# Patient Record
Sex: MALE | Race: WHITE | NOT HISPANIC OR LATINO | URBAN - METROPOLITAN AREA
[De-identification: names, ages, dates, MRNs, and addresses within clinical notes are randomized per-mention and may not be internally consistent; named-entity substitution may affect disease eponyms.]

---

## 2022-03-07 ENCOUNTER — INPATIENT (INPATIENT)
Facility: HOSPITAL | Age: 77
LOS: 8 days | Discharge: NURSING, REHAB OR LONG-TERM CARE | DRG: 870 | End: 2022-03-16
Attending: INTERNAL MEDICINE | Admitting: INTERNAL MEDICINE
Payer: MEDICARE

## 2022-03-07 VITALS
SYSTOLIC BLOOD PRESSURE: 153 MMHG | DIASTOLIC BLOOD PRESSURE: 84 MMHG | TEMPERATURE: 101 F | RESPIRATION RATE: 20 BRPM | OXYGEN SATURATION: 96 % | HEART RATE: 94 BPM | HEIGHT: 68 IN

## 2022-03-07 DIAGNOSIS — Z96.642 PRESENCE OF LEFT ARTIFICIAL HIP JOINT: Chronic | ICD-10-CM

## 2022-03-07 DIAGNOSIS — Z29.9 ENCOUNTER FOR PROPHYLACTIC MEASURES, UNSPECIFIED: ICD-10-CM

## 2022-03-07 DIAGNOSIS — Z98.89 OTHER SPECIFIED POSTPROCEDURAL STATES: Chronic | ICD-10-CM

## 2022-03-07 DIAGNOSIS — Z98.84 BARIATRIC SURGERY STATUS: Chronic | ICD-10-CM

## 2022-03-07 DIAGNOSIS — I10 ESSENTIAL (PRIMARY) HYPERTENSION: ICD-10-CM

## 2022-03-07 DIAGNOSIS — J18.9 PNEUMONIA, UNSPECIFIED ORGANISM: ICD-10-CM

## 2022-03-07 DIAGNOSIS — J96.01 ACUTE RESPIRATORY FAILURE WITH HYPOXIA: ICD-10-CM

## 2022-03-07 DIAGNOSIS — I48.91 UNSPECIFIED ATRIAL FIBRILLATION: ICD-10-CM

## 2022-03-07 DIAGNOSIS — G40.909 EPILEPSY, UNSPECIFIED, NOT INTRACTABLE, WITHOUT STATUS EPILEPTICUS: ICD-10-CM

## 2022-03-07 LAB
ALBUMIN SERPL ELPH-MCNC: 2.1 G/DL — LOW (ref 3.5–5)
ALBUMIN SERPL ELPH-MCNC: 2.2 G/DL — LOW (ref 3.5–5)
ALP SERPL-CCNC: 115 U/L — SIGNIFICANT CHANGE UP (ref 40–120)
ALP SERPL-CCNC: 117 U/L — SIGNIFICANT CHANGE UP (ref 40–120)
ALT FLD-CCNC: 29 U/L DA — SIGNIFICANT CHANGE UP (ref 10–60)
ALT FLD-CCNC: 33 U/L DA — SIGNIFICANT CHANGE UP (ref 10–60)
ANION GAP SERPL CALC-SCNC: 2 MMOL/L — LOW (ref 5–17)
ANION GAP SERPL CALC-SCNC: 5 MMOL/L — SIGNIFICANT CHANGE UP (ref 5–17)
APPEARANCE UR: CLEAR — SIGNIFICANT CHANGE UP
APTT BLD: 26.7 SEC — LOW (ref 27.5–35.5)
AST SERPL-CCNC: 25 U/L — SIGNIFICANT CHANGE UP (ref 10–40)
AST SERPL-CCNC: 37 U/L — SIGNIFICANT CHANGE UP (ref 10–40)
BASE EXCESS BLDA CALC-SCNC: 14.2 MMOL/L — HIGH (ref -2–3)
BASE EXCESS BLDV CALC-SCNC: 13.8 MMOL/L — SIGNIFICANT CHANGE UP
BASOPHILS # BLD AUTO: 0.1 K/UL — SIGNIFICANT CHANGE UP (ref 0–0.2)
BASOPHILS NFR BLD AUTO: 0.6 % — SIGNIFICANT CHANGE UP (ref 0–2)
BILIRUB SERPL-MCNC: 0.2 MG/DL — SIGNIFICANT CHANGE UP (ref 0.2–1.2)
BILIRUB SERPL-MCNC: 0.2 MG/DL — SIGNIFICANT CHANGE UP (ref 0.2–1.2)
BILIRUB UR-MCNC: NEGATIVE — SIGNIFICANT CHANGE UP
BLOOD GAS COMMENTS ARTERIAL: SIGNIFICANT CHANGE UP
BUN SERPL-MCNC: 32 MG/DL — HIGH (ref 7–18)
BUN SERPL-MCNC: 32 MG/DL — HIGH (ref 7–18)
CALCIUM SERPL-MCNC: 8.6 MG/DL — SIGNIFICANT CHANGE UP (ref 8.4–10.5)
CALCIUM SERPL-MCNC: 9 MG/DL — SIGNIFICANT CHANGE UP (ref 8.4–10.5)
CHLORIDE SERPL-SCNC: 107 MMOL/L — SIGNIFICANT CHANGE UP (ref 96–108)
CHLORIDE SERPL-SCNC: 107 MMOL/L — SIGNIFICANT CHANGE UP (ref 96–108)
CO2 SERPL-SCNC: 32 MMOL/L — HIGH (ref 22–31)
CO2 SERPL-SCNC: 35 MMOL/L — HIGH (ref 22–31)
COLOR SPEC: YELLOW — SIGNIFICANT CHANGE UP
CREAT SERPL-MCNC: 0.98 MG/DL — SIGNIFICANT CHANGE UP (ref 0.5–1.3)
CREAT SERPL-MCNC: 1 MG/DL — SIGNIFICANT CHANGE UP (ref 0.5–1.3)
CRP SERPL-MCNC: 87 MG/L — HIGH
DIFF PNL FLD: NEGATIVE — SIGNIFICANT CHANGE UP
EGFR: 78 ML/MIN/1.73M2 — SIGNIFICANT CHANGE UP
EGFR: 80 ML/MIN/1.73M2 — SIGNIFICANT CHANGE UP
EOSINOPHIL # BLD AUTO: 0.03 K/UL — SIGNIFICANT CHANGE UP (ref 0–0.5)
EOSINOPHIL NFR BLD AUTO: 0.2 % — SIGNIFICANT CHANGE UP (ref 0–6)
ERYTHROCYTE [SEDIMENTATION RATE] IN BLOOD: 109 MM/HR — HIGH (ref 0–20)
GLUCOSE SERPL-MCNC: 147 MG/DL — HIGH (ref 70–99)
GLUCOSE SERPL-MCNC: 156 MG/DL — HIGH (ref 70–99)
GLUCOSE UR QL: NEGATIVE — SIGNIFICANT CHANGE UP
HCO3 BLDA-SCNC: 40 MMOL/L — HIGH (ref 21–28)
HCO3 BLDV-SCNC: 40 MMOL/L — HIGH (ref 22–29)
HCT VFR BLD CALC: 28.1 % — LOW (ref 39–50)
HCT VFR BLD CALC: 30.4 % — LOW (ref 39–50)
HGB BLD-MCNC: 8.5 G/DL — LOW (ref 13–17)
HGB BLD-MCNC: 9 G/DL — LOW (ref 13–17)
HOROWITZ INDEX BLDA+IHG-RTO: 70 — SIGNIFICANT CHANGE UP
HOROWITZ INDEX BLDV+IHG-RTO: 21 — SIGNIFICANT CHANGE UP
IMM GRANULOCYTES NFR BLD AUTO: 0.6 % — SIGNIFICANT CHANGE UP (ref 0–1.5)
INR BLD: 1.13 RATIO — SIGNIFICANT CHANGE UP (ref 0.88–1.16)
KETONES UR-MCNC: NEGATIVE — SIGNIFICANT CHANGE UP
LACTATE SERPL-SCNC: 1.5 MMOL/L — SIGNIFICANT CHANGE UP (ref 0.7–2)
LACTATE SERPL-SCNC: 2.1 MMOL/L — HIGH (ref 0.7–2)
LEUKOCYTE ESTERASE UR-ACNC: NEGATIVE — SIGNIFICANT CHANGE UP
LYMPHOCYTES # BLD AUTO: 1.05 K/UL — SIGNIFICANT CHANGE UP (ref 1–3.3)
LYMPHOCYTES # BLD AUTO: 6.3 % — LOW (ref 13–44)
MAGNESIUM SERPL-MCNC: 2.8 MG/DL — HIGH (ref 1.6–2.6)
MCHC RBC-ENTMCNC: 29.6 GM/DL — LOW (ref 32–36)
MCHC RBC-ENTMCNC: 30.2 GM/DL — LOW (ref 32–36)
MCHC RBC-ENTMCNC: 31 PG — SIGNIFICANT CHANGE UP (ref 27–34)
MCHC RBC-ENTMCNC: 31 PG — SIGNIFICANT CHANGE UP (ref 27–34)
MCV RBC AUTO: 102.6 FL — HIGH (ref 80–100)
MCV RBC AUTO: 104.8 FL — HIGH (ref 80–100)
MONOCYTES # BLD AUTO: 0.48 K/UL — SIGNIFICANT CHANGE UP (ref 0–0.9)
MONOCYTES NFR BLD AUTO: 2.9 % — SIGNIFICANT CHANGE UP (ref 2–14)
NEUTROPHILS # BLD AUTO: 14.82 K/UL — HIGH (ref 1.8–7.4)
NEUTROPHILS NFR BLD AUTO: 89.4 % — HIGH (ref 43–77)
NITRITE UR-MCNC: NEGATIVE — SIGNIFICANT CHANGE UP
NRBC # BLD: 0 /100 WBCS — SIGNIFICANT CHANGE UP (ref 0–0)
NRBC # BLD: 0 /100 WBCS — SIGNIFICANT CHANGE UP (ref 0–0)
NT-PROBNP SERPL-SCNC: 8249 PG/ML — HIGH (ref 0–450)
PCO2 BLDA: 54 MMHG — HIGH (ref 35–48)
PCO2 BLDV: 55 MMHG — SIGNIFICANT CHANGE UP (ref 42–55)
PH BLDA: 7.48 — HIGH (ref 7.35–7.45)
PH BLDV: 7.47 — HIGH (ref 7.32–7.43)
PH UR: 6 — SIGNIFICANT CHANGE UP (ref 5–8)
PHOSPHATE SERPL-MCNC: 4.1 MG/DL — SIGNIFICANT CHANGE UP (ref 2.5–4.5)
PLATELET # BLD AUTO: 303 K/UL — SIGNIFICANT CHANGE UP (ref 150–400)
PLATELET # BLD AUTO: 306 K/UL — SIGNIFICANT CHANGE UP (ref 150–400)
PO2 BLDA: 85 MMHG — SIGNIFICANT CHANGE UP (ref 83–108)
PO2 BLDV: 56 MMHG — SIGNIFICANT CHANGE UP
POTASSIUM SERPL-MCNC: 4 MMOL/L — SIGNIFICANT CHANGE UP (ref 3.5–5.3)
POTASSIUM SERPL-MCNC: 4.4 MMOL/L — SIGNIFICANT CHANGE UP (ref 3.5–5.3)
POTASSIUM SERPL-SCNC: 4 MMOL/L — SIGNIFICANT CHANGE UP (ref 3.5–5.3)
POTASSIUM SERPL-SCNC: 4.4 MMOL/L — SIGNIFICANT CHANGE UP (ref 3.5–5.3)
PROCALCITONIN SERPL-MCNC: 0.11 NG/ML — HIGH (ref 0.02–0.1)
PROT SERPL-MCNC: 7.5 G/DL — SIGNIFICANT CHANGE UP (ref 6–8.3)
PROT SERPL-MCNC: 7.9 G/DL — SIGNIFICANT CHANGE UP (ref 6–8.3)
PROT UR-MCNC: 15
PROTHROM AB SERPL-ACNC: 13.5 SEC — HIGH (ref 10.5–13.4)
RBC # BLD: 2.74 M/UL — LOW (ref 4.2–5.8)
RBC # BLD: 2.9 M/UL — LOW (ref 4.2–5.8)
RBC # FLD: 17.7 % — HIGH (ref 10.3–14.5)
RBC # FLD: 18 % — HIGH (ref 10.3–14.5)
SAO2 % BLDA: 98 % — SIGNIFICANT CHANGE UP
SAO2 % BLDV: 87.3 % — SIGNIFICANT CHANGE UP
SARS-COV-2 RNA SPEC QL NAA+PROBE: SIGNIFICANT CHANGE UP
SODIUM SERPL-SCNC: 144 MMOL/L — SIGNIFICANT CHANGE UP (ref 135–145)
SODIUM SERPL-SCNC: 144 MMOL/L — SIGNIFICANT CHANGE UP (ref 135–145)
SP GR SPEC: 1.01 — SIGNIFICANT CHANGE UP (ref 1.01–1.02)
TROPONIN I, HIGH SENSITIVITY RESULT: 29.8 NG/L — SIGNIFICANT CHANGE UP
UROBILINOGEN FLD QL: NEGATIVE — SIGNIFICANT CHANGE UP
WBC # BLD: 14.44 K/UL — HIGH (ref 3.8–10.5)
WBC # BLD: 16.58 K/UL — HIGH (ref 3.8–10.5)
WBC # FLD AUTO: 14.44 K/UL — HIGH (ref 3.8–10.5)
WBC # FLD AUTO: 16.58 K/UL — HIGH (ref 3.8–10.5)

## 2022-03-07 PROCEDURE — 99285 EMERGENCY DEPT VISIT HI MDM: CPT

## 2022-03-07 PROCEDURE — 71045 X-RAY EXAM CHEST 1 VIEW: CPT | Mod: 26

## 2022-03-07 PROCEDURE — 93306 TTE W/DOPPLER COMPLETE: CPT | Mod: 26

## 2022-03-07 PROCEDURE — 99223 1ST HOSP IP/OBS HIGH 75: CPT

## 2022-03-07 PROCEDURE — 93010 ELECTROCARDIOGRAM REPORT: CPT

## 2022-03-07 RX ORDER — AMANTADINE HCL 100 MG
100 CAPSULE ORAL
Refills: 0 | Status: DISCONTINUED | OUTPATIENT
Start: 2022-03-07 | End: 2022-03-16

## 2022-03-07 RX ORDER — TIOTROPIUM BROMIDE 18 UG/1
1 CAPSULE ORAL; RESPIRATORY (INHALATION) DAILY
Refills: 0 | Status: DISCONTINUED | OUTPATIENT
Start: 2022-03-07 | End: 2022-03-09

## 2022-03-07 RX ORDER — AMIODARONE HYDROCHLORIDE 400 MG/1
200 TABLET ORAL DAILY
Refills: 0 | Status: DISCONTINUED | OUTPATIENT
Start: 2022-03-07 | End: 2022-03-16

## 2022-03-07 RX ORDER — VANCOMYCIN HCL 1 G
1000 VIAL (EA) INTRAVENOUS ONCE
Refills: 0 | Status: COMPLETED | OUTPATIENT
Start: 2022-03-07 | End: 2022-03-07

## 2022-03-07 RX ORDER — VANCOMYCIN HCL 1 G
1000 VIAL (EA) INTRAVENOUS EVERY 24 HOURS
Refills: 0 | Status: DISCONTINUED | OUTPATIENT
Start: 2022-03-07 | End: 2022-03-07

## 2022-03-07 RX ORDER — CHLORHEXIDINE GLUCONATE 213 G/1000ML
15 SOLUTION TOPICAL EVERY 12 HOURS
Refills: 0 | Status: DISCONTINUED | OUTPATIENT
Start: 2022-03-07 | End: 2022-03-13

## 2022-03-07 RX ORDER — ENOXAPARIN SODIUM 100 MG/ML
40 INJECTION SUBCUTANEOUS EVERY 24 HOURS
Refills: 0 | Status: DISCONTINUED | OUTPATIENT
Start: 2022-03-07 | End: 2022-03-16

## 2022-03-07 RX ORDER — IPRATROPIUM/ALBUTEROL SULFATE 18-103MCG
3 AEROSOL WITH ADAPTER (GRAM) INHALATION ONCE
Refills: 0 | Status: COMPLETED | OUTPATIENT
Start: 2022-03-07 | End: 2022-03-07

## 2022-03-07 RX ORDER — ACETAMINOPHEN 500 MG
650 TABLET ORAL ONCE
Refills: 0 | Status: COMPLETED | OUTPATIENT
Start: 2022-03-07 | End: 2022-03-07

## 2022-03-07 RX ORDER — PIPERACILLIN AND TAZOBACTAM 4; .5 G/20ML; G/20ML
3.38 INJECTION, POWDER, LYOPHILIZED, FOR SOLUTION INTRAVENOUS EVERY 8 HOURS
Refills: 0 | Status: COMPLETED | OUTPATIENT
Start: 2022-03-07 | End: 2022-03-13

## 2022-03-07 RX ORDER — SENNA PLUS 8.6 MG/1
10 TABLET ORAL AT BEDTIME
Refills: 0 | Status: DISCONTINUED | OUTPATIENT
Start: 2022-03-07 | End: 2022-03-16

## 2022-03-07 RX ORDER — SODIUM CHLORIDE 9 MG/ML
1000 INJECTION INTRAMUSCULAR; INTRAVENOUS; SUBCUTANEOUS
Refills: 0 | Status: DISCONTINUED | OUTPATIENT
Start: 2022-03-07 | End: 2022-03-07

## 2022-03-07 RX ORDER — SODIUM CHLORIDE 9 MG/ML
1000 INJECTION INTRAMUSCULAR; INTRAVENOUS; SUBCUTANEOUS ONCE
Refills: 0 | Status: COMPLETED | OUTPATIENT
Start: 2022-03-07 | End: 2022-03-07

## 2022-03-07 RX ORDER — LACTULOSE 10 G/15ML
6.67 SOLUTION ORAL
Refills: 0 | Status: DISCONTINUED | OUTPATIENT
Start: 2022-03-07 | End: 2022-03-16

## 2022-03-07 RX ORDER — ACETAMINOPHEN 500 MG
1000 TABLET ORAL ONCE
Refills: 0 | Status: COMPLETED | OUTPATIENT
Start: 2022-03-07 | End: 2022-03-08

## 2022-03-07 RX ORDER — ALBUTEROL 90 UG/1
2 AEROSOL, METERED ORAL EVERY 6 HOURS
Refills: 0 | Status: DISCONTINUED | OUTPATIENT
Start: 2022-03-07 | End: 2022-03-16

## 2022-03-07 RX ORDER — TAMSULOSIN HYDROCHLORIDE 0.4 MG/1
0.4 CAPSULE ORAL AT BEDTIME
Refills: 0 | Status: DISCONTINUED | OUTPATIENT
Start: 2022-03-07 | End: 2022-03-16

## 2022-03-07 RX ORDER — FOLIC ACID 0.8 MG
1 TABLET ORAL DAILY
Refills: 0 | Status: DISCONTINUED | OUTPATIENT
Start: 2022-03-07 | End: 2022-03-16

## 2022-03-07 RX ORDER — CARVEDILOL PHOSPHATE 80 MG/1
6.25 CAPSULE, EXTENDED RELEASE ORAL EVERY 12 HOURS
Refills: 0 | Status: DISCONTINUED | OUTPATIENT
Start: 2022-03-07 | End: 2022-03-08

## 2022-03-07 RX ORDER — LEVETIRACETAM 250 MG/1
250 TABLET, FILM COATED ORAL
Refills: 0 | Status: DISCONTINUED | OUTPATIENT
Start: 2022-03-07 | End: 2022-03-08

## 2022-03-07 RX ORDER — MORPHINE SULFATE 50 MG/1
1 CAPSULE, EXTENDED RELEASE ORAL EVERY 4 HOURS
Refills: 0 | Status: DISCONTINUED | OUTPATIENT
Start: 2022-03-07 | End: 2022-03-08

## 2022-03-07 RX ORDER — ASCORBIC ACID 60 MG
500 TABLET,CHEWABLE ORAL DAILY
Refills: 0 | Status: DISCONTINUED | OUTPATIENT
Start: 2022-03-07 | End: 2022-03-16

## 2022-03-07 RX ORDER — MORPHINE SULFATE 50 MG/1
2 CAPSULE, EXTENDED RELEASE ORAL ONCE
Refills: 0 | Status: DISCONTINUED | OUTPATIENT
Start: 2022-03-07 | End: 2022-03-07

## 2022-03-07 RX ORDER — PIPERACILLIN AND TAZOBACTAM 4; .5 G/20ML; G/20ML
3.38 INJECTION, POWDER, LYOPHILIZED, FOR SOLUTION INTRAVENOUS ONCE
Refills: 0 | Status: COMPLETED | OUTPATIENT
Start: 2022-03-07 | End: 2022-03-07

## 2022-03-07 RX ORDER — COLLAGENASE CLOSTRIDIUM HIST. 250 UNIT/G
1 OINTMENT (GRAM) TOPICAL DAILY
Refills: 0 | Status: DISCONTINUED | OUTPATIENT
Start: 2022-03-07 | End: 2022-03-13

## 2022-03-07 RX ADMIN — Medication 250 MILLIGRAM(S): at 22:35

## 2022-03-07 RX ADMIN — MORPHINE SULFATE 2 MILLIGRAM(S): 50 CAPSULE, EXTENDED RELEASE ORAL at 16:35

## 2022-03-07 RX ADMIN — PIPERACILLIN AND TAZOBACTAM 25 GRAM(S): 4; .5 INJECTION, POWDER, LYOPHILIZED, FOR SOLUTION INTRAVENOUS at 14:50

## 2022-03-07 RX ADMIN — ALBUTEROL 2 PUFF(S): 90 AEROSOL, METERED ORAL at 10:21

## 2022-03-07 RX ADMIN — LEVETIRACETAM 250 MILLIGRAM(S): 250 TABLET, FILM COATED ORAL at 22:09

## 2022-03-07 RX ADMIN — Medication 1 TABLET(S): at 13:28

## 2022-03-07 RX ADMIN — AMIODARONE HYDROCHLORIDE 200 MILLIGRAM(S): 400 TABLET ORAL at 05:59

## 2022-03-07 RX ADMIN — Medication 1 APPLICATION(S): at 13:39

## 2022-03-07 RX ADMIN — SODIUM CHLORIDE 12 MILLILITER(S): 9 INJECTION INTRAMUSCULAR; INTRAVENOUS; SUBCUTANEOUS at 13:30

## 2022-03-07 RX ADMIN — CARVEDILOL PHOSPHATE 6.25 MILLIGRAM(S): 80 CAPSULE, EXTENDED RELEASE ORAL at 05:59

## 2022-03-07 RX ADMIN — ENOXAPARIN SODIUM 40 MILLIGRAM(S): 100 INJECTION SUBCUTANEOUS at 13:29

## 2022-03-07 RX ADMIN — SODIUM CHLORIDE 500 MILLILITER(S): 9 INJECTION INTRAMUSCULAR; INTRAVENOUS; SUBCUTANEOUS at 03:23

## 2022-03-07 RX ADMIN — TAMSULOSIN HYDROCHLORIDE 0.4 MILLIGRAM(S): 0.4 CAPSULE ORAL at 23:26

## 2022-03-07 RX ADMIN — Medication 250 MILLIGRAM(S): at 02:35

## 2022-03-07 RX ADMIN — Medication 500 MILLIGRAM(S): at 13:29

## 2022-03-07 RX ADMIN — TIOTROPIUM BROMIDE 1 CAPSULE(S): 18 CAPSULE ORAL; RESPIRATORY (INHALATION) at 13:28

## 2022-03-07 RX ADMIN — MORPHINE SULFATE 2 MILLIGRAM(S): 50 CAPSULE, EXTENDED RELEASE ORAL at 16:09

## 2022-03-07 RX ADMIN — PIPERACILLIN AND TAZOBACTAM 3.38 GRAM(S): 4; .5 INJECTION, POWDER, LYOPHILIZED, FOR SOLUTION INTRAVENOUS at 03:35

## 2022-03-07 RX ADMIN — SENNA PLUS 10 MILLILITER(S): 8.6 TABLET ORAL at 22:52

## 2022-03-07 RX ADMIN — PIPERACILLIN AND TAZOBACTAM 200 GRAM(S): 4; .5 INJECTION, POWDER, LYOPHILIZED, FOR SOLUTION INTRAVENOUS at 02:35

## 2022-03-07 RX ADMIN — LEVETIRACETAM 250 MILLIGRAM(S): 250 TABLET, FILM COATED ORAL at 05:59

## 2022-03-07 RX ADMIN — Medication 1 MILLIGRAM(S): at 13:29

## 2022-03-07 RX ADMIN — Medication 650 MILLIGRAM(S): at 04:14

## 2022-03-07 RX ADMIN — Medication 3 MILLILITER(S): at 02:38

## 2022-03-07 RX ADMIN — PIPERACILLIN AND TAZOBACTAM 25 GRAM(S): 4; .5 INJECTION, POWDER, LYOPHILIZED, FOR SOLUTION INTRAVENOUS at 23:26

## 2022-03-07 RX ADMIN — ALBUTEROL 2 PUFF(S): 90 AEROSOL, METERED ORAL at 16:30

## 2022-03-07 RX ADMIN — Medication 100 MILLIGRAM(S): at 06:00

## 2022-03-07 RX ADMIN — Medication 100 MILLIGRAM(S): at 23:22

## 2022-03-07 RX ADMIN — Medication 1000 MILLIGRAM(S): at 03:35

## 2022-03-07 RX ADMIN — LACTULOSE 6.67 GRAM(S): 10 SOLUTION ORAL at 05:59

## 2022-03-07 RX ADMIN — LACTULOSE 6.67 GRAM(S): 10 SOLUTION ORAL at 22:11

## 2022-03-07 RX ADMIN — ALBUTEROL 2 PUFF(S): 90 AEROSOL, METERED ORAL at 22:50

## 2022-03-07 RX ADMIN — CARVEDILOL PHOSPHATE 6.25 MILLIGRAM(S): 80 CAPSULE, EXTENDED RELEASE ORAL at 21:49

## 2022-03-07 RX ADMIN — Medication 650 MILLIGRAM(S): at 03:44

## 2022-03-07 RX ADMIN — CHLORHEXIDINE GLUCONATE 15 MILLILITER(S): 213 SOLUTION TOPICAL at 23:19

## 2022-03-07 NOTE — H&P ADULT - NSHPPHYSICALEXAM_GEN_ALL_CORE
ICU Vital Signs Last 24 Hrs  T(C): 37.2 (07 Mar 2022 04:17), Max: 38.1 (07 Mar 2022 01:15)  T(F): 99 (07 Mar 2022 04:17), Max: 100.6 (07 Mar 2022 01:15)  HR: 92 (07 Mar 2022 04:17) (92 - 98)  BP: 167/88 (07 Mar 2022 04:17) (153/84 - 167/88)  RR: 25 (07 Mar 2022 04:17) (20 - 25)  SpO2: 96% (07 Mar 2022 04:17) (96% - 96%)    GENERAL: NAD, lying in bed, Trach/PEG,  in respiratory distress on 15L T collar saturatin 95%  HEAD:  Atraumatic, Normocephalic  EYES: EOMI, PERRLA, conjunctiva and sclera clear  ENT: Moist mucous membranes  NECK: Supple, No JVD  CHEST/LUNG: Diminished breath sound bilaterally   HEART: Regular rate and rhythm; No murmurs, rubs, or gallops  ABDOMEN: Bowel sounds present; Soft, Nontende.  EXTREMITIES:  2+ Peripheral Pulses, brisk capillary refill. No clubbing, cyanosis, or edema  NERVOUS SYSTEM:  Alert & Oriented X0-1. non verbal   MSK: Unable to assess   SKIN: Sacral ulcer as per chart ( unable to prone the patient due to desat and his weight )

## 2022-03-07 NOTE — CHART NOTE - NSCHARTNOTEFT_GEN_A_CORE
OBJECTIVE:  Vital Signs Last 24 Hrs  T(C): 36.7 (07 Mar 2022 07:34), Max: 38.1 (07 Mar 2022 01:15)  T(F): 98.1 (07 Mar 2022 07:34), Max: 100.6 (07 Mar 2022 01:15)  HR: 100 (07 Mar 2022 07:34) (92 - 107)  BP: 140/83 (07 Mar 2022 07:34) (140/83 - 197/83)  BP(mean): --  RR: 29 (07 Mar 2022 07:34) (20 - 29)  SpO2: 95% (07 Mar 2022 07:34) (93% - 97%)    FOCUSED PHYSICAL EXAM: lethargic, trach to 15L c-collar venti mask, requires frequent suctioning, approtiote candidate for AI, ICU consluted    LABS:                        8.5    14.44 )-----------( 303      ( 07 Mar 2022 05:34 )             28.1     03-07    144  |  107  |  32<H>  ----------------------------<  147<H>  4.0   |  35<H>  |  0.98    Ca    8.6      07 Mar 2022 05:34  Phos  4.1     03-07  Mg     2.8     03-07    TPro  7.5  /  Alb  2.2<L>  /  TBili  0.2  /  DBili  x   /  AST  25  /  ALT  29  /  AlkPhos  115  03-07      IMGAGING:    < from: Xray Chest 1 View-PORTABLE IMMEDIATE (03.07.22 @ 01:51) >    ACC: [id number] EXAM:  XR CHEST PORTABLE IMMED 1V                          PROCEDURE DATE:  03/07/2022          INTERPRETATION:  INDICATION: Sepsis    PRIORS: None    VIEWS: Portable AP radiography of the chest performed. Evaluation limited   secondary to portable technique and shallow inspiration.    FINDINGS: Heart size and mediastinal contours cannot be assessed on this   evaluation. Midline tracheostomy. Interstitial prominence may reflect   shallow inspiration. Increased markings within the lung bases suggests   atelectatic change. Small pleural effusions cannot be excluded. There is   no evidence for pneumothorax. No mediastinal shift.    IMPRESSION: As above.    --- End of Report ---            BECK SHEPHERD MD; Attending Radiologist  This document has been electronically signed. Mar  7 2022  9:00AM    < end of copied text >    HPI:  · Assessment    Patient is 76 years old male (with Trach/Peg) from NH with past medical history Respiratory failure, pressure ulcer of sacral region, intracranial injury, anemia, UTI, was brought to ED due to respiratory distress and hypoxia from nursing home. After 12am, it was noted that his O2 was 82%, he had labored breathing and was less responsive. At the nursing home, trach was suctioned and he was placed on oxygen and with oxygen, saturation went to 92% and he was sent to ED by EMS. Patient is currently on Zosyn antibiotic therapy.    In ED, Patient was placed Trach collar 15L saturating 95%. CXR showed lung infiltrations. Patient got Vancomycin IV and frequent suction.    Patient will be admitted for AHRF secondary to pneumonia     Problem/Plan - 1:  ·  Problem: Acute respiratory failure with hypoxia.   ·  Plan: - Patient with hx of respiratory failure on Trach/Peg was brought ED due to hypoxia to 82%   - Patient was placed on trach collar 15L saturating 95%  - CXR showed bilateral lung infiltrates   - Cont van and Zosyn  - vanco trough march 9th 0200  - Cont frequent Trach suctions   - Will send Inflammatory markers including Procal   - BNP >8k;   - F/u  Echo.  - ICU conslute for AI    Problem/Plan - 2:  ·  Problem: Pneumonia.   ·  Plan: - Plan as above   -Monitor vitals.    Problem/Plan - 3:  ·  Problem: Afib.   ·  Plan: - EKG showed Afib with RVR; Now HR 90s  - Patient is on Coreg and Amiodarone   - Will continue on same meds now with holding parameters.    Problem/Plan - 4:  ·  Problem: Epileptic seizures.   ·  Plan: - Continue on home meds.    Problem/Plan - 5:  ·  Problem: Hypertension.   ·  Plan: - Continue on home meds with holding parameters.    Problem/Plan - 6:  ·  Problem: Prophylactic measure.   ·  Plan: - Lovenox 40mg daily subcutaneous for DVT pxp.

## 2022-03-07 NOTE — ED PROVIDER NOTE - OBJECTIVE STATEMENT
75 y/o male with a history of respiratory failure with a trach, anemia, subdural hemorrhage, epilepsy, gastrostomy, hypertension, BPH, Afib and currently on Lovenox presents with respiratory distress and hypoxia from nursing home. After 12am, it was noted that his O2 was satting 82%, he had labored breathing and was less responsive. At the nursing home, trach was suctioned and he was placed on oxygen and with oxygen, saturation went to 92% and he was sent to ED by EMS. Patient is currently on Zosyn antibiotic therapy. NKDA.

## 2022-03-07 NOTE — H&P ADULT - NSICDXPASTSURGICALHX_GEN_ALL_CORE_FT
PAST SURGICAL HISTORY:  History of abdominoplasty and subsequent cosmetic surgery for removal of excess skin from face    S/P bunionectomy bilateral    S/P colonoscopy approx 2012    S/P gastric bypass 2008    Status post total replacement of left hip 2006

## 2022-03-07 NOTE — CONSULT NOTE ADULT - SUBJECTIVE AND OBJECTIVE BOX
HPI:  Patient is 76 years old male (with Trach/Peg) from NH with past medical history Respiratory failure, pressure ulcer of sacral region, intracranial injury, anemia, UTI, was brought to ED due to respiratory distress and hypoxia from nursing home. After 12am, it was noted that his O2 was satting 82%, he had labored breathing and was less responsive. At the nursing home, trach was suctioned and he was placed on oxygen and with oxygen, saturation went to 92% and he was sent to ED by EMS. Patient is currently on Zosyn antibiotic therapy.    In ED, Patient was placed Trach collar 15L saturating 95%. CXR showed lung infiltrations. Patient got Vancomycin IV and frequent suction. (07 Mar 2022 05:22)      PAST MEDICAL & SURGICAL HISTORY:  Essential hypertension    Primary osteoarthritis, unspecified site    Closed fracture of left radius, initial encounter    Morbid obesity, unspecified obesity type  h/o. - s/p gastric bypass- lost 113 lbs    KAREN (obstructive sleep apnea)  h/o - was on CPAP until gastric bypass surgery    Respiratory failure    Anemia    Subdural hemorrhage    Epilepsy    H/O gastrostomy    History of BPH    Afib    S/P gastric bypass  2008    Status post total replacement of left hip  2006    S/P bunionectomy  bilateral    S/P colonoscopy  approx 2012    History of abdominoplasty  and subsequent cosmetic surgery for removal of excess skin from face        No Known Allergies      Meds:  ALBUTerol    90 MICROgram(s) HFA Inhaler 2 Puff(s) Inhalation every 6 hours  amantadine Syrup 100 milliGRAM(s) Oral two times a day  aMIOdarone    Tablet 200 milliGRAM(s) Oral daily  ascorbic acid 500 milliGRAM(s) Oral daily  carvedilol 6.25 milliGRAM(s) Oral every 12 hours  chlorhexidine 0.12% Liquid 15 milliLiter(s) Oral Mucosa every 12 hours  collagenase Ointment 1 Application(s) Topical daily  enoxaparin Injectable 40 milliGRAM(s) SubCutaneous every 24 hours  folic acid 1 milliGRAM(s) Oral daily  lactulose Syrup 6.6667 Gram(s) Oral two times a day  levETIRAcetam  Solution 250 milliGRAM(s) Oral two times a day  LORazepam   Injectable 1 milliGRAM(s) IV Push once  morphine  - Injectable 1 milliGRAM(s) IV Push every 4 hours  multivitamin 1 Tablet(s) Oral daily  piperacillin/tazobactam IVPB.. 3.375 Gram(s) IV Intermittent every 8 hours  senna Syrup 10 milliLiter(s) Oral at bedtime  tamsulosin 0.4 milliGRAM(s) Oral at bedtime  tiotropium 18 MICROgram(s) Capsule 1 Capsule(s) Inhalation daily  vancomycin  IVPB 1000 milliGRAM(s) IV Intermittent every 24 hours      SOCIAL HISTORY:  Smoker:  YES / NO        PACK YEARS:                         WHEN QUIT?  ETOH use:  YES / NO               FREQUENCY / QUANTITY:  Ilicit Drug use:  YES / NO  Occupation:  Assisted device use (Cane / Walker):  Live with:    FAMILY HISTORY:  Family history of renal cell carcinoma (Mother)    Family history of malignant melanoma (Sibling)        VITALS:  Vital Signs Last 24 Hrs  T(C): 36.8 (07 Mar 2022 16:00), Max: 38.1 (07 Mar 2022 01:15)  T(F): 98.3 (07 Mar 2022 16:00), Max: 100.6 (07 Mar 2022 01:15)  HR: 106 (07 Mar 2022 16:00) (92 - 107)  BP: 185/104 (07 Mar 2022 16:00) (140/83 - 197/83)  BP(mean): 131 (07 Mar 2022 16:00) (131 - 131)  RR: 28 (07 Mar 2022 16:00) (20 - 29)  SpO2: 97% (07 Mar 2022 17:06) (89% - 97%)    LABS/DIAGNOSTIC TESTS:                          8.5    14.44 )-----------( 303      ( 07 Mar 2022 05:34 )             28.1     WBC Count: 14.44 K/uL ( @ 05:34)  WBC Count: 16.58 K/uL ( @ 02:23)          144  |  107  |  32<H>  ----------------------------<  147<H>  4.0   |  35<H>  |  0.98    Ca    8.6      07 Mar 2022 05:34  Phos  4.1       Mg     2.8         TPro  7.5  /  Alb  2.2<L>  /  TBili  0.2  /  DBili  x   /  AST  25  /  ALT  29  /  AlkPhos  115        Urinalysis Basic - ( 07 Mar 2022 02:30 )    Color: Yellow / Appearance: Clear / S.015 / pH: x  Gluc: x / Ketone: Negative  / Bili: Negative / Urobili: Negative   Blood: x / Protein: 15 / Nitrite: Negative   Leuk Esterase: Negative / RBC: 0-2 /HPF / WBC 0-2 /HPF   Sq Epi: x / Non Sq Epi: Few /HPF / Bacteria: Few /HPF        LIVER FUNCTIONS - ( 07 Mar 2022 05:34 )  Alb: 2.2 g/dL / Pro: 7.5 g/dL / ALK PHOS: 115 U/L / ALT: 29 U/L DA / AST: 25 U/L / GGT: x             PT/INR - ( 07 Mar 2022 02:23 )   PT: 13.5 sec;   INR: 1.13 ratio         PTT - ( 07 Mar 2022 02:23 )  PTT:26.7 sec    LACTATE:Lactate, Blood: 1.5 mmol/L ( @ 04:41)  Lactate, Blood: 2.1 mmol/L ( @ 02:23)      ABG - ABG - ( 07 Mar 2022 16:56 )  pH, Arterial: 7.48  pH, Blood: x     /  pCO2: 54    /  pO2: 85    / HCO3: 40    / Base Excess: 14.2  /  SaO2: 98                  CULTURES:       RADIOLOGY:< from: Xray Chest 1 View-PORTABLE IMMEDIATE (22 @ 01:51) >  ACC: [id number] EXAM:  XR CHEST PORTABLE IMMED 1V                          PROCEDURE DATE:  2022          INTERPRETATION:  INDICATION: Sepsis    PRIORS: None    VIEWS: Portable AP radiography of the chest performed. Evaluation limited   secondary to portable technique and shallow inspiration.    FINDINGS: Heart size and mediastinal contours cannot be assessed on this   evaluation. Midline tracheostomy. Interstitial prominence may reflect   shallow inspiration. Increased markings within the lung bases suggests   atelectatic change. Small pleural effusions cannot be excluded. There is   no evidence for pneumothorax. No mediastinal shift.    IMPRESSION: As above.    --- End of Report ---            BECK SHEPHERD MD; Attending Radiologist  This document has been electronically signed. Mar  7 2022  9:00AM    < end of copied text >        ROS  [  ] UNABLE TO ELICIT               HPI:  Patient is 76 years old male (with Trach/Peg) from NH with past medical history Respiratory failure, pressure ulcer of sacral region, intracranial injury, anemia, UTI, was brought to ED due to respiratory distress and hypoxia from nursing home. After 12am, it was noted that his O2 was satting 82%, he had labored breathing and was less responsive. At the nursing home, trach was suctioned and he was placed on oxygen and with oxygen, saturation went to 92% and he was sent to ED by EMS. Patient is currently on Zosyn antibiotic therapy.    In ED, Patient was placed Trach collar 15L saturating 95%. CXR showed lung infiltrations. Patient got Vancomycin IV and frequent suction. (07 Mar 2022 05:22)      History as above, pt is from a NH and has a trach and peg secondary to a prior intracranial hemorrhage , he was sent to the hospital for respiratory distress and hypoxia. He is currently vent dependent on an FIO2 of 80% and PEEP of 5. Asked to see patient as he has what appears to be Pneumonia along with fevers and leukocytosis. He opens his eyes partially to tactile stimuli.       PAST MEDICAL & SURGICAL HISTORY:  Essential hypertension    Primary osteoarthritis, unspecified site    Closed fracture of left radius, initial encounter    Morbid obesity, unspecified obesity type  h/o. - s/p gastric bypass- lost 113 lbs    KAREN (obstructive sleep apnea)  h/o - was on CPAP until gastric bypass surgery    Respiratory failure    Anemia    Subdural hemorrhage    Epilepsy    H/O gastrostomy    History of BPH    Afib    S/P gastric bypass      Status post total replacement of left hip  2006    S/P bunionectomy  bilateral    S/P colonoscopy  approx 2012    History of abdominoplasty  and subsequent cosmetic surgery for removal of excess skin from face        No Known Allergies      Meds:  ALBUTerol    90 MICROgram(s) HFA Inhaler 2 Puff(s) Inhalation every 6 hours  amantadine Syrup 100 milliGRAM(s) Oral two times a day  aMIOdarone    Tablet 200 milliGRAM(s) Oral daily  ascorbic acid 500 milliGRAM(s) Oral daily  carvedilol 6.25 milliGRAM(s) Oral every 12 hours  chlorhexidine 0.12% Liquid 15 milliLiter(s) Oral Mucosa every 12 hours  collagenase Ointment 1 Application(s) Topical daily  enoxaparin Injectable 40 milliGRAM(s) SubCutaneous every 24 hours  folic acid 1 milliGRAM(s) Oral daily  lactulose Syrup 6.6667 Gram(s) Oral two times a day  levETIRAcetam  Solution 250 milliGRAM(s) Oral two times a day  LORazepam   Injectable 1 milliGRAM(s) IV Push once  morphine  - Injectable 1 milliGRAM(s) IV Push every 4 hours  multivitamin 1 Tablet(s) Oral daily  piperacillin/tazobactam IVPB.. 3.375 Gram(s) IV Intermittent every 8 hours  senna Syrup 10 milliLiter(s) Oral at bedtime  tamsulosin 0.4 milliGRAM(s) Oral at bedtime  tiotropium 18 MICROgram(s) Capsule 1 Capsule(s) Inhalation daily  vancomycin  IVPB 1000 milliGRAM(s) IV Intermittent every 24 hours      SOCIAL HISTORY: unknown      FAMILY HISTORY:  Family history of renal cell carcinoma (Mother)    Family history of malignant melanoma (Sibling)        VITALS:  Vital Signs Last 24 Hrs  T(C): 36.8 (07 Mar 2022 16:00), Max: 38.1 (07 Mar 2022 01:15)  T(F): 98.3 (07 Mar 2022 16:00), Max: 100.6 (07 Mar 2022 01:15)  HR: 106 (07 Mar 2022 16:00) (92 - 107)  BP: 185/104 (07 Mar 2022 16:00) (140/83 - 197/83)  BP(mean): 131 (07 Mar 2022 16:00) (131 - 131)  RR: 28 (07 Mar 2022 16:00) (20 - 29)  SpO2: 97% (07 Mar 2022 17:06) (89% - 97%)    LABS/DIAGNOSTIC TESTS:                          8.5    14.44 )-----------( 303      ( 07 Mar 2022 05:34 )             28.1     WBC Count: 14.44 K/uL ( @ 05:34)  WBC Count: 16.58 K/uL ( @ 02:23)          144  |  107  |  32<H>  ----------------------------<  147<H>  4.0   |  35<H>  |  0.98    Ca    8.6      07 Mar 2022 05:34  Phos  4.1     -  Mg     2.8         TPro  7.5  /  Alb  2.2<L>  /  TBili  0.2  /  DBili  x   /  AST  25  /  ALT  29  /  AlkPhos  115  -      Urinalysis Basic - ( 07 Mar 2022 02:30 )    Color: Yellow / Appearance: Clear / S.015 / pH: x  Gluc: x / Ketone: Negative  / Bili: Negative / Urobili: Negative   Blood: x / Protein: 15 / Nitrite: Negative   Leuk Esterase: Negative / RBC: 0-2 /HPF / WBC 0-2 /HPF   Sq Epi: x / Non Sq Epi: Few /HPF / Bacteria: Few /HPF        LIVER FUNCTIONS - ( 07 Mar 2022 05:34 )  Alb: 2.2 g/dL / Pro: 7.5 g/dL / ALK PHOS: 115 U/L / ALT: 29 U/L DA / AST: 25 U/L / GGT: x             PT/INR - ( 07 Mar 2022 02:23 )   PT: 13.5 sec;   INR: 1.13 ratio         PTT - ( 07 Mar 2022 02:23 )  PTT:26.7 sec    LACTATE:Lactate, Blood: 1.5 mmol/L ( @ 04:41)  Lactate, Blood: 2.1 mmol/L ( @ 02:23)      ABG - ABG - ( 07 Mar 2022 16:56 )  pH, Arterial: 7.48  pH, Blood: x     /  pCO2: 54    /  pO2: 85    / HCO3: 40    / Base Excess: 14.2  /  SaO2: 98                  CULTURES:       RADIOLOGY:< from: Xray Chest 1 View-PORTABLE IMMEDIATE (22 @ 01:51) >  ACC: [id number] EXAM:  XR CHEST PORTABLE IMMED 1V                          PROCEDURE DATE:  2022          INTERPRETATION:  INDICATION: Sepsis    PRIORS: None    VIEWS: Portable AP radiography of the chest performed. Evaluation limited   secondary to portable technique and shallow inspiration.    FINDINGS: Heart size and mediastinal contours cannot be assessed on this   evaluation. Midline tracheostomy. Interstitial prominence may reflect   shallow inspiration. Increased markings within the lung bases suggests   atelectatic change. Small pleural effusions cannot be excluded. There is   no evidence for pneumothorax. No mediastinal shift.    IMPRESSION: As above.    --- End of Report ---            BECK SHEPHERD MD; Attending Radiologist  This document has been electronically signed. Mar  7 2022  9:00AM    < end of copied text >        ROS  [ x ] UNABLE TO ELICIT

## 2022-03-07 NOTE — ED ADULT NURSE REASSESSMENT NOTE - NS ED NURSE REASSESS COMMENT FT1
1630: Pt received from RN Shraddha in holding area. Pt hypoxic, tracheostomy connected to vent. Pt waiting to be transferred to ICU. Pt on cardaic monitor.

## 2022-03-07 NOTE — ED PROVIDER NOTE - NSICDXFAMILYHX_GEN_ALL_CORE_FT
FAMILY HISTORY:  Mother  Still living? No  Family history of renal cell carcinoma, Age at diagnosis: Age Unknown    Sibling  Still living? No  Family history of malignant melanoma, Age at diagnosis: Age Unknown

## 2022-03-07 NOTE — ED ADULT TRIAGE NOTE - CHIEF COMPLAINT QUOTE
Pt BIBA from [facility] for short of breathe and low oxygen saturation received with Venti-mask attached to trach collar.

## 2022-03-07 NOTE — CONSULT NOTE ADULT - ASSESSMENT
76 years old male (with Trach/Peg) from NH with PMH of Respiratory failure, pressure ulcer of sacral region, intracranial injury, anemia, UTI, was brought to ED due to respiratory distress and hypoxia from nursing home. In ED, Patient was placed Trach collar 15L saturating 93%. CXR showed lung infiltrations. Patient requires frequent suctioning. Pt started on Vancomycin and Zosyn.  Patient will be admitted for AHRF secondary to pneumonia     Neuro  #Appears lethargic, AAOX1     #Epilepsy  -c/w home medications      Cardiovascular  #Afib   - EKG showed Afib with RVR; Now HR 90s  - Patient is on Coreg and Amiodarone  - c/w meds     #HTN  -c/w coreg     Pulmonary  #Acute Hypoxic Respiratory Failure   -Patient with hx of respiratory failure on Trach/Peg   - Pt was brought ED due to hypoxia to 82%   - Patient was placed on trach collar 15L saturating 93-95%  -WBC 16K> 14 K  - CXR showed bilateral lung infiltrates   - c/w Vanc and Zosyn  - Cont frequent Trach suctions   - BNP >8k  - F/u  Echo  -f/u sputum Cx and Blood Cx       Infections  #Acute Hypoxic Respiratory Failure likely 2/2 Pneuminia  -Plan as above     Nephro  -No active issues     Gastrointestinal  -No active issues       Endocrine  -No active issues       Prophylaxis   -On Lovenox for DVT ppx    Dispo  Pt will be transferred to       76 years old male (with Trach/Peg) from NH with PMH of Respiratory failure, pressure ulcer of sacral region, intracranial injury, anemia, UTI, was brought to ED due to respiratory distress and hypoxia from nursing home. In ED, Patient was placed Trach collar 15L saturating 93%. CXR showed lung infiltrations. Patient requires frequent suctioning. Pt started on Vancomycin and Zosyn.  Patient will be admitted for AHRF secondary to pneumonia     #Acute Hypoxic Respiratory Failure likely 2/2 Pneumonia   #Epilepsy  #Afib  #HTN         Neuro  #Appears lethargic, AAOX1     #Epilepsy  -c/w home medications through PEG tube       Cardiovascular  #Afib   - EKG showed Afib with RVR; Now HR 100s  - Patient is on Coreg and Amiodarone  - c/w meds     #HTN  -c/w coreg     Pulmonary  #Acute Hypoxic Respiratory Failure   -Patient with hx of respiratory failure on Trach/Peg   - Pt was brought ED due to hypoxia to 82%   - Patient was placed on trach collar 15L saturating 93-95%. Pt will be put on mechanical ventilator due to increased work of breathing   -WBC 16K> 14 K  - CXR showed bilateral lung infiltrates   - c/w Vanc and Zosyn  - Cont frequent Trach suctions   - BNP >8k  - F/u  Echo  -f/u sputum Cx and Blood Cx       Infections  #Acute Hypoxic Respiratory Failure likely 2/2 Pneuminia  -Plan as above     Nephro  -No active issues     Gastrointestinal  -No active issues       Endocrine  -No active issues       Prophylaxis   -On Lovenox for DVT ppx    Dispo  Pt will be transferred to ICU      76 years old male (with Trach/Peg) from NH with PMH of Respiratory failure, pressure ulcer of sacral region, intracranial injury, anemia, UTI, was brought to ED due to respiratory distress and hypoxia from nursing home. In ED, Patient was placed Trach collar 15L saturating 93%. CXR showed lung infiltrations. Patient requires frequent suctioning. Pt started on Vancomycin and Zosyn.  Patient will be admitted for AHRF secondary to pneumonia     #Acute Hypoxic Respiratory Failure likely 2/2 Pneumonia   #Epilepsy  #Afib  #HTN         Neuro  #Appears lethargic, AAOX1     #Epilepsy  -c/w home medications through PEG tube       Cardiovascular  #Afib   - EKG showed Afib with RVR; Now HR 100s  - Patient is on Coreg and Amiodarone  - c/w meds     #HTN  -c/w coreg     Pulmonary  #Acute Hypoxic Respiratory Failure   -Patient with hx of respiratory failure on Trach/Peg   - Pt was brought ED due to hypoxia to 82%   - Patient was placed on trach collar 15L saturating 93-95%. Pt will be put on mechanical ventilator due to increased work of breathing   -WBC 16K> 14 K  - CXR showed bilateral lung infiltrates   - c/w Vanc and Zosyn  - Cont frequent Trach suctions   - BNP >8k  - F/u  Echo  -f/u sputum Cx and Blood Cx       Infections  #Acute Hypoxic Respiratory Failure likely 2/2 Pneuminia  -Plan as above     Nephro  -No active issues     Gastrointestinal  -No active issues       Endocrine  -No active issues       Prophylaxis   -On Lovenox for DVT ppx    Dispo  Pt will be transferred to

## 2022-03-07 NOTE — H&P ADULT - HISTORY OF PRESENT ILLNESS
Patient is 76 years old male from NH with past medical history pressure ulcer of sacral region, intracranial injury, anemia, UTI,  Patient is 76 years old male (with Trach/Peg) from NH with past medical history Respiratory failure, pressure ulcer of sacral region, intracranial injury, anemia, UTI, was brought to ED due to respiratory distress and hypoxia from nursing home. After 12am, it was noted that his O2 was satting 82%, he had labored breathing and was less responsive. At the nursing home, trach was suctioned and he was placed on oxygen and with oxygen, saturation went to 92% and he was sent to ED by EMS. Patient is currently on Zosyn antibiotic therapy.    In ED, Patient was placed Trach collar 15L saturating 95%. CXR showed lung infiltrations. Patient got Vancomycin IV and frequent suction.

## 2022-03-07 NOTE — ED ADULT NURSE REASSESSMENT NOTE - NS ED NURSE REASSESS COMMENT FT1
Pt remains stable, no s/s of any distress noted. IV line in place, IV fluids infusing as per orders, no signs of infiltration noted. Trach connected to vent. O2 saturation improving. VS WNL. Call bell in reach, bed in lowest position. Safety maintained, hourly rounding performed. Pt waiting to be transferred to AI floor. Endorsed to nurse Anne.

## 2022-03-07 NOTE — ED ADULT NURSE NOTE - OBJECTIVE STATEMENT
BIBA from nursing home for SOB and desaturation. Patient is on mask to trach collar 10 L, saturating 93%. PEG tube present.

## 2022-03-07 NOTE — ED PROVIDER NOTE - PHYSICAL EXAMINATION
Mild distress and heart has regular rate and rhythm. Lungs were decreased bilaterally, patient has a trach in place and has a feeding tube in left abdomen. 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 On neuro exam, opens eyes to verbal stimuli and follows simple commands but currently nonverbal.

## 2022-03-07 NOTE — H&P ADULT - ASSESSMENT
Patient is 76 years old male (with Trach/Peg) from NH with past medical history Respiratory failure, pressure ulcer of sacral region, intracranial injury, anemia, UTI, was brought to ED due to respiratory distress and hypoxia from nursing home. After 12am, it was noted that his O2 was 82%, he had labored breathing and was less responsive. At the nursing home, trach was suctioned and he was placed on oxygen and with oxygen, saturation went to 92% and he was sent to ED by EMS. Patient is currently on Zosyn antibiotic therapy.    In ED, Patient was placed Trach collar 15L saturating 95%. CXR showed lung infiltrations. Patient got Vancomycin IV and frequent suction.    Patient will be admitted for AHRF secondary to pneumonia

## 2022-03-07 NOTE — ED PROVIDER NOTE - CLINICAL SUMMARY MEDICAL DECISION MAKING FREE TEXT BOX
75 y/o male with a history of respiratory failure with a trach, anemia, subdural hemorrhage, epilepsy, gastrostomy, hypertension, BPH, Afib and currently on Lovenox presents with respiratory distress and hypoxia from nursing home. Will obtain EKG, labs and chest x-ray. In ED, trach was suctioned with thick, yellowish sputum. Patient was placed on aerosol mask at 15L satting 94%. Will continue to monitor. 77 y/o male with a history of respiratory failure with a trach, anemia, subdural hemorrhage, epilepsy, gastrostomy, hypertension, BPH, Afib and currently on Lovenox presents with respiratory distress and hypoxia from nursing home. Will obtain EKG, labs and chest x-ray. In ED, trach was suctioned with thick, yellowish sputum. Patient was placed on aerosol mask at 15L with O2sat 94%. Given empiric antibiotic. Will continue to monitor.    ekg shows afib with , trop wnl, wbc 16K, lactate 2.1, probnp 8249, covid neg, CXR shows possible infiltrate in RLL.  Will admit for further management.

## 2022-03-07 NOTE — CONSULT NOTE ADULT - SUBJECTIVE AND OBJECTIVE BOX
Patient is a 76y old  Male who presents with a chief complaint of Acute hypoxia respiratory failure (07 Mar 2022 05:22)      Initial HPI on admission:  HPI:  Patient is 76 years old male (with Trach/Peg) from NH with past medical history Respiratory failure, pressure ulcer of sacral region, intracranial injury, anemia, UTI, was brought to ED due to respiratory distress and hypoxia from nursing home. After 12am, it was noted that his O2 was satting 82%, he had labored breathing and was less responsive. At the nursing home, trach was suctioned and he was placed on oxygen and with oxygen, saturation went to 92% and he was sent to ED by EMS. Patient is currently on Zosyn antibiotic therapy.    In ED, Patient was placed Trach collar 15L saturating 95%. CXR showed lung infiltrations. Patient got Vancomycin IV and frequent suction. (07 Mar 2022 05:22)      BRIEF HOSPITAL COURSE: Pt is on 15 liters trach collar saturating 93%, requiring frequent suctioning. Pt appears lethargic. Pt is on Vancomycin and Zosyn for Pneumonia.     PAST MEDICAL & SURGICAL HISTORY:  Essential hypertension    Primary osteoarthritis, unspecified site    Closed fracture of left radius, initial encounter    Morbid obesity, unspecified obesity type  h/o. - s/p gastric bypass- lost 113 lbs    KAREN (obstructive sleep apnea)  h/o - was on CPAP until gastric bypass surgery    Respiratory failure    Anemia    Subdural hemorrhage    Epilepsy    H/O gastrostomy    History of BPH    Afib    S/P gastric bypass  2008    Status post total replacement of left hip  2006    S/P bunionectomy  bilateral    S/P colonoscopy  approx 2012    History of abdominoplasty  and subsequent cosmetic surgery for removal of excess skin from face      Allergies    No Known Allergies    Intolerances      FAMILY HISTORY:  Family history of renal cell carcinoma (Mother)    Family history of malignant melanoma (Sibling)            Medications:  ALBUTerol    90 MICROgram(s) HFA Inhaler 2 Puff(s) Inhalation every 6 hours  amantadine Syrup 100 milliGRAM(s) Oral two times a day  aMIOdarone    Tablet 200 milliGRAM(s) Oral daily  ascorbic acid 500 milliGRAM(s) Oral daily  carvedilol 6.25 milliGRAM(s) Oral every 12 hours  collagenase Ointment 1 Application(s) Topical daily  enoxaparin Injectable 40 milliGRAM(s) SubCutaneous every 24 hours  folic acid 1 milliGRAM(s) Oral daily  lactulose Syrup 6.6667 Gram(s) Oral two times a day  levETIRAcetam  Solution 250 milliGRAM(s) Oral two times a day  multivitamin 1 Tablet(s) Oral daily  piperacillin/tazobactam IVPB.. 3.375 Gram(s) IV Intermittent every 8 hours  senna Syrup 10 milliLiter(s) Oral at bedtime  sodium chloride 0.9%. 1000 milliLiter(s) IV Continuous <Continuous>  tamsulosin 0.4 milliGRAM(s) Oral at bedtime  tiotropium 18 MICROgram(s) Capsule 1 Capsule(s) Inhalation daily  vancomycin  IVPB 1000 milliGRAM(s) IV Intermittent every 24 hours      vent settings      Vital Signs Last 24 Hrs  T(C): 36.7 (07 Mar 2022 07:34), Max: 38.1 (07 Mar 2022 01:15)  T(F): 98.1 (07 Mar 2022 07:34), Max: 100.6 (07 Mar 2022 01:15)  HR: 100 (07 Mar 2022 07:34) (92 - 107)  BP: 140/83 (07 Mar 2022 07:34) (140/83 - 197/83)  BP(mean): --  RR: 29 (07 Mar 2022 07:34) (20 - 29)  SpO2: 95% (07 Mar 2022 07:34) (93% - 97%)              LABS:                        8.5    14.44 )-----------( 303      ( 07 Mar 2022 05:34 )             28.1     03-07    144  |  107  |  32<H>  ----------------------------<  147<H>  4.0   |  35<H>  |  0.98    Ca    8.6      07 Mar 2022 05:34  Phos  4.1     03-07  Mg     2.8     03-07    TPro  7.5  /  Alb  2.2<L>  /  TBili  0.2  /  DBili  x   /  AST  25  /  ALT  29  /  AlkPhos  115  03-07          CAPILLARY BLOOD GLUCOSE      POCT Blood Glucose.: 169 mg/dL (07 Mar 2022 01:33)    PT/INR - ( 07 Mar 2022 02:23 )   PT: 13.5 sec;   INR: 1.13 ratio         PTT - ( 07 Mar 2022 02:23 )  PTT:26.7 sec  Urinalysis Basic - ( 07 Mar 2022 02:30 )    Color: Yellow / Appearance: Clear / S.015 / pH: x  Gluc: x / Ketone: Negative  / Bili: Negative / Urobili: Negative   Blood: x / Protein: 15 / Nitrite: Negative   Leuk Esterase: Negative / RBC: 0-2 /HPF / WBC 0-2 /HPF   Sq Epi: x / Non Sq Epi: Few /HPF / Bacteria: Few /HPF      CULTURES:        Physical Examination:  GENERAL: NAD, lying in bed, Trach/PEG,  in respiratory distress   HEAD:  Atraumatic, Normocephalic  EYES: EOMI, PERRLA, conjunctiva and sclera clear  ENT: Moist mucous membranes  NECK: Supple, No JVD  CHEST/LUNG: Diminished breath sound bilaterally   HEART: Regular rate and rhythm; No murmurs, rubs, or gallops  ABDOMEN: Bowel sounds present; Soft, Nontender  EXTREMITIES:  2+ Peripheral Pulses, brisk capillary refill. No clubbing, cyanosis, or edema  NERVOUS SYSTEM:  Alert & Oriented X0-1. non verbal      RADIOLOGY REVIEWED ***   Patient is a 76y old  Male who presents with a chief complaint of Acute hypoxia respiratory failure (07 Mar 2022 05:22)      Initial HPI on admission:  HPI:  Patient is 76 years old male (with Trach/Peg) from NH with past medical history Respiratory failure, pressure ulcer of sacral region, intracranial injury, anemia, UTI, was brought to ED due to respiratory distress and hypoxia from nursing home. After 12am, it was noted that his O2 was satting 82%, he had labored breathing and was less responsive. At the nursing home, trach was suctioned and he was placed on oxygen and with oxygen, saturation went to 92% and he was sent to ED by EMS. Patient is currently on Zosyn antibiotic therapy.    In ED, Patient was placed Trach collar 15L saturating 95%. CXR showed lung infiltrations. Patient got Vancomycin IV and frequent suction. (07 Mar 2022 05:22)      BRIEF HOSPITAL COURSE: Pt is on 15 liters trach collar saturating 93%, requiring frequent suctioning. Pt appears lethargic and has labored breathing. Pt is on Vancomycin and Zosyn for Pneumonia.     PAST MEDICAL & SURGICAL HISTORY:  Essential hypertension    Primary osteoarthritis, unspecified site    Closed fracture of left radius, initial encounter    Morbid obesity, unspecified obesity type  h/o. - s/p gastric bypass- lost 113 lbs    KAREN (obstructive sleep apnea)  h/o - was on CPAP until gastric bypass surgery    Respiratory failure    Anemia    Subdural hemorrhage    Epilepsy    H/O gastrostomy    History of BPH    Afib    S/P gastric bypass  2008    Status post total replacement of left hip  2006    S/P bunionectomy  bilateral    S/P colonoscopy  approx 2012    History of abdominoplasty  and subsequent cosmetic surgery for removal of excess skin from face      Allergies    No Known Allergies    Intolerances      FAMILY HISTORY:  Family history of renal cell carcinoma (Mother)    Family history of malignant melanoma (Sibling)            Medications:  ALBUTerol    90 MICROgram(s) HFA Inhaler 2 Puff(s) Inhalation every 6 hours  amantadine Syrup 100 milliGRAM(s) Oral two times a day  aMIOdarone    Tablet 200 milliGRAM(s) Oral daily  ascorbic acid 500 milliGRAM(s) Oral daily  carvedilol 6.25 milliGRAM(s) Oral every 12 hours  collagenase Ointment 1 Application(s) Topical daily  enoxaparin Injectable 40 milliGRAM(s) SubCutaneous every 24 hours  folic acid 1 milliGRAM(s) Oral daily  lactulose Syrup 6.6667 Gram(s) Oral two times a day  levETIRAcetam  Solution 250 milliGRAM(s) Oral two times a day  multivitamin 1 Tablet(s) Oral daily  piperacillin/tazobactam IVPB.. 3.375 Gram(s) IV Intermittent every 8 hours  senna Syrup 10 milliLiter(s) Oral at bedtime  sodium chloride 0.9%. 1000 milliLiter(s) IV Continuous <Continuous>  tamsulosin 0.4 milliGRAM(s) Oral at bedtime  tiotropium 18 MICROgram(s) Capsule 1 Capsule(s) Inhalation daily  vancomycin  IVPB 1000 milliGRAM(s) IV Intermittent every 24 hours      vent settings      Vital Signs Last 24 Hrs  T(C): 36.7 (07 Mar 2022 07:34), Max: 38.1 (07 Mar 2022 01:15)  T(F): 98.1 (07 Mar 2022 07:34), Max: 100.6 (07 Mar 2022 01:15)  HR: 100 (07 Mar 2022 07:34) (92 - 107)  BP: 140/83 (07 Mar 2022 07:34) (140/83 - 197/83)  BP(mean): --  RR: 29 (07 Mar 2022 07:34) (20 - 29)  SpO2: 95% (07 Mar 2022 07:34) (93% - 97%)              LABS:                        8.5    14.44 )-----------( 303      ( 07 Mar 2022 05:34 )             28.1     03-07    144  |  107  |  32<H>  ----------------------------<  147<H>  4.0   |  35<H>  |  0.98    Ca    8.6      07 Mar 2022 05:34  Phos  4.1     03-07  Mg     2.8     03-07    TPro  7.5  /  Alb  2.2<L>  /  TBili  0.2  /  DBili  x   /  AST  25  /  ALT  29  /  AlkPhos  115  03-07          CAPILLARY BLOOD GLUCOSE      POCT Blood Glucose.: 169 mg/dL (07 Mar 2022 01:33)    PT/INR - ( 07 Mar 2022 02:23 )   PT: 13.5 sec;   INR: 1.13 ratio         PTT - ( 07 Mar 2022 02:23 )  PTT:26.7 sec  Urinalysis Basic - ( 07 Mar 2022 02:30 )    Color: Yellow / Appearance: Clear / S.015 / pH: x  Gluc: x / Ketone: Negative  / Bili: Negative / Urobili: Negative   Blood: x / Protein: 15 / Nitrite: Negative   Leuk Esterase: Negative / RBC: 0-2 /HPF / WBC 0-2 /HPF   Sq Epi: x / Non Sq Epi: Few /HPF / Bacteria: Few /HPF      CULTURES:        Physical Examination:  GENERAL: NAD, lying in bed, Trach/PEG,  in respiratory distress   HEAD:  Atraumatic, Normocephalic  EYES: EOMI, PERRLA, conjunctiva and sclera clear  ENT: Moist mucous membranes  NECK: Supple, No JVD  CHEST/LUNG: Diminished breath sound bilaterally   HEART: Regular rate and rhythm; No murmurs, rubs, or gallops  ABDOMEN: Bowel sounds present; Soft, Nontender  EXTREMITIES:  2+ Peripheral Pulses, brisk capillary refill. No clubbing, cyanosis, or edema  NERVOUS SYSTEM:  Alert & Oriented X0-1. non verbal      RADIOLOGY REVIEWED ***

## 2022-03-07 NOTE — ED ADULT NURSE NOTE - CHIEF COMPLAINT QUOTE
Pt BIBA from [facility] for short of breathe and low oxygen saturation received with Venti-mask attached to trach collar.

## 2022-03-07 NOTE — CONSULT NOTE ADULT - ASSESSMENT
Pneumonia  Fevers  Leukocytosis  Sepsis    Plan - Cont Zosyn 3.375 gms iv q8hrs  Can DC Vancomycin  await culture results.

## 2022-03-07 NOTE — ED PROVIDER NOTE - NSICDXPASTMEDICALHX_GEN_ALL_CORE_FT
PAST MEDICAL HISTORY:  Closed fracture of left radius, initial encounter     Essential hypertension     Morbid obesity, unspecified obesity type h/o. - s/p gastric bypass- lost 113 lbs    KAREN (obstructive sleep apnea) h/o - was on CPAP until gastric bypass surgery    Primary osteoarthritis, unspecified site       Afib     Anemia     Epilepsy     H/O gastrostomy     History of BPH     Respiratory failure     Subdural hemorrhage

## 2022-03-07 NOTE — H&P ADULT - NSICDXPASTMEDICALHX_GEN_ALL_CORE_FT
PAST MEDICAL HISTORY:  Afib     Anemia     Closed fracture of left radius, initial encounter     Epilepsy     Essential hypertension     H/O gastrostomy     History of BPH     Morbid obesity, unspecified obesity type h/o. - s/p gastric bypass- lost 113 lbs    KAREN (obstructive sleep apnea) h/o - was on CPAP until gastric bypass surgery    Primary osteoarthritis, unspecified site     Respiratory failure     Subdural hemorrhage

## 2022-03-07 NOTE — H&P ADULT - ATTENDING COMMENTS
Pt seen at bedside; examined  Case discussed with MAR. Pt seen at bedside; examined  Case discussed with MAR.  76 year old man Nursing Home resident - bedbound with medical history including SDH now tracheostomy dependent brought in on account of new respiratory distress with increased yellow sputum production and low SaO2.     Vital Signs Last 24 Hrs  T(C): 37.5 (07 Mar 2022 05:26), Max: 38.1 (07 Mar 2022 01:15)  T(F): 99.5 (07 Mar 2022 05:26), Max: 100.6 (07 Mar 2022 01:15)  HR: 92 (07 Mar 2022 06:36) (92 - 98)  BP: 157/77 (07 Mar 2022 06:36) (142/85 - 167/88)  RR: 25 (07 Mar 2022 06:36) (20 - 25)  SpO2: 93% (07 Mar 2022 06:36) (93% - 97%)    Elderly man, trached and nonverbal but makes eye contact, Not agitated  Loud transmitted upper airway rhonchi resolves with suctioning of upper airway.                          9.0    16.58 )-----------( 306      ( 07 Mar 2022 02:23 )             30.4     03-07    144  |  107  |  32<H>  ----------------------------<  147<H>  4.0   |  35<H>  |  0.98    Ca    8.6      07 Mar 2022 05:34  Phos  4.1     03-07  Mg     2.8     03-07    TPro  7.5  /  Alb  2.2<L>  /  TBili  0.2  /  DBili  x   /  AST  25  /  ALT  29  /  AlkPhos  115  03-07    - Lactate 2.1 -> 1.5  - Pro BNP - 8249    CXR   - RLL infiltrates    Metabolic alkalosis    Impression   - Acute respiratory failure with hypoxia   - RLL pneumonia - severe acute bacterial pneumonia   - Mild lactic acidosis  - Suspected LV dysfunction     Plan   - Admit to Medicine  - Blood and sputum culture  - Supplemental O2 via trach   - Intermittent suctioning   - Empiric broad spectrum antibiotics with zosyn and short course of vancomycin. If blood culture negative by day 3, discontinue vancomycin   - ECHO   - Gentle hydration   - Monitor closely

## 2022-03-07 NOTE — ED PROVIDER NOTE - RESPIRATORY, MLM
. Lungs were decreased bilaterally, patient has a trach in place and has a feeding tube in left abdomen.

## 2022-03-07 NOTE — CHART NOTE - NSCHARTNOTEFT_GEN_A_CORE
Spoke to Luisa, healthcare proxy and neurologist doctor. She told me that the patient BNP couple weeks ago was around 3500 and his WBCs was 12K. Patient had UTI before grew Pseudomonas for which he took Abx.    She requested to call her on [phone]) for any questions or concerns.

## 2022-03-07 NOTE — H&P ADULT - PROBLEM SELECTOR PLAN 1
- Patient with hx of respiratory failure on Trach/Peg was brought ED due to hypoxia to 82%   - Patient was placed on trach collar 15L saturating 95%  - CXR showed bilateral lung infiltrates   - On Zosyn in NH   - Will start on Vanco and Zosyn IV   - Patient needs frequent Trach suctions   - Will send Inflammatory markers including Procal   - BNP >8k; Will get Echo

## 2022-03-07 NOTE — H&P ADULT - PROBLEM SELECTOR PLAN 3
- EKG showed Afib   - Unknown whether it's old ?; But Patient is on Coreg and Amiodarone   - Will continue on same meds now with holding parameters - EKG showed Afib with RVR; Now HR 90s  - Patient is on Coreg and Amiodarone   - Will continue on same meds now with holding parameters

## 2022-03-07 NOTE — ED ADULT NURSE NOTE - NS ED NURSE LEVEL OF CONSCIOUSNESS MENTAL STATUS
Recommendations:      Medications: Continue Concerta 36 mg daily. Discussed resuming Concerta 54 mg for test days once school resumes in the fall. Dosing, administration, and side effects reviewed.        I reviewed protocol for this medication, and sent refill request to the prescribing provider.      Awake/Drowsy

## 2022-03-08 DIAGNOSIS — Z71.89 OTHER SPECIFIED COUNSELING: ICD-10-CM

## 2022-03-08 DIAGNOSIS — I62.00 NONTRAUMATIC SUBDURAL HEMORRHAGE, UNSPECIFIED: ICD-10-CM

## 2022-03-08 DIAGNOSIS — Z51.5 ENCOUNTER FOR PALLIATIVE CARE: ICD-10-CM

## 2022-03-08 DIAGNOSIS — Z87.438 PERSONAL HISTORY OF OTHER DISEASES OF MALE GENITAL ORGANS: ICD-10-CM

## 2022-03-08 DIAGNOSIS — E43 UNSPECIFIED SEVERE PROTEIN-CALORIE MALNUTRITION: ICD-10-CM

## 2022-03-08 LAB
ALBUMIN SERPL ELPH-MCNC: 1.9 G/DL — LOW (ref 3.5–5)
ALP SERPL-CCNC: 107 U/L — SIGNIFICANT CHANGE UP (ref 40–120)
ALT FLD-CCNC: 31 U/L DA — SIGNIFICANT CHANGE UP (ref 10–60)
ANION GAP SERPL CALC-SCNC: 4 MMOL/L — LOW (ref 5–17)
AST SERPL-CCNC: 32 U/L — SIGNIFICANT CHANGE UP (ref 10–40)
BILIRUB SERPL-MCNC: 0.2 MG/DL — SIGNIFICANT CHANGE UP (ref 0.2–1.2)
BUN SERPL-MCNC: 33 MG/DL — HIGH (ref 7–18)
CALCIUM SERPL-MCNC: 8.5 MG/DL — SIGNIFICANT CHANGE UP (ref 8.4–10.5)
CHLORIDE SERPL-SCNC: 108 MMOL/L — SIGNIFICANT CHANGE UP (ref 96–108)
CO2 SERPL-SCNC: 33 MMOL/L — HIGH (ref 22–31)
CREAT SERPL-MCNC: 0.93 MG/DL — SIGNIFICANT CHANGE UP (ref 0.5–1.3)
CULTURE RESULTS: NO GROWTH — SIGNIFICANT CHANGE UP
EGFR: 86 ML/MIN/1.73M2 — SIGNIFICANT CHANGE UP
GLUCOSE BLDC GLUCOMTR-MCNC: 110 MG/DL — HIGH (ref 70–99)
GLUCOSE SERPL-MCNC: 108 MG/DL — HIGH (ref 70–99)
GRAM STN FLD: SIGNIFICANT CHANGE UP
HCT VFR BLD CALC: 25.3 % — LOW (ref 39–50)
HCV AB S/CO SERPL IA: 1.43 S/CO — HIGH (ref 0–0.99)
HCV AB SERPL-IMP: ABNORMAL
HGB BLD-MCNC: 7.8 G/DL — LOW (ref 13–17)
MCHC RBC-ENTMCNC: 30.8 GM/DL — LOW (ref 32–36)
MCHC RBC-ENTMCNC: 32 PG — SIGNIFICANT CHANGE UP (ref 27–34)
MCV RBC AUTO: 103.7 FL — HIGH (ref 80–100)
NRBC # BLD: 0 /100 WBCS — SIGNIFICANT CHANGE UP (ref 0–0)
PLATELET # BLD AUTO: 282 K/UL — SIGNIFICANT CHANGE UP (ref 150–400)
POTASSIUM SERPL-MCNC: 3.6 MMOL/L — SIGNIFICANT CHANGE UP (ref 3.5–5.3)
POTASSIUM SERPL-SCNC: 3.6 MMOL/L — SIGNIFICANT CHANGE UP (ref 3.5–5.3)
PROT SERPL-MCNC: 6.8 G/DL — SIGNIFICANT CHANGE UP (ref 6–8.3)
RBC # BLD: 2.44 M/UL — LOW (ref 4.2–5.8)
RBC # FLD: 18.1 % — HIGH (ref 10.3–14.5)
SODIUM SERPL-SCNC: 145 MMOL/L — SIGNIFICANT CHANGE UP (ref 135–145)
SPECIMEN SOURCE: SIGNIFICANT CHANGE UP
SPECIMEN SOURCE: SIGNIFICANT CHANGE UP
WBC # BLD: 11.91 K/UL — HIGH (ref 3.8–10.5)
WBC # FLD AUTO: 11.91 K/UL — HIGH (ref 3.8–10.5)

## 2022-03-08 PROCEDURE — 99223 1ST HOSP IP/OBS HIGH 75: CPT

## 2022-03-08 PROCEDURE — 99497 ADVNCD CARE PLAN 30 MIN: CPT | Mod: 25

## 2022-03-08 RX ORDER — MORPHINE SULFATE 50 MG/1
1 CAPSULE, EXTENDED RELEASE ORAL EVERY 4 HOURS
Refills: 0 | Status: DISCONTINUED | OUTPATIENT
Start: 2022-03-08 | End: 2022-03-08

## 2022-03-08 RX ORDER — CARVEDILOL PHOSPHATE 80 MG/1
6.25 CAPSULE, EXTENDED RELEASE ORAL EVERY 12 HOURS
Refills: 0 | Status: DISCONTINUED | OUTPATIENT
Start: 2022-03-08 | End: 2022-03-16

## 2022-03-08 RX ORDER — LEVETIRACETAM 250 MG/1
1000 TABLET, FILM COATED ORAL
Refills: 0 | Status: DISCONTINUED | OUTPATIENT
Start: 2022-03-08 | End: 2022-03-16

## 2022-03-08 RX ORDER — ACETAMINOPHEN 500 MG
650 TABLET ORAL EVERY 6 HOURS
Refills: 0 | Status: DISCONTINUED | OUTPATIENT
Start: 2022-03-08 | End: 2022-03-16

## 2022-03-08 RX ORDER — IPRATROPIUM/ALBUTEROL SULFATE 18-103MCG
3 AEROSOL WITH ADAPTER (GRAM) INHALATION EVERY 6 HOURS
Refills: 0 | Status: DISCONTINUED | OUTPATIENT
Start: 2022-03-08 | End: 2022-03-09

## 2022-03-08 RX ADMIN — SENNA PLUS 10 MILLILITER(S): 8.6 TABLET ORAL at 21:46

## 2022-03-08 RX ADMIN — Medication 500 MILLIGRAM(S): at 12:10

## 2022-03-08 RX ADMIN — PIPERACILLIN AND TAZOBACTAM 25 GRAM(S): 4; .5 INJECTION, POWDER, LYOPHILIZED, FOR SOLUTION INTRAVENOUS at 05:54

## 2022-03-08 RX ADMIN — Medication 400 MILLIGRAM(S): at 00:02

## 2022-03-08 RX ADMIN — PIPERACILLIN AND TAZOBACTAM 25 GRAM(S): 4; .5 INJECTION, POWDER, LYOPHILIZED, FOR SOLUTION INTRAVENOUS at 21:44

## 2022-03-08 RX ADMIN — ALBUTEROL 2 PUFF(S): 90 AEROSOL, METERED ORAL at 14:12

## 2022-03-08 RX ADMIN — Medication 1 MILLIGRAM(S): at 12:13

## 2022-03-08 RX ADMIN — PIPERACILLIN AND TAZOBACTAM 25 GRAM(S): 4; .5 INJECTION, POWDER, LYOPHILIZED, FOR SOLUTION INTRAVENOUS at 13:20

## 2022-03-08 RX ADMIN — ALBUTEROL 2 PUFF(S): 90 AEROSOL, METERED ORAL at 20:16

## 2022-03-08 RX ADMIN — LEVETIRACETAM 1000 MILLIGRAM(S): 250 TABLET, FILM COATED ORAL at 17:41

## 2022-03-08 RX ADMIN — CARVEDILOL PHOSPHATE 6.25 MILLIGRAM(S): 80 CAPSULE, EXTENDED RELEASE ORAL at 17:38

## 2022-03-08 RX ADMIN — ALBUTEROL 2 PUFF(S): 90 AEROSOL, METERED ORAL at 08:28

## 2022-03-08 RX ADMIN — CHLORHEXIDINE GLUCONATE 15 MILLILITER(S): 213 SOLUTION TOPICAL at 05:55

## 2022-03-08 RX ADMIN — Medication 1 APPLICATION(S): at 12:10

## 2022-03-08 RX ADMIN — CHLORHEXIDINE GLUCONATE 15 MILLILITER(S): 213 SOLUTION TOPICAL at 17:48

## 2022-03-08 RX ADMIN — Medication 100 MILLIGRAM(S): at 17:38

## 2022-03-08 RX ADMIN — ENOXAPARIN SODIUM 40 MILLIGRAM(S): 100 INJECTION SUBCUTANEOUS at 13:20

## 2022-03-08 RX ADMIN — LACTULOSE 6.67 GRAM(S): 10 SOLUTION ORAL at 17:38

## 2022-03-08 RX ADMIN — TAMSULOSIN HYDROCHLORIDE 0.4 MILLIGRAM(S): 0.4 CAPSULE ORAL at 21:44

## 2022-03-08 NOTE — PROGRESS NOTE ADULT - PROBLEM SELECTOR PLAN 3
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  CT head 2/15/22 as above

## 2022-03-08 NOTE — CONSULT NOTE ADULT - TREATMENT GUIDELINE COMMENT
CPR, no limitations to medical interventions, long term intubation and mechanical ventilation (pt s/p trach), long term feeding tube (pt s/p PEG), use antibiotics

## 2022-03-08 NOTE — PROGRESS NOTE ADULT - PROBLEM SELECTOR PLAN 1
secondary to aspiration pneumonia, trach dependent at nursing home (15L trach collar)  presented to ED 3/7 from nursing home for respiratory distress  Continue ventilator support; wean as able  Continue zosyn, vancomycin  continue spiriva, albuterol   follow up culture data secondary to aspiration pneumonia, trach dependent at nursing home (15L trach collar)  presented to ED 3/7 from nursing home for respiratory distress  Continue ventilator support; wean as able  Continue zosyn, vancomycin  continue spiriva, albuterol   Prednisone 20mg for 5 days initiated today   follow up culture data

## 2022-03-08 NOTE — ADVANCED PRACTICE NURSE CONSULT - ASSESSMENT
This is a 76yr old male patient admitted for Pneumonia, presenting with the following:  -There is a Stage 1 Pressure Injury to the Bilateral Heels, as evident by non-blanchable erythema  -There is a Stage 3 Pressure Injury to the Sacrum with slough (15%), pink tissue, and red tissue

## 2022-03-08 NOTE — DIETITIAN INITIAL EVALUATION ADULT. - OTHER INFO
Pt visited, Pt is On vent via Trach. Pt is from NH Diet - Jevity 1.2 1500 ml /day. Pt w H/P Gastric Bypss Sx in 2008. Pt with SDH, . Pt admitted  with Acute Respiratory failure , Pneumonia. Wound Consult Noted.  Pt with Pressure ulcer stage 3 @ Sacrum, Stage 1 @ B/L Heel. Pt is NPO at Present. Labs noted. Bed  scale 177 Lb. Ht 68 inches. Meds Noted On MVim   , Vit c , Folic Acid.  D/ W RN Pt w H/O Jejunostomy tube.

## 2022-03-08 NOTE — DIETITIAN INITIAL EVALUATION ADULT. - PROBLEM SELECTOR PLAN 3
- EKG showed Afib with RVR; Now HR 90s  - Patient is on Coreg and Amiodarone   - Will continue on same meds now with holding parameters

## 2022-03-08 NOTE — ADVANCED PRACTICE NURSE CONSULT - RECOMMEDATIONS
-Clean all wounds with normal saline and apply skin prep to the surrounding skin  -Apply Calcium Alginate (Aquacel) to the Sacral wound and cover with a Foam dressing Q 72hrs PRN  -Elevate/float the patiens heels using heel protectors and reposition Q 2hrs using wedges or pillows

## 2022-03-08 NOTE — PROGRESS NOTE ADULT - ASSESSMENT
Pneumonia  Fevers  Leukocytosis - improving  Sepsis    Plan - Cont Zosyn 3.375 gms iv q8hrs  Awaiting blood culture results   Pneumonia  Fevers  Leukocytosis - improving  Sepsis    Plan - Cont Zosyn 3.375 gms iv q8hrs  Awaiting blood culture results    I agree with above

## 2022-03-08 NOTE — PROGRESS NOTE ADULT - PROBLEM SELECTOR PLAN 5
currently controlled  continue coreg with hold parameters currently controlled  continue coreg with hold parameters  per OSH records was previously on losartan and amlodpine as well; resume if needed/able

## 2022-03-08 NOTE — DIETITIAN INITIAL EVALUATION ADULT. - ENTERAL
When TF Started Suggest  Jevity 1.5 initial Goal  rate @ 50 ml /hr x 24 hr as tolerated to Provide 1200 ml, 1800 kcal, protein 76 gms, free water 912 ml/day

## 2022-03-08 NOTE — CONSULT NOTE ADULT - REASON FOR ADMISSION
Acute hypoxia respiratory failure

## 2022-03-08 NOTE — CONSULT NOTE ADULT - PROBLEM SELECTOR RECOMMENDATION 4
Clinical evidence indicates that the patient has Severe protein calorie malnutrition/ 3rd degree    In context of      Chronic Illness (>1 month)    Energy/Food intake - NPO s/p PEG on tube feed  Weight loss: Moderate   Body Fat loss: moderate muscle atrophy)  Muscle mass loss: moderate  (pressure ulcers)  Fluid Accumulation:  moderate ( edema, pleural effusions)   Strength: weakened severe (bedbound)    Recommend:   Continue with long-term feeding tube, dietary consult, supplement PRN. Clinical evidence indicates that the patient has Severe protein calorie malnutrition/ 3rd degree    In context of      Chronic Illness (>1 month)    Energy/Food intake - NPO s/p PEG on tube feed  Weight loss: Moderate   Body Fat loss: moderate muscle atrophy)  Muscle mass loss: moderate  (pressure ulcers st 1-bl heels, st 3- sacrum)  Fluid Accumulation:  moderate ( edema, pleural effusions)   Strength: weakened severe (bedbound)    Recommend:   Continue with long-term feeding tube, dietary consult, supplement PRN.

## 2022-03-08 NOTE — PROGRESS NOTE ADULT - PROBLEM SELECTOR PLAN 2
continue empiric antibiotics  trend WBC, fever  procalcitonin 0.11  follow up culture data  ID Dr Quach following, appreciate recommendation continue empiric antibiotics (vancomycin discontinued)  Continue zosyn   trend WBC, fever  procalcitonin 0.11  follow up culture data  ID Dr Quach following, appreciate recommendation continue empiric antibiotics (vancomycin discontinued)  Continue zosyn   trend WBC, fever  procalcitonin 0.11  follow up culture data  ID Dr Quach following, appreciate recommendation  Zosyn initiated prior to [facility] hospitalization for parotitis vs PNA; CT neck 3/2 as above.

## 2022-03-08 NOTE — CONSULT NOTE ADULT - PROBLEM SELECTOR RECOMMENDATION 9
-stabilized on vent   -being treated for pna/sepsis  -ID following -stabilized on vent   -being treated for pna/sepsis  -goal is for him to return to [facility] with trach/vent capabilities  -ID following

## 2022-03-08 NOTE — DIETITIAN INITIAL EVALUATION ADULT. - PERTINENT MEDS FT
MEDICATIONS  (STANDING):  ALBUTerol    90 MICROgram(s) HFA Inhaler 2 Puff(s) Inhalation every 6 hours  amantadine Syrup 100 milliGRAM(s) Oral two times a day  aMIOdarone    Tablet 200 milliGRAM(s) Oral daily  ascorbic acid 500 milliGRAM(s) Oral daily  carvedilol 6.25 milliGRAM(s) Oral every 12 hours  chlorhexidine 0.12% Liquid 15 milliLiter(s) Oral Mucosa every 12 hours  collagenase Ointment 1 Application(s) Topical daily  enoxaparin Injectable 40 milliGRAM(s) SubCutaneous every 24 hours  folic acid 1 milliGRAM(s) Oral daily  lactulose Syrup 6.6667 Gram(s) Oral two times a day  levETIRAcetam  Solution 250 milliGRAM(s) Oral two times a day  multivitamin 1 Tablet(s) Oral daily  piperacillin/tazobactam IVPB.. 3.375 Gram(s) IV Intermittent every 8 hours  senna Syrup 10 milliLiter(s) Oral at bedtime  tamsulosin 0.4 milliGRAM(s) Oral at bedtime  tiotropium 18 MICROgram(s) Capsule 1 Capsule(s) Inhalation daily    MEDICATIONS  (PRN):  morphine  - Injectable 1 milliGRAM(s) IV Push every 4 hours PRN Severe Pain (7 - 10)

## 2022-03-08 NOTE — PROGRESS NOTE ADULT - PROBLEM SELECTOR PLAN 9
continue DVT ppx, bowel regimen per nursing home  Nutrition to see for recommendation  PT eval pending  ID following appreciate recommendation for PNA continue DVT ppx, bowel regimen per nursing home  Nutrition to see for recommendation  PT eval pending  ID following appreciate recommendation for PNA  Palliative care following

## 2022-03-08 NOTE — CONSULT NOTE ADULT - CONVERSATION DETAILS
Due to the patient's health status and restrictions on visitation during the Public Health Emergency, the Advance Care Planning service was performed via telephone with patient's cousin and HCP Dr. Cora Moran (neurologist). We reviewed the goals of care and code status at length. Per Cora, pt is full code and wishes to continue with no limitations to medical interventions, the GOC are restorative-focused. Discussed high risk for readmission, wishes for him to be sent to hospital if necessary, would want ICU care if needed. Educated on [employer] and completed over the phone. She is the HCP and has a copy and it is on file at [facility]. All questions/concerns addressed.

## 2022-03-08 NOTE — PATIENT PROFILE ADULT - FALL HARM RISK - HARM RISK INTERVENTIONS

## 2022-03-08 NOTE — PROGRESS NOTE ADULT - SUBJECTIVE AND OBJECTIVE BOX
NAYANA CLAROS    SCU progress note    INTERVAL HPI/OVERNIGHT EVENTS: ***    DNR [ ]   DNI  [  ]    Covid - 19 PCR:     The 4Ms    What Matters Most: see GOC  Age appropriate Medications/Screen for High Risk Medication: Yes  Mentation: see CAM below  Mobility: defer to physical exam    The Confusion Assessment Method (CAM) Diagnostic Algorithm     1: Acute Onset or Fluctuating Course  - Is there evidence of an acute change in mental status from the patient’s baseline? Did the (abnormal) behavior  fluctuate during the day, that is, tend to come and go, or increase and decrease in severity?  [ ] YES [ ] NO     2: Inattention  - Did the patient have difficulty focusing attention, being easily distractible, or having difficulty keeping track of what was being said?  [ ] YES [ ] NO     3: Disorganized thinking  -Was the patient’s thinking disorganized or incoherent, such as rambling or irrelevant conversation, unclear or illogical flow of ideas, or unpredictable switching from subject to subject?  [ ] YES [ ] NO    4: Altered Level of consciousness?  [ ] YES [ ] NO    The diagnosis of delirium by CAM requires the presence of features 1 and 2 and either 3 or 4.    PRESSORS: [ ] YES [ ] NO  piperacillin/tazobactam IVPB.. 3.375 Gram(s) IV Intermittent every 8 hours    Cardiovascular:  Heart Failure  Acute   Acute on Chronic  Chronic       aMIOdarone    Tablet 200 milliGRAM(s) Oral daily  carvedilol 6.25 milliGRAM(s) Oral every 12 hours  tamsulosin 0.4 milliGRAM(s) Oral at bedtime    Pulmonary:  ALBUTerol    90 MICROgram(s) HFA Inhaler 2 Puff(s) Inhalation every 6 hours  tiotropium 18 MICROgram(s) Capsule 1 Capsule(s) Inhalation daily    Hematalogic:  enoxaparin Injectable 40 milliGRAM(s) SubCutaneous every 24 hours    Other:  amantadine Syrup 100 milliGRAM(s) Oral two times a day  ascorbic acid 500 milliGRAM(s) Oral daily  chlorhexidine 0.12% Liquid 15 milliLiter(s) Oral Mucosa every 12 hours  collagenase Ointment 1 Application(s) Topical daily  folic acid 1 milliGRAM(s) Oral daily  lactulose Syrup 6.6667 Gram(s) Oral two times a day  levETIRAcetam  Solution 250 milliGRAM(s) Oral two times a day  morphine  - Injectable 1 milliGRAM(s) IV Push every 4 hours PRN  multivitamin 1 Tablet(s) Oral daily  senna Syrup 10 milliLiter(s) Oral at bedtime    ALBUTerol    90 MICROgram(s) HFA Inhaler 2 Puff(s) Inhalation every 6 hours  amantadine Syrup 100 milliGRAM(s) Oral two times a day  aMIOdarone    Tablet 200 milliGRAM(s) Oral daily  ascorbic acid 500 milliGRAM(s) Oral daily  carvedilol 6.25 milliGRAM(s) Oral every 12 hours  chlorhexidine 0.12% Liquid 15 milliLiter(s) Oral Mucosa every 12 hours  collagenase Ointment 1 Application(s) Topical daily  enoxaparin Injectable 40 milliGRAM(s) SubCutaneous every 24 hours  folic acid 1 milliGRAM(s) Oral daily  lactulose Syrup 6.6667 Gram(s) Oral two times a day  levETIRAcetam  Solution 250 milliGRAM(s) Oral two times a day  morphine  - Injectable 1 milliGRAM(s) IV Push every 4 hours PRN  multivitamin 1 Tablet(s) Oral daily  piperacillin/tazobactam IVPB.. 3.375 Gram(s) IV Intermittent every 8 hours  senna Syrup 10 milliLiter(s) Oral at bedtime  tamsulosin 0.4 milliGRAM(s) Oral at bedtime  tiotropium 18 MICROgram(s) Capsule 1 Capsule(s) Inhalation daily    Drug Dosing Weight  Height (cm): 172.7 (07 Mar 2022 01:15)  Weight (kg): 82.2 (08 Mar 2022 05:03)  BMI (kg/m2): 27.6 (08 Mar 2022 05:03)  BSA (m2): 1.96 (08 Mar 2022 05:03)    CENTRAL LINE: [ ] YES [ ] NO  LOCATION:   DATE INSERTED:  REMOVE: [ ] YES [ ] NO  EXPLAIN:    ESPITIA: [ ] YES [ ] NO    DATE INSERTED:  REMOVE:  [ ] YES [ ] NO  EXPLAIN:    PAST MEDICAL & SURGICAL HISTORY:  Essential hypertension    Primary osteoarthritis, unspecified site    Closed fracture of left radius, initial encounter    Morbid obesity, unspecified obesity type  h/o. - s/p gastric bypass- lost 113 lbs    KAREN (obstructive sleep apnea)  h/o - was on CPAP until gastric bypass surgery    Respiratory failure    Anemia    Subdural hemorrhage    Epilepsy    H/O gastrostomy    History of BPH    Afib    S/P gastric bypass  2008    Status post total replacement of left hip  2006    S/P bunionectomy  bilateral    S/P colonoscopy  approx 2012    History of abdominoplasty  and subsequent cosmetic surgery for removal of excess skin from face          ABG - ( 07 Mar 2022 16:56 )  pH, Arterial: 7.48  pH, Blood: x     /  pCO2: 54    /  pO2: 85    / HCO3: 40    / Base Excess: 14.2  /  SaO2: 98                      Mode: AC/ CMV (Assist Control/ Continuous Mandatory Ventilation)  RR (machine): 14  TV (machine): 420  FiO2: 80  PEEP: 5  ITime: 1  MAP: 8  PIP: 15      PHYSICAL EXAM:    GENERAL: NAD, well-groomed, well-developed  HEAD:  Atraumatic, Normocephalic  EYES: EOMI, PERRLA, conjunctiva and sclera clear  ENMT: No tonsillar erythema, exudates, or enlargement; Moist mucous membranes, Good dentition, No lesions  NECK: Supple, No JVD, Normal thyroid  NERVOUS SYSTEM:  Alert & Oriented X3, Good concentration; Motor Strength 5/5 B/L upper and lower extremities; DTRs 2+ intact and symmetric  CHEST/LUNG: Clear to percussion bilaterally; No rales, rhonchi, wheezing, or rubs  HEART: Regular rate and rhythm; No murmurs, rubs, or gallops  ABDOMEN: Soft, Nontender, Nondistended; Bowel sounds present  EXTREMITIES:  2+ Peripheral Pulses, No clubbing, cyanosis, or edema  LYMPH: No lymphadenopathy noted  SKIN: No rashes or lesions      LABS:  CBC Full  -  ( 08 Mar 2022 06:42 )  WBC Count : 11.91 K/uL  RBC Count : 2.44 M/uL  Hemoglobin : 7.8 g/dL  Hematocrit : 25.3 %  Platelet Count - Automated : 282 K/uL  Mean Cell Volume : 103.7 fl  Mean Cell Hemoglobin : 32.0 pg  Mean Cell Hemoglobin Concentration : 30.8 gm/dL  Auto Neutrophil # : x  Auto Lymphocyte # : x  Auto Monocyte # : x  Auto Eosinophil # : x  Auto Basophil # : x  Auto Neutrophil % : x  Auto Lymphocyte % : x  Auto Monocyte % : x  Auto Eosinophil % : x  Auto Basophil % : x    -    145  |  108  |  33<H>  ----------------------------<  108<H>  3.6   |  33<H>  |  0.93    Ca    8.5      08 Mar 2022 06:42  Phos  4.1     03-07  Mg     2.8     03-07    TPro  6.8  /  Alb  1.9<L>  /  TBili  0.2  /  DBili  x   /  AST  32  /  ALT  31  /  AlkPhos  107  03-08    PT/INR - ( 07 Mar 2022 02:23 )   PT: 13.5 sec;   INR: 1.13 ratio         PTT - ( 07 Mar 2022 02:23 )  PTT:26.7 sec  Urinalysis Basic - ( 07 Mar 2022 02:30 )    Color: Yellow / Appearance: Clear / S.015 / pH: x  Gluc: x / Ketone: Negative  / Bili: Negative / Urobili: Negative   Blood: x / Protein: 15 / Nitrite: Negative   Leuk Esterase: Negative / RBC: 0-2 /HPF / WBC 0-2 /HPF   Sq Epi: x / Non Sq Epi: Few /HPF / Bacteria: Few /HPF            [  ]  DVT Prophylaxis  [  ]  Nutrition, Brand, Rate         Goal Rate        Abnormal Nutritional Status -  Malnutrition   Cachexia      Morbid Obesity BMI >/=40    RADIOLOGY & ADDITIONAL STUDIES:  ***    Goals of Care Discussion with Family/Proxy/Other   - see note from/family meeting set up for...     NAYANA CLAROS    SCU progress note    INTERVAL HPI/OVERNIGHT EVENTS: no events overnight, examined at bedside with even and unlabored respirations on ventilator     DNR [ ]   DNI  [  ]    Covid - 19 PCR: COVID negative 3/7/22    The 4Ms    What Matters Most: see GO  Age appropriate Medications/Screen for High Risk Medication: Yes  Mentation: see CAM below  Mobility: defer to physical exam    The Confusion Assessment Method (CAM) Diagnostic Algorithm     1: Acute Onset or Fluctuating Course  - Is there evidence of an acute change in mental status from the patient’s baseline? Did the (abnormal) behavior  fluctuate during the day, that is, tend to come and go, or increase and decrease in severity?  [ ] YES [x ] NO     2: Inattention  - Did the patient have difficulty focusing attention, being easily distractible, or having difficulty keeping track of what was being said?  [ ] YES [ x] NO     3: Disorganized thinking  -Was the patient’s thinking disorganized or incoherent, such as rambling or irrelevant conversation, unclear or illogical flow of ideas, or unpredictable switching from subject to subject?  [ ] YES [x ] NO    4: Altered Level of consciousness?  [x ] YES [ ] NO    The diagnosis of delirium by CAM requires the presence of features 1 and 2 and either 3 or 4.    PRESSORS: [ ] YES [x ] NO  piperacillin/tazobactam IVPB.. 3.375 Gram(s) IV Intermittent every 8 hours    Cardiovascular:    aMIOdarone    Tablet 200 milliGRAM(s) Oral daily  carvedilol 6.25 milliGRAM(s) Oral every 12 hours  tamsulosin 0.4 milliGRAM(s) Oral at bedtime    Pulmonary:  ALBUTerol    90 MICROgram(s) HFA Inhaler 2 Puff(s) Inhalation every 6 hours  tiotropium 18 MICROgram(s) Capsule 1 Capsule(s) Inhalation daily    Hematalogic:  enoxaparin Injectable 40 milliGRAM(s) SubCutaneous every 24 hours    Other:  amantadine Syrup 100 milliGRAM(s) Oral two times a day  ascorbic acid 500 milliGRAM(s) Oral daily  chlorhexidine 0.12% Liquid 15 milliLiter(s) Oral Mucosa every 12 hours  collagenase Ointment 1 Application(s) Topical daily  folic acid 1 milliGRAM(s) Oral daily  lactulose Syrup 6.6667 Gram(s) Oral two times a day  levETIRAcetam  Solution 250 milliGRAM(s) Oral two times a day  morphine  - Injectable 1 milliGRAM(s) IV Push every 4 hours PRN  multivitamin 1 Tablet(s) Oral daily  senna Syrup 10 milliLiter(s) Oral at bedtime    ALBUTerol    90 MICROgram(s) HFA Inhaler 2 Puff(s) Inhalation every 6 hours  amantadine Syrup 100 milliGRAM(s) Oral two times a day  aMIOdarone    Tablet 200 milliGRAM(s) Oral daily  ascorbic acid 500 milliGRAM(s) Oral daily  carvedilol 6.25 milliGRAM(s) Oral every 12 hours  chlorhexidine 0.12% Liquid 15 milliLiter(s) Oral Mucosa every 12 hours  collagenase Ointment 1 Application(s) Topical daily  enoxaparin Injectable 40 milliGRAM(s) SubCutaneous every 24 hours  folic acid 1 milliGRAM(s) Oral daily  lactulose Syrup 6.6667 Gram(s) Oral two times a day  levETIRAcetam  Solution 250 milliGRAM(s) Oral two times a day  morphine  - Injectable 1 milliGRAM(s) IV Push every 4 hours PRN  multivitamin 1 Tablet(s) Oral daily  piperacillin/tazobactam IVPB.. 3.375 Gram(s) IV Intermittent every 8 hours  senna Syrup 10 milliLiter(s) Oral at bedtime  tamsulosin 0.4 milliGRAM(s) Oral at bedtime  tiotropium 18 MICROgram(s) Capsule 1 Capsule(s) Inhalation daily    Drug Dosing Weight  Height (cm): 172.7 (07 Mar 2022 01:15)  Weight (kg): 82.2 (08 Mar 2022 05:03)  BMI (kg/m2): 27.6 (08 Mar 2022 05:03)  BSA (m2): 1.96 (08 Mar 2022 05:03)    CENTRAL LINE: [ ] YES [x ] NO  LOCATION:   DATE INSERTED:  REMOVE: [ ] YES [ ] NO  EXPLAIN:    ESPITIA: [ ] YES [ x] NO    DATE INSERTED:  REMOVE:  [ ] YES [ ] NO  EXPLAIN:    PAST MEDICAL & SURGICAL HISTORY:  Essential hypertension    Primary osteoarthritis, unspecified site    Closed fracture of left radius, initial encounter    Morbid obesity, unspecified obesity type  h/o. - s/p gastric bypass- lost 113 lbs    KAREN (obstructive sleep apnea)  h/o - was on CPAP until gastric bypass surgery    Respiratory failure    Anemia    Subdural hemorrhage    Epilepsy    H/O gastrostomy    History of BPH    Afib    S/P gastric bypass  2008    Status post total replacement of left hip      S/P bunionectomy  bilateral    S/P colonoscopy  approx 2012    History of abdominoplasty  and subsequent cosmetic surgery for removal of excess skin from face          ABG - ( 07 Mar 2022 16:56 )  pH, Arterial: 7.48  pH, Blood: x     /  pCO2: 54    /  pO2: 85    / HCO3: 40    / Base Excess: 14.2  /  SaO2: 98          Mode: AC/ CMV (Assist Control/ Continuous Mandatory Ventilation)  RR (machine): 14  TV (machine): 420  FiO2: 80  PEEP: 5  ITime: 1  MAP: 8  PIP: 15      PHYSICAL EXAM:    GENERAL: NAD, chronically ill appearing male   HEAD:  Atraumatic, well healed scar across skull CDI without erythema   EYES: conjunctiva and sclera clear  ENMT:  Moist mucous membranes,   NECK: Supple, trach midline with trach collar  NERVOUS SYSTEM: awake, opens eyes to verbal stimuli, intermittently following commands  CHEST/LUNG: coarse rhonchi throughout, no wheeze, respirations even and unlabored on ventilator support  HEART: irregular rate, trace edema x4 extremities   ABDOMEN: Soft, Nontender, Nondistended; Bowel sounds present, PEG in situ  EXTREMITIES:  2+ Peripheral Pulses, No clubbing, cyanosis   SKIN: see wound note       LABS:  CBC Full  -  ( 08 Mar 2022 06:42 )  WBC Count : 11.91 K/uL  RBC Count : 2.44 M/uL  Hemoglobin : 7.8 g/dL  Hematocrit : 25.3 %  Platelet Count - Automated : 282 K/uL  Mean Cell Volume : 103.7 fl  Mean Cell Hemoglobin : 32.0 pg  Mean Cell Hemoglobin Concentration : 30.8 gm/dL  Auto Neutrophil # : x  Auto Lymphocyte # : x  Auto Monocyte # : x  Auto Eosinophil # : x  Auto Basophil # : x  Auto Neutrophil % : x  Auto Lymphocyte % : x  Auto Monocyte % : x  Auto Eosinophil % : x  Auto Basophil % : x    03-08    145  |  108  |  33<H>  ----------------------------<  108<H>  3.6   |  33<H>  |  0.93    Ca    8.5      08 Mar 2022 06:42  Phos  4.1     03-07  Mg     2.8     03-07    TPro  6.8  /  Alb  1.9<L>  /  TBili  0.2  /  DBili  x   /  AST  32  /  ALT  31  /  AlkPhos  107  03-08    PT/INR - ( 07 Mar 2022 02:23 )   PT: 13.5 sec;   INR: 1.13 ratio         PTT - ( 07 Mar 2022 02:23 )  PTT:26.7 sec  Urinalysis Basic - ( 07 Mar 2022 02:30 )    Color: Yellow / Appearance: Clear / S.015 / pH: x  Gluc: x / Ketone: Negative  / Bili: Negative / Urobili: Negative   Blood: x / Protein: 15 / Nitrite: Negative   Leuk Esterase: Negative / RBC: 0-2 /HPF / WBC 0-2 /HPF   Sq Epi: x / Non Sq Epi: Few /HPF / Bacteria: Few /HPF    [x  ]  DVT Prophylaxis lovenox ppx  [x  ]  Nutrition, Brand, Rate NPO, nutrition consult pending; was tolerating Jevity 1.2 100ml/hr    RADIOLOGY & ADDITIONAL STUDIES:       ACC: [id number] EXAM:  XR CHEST PORTABLE IMMED 1V                          PROCEDURE DATE:  2022          INTERPRETATION:  INDICATION: Sepsis    PRIORS: None    VIEWS: Portable AP radiography of the chest performed. Evaluation limited   secondary to portable technique and shallow inspiration.    FINDINGS: Heart size and mediastinal contours cannot be assessed on this   evaluation. Midline tracheostomy. Interstitial prominence may reflect   shallow inspiration. Increased markings within the lung bases suggests   atelectatic change. Small pleural effusions cannot be excluded. There is   no evidence for pneumothorax. No mediastinal shift.    IMPRESSION: As above.    --- End of Report ---            BECK SHEPHERD MD; Attending Radiologist  This document has been electronically signed. Mar  7 2022  9:00AM      Goals of Care Discussion with Family/Proxy/Other   - see note from/family meeting set up for...     NAYANA CLAROS    SCU progress note    INTERVAL HPI/OVERNIGHT EVENTS: no events overnight, examined at bedside with even and unlabored respirations on ventilator     DNR [ ]   DNI  [  ]    Covid - 19 PCR: COVID negative 3/7/22    The 4Ms    What Matters Most: see GO  Age appropriate Medications/Screen for High Risk Medication: Yes  Mentation: see CAM below  Mobility: defer to physical exam    The Confusion Assessment Method (CAM) Diagnostic Algorithm     1: Acute Onset or Fluctuating Course  - Is there evidence of an acute change in mental status from the patient’s baseline? Did the (abnormal) behavior  fluctuate during the day, that is, tend to come and go, or increase and decrease in severity?  [ ] YES [x ] NO     2: Inattention  - Did the patient have difficulty focusing attention, being easily distractible, or having difficulty keeping track of what was being said?  [ ] YES [ x] NO     3: Disorganized thinking  -Was the patient’s thinking disorganized or incoherent, such as rambling or irrelevant conversation, unclear or illogical flow of ideas, or unpredictable switching from subject to subject?  [ ] YES [x ] NO    4: Altered Level of consciousness?  [x ] YES [ ] NO    The diagnosis of delirium by CAM requires the presence of features 1 and 2 and either 3 or 4.    PRESSORS: [ ] YES [x ] NO  piperacillin/tazobactam IVPB.. 3.375 Gram(s) IV Intermittent every 8 hours    Cardiovascular:    aMIOdarone    Tablet 200 milliGRAM(s) Oral daily  carvedilol 6.25 milliGRAM(s) Oral every 12 hours  tamsulosin 0.4 milliGRAM(s) Oral at bedtime    Pulmonary:  ALBUTerol    90 MICROgram(s) HFA Inhaler 2 Puff(s) Inhalation every 6 hours  tiotropium 18 MICROgram(s) Capsule 1 Capsule(s) Inhalation daily    Hematalogic:  enoxaparin Injectable 40 milliGRAM(s) SubCutaneous every 24 hours    Other:  amantadine Syrup 100 milliGRAM(s) Oral two times a day  ascorbic acid 500 milliGRAM(s) Oral daily  chlorhexidine 0.12% Liquid 15 milliLiter(s) Oral Mucosa every 12 hours  collagenase Ointment 1 Application(s) Topical daily  folic acid 1 milliGRAM(s) Oral daily  lactulose Syrup 6.6667 Gram(s) Oral two times a day  levETIRAcetam  Solution 250 milliGRAM(s) Oral two times a day  morphine  - Injectable 1 milliGRAM(s) IV Push every 4 hours PRN  multivitamin 1 Tablet(s) Oral daily  senna Syrup 10 milliLiter(s) Oral at bedtime    ALBUTerol    90 MICROgram(s) HFA Inhaler 2 Puff(s) Inhalation every 6 hours  amantadine Syrup 100 milliGRAM(s) Oral two times a day  aMIOdarone    Tablet 200 milliGRAM(s) Oral daily  ascorbic acid 500 milliGRAM(s) Oral daily  carvedilol 6.25 milliGRAM(s) Oral every 12 hours  chlorhexidine 0.12% Liquid 15 milliLiter(s) Oral Mucosa every 12 hours  collagenase Ointment 1 Application(s) Topical daily  enoxaparin Injectable 40 milliGRAM(s) SubCutaneous every 24 hours  folic acid 1 milliGRAM(s) Oral daily  lactulose Syrup 6.6667 Gram(s) Oral two times a day  levETIRAcetam  Solution 250 milliGRAM(s) Oral two times a day  morphine  - Injectable 1 milliGRAM(s) IV Push every 4 hours PRN  multivitamin 1 Tablet(s) Oral daily  piperacillin/tazobactam IVPB.. 3.375 Gram(s) IV Intermittent every 8 hours  senna Syrup 10 milliLiter(s) Oral at bedtime  tamsulosin 0.4 milliGRAM(s) Oral at bedtime  tiotropium 18 MICROgram(s) Capsule 1 Capsule(s) Inhalation daily    Drug Dosing Weight  Height (cm): 172.7 (07 Mar 2022 01:15)  Weight (kg): 82.2 (08 Mar 2022 05:03)  BMI (kg/m2): 27.6 (08 Mar 2022 05:03)  BSA (m2): 1.96 (08 Mar 2022 05:03)    CENTRAL LINE: [ ] YES [x ] NO  LOCATION:   DATE INSERTED:  REMOVE: [ ] YES [ ] NO  EXPLAIN:    ESPITIA: [ ] YES [ x] NO    DATE INSERTED:  REMOVE:  [ ] YES [ ] NO  EXPLAIN:    PAST MEDICAL & SURGICAL HISTORY:  Essential hypertension    Primary osteoarthritis, unspecified site    Closed fracture of left radius, initial encounter    Morbid obesity, unspecified obesity type  h/o. - s/p gastric bypass- lost 113 lbs    KAREN (obstructive sleep apnea)  h/o - was on CPAP until gastric bypass surgery    Respiratory failure    Anemia    Subdural hemorrhage    Epilepsy    H/O gastrostomy    History of BPH    Afib    S/P gastric bypass  2008    Status post total replacement of left hip      S/P bunionectomy  bilateral    S/P colonoscopy  approx 2012    History of abdominoplasty  and subsequent cosmetic surgery for removal of excess skin from face          ABG - ( 07 Mar 2022 16:56 )  pH, Arterial: 7.48  pH, Blood: x     /  pCO2: 54    /  pO2: 85    / HCO3: 40    / Base Excess: 14.2  /  SaO2: 98          Mode: AC/ CMV (Assist Control/ Continuous Mandatory Ventilation)  RR (machine): 14  TV (machine): 420  FiO2: 80  PEEP: 5  ITime: 1  MAP: 8  PIP: 15      PHYSICAL EXAM:    GENERAL: NAD, chronically ill appearing male   HEAD:  Atraumatic, well healed scar across skull CDI without erythema   EYES: conjunctiva and sclera clear  ENMT:  Moist mucous membranes,   NECK: Supple, trach midline with trach collar  NERVOUS SYSTEM: awake, opens eyes to verbal stimuli, intermittently following commands  CHEST/LUNG: coarse rhonchi throughout, no wheeze, respirations even and unlabored on ventilator support  HEART: irregular rate, trace edema x4 extremities   ABDOMEN: Soft, Nontender, Nondistended; Bowel sounds present, PEG in situ  EXTREMITIES:  2+ Peripheral Pulses, No clubbing, cyanosis   SKIN: see wound note       LABS:  CBC Full  -  ( 08 Mar 2022 06:42 )  WBC Count : 11.91 K/uL  RBC Count : 2.44 M/uL  Hemoglobin : 7.8 g/dL  Hematocrit : 25.3 %  Platelet Count - Automated : 282 K/uL  Mean Cell Volume : 103.7 fl  Mean Cell Hemoglobin : 32.0 pg  Mean Cell Hemoglobin Concentration : 30.8 gm/dL  Auto Neutrophil # : x  Auto Lymphocyte # : x  Auto Monocyte # : x  Auto Eosinophil # : x  Auto Basophil # : x  Auto Neutrophil % : x  Auto Lymphocyte % : x  Auto Monocyte % : x  Auto Eosinophil % : x  Auto Basophil % : x    03-08    145  |  108  |  33<H>  ----------------------------<  108<H>  3.6   |  33<H>  |  0.93    Ca    8.5      08 Mar 2022 06:42  Phos  4.1     03-07  Mg     2.8     03-07    TPro  6.8  /  Alb  1.9<L>  /  TBili  0.2  /  DBili  x   /  AST  32  /  ALT  31  /  AlkPhos  107  03-08    PT/INR - ( 07 Mar 2022 02:23 )   PT: 13.5 sec;   INR: 1.13 ratio         PTT - ( 07 Mar 2022 02:23 )  PTT:26.7 sec  Urinalysis Basic - ( 07 Mar 2022 02:30 )    Color: Yellow / Appearance: Clear / S.015 / pH: x  Gluc: x / Ketone: Negative  / Bili: Negative / Urobili: Negative   Blood: x / Protein: 15 / Nitrite: Negative   Leuk Esterase: Negative / RBC: 0-2 /HPF / WBC 0-2 /HPF   Sq Epi: x / Non Sq Epi: Few /HPF / Bacteria: Few /HPF    [x  ]  DVT Prophylaxis lovenox ppx  [x  ]  Nutrition, Brand, Rate NPO, nutrition consult pending; was tolerating Jevity 1.2 100ml/hr    RADIOLOGY & ADDITIONAL STUDIES:       ACC: [id number] EXAM:  XR CHEST PORTABLE IMMED 1V                          PROCEDURE DATE:  2022          INTERPRETATION:  INDICATION: Sepsis    PRIORS: None    VIEWS: Portable AP radiography of the chest performed. Evaluation limited   secondary to portable technique and shallow inspiration.    FINDINGS: Heart size and mediastinal contours cannot be assessed on this   evaluation. Midline tracheostomy. Interstitial prominence may reflect   shallow inspiration. Increased markings within the lung bases suggests   atelectatic change. Small pleural effusions cannot be excluded. There is   no evidence for pneumothorax. No mediastinal shift.    IMPRESSION: As above.    --- End of Report ---            BECK SHEPHERD MD; Attending Radiologist  This document has been electronically signed. Mar  7 2022  9:00AM      Goals of Care Discussion with Family/Proxy/Other   - see note from palliative care 3/8     NAYANA CLAROS    SCU progress note    INTERVAL HPI/OVERNIGHT EVENTS: no events overnight, examined at bedside with even and unlabored respirations on ventilator     DNR [ ]   DNI  [  ]    Covid - 19 PCR: COVID negative 3/7/22    The 4Ms    What Matters Most: see GO  Age appropriate Medications/Screen for High Risk Medication: Yes  Mentation: see CAM below  Mobility: defer to physical exam    The Confusion Assessment Method (CAM) Diagnostic Algorithm     1: Acute Onset or Fluctuating Course  - Is there evidence of an acute change in mental status from the patient’s baseline? Did the (abnormal) behavior  fluctuate during the day, that is, tend to come and go, or increase and decrease in severity?  [ ] YES [x ] NO     2: Inattention  - Did the patient have difficulty focusing attention, being easily distractible, or having difficulty keeping track of what was being said?  [ ] YES [ x] NO     3: Disorganized thinking  -Was the patient’s thinking disorganized or incoherent, such as rambling or irrelevant conversation, unclear or illogical flow of ideas, or unpredictable switching from subject to subject?  [ ] YES [x ] NO    4: Altered Level of consciousness?  [x ] YES [ ] NO    The diagnosis of delirium by CAM requires the presence of features 1 and 2 and either 3 or 4.    PRESSORS: [ ] YES [x ] NO  piperacillin/tazobactam IVPB.. 3.375 Gram(s) IV Intermittent every 8 hours    Cardiovascular:    aMIOdarone    Tablet 200 milliGRAM(s) Oral daily  carvedilol 6.25 milliGRAM(s) Oral every 12 hours  tamsulosin 0.4 milliGRAM(s) Oral at bedtime    Pulmonary:  ALBUTerol    90 MICROgram(s) HFA Inhaler 2 Puff(s) Inhalation every 6 hours  tiotropium 18 MICROgram(s) Capsule 1 Capsule(s) Inhalation daily    Hematalogic:  enoxaparin Injectable 40 milliGRAM(s) SubCutaneous every 24 hours    Other:  amantadine Syrup 100 milliGRAM(s) Oral two times a day  ascorbic acid 500 milliGRAM(s) Oral daily  chlorhexidine 0.12% Liquid 15 milliLiter(s) Oral Mucosa every 12 hours  collagenase Ointment 1 Application(s) Topical daily  folic acid 1 milliGRAM(s) Oral daily  lactulose Syrup 6.6667 Gram(s) Oral two times a day  levETIRAcetam  Solution 250 milliGRAM(s) Oral two times a day  morphine  - Injectable 1 milliGRAM(s) IV Push every 4 hours PRN  multivitamin 1 Tablet(s) Oral daily  senna Syrup 10 milliLiter(s) Oral at bedtime    ALBUTerol    90 MICROgram(s) HFA Inhaler 2 Puff(s) Inhalation every 6 hours  amantadine Syrup 100 milliGRAM(s) Oral two times a day  aMIOdarone    Tablet 200 milliGRAM(s) Oral daily  ascorbic acid 500 milliGRAM(s) Oral daily  carvedilol 6.25 milliGRAM(s) Oral every 12 hours  chlorhexidine 0.12% Liquid 15 milliLiter(s) Oral Mucosa every 12 hours  collagenase Ointment 1 Application(s) Topical daily  enoxaparin Injectable 40 milliGRAM(s) SubCutaneous every 24 hours  folic acid 1 milliGRAM(s) Oral daily  lactulose Syrup 6.6667 Gram(s) Oral two times a day  levETIRAcetam  Solution 250 milliGRAM(s) Oral two times a day  morphine  - Injectable 1 milliGRAM(s) IV Push every 4 hours PRN  multivitamin 1 Tablet(s) Oral daily  piperacillin/tazobactam IVPB.. 3.375 Gram(s) IV Intermittent every 8 hours  senna Syrup 10 milliLiter(s) Oral at bedtime  tamsulosin 0.4 milliGRAM(s) Oral at bedtime  tiotropium 18 MICROgram(s) Capsule 1 Capsule(s) Inhalation daily    Drug Dosing Weight  Height (cm): 172.7 (07 Mar 2022 01:15)  Weight (kg): 82.2 (08 Mar 2022 05:03)  BMI (kg/m2): 27.6 (08 Mar 2022 05:03)  BSA (m2): 1.96 (08 Mar 2022 05:03)    CENTRAL LINE: [ ] YES [x ] NO  LOCATION:   DATE INSERTED:  REMOVE: [ ] YES [ ] NO  EXPLAIN:    ESPITIA: [ ] YES [ x] NO    DATE INSERTED:  REMOVE:  [ ] YES [ ] NO  EXPLAIN:    PAST MEDICAL & SURGICAL HISTORY:  Essential hypertension    Primary osteoarthritis, unspecified site    Closed fracture of left radius, initial encounter    Morbid obesity, unspecified obesity type  h/o. - s/p gastric bypass- lost 113 lbs    KAREN (obstructive sleep apnea)  h/o - was on CPAP until gastric bypass surgery    Respiratory failure    Anemia    Subdural hemorrhage    Epilepsy    H/O gastrostomy    History of BPH    Afib    S/P gastric bypass  2008    Status post total replacement of left hip      S/P bunionectomy  bilateral    S/P colonoscopy  approx 2012    History of abdominoplasty  and subsequent cosmetic surgery for removal of excess skin from face          ABG - ( 07 Mar 2022 16:56 )  pH, Arterial: 7.48  pH, Blood: x     /  pCO2: 54    /  pO2: 85    / HCO3: 40    / Base Excess: 14.2  /  SaO2: 98          Mode: AC/ CMV (Assist Control/ Continuous Mandatory Ventilation)  RR (machine): 14  TV (machine): 420  FiO2: 80  PEEP: 5  ITime: 1  MAP: 8  PIP: 15      PHYSICAL EXAM:    GENERAL: NAD, chronically ill appearing male   HEAD:  Atraumatic, well healed scar across skull CDI without erythema   EYES: conjunctiva and sclera clear  ENMT:  Moist mucous membranes,   NECK: Supple, trach midline with trach collar  NERVOUS SYSTEM: awake, opens eyes to verbal stimuli, intermittently following commands  CHEST/LUNG: coarse rhonchi throughout, no wheeze, respirations even and unlabored on ventilator support  HEART: irregular rate, trace edema x4 extremities   ABDOMEN: Soft, Nontender, Nondistended; Bowel sounds present, PEG in situ  EXTREMITIES:  2+ Peripheral Pulses, No clubbing, cyanosis   SKIN: see wound note       LABS:  CBC Full  -  ( 08 Mar 2022 06:42 )  WBC Count : 11.91 K/uL  RBC Count : 2.44 M/uL  Hemoglobin : 7.8 g/dL  Hematocrit : 25.3 %  Platelet Count - Automated : 282 K/uL  Mean Cell Volume : 103.7 fl  Mean Cell Hemoglobin : 32.0 pg  Mean Cell Hemoglobin Concentration : 30.8 gm/dL  Auto Neutrophil # : x  Auto Lymphocyte # : x  Auto Monocyte # : x  Auto Eosinophil # : x  Auto Basophil # : x  Auto Neutrophil % : x  Auto Lymphocyte % : x  Auto Monocyte % : x  Auto Eosinophil % : x  Auto Basophil % : x    03-08    145  |  108  |  33<H>  ----------------------------<  108<H>  3.6   |  33<H>  |  0.93    Ca    8.5      08 Mar 2022 06:42  Phos  4.1     03-07  Mg     2.8     -07    TPro  6.8  /  Alb  1.9<L>  /  TBili  0.2  /  DBili  x   /  AST  32  /  ALT  31  /  AlkPhos  107  -08    PT/INR - ( 07 Mar 2022 02:23 )   PT: 13.5 sec;   INR: 1.13 ratio         PTT - ( 07 Mar 2022 02:23 )  PTT:26.7 sec  Urinalysis Basic - ( 07 Mar 2022 02:30 )    Color: Yellow / Appearance: Clear / S.015 / pH: x  Gluc: x / Ketone: Negative  / Bili: Negative / Urobili: Negative   Blood: x / Protein: 15 / Nitrite: Negative   Leuk Esterase: Negative / RBC: 0-2 /HPF / WBC 0-2 /HPF   Sq Epi: x / Non Sq Epi: Few /HPF / Bacteria: Few /HPF    [x  ]  DVT Prophylaxis lovenox ppx  [x  ]  Nutrition, Brand, Rate NPO, nutrition consult pending; was tolerating Jevity 1.2 100ml/hr    RADIOLOGY & ADDITIONAL STUDIES:       ACC: [id number] EXAM:  XR CHEST PORTABLE IMMED 1V                          PROCEDURE DATE:  2022          INTERPRETATION:  INDICATION: Sepsis    PRIORS: None    VIEWS: Portable AP radiography of the chest performed. Evaluation limited   secondary to portable technique and shallow inspiration.    FINDINGS: Heart size and mediastinal contours cannot be assessed on this   evaluation. Midline tracheostomy. Interstitial prominence may reflect   shallow inspiration. Increased markings within the lung bases suggests   atelectatic change. Small pleural effusions cannot be excluded. There is   no evidence for pneumothorax. No mediastinal shift.    IMPRESSION: As above.    --- End of Report ---            BECK SHEPHERD MD; Attending Radiologist  This document has been electronically signed. Mar  7 2022  9:00AM    [facility] DISCHARGE SUMMARY RECORDS    CT HEAD WITHOUT CONTRAST     Result Date: 2/15/2022  Head CT scan History: Trauma Priors: Outside MRI 2022 Non-contrast study acquired. Dose reduction techniques were utilized. Findings: There was motion artifact. Patient was obliqued at the time of imaging. Right-sided craniotomy and cranioplasty have been performed. Right frontal extra-axial collection overall size is decreased from maximum 2 to 1.7 cm. Hyperdense components are decreased. Air is decreased. Right posterior parafalcine extra-axial fluid collection is decreased. Intraventricular hemorrhage is decreased and no longer seen. No new hemorrhages are seen. There is brain parenchymal atrophy. There is hypodensity in the white matter. Previous noted left frontal and right parietal-occipital hypodense areas are unchanged. Ventricles are not significantly changed. There is partial left mastoid air cell opacification similar to prior study.      Postop changes. Decreased right frontal extra-axial collection. No new hemorrhages.    CT SOFT TISSUE NECK WITH CONTRAST     Addendum Date: 3/2/2022   There is relative enlargement of the right parotid gland with mild increased parenchymal enhancement compared to the contralateral side which can be seen with parotitis. There is no discrete drainable fluid/abscess collection. The results of this imaging exam were called to , and read back by Dr. Norris on 2022 at 9:45 AM.     Result Date: 3/2/2022  CT SOFT TISSUE NECK WITH CONTRAST TECHNIQUE: Axial images obtained from the skull base to the lung apices with intravenous administration of Omnipaque-350. Exam was acquired using one or more dose reduction techniques. COMPARISON: None COMMENTS: Nasopharynx: There is no suspicious abnormality. Suprahyoid neck: oropharynx and oral cavity appear within normal limits. The parapharyngeal region appears within normal limits. The retropharyngeal region appears within normal limits. Infrahyoid neck: hypopharynx, larynx, and trachea appear within normal limits. A tracheostomy tube is noted in place. Salivary glands: within normal limits. Thyroid gland: Appears within normal limits. There are small bilateral jugular chain nodes which are below size criteria for pathologic adenopathy. Vascular structures: within normal limits. Multilevel spondylotic changes are noted. Diffuse disc osteophyte complexes are noted at the C5-6 and C6-7 levels. Grade 1 anterolisthesis is noted at C7-T1 level. Visualized lung apices demonstrate a moderate left pleural effusion with adjacent compressive atelectasis. INDICATION:Lymphadenopathy, neck      1. Small bilateral jugular chain nodes are noted which are below size criteria for pathologic adenopathy. 2. A tracheostomy tube is noted in place. 3. Moderate left pleural effusion is noted.     VENOUS DOPPLER BILAT LE     Result Date: 2022   Name: NAYANA CLAROS Study Date: 2022 02:35 PM MRN: [id number] : 1945 Age: 76 yrs Patient Loc: [id number] [id number] Gender: Male Performed By: Margie Lynn T Ordering MD: LINDA KLEIN  Right Leg There is satisfactory visualization of the deep venous segments from the common femoral vein proceeding distally to the tibial veins. Color flow demonstrates patency of the venous segments with normal quality venous flow.     No evidence for deep or superficial thrombophlebitis. The great saphenous vein is also clearly visualized from the saphenofemoral junction distally to the ankle. There is no apparent intraluminal thrombus. The small saphenous vein was clearly visualized. There is no apparent intraluminal thrombus in the small saphenous vein.     Left Leg There is satisfactory visualization of the deep venous segments from the common femoral vein proceeding distally to the tibial veins. Color flow demonstrates patency of the venous segments with normal quality venous flow.     No evidence for deep or superficial thrombophlebitis. The great saphenous vein is also clearly visualized from the saphenofemoral junction distally to the ankle. There is no apparent intraluminal thrombus. The small saphenous vein was clearly visualized. There is no apparent intraluminal thrombus in the small saphenous vein. Impression: Negative venous duplex examination of both lower extremities. No evidence of superficial phlebitis. Electronically Signed by:Ahmet Johnson MD 2022 04:21 PM               Goals of Care Discussion with Family/Proxy/Other   - see note from palliative care 3/8

## 2022-03-08 NOTE — PROGRESS NOTE ADULT - PROBLEM SELECTOR PLAN 8
TF dependent via PEG   Nutrition consult pending  continue vitamin supplements given at nursing home

## 2022-03-08 NOTE — PROGRESS NOTE ADULT - SUBJECTIVE AND OBJECTIVE BOX
76y Male is under our care for     REVIEW OF SYSTEMS:  [  ] Not able to elicit  General:	  Chest:	  GI:	  :  Skin:	  Musculoskeletal:	  Neuro:	    MEDS:  piperacillin/tazobactam IVPB.. 3.375 Gram(s) IV Intermittent every 8 hours    ALLERGIES: Allergies    No Known Allergies    Intolerances        VITALS:  Vital Signs Last 24 Hrs  T(C): 37.3 (08 Mar 2022 05:03), Max: 38.3 (08 Mar 2022 02:23)  T(F): 99.2 (08 Mar 2022 05:03), Max: 100.9 (08 Mar 2022 02:23)  HR: 107 (08 Mar 2022 08:50) (65 - 107)  BP: 118/84 (08 Mar 2022 05:03) (118/84 - 185/104)  BP(mean): 131 (07 Mar 2022 16:00) (131 - 131)  RR: 18 (08 Mar 2022 05:03) (18 - 28)  SpO2: 97% (08 Mar 2022 08:50) (89% - 100%)      PHYSICAL EXAM:  HEENT:  Neck:  Respiratory:  Cardiovascular:  Gastrointestinal:  Extremities:  Skin:  Ortho:  Neuro:    LABS/DIAGNOSTIC TESTS:                        7.8    11.91 )-----------( 282      ( 08 Mar 2022 06:42 )             25.3     WBC Count: 11.91 K/uL (03-08 @ 06:42)  WBC Count: 14.44 K/uL (03-07 @ 05:34)  WBC Count: 16.58 K/uL (03-07 @ 02:23)    03-08    145  |  108  |  33<H>  ----------------------------<  108<H>  3.6   |  33<H>  |  0.93    Ca    8.5      08 Mar 2022 06:42  Phos  4.1     03-07  Mg     2.8     03-07    TPro  6.8  /  Alb  1.9<L>  /  TBili  0.2  /  DBili  x   /  AST  32  /  ALT  31  /  AlkPhos  107  03-08      CULTURES:   Clean Catch Clean Catch (Midstream)  03-07 @ 08:41   No growth  --  --        RADIOLOGY:  no new studies 76y Male is under our care for pneumonia and fevers.  Patient was seen laying comfortably in bed with no acute distress.  Patient had a fever of 100.9 this morning and WBC count is downtrending.  Urine culture is negative, awaiting results of blood culture.    REVIEW OF SYSTEMS:  [ x ] Not able to elicit    MEDS:  piperacillin/tazobactam IVPB.. 3.375 Gram(s) IV Intermittent every 8 hours    ALLERGIES: Allergies    No Known Allergies    Intolerances        VITALS:  Vital Signs Last 24 Hrs  T(C): 37.3 (08 Mar 2022 05:03), Max: 38.3 (08 Mar 2022 02:23)  T(F): 99.2 (08 Mar 2022 05:03), Max: 100.9 (08 Mar 2022 02:23)  HR: 107 (08 Mar 2022 08:50) (65 - 107)  BP: 118/84 (08 Mar 2022 05:03) (118/84 - 185/104)  BP(mean): 131 (07 Mar 2022 16:00) (131 - 131)  RR: 18 (08 Mar 2022 05:03) (18 - 28)  SpO2: 97% (08 Mar 2022 08:50) (89% - 100%)      PHYSICAL EXAM:  HEENT: trach +  Neck: supple no LN's   Respiratory: Bilateral rales  Cardiovascular: S1 S2 reg no murmurs  Gastrointestinal: +BS with soft, nondistended abdomen; nontender, peg tube in place  Extremities: no edema  Skin: no rashes  Ortho: n/a  Neuro: Awake and alert    LABS/DIAGNOSTIC TESTS:                        7.8    11.91 )-----------( 282      ( 08 Mar 2022 06:42 )             25.3     WBC Count: 11.91 K/uL (03-08 @ 06:42)  WBC Count: 14.44 K/uL (03-07 @ 05:34)  WBC Count: 16.58 K/uL (03-07 @ 02:23)    03-08    145  |  108  |  33<H>  ----------------------------<  108<H>  3.6   |  33<H>  |  0.93    Ca    8.5      08 Mar 2022 06:42  Phos  4.1     03-07  Mg     2.8     03-07    TPro  6.8  /  Alb  1.9<L>  /  TBili  0.2  /  DBili  x   /  AST  32  /  ALT  31  /  AlkPhos  107  03-08      CULTURES:   Clean Catch Clean Catch (Midstream)  03-07 @ 08:41   No growth  --  --        RADIOLOGY:  no new studies

## 2022-03-08 NOTE — CHART NOTE - NSCHARTNOTEFT_GEN_A_CORE
Spoke with patient HCP Cora Moran patients cousin (secondary HCP -Cora's  Dev [phone]). Pt HCP provided some detail in regards to medical history to myself and palliative care NP (see [facility] note), MOLST form completed by palliative care. Patient HCP to send discharge documents from rehab and hospitalization at [facility] to [facility]. Allowed for and answered all questions to the best of my ability.

## 2022-03-08 NOTE — CONSULT NOTE ADULT - ATTENDING COMMENTS
IMP: This is a 76 years old white man  (with Trach/Peg) from NH with  Respiratory failure s/p trach , pressure ulcer of sacral region, intracranial injury,( SDH) , dysphagia with PEG  anemia, UTI, was brought to ED due to respiratory distress and hypoxia from nursing home. In ED, Patient was placed Trach collar 15L saturating 93%. CXR showed lung infiltrations. Patient requires frequent suctioning. Pt started on Vancomycin and Zosyn.  Patient will be admitted for AHRF secondary to pneumonia   At time of examination , pat was in resp distress and placed on vent support     - Acute Hypoxic Respiratory Failure likely 2/2 Pneumonia Aspiration   - Epilepsy  - Afib  - HTN  - Dysphagia with PEG   - Sacral pressure wound   - Intracranial injury ( SDH)  - Seizure       Plan   - Admit to AI unit   - Place on vent support   - Continue antibx   - F/u cultures   - Bronchodilators   - Morphine for comfort   - Wound care   - Continue meds     Spoke with Dr Elham Moran ( Neuro ) [phone]
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 Currently lethargic, on vent support. Sister is physician, HCP. As patient was making strides at rehab prior to this admission, family wishes to continue all aggressive medical measures at this time. Remains FULL CODE. Reconsult Palliative team as needed. To return to vent facility once medically stable.

## 2022-03-08 NOTE — CONSULT NOTE ADULT - PROBLEM SELECTOR RECOMMENDATION 3
-traumatic SDH s/p fall in 11/2021  -per family respiratory, mental, functional status improving with acute rehabilitation and recently was transferred to [facility]  -Goal continue with [facility] at [facility]  -Discussed high risk for re-admission, would want him sent to hospital if necessary

## 2022-03-08 NOTE — CONSULT NOTE ADULT - SUBJECTIVE AND OBJECTIVE BOX
Consult to: Discuss complex medical decision making related to goals of care    [facility] Geriatric and Palliative Consult Service:  Dr. Elinor Ballesteros: cell ([phone])  Dr. Ofelia Jones: cell ([phone])   Shauna Gonzalez NP: cell ([phone])  Trice Slater NP: cell ([phone])   Patrick Su LSW: cell ([phone])     HPI:  Patient is 76 years old male (with Trach/Peg) from NH with past medical history Respiratory failure, pressure ulcer of sacral region, intracranial injury, anemia, UTI, was brought to ED due to respiratory distress and hypoxia from nursing home. After 12am, it was noted that his O2 was satting 82%, he had labored breathing and was less responsive. At the nursing home, trach was suctioned and he was placed on oxygen and with oxygen, saturation went to 92% and he was sent to ED by EMS. Patient is currently on Zosyn antibiotic therapy. In ED, Patient was placed Trach collar 15L saturating 95%. CXR showed lung infiltrations. Patient got Vancomycin IV and frequent suction. (07 Mar 2022 05:22)      Interval History: Seen at the bedside, NARD, alert on vent but minimally communicative. Additional history obtained over the phone with his sister Dr. Cora Moran. Sustained fall in [facility] in 2021, later found unconscious by hotel staff and had prolonged hospitalization there with SDH s/p cranitomy. Was stabilized and on 22 air lifted to [facility] where Cora is a neurolgoist. Was weaned from vent in [place] and went to LTAC at [facility] and then recently transferred to [facility] for continued ELIZABETH/LTC on 3/3/22. She notes that he progressed well with vent weaning, and had been off of the vent for over a month, with supplemental O2 and using speaking valve and able to communicate and make his needs known.     PAST MEDICAL & SURGICAL HISTORY:  Essential hypertension    Primary osteoarthritis, unspecified site    Closed fracture of left radius, initial encounter    Morbid obesity, unspecified obesity type  h/o. - s/p gastric bypass- lost 113 lbs    KAREN (obstructive sleep apnea)  h/o - was on CPAP until gastric bypass surgery    Respiratory failure    Anemia    Subdural hemorrhage    Epilepsy    H/O gastrostomy    History of BPH    Afib    S/P gastric bypass  2008    Status post total replacement of left hip  2006    S/P bunionectomy  bilateral    S/P colonoscopy  approx 2012    History of abdominoplasty  and subsequent cosmetic surgery for removal of excess skin from face        SOCIAL HISTORY:    Admitted from:    [facility]      [ none ] Substance abuse, [ none ] Tobacco hx, [ none ] Alcohol hx, [ none ] Home Opioid Hx    FAMILY HISTORY:  Family history of renal cell carcinoma (Mother)    Family history of malignant melanoma (Sibling)     see H&P for history  Baseline ADLs (prior to admission): bedbound, s/p trach and PEG, dependent in ADLs    Allergies    No Known Allergies    Intolerances      Present Symptoms:   Pain:  Fatigue:  Nausea:  Lack of Appetite:   SOB:  Depression:  Anxiety:  Review of Systems:  Unable to obtain due to poor mentation    MEDICATIONS  (STANDING):  ALBUTerol    90 MICROgram(s) HFA Inhaler 2 Puff(s) Inhalation every 6 hours  amantadine Syrup 100 milliGRAM(s) Oral two times a day  aMIOdarone    Tablet 200 milliGRAM(s) Oral daily  ascorbic acid 500 milliGRAM(s) Oral daily  carvedilol 6.25 milliGRAM(s) Oral every 12 hours  chlorhexidine 0.12% Liquid 15 milliLiter(s) Oral Mucosa every 12 hours  collagenase Ointment 1 Application(s) Topical daily  enoxaparin Injectable 40 milliGRAM(s) SubCutaneous every 24 hours  folic acid 1 milliGRAM(s) Oral daily  lactulose Syrup 6.6667 Gram(s) Oral two times a day  levETIRAcetam  Solution 1000 milliGRAM(s) Oral two times a day  multivitamin 1 Tablet(s) Oral daily  piperacillin/tazobactam IVPB.. 3.375 Gram(s) IV Intermittent every 8 hours  predniSONE   Tablet 20 milliGRAM(s) Oral daily  senna Syrup 10 milliLiter(s) Oral at bedtime  tamsulosin 0.4 milliGRAM(s) Oral at bedtime  tiotropium 18 MICROgram(s) Capsule 1 Capsule(s) Inhalation daily    MEDICATIONS  (PRN):  morphine  - Injectable 1 milliGRAM(s) IV Push every 4 hours PRN Severe Pain (7 - 10)      PHYSICAL EXAM:  Vital Signs Last 24 Hrs  T(C): 36.4 (08 Mar 2022 13:53), Max: 38.3 (08 Mar 2022 02:23)  T(F): 97.5 (08 Mar 2022 13:53), Max: 100.9 (08 Mar 2022 02:23)  HR: 90 (08 Mar 2022 13:53) (65 - 107)  BP: 124/80 (08 Mar 2022 13:53) (118/84 - 185/104)  BP(mean): 131 (07 Mar 2022 16:00) (131 - 131)  RR: 14 (08 Mar 2022 13:53) (14 - 28)  SpO2: 100% (08 Mar 2022 13:53) (89% - 100%)    General: alert  oriented x 1    lethargic nonverbal      Palliative Performance Scale/Karnofsky Score: 30%    HEENT: no abnormal lesion, dry mouth    Lungs: trach to vent  CV: RRR, S1S2, tachycardia  GI: soft non distended non tender  incontinent               PEG/NG/OG tube  constipation  last BM:   : incontinent  oliguria/anuria  benson  Musculoskeletal: weakness x4 edema x4    ambulatory with assistance   mostly/fully bedbound/wheelchair bound  Skin: no abnormal skin lesions, poor skin turgor, pressure ulcer stage:   Neuro: no deficits, cognitive impairment dsyphagia/dysarthria paresis  Oral intake ability: unable/only mouth care, minimal moderate full capability    LABS:                        7.8    11.91 )-----------( 282      ( 08 Mar 2022 06:42 )             25.3     03-08    145  |  108  |  33<H>  ----------------------------<  108<H>  3.6   |  33<H>  |  0.93    Ca    8.5      08 Mar 2022 06:42  Phos  4.1     03-07  Mg     2.8     03-07    TPro  6.8  /  Alb  1.9<L>  /  TBili  0.2  /  DBili  x   /  AST  32  /  ALT  31  /  AlkPhos  107  03-08    Urinalysis Basic - ( 07 Mar 2022 02:30 )    Color: Yellow / Appearance: Clear / S.015 / pH: x  Gluc: x / Ketone: Negative  / Bili: Negative / Urobili: Negative   Blood: x / Protein: 15 / Nitrite: Negative   Leuk Esterase: Negative / RBC: 0-2 /HPF / WBC 0-2 /HPF   Sq Epi: x / Non Sq Epi: Few /HPF / Bacteria: Few /HPF        RADIOLOGY & ADDITIONAL STUDIES:     Consult to: Discuss complex medical decision making related to goals of care    [facility] Geriatric and Palliative Consult Service:  Dr. Elinor Ballesteros: cell ([phone])  Dr. Ofelia Jones: cell ([phone])   Shauna Gonzalez NP: cell ([phone])  Trice Slater NP: cell ([phone])   Patrick Su LSW: cell ([phone])     HPI:  Patient is 76 years old male (with Trach/Peg) from NH with past medical history Respiratory failure, pressure ulcer of sacral region, intracranial injury, anemia, UTI, was brought to ED due to respiratory distress and hypoxia from nursing home. After 12am, it was noted that his O2 was satting 82%, he had labored breathing and was less responsive. At the nursing home, trach was suctioned and he was placed on oxygen and with oxygen, saturation went to 92% and he was sent to ED by EMS. Patient is currently on Zosyn antibiotic therapy. In ED, Patient was placed Trach collar 15L saturating 95%. CXR showed lung infiltrations. Patient got Vancomycin IV and frequent suction. (07 Mar 2022 05:22)      Interval History: Seen at the bedside, NARD, alert on vent but minimally communicative. Additional history obtained over the phone with his sister Dr. Cora Moran. Sustained fall in [facility] in 2021, later found unconscious by hotel staff and had prolonged hospitalization there with SDH s/p cranitomy. Was stabilized and on 22 air lifted to [facility] where Cora is a neurolgoist. Was weaned from vent in [place] and went to LTAC at [facility] and then recently transferred to [facility] for continued ELIZABETH/LTC on 3/3/22. She notes that he progressed well with vent weaning, and had been off of the vent for over a month, with supplemental O2 and using speaking valve and able to communicate and make his needs known.     PAST MEDICAL & SURGICAL HISTORY:  Essential hypertension    Primary osteoarthritis, unspecified site    Closed fracture of left radius, initial encounter    Morbid obesity, unspecified obesity type  h/o. - s/p gastric bypass- lost 113 lbs    KAREN (obstructive sleep apnea)  h/o - was on CPAP until gastric bypass surgery    Respiratory failure    Anemia    Subdural hemorrhage    Epilepsy    H/O gastrostomy    History of BPH    Afib    S/P gastric bypass  2008    Status post total replacement of left hip  2006    S/P bunionectomy  bilateral    S/P colonoscopy  approx 2012    History of abdominoplasty  and subsequent cosmetic surgery for removal of excess skin from face        SOCIAL HISTORY:    Admitted from:    [facility]      [ none ] Substance abuse, [ none ] Tobacco hx, [ none ] Alcohol hx, [ none ] Home Opioid Hx    FAMILY HISTORY:  Family history of renal cell carcinoma (Mother)    Family history of malignant melanoma (Sibling)     see H&P for history  Baseline ADLs (prior to admission): bedbound, s/p trach and PEG, dependent in ADLs, using speaking valve    Allergies    No Known Allergies    Intolerances      Present Symptoms:   Pain:  Fatigue:  Nausea:  Lack of Appetite:   SOB:  Depression:  Anxiety:  Review of Systems:  Unable to obtain due to poor mentation    MEDICATIONS  (STANDING):  ALBUTerol    90 MICROgram(s) HFA Inhaler 2 Puff(s) Inhalation every 6 hours  amantadine Syrup 100 milliGRAM(s) Oral two times a day  aMIOdarone    Tablet 200 milliGRAM(s) Oral daily  ascorbic acid 500 milliGRAM(s) Oral daily  carvedilol 6.25 milliGRAM(s) Oral every 12 hours  chlorhexidine 0.12% Liquid 15 milliLiter(s) Oral Mucosa every 12 hours  collagenase Ointment 1 Application(s) Topical daily  enoxaparin Injectable 40 milliGRAM(s) SubCutaneous every 24 hours  folic acid 1 milliGRAM(s) Oral daily  lactulose Syrup 6.6667 Gram(s) Oral two times a day  levETIRAcetam  Solution 1000 milliGRAM(s) Oral two times a day  multivitamin 1 Tablet(s) Oral daily  piperacillin/tazobactam IVPB.. 3.375 Gram(s) IV Intermittent every 8 hours  predniSONE   Tablet 20 milliGRAM(s) Oral daily  senna Syrup 10 milliLiter(s) Oral at bedtime  tamsulosin 0.4 milliGRAM(s) Oral at bedtime  tiotropium 18 MICROgram(s) Capsule 1 Capsule(s) Inhalation daily    MEDICATIONS  (PRN):  morphine  - Injectable 1 milliGRAM(s) IV Push every 4 hours PRN Severe Pain (7 - 10)      PHYSICAL EXAM:  Vital Signs Last 24 Hrs  T(C): 36.4 (08 Mar 2022 13:53), Max: 38.3 (08 Mar 2022 02:23)  T(F): 97.5 (08 Mar 2022 13:53), Max: 100.9 (08 Mar 2022 02:23)  HR: 90 (08 Mar 2022 13:53) (65 - 107)  BP: 124/80 (08 Mar 2022 13:53) (118/84 - 185/104)  BP(mean): 131 (07 Mar 2022 16:00) (131 - 131)  RR: 14 (08 Mar 2022 13:53) (14 - 28)  SpO2: 100% (08 Mar 2022 13:53) (89% - 100%)    General: alert  oriented x 1    lethargic nonverbal      Palliative Performance Scale/Karnofsky Score: 30%    HEENT: no abnormal lesion, dry mouth, healed neurosurg scar across scalp  Lungs: trach to vent  CV: RRR, S1S2, tachycardia  GI: soft non distended non tender  incontinent  PEG  : incontinent  oliguria/anuria  benson  Musculoskeletal: weakness x4 edema x4   fully bedbound  Skin: no abnormal skin lesions, poor skin turgor, pressure ulcer stage:   Neuro: eyes open to verbal stimuli, intermittently follows commands, minimally communicative on vent  Oral intake ability: unable/only mouth care, minimal moderate full capability    LABS:                        7.8    11.91 )-----------( 282      ( 08 Mar 2022 06:42 )             25.3     03-08    145  |  108  |  33<H>  ----------------------------<  108<H>  3.6   |  33<H>  |  0.93    Ca    8.5      08 Mar 2022 06:42  Phos  4.1     03-07  Mg     2.8     03-07    TPro  6.8  /  Alb  1.9<L>  /  TBili  0.2  /  DBili  x   /  AST  32  /  ALT  31  /  AlkPhos  107  03-08    Urinalysis Basic - ( 07 Mar 2022 02:30 )    Color: Yellow / Appearance: Clear / S.015 / pH: x  Gluc: x / Ketone: Negative  / Bili: Negative / Urobili: Negative   Blood: x / Protein: 15 / Nitrite: Negative   Leuk Esterase: Negative / RBC: 0-2 /HPF / WBC 0-2 /HPF   Sq Epi: x / Non Sq Epi: Few /HPF / Bacteria: Few /HPF        RADIOLOGY & ADDITIONAL STUDIES:     Consult to: Discuss complex medical decision making related to goals of care    [facility] Geriatric and Palliative Consult Service:  Dr. Elinor Ballesteros: cell ([phone])  Dr. Ofelia Jones: cell ([phone])   Shauna Gonzalez NP: cell ([phone])  Trice Slater NP: cell ([phone])   Patrick Su LSW: cell ([phone])     HPI:  Patient is 76 years old male (with Trach/Peg) from NH with past medical history Respiratory failure, pressure ulcer of sacral region, intracranial injury, anemia, UTI, was brought to ED due to respiratory distress and hypoxia from nursing home. After 12am, it was noted that his O2 was satting 82%, he had labored breathing and was less responsive. At the nursing home, trach was suctioned and he was placed on oxygen and with oxygen, saturation went to 92% and he was sent to ED by EMS. Patient is currently on Zosyn antibiotic therapy. In ED, Patient was placed Trach collar 15L saturating 95%. CXR showed lung infiltrations. Patient got Vancomycin IV and frequent suction. (07 Mar 2022 05:22)      Interval History: Seen at the bedside, NARD, alert on vent but minimally communicative. Additional history obtained over the phone with his sister Dr. Cora Moran. Sustained fall in [facility] in 2021, later found unconscious by hotel staff and had prolonged hospitalization there with SDH s/p cranitomy. Was stabilized and on 22 air lifted to [facility] where Cora is a neurolgoist. Was weaned from vent in [place] and went to LTAC at [facility] and then recently transferred to [facility] for continued ELIZABETH/LTC on 3/3/22. She notes that he progressed well with vent weaning, and had been off of the vent for over a month, with supplemental O2 and using speaking valve and able to communicate and make his needs known.     PAST MEDICAL & SURGICAL HISTORY:  Essential hypertension    Primary osteoarthritis, unspecified site    Closed fracture of left radius, initial encounter    Morbid obesity, unspecified obesity type  h/o. - s/p gastric bypass- lost 113 lbs    KAREN (obstructive sleep apnea)  h/o - was on CPAP until gastric bypass surgery    Respiratory failure    Anemia    Subdural hemorrhage    Epilepsy    H/O gastrostomy    History of BPH    Afib    S/P gastric bypass  2008    Status post total replacement of left hip  2006    S/P bunionectomy  bilateral    S/P colonoscopy  approx 2012    History of abdominoplasty  and subsequent cosmetic surgery for removal of excess skin from face        SOCIAL HISTORY:    Admitted from:    [facility]      [ none ] Substance abuse, [ none ] Tobacco hx, [ none ] Alcohol hx, [ none ] Home Opioid Hx    FAMILY HISTORY:  Family history of renal cell carcinoma (Mother)    Family history of malignant melanoma (Sibling)     see H&P for history  Baseline ADLs (prior to admission): bedbound, s/p trach and PEG, dependent in ADLs, using speaking valve    Allergies    No Known Allergies    Intolerances      Present Symptoms:   Pain:  Fatigue:  Nausea:  Lack of Appetite:   SOB:  Depression:  Anxiety:  Review of Systems:  Unable to obtain due to poor mentation    MEDICATIONS  (STANDING):  ALBUTerol    90 MICROgram(s) HFA Inhaler 2 Puff(s) Inhalation every 6 hours  amantadine Syrup 100 milliGRAM(s) Oral two times a day  aMIOdarone    Tablet 200 milliGRAM(s) Oral daily  ascorbic acid 500 milliGRAM(s) Oral daily  carvedilol 6.25 milliGRAM(s) Oral every 12 hours  chlorhexidine 0.12% Liquid 15 milliLiter(s) Oral Mucosa every 12 hours  collagenase Ointment 1 Application(s) Topical daily  enoxaparin Injectable 40 milliGRAM(s) SubCutaneous every 24 hours  folic acid 1 milliGRAM(s) Oral daily  lactulose Syrup 6.6667 Gram(s) Oral two times a day  levETIRAcetam  Solution 1000 milliGRAM(s) Oral two times a day  multivitamin 1 Tablet(s) Oral daily  piperacillin/tazobactam IVPB.. 3.375 Gram(s) IV Intermittent every 8 hours  predniSONE   Tablet 20 milliGRAM(s) Oral daily  senna Syrup 10 milliLiter(s) Oral at bedtime  tamsulosin 0.4 milliGRAM(s) Oral at bedtime  tiotropium 18 MICROgram(s) Capsule 1 Capsule(s) Inhalation daily    MEDICATIONS  (PRN):  morphine  - Injectable 1 milliGRAM(s) IV Push every 4 hours PRN Severe Pain (7 - 10)      PHYSICAL EXAM:  Vital Signs Last 24 Hrs  T(C): 36.4 (08 Mar 2022 13:53), Max: 38.3 (08 Mar 2022 02:23)  T(F): 97.5 (08 Mar 2022 13:53), Max: 100.9 (08 Mar 2022 02:23)  HR: 90 (08 Mar 2022 13:53) (65 - 107)  BP: 124/80 (08 Mar 2022 13:53) (118/84 - 185/104)  BP(mean): 131 (07 Mar 2022 16:00) (131 - 131)  RR: 14 (08 Mar 2022 13:53) (14 - 28)  SpO2: 100% (08 Mar 2022 13:53) (89% - 100%)    General: alert  oriented x 1    lethargic nonverbal      Palliative Performance Scale/Karnofsky Score: 30%    HEENT: no abnormal lesion, dry mouth, healed neurosurg scar across scalp  Lungs: trach to vent  CV: RRR, S1S2, tachycardia  GI: soft non distended non tender  incontinent  PEG  : incontinent  oliguria/anuria  benson  Musculoskeletal: weakness x4 edema x4   fully bedbound  Skin: no abnormal skin lesions, poor skin turgor, pressure ulcer stage: -Stage 1 to bilateral heels, Stage 3 Pressure Injury to the Sacrum   Neuro: eyes open to verbal stimuli, intermittently follows commands, minimally communicative on vent  Oral intake ability: unable/only mouth care, minimal moderate full capability    LABS:                        7.8    11.91 )-----------( 282      ( 08 Mar 2022 06:42 )             25.3     03-08    145  |  108  |  33<H>  ----------------------------<  108<H>  3.6   |  33<H>  |  0.93    Ca    8.5      08 Mar 2022 06:42  Phos  4.1     03-07  Mg     2.8     03-07    TPro  6.8  /  Alb  1.9<L>  /  TBili  0.2  /  DBili  x   /  AST  32  /  ALT  31  /  AlkPhos  107  03-08    Urinalysis Basic - ( 07 Mar 2022 02:30 )    Color: Yellow / Appearance: Clear / S.015 / pH: x  Gluc: x / Ketone: Negative  / Bili: Negative / Urobili: Negative   Blood: x / Protein: 15 / Nitrite: Negative   Leuk Esterase: Negative / RBC: 0-2 /HPF / WBC 0-2 /HPF   Sq Epi: x / Non Sq Epi: Few /HPF / Bacteria: Few /HPF        RADIOLOGY & ADDITIONAL STUDIES:

## 2022-03-08 NOTE — PROGRESS NOTE ADULT - ASSESSMENT
75 y/o male bedbound Nursing Home resident (Lesli Hook) with PMH SDH now s/p trach (6.0 shiley) and PEG, pressure ulcer, anemia, Afib and epilepsy. Per nursing home records pt was started on zosyn  (3/4) with course notable for increased yellow sputum production, respiratory distress for which patient was brought to ER (3/7). ER course notable for continuation of zosyn, initiating vancomycin with cultures pending and ID following and respiratory distress managed with ventilator support. Transferred to  from ED for further management of acute on chronic hypoxic respiratory failure.  75 y/o male bedbound Nursing Home resident (Lesli Hook) with medical history provided by cousin (HCP Cora Moran). Pt with PMH SDH (11/25) complicated by ventilator dependent respiratory failure s/p trach (6.0 shiley) and PEG was at [facility] brain trauma rehab (2/1-3/3) with course notable for wean from ventilator to 9L O2, pressure ulcer, anemia, Afib was on ASA prior to SDH and epilepsy i/s/o SDH on keppra. Per nursing home records pt on zosyn, per HCP this was started at [facility] for R parotitis. Nursing home course notable for increased yellow sputum production, respiratory distress for which patient was brought to ER (3/7). ER course notable for continuation of zosyn, initiating vancomycin with cultures pending and ID following and respiratory distress managed with ventilator support. Transferred to  from ED for further management of acute on chronic hypoxic respiratory failure. Per HCP baseline mental status is awake and alert, voluntary movement of RUE, minimal movement RLE, no movement LUE/LLE.  76 y.o. male with PMH Afib s/p Watchman device on ASA, prior gastric sleeve and history of R occipital hemorrhage (dx 2020) stable on imaging (11/19/2021). Patient was traveling in [facility] on 11/26/21 when he fell from a standing height and struck his head on the floor then presented to [facility] hospital in [facility] with a GCS of 3 found to have R subdural hematoma w/ R to L subflacine hernation s/p emergent R hemicraniectomy. Course notable for ventilator dependent respiratory failure s/p tracheostomy (12/9/21), CDfiff colitis (completed PO vanco), non convulsive seizures controlled with Keppra. Following 6 week ICU course in [facility] was repatriated on 1/8/22 and admitted to [facility] ([facility]). [facility] course notable for initiation of amantadine, successful wean from ventilator to trach collar, vEEG (1/8-1/9) notable for mild background slowing and right hemispheric high amplitude slowing and breach artifact; there were no seizures captured, s/p PEG (1/13). 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 Patient was stabilized and discharged to [facility] on 2/1/2022 for comprehensive inpatient rehabilitation for ADL and gait dysfunction 2/2 TBI then transferred to [facility] on 3/3/22 for skilled nursing. Recent course notable for new fever due to R parotitis vs PNA for which he was started on zosyn. Nursing home course notable for increased yellow sputum production, respiratory distress for which patient was brought to ER (3/7). ER course notable for continuation of zosyn, initiating vancomycin with cultures pending, ID following and respiratory distress managed with ventilator support. Transferred to  from ED for further management of acute on chronic hypoxic respiratory failure.

## 2022-03-08 NOTE — DIETITIAN INITIAL EVALUATION ADULT. - PERTINENT LABORATORY DATA
03-08 Na145 mmol/L Glu 108 mg/dL<H> K+ 3.6 mmol/L Cr  0.93 mg/dL BUN 33 mg/dL<H>   03-07 Phos 4.1 mg/dL   03-08 Alb 1.9 g/dL<L>

## 2022-03-09 DIAGNOSIS — E87.6 HYPOKALEMIA: ICD-10-CM

## 2022-03-09 DIAGNOSIS — D64.9 ANEMIA, UNSPECIFIED: ICD-10-CM

## 2022-03-09 DIAGNOSIS — L89.90 PRESSURE ULCER OF UNSPECIFIED SITE, UNSPECIFIED STAGE: ICD-10-CM

## 2022-03-09 LAB
ALBUMIN SERPL ELPH-MCNC: 1.8 G/DL — LOW (ref 3.5–5)
ALP SERPL-CCNC: 103 U/L — SIGNIFICANT CHANGE UP (ref 40–120)
ALT FLD-CCNC: 26 U/L DA — SIGNIFICANT CHANGE UP (ref 10–60)
ANION GAP SERPL CALC-SCNC: 6 MMOL/L — SIGNIFICANT CHANGE UP (ref 5–17)
AST SERPL-CCNC: 25 U/L — SIGNIFICANT CHANGE UP (ref 10–40)
BILIRUB SERPL-MCNC: 0.2 MG/DL — SIGNIFICANT CHANGE UP (ref 0.2–1.2)
BUN SERPL-MCNC: 34 MG/DL — HIGH (ref 7–18)
CALCIUM SERPL-MCNC: 8.4 MG/DL — SIGNIFICANT CHANGE UP (ref 8.4–10.5)
CHLORIDE SERPL-SCNC: 109 MMOL/L — HIGH (ref 96–108)
CO2 SERPL-SCNC: 31 MMOL/L — SIGNIFICANT CHANGE UP (ref 22–31)
CREAT SERPL-MCNC: 1.04 MG/DL — SIGNIFICANT CHANGE UP (ref 0.5–1.3)
EGFR: 75 ML/MIN/1.73M2 — SIGNIFICANT CHANGE UP
GLUCOSE BLDC GLUCOMTR-MCNC: 120 MG/DL — HIGH (ref 70–99)
GLUCOSE BLDC GLUCOMTR-MCNC: 141 MG/DL — HIGH (ref 70–99)
GLUCOSE BLDC GLUCOMTR-MCNC: 148 MG/DL — HIGH (ref 70–99)
GLUCOSE BLDC GLUCOMTR-MCNC: 155 MG/DL — HIGH (ref 70–99)
GLUCOSE BLDC GLUCOMTR-MCNC: 164 MG/DL — HIGH (ref 70–99)
GLUCOSE SERPL-MCNC: 137 MG/DL — HIGH (ref 70–99)
HCT VFR BLD CALC: 24.7 % — LOW (ref 39–50)
HGB BLD-MCNC: 7.8 G/DL — LOW (ref 13–17)
MAGNESIUM SERPL-MCNC: 2.6 MG/DL — SIGNIFICANT CHANGE UP (ref 1.6–2.6)
MCHC RBC-ENTMCNC: 31.6 GM/DL — LOW (ref 32–36)
MCHC RBC-ENTMCNC: 32.5 PG — SIGNIFICANT CHANGE UP (ref 27–34)
MCV RBC AUTO: 102.9 FL — HIGH (ref 80–100)
MRSA PCR RESULT.: SIGNIFICANT CHANGE UP
NRBC # BLD: 0 /100 WBCS — SIGNIFICANT CHANGE UP (ref 0–0)
OB PNL STL: NEGATIVE — SIGNIFICANT CHANGE UP
PHOSPHATE SERPL-MCNC: 3.1 MG/DL — SIGNIFICANT CHANGE UP (ref 2.5–4.5)
PLATELET # BLD AUTO: 293 K/UL — SIGNIFICANT CHANGE UP (ref 150–400)
POTASSIUM SERPL-MCNC: 3.2 MMOL/L — LOW (ref 3.5–5.3)
POTASSIUM SERPL-SCNC: 3.2 MMOL/L — LOW (ref 3.5–5.3)
PROT SERPL-MCNC: 6.8 G/DL — SIGNIFICANT CHANGE UP (ref 6–8.3)
RBC # BLD: 2.4 M/UL — LOW (ref 4.2–5.8)
RBC # FLD: 18.6 % — HIGH (ref 10.3–14.5)
S AUREUS DNA NOSE QL NAA+PROBE: SIGNIFICANT CHANGE UP
SODIUM SERPL-SCNC: 146 MMOL/L — HIGH (ref 135–145)
WBC # BLD: 10.83 K/UL — HIGH (ref 3.8–10.5)
WBC # FLD AUTO: 10.83 K/UL — HIGH (ref 3.8–10.5)

## 2022-03-09 RX ORDER — POTASSIUM CHLORIDE 20 MEQ
40 PACKET (EA) ORAL EVERY 4 HOURS
Refills: 0 | Status: COMPLETED | OUTPATIENT
Start: 2022-03-09 | End: 2022-03-09

## 2022-03-09 RX ADMIN — LEVETIRACETAM 1000 MILLIGRAM(S): 250 TABLET, FILM COATED ORAL at 05:35

## 2022-03-09 RX ADMIN — LACTULOSE 6.67 GRAM(S): 10 SOLUTION ORAL at 17:27

## 2022-03-09 RX ADMIN — PIPERACILLIN AND TAZOBACTAM 25 GRAM(S): 4; .5 INJECTION, POWDER, LYOPHILIZED, FOR SOLUTION INTRAVENOUS at 14:23

## 2022-03-09 RX ADMIN — Medication 40 MILLIEQUIVALENT(S): at 14:23

## 2022-03-09 RX ADMIN — TAMSULOSIN HYDROCHLORIDE 0.4 MILLIGRAM(S): 0.4 CAPSULE ORAL at 21:21

## 2022-03-09 RX ADMIN — SENNA PLUS 10 MILLILITER(S): 8.6 TABLET ORAL at 21:22

## 2022-03-09 RX ADMIN — Medication 1 MILLIGRAM(S): at 11:22

## 2022-03-09 RX ADMIN — PIPERACILLIN AND TAZOBACTAM 25 GRAM(S): 4; .5 INJECTION, POWDER, LYOPHILIZED, FOR SOLUTION INTRAVENOUS at 05:37

## 2022-03-09 RX ADMIN — PIPERACILLIN AND TAZOBACTAM 25 GRAM(S): 4; .5 INJECTION, POWDER, LYOPHILIZED, FOR SOLUTION INTRAVENOUS at 21:21

## 2022-03-09 RX ADMIN — Medication 500 MILLIGRAM(S): at 11:38

## 2022-03-09 RX ADMIN — ALBUTEROL 2 PUFF(S): 90 AEROSOL, METERED ORAL at 23:38

## 2022-03-09 RX ADMIN — CARVEDILOL PHOSPHATE 6.25 MILLIGRAM(S): 80 CAPSULE, EXTENDED RELEASE ORAL at 05:33

## 2022-03-09 RX ADMIN — Medication 100 MILLIGRAM(S): at 05:32

## 2022-03-09 RX ADMIN — ALBUTEROL 2 PUFF(S): 90 AEROSOL, METERED ORAL at 15:29

## 2022-03-09 RX ADMIN — Medication 100 MILLIGRAM(S): at 17:28

## 2022-03-09 RX ADMIN — LEVETIRACETAM 1000 MILLIGRAM(S): 250 TABLET, FILM COATED ORAL at 17:26

## 2022-03-09 RX ADMIN — LACTULOSE 6.67 GRAM(S): 10 SOLUTION ORAL at 05:34

## 2022-03-09 RX ADMIN — ALBUTEROL 2 PUFF(S): 90 AEROSOL, METERED ORAL at 08:50

## 2022-03-09 RX ADMIN — CHLORHEXIDINE GLUCONATE 15 MILLILITER(S): 213 SOLUTION TOPICAL at 17:27

## 2022-03-09 RX ADMIN — Medication 40 MILLIEQUIVALENT(S): at 10:06

## 2022-03-09 RX ADMIN — CARVEDILOL PHOSPHATE 6.25 MILLIGRAM(S): 80 CAPSULE, EXTENDED RELEASE ORAL at 17:29

## 2022-03-09 RX ADMIN — AMIODARONE HYDROCHLORIDE 200 MILLIGRAM(S): 400 TABLET ORAL at 05:33

## 2022-03-09 RX ADMIN — Medication 20 MILLIGRAM(S): at 05:33

## 2022-03-09 RX ADMIN — Medication 1 APPLICATION(S): at 11:36

## 2022-03-09 RX ADMIN — ENOXAPARIN SODIUM 40 MILLIGRAM(S): 100 INJECTION SUBCUTANEOUS at 14:23

## 2022-03-09 RX ADMIN — Medication 1 TABLET(S): at 11:21

## 2022-03-09 RX ADMIN — CHLORHEXIDINE GLUCONATE 15 MILLILITER(S): 213 SOLUTION TOPICAL at 05:36

## 2022-03-09 NOTE — PROGRESS NOTE ADULT - PROBLEM SELECTOR PLAN 8
TF dependent via PEG   Nutrition consult pending  continue vitamin supplements given at nursing home Wound care specialist consulted, c/w local wound tx as rec.   -There is a Stage 1 Pressure Injury to the Bilateral Heels, as evident by non-blanchable erythema  -There is a Stage 3 Pressure Injury to the Sacrum with slough (15%), pink tissue, and red tissue  Recommendation:  -Clean all wounds with normal saline and apply skin prep to the surrounding skin  -Apply Calcium Alginate (Aquacel) to the Sacral wound and cover with a Foam dressing Q 72hrs PRN  -Elevate/float the patiens heels using heel protectors and reposition Q 2hrs using wedges or pillows

## 2022-03-09 NOTE — PROGRESS NOTE ADULT - ASSESSMENT
76 y.o. male with PMH Afib s/p Watchman device on ASA, prior gastric sleeve and history of R occipital hemorrhage (dx 2020) stable on imaging (11/19/2021). Patient was traveling in [facility] on 11/26/21 when he fell from a standing height and struck his head on the floor then presented to [facility] hospital in [facility] with a GCS of 3 found to have R subdural hematoma w/ R to L subflacine hernation s/p emergent R hemicraniectomy. Course notable for ventilator dependent respiratory failure s/p tracheostomy (12/9/21), CDfiff colitis (completed PO vanco), non convulsive seizures controlled with Keppra. Following 6 week ICU course in [facility] was repatriated on 1/8/22 and admitted to [facility] ([facility] course notable for initiation of amantadine, successful wean from ventilator to trach collar, vEEG (1/8-1/9) notable for mild background slowing and right hemispheric high amplitude slowing and breach artifact; there were no seizures captured, s/p PEG (1/13). 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 Patient was stabilized and discharged to [facility] on 2/1/2022 for comprehensive inpatient rehabilitation for ADL and gait dysfunction 2/2 TBI then transferred to [facility] on 3/3/22 for skilled nursing. Recent course notable for new fever due to R parotitis vs PNA for which he was started on zosyn. Nursing home course notable for increased yellow sputum production, respiratory distress for which patient was brought to ER (3/7). ER course notable for continuation of zosyn, initiating vancomycin with cultures pending, ID following and respiratory distress managed with ventilator support.   Patient is admitted to   for further management of acute on chronic hypoxic respiratory failure and sepsis.  started on Zosyn and ID on board.    76 y.o. male with PMH Afib s/p Watchman device on ASA, prior gastric sleeve and history of R occipital hemorrhage (dx 2020) stable on imaging (11/19/2021). Patient was traveling in [facility] on 11/26/21 when he fell from a standing height and struck his head on the floor then presented to [facility] hospital in [facility] with a GCS of 3 found to have R subdural hematoma w/ R to L subflacine hernation s/p emergent R hemicraniectomy. Course notable for ventilator dependent respiratory failure s/p tracheostomy (12/9/21), CDfiff colitis (completed PO vanco), non convulsive seizures controlled with Keppra. Following 6 week ICU course in [facility] was repatriated on 1/8/22 and admitted to [facility] ([facility] course notable for initiation of amantadine, successful wean from ventilator to trach collar, vEEG (1/8-1/9) notable for mild background slowing and right hemispheric high amplitude slowing and breach artifact; there were no seizures captured, s/p PEG (1/13). 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 Patient was stabilized and discharged to [facility] on 2/1/2022 for comprehensive inpatient rehabilitation for ADL and gait dysfunction 2/2 TBI then transferred to [facility] on 3/3/22 for skilled nursing. Recent course notable for new fever due to R parotitis vs PNA for which he was started on zosyn. Nursing home course notable for increased yellow sputum production, respiratory distress for which patient was brought to ER (3/7). ER course notable for continuation of zosyn, initiating vancomycin with cultures pending, ID following and respiratory distress managed with ventilator support.   Patient is admitted to   for further management of acute on chronic hypoxic respiratory failure and sepsis.  started on Zosyn and ID on board.  Will tray breathing trial via Trach collar today, SLP consulted for speech eval.     76 y.o. male with PMH Afib s/p Watchman device on ASA, prior gastric sleeve and history of R occipital hemorrhage (dx 2020) stable on imaging (11/19/2021). Patient was traveling in [facility] on 11/26/21 when he fell from a standing height and struck his head on the floor then presented to [facility] hospital in [facility] with a GCS of 3 found to have R subdural hematoma w/ R to L subflacine hernation s/p emergent R hemicraniectomy. Course notable for ventilator dependent respiratory failure s/p tracheostomy (12/9/21), CDfiff colitis (completed PO vanco), non convulsive seizures controlled with Keppra. Following 6 week ICU course in [facility] was repatriated on 1/8/22 and admitted to [facility] ([facility]). [facility] course notable for initiation of amantadine, successful wean from ventilator to trach collar, vEEG (1/8-1/9) notable for mild background slowing and right hemispheric high amplitude slowing and breach artifact; there were no seizures captured, s/p PEG (1/13). 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 Patient was stabilized and discharged to [facility] on 2/1/2022 for comprehensive inpatient rehabilitation for ADL and gait dysfunction 2/2 TBI then transferred to [facility] on 3/3/22 for skilled nursing. Recent course notable for new fever due to R parotitis vs PNA for which he was started on zosyn. Nursing home course notable for increased yellow sputum production, respiratory distress for which patient was brought to ER (3/7). ER course notable for continuation of zosyn, initiating vancomycin with cultures pending, ID following and respiratory distress managed with ventilator support.   Patient is admitted to   for further management of acute on chronic hypoxic respiratory failure and sepsis.  started on Zosyn and ID on board.  Will attempt SBT today and then f/u SLP for speech eval when tolerating.

## 2022-03-09 NOTE — CHART NOTE - NSCHARTNOTEFT_GEN_A_CORE
Pt not tolerating on SBT today as tachypneic, hypoxic, placed back on Vent-A/C mode. will continue to monitor.

## 2022-03-09 NOTE — PROGRESS NOTE ADULT - PROBLEM SELECTOR PLAN 11
continue DVT ppx, bowel regimen per nursing home  Nutrition to see for recommendation  PT eval pending  Palliative care following- Full code  May need vent facility. continue DVT ppx, bowel regimen per nursing home  Nutrition to see for recommendation  PT and SLP eval pending  Palliative care following- Full code  May need vent facility. K 3.2  replaced  monitor BMP

## 2022-03-09 NOTE — PROGRESS NOTE ADULT - PROBLEM SELECTOR PLAN 3
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  CT head 2/15/22 as above

## 2022-03-09 NOTE — PROGRESS NOTE ADULT - PROBLEM SELECTOR PLAN 9
likely chronic condition, no report bleeding  Hgb 9.0 on admission to 7.8  unknown baseline  f/u FOBT, iron panel.   monitor CBC TF dependent via PEG   Nutrition consult pending  continue vitamin supplements given at nursing home

## 2022-03-09 NOTE — PROGRESS NOTE ADULT - SUBJECTIVE AND OBJECTIVE BOX
76y Male is under our care for     REVIEW OF SYSTEMS:  [  ] Not able to elicit  General:	  Chest:	  GI:	  :  Skin:	  Musculoskeletal:	  Neuro:	    MEDS:  piperacillin/tazobactam IVPB.. 3.375 Gram(s) IV Intermittent every 8 hours    ALLERGIES: Allergies    No Known Allergies    Intolerances        VITALS:  Vital Signs Last 24 Hrs  T(C): 37.6 (09 Mar 2022 05:25), Max: 37.6 (09 Mar 2022 05:25)  T(F): 99.6 (09 Mar 2022 05:25), Max: 99.6 (09 Mar 2022 05:25)  HR: 75 (09 Mar 2022 05:25) (68 - 90)  BP: 130/90 (09 Mar 2022 05:25) (115/73 - 130/90)  BP(mean): --  RR: 18 (08 Mar 2022 20:31) (14 - 18)  SpO2: 95% (09 Mar 2022 08:36) (95% - 100%)      PHYSICAL EXAM:  HEENT:  Neck:  Respiratory:  Cardiovascular:  Gastrointestinal:  Extremities:  Skin:  Ortho:  Neuro:    LABS/DIAGNOSTIC TESTS:                        7.8    10.83 )-----------( 293      ( 09 Mar 2022 07:15 )             24.7     WBC Count: 10.83 K/uL (03-09 @ 07:15)  WBC Count: 11.91 K/uL (03-08 @ 06:42)  WBC Count: 14.44 K/uL (03-07 @ 05:34)  WBC Count: 16.58 K/uL (03-07 @ 02:23)    03-09    146<H>  |  109<H>  |  34<H>  ----------------------------<  137<H>  3.2<L>   |  31  |  1.04    Ca    8.4      09 Mar 2022 07:15  Phos  3.1     03-09  Mg     2.6     03-09    TPro  6.8  /  Alb  1.8<L>  /  TBili  0.2  /  DBili  x   /  AST  25  /  ALT  26  /  AlkPhos  103  03-09      CULTURES:   .Sputum Sputum  03-08 @ 18:33 --  --    Few polymorphonuclear leukocytes per low power field  No Squamous epithelial cells per low power field  Moderate Gram Negative Rods per oil power field      .Blood Blood-Peripheral  03-07 @ 10:18   No growth to date.  --  --      Clean Catch Clean Catch (Midstream)  03-07 @ 08:41   No growth  --  --        RADIOLOGY:  no new studies 76y Male is under our care for pneumonia.  Patient was seen laying comfortably in bed with no acute distress.  Patient remains afebrile and WBC count has normalized.    REVIEW OF SYSTEMS:  [ x ] Not able to elicit      MEDS:  piperacillin/tazobactam IVPB.. 3.375 Gram(s) IV Intermittent every 8 hours    ALLERGIES: Allergies    No Known Allergies    Intolerances        VITALS:  Vital Signs Last 24 Hrs  T(C): 37.6 (09 Mar 2022 05:25), Max: 37.6 (09 Mar 2022 05:25)  T(F): 99.6 (09 Mar 2022 05:25), Max: 99.6 (09 Mar 2022 05:25)  HR: 75 (09 Mar 2022 05:25) (68 - 90)  BP: 130/90 (09 Mar 2022 05:25) (115/73 - 130/90)  BP(mean): --  RR: 18 (08 Mar 2022 20:31) (14 - 18)  SpO2: 95% (09 Mar 2022 08:36) (95% - 100%)      PHYSICAL EXAM:  HEENT: trach +  Neck: supple no LN's   Respiratory: Bilateral rales  Cardiovascular: S1 S2 reg no murmurs  Gastrointestinal: +BS with soft, nondistended abdomen; nontender, peg tube in place  Extremities: no edema  Skin: no rashes  Ortho: n/a  Neuro: Awake and alert    LABS/DIAGNOSTIC TESTS:                        7.8    10.83 )-----------( 293      ( 09 Mar 2022 07:15 )             24.7     WBC Count: 10.83 K/uL (03-09 @ 07:15)  WBC Count: 11.91 K/uL (03-08 @ 06:42)  WBC Count: 14.44 K/uL (03-07 @ 05:34)  WBC Count: 16.58 K/uL (03-07 @ 02:23)    03-09    146<H>  |  109<H>  |  34<H>  ----------------------------<  137<H>  3.2<L>   |  31  |  1.04    Ca    8.4      09 Mar 2022 07:15  Phos  3.1     03-09  Mg     2.6     03-09    TPro  6.8  /  Alb  1.8<L>  /  TBili  0.2  /  DBili  x   /  AST  25  /  ALT  26  /  AlkPhos  103  03-09      CULTURES:   .Sputum Sputum  03-08 @ 18:33 --  --    Few polymorphonuclear leukocytes per low power field  No Squamous epithelial cells per low power field  Moderate Gram Negative Rods per oil power field      .Blood Blood-Peripheral  03-07 @ 10:18   No growth to date.  --  --      Clean Catch Clean Catch (Midstream)  03-07 @ 08:41   No growth  --  --        RADIOLOGY:  no new studies

## 2022-03-09 NOTE — PROGRESS NOTE ADULT - PROBLEM SELECTOR PLAN 5
currently controlled  continue coreg with hold parameters  per OSH records was previously on losartan and amlodpine as well; resume if needed/able

## 2022-03-09 NOTE — PROGRESS NOTE ADULT - PROBLEM SELECTOR PLAN 10
K 3.2  replaced  monitor BMP likely chronic condition, no report bleeding  Hgb 9.0 on admission to 7.8  unknown baseline  f/u FOBT, iron panel.   monitor CBC

## 2022-03-09 NOTE — PROGRESS NOTE ADULT - PROBLEM SELECTOR PLAN 12
continue DVT ppx, bowel regimen per nursing home  Nutrition to see for recommendation  PT and SLP eval pending  Palliative care following- Full code  May need vent facility.

## 2022-03-09 NOTE — PROGRESS NOTE ADULT - PROBLEM SELECTOR PLAN 1
secondary to aspiration pneumonia, trach dependent at nursing home (15L trach collar)  presented to ED 3/7 from nursing home for respiratory distress  Continue ventilator support; wean as able  Continue zosyn, vancomycin  continue spiriva, albuterol   Prednisone 20mg for 5 days    Culture result negative from 3/7  f/u MRSA pcr secondary to aspiration pneumonia, trach dependent at nursing home (15L trach collar)  presented to ED 3/7 from nursing home for respiratory distress  Continue ventilator support; wean as able-daily breathing trial via trach collar  Continue zosyn, vancomycin  continue spiriva, albuterol   Prednisone 20mg for 5 days    Culture result negative from 3/7  f/u MRSA pcr secondary to aspiration pneumonia, trach dependent at nursing home (15L trach collar)  presented to ED 3/7 from nursing home for respiratory distress  Continue ventilator support; wean as able-daily SBT during the day and place vent at night  will follow up SLP for swallowing eval if tolerating SBT.   Continue zosyn, vancomycin  continue spiriva, albuterol   Prednisone 20mg for 5 days    Culture result negative from 3/7  f/u MRSA pcr

## 2022-03-09 NOTE — PROGRESS NOTE ADULT - SUBJECTIVE AND OBJECTIVE BOX
NAYANA CLAROS    SCU progress note    INTERVAL HPI/OVERNIGHT EVENTS: no significant changes over night. Tmax 99.6F today. non verbal    Code status : Full code    Covid - 19 PCR: negative on 3/7    The 4Ms    What Matters Most: see [facility]  Age appropriate Medications/Screen for High Risk Medication: Yes  Mentation: see CAM below  Mobility: defer to physical exam    The Confusion Assessment Method (CAM) Diagnostic Algorithm     1: Acute Onset or Fluctuating Course  - Is there evidence of an acute change in mental status from the patient’s baseline? Did the (abnormal) behavior  fluctuate during the day, that is, tend to come and go, or increase and decrease in severity?  [x ] YES [ ] NO     2: Inattention  - Did the patient have difficulty focusing attention, being easily distractible, or having difficulty keeping track of what was being said?  [ x] YES [ ] NO     3: Disorganized thinking  -Was the patient’s thinking disorganized or incoherent, such as rambling or irrelevant conversation, unclear or illogical flow of ideas, or unpredictable switching from subject to subject?  [ ] YES [ ] NO (X) NA    4: Altered Level of consciousness?  [ x] YES [ ] NO    The diagnosis of delirium by CAM requires the presence of features 1 and 2 and either 3 or 4.    PRESSORS: [ ] YES [ x] NO  piperacillin/tazobactam IVPB.. 3.375 Gram(s) IV Intermittent every 8 hours    Cardiovascular:  Heart Failure  Acute   Acute on Chronic  Chronic       aMIOdarone    Tablet 200 milliGRAM(s) Oral daily  carvedilol 6.25 milliGRAM(s) Oral every 12 hours  tamsulosin 0.4 milliGRAM(s) Oral at bedtime    Pulmonary:  ALBUTerol    90 MICROgram(s) HFA Inhaler 2 Puff(s) Inhalation every 6 hours  tiotropium 18 MICROgram(s) Capsule 1 Capsule(s) Inhalation daily    Hematalogic:  enoxaparin Injectable 40 milliGRAM(s) SubCutaneous every 24 hours    Other:  acetaminophen    Suspension .. 650 milliGRAM(s) Enteral Tube every 6 hours PRN  amantadine Syrup 100 milliGRAM(s) Oral two times a day  ascorbic acid 500 milliGRAM(s) Oral daily  chlorhexidine 0.12% Liquid 15 milliLiter(s) Oral Mucosa every 12 hours  collagenase Ointment 1 Application(s) Topical daily  folic acid 1 milliGRAM(s) Oral daily  lactulose Syrup 6.6667 Gram(s) Oral two times a day  levETIRAcetam  Solution 1000 milliGRAM(s) Oral two times a day  morphine  - Injectable 1 milliGRAM(s) IV Push every 4 hours PRN  multivitamin 1 Tablet(s) Oral daily  potassium chloride   Powder 40 milliEquivalent(s) Enteral Tube every 4 hours  predniSONE   Tablet 20 milliGRAM(s) Oral daily  senna Syrup 10 milliLiter(s) Oral at bedtime    acetaminophen    Suspension .. 650 milliGRAM(s) Enteral Tube every 6 hours PRN  ALBUTerol    90 MICROgram(s) HFA Inhaler 2 Puff(s) Inhalation every 6 hours  amantadine Syrup 100 milliGRAM(s) Oral two times a day  aMIOdarone    Tablet 200 milliGRAM(s) Oral daily  ascorbic acid 500 milliGRAM(s) Oral daily  carvedilol 6.25 milliGRAM(s) Oral every 12 hours  chlorhexidine 0.12% Liquid 15 milliLiter(s) Oral Mucosa every 12 hours  collagenase Ointment 1 Application(s) Topical daily  enoxaparin Injectable 40 milliGRAM(s) SubCutaneous every 24 hours  folic acid 1 milliGRAM(s) Oral daily  lactulose Syrup 6.6667 Gram(s) Oral two times a day  levETIRAcetam  Solution 1000 milliGRAM(s) Oral two times a day  morphine  - Injectable 1 milliGRAM(s) IV Push every 4 hours PRN  multivitamin 1 Tablet(s) Oral daily  piperacillin/tazobactam IVPB.. 3.375 Gram(s) IV Intermittent every 8 hours  potassium chloride   Powder 40 milliEquivalent(s) Enteral Tube every 4 hours  predniSONE   Tablet 20 milliGRAM(s) Oral daily  senna Syrup 10 milliLiter(s) Oral at bedtime  tamsulosin 0.4 milliGRAM(s) Oral at bedtime  tiotropium 18 MICROgram(s) Capsule 1 Capsule(s) Inhalation daily    Drug Dosing Weight  Height (cm): 172.7 (07 Mar 2022 01:15)  Weight (kg): 82.2 (08 Mar 2022 05:03)  BMI (kg/m2): 27.6 (08 Mar 2022 05:03)  BSA (m2): 1.96 (08 Mar 2022 05:03)    CENTRAL LINE: [ ] YES [x ] NO  LOCATION:   DATE INSERTED:  REMOVE: [ ] YES [ ] NO  EXPLAIN:    ESPITIA: [ ] YES [ x] NO    DATE INSERTED:  REMOVE:  [ ] YES [ ] NO  EXPLAIN:    PAST MEDICAL & SURGICAL HISTORY:  Essential hypertension    Primary osteoarthritis, unspecified site    Closed fracture of left radius, initial encounter    Morbid obesity, unspecified obesity type  h/o. - s/p gastric bypass- lost 113 lbs    KAREN (obstructive sleep apnea)  h/o - was on CPAP until gastric bypass surgery    Respiratory failure    Anemia    Subdural hemorrhage    Epilepsy    H/O gastrostomy    History of BPH    Afib    S/P gastric bypass  2008    Status post total replacement of left hip  2006    S/P bunionectomy  bilateral    S/P colonoscopy  approx 2012    History of abdominoplasty  and subsequent cosmetic surgery for removal of excess skin from face    ABG - ( 07 Mar 2022 16:56 )  pH, Arterial: 7.48  pH, Blood: x     /  pCO2: 54    /  pO2: 85    / HCO3: 40    / Base Excess: 14.2  /  SaO2: 98        Mode: AC/ CMV (Assist Control/ Continuous Mandatory Ventilation)  RR (machine): 14  TV (machine): 420  FiO2: 40  PEEP: 5  ITime: 1  MAP: 9  PIP: 19      PHYSICAL EXAM:  GENERAL: NAD, chronically ill appearing  non verbal  HEAD:  Atraumatic, well healed scar across skull   EYES: conjunctiva and sclera clear  ENMT:  Moist mucous membranes,   NECK: Supple, trach midline with trach collar  NERVOUS SYSTEM: awake, opens eyes to verbal stimuli, intermittently following commands  CHEST/LUNG: fair air entry, no wheeze or rhonchi, equal lung expansion and unlabored on ventilator support  HEART: irregular rate, no murmur  ABDOMEN: Soft, Nontender, Nondistended; Bowel sounds present, PEG in place  EXTREMITIES:  2+ Peripheral Pulses, No clubbing, cyanosis + trace edema all extremities   SKIN: see wound note-no visible  rash or erythema    LABS:  CBC Full  -  ( 09 Mar 2022 07:15 )  WBC Count : 10.83 K/uL  RBC Count : 2.40 M/uL  Hemoglobin : 7.8 g/dL  Hematocrit : 24.7 %  Platelet Count - Automated : 293 K/uL  Mean Cell Volume : 102.9 fl  Mean Cell Hemoglobin : 32.5 pg  Mean Cell Hemoglobin Concentration : 31.6 gm/dL    03-09    146<H>  |  109<H>  |  34<H>  ----------------------------<  137<H>  3.2<L>   |  31  |  1.04    Ca    8.4      09 Mar 2022 07:15  Phos  3.1     03-09  Mg     2.6     03-09    TPro  6.8  /  Alb  1.8<L>  /  TBili  0.2  /  DBili  x   /  AST  25  /  ALT  26  /  AlkPhos  103  03-09    [ x ]  DVT Prophylaxis  [ x ]  Nutrition, tube feeding, see nutrition note        Abnormal Nutritional Status -  Malnutrition   Cachexia      Morbid Obesity BMI >/=40    RADIOLOGY & ADDITIONAL STUDIES:   < from: Xray Chest 1 View-PORTABLE IMMEDIATE (03.07.22 @ 01:51) >    ACC: [id number] EXAM:  XR CHEST PORTABLE IMMED 1V                          PROCEDURE DATE:  03/07/2022      INTERPRETATION:  INDICATION: Sepsis    PRIORS: None    VIEWS: Portable AP radiography of the chest performed. Evaluation limited   secondary to portable technique and shallow inspiration.    FINDINGS: Heart size and mediastinal contours cannot be assessed on this   evaluation. Midline tracheostomy. Interstitial prominence may reflect   shallow inspiration. Increased markings within the lung bases suggests   atelectatic change. Small pleural effusions cannot be excluded. There is   no evidence for pneumothorax. No mediastinal shift.    IMPRESSION: As above.    --- End of Report ---    BECK SHEPHERD MD; Attending Radiologist  This document has been electronically signed. Mar  7 2022  9:00AM    < end of copied text >      Goals of Care Discussion with Family/Proxy/Other   - see note for family discussion     NAYANA CLAROS    SCU progress note    INTERVAL HPI/OVERNIGHT EVENTS: no significant changes over night. Tmax 99.6F today. non verbal    Code status : Full code    Covid - 19 PCR: negative on 3/7    The 4Ms    What Matters Most: see [facility]  Age appropriate Medications/Screen for High Risk Medication: Yes  Mentation: see CAM below  Mobility: defer to physical exam    The Confusion Assessment Method (CAM) Diagnostic Algorithm     1: Acute Onset or Fluctuating Course  - Is there evidence of an acute change in mental status from the patient’s baseline? Did the (abnormal) behavior  fluctuate during the day, that is, tend to come and go, or increase and decrease in severity?  [x ] YES [ ] NO     2: Inattention  - Did the patient have difficulty focusing attention, being easily distractible, or having difficulty keeping track of what was being said?  [ x] YES [ ] NO     3: Disorganized thinking  -Was the patient’s thinking disorganized or incoherent, such as rambling or irrelevant conversation, unclear or illogical flow of ideas, or unpredictable switching from subject to subject?  [ ] YES [ ] NO (X) NA    4: Altered Level of consciousness?  [ x] YES [ ] NO    The diagnosis of delirium by CAM requires the presence of features 1 and 2 and either 3 or 4.    PRESSORS: [ ] YES [ x] NO  piperacillin/tazobactam IVPB.. 3.375 Gram(s) IV Intermittent every 8 hours    Cardiovascular:  Heart Failure  Acute   Acute on Chronic  Chronic       aMIOdarone    Tablet 200 milliGRAM(s) Oral daily  carvedilol 6.25 milliGRAM(s) Oral every 12 hours  tamsulosin 0.4 milliGRAM(s) Oral at bedtime    Pulmonary:  ALBUTerol    90 MICROgram(s) HFA Inhaler 2 Puff(s) Inhalation every 6 hours  tiotropium 18 MICROgram(s) Capsule 1 Capsule(s) Inhalation daily    Hematalogic:  enoxaparin Injectable 40 milliGRAM(s) SubCutaneous every 24 hours    Other:  acetaminophen    Suspension .. 650 milliGRAM(s) Enteral Tube every 6 hours PRN  amantadine Syrup 100 milliGRAM(s) Oral two times a day  ascorbic acid 500 milliGRAM(s) Oral daily  chlorhexidine 0.12% Liquid 15 milliLiter(s) Oral Mucosa every 12 hours  collagenase Ointment 1 Application(s) Topical daily  folic acid 1 milliGRAM(s) Oral daily  lactulose Syrup 6.6667 Gram(s) Oral two times a day  levETIRAcetam  Solution 1000 milliGRAM(s) Oral two times a day  morphine  - Injectable 1 milliGRAM(s) IV Push every 4 hours PRN  multivitamin 1 Tablet(s) Oral daily  potassium chloride   Powder 40 milliEquivalent(s) Enteral Tube every 4 hours  predniSONE   Tablet 20 milliGRAM(s) Oral daily  senna Syrup 10 milliLiter(s) Oral at bedtime    acetaminophen    Suspension .. 650 milliGRAM(s) Enteral Tube every 6 hours PRN  ALBUTerol    90 MICROgram(s) HFA Inhaler 2 Puff(s) Inhalation every 6 hours  amantadine Syrup 100 milliGRAM(s) Oral two times a day  aMIOdarone    Tablet 200 milliGRAM(s) Oral daily  ascorbic acid 500 milliGRAM(s) Oral daily  carvedilol 6.25 milliGRAM(s) Oral every 12 hours  chlorhexidine 0.12% Liquid 15 milliLiter(s) Oral Mucosa every 12 hours  collagenase Ointment 1 Application(s) Topical daily  enoxaparin Injectable 40 milliGRAM(s) SubCutaneous every 24 hours  folic acid 1 milliGRAM(s) Oral daily  lactulose Syrup 6.6667 Gram(s) Oral two times a day  levETIRAcetam  Solution 1000 milliGRAM(s) Oral two times a day  morphine  - Injectable 1 milliGRAM(s) IV Push every 4 hours PRN  multivitamin 1 Tablet(s) Oral daily  piperacillin/tazobactam IVPB.. 3.375 Gram(s) IV Intermittent every 8 hours  potassium chloride   Powder 40 milliEquivalent(s) Enteral Tube every 4 hours  predniSONE   Tablet 20 milliGRAM(s) Oral daily  senna Syrup 10 milliLiter(s) Oral at bedtime  tamsulosin 0.4 milliGRAM(s) Oral at bedtime  tiotropium 18 MICROgram(s) Capsule 1 Capsule(s) Inhalation daily    Drug Dosing Weight  Height (cm): 172.7 (07 Mar 2022 01:15)  Weight (kg): 82.2 (08 Mar 2022 05:03)  BMI (kg/m2): 27.6 (08 Mar 2022 05:03)  BSA (m2): 1.96 (08 Mar 2022 05:03)    CENTRAL LINE: [ ] YES [x ] NO  LOCATION:   DATE INSERTED:  REMOVE: [ ] YES [ ] NO  EXPLAIN:    ESPITIA: [ ] YES [ x] NO    DATE INSERTED:  REMOVE:  [ ] YES [ ] NO  EXPLAIN:    PAST MEDICAL & SURGICAL HISTORY:  Essential hypertension    Primary osteoarthritis, unspecified site    Closed fracture of left radius, initial encounter    Morbid obesity, unspecified obesity type  h/o. - s/p gastric bypass- lost 113 lbs    KAREN (obstructive sleep apnea)  h/o - was on CPAP until gastric bypass surgery    Respiratory failure    Anemia    Subdural hemorrhage    Epilepsy    H/O gastrostomy    History of BPH    Afib    S/P gastric bypass  2008    Status post total replacement of left hip  2006    S/P bunionectomy  bilateral    S/P colonoscopy  approx 2012    History of abdominoplasty  and subsequent cosmetic surgery for removal of excess skin from face    ABG - ( 07 Mar 2022 16:56 )  pH, Arterial: 7.48  pH, Blood: x     /  pCO2: 54    /  pO2: 85    / HCO3: 40    / Base Excess: 14.2  /  SaO2: 98        Mode: AC/ CMV (Assist Control/ Continuous Mandatory Ventilation)  RR (machine): 14  TV (machine): 420  FiO2: 40  PEEP: 5  ITime: 1  MAP: 9  PIP: 19      PHYSICAL EXAM:  GENERAL: NAD, chronically ill appearing  non verbal  HEAD:  Atraumatic, well healed scar across skull   EYES: conjunctiva and sclera clear  ENMT:  Moist mucous membranes,   NECK: Supple, trach midline with trach collar  NERVOUS SYSTEM: awake, opens eyes to verbal stimuli, intermittently following commands  CHEST/LUNG: fair air entry, no wheeze or rhonchi, equal lung expansion and unlabored on ventilator support  HEART: irregular rate, no murmur  ABDOMEN: Soft, Nontender, Nondistended; Bowel sounds present, PEG in place  EXTREMITIES:  2+ Peripheral Pulses, No clubbing, cyanosis + trace edema all extremities   SKIN: see wound note, dressing intact lower back--no other visible  rash or erythema    LABS:  CBC Full  -  ( 09 Mar 2022 07:15 )  WBC Count : 10.83 K/uL  RBC Count : 2.40 M/uL  Hemoglobin : 7.8 g/dL  Hematocrit : 24.7 %  Platelet Count - Automated : 293 K/uL  Mean Cell Volume : 102.9 fl  Mean Cell Hemoglobin : 32.5 pg  Mean Cell Hemoglobin Concentration : 31.6 gm/dL    03-09    146<H>  |  109<H>  |  34<H>  ----------------------------<  137<H>  3.2<L>   |  31  |  1.04    Ca    8.4      09 Mar 2022 07:15  Phos  3.1     03-09  Mg     2.6     03-09    TPro  6.8  /  Alb  1.8<L>  /  TBili  0.2  /  DBili  x   /  AST  25  /  ALT  26  /  AlkPhos  103  03-09    [ x ]  DVT Prophylaxis  [ x ]  Nutrition, tube feeding, see nutrition note        Abnormal Nutritional Status -  Malnutrition   Cachexia      Morbid Obesity BMI >/=40    RADIOLOGY & ADDITIONAL STUDIES:   < from: Xray Chest 1 View-PORTABLE IMMEDIATE (03.07.22 @ 01:51) >    ACC: [id number] EXAM:  XR CHEST PORTABLE IMMED 1V                          PROCEDURE DATE:  03/07/2022      INTERPRETATION:  INDICATION: Sepsis    PRIORS: None    VIEWS: Portable AP radiography of the chest performed. Evaluation limited   secondary to portable technique and shallow inspiration.    FINDINGS: Heart size and mediastinal contours cannot be assessed on this   evaluation. Midline tracheostomy. Interstitial prominence may reflect   shallow inspiration. Increased markings within the lung bases suggests   atelectatic change. Small pleural effusions cannot be excluded. There is   no evidence for pneumothorax. No mediastinal shift.    IMPRESSION: As above.    --- End of Report ---    BECK SHEPHERD MD; Attending Radiologist  This document has been electronically signed. Mar  7 2022  9:00AM    < end of copied text >      Goals of Care Discussion with Family/Proxy/Other   - see note for family discussion

## 2022-03-09 NOTE — CHART NOTE - NSCHARTNOTEFT_GEN_A_CORE
Discussed and updated pt condition to family brother Jermaine Olivier at [phone], including current respiratory condition which is improving with oxygen at home dose. answered all questions. no further questions are voiced this time. Discussed and updated pt condition to family cousin HCP dr. Cora Moran [phone], including current respiratory condition on vent, will attempt to wean for SBT today, then will assess for speech if pt is tolerating SBT. Agreeable plan of care, answered all qeustions, no further questions voiced this time.

## 2022-03-09 NOTE — PROGRESS NOTE ADULT - PROBLEM SELECTOR PLAN 2
continue empiric antibiotics (vancomycin discontinued)  Continue zosyn   trend WBC, fever  procalcitonin 0.11  Bcx, Ucx NGTD 3/7  ID Dr Quahc following, appreciate recommendation  Zosyn initiated prior to [facility] hospitalization for parotitis vs PNA; CT neck 3/2 as above.  c/w Zosyn for now  f/u MRSA pcr

## 2022-03-10 LAB
-  AMIKACIN: SIGNIFICANT CHANGE UP
-  AMIKACIN: SIGNIFICANT CHANGE UP
-  AMPICILLIN/SULBACTAM: SIGNIFICANT CHANGE UP
-  AZTREONAM: SIGNIFICANT CHANGE UP
-  CEFEPIME: SIGNIFICANT CHANGE UP
-  CEFEPIME: SIGNIFICANT CHANGE UP
-  CEFTAZIDIME: SIGNIFICANT CHANGE UP
-  CEFTAZIDIME: SIGNIFICANT CHANGE UP
-  CIPROFLOXACIN: SIGNIFICANT CHANGE UP
-  CIPROFLOXACIN: SIGNIFICANT CHANGE UP
-  GENTAMICIN: SIGNIFICANT CHANGE UP
-  GENTAMICIN: SIGNIFICANT CHANGE UP
-  IMIPENEM: SIGNIFICANT CHANGE UP
-  IMIPENEM: SIGNIFICANT CHANGE UP
-  LEVOFLOXACIN: SIGNIFICANT CHANGE UP
-  LEVOFLOXACIN: SIGNIFICANT CHANGE UP
-  MEROPENEM: SIGNIFICANT CHANGE UP
-  MEROPENEM: SIGNIFICANT CHANGE UP
-  PIPERACILLIN/TAZOBACTAM: SIGNIFICANT CHANGE UP
-  PIPERACILLIN/TAZOBACTAM: SIGNIFICANT CHANGE UP
-  TOBRAMYCIN: SIGNIFICANT CHANGE UP
-  TOBRAMYCIN: SIGNIFICANT CHANGE UP
-  TRIMETHOPRIM/SULFAMETHOXAZOLE: SIGNIFICANT CHANGE UP
ALBUMIN SERPL ELPH-MCNC: 1.8 G/DL — LOW (ref 3.5–5)
ALP SERPL-CCNC: 102 U/L — SIGNIFICANT CHANGE UP (ref 40–120)
ALT FLD-CCNC: 29 U/L DA — SIGNIFICANT CHANGE UP (ref 10–60)
ANION GAP SERPL CALC-SCNC: 4 MMOL/L — LOW (ref 5–17)
AST SERPL-CCNC: 25 U/L — SIGNIFICANT CHANGE UP (ref 10–40)
BILIRUB SERPL-MCNC: 0.2 MG/DL — SIGNIFICANT CHANGE UP (ref 0.2–1.2)
BUN SERPL-MCNC: 35 MG/DL — HIGH (ref 7–18)
CALCIUM SERPL-MCNC: 8.2 MG/DL — LOW (ref 8.4–10.5)
CHLORIDE SERPL-SCNC: 111 MMOL/L — HIGH (ref 96–108)
CO2 SERPL-SCNC: 32 MMOL/L — HIGH (ref 22–31)
CREAT SERPL-MCNC: 0.94 MG/DL — SIGNIFICANT CHANGE UP (ref 0.5–1.3)
CULTURE RESULTS: SIGNIFICANT CHANGE UP
EGFR: 85 ML/MIN/1.73M2 — SIGNIFICANT CHANGE UP
FERRITIN SERPL-MCNC: 267 NG/ML — SIGNIFICANT CHANGE UP (ref 30–400)
FOLATE SERPL-MCNC: >20 NG/ML — SIGNIFICANT CHANGE UP
GLUCOSE BLDC GLUCOMTR-MCNC: 144 MG/DL — HIGH (ref 70–99)
GLUCOSE BLDC GLUCOMTR-MCNC: 179 MG/DL — HIGH (ref 70–99)
GLUCOSE BLDC GLUCOMTR-MCNC: 180 MG/DL — HIGH (ref 70–99)
GLUCOSE SERPL-MCNC: 137 MG/DL — HIGH (ref 70–99)
HCT VFR BLD CALC: 25.4 % — LOW (ref 39–50)
HGB BLD-MCNC: 7.7 G/DL — LOW (ref 13–17)
IRON SATN MFR SERPL: 25 % — SIGNIFICANT CHANGE UP (ref 20–55)
IRON SATN MFR SERPL: 41 UG/DL — LOW (ref 65–170)
MAGNESIUM SERPL-MCNC: 2.7 MG/DL — HIGH (ref 1.6–2.6)
MCHC RBC-ENTMCNC: 30.3 GM/DL — LOW (ref 32–36)
MCHC RBC-ENTMCNC: 32 PG — SIGNIFICANT CHANGE UP (ref 27–34)
MCV RBC AUTO: 105.4 FL — HIGH (ref 80–100)
METHOD TYPE: SIGNIFICANT CHANGE UP
NRBC # BLD: 0 /100 WBCS — SIGNIFICANT CHANGE UP (ref 0–0)
ORGANISM # SPEC MICROSCOPIC CNT: SIGNIFICANT CHANGE UP
PHOSPHATE SERPL-MCNC: 2.9 MG/DL — SIGNIFICANT CHANGE UP (ref 2.5–4.5)
PLATELET # BLD AUTO: 292 K/UL — SIGNIFICANT CHANGE UP (ref 150–400)
POTASSIUM SERPL-MCNC: 3.7 MMOL/L — SIGNIFICANT CHANGE UP (ref 3.5–5.3)
POTASSIUM SERPL-SCNC: 3.7 MMOL/L — SIGNIFICANT CHANGE UP (ref 3.5–5.3)
PROT SERPL-MCNC: 6.8 G/DL — SIGNIFICANT CHANGE UP (ref 6–8.3)
RBC # BLD: 2.41 M/UL — LOW (ref 4.2–5.8)
RBC # FLD: 18.7 % — HIGH (ref 10.3–14.5)
SODIUM SERPL-SCNC: 147 MMOL/L — HIGH (ref 135–145)
SPECIMEN SOURCE: SIGNIFICANT CHANGE UP
TIBC SERPL-MCNC: 162 UG/DL — LOW (ref 250–450)
TRANSFERRIN SERPL-MCNC: 145 MG/DL — LOW (ref 200–360)
UIBC SERPL-MCNC: 121 UG/DL — SIGNIFICANT CHANGE UP (ref 110–370)
VIT B12 SERPL-MCNC: 1455 PG/ML — HIGH (ref 232–1245)
WBC # BLD: 8.51 K/UL — SIGNIFICANT CHANGE UP (ref 3.8–10.5)
WBC # FLD AUTO: 8.51 K/UL — SIGNIFICANT CHANGE UP (ref 3.8–10.5)

## 2022-03-10 RX ORDER — SCOPALAMINE 1 MG/3D
1 PATCH, EXTENDED RELEASE TRANSDERMAL ONCE
Refills: 0 | Status: COMPLETED | OUTPATIENT
Start: 2022-03-10 | End: 2022-03-10

## 2022-03-10 RX ORDER — FUROSEMIDE 40 MG
40 TABLET ORAL DAILY
Refills: 0 | Status: DISCONTINUED | OUTPATIENT
Start: 2022-03-10 | End: 2022-03-10

## 2022-03-10 RX ORDER — FUROSEMIDE 40 MG
40 TABLET ORAL DAILY
Refills: 0 | Status: DISCONTINUED | OUTPATIENT
Start: 2022-03-10 | End: 2022-03-16

## 2022-03-10 RX ADMIN — PIPERACILLIN AND TAZOBACTAM 25 GRAM(S): 4; .5 INJECTION, POWDER, LYOPHILIZED, FOR SOLUTION INTRAVENOUS at 21:30

## 2022-03-10 RX ADMIN — SENNA PLUS 10 MILLILITER(S): 8.6 TABLET ORAL at 23:54

## 2022-03-10 RX ADMIN — Medication 100 MILLIGRAM(S): at 05:16

## 2022-03-10 RX ADMIN — LEVETIRACETAM 1000 MILLIGRAM(S): 250 TABLET, FILM COATED ORAL at 18:33

## 2022-03-10 RX ADMIN — CARVEDILOL PHOSPHATE 6.25 MILLIGRAM(S): 80 CAPSULE, EXTENDED RELEASE ORAL at 05:17

## 2022-03-10 RX ADMIN — CHLORHEXIDINE GLUCONATE 15 MILLILITER(S): 213 SOLUTION TOPICAL at 18:31

## 2022-03-10 RX ADMIN — Medication 20 MILLIGRAM(S): at 05:17

## 2022-03-10 RX ADMIN — CHLORHEXIDINE GLUCONATE 15 MILLILITER(S): 213 SOLUTION TOPICAL at 05:19

## 2022-03-10 RX ADMIN — TAMSULOSIN HYDROCHLORIDE 0.4 MILLIGRAM(S): 0.4 CAPSULE ORAL at 21:31

## 2022-03-10 RX ADMIN — ALBUTEROL 2 PUFF(S): 90 AEROSOL, METERED ORAL at 04:27

## 2022-03-10 RX ADMIN — SCOPALAMINE 1 PATCH: 1 PATCH, EXTENDED RELEASE TRANSDERMAL at 18:32

## 2022-03-10 RX ADMIN — Medication 650 MILLIGRAM(S): at 21:29

## 2022-03-10 RX ADMIN — AMIODARONE HYDROCHLORIDE 200 MILLIGRAM(S): 400 TABLET ORAL at 05:16

## 2022-03-10 RX ADMIN — LEVETIRACETAM 1000 MILLIGRAM(S): 250 TABLET, FILM COATED ORAL at 05:16

## 2022-03-10 RX ADMIN — ALBUTEROL 2 PUFF(S): 90 AEROSOL, METERED ORAL at 20:02

## 2022-03-10 RX ADMIN — ALBUTEROL 2 PUFF(S): 90 AEROSOL, METERED ORAL at 08:16

## 2022-03-10 RX ADMIN — Medication 1 TABLET(S): at 10:55

## 2022-03-10 RX ADMIN — ENOXAPARIN SODIUM 40 MILLIGRAM(S): 100 INJECTION SUBCUTANEOUS at 10:54

## 2022-03-10 RX ADMIN — PIPERACILLIN AND TAZOBACTAM 25 GRAM(S): 4; .5 INJECTION, POWDER, LYOPHILIZED, FOR SOLUTION INTRAVENOUS at 14:19

## 2022-03-10 RX ADMIN — CARVEDILOL PHOSPHATE 6.25 MILLIGRAM(S): 80 CAPSULE, EXTENDED RELEASE ORAL at 18:33

## 2022-03-10 RX ADMIN — LACTULOSE 6.67 GRAM(S): 10 SOLUTION ORAL at 18:33

## 2022-03-10 RX ADMIN — Medication 500 MILLIGRAM(S): at 10:46

## 2022-03-10 RX ADMIN — Medication 40 MILLIGRAM(S): at 15:06

## 2022-03-10 RX ADMIN — Medication 1 MILLIGRAM(S): at 10:54

## 2022-03-10 RX ADMIN — Medication 1 APPLICATION(S): at 10:47

## 2022-03-10 RX ADMIN — Medication 100 MILLIGRAM(S): at 18:32

## 2022-03-10 RX ADMIN — PIPERACILLIN AND TAZOBACTAM 25 GRAM(S): 4; .5 INJECTION, POWDER, LYOPHILIZED, FOR SOLUTION INTRAVENOUS at 05:17

## 2022-03-10 RX ADMIN — Medication 1000 MILLIGRAM(S): at 08:11

## 2022-03-10 RX ADMIN — SCOPALAMINE 1 PATCH: 1 PATCH, EXTENDED RELEASE TRANSDERMAL at 19:30

## 2022-03-10 RX ADMIN — Medication 650 MILLIGRAM(S): at 18:33

## 2022-03-10 RX ADMIN — LACTULOSE 6.67 GRAM(S): 10 SOLUTION ORAL at 05:18

## 2022-03-10 NOTE — PROGRESS NOTE ADULT - ASSESSMENT
Pneumonia  Fevers - improving  Leukocytosis - normalized  Sepsis    Plan - Cont Zosyn 3.375 gms iv q8hrs D#4 for a total of 7-8 days             Pneumonia  Fevers - improving  Leukocytosis - normalized  Sepsis    Plan - Cont Zosyn 3.375 gms iv q8hrs D#4 for a total of 7-8 days    I agree with above

## 2022-03-10 NOTE — PROGRESS NOTE ADULT - PROBLEM SELECTOR PLAN 1
secondary to aspiration pneumonia, trach dependent at nursing home (15L trach collar)  presented to ED 3/7 from nursing home for respiratory distress  Continue ventilator support; wean as able-daily SBT during the day and place vent at night  will follow up SLP for swallowing eval if tolerating SBT.   Continue zosyn, vancomycin  continue spiriva, albuterol   Prednisone 20mg for 5 days    Culture result negative from 3/7  MRSA PCR negative.

## 2022-03-10 NOTE — PROGRESS NOTE ADULT - PROBLEM SELECTOR PLAN 10
likely chronic condition, no report bleeding  Hgb 9.0 on admission to 7.8  unknown baseline  f/u FOBT, iron panel.   monitor CBC

## 2022-03-10 NOTE — PROGRESS NOTE ADULT - PROBLEM SELECTOR PLAN 2
continue empiric antibiotics (vancomycin discontinued)  Continue zosyn   trend WBC, fever  procalcitonin 0.11  MRSA PCR negative.  Bcx, Ucx NGTD 3/7  ID Dr Quach following, appreciate recommendation  Zosyn initiated prior to [facility] hospitalization for parotitis vs PNA; CT neck 3/2 as above.  c/w Zosyn for now

## 2022-03-10 NOTE — PROGRESS NOTE ADULT - ASSESSMENT
76 y.o. male with PMH Afib s/p Watchman device on ASA, prior gastric sleeve and history of R occipital hemorrhage (dx 2020) stable on imaging (11/19/2021). Patient was traveling in [facility] on 11/26/21 when he fell from a standing height and struck his head on the floor then presented to [facility] hospital in [facility] with a GCS of 3 found to have R subdural hematoma w/ R to L subflacine hernation s/p emergent R hemicraniectomy. Course notable for ventilator dependent respiratory failure s/p tracheostomy (12/9/21), CDfiff colitis (completed PO vanco), non convulsive seizures controlled with Keppra. Following 6 week ICU course in [facility] was repatriated on 1/8/22 and admitted to [facility] ([facility]). [facility] course notable for initiation of amantadine, successful wean from ventilator to trach collar, vEEG (1/8-1/9) notable for mild background slowing and right hemispheric high amplitude slowing and breach artifact; there were no seizures captured, s/p PEG (1/13). 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 Patient was stabilized and discharged to [facility] on 2/1/2022 for comprehensive inpatient rehabilitation for ADL and gait dysfunction 2/2 TBI then transferred to [facility] on 3/3/22 for skilled nursing. Recent course notable for new fever due to R parotitis vs PNA for which he was started on zosyn. Nursing home course notable for increased yellow sputum production, respiratory distress for which patient was brought to ER (3/7). ER course notable for continuation of zosyn, initiating vancomycin with cultures pending, ID following and respiratory distress managed with ventilator support.   Patient is admitted to   for further management of acute on chronic hypoxic respiratory failure and sepsis.  started on Zosyn and ID on board.  Will attempt SBT today and then f/u SLP for speech eval when tolerating.     76 y.o. male with PMH Afib s/p Watchman device on ASA, prior gastric sleeve and history of R occipital hemorrhage (dx 2020) stable on imaging (11/19/2021). Patient was traveling in [facility] on 11/26/21 when he fell from a standing height and struck his head on the floor then presented to [facility] hospital in [facility] with a GCS of 3 found to have R subdural hematoma w/ R to L subflacine hernation s/p emergent R hemicraniectomy. Course notable for ventilator dependent respiratory failure s/p tracheostomy (12/9/21), CDfiff colitis (completed PO vanco), non convulsive seizures controlled with Keppra. Following 6 week ICU course in [facility] was repatriated on 1/8/22 and admitted to [facility] ([facility]). [facility] course notable for initiation of amantadine, successful wean from ventilator to trach collar, vEEG (1/8-1/9) notable for mild background slowing and right hemispheric high amplitude slowing and breach artifact; there were no seizures captured, s/p PEG (1/13). 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 Patient was stabilized and discharged to [facility] on 2/1/2022 for comprehensive inpatient rehabilitation for ADL and gait dysfunction 2/2 TBI then transferred to [facility] on 3/3/22 for skilled nursing. Recent course notable for new fever due to R parotitis vs PNA for which he was started on zosyn. Nursing home course notable for increased yellow sputum production, respiratory distress for which patient was brought to ER (3/7). ER course notable for continuation of zosyn, initiating vancomycin with cultures pending, ID following and respiratory distress managed with ventilator support.   Patient is admitted to   for further management of acute on chronic hypoxic respiratory failure and sepsis.  started on Zosyn and ID on board.  3/9 failed SBT with tachyneic, hypoxic, placed back on A/C mode.  3/10 attempt SBT today

## 2022-03-10 NOTE — PROGRESS NOTE ADULT - PROBLEM SELECTOR PLAN 8
Wound care specialist consulted, c/w local wound tx as rec.   -There is a Stage 1 Pressure Injury to the Bilateral Heels, as evident by non-blanchable erythema  -There is a Stage 3 Pressure Injury to the Sacrum with slough (15%), pink tissue, and red tissue  Recommendation:  -Clean all wounds with normal saline and apply skin prep to the surrounding skin  -Apply Calcium Alginate (Aquacel) to the Sacral wound and cover with a Foam dressing Q 72hrs PRN  -Elevate/float the patiens heels using heel protectors and reposition Q 2hrs using wedges or pillows

## 2022-03-10 NOTE — CHART NOTE - NSCHARTNOTEFT_GEN_A_CORE
updated pt condition to family cousin HCP dr. Cora Moran [phone], including current respiratory condition on vent, not tolerating SBT may require vent facility on discharge. will continue daily SBT trials. antibiotic. Agreeable plan of care, answered all questions, no further questions voiced this time.

## 2022-03-10 NOTE — PROGRESS NOTE ADULT - SUBJECTIVE AND OBJECTIVE BOX
NAYANA CLAROS    SCU progress note    INTERVAL HPI/OVERNIGHT EVENTS: no overnight event, on vent, pt able to nod to Y/N questions. denies pain    Full code    Covid - 19 PCR: neg 3/7    The 4Ms    What Matters Most: see GOC  Age appropriate Medications/Screen for High Risk Medication: Yes  Mentation: see CAM below  Mobility: defer to physical exam    The Confusion Assessment Method (CAM) Diagnostic Algorithm     1: Acute Onset or Fluctuating Course  - Is there evidence of an acute change in mental status from the patient’s baseline? Did the (abnormal) behavior  fluctuate during the day, that is, tend to come and go, or increase and decrease in severity?  [x ] YES [ ] NO     2: Inattention  - Did the patient have difficulty focusing attention, being easily distractible, or having difficulty keeping track of what was being said?  [ ] YES [x ] NO     3: Disorganized thinking  -Was the patient’s thinking disorganized or incoherent, such as rambling or irrelevant conversation, unclear or illogical flow of ideas, or unpredictable switching from subject to subject?  [ ] YES [x ] NO    4: Altered Level of consciousness?  [ ] YES [x ] NO    The diagnosis of delirium by CAM requires the presence of features 1 and 2 and either 3 or 4.    PRESSORS: [ ] YES [ x] NO  piperacillin/tazobactam IVPB.. 3.375 Gram(s) IV Intermittent every 8 hours    Cardiovascular:  Heart Failure  Acute   Acute on Chronic  Chronic       aMIOdarone    Tablet 200 milliGRAM(s) Oral daily  carvedilol 6.25 milliGRAM(s) Oral every 12 hours  tamsulosin 0.4 milliGRAM(s) Oral at bedtime    Pulmonary:  ALBUTerol    90 MICROgram(s) HFA Inhaler 2 Puff(s) Inhalation every 6 hours    Hematalogic:  enoxaparin Injectable 40 milliGRAM(s) SubCutaneous every 24 hours    Other:  acetaminophen    Suspension .. 650 milliGRAM(s) Enteral Tube every 6 hours PRN  amantadine Syrup 100 milliGRAM(s) Oral two times a day  ascorbic acid 500 milliGRAM(s) Oral daily  chlorhexidine 0.12% Liquid 15 milliLiter(s) Oral Mucosa every 12 hours  collagenase Ointment 1 Application(s) Topical daily  folic acid 1 milliGRAM(s) Oral daily  lactulose Syrup 6.6667 Gram(s) Oral two times a day  levETIRAcetam  Solution 1000 milliGRAM(s) Oral two times a day  multivitamin 1 Tablet(s) Oral daily  predniSONE   Tablet 20 milliGRAM(s) Oral daily  senna Syrup 10 milliLiter(s) Oral at bedtime    acetaminophen    Suspension .. 650 milliGRAM(s) Enteral Tube every 6 hours PRN  ALBUTerol    90 MICROgram(s) HFA Inhaler 2 Puff(s) Inhalation every 6 hours  amantadine Syrup 100 milliGRAM(s) Oral two times a day  aMIOdarone    Tablet 200 milliGRAM(s) Oral daily  ascorbic acid 500 milliGRAM(s) Oral daily  carvedilol 6.25 milliGRAM(s) Oral every 12 hours  chlorhexidine 0.12% Liquid 15 milliLiter(s) Oral Mucosa every 12 hours  collagenase Ointment 1 Application(s) Topical daily  enoxaparin Injectable 40 milliGRAM(s) SubCutaneous every 24 hours  folic acid 1 milliGRAM(s) Oral daily  lactulose Syrup 6.6667 Gram(s) Oral two times a day  levETIRAcetam  Solution 1000 milliGRAM(s) Oral two times a day  multivitamin 1 Tablet(s) Oral daily  piperacillin/tazobactam IVPB.. 3.375 Gram(s) IV Intermittent every 8 hours  predniSONE   Tablet 20 milliGRAM(s) Oral daily  senna Syrup 10 milliLiter(s) Oral at bedtime  tamsulosin 0.4 milliGRAM(s) Oral at bedtime    Drug Dosing Weight  Height (cm): 172.7 (07 Mar 2022 01:15)  Weight (kg): 82.2 (08 Mar 2022 05:03)  BMI (kg/m2): 27.6 (08 Mar 2022 05:03)  BSA (m2): 1.96 (08 Mar 2022 05:03)    CENTRAL LINE: [ ] YES [x ] NO  LOCATION:   DATE INSERTED:  REMOVE: [ ] YES [ ] NO  EXPLAIN:    ESPITIA: [ ] YES [x ] NO    DATE INSERTED:  REMOVE:  [ ] YES [ ] NO  EXPLAIN:    PAST MEDICAL & SURGICAL HISTORY:  Essential hypertension    Primary osteoarthritis, unspecified site    Closed fracture of left radius, initial encounter    Morbid obesity, unspecified obesity type  h/o. - s/p gastric bypass- lost 113 lbs    KAREN (obstructive sleep apnea)  h/o - was on CPAP until gastric bypass surgery    Respiratory failure    Anemia    Subdural hemorrhage    Epilepsy    H/O gastrostomy    History of BPH    Afib    S/P gastric bypass  2008    Status post total replacement of left hip  2006    S/P bunionectomy  bilateral    S/P colonoscopy  approx 2012    History of abdominoplasty  and subsequent cosmetic surgery for removal of excess skin from face  Mode: PS (Pressure Support)/ Spontaneous  FiO2: 40  PEEP: 5  ITime: 1  MAP: 7  PIP: 17    PHYSICAL EXAM:  GENERAL: NAD, chronically ill appearing  non verbally responds  HEAD:  Atraumatic, well healed scar across skull   EYES: conjunctiva and sclera clear  ENMT:  Moist mucous membranes,   NECK: Supple, trach midline with trach collar  NERVOUS SYSTEM: awake, opens eyes to verbal stimuli,  following simple commands  CHEST/LUNG: fair air entry, no wheeze or rhonchi, equal lung expansion and unlabored on ventilator support  HEART: irregular rate, no murmur  ABDOMEN: Soft, Nontender, Nondistended; Bowel sounds present, PEG in place  EXTREMITIES:  2+ Peripheral Pulses, No clubbing, cyanosis + trace edema all extremities   SKIN: see wound note, pressure injury site no sx of infection to lower back, no other visible  rash or erythema    LABS:  CBC Full  -  ( 10 Mar 2022 06:44 )  WBC Count : 8.51 K/uL  RBC Count : 2.41 M/uL  Hemoglobin : 7.7 g/dL  Hematocrit : 25.4 %  Platelet Count - Automated : 292 K/uL  Mean Cell Volume : 105.4 fl  Mean Cell Hemoglobin : 32.0 pg  Mean Cell Hemoglobin Concentration : 30.3 gm/dL    03-10    147<H>  |  111<H>  |  35<H>  ----------------------------<  137<H>  3.7   |  32<H>  |  0.94    Ca    8.2<L>      10 Mar 2022 06:44  Phos  2.9     03-10  Mg     2.7     03-10    TPro  6.8  /  Alb  1.8<L>  /  TBili  0.2  /  DBili  x   /  AST  25  /  ALT  29  /  AlkPhos  102  03-10      [x  ]  DVT Prophylaxis  [x  ]  Nutrition, tube feeding, see nutrition note        Abnormal Nutritional Status -  Malnutrition   Cachexia      Morbid Obesity BMI >/=40    RADIOLOGY & ADDITIONAL STUDIES:     < from: Xray Chest 1 View-PORTABLE IMMEDIATE (03.07.22 @ 01:51) >    ACC: [id number] EXAM:  XR CHEST PORTABLE IMMED 1V                          PROCEDURE DATE:  03/07/2022          INTERPRETATION:  INDICATION: Sepsis    PRIORS: None    VIEWS: Portable AP radiography of the chest performed. Evaluation limited   secondary to portable technique and shallow inspiration.    FINDINGS: Heart size and mediastinal contours cannot be assessed on this   evaluation. Midline tracheostomy. Interstitial prominence may reflect   shallow inspiration. Increased markings within the lung bases suggests   atelectatic change. Small pleural effusions cannot be excluded. There is   no evidence for pneumothorax. No mediastinal shift.    IMPRESSION: As above.    --- End of Report ---    BECK SHEPHERD MD; Attending Radiologist  This document has been electronically signed. Mar  7 2022  9:00AM  < end of copied text >      Goals of Care Discussion with Family/Proxy/Other   - see note family update.

## 2022-03-10 NOTE — PROGRESS NOTE ADULT - PROBLEM SELECTOR PLAN 12
From [facility]  continue DVT ppx, bowel regimen per nursing home  Nutrition to see for recommendation  will need PT and SLP eval, daily SBT    Palliative care following- Full code  May need [facility] facility.

## 2022-03-10 NOTE — PROGRESS NOTE ADULT - SUBJECTIVE AND OBJECTIVE BOX
76y Male is under our care for     REVIEW OF SYSTEMS:  [  ] Not able to elicit  General:	  Chest:	  GI:	  :  Skin:	  Musculoskeletal:	  Neuro:	    MEDS:  piperacillin/tazobactam IVPB.. 3.375 Gram(s) IV Intermittent every 8 hours    ALLERGIES: Allergies    No Known Allergies    Intolerances        VITALS:  Vital Signs Last 24 Hrs  T(C): 37.2 (10 Mar 2022 05:05), Max: 37.4 (09 Mar 2022 21:59)  T(F): 99 (10 Mar 2022 05:05), Max: 99.4 (09 Mar 2022 21:59)  HR: 95 (10 Mar 2022 05:05) (77 - 95)  BP: 140/88 (10 Mar 2022 05:05) (123/75 - 141/87)  BP(mean): --  RR: 19 (10 Mar 2022 05:05) (14 - 21)  SpO2: 99% (10 Mar 2022 05:05) (94% - 99%)      PHYSICAL EXAM:  HEENT:  Neck:  Respiratory:  Cardiovascular:  Gastrointestinal:  Extremities:  Skin:  Ortho:  Neuro:    LABS/DIAGNOSTIC TESTS:                        7.7    8.51  )-----------( 292      ( 10 Mar 2022 06:44 )             25.4     WBC Count: 8.51 K/uL (03-10 @ 06:44)  WBC Count: 10.83 K/uL (03-09 @ 07:15)  WBC Count: 11.91 K/uL (03-08 @ 06:42)  WBC Count: 14.44 K/uL (03-07 @ 05:34)  WBC Count: 16.58 K/uL (03-07 @ 02:23)    03-10    147<H>  |  111<H>  |  35<H>  ----------------------------<  137<H>  3.7   |  32<H>  |  0.94    Ca    8.2<L>      10 Mar 2022 06:44  Phos  2.9     03-10  Mg     2.7     03-10    TPro  6.8  /  Alb  1.8<L>  /  TBili  0.2  /  DBili  x   /  AST  25  /  ALT  29  /  AlkPhos  102  03-10      CULTURES:   .Sputum Sputum  03-08 @ 18:33   Numerous Acinetobacter baumannii/nosocomialis group  Numerous Pseudomonas aeruginosa  --    Few polymorphonuclear leukocytes per low power field  No Squamous epithelial cells per low power field  Moderate Gram Negative Rods per oil power field      .Blood Blood-Peripheral  03-07 @ 10:18   No growth to date.  --  --      Clean Catch Clean Catch (Midstream)  03-07 @ 08:41   No growth  --  --        RADIOLOGY:  no new studies 76y Male is under our care for pneumonia.  Patient was seen laying comfortably in bed with no acute distress on trach collar on 40%.  He remains afebrile and WBC count is normalized.    REVIEW OF SYSTEMS:  [  ] Not able to elicit  General: no fevers no malaise  Chest: no cough no sob  GI: no nvd  : no urinary sxs   Skin: no rashes  Musculoskeletal: no trauma no LBP  Neuro: no ha's no dizziness     MEDS:  piperacillin/tazobactam IVPB.. 3.375 Gram(s) IV Intermittent every 8 hours    ALLERGIES: Allergies    No Known Allergies    Intolerances        VITALS:  Vital Signs Last 24 Hrs  T(C): 37.2 (10 Mar 2022 05:05), Max: 37.4 (09 Mar 2022 21:59)  T(F): 99 (10 Mar 2022 05:05), Max: 99.4 (09 Mar 2022 21:59)  HR: 95 (10 Mar 2022 05:05) (77 - 95)  BP: 140/88 (10 Mar 2022 05:05) (123/75 - 141/87)  BP(mean): --  RR: 19 (10 Mar 2022 05:05) (14 - 21)  SpO2: 99% (10 Mar 2022 05:05) (94% - 99%)      PHYSICAL EXAM:  HEENT: trach +  Neck: supple no LN's   Respiratory: Bilateral rales  Cardiovascular: S1 S2 reg no murmurs  Gastrointestinal: +BS with soft, nondistended abdomen; nontender, peg tube in place  Extremities: no edema  Skin: no rashes  Ortho: n/a  Neuro: Awake and alert      LABS/DIAGNOSTIC TESTS:                        7.7    8.51  )-----------( 292      ( 10 Mar 2022 06:44 )             25.4     WBC Count: 8.51 K/uL (03-10 @ 06:44)  WBC Count: 10.83 K/uL (03-09 @ 07:15)  WBC Count: 11.91 K/uL (03-08 @ 06:42)  WBC Count: 14.44 K/uL (03-07 @ 05:34)  WBC Count: 16.58 K/uL (03-07 @ 02:23)    03-10    147<H>  |  111<H>  |  35<H>  ----------------------------<  137<H>  3.7   |  32<H>  |  0.94    Ca    8.2<L>      10 Mar 2022 06:44  Phos  2.9     03-10  Mg     2.7     03-10    TPro  6.8  /  Alb  1.8<L>  /  TBili  0.2  /  DBili  x   /  AST  25  /  ALT  29  /  AlkPhos  102  03-10      CULTURES:   .Sputum Sputum  03-08 @ 18:33   Numerous Acinetobacter baumannii/nosocomialis group  Numerous Pseudomonas aeruginosa  --    Few polymorphonuclear leukocytes per low power field  No Squamous epithelial cells per low power field  Moderate Gram Negative Rods per oil power field      .Blood Blood-Peripheral  03-07 @ 10:18   No growth to date.  --  --      Clean Catch Clean Catch (Midstream)  03-07 @ 08:41   No growth  --  --        RADIOLOGY:  no new studies

## 2022-03-11 DIAGNOSIS — J90 PLEURAL EFFUSION, NOT ELSEWHERE CLASSIFIED: ICD-10-CM

## 2022-03-11 DIAGNOSIS — E87.8 OTHER DISORDERS OF ELECTROLYTE AND FLUID BALANCE, NOT ELSEWHERE CLASSIFIED: ICD-10-CM

## 2022-03-11 LAB
ANION GAP SERPL CALC-SCNC: 5 MMOL/L — SIGNIFICANT CHANGE UP (ref 5–17)
BUN SERPL-MCNC: 28 MG/DL — HIGH (ref 7–18)
CALCIUM SERPL-MCNC: 8.8 MG/DL — SIGNIFICANT CHANGE UP (ref 8.4–10.5)
CHLORIDE SERPL-SCNC: 110 MMOL/L — HIGH (ref 96–108)
CO2 SERPL-SCNC: 32 MMOL/L — HIGH (ref 22–31)
CREAT SERPL-MCNC: 0.96 MG/DL — SIGNIFICANT CHANGE UP (ref 0.5–1.3)
EGFR: 82 ML/MIN/1.73M2 — SIGNIFICANT CHANGE UP
GLUCOSE BLDC GLUCOMTR-MCNC: 146 MG/DL — HIGH (ref 70–99)
GLUCOSE BLDC GLUCOMTR-MCNC: 148 MG/DL — HIGH (ref 70–99)
GLUCOSE SERPL-MCNC: 142 MG/DL — HIGH (ref 70–99)
HCT VFR BLD CALC: 26.6 % — LOW (ref 39–50)
HGB BLD-MCNC: 8 G/DL — LOW (ref 13–17)
MCHC RBC-ENTMCNC: 30.1 GM/DL — LOW (ref 32–36)
MCHC RBC-ENTMCNC: 32 PG — SIGNIFICANT CHANGE UP (ref 27–34)
MCV RBC AUTO: 106.4 FL — HIGH (ref 80–100)
NRBC # BLD: 0 /100 WBCS — SIGNIFICANT CHANGE UP (ref 0–0)
NT-PROBNP SERPL-SCNC: 5893 PG/ML — HIGH (ref 0–450)
PLATELET # BLD AUTO: 297 K/UL — SIGNIFICANT CHANGE UP (ref 150–400)
POTASSIUM SERPL-MCNC: 3.5 MMOL/L — SIGNIFICANT CHANGE UP (ref 3.5–5.3)
POTASSIUM SERPL-SCNC: 3.5 MMOL/L — SIGNIFICANT CHANGE UP (ref 3.5–5.3)
RBC # BLD: 2.5 M/UL — LOW (ref 4.2–5.8)
RBC # FLD: 18.9 % — HIGH (ref 10.3–14.5)
SODIUM SERPL-SCNC: 147 MMOL/L — HIGH (ref 135–145)
WBC # BLD: 6.68 K/UL — SIGNIFICANT CHANGE UP (ref 3.8–10.5)
WBC # FLD AUTO: 6.68 K/UL — SIGNIFICANT CHANGE UP (ref 3.8–10.5)

## 2022-03-11 PROCEDURE — 71045 X-RAY EXAM CHEST 1 VIEW: CPT | Mod: 26

## 2022-03-11 RX ORDER — CHLORHEXIDINE GLUCONATE 213 G/1000ML
1 SOLUTION TOPICAL
Refills: 0 | Status: DISCONTINUED | OUTPATIENT
Start: 2022-03-11 | End: 2022-03-16

## 2022-03-11 RX ADMIN — Medication 500 MILLIGRAM(S): at 12:21

## 2022-03-11 RX ADMIN — Medication 40 MILLIGRAM(S): at 05:19

## 2022-03-11 RX ADMIN — ALBUTEROL 2 PUFF(S): 90 AEROSOL, METERED ORAL at 03:52

## 2022-03-11 RX ADMIN — LACTULOSE 6.67 GRAM(S): 10 SOLUTION ORAL at 05:17

## 2022-03-11 RX ADMIN — ALBUTEROL 2 PUFF(S): 90 AEROSOL, METERED ORAL at 14:33

## 2022-03-11 RX ADMIN — LEVETIRACETAM 1000 MILLIGRAM(S): 250 TABLET, FILM COATED ORAL at 18:21

## 2022-03-11 RX ADMIN — TAMSULOSIN HYDROCHLORIDE 0.4 MILLIGRAM(S): 0.4 CAPSULE ORAL at 21:54

## 2022-03-11 RX ADMIN — ALBUTEROL 2 PUFF(S): 90 AEROSOL, METERED ORAL at 21:50

## 2022-03-11 RX ADMIN — SENNA PLUS 10 MILLILITER(S): 8.6 TABLET ORAL at 21:58

## 2022-03-11 RX ADMIN — CHLORHEXIDINE GLUCONATE 15 MILLILITER(S): 213 SOLUTION TOPICAL at 18:24

## 2022-03-11 RX ADMIN — SCOPALAMINE 1 PATCH: 1 PATCH, EXTENDED RELEASE TRANSDERMAL at 07:16

## 2022-03-11 RX ADMIN — CARVEDILOL PHOSPHATE 6.25 MILLIGRAM(S): 80 CAPSULE, EXTENDED RELEASE ORAL at 18:23

## 2022-03-11 RX ADMIN — LACTULOSE 6.67 GRAM(S): 10 SOLUTION ORAL at 18:22

## 2022-03-11 RX ADMIN — PIPERACILLIN AND TAZOBACTAM 25 GRAM(S): 4; .5 INJECTION, POWDER, LYOPHILIZED, FOR SOLUTION INTRAVENOUS at 18:19

## 2022-03-11 RX ADMIN — Medication 1 APPLICATION(S): at 12:18

## 2022-03-11 RX ADMIN — CARVEDILOL PHOSPHATE 6.25 MILLIGRAM(S): 80 CAPSULE, EXTENDED RELEASE ORAL at 05:16

## 2022-03-11 RX ADMIN — AMIODARONE HYDROCHLORIDE 200 MILLIGRAM(S): 400 TABLET ORAL at 05:17

## 2022-03-11 RX ADMIN — PIPERACILLIN AND TAZOBACTAM 25 GRAM(S): 4; .5 INJECTION, POWDER, LYOPHILIZED, FOR SOLUTION INTRAVENOUS at 05:18

## 2022-03-11 RX ADMIN — Medication 100 MILLIGRAM(S): at 18:22

## 2022-03-11 RX ADMIN — SCOPALAMINE 1 PATCH: 1 PATCH, EXTENDED RELEASE TRANSDERMAL at 19:52

## 2022-03-11 RX ADMIN — LEVETIRACETAM 1000 MILLIGRAM(S): 250 TABLET, FILM COATED ORAL at 05:19

## 2022-03-11 RX ADMIN — Medication 100 MILLIGRAM(S): at 05:17

## 2022-03-11 RX ADMIN — Medication 1 MILLIGRAM(S): at 12:20

## 2022-03-11 RX ADMIN — Medication 20 MILLIGRAM(S): at 05:16

## 2022-03-11 RX ADMIN — Medication 1 TABLET(S): at 18:54

## 2022-03-11 RX ADMIN — ENOXAPARIN SODIUM 40 MILLIGRAM(S): 100 INJECTION SUBCUTANEOUS at 18:19

## 2022-03-11 RX ADMIN — CHLORHEXIDINE GLUCONATE 15 MILLILITER(S): 213 SOLUTION TOPICAL at 05:34

## 2022-03-11 RX ADMIN — CHLORHEXIDINE GLUCONATE 1 APPLICATION(S): 213 SOLUTION TOPICAL at 12:20

## 2022-03-11 RX ADMIN — ALBUTEROL 2 PUFF(S): 90 AEROSOL, METERED ORAL at 08:22

## 2022-03-11 NOTE — PROGRESS NOTE ADULT - PROBLEM SELECTOR PLAN 12
-From [facility]  -continue DVT ppx, bowel regimen per nursing home  -Nutrition to see for recommendation  -will need PT and SLP eval after tolerating SBT    -Palliative care following- Full code  -Will need vent facility. -From [facility]  -continue DVT ppx, bowel regimen per nursing home  -Nutrition to see for recommendation  -will need PT and SLP eval after tolerating SBT    -Palliative care following- Full code  -on isolation, CRE sputum  -Will need vent facility. -likely chronic condition, no report bleeding  -Hgb 8.0  -unknown baseline  -FOBT negative. low iron, TIBC, normal ferritin  -c/w Multivitamin.   -monitor CBC

## 2022-03-11 NOTE — PROGRESS NOTE ADULT - PROBLEM SELECTOR PLAN 10
-hypokalemia resolved after supplement  -Na 147, start free water 250ml Q6h via peg  -nutritionist following  -monitor electrolytes -TF dependent via PEG   -Nutrition consult pending  -continue vitamin supplements given at nursing home

## 2022-03-11 NOTE — PROGRESS NOTE ADULT - SUBJECTIVE AND OBJECTIVE BOX
76y Male    Meds:  piperacillin/tazobactam IVPB.. 3.375 Gram(s) IV Intermittent every 8 hours    Allergies    No Known Allergies    Intolerances        VITALS:  Vital Signs Last 24 Hrs  T(C): 37.1 (11 Mar 2022 12:47), Max: 37.3 (10 Mar 2022 21:33)  T(F): 98.7 (11 Mar 2022 12:47), Max: 99.1 (10 Mar 2022 21:33)  HR: 95 (11 Mar 2022 17:35) (78 - 95)  BP: 151/97 (11 Mar 2022 12:47) (150/90 - 155/96)  BP(mean): --  RR: 18 (11 Mar 2022 12:47) (18 - 19)  SpO2: 100% (11 Mar 2022 17:35) (98% - 100%)    LABS/DIAGNOSTIC TESTS:                          8.0    6.68  )-----------( 297      ( 11 Mar 2022 07:12 )             26.6         03-11    147<H>  |  110<H>  |  28<H>  ----------------------------<  142<H>  3.5   |  32<H>  |  0.96    Ca    8.8      11 Mar 2022 07:12  Phos  2.9     03-10  Mg     2.7     03-10    TPro  6.8  /  Alb  1.8<L>  /  TBili  0.2  /  DBili  x   /  AST  25  /  ALT  29  /  AlkPhos  102  03-10      LIVER FUNCTIONS - ( 10 Mar 2022 06:44 )  Alb: 1.8 g/dL / Pro: 6.8 g/dL / ALK PHOS: 102 U/L / ALT: 29 U/L DA / AST: 25 U/L / GGT: x             CULTURES: .Sputum Sputum  03-08 @ 18:33   Numerous Acinetobacter baumannii/nosocom group (Carbapenem Resistant)  Numerous Pseudomonas aeruginosa  Normal Respiratory Cheryl absent  --  Acinetobacter baumannii/nosocom group (Carbapenem Resistant)  Pseudomonas aeruginosa      .Blood Blood-Peripheral  03-07 @ 10:18   No growth to date.  --  --      Clean Catch Clean Catch (Midstream)  03-07 @ 08:41   No growth  --  --            RADIOLOGY:      ROS:  [  ] UNABLE TO ELICIT 76y Male who is in the AI unit , he remains vent dependent on an FIO2 of 40% and PEEP of 5,  He looks good and is alert and awake but does not appear to understand what I am saying to him. He has no fevers and his WBC count is WNL.    Meds:  piperacillin/tazobactam IVPB.. 3.375 Gram(s) IV Intermittent every 8 hours    Allergies    No Known Allergies    Intolerances        VITALS:  Vital Signs Last 24 Hrs  T(C): 37.1 (11 Mar 2022 12:47), Max: 37.3 (10 Mar 2022 21:33)  T(F): 98.7 (11 Mar 2022 12:47), Max: 99.1 (10 Mar 2022 21:33)  HR: 95 (11 Mar 2022 17:35) (78 - 95)  BP: 151/97 (11 Mar 2022 12:47) (150/90 - 155/96)  BP(mean): --  RR: 18 (11 Mar 2022 12:47) (18 - 19)  SpO2: 100% (11 Mar 2022 17:35) (98% - 100%)    LABS/DIAGNOSTIC TESTS:                          8.0    6.68  )-----------( 297      ( 11 Mar 2022 07:12 )             26.6         03-11    147<H>  |  110<H>  |  28<H>  ----------------------------<  142<H>  3.5   |  32<H>  |  0.96    Ca    8.8      11 Mar 2022 07:12  Phos  2.9     03-10  Mg     2.7     03-10    TPro  6.8  /  Alb  1.8<L>  /  TBili  0.2  /  DBili  x   /  AST  25  /  ALT  29  /  AlkPhos  102  03-10      LIVER FUNCTIONS - ( 10 Mar 2022 06:44 )  Alb: 1.8 g/dL / Pro: 6.8 g/dL / ALK PHOS: 102 U/L / ALT: 29 U/L DA / AST: 25 U/L / GGT: x             CULTURES: .Sputum Sputum  03-08 @ 18:33   Numerous Acinetobacter baumannii/nosocom group (Carbapenem Resistant)  Numerous Pseudomonas aeruginosa  Normal Respiratory Cheryl absent  --  Acinetobacter baumannii/nosocom group (Carbapenem Resistant)  Pseudomonas aeruginosa      .Blood Blood-Peripheral  03-07 @ 10:18   No growth to date.  --  --      Clean Catch Clean Catch (Midstream)  03-07 @ 08:41   No growth  --  --            RADIOLOGY:      ROS:  [ x ] UNABLE TO ELICIT

## 2022-03-11 NOTE — PROGRESS NOTE ADULT - ASSESSMENT
76 y.o. male with PMH Afib s/p Watchman device on ASA, prior gastric sleeve and history of R occipital hemorrhage (dx 2020) stable on imaging (11/19/2021). Patient was traveling in [facility] on 11/26/21 when he fell from a standing height and struck his head on the floor then presented to [facility] hospital in [facility] with a GCS of 3 found to have R subdural hematoma w/ R to L subflacine hernation s/p emergent R hemicraniectomy. Course notable for ventilator dependent respiratory failure s/p tracheostomy (12/9/21), CDfiff colitis (completed PO vanco), non convulsive seizures controlled with Keppra. Following 6 week ICU course in [facility] was repatriated on 1/8/22 and admitted to [facility] ([facility]). [facility] course notable for initiation of amantadine, successful wean from ventilator to trach collar, vEEG (1/8-1/9) notable for mild background slowing and right hemispheric high amplitude slowing and breach artifact; there were no seizures captured, s/p PEG (1/13). 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 Patient was stabilized and discharged to [facility] on 2/1/2022 for comprehensive inpatient rehabilitation for ADL and gait dysfunction 2/2 TBI then transferred to [facility] on 3/3/22 for skilled nursing. Recent course notable for new fever due to R parotitis vs PNA for which he was started on zosyn. Nursing home course notable for increased yellow sputum production, respiratory distress for which patient was brought to ER (3/7). ER course notable for continuation of zosyn, initiating vancomycin with cultures pending, ID following and respiratory distress managed with ventilator support.     Patient is admitted to   for further management of acute on chronic hypoxic respiratory failure and sepsis.  on vent support. FIO2 40%.   started on Zosyn and ID on board.  3/9 failed SBT with tachyneic, hypoxic, placed back on A/C mode.  3/10 failed SBT, on A/C mode.   3/11 attempt SBT   76 y.o. male with PMH Afib s/p Watchman device on ASA, prior gastric sleeve and history of R occipital hemorrhage (dx 2020) stable on imaging (11/19/2021). Patient was traveling in [facility] on 11/26/21 when he fell from a standing height and struck his head on the floor then presented to [facility] hospital in [facility] with a GCS of 3 found to have R subdural hematoma w/ R to L subflacine hernation s/p emergent R hemicraniectomy. Course notable for ventilator dependent respiratory failure s/p tracheostomy (12/9/21), CDfiff colitis (completed PO vanco), non convulsive seizures controlled with Keppra. Following 6 week ICU course in [facility] was repatriated on 1/8/22 and admitted to [facility] ([facility]). [facility] course notable for initiation of amantadine, successful wean from ventilator to trach collar, vEEG (1/8-1/9) notable for mild background slowing and right hemispheric high amplitude slowing and breach artifact; there were no seizures captured, s/p PEG (1/13). 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 Patient was stabilized and discharged to [facility] on 2/1/2022 for comprehensive inpatient rehabilitation for ADL and gait dysfunction 2/2 TBI then transferred to [facility] on 3/3/22 for skilled nursing. Recent course notable for new fever due to R parotitis vs PNA for which he was started on zosyn. Nursing home course notable for increased yellow sputum production, respiratory distress for which patient was brought to ER (3/7). ER course notable for continuation of zosyn, initiating vancomycin with cultures pending, ID following and respiratory distress managed with ventilator support.     Patient is admitted to   for further management of acute on chronic hypoxic respiratory failure and sepsis.  on vent support. FIO2 40%.   started on Zosyn and ID on board.  3/9 failed SBT with tachyneic, hypoxic, placed back on A/C mode.  3/10 failed SBT, on A/C mode.   3/11 attempt SBT, repeat CXR   76 y.o. male with PMH Afib s/p Watchman device on ASA, prior gastric sleeve and history of R occipital hemorrhage (dx 2020) stable on imaging (11/19/2021). Patient was traveling in [facility] on 11/26/21 when he fell from a standing height and struck his head on the floor then presented to [facility] hospital in [facility] with a GCS of 3 found to have R subdural hematoma w/ R to L subflacine hernation s/p emergent R hemicraniectomy. Course notable for ventilator dependent respiratory failure s/p tracheostomy (12/9/21), CDfiff colitis (completed PO vanco), non convulsive seizures controlled with Keppra. Following 6 week ICU course in [facility] was repatriated on 1/8/22 and admitted to [facility] ([facility]). [facility] course notable for initiation of amantadine, successful wean from ventilator to trach collar, vEEG (1/8-1/9) notable for mild background slowing and right hemispheric high amplitude slowing and breach artifact; there were no seizures captured, s/p PEG (1/13). 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 Patient was stabilized and discharged to [facility] on 2/1/2022 for comprehensive inpatient rehabilitation for ADL and gait dysfunction 2/2 TBI then transferred to [facility] on 3/3/22 for skilled nursing. Recent course notable for new fever due to R parotitis vs PNA for which he was started on zosyn. Nursing home course notable for increased yellow sputum production, respiratory distress for which patient was brought to ER (3/7). ER course notable for continuation of zosyn, initiating vancomycin with cultures pending, ID following and respiratory distress managed with ventilator support.     Patient is admitted to   for further management of acute on chronic hypoxic respiratory failure and sepsis.  on vent support. FIO2 40%.   started on Zosyn and ID on board.  3/9 failed SBT with tachyneic, hypoxic, placed back on A/C mode.  3/10 failed SBT, on A/C mode.   3/11 attempt SBT, PS 10 today, repeat CXR

## 2022-03-11 NOTE — PROGRESS NOTE ADULT - PROBLEM SELECTOR PLAN 1
-secondary to aspiration pneumonia, trach dependent at nursing home (15L trach collar)  -presented to ED 3/7 from nursing home for respiratory distress  -Blood Culture result negative from 3/7  -Sputum cx grew acinobacter, pseudomonas  -MRSA PCR negative.  -Continue ventilator support; wean as able-daily SBT during the day and place vent at night  will follow up SLP for swallowing eval if tolerating SBT.   -Continue zosyn  -continue spiriva, albuterol   -Prednisone 20mg for 5 days -secondary to aspiration pneumonia, trach dependent at nursing home (15L trach collar)  -presented to ED 3/7 from nursing home for respiratory distress  -Blood Culture result negative from 3/7  -MRSA PCR negative.  -Continue ventilator support; wean as able-daily SBT during the day and place vent at night  will follow up SLP for swallowing eval if tolerating SBT.   -Continue zosyn  -continue spiriva, albuterol   -Prednisone 20mg for 5 days  --Sputum cx grew acinobacter, pseudomonas (CRE), on contact/droplet Isolation -secondary to aspiration pneumonia, trach dependent at nursing home (15L trach collar)  -presented to ED 3/7 from nursing home for respiratory distress  -Blood Culture result negative from 3/7  -MRSA PCR negative.  -Continue ventilator support; wean as able-daily SBT during the day and place vent at night  will follow up SLP for swallowing eval if tolerating SBT.   -Continue zosyn  -continue spiriva, albuterol   -Prednisone 20mg for 5 days  --Sputum cx grew acinobacter, pseudomonas (Carbapenem resistant), on contact/droplet Isolation

## 2022-03-11 NOTE — CHART NOTE - NSCHARTNOTEFT_GEN_A_CORE
updated pt condition to HCP cousin HCP dr. Cora Moran [phone], including test result, current respiratory status on vent, pt has been tolerating SBT since this morning until now, will continue vent at night. Pt will require vent facility on discharge if pt not tolerating SBT.  Agreeable plan of care, answered all questions, no further questions voiced this time.

## 2022-03-11 NOTE — PROGRESS NOTE ADULT - PROBLEM SELECTOR PLAN 4
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  -CT head 2/15/22 as above

## 2022-03-11 NOTE — PROGRESS NOTE ADULT - PROBLEM SELECTOR PLAN 5
-currently controlled  -continue coreg with hold parameters  -per OSH records was previously on losartan and amlodpine as well; resume if needed/able -continue keppra  -maintain seizure precautions

## 2022-03-11 NOTE — PROGRESS NOTE ADULT - PROBLEM SELECTOR PLAN 9
-TF dependent via PEG   -Nutrition consult pending  -continue vitamin supplements given at nursing home Wound care specialist consulted, c/w local wound tx as rec.   -There is a Stage 1 Pressure Injury to the Bilateral Heels, as evident by non-blanchable erythema  -There is a Stage 3 Pressure Injury to the Sacrum with slough (15%), pink tissue, and red tissue  Recommendation:  -Clean all wounds with normal saline and apply skin prep to the surrounding skin  -Apply Calcium Alginate (Aquacel) to the Sacral wound and cover with a Foam dressing Q 72hrs PRN  -Elevate/float the patiens heels using heel protectors and reposition Q 2hrs using wedges or pillows

## 2022-03-11 NOTE — PROGRESS NOTE ADULT - PROBLEM SELECTOR PLAN 2
-trend WBC, fever  -procalcitonin 0.11  -MRSA PCR negative.  -Bcx, Ucx NGTD 3/7  -sputum Cx-Acinobacter, Pseudomonas  -Pulm dr. Bishop  -ID Dr Quach following, appreciate recommendation  -Zosyn initiated prior to [facility] hospitalization for parotitis vs PNA (CT  3/2 as above.)  -c/w Zosyn for now -trend WBC, fever  -procalcitonin 0.11  -MRSA PCR negative.  -Bcx, Ucx NGTD 3/7  -sputum Cx-Acinobacter, Pseudomonas, CRE, continue contact/droplet isolation  -Pulm dr. Bishop  -ID Dr Quach following, appreciate recommendation  -[facility]osyn [facility] (CT  3/2 as above.)  -c/w Zosyn for now -trend WBC, fever  -procalcitonin 0.11  -MRSA PCR negative.  -Bcx, Ucx NGTD 3/7  -sputum Cx-Acinobacter, Pseudomonas, (MDRO), continue contact/droplet isolation  -Pulm dr. Bishop  -ID Dr Quach following, appreciate recommendation  -Zosyn initiated prior to [facility] hospitalization for parotitis vs PNA (CT  3/2 as above.)  -c/w Zosyn for now

## 2022-03-11 NOTE — PROGRESS NOTE ADULT - PROBLEM SELECTOR PLAN 11
-likely chronic condition, no report bleeding  -Hgb 8.0  -unknown baseline  -FOBT negative. low iron, TIBC, normal ferritin  -c/w Multivitamin.   -monitor CBC -hypokalemia resolved after supplement  -Na 147, start free water 250ml Q6h via peg  -nutritionist following  -monitor electrolytes

## 2022-03-11 NOTE — PROGRESS NOTE ADULT - PROBLEM SELECTOR PLAN 6
-continue amiodarone, coreg   -EKG PRN  -monitor VS, rate control -currently controlled  -continue coreg with hold parameters  -per OSH records was previously on losartan and amlodpine as well; resume if needed/able

## 2022-03-11 NOTE — PROGRESS NOTE ADULT - PROBLEM SELECTOR PLAN 8
Wound care specialist consulted, c/w local wound tx as rec.   -There is a Stage 1 Pressure Injury to the Bilateral Heels, as evident by non-blanchable erythema  -There is a Stage 3 Pressure Injury to the Sacrum with slough (15%), pink tissue, and red tissue  Recommendation:  -Clean all wounds with normal saline and apply skin prep to the surrounding skin  -Apply Calcium Alginate (Aquacel) to the Sacral wound and cover with a Foam dressing Q 72hrs PRN  -Elevate/float the patiens heels using heel protectors and reposition Q 2hrs using wedges or pillows -continue flomax  -monitor urine output

## 2022-03-11 NOTE — PROGRESS NOTE ADULT - SUBJECTIVE AND OBJECTIVE BOX
NAYANA CLAROS    SCU progress note    INTERVAL HPI/OVERNIGHT EVENTS: Seen at bedside, able to nod on Y/N questions. denies pain.     Ful code     Covid - 19 PCR: neg 3/7    The 4Ms    What Matters Most: see GO  Age appropriate Medications/Screen for High Risk Medication: Yes  Mentation: see CAM below  Mobility: defer to physical exam    The Confusion Assessment Method (CAM) Diagnostic Algorithm     1: Acute Onset or Fluctuating Course  - Is there evidence of an acute change in mental status from the patient’s baseline? Did the (abnormal) behavior  fluctuate during the day, that is, tend to come and go, or increase and decrease in severity?  [ x] YES [ ] NO     2: Inattention  - Did the patient have difficulty focusing attention, being easily distractible, or having difficulty keeping track of what was being said?  [ ] YES [x ] NO     3: Disorganized thinking  -Was the patient’s thinking disorganized or incoherent, such as rambling or irrelevant conversation, unclear or illogical flow of ideas, or unpredictable switching from subject to subject?  [ ] YES [x ] NO    4: Altered Level of consciousness?  [ ] YES [x ] NO    The diagnosis of delirium by CAM requires the presence of features 1 and 2 and either 3 or 4.    PRESSORS: [ ] YES [ ] NO  piperacillin/tazobactam IVPB.. 3.375 Gram(s) IV Intermittent every 8 hours    Cardiovascular:  Heart Failure  Acute   Acute on Chronic  Chronic       aMIOdarone    Tablet 200 milliGRAM(s) Oral daily  carvedilol 6.25 milliGRAM(s) Oral every 12 hours  furosemide Solution 40 milliGRAM(s) Oral daily  tamsulosin 0.4 milliGRAM(s) Oral at bedtime    Pulmonary:  ALBUTerol    90 MICROgram(s) HFA Inhaler 2 Puff(s) Inhalation every 6 hours    Hematalogic:  enoxaparin Injectable 40 milliGRAM(s) SubCutaneous every 24 hours    Other:  acetaminophen    Suspension .. 650 milliGRAM(s) Enteral Tube every 6 hours PRN  amantadine Syrup 100 milliGRAM(s) Oral two times a day  ascorbic acid 500 milliGRAM(s) Oral daily  chlorhexidine 0.12% Liquid 15 milliLiter(s) Oral Mucosa every 12 hours  collagenase Ointment 1 Application(s) Topical daily  folic acid 1 milliGRAM(s) Oral daily  lactulose Syrup 6.6667 Gram(s) Oral two times a day  levETIRAcetam  Solution 1000 milliGRAM(s) Oral two times a day  multivitamin 1 Tablet(s) Oral daily  predniSONE   Tablet 20 milliGRAM(s) Oral daily  senna Syrup 10 milliLiter(s) Oral at bedtime    acetaminophen    Suspension .. 650 milliGRAM(s) Enteral Tube every 6 hours PRN  ALBUTerol    90 MICROgram(s) HFA Inhaler 2 Puff(s) Inhalation every 6 hours  amantadine Syrup 100 milliGRAM(s) Oral two times a day  aMIOdarone    Tablet 200 milliGRAM(s) Oral daily  ascorbic acid 500 milliGRAM(s) Oral daily  carvedilol 6.25 milliGRAM(s) Oral every 12 hours  chlorhexidine 0.12% Liquid 15 milliLiter(s) Oral Mucosa every 12 hours  collagenase Ointment 1 Application(s) Topical daily  enoxaparin Injectable 40 milliGRAM(s) SubCutaneous every 24 hours  folic acid 1 milliGRAM(s) Oral daily  furosemide Solution 40 milliGRAM(s) Oral daily  lactulose Syrup 6.6667 Gram(s) Oral two times a day  levETIRAcetam  Solution 1000 milliGRAM(s) Oral two times a day  multivitamin 1 Tablet(s) Oral daily  piperacillin/tazobactam IVPB.. 3.375 Gram(s) IV Intermittent every 8 hours  predniSONE   Tablet 20 milliGRAM(s) Oral daily  senna Syrup 10 milliLiter(s) Oral at bedtime  tamsulosin 0.4 milliGRAM(s) Oral at bedtime    Drug Dosing Weight  Height (cm): 172.7 (07 Mar 2022 01:15)  Weight (kg): 82.2 (08 Mar 2022 05:03)  BMI (kg/m2): 27.6 (08 Mar 2022 05:03)  BSA (m2): 1.96 (08 Mar 2022 05:03)    CENTRAL LINE: [ ] YES [ ] NO  LOCATION:   DATE INSERTED:  REMOVE: [ ] YES [ ] NO  EXPLAIN:    ESPITIA: [ ] YES [ ] NO    DATE INSERTED:  REMOVE:  [ ] YES [ ] NO  EXPLAIN:    PAST MEDICAL & SURGICAL HISTORY:  Essential hypertension    Primary osteoarthritis, unspecified site    Closed fracture of left radius, initial encounter    Morbid obesity, unspecified obesity type  h/o. - s/p gastric bypass- lost 113 lbs    KAREN (obstructive sleep apnea)  h/o - was on CPAP until gastric bypass surgery    Respiratory failure    Anemia    Subdural hemorrhage    Epilepsy    H/O gastrostomy    History of BPH    Afib    S/P gastric bypass  2008    Status post total replacement of left hip  2006    S/P bunionectomy  bilateral    S/P colonoscopy  approx 2012    History of abdominoplasty  and subsequent cosmetic surgery for removal of excess skin from face    03-10 @ 07:01  -  03-11 @ 07:00  --------------------------------------------------------  IN: 0 mL / OUT: 600 mL / NET: -600 mL  Mode: CPAP with PS  FiO2: 40  PEEP: 5  PS: 12  ITime: 1  MAP: 8  PIP: 18    PHYSICAL EXAM:  GENERAL: NAD, well-groomed, well-developed  HEAD:  Atraumatic, Normocephalic  EYES: EOMI, PERRLA, conjunctiva and sclera clear  ENMT: No tonsillar erythema, exudates, or enlargement; Moist mucous membranes, Good dentition, No lesions  NECK: Supple, No JVD, Normal thyroid  NERVOUS SYSTEM:  Alert & Oriented X3, Good concentration; Motor Strength 5/5 B/L upper and lower extremities; DTRs 2+ intact and symmetric  CHEST/LUNG: Clear to percussion bilaterally; No rales, rhonchi, wheezing, or rubs  HEART: Regular rate and rhythm; No murmurs, rubs, or gallops  ABDOMEN: Soft, Nontender, Nondistended; Bowel sounds present  EXTREMITIES:  2+ Peripheral Pulses, No clubbing, cyanosis, or edema  LYMPH: No lymphadenopathy noted  SKIN: No rashes or lesions    LABS:  CBC Full  -  ( 11 Mar 2022 07:12 )  WBC Count : 6.68 K/uL  RBC Count : 2.50 M/uL  Hemoglobin : 8.0 g/dL  Hematocrit : 26.6 %  Platelet Count - Automated : 297 K/uL  Mean Cell Volume : 106.4 fl  Mean Cell Hemoglobin : 32.0 pg  Mean Cell Hemoglobin Concentration : 30.1 gm/dL    03-11    147<H>  |  110<H>  |  28<H>  ----------------------------<  142<H>  3.5   |  32<H>  |  0.96    Ca    8.8      11 Mar 2022 07:12  Phos  2.9     03-10  Mg     2.7     03-10    TPro  6.8  /  Alb  1.8<L>  /  TBili  0.2  /  DBili  x   /  AST  25  /  ALT  29  /  AlkPhos  102  03-10  [  ]  DVT Prophylaxis  [  ]  Nutrition, Brand, Rate         Goal Rate        Abnormal Nutritional Status -  Malnutrition   Cachexia      Morbid Obesity BMI >/=40    RADIOLOGY & ADDITIONAL STUDIES:  ***    Goals of Care Discussion with Family/Proxy/Other   - see note from/family meeting set up for...     NAYANA CLAROS    SCU progress note    INTERVAL HPI/OVERNIGHT EVENTS: Seen at bedside, able to nod on Y/N questions. denies pain.     Ful code     Covid - 19 PCR: neg 3/7    The 4Ms    What Matters Most: see GO  Age appropriate Medications/Screen for High Risk Medication: Yes  Mentation: see CAM below  Mobility: defer to physical exam    The Confusion Assessment Method (CAM) Diagnostic Algorithm     1: Acute Onset or Fluctuating Course  - Is there evidence of an acute change in mental status from the patient’s baseline? Did the (abnormal) behavior  fluctuate during the day, that is, tend to come and go, or increase and decrease in severity?  [ ] YES [x ] NO     2: Inattention  - Did the patient have difficulty focusing attention, being easily distractible, or having difficulty keeping track of what was being said?  [ ] YES [x ] NO     3: Disorganized thinking  -Was the patient’s thinking disorganized or incoherent, such as rambling or irrelevant conversation, unclear or illogical flow of ideas, or unpredictable switching from subject to subject?  [ ] YES [x ] NO    4: Altered Level of consciousness?  [ ] YES [x ] NO    The diagnosis of delirium by CAM requires the presence of features 1 and 2 and either 3 or 4.    PRESSORS: [ ] YES [ ] NO  piperacillin/tazobactam IVPB.. 3.375 Gram(s) IV Intermittent every 8 hours    Cardiovascular:  Heart Failure  Acute   Acute on Chronic  Chronic       aMIOdarone    Tablet 200 milliGRAM(s) Oral daily  carvedilol 6.25 milliGRAM(s) Oral every 12 hours  furosemide Solution 40 milliGRAM(s) Oral daily  tamsulosin 0.4 milliGRAM(s) Oral at bedtime    Pulmonary:  ALBUTerol    90 MICROgram(s) HFA Inhaler 2 Puff(s) Inhalation every 6 hours    Hematalogic:  enoxaparin Injectable 40 milliGRAM(s) SubCutaneous every 24 hours    Other:  acetaminophen    Suspension .. 650 milliGRAM(s) Enteral Tube every 6 hours PRN  amantadine Syrup 100 milliGRAM(s) Oral two times a day  ascorbic acid 500 milliGRAM(s) Oral daily  chlorhexidine 0.12% Liquid 15 milliLiter(s) Oral Mucosa every 12 hours  collagenase Ointment 1 Application(s) Topical daily  folic acid 1 milliGRAM(s) Oral daily  lactulose Syrup 6.6667 Gram(s) Oral two times a day  levETIRAcetam  Solution 1000 milliGRAM(s) Oral two times a day  multivitamin 1 Tablet(s) Oral daily  predniSONE   Tablet 20 milliGRAM(s) Oral daily  senna Syrup 10 milliLiter(s) Oral at bedtime    acetaminophen    Suspension .. 650 milliGRAM(s) Enteral Tube every 6 hours PRN  ALBUTerol    90 MICROgram(s) HFA Inhaler 2 Puff(s) Inhalation every 6 hours  amantadine Syrup 100 milliGRAM(s) Oral two times a day  aMIOdarone    Tablet 200 milliGRAM(s) Oral daily  ascorbic acid 500 milliGRAM(s) Oral daily  carvedilol 6.25 milliGRAM(s) Oral every 12 hours  chlorhexidine 0.12% Liquid 15 milliLiter(s) Oral Mucosa every 12 hours  collagenase Ointment 1 Application(s) Topical daily  enoxaparin Injectable 40 milliGRAM(s) SubCutaneous every 24 hours  folic acid 1 milliGRAM(s) Oral daily  furosemide Solution 40 milliGRAM(s) Oral daily  lactulose Syrup 6.6667 Gram(s) Oral two times a day  levETIRAcetam  Solution 1000 milliGRAM(s) Oral two times a day  multivitamin 1 Tablet(s) Oral daily  piperacillin/tazobactam IVPB.. 3.375 Gram(s) IV Intermittent every 8 hours  predniSONE   Tablet 20 milliGRAM(s) Oral daily  senna Syrup 10 milliLiter(s) Oral at bedtime  tamsulosin 0.4 milliGRAM(s) Oral at bedtime    Drug Dosing Weight  Height (cm): 172.7 (07 Mar 2022 01:15)  Weight (kg): 82.2 (08 Mar 2022 05:03)  BMI (kg/m2): 27.6 (08 Mar 2022 05:03)  BSA (m2): 1.96 (08 Mar 2022 05:03)    CENTRAL LINE: [ ] YES [ ] NO  LOCATION:   DATE INSERTED:  REMOVE: [ ] YES [ ] NO  EXPLAIN:    ESPITIA: [ ] YES [ ] NO    DATE INSERTED:  REMOVE:  [ ] YES [ ] NO  EXPLAIN:    PAST MEDICAL & SURGICAL HISTORY:  Essential hypertension    Primary osteoarthritis, unspecified site    Closed fracture of left radius, initial encounter    Morbid obesity, unspecified obesity type  h/o. - s/p gastric bypass- lost 113 lbs    KAREN (obstructive sleep apnea)  h/o - was on CPAP until gastric bypass surgery    Respiratory failure    Anemia    Subdural hemorrhage    Epilepsy    H/O gastrostomy    History of BPH    Afib    S/P gastric bypass  2008    Status post total replacement of left hip  2006    S/P bunionectomy  bilateral    S/P colonoscopy  approx 2012    History of abdominoplasty  and subsequent cosmetic surgery for removal of excess skin from face    03-10 @ 07:01  -  03-11 @ 07:00  --------------------------------------------------------  IN: 0 mL / OUT: 600 mL / NET: -600 mL  Mode: CPAP with PS  FiO2: 40  PEEP: 5  PS: 12  ITime: 1  MAP: 8  PIP: 18    PHYSICAL EXAM:  GENERAL: NAD, chronically ill appearing  non verbally responds  HEAD:  Atraumatic, well healed scar across skull   EYES: conjunctiva and sclera clear  ENMT:  Moist mucous membranes,   NECK: Supple, trach midline with trach collar  NERVOUS SYSTEM: awake, opens eyes to verbal stimuli,  following simple commands  CHEST/LUNG: fair air entry, Rales b/l, equal lung expansion and unlabored on ventilator support  HEART: irregular rate, no murmur  ABDOMEN: Soft, Nontender, Nondistended; Bowel sounds present, PEG in place  EXTREMITIES:  2+ Peripheral Pulses, No clubbing, cyanosis + trace edema all extremities   SKIN: see wound note, pressure injury site no sx of infection to lower back, no other visible  rash or erythema    LABS:  CBC Full  -  ( 11 Mar 2022 07:12 )  WBC Count : 6.68 K/uL  RBC Count : 2.50 M/uL  Hemoglobin : 8.0 g/dL  Hematocrit : 26.6 %  Platelet Count - Automated : 297 K/uL  Mean Cell Volume : 106.4 fl  Mean Cell Hemoglobin : 32.0 pg  Mean Cell Hemoglobin Concentration : 30.1 gm/dL    03-11    147<H>  |  110<H>  |  28<H>  ----------------------------<  142<H>  3.5   |  32<H>  |  0.96    Ca    8.8      11 Mar 2022 07:12  Phos  2.9     03-10  Mg     2.7     03-10    TPro  6.8  /  Alb  1.8<L>  /  TBili  0.2  /  DBili  x   /  AST  25  /  ALT  29  /  AlkPhos  102  03-10  [x  ]  DVT Prophylaxis  [ x ]  Nutrition, Tube feeding, see full nutrition note    RADIOLOGY & ADDITIONAL STUDIES:    < from: Xray Chest 1 View-PORTABLE IMMEDIATE (03.07.22 @ 01:51) >    ACC: [id number] EXAM:  XR CHEST PORTABLE IMMED 1V                          PROCEDURE DATE:  03/07/2022          INTERPRETATION:  INDICATION: Sepsis    PRIORS: None    VIEWS: Portable AP radiography of the chest performed. Evaluation limited   secondary to portable technique and shallow inspiration.    FINDINGS: Heart size and mediastinal contours cannot be assessed on this   evaluation. Midline tracheostomy. Interstitial prominence may reflect   shallow inspiration. Increased markings within the lung bases suggests   atelectatic change. Small pleural effusions cannot be excluded. There is   no evidence for pneumothorax. No mediastinal shift.    IMPRESSION: As above.    --- End of Report ---    BECK SHEPHERD MD; Attending Radiologist  This document has been electronically signed. Mar  7 2022  9:00AM    < end of copied text >    < from: Transthoracic Echocardiogram (03.07.22 @ 07:52) >  OBSERVATIONS:  Mitral Valve: Normal mitral valve. Mild mitral  regurgitation.  Aortic Root: Aortic Root: 3.7 cm. Ascending Aorta: 3.8 cm.  Aortic Valve: Trileaflet aortic valve. No aortic stenosis.  Mild aortic regurgitation.  Left Atrium: Severely dilated left atrium.  LA volume index  = 53 cc/m2.  Left Ventricle: Endocardium not well visualized; grossly  normal left ventricular systolic function; in the setting  of atrial fibrillation, ejection fraction can vary with the  R-R interval.  Segmental wall motion could not be assessed.  Upper limit normal left ventricular wall thickness.  Unable  to accurately assess diastolic function due to presence of  arrythmia.  Right Heart: Normal right atrium. Normal right ventricular  size and function. Tricuspid valve not well visualized,  probably normal. Trace tricuspid regurgitation. Normal  pulmonic valve.  Pericardium/PleuraNormal pericardium with no pericardial  effusion. Left pleural effusion.  Hemodynamic: Unable to accurately assess right atrial  pressure due to technical aspects of the study.  Incomplete  tricuspid regurgitation jet precludes accurate assessment  of pulmonary artery systolic pressure.  ------------------------------------------------------------------------  CONCLUSIONS:  1. Normal mitral valve. Mild mitral regurgitation.  2. Trileaflet aortic valve. No aortic stenosis. Mild aortic  regurgitation.  3. Severely dilated left atrium.  LA volume index = 53  cc/m2.  4. Upper limit normal left ventricular wall thickness.  5. Endocardium not well visualized; grossly normal left  ventricular systolic function; in the setting of atrial  fibrillation, ejection fraction can vary with the R-R  interval.  Segmental wall motion could not be assessed.  6. Unable to accurately assess diastolic function due to  presence of arrythmia.  7. Normal right ventricular size and function.  8. Unable to accurately assess right atrial pressure due to  technical aspects of the study.  9. Incomplete tricuspid regurgitation jet precludes  accurate assessment of pulmonary artery systolic pressure.  10. Left pleural effusion.    *** No previous Echo exam.  ------------------------------------------------------------------------  Confirmed on  3/8/2022 - 13:04:57 by iLsa Vidal MD  ------------------------------------------------------------------------    < end of copied text >    Goals of Care Discussion with Family/Proxy/Other   - see note from/family meeting set up for...     NAYANA CLAROS    SCU progress note    INTERVAL HPI/OVERNIGHT EVENTS: Seen at bedside, able to nod on Y/N questions. denies pain.     Ful code     Covid - 19 PCR: neg 3/7    The 4Ms    What Matters Most: see GO  Age appropriate Medications/Screen for High Risk Medication: Yes  Mentation: see CAM below  Mobility: defer to physical exam    The Confusion Assessment Method (CAM) Diagnostic Algorithm     1: Acute Onset or Fluctuating Course  - Is there evidence of an acute change in mental status from the patient’s baseline? Did the (abnormal) behavior  fluctuate during the day, that is, tend to come and go, or increase and decrease in severity?  [ ] YES [x ] NO     2: Inattention  - Did the patient have difficulty focusing attention, being easily distractible, or having difficulty keeping track of what was being said?  [ ] YES [x ] NO     3: Disorganized thinking  -Was the patient’s thinking disorganized or incoherent, such as rambling or irrelevant conversation, unclear or illogical flow of ideas, or unpredictable switching from subject to subject?  [ ] YES [x ] NO    4: Altered Level of consciousness?  [ ] YES [x ] NO    The diagnosis of delirium by CAM requires the presence of features 1 and 2 and either 3 or 4.    PRESSORS: [ ] YES [ x] NO  piperacillin/tazobactam IVPB.. 3.375 Gram(s) IV Intermittent every 8 hours    Cardiovascular:  Heart Failure  Acute   Acute on Chronic  Chronic       aMIOdarone    Tablet 200 milliGRAM(s) Oral daily  carvedilol 6.25 milliGRAM(s) Oral every 12 hours  furosemide Solution 40 milliGRAM(s) Oral daily  tamsulosin 0.4 milliGRAM(s) Oral at bedtime    Pulmonary:  ALBUTerol    90 MICROgram(s) HFA Inhaler 2 Puff(s) Inhalation every 6 hours    Hematalogic:  enoxaparin Injectable 40 milliGRAM(s) SubCutaneous every 24 hours    Other:  acetaminophen    Suspension .. 650 milliGRAM(s) Enteral Tube every 6 hours PRN  amantadine Syrup 100 milliGRAM(s) Oral two times a day  ascorbic acid 500 milliGRAM(s) Oral daily  chlorhexidine 0.12% Liquid 15 milliLiter(s) Oral Mucosa every 12 hours  collagenase Ointment 1 Application(s) Topical daily  folic acid 1 milliGRAM(s) Oral daily  lactulose Syrup 6.6667 Gram(s) Oral two times a day  levETIRAcetam  Solution 1000 milliGRAM(s) Oral two times a day  multivitamin 1 Tablet(s) Oral daily  predniSONE   Tablet 20 milliGRAM(s) Oral daily  senna Syrup 10 milliLiter(s) Oral at bedtime    acetaminophen    Suspension .. 650 milliGRAM(s) Enteral Tube every 6 hours PRN  ALBUTerol    90 MICROgram(s) HFA Inhaler 2 Puff(s) Inhalation every 6 hours  amantadine Syrup 100 milliGRAM(s) Oral two times a day  aMIOdarone    Tablet 200 milliGRAM(s) Oral daily  ascorbic acid 500 milliGRAM(s) Oral daily  carvedilol 6.25 milliGRAM(s) Oral every 12 hours  chlorhexidine 0.12% Liquid 15 milliLiter(s) Oral Mucosa every 12 hours  collagenase Ointment 1 Application(s) Topical daily  enoxaparin Injectable 40 milliGRAM(s) SubCutaneous every 24 hours  folic acid 1 milliGRAM(s) Oral daily  furosemide Solution 40 milliGRAM(s) Oral daily  lactulose Syrup 6.6667 Gram(s) Oral two times a day  levETIRAcetam  Solution 1000 milliGRAM(s) Oral two times a day  multivitamin 1 Tablet(s) Oral daily  piperacillin/tazobactam IVPB.. 3.375 Gram(s) IV Intermittent every 8 hours  predniSONE   Tablet 20 milliGRAM(s) Oral daily  senna Syrup 10 milliLiter(s) Oral at bedtime  tamsulosin 0.4 milliGRAM(s) Oral at bedtime    Drug Dosing Weight  Height (cm): 172.7 (07 Mar 2022 01:15)  Weight (kg): 82.2 (08 Mar 2022 05:03)  BMI (kg/m2): 27.6 (08 Mar 2022 05:03)  BSA (m2): 1.96 (08 Mar 2022 05:03)    CENTRAL LINE: [ ] YES [ ] NO  LOCATION:   DATE INSERTED:  REMOVE: [ ] YES [ ] NO  EXPLAIN:    ESPITIA: [ ] YES [ ] NO    DATE INSERTED:  REMOVE:  [ ] YES [ ] NO  EXPLAIN:    PAST MEDICAL & SURGICAL HISTORY:  Essential hypertension    Primary osteoarthritis, unspecified site    Closed fracture of left radius, initial encounter    Morbid obesity, unspecified obesity type  h/o. - s/p gastric bypass- lost 113 lbs    KAREN (obstructive sleep apnea)  h/o - was on CPAP until gastric bypass surgery    Respiratory failure    Anemia    Subdural hemorrhage    Epilepsy    H/O gastrostomy    History of BPH    Afib    S/P gastric bypass  2008    Status post total replacement of left hip  2006    S/P bunionectomy  bilateral    S/P colonoscopy  approx 2012    History of abdominoplasty  and subsequent cosmetic surgery for removal of excess skin from face    03-10 @ 07:01  -  03-11 @ 07:00  --------------------------------------------------------  IN: 0 mL / OUT: 600 mL / NET: -600 mL  Mode: CPAP with PS  FiO2: 40  PEEP: 5  PS: 12  ITime: 1  MAP: 8  PIP: 18    PHYSICAL EXAM:  GENERAL: NAD, chronically ill appearing  non verbally responds  HEAD:  Atraumatic, well healed scar across skull   EYES: conjunctiva and sclera clear  ENMT:  Moist mucous membranes,   NECK: Supple, trach midline with trach collar  NERVOUS SYSTEM: awake, opens eyes to verbal stimuli,  following simple commands  CHEST/LUNG: fair air entry, Rales b/l, equal lung expansion and unlabored on ventilator support  HEART: irregular rate, no murmur  ABDOMEN: Soft, Nontender, Nondistended; Bowel sounds present, PEG in place  EXTREMITIES:  2+ Peripheral Pulses, No clubbing, cyanosis + trace edema all extremities   SKIN: see wound note, pressure injury site no sx of infection to lower back, no other visible  rash or erythema    LABS:  CBC Full  -  ( 11 Mar 2022 07:12 )  WBC Count : 6.68 K/uL  RBC Count : 2.50 M/uL  Hemoglobin : 8.0 g/dL  Hematocrit : 26.6 %  Platelet Count - Automated : 297 K/uL  Mean Cell Volume : 106.4 fl  Mean Cell Hemoglobin : 32.0 pg  Mean Cell Hemoglobin Concentration : 30.1 gm/dL    03-11    147<H>  |  110<H>  |  28<H>  ----------------------------<  142<H>  3.5   |  32<H>  |  0.96    Ca    8.8      11 Mar 2022 07:12  Phos  2.9     03-10  Mg     2.7     03-10    TPro  6.8  /  Alb  1.8<L>  /  TBili  0.2  /  DBili  x   /  AST  25  /  ALT  29  /  AlkPhos  102  03-10  [x  ]  DVT Prophylaxis  [ x ]  Nutrition, Tube feeding, see full nutrition note    RADIOLOGY & ADDITIONAL STUDIES:    < from: Xray Chest 1 View-PORTABLE IMMEDIATE (03.07.22 @ 01:51) >    ACC: [id number] EXAM:  XR CHEST PORTABLE IMMED 1V                          PROCEDURE DATE:  03/07/2022          INTERPRETATION:  INDICATION: Sepsis    PRIORS: None    VIEWS: Portable AP radiography of the chest performed. Evaluation limited   secondary to portable technique and shallow inspiration.    FINDINGS: Heart size and mediastinal contours cannot be assessed on this   evaluation. Midline tracheostomy. Interstitial prominence may reflect   shallow inspiration. Increased markings within the lung bases suggests   atelectatic change. Small pleural effusions cannot be excluded. There is   no evidence for pneumothorax. No mediastinal shift.    IMPRESSION: As above.    --- End of Report ---    BECK SHEPHERD MD; Attending Radiologist  This document has been electronically signed. Mar  7 2022  9:00AM    < end of copied text >    < from: Transthoracic Echocardiogram (03.07.22 @ 07:52) >  OBSERVATIONS:  Mitral Valve: Normal mitral valve. Mild mitral  regurgitation.  Aortic Root: Aortic Root: 3.7 cm. Ascending Aorta: 3.8 cm.  Aortic Valve: Trileaflet aortic valve. No aortic stenosis.  Mild aortic regurgitation.  Left Atrium: Severely dilated left atrium.  LA volume index  = 53 cc/m2.  Left Ventricle: Endocardium not well visualized; grossly  normal left ventricular systolic function; in the setting  of atrial fibrillation, ejection fraction can vary with the  R-R interval.  Segmental wall motion could not be assessed.  Upper limit normal left ventricular wall thickness.  Unable  to accurately assess diastolic function due to presence of  arrythmia.  Right Heart: Normal right atrium. Normal right ventricular  size and function. Tricuspid valve not well visualized,  probably normal. Trace tricuspid regurgitation. Normal  pulmonic valve.  Pericardium/PleuraNormal pericardium with no pericardial  effusion. Left pleural effusion.  Hemodynamic: Unable to accurately assess right atrial  pressure due to technical aspects of the study.  Incomplete  tricuspid regurgitation jet precludes accurate assessment  of pulmonary artery systolic pressure.  ------------------------------------------------------------------------  CONCLUSIONS:  1. Normal mitral valve. Mild mitral regurgitation.  2. Trileaflet aortic valve. No aortic stenosis. Mild aortic  regurgitation.  3. Severely dilated left atrium.  LA volume index = 53  cc/m2.  4. Upper limit normal left ventricular wall thickness.  5. Endocardium not well visualized; grossly normal left  ventricular systolic function; in the setting of atrial  fibrillation, ejection fraction can vary with the R-R  interval.  Segmental wall motion could not be assessed.  6. Unable to accurately assess diastolic function due to  presence of arrythmia.  7. Normal right ventricular size and function.  8. Unable to accurately assess right atrial pressure due to  technical aspects of the study.  9. Incomplete tricuspid regurgitation jet precludes  accurate assessment of pulmonary artery systolic pressure.  10. Left pleural effusion.    *** No previous Echo exam.  ------------------------------------------------------------------------  Confirmed on  3/8/2022 - 13:04:57 by Lisa Vidal MD  ------------------------------------------------------------------------    < end of copied text >    Goals of Care Discussion with Family/Proxy/Other   - see note from/family meeting set up for...

## 2022-03-11 NOTE — PROGRESS NOTE ADULT - PROBLEM SELECTOR PLAN 3
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  -CT head 2/15/22 as above -Echo result : limited, mild MR/AR, Left pleural effusion. EF 55%  -initial CXR shows atelectatic change. Small pleural effusions   -BNP 8000s on admission----> 5800s today  -trace edema, no respiratory distress on vent support  -on lasix 40mg daily  -repeat CXR

## 2022-03-11 NOTE — PROGRESS NOTE ADULT - ASSESSMENT
Pneumonia  Fevers - resolved  Leukocytosis - normalized      Plan - Cont Zosyn 3.375 gms iv q8hrs  for a total of 7-8 days

## 2022-03-11 NOTE — PROGRESS NOTE ADULT - PROBLEM SELECTOR PLAN 7
-continue flomax  -monitor urine output -continue amiodarone, coreg   -EKG PRN  -monitor VS, rate control

## 2022-03-12 ENCOUNTER — TRANSCRIPTION ENCOUNTER (OUTPATIENT)
Age: 77
End: 2022-03-12

## 2022-03-12 LAB
ANION GAP SERPL CALC-SCNC: 5 MMOL/L — SIGNIFICANT CHANGE UP (ref 5–17)
BUN SERPL-MCNC: 27 MG/DL — HIGH (ref 7–18)
CALCIUM SERPL-MCNC: 8.4 MG/DL — SIGNIFICANT CHANGE UP (ref 8.4–10.5)
CHLORIDE SERPL-SCNC: 110 MMOL/L — HIGH (ref 96–108)
CO2 SERPL-SCNC: 30 MMOL/L — SIGNIFICANT CHANGE UP (ref 22–31)
CREAT SERPL-MCNC: 0.94 MG/DL — SIGNIFICANT CHANGE UP (ref 0.5–1.3)
CULTURE RESULTS: SIGNIFICANT CHANGE UP
CULTURE RESULTS: SIGNIFICANT CHANGE UP
EGFR: 85 ML/MIN/1.73M2 — SIGNIFICANT CHANGE UP
GLUCOSE BLDC GLUCOMTR-MCNC: 134 MG/DL — HIGH (ref 70–99)
GLUCOSE BLDC GLUCOMTR-MCNC: 153 MG/DL — HIGH (ref 70–99)
GLUCOSE BLDC GLUCOMTR-MCNC: 181 MG/DL — HIGH (ref 70–99)
GLUCOSE SERPL-MCNC: 177 MG/DL — HIGH (ref 70–99)
HCT VFR BLD CALC: 28.1 % — LOW (ref 39–50)
HGB BLD-MCNC: 8.4 G/DL — LOW (ref 13–17)
MAGNESIUM SERPL-MCNC: 2.4 MG/DL — SIGNIFICANT CHANGE UP (ref 1.6–2.6)
MCHC RBC-ENTMCNC: 29.9 GM/DL — LOW (ref 32–36)
MCHC RBC-ENTMCNC: 32.1 PG — SIGNIFICANT CHANGE UP (ref 27–34)
MCV RBC AUTO: 107.3 FL — HIGH (ref 80–100)
NRBC # BLD: 0 /100 WBCS — SIGNIFICANT CHANGE UP (ref 0–0)
PHOSPHATE SERPL-MCNC: 2.9 MG/DL — SIGNIFICANT CHANGE UP (ref 2.5–4.5)
PLATELET # BLD AUTO: 273 K/UL — SIGNIFICANT CHANGE UP (ref 150–400)
POTASSIUM SERPL-MCNC: 3.7 MMOL/L — SIGNIFICANT CHANGE UP (ref 3.5–5.3)
POTASSIUM SERPL-SCNC: 3.7 MMOL/L — SIGNIFICANT CHANGE UP (ref 3.5–5.3)
RBC # BLD: 2.62 M/UL — LOW (ref 4.2–5.8)
RBC # FLD: 18.8 % — HIGH (ref 10.3–14.5)
SODIUM SERPL-SCNC: 145 MMOL/L — SIGNIFICANT CHANGE UP (ref 135–145)
SPECIMEN SOURCE: SIGNIFICANT CHANGE UP
SPECIMEN SOURCE: SIGNIFICANT CHANGE UP
WBC # BLD: 7.7 K/UL — SIGNIFICANT CHANGE UP (ref 3.8–10.5)
WBC # FLD AUTO: 7.7 K/UL — SIGNIFICANT CHANGE UP (ref 3.8–10.5)

## 2022-03-12 RX ORDER — SCOPALAMINE 1 MG/3D
1 PATCH, EXTENDED RELEASE TRANSDERMAL ONCE
Refills: 0 | Status: COMPLETED | OUTPATIENT
Start: 2022-03-12 | End: 2022-03-12

## 2022-03-12 RX ADMIN — LEVETIRACETAM 1000 MILLIGRAM(S): 250 TABLET, FILM COATED ORAL at 16:59

## 2022-03-12 RX ADMIN — AMIODARONE HYDROCHLORIDE 200 MILLIGRAM(S): 400 TABLET ORAL at 05:05

## 2022-03-12 RX ADMIN — CARVEDILOL PHOSPHATE 6.25 MILLIGRAM(S): 80 CAPSULE, EXTENDED RELEASE ORAL at 17:00

## 2022-03-12 RX ADMIN — ALBUTEROL 2 PUFF(S): 90 AEROSOL, METERED ORAL at 22:36

## 2022-03-12 RX ADMIN — PIPERACILLIN AND TAZOBACTAM 25 GRAM(S): 4; .5 INJECTION, POWDER, LYOPHILIZED, FOR SOLUTION INTRAVENOUS at 01:42

## 2022-03-12 RX ADMIN — CHLORHEXIDINE GLUCONATE 15 MILLILITER(S): 213 SOLUTION TOPICAL at 05:18

## 2022-03-12 RX ADMIN — Medication 650 MILLIGRAM(S): at 04:30

## 2022-03-12 RX ADMIN — Medication 1 MILLIGRAM(S): at 11:33

## 2022-03-12 RX ADMIN — ALBUTEROL 2 PUFF(S): 90 AEROSOL, METERED ORAL at 03:35

## 2022-03-12 RX ADMIN — Medication 500 MILLIGRAM(S): at 11:34

## 2022-03-12 RX ADMIN — ALBUTEROL 2 PUFF(S): 90 AEROSOL, METERED ORAL at 14:58

## 2022-03-12 RX ADMIN — SENNA PLUS 10 MILLILITER(S): 8.6 TABLET ORAL at 22:27

## 2022-03-12 RX ADMIN — ENOXAPARIN SODIUM 40 MILLIGRAM(S): 100 INJECTION SUBCUTANEOUS at 14:57

## 2022-03-12 RX ADMIN — CARVEDILOL PHOSPHATE 6.25 MILLIGRAM(S): 80 CAPSULE, EXTENDED RELEASE ORAL at 05:05

## 2022-03-12 RX ADMIN — PIPERACILLIN AND TAZOBACTAM 25 GRAM(S): 4; .5 INJECTION, POWDER, LYOPHILIZED, FOR SOLUTION INTRAVENOUS at 14:58

## 2022-03-12 RX ADMIN — SCOPALAMINE 1 PATCH: 1 PATCH, EXTENDED RELEASE TRANSDERMAL at 07:11

## 2022-03-12 RX ADMIN — LACTULOSE 6.67 GRAM(S): 10 SOLUTION ORAL at 05:06

## 2022-03-12 RX ADMIN — Medication 20 MILLIGRAM(S): at 05:06

## 2022-03-12 RX ADMIN — TAMSULOSIN HYDROCHLORIDE 0.4 MILLIGRAM(S): 0.4 CAPSULE ORAL at 22:17

## 2022-03-12 RX ADMIN — SCOPALAMINE 1 PATCH: 1 PATCH, EXTENDED RELEASE TRANSDERMAL at 16:57

## 2022-03-12 RX ADMIN — Medication 100 MILLIGRAM(S): at 16:58

## 2022-03-12 RX ADMIN — Medication 1 TABLET(S): at 11:33

## 2022-03-12 RX ADMIN — ALBUTEROL 2 PUFF(S): 90 AEROSOL, METERED ORAL at 08:44

## 2022-03-12 RX ADMIN — SCOPALAMINE 1 PATCH: 1 PATCH, EXTENDED RELEASE TRANSDERMAL at 23:09

## 2022-03-12 RX ADMIN — CHLORHEXIDINE GLUCONATE 1 APPLICATION(S): 213 SOLUTION TOPICAL at 05:08

## 2022-03-12 RX ADMIN — Medication 650 MILLIGRAM(S): at 06:41

## 2022-03-12 RX ADMIN — Medication 40 MILLIGRAM(S): at 05:07

## 2022-03-12 RX ADMIN — PIPERACILLIN AND TAZOBACTAM 25 GRAM(S): 4; .5 INJECTION, POWDER, LYOPHILIZED, FOR SOLUTION INTRAVENOUS at 22:17

## 2022-03-12 RX ADMIN — CHLORHEXIDINE GLUCONATE 15 MILLILITER(S): 213 SOLUTION TOPICAL at 16:59

## 2022-03-12 RX ADMIN — LEVETIRACETAM 1000 MILLIGRAM(S): 250 TABLET, FILM COATED ORAL at 05:07

## 2022-03-12 RX ADMIN — LACTULOSE 6.67 GRAM(S): 10 SOLUTION ORAL at 16:58

## 2022-03-12 RX ADMIN — Medication 1 APPLICATION(S): at 11:32

## 2022-03-12 RX ADMIN — Medication 100 MILLIGRAM(S): at 05:06

## 2022-03-12 NOTE — PROGRESS NOTE ADULT - PROBLEM SELECTOR PLAN 1
-secondary to aspiration pneumonia, trach dependent at nursing home (15L trach collar)  -presented to ED 3/7 from nursing home for respiratory distress  -Blood Culture result negative from 3/7  -MRSA PCR negative.  -Continue ventilator support; wean as able-daily SBT during the day and place vent at night  will follow up SLP for swallowing eval if tolerating SBT.   -Continue zosyn  -continue spiriva, albuterol   -Prednisone 20mg for 5 days  --Sputum cx grew Acinobacter, pseudomonas (Carbapenem resistant), on contact/droplet Isolation

## 2022-03-12 NOTE — PROGRESS NOTE ADULT - PROBLEM SELECTOR PLAN 6
-currently controlled  -continue coreg with hold parameters  -per OSH records was previously on losartan and Amlodipine as well; resume if needed/able

## 2022-03-12 NOTE — PROGRESS NOTE ADULT - PROBLEM SELECTOR PLAN 12
-likely chronic condition, no report bleeding  -Hgb stable   -unknown baseline  -FOBT negative. low iron, TIBC, normal ferritin  -c/w Multivitamin.   -monitor CBC

## 2022-03-12 NOTE — DISCHARGE NOTE PROVIDER - NSDCMRMEDTOKEN_GEN_ALL_CORE_FT
amantadine 50 mg/5 mL oral syrup: 10 milliliter(s) orally 2 times a day  amiodarone 100 mg oral tablet: 2 tab(s) orally once a day  carvedilol 6.25 mg oral tablet: 1 tab(s) orally 2 times a day  folic acid 1 mg oral tablet: 1 tab(s) orally once a day  ipratropium-albuterol 0.5 mg-2.5 mg/3 mL inhalation solution: 3 milliliter(s) inhaled 4 times a day  lactulose 10 g/15 mL oral syrup: 10 milliliter(s) orally 2 times a day  levETIRAcetam 100 mg/mL oral solution: 2.5 milliliter(s) orally 2 times a day  Santyl 250 units/g topical ointment: Apply topically to affected area once a day  Senna 8.8 mg/5 mL oral syrup: 10 milliliter(s) orally once a day (at bedtime)  tamsulosin 0.4 mg oral capsule: 1 cap(s) orally once a day  Vitamin B-100 oral tablet: 1 tab(s) orally once a day  Vitamin C 500 mg/5 mL oral liquid: 5 milliliter(s) orally once a day  Zosyn 4 g-0.5 g/100 mL intravenous solution: 4.5 gram(s) intravenous every 8 hours   acetaminophen 160 mg/5 mL oral suspension: 20.31 milliliter(s) orally every 6 hours, As needed, Temp greater or equal to 38C (100.4F), Moderate Pain (4 - 6)  albuterol 90 mcg/inh inhalation aerosol: 2 puff(s) inhaled every 6 hours  amantadine 50 mg/5 mL oral syrup: 10 milliliter(s) orally 2 times a day  amiodarone 200 mg oral tablet: 1 tab(s) orally once a day  ascorbic acid 500 mg oral tablet: 1 tab(s) orally once a day  carvedilol 6.25 mg oral tablet: 1 tab(s) orally every 12 hours  folic acid 1 mg oral tablet: 1 tab(s) orally once a day  furosemide 40 mg/5 mL oral solution: 5 milliliter(s) orally once a day  lactulose 10 g/15 mL oral syrup: 10 milliliter(s) orally 2 times a day  levETIRAcetam 100 mg/mL oral solution: 10 milliliter(s) orally 2 times a day  Multiple Vitamins oral tablet: 1 tab(s) orally once a day  senna 8.8 mg/5 mL oral syrup: 10 milliliter(s) orally once a day (at bedtime)  tamsulosin 0.4 mg oral capsule: 1 cap(s) orally once a day (at bedtime)   acetaminophen 160 mg/5 mL oral suspension: 20.31 milliliter(s) orally every 6 hours, As needed, Temp greater or equal to 38C (100.4F), Moderate Pain (4 - 6)  albuterol 90 mcg/inh inhalation aerosol: 2 puff(s) inhaled every 6 hours  amantadine 50 mg/5 mL oral syrup: 10 milliliter(s) orally 2 times a day  amiodarone 200 mg oral tablet: 1 tab(s) orally once a day  ascorbic acid 500 mg oral tablet: 1 tab(s) orally once a day  carvedilol 6.25 mg oral tablet: 1 tab(s) orally every 12 hours  folic acid 1 mg oral tablet: 1 tab(s) orally once a day  furosemide 40 mg/5 mL oral solution: 5 milliliter(s) orally once a day  lactulose 10 g/15 mL oral syrup: 10 milliliter(s) orally 2 times a day  levETIRAcetam 100 mg/mL oral solution: 10 milliliter(s) orally 2 times a day  Multiple Vitamins oral tablet: 1 tab(s) orally once a day  scopolamine: 1 milligram(s) transdermal every 3 days, excessive secretion  senna 8.8 mg/5 mL oral syrup: 10 milliliter(s) orally once a day (at bedtime)  tamsulosin 0.4 mg oral capsule: 1 cap(s) orally once a day (at bedtime)

## 2022-03-12 NOTE — DISCHARGE NOTE PROVIDER - HOSPITAL COURSE
Mr. Conrad is a 76 years old male (with Trach/Peg) from NH with past medical history Respiratory failure, pressure ulcer of sacral region, intracranial injury, anemia, UTI, was brought to ED due to respiratory distress and hypoxia from nursing home. After 12am, it was noted that his O2 was sating 82%, he had labored breathing and was less responsive. At the nursing home, trach was suctioned and he was placed on oxygen and with oxygen, saturation went to 92% and he was sent to ED by EMS.    In ED, he was placed on Trach collar 15L saturating 95% and his CXR showed lung infiltrates.  He was started on Vancomycin IV in addition to Zosyn and requiring frequent suction. He was admitted to SCU (Special Care Unit) for AHRF secondary to pneumonia and he was followed by ID.      Vancomycin was d/c and continue on Zosyn.  His sputum culture grew Numerous Acinetobacter baumannii/nosocom group (Carbapenem Resistant)  Numerous Pseudomonas aeruginosa, Normal Respiratory Cheryl absent.  Respiratory status continues to improve and was able to transition pt to trach collar on 3/12.    INCOMPLETE:::::::::::::3/12/22     Patient is a 76 year old male with PMH Afib s/p Watchman device on ASA, prior gastric sleeve and history of R occipital hemorrhage (dx 2020) stable on imaging (11/19/2021). Patient was traveling in [facility] on 11/26/21 when he fell from a standing height and struck his head on the floor then presented to [facility] hospital in [facility] with a GCS of 3 found to have Right subdural hematoma w/ R to L subflacine hernation s/p emergent R hemicraniectomy. Course notable for ventilator dependent respiratory failure s/p tracheostomy (12/9/21), CDfiff colitis (completed PO vanco), non convulsive seizures controlled with Keppra. Following 6 week ICU course in [facility] was repatriated on 1/8/22 and admitted to [facility] ([facility] course notable for initiation of amantadine, successful wean from ventilator to trach collar, vEEG (1/8-1/9) notable for mild background slowing and right hemispheric high amplitude slowing and breach artifact; there were no seizures captured, s/p PEG (1/13). 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 Patient was stabilized and discharged to [facility] on 2/1/2022 for comprehensive inpatient rehabilitation for ADL and gait dysfunction 2/2 TBI then transferred to [facility] on 3/3/22 for skilled nursing. Recent course notable for new fever due to R parotitis vs PNA for which he was started on zosyn. Nursing home course notable for increased yellow sputum production, respiratory distress for which patient was brought to ER (3/7). Patient admitted tp SCU for Aspiration Pneumonia, completed course of zosyn, cultures resulting CRE in sputum, ID following and respiratory distress managed with ventilator support. Patient able to tolerate Trach collar during the day, speaking valve for 10minutes to increase 10 minutes every day or as tolerable, however requiring ventilator support at night. Patient to be discharged to ventilator facility.    Incomplete 3/15 Patient is a 76 year old male with PMH Afib s/p Watchman device on ASA, prior gastric sleeve and history of R occipital hemorrhage (dx 2020) stable on imaging (11/19/2021). Patient was traveling in [facility] on 11/26/21 when he fell from a standing height and struck his head on the floor then presented to [facility] hospital in [facility] with a GCS of 3 found to have Right subdural hematoma w/ R to L subflacine hernation s/p emergent R hemicraniectomy. Course notable for ventilator dependent respiratory failure s/p tracheostomy (12/9/21), CDfiff colitis (completed PO vanco), non convulsive seizures controlled with Keppra. Following 6 week ICU course in [facility] was repatriated on 1/8/22 and admitted to [facility] ([facility] course notable for initiation of amantadine, successful wean from ventilator to trach collar, vEEG (1/8-1/9) notable for mild background slowing and right hemispheric high amplitude slowing and breach artifact; there were no seizures captured, s/p PEG (1/13). 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 Patient was stabilized and discharged to [facility] on 2/1/2022 for comprehensive inpatient rehabilitation for ADL and gait dysfunction 2/2 TBI then transferred to [facility] on 3/3/22 for skilled nursing. Recent course notable for new fever due to R parotitis vs PNA for which he was started on zosyn. Nursing home course notable for increased yellow sputum production, respiratory distress for which patient was brought to ER (3/7). Patient admitted tp SCU for Aspiration Pneumonia, completed course of zosyn, cultures resulting CRE in sputum, ID following and respiratory distress managed with ventilator support. Patient able to tolerate Trach collar during the day, speaking valve for 10minutes to increase 10 minutes every day or as tolerable, however requiring ventilator support at night. Patient to be discharged to ventilator facility.    Please note that this a brief summary of hospital course please refer to daily progress notes and consult notes for full course and events. Patient seen and examined at bedside, discussed with medical attending. Patient medically cleared for discharge to ventilator facility without patient follow up with Pulmonologist.

## 2022-03-12 NOTE — CHART NOTE - NSCHARTNOTEFT_GEN_A_CORE
Case discussed with pt's HCP Dr. Cora Moran at  and provided her update regarding pt's current medical condition including current oxygen demand.  Plan of care discussed and all questions answered.

## 2022-03-12 NOTE — PROGRESS NOTE ADULT - PROBLEM SELECTOR PLAN 2
-trend WBC, fever  -procalcitonin 0.11  -MRSA PCR negative.  -Bcx, Ucx NGTD 3/7  -sputum Cx- Acinobacter, Pseudomonas, (MDRO), continue contact/droplet isolation  -Pulm dr. Bishop  -ID Dr Quach following, appreciate recommendation  -Zosyn initiated prior to [facility] hospitalization for parotitis vs PNA (CT  3/2 as above.)  -c/w Zosyn for now

## 2022-03-12 NOTE — PROGRESS NOTE ADULT - ASSESSMENT
76 y.o. male with PMH Afib s/p Watchman device on ASA, prior gastric sleeve and history of R occipital hemorrhage (dx 2020) stable on imaging (11/19/2021). Patient was traveling in [facility] on 11/26/21 when he fell from a standing height and struck his head on the floor then presented to [facility] hospital in [facility] with a GCS of 3 found to have R subdural hematoma w/ R to L subflacine hernation s/p emergent R hemicraniectomy. Course notable for ventilator dependent respiratory failure s/p tracheostomy (12/9/21), CDfiff colitis (completed PO vanco), non convulsive seizures controlled with Keppra. Following 6 week ICU course in [facility] was repatriated on 1/8/22 and admitted to [facility] course notable for initiation of amantadine, successful wean from ventilator to trach collar, vEEG (1/8-1/9) notable for mild background slowing and right hemispheric high amplitude slowing and breach artifact; there were no seizures captured, s/p PEG (1/13). 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 Patient was stabilized and discharged to [facility] on 2/1/2022 for comprehensive inpatient rehabilitation for ADL and gait dysfunction 2/2 TBI then transferred to [facility] on 3/3/22 for skilled nursing. Recent course notable for new fever due to R parotitis vs PNA for which he was started on zosyn. Nursing home course notable for increased yellow sputum production, respiratory distress for which patient was brought to ER (3/7). ER course notable for continuation of zosyn, initiating vancomycin with cultures pending, ID following and respiratory distress managed with ventilator support.     Patient is admitted to   for further management of acute on chronic hypoxic respiratory failure and sepsis.  on vent support. FIO2 40%.   started on Zosyn and ID on board.  3/9 failed SBT with tachyneic, hypoxic, placed back on A/C mode.  3/10 failed SBT, on A/C mode.   3/11 attempt SBT, PS 10 today, repeat CXR   76 y.o. male with PMH Afib s/p Watchman device on ASA, prior gastric sleeve and history of R occipital hemorrhage (dx 2020) stable on imaging (11/19/2021). Patient was traveling in [facility] on 11/26/21 when he fell from a standing height and struck his head on the floor then presented to [facility] hospital in [facility] with a GCS of 3 found to have R subdural hematoma w/ R to L subflacine hernation s/p emergent R hemicraniectomy. Course notable for ventilator dependent respiratory failure s/p tracheostomy (12/9/21), CDfiff colitis (completed PO vanco), non convulsive seizures controlled with Keppra. Following 6 week ICU course in [facility] was repatriated on 1/8/22 and admitted to [facility] ([facility] course notable for initiation of amantadine, successful wean from ventilator to trach collar, vEEG (1/8-1/9) notable for mild background slowing and right hemispheric high amplitude slowing and breach artifact; there were no seizures captured, s/p PEG (1/13). 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 Patient was stabilized and discharged to [facility] on 2/1/2022 for comprehensive inpatient rehabilitation for ADL and gait dysfunction 2/2 TBI then transferred to [facility] on 3/3/22 for skilled nursing. Recent course notable for new fever due to R parotitis vs PNA for which he was started on zosyn. Nursing home course notable for increased yellow sputum production, respiratory distress for which patient was brought to ER (3/7). ER course notable for continuation of zosyn, initiating vancomycin with cultures pending, ID following and respiratory distress managed with ventilator support.     Patient is admitted to   for further management of acute on chronic hypoxic respiratory failure and sepsis.  on vent support. FIO2 40%.   started on Zosyn and ID on board.  3/9 failed SBT with tachyneic, hypoxic, placed back on A/C mode.  3/10 failed SBT, on A/C mode.   3/11 attempt SBT, PS 10 today, repeat CXR  3/12- Pt tolerated SBT 10/5 w/ 40% and is now tolerating trach collar.

## 2022-03-12 NOTE — PROGRESS NOTE ADULT - PROBLEM SELECTOR PLAN 9
Wound care specialist consulted, c/w local wound tx as rec.   -There is a Stage 1 Pressure Injury to the Bilateral Heels, as evident by non-blanchable erythema  -There is a Stage 3 Pressure Injury to the Sacrum with slough (15%), pink tissue, and red tissue  Recommendation:  -Clean all wounds with normal saline and apply skin prep to the surrounding skin  -Apply Calcium Alginate (Aquacel) to the Sacral wound and cover with a Foam dressing Q 72hrs PRN  -Elevate/float the patient's heels using heel protectors and reposition Q 2hrs using wedges or pillows

## 2022-03-12 NOTE — PROGRESS NOTE ADULT - PROBLEM SELECTOR PLAN 4
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  -CT head 2/15/22 as above

## 2022-03-12 NOTE — PROGRESS NOTE ADULT - PROBLEM SELECTOR PLAN 3
-Echo result : limited, mild MR/AR, Left pleural effusion. EF 55%  -initial CXR shows atelectatic change. Small pleural effusions   -BNP 8000s on admission----> 5800s  on 3/11  -trace edema, no respiratory distress on vent support  -on lasix 40mg daily  -repeat CXR- showed effusion, Lt>rt- -Echo result : limited, mild MR/AR, Left pleural effusion. EF 55%  -initial CXR shows atelectatic change. Small pleural effusions   -BNP 8000s on admission----> 5800s  on 3/11  -trace edema, no respiratory distress on vent support  -on lasix 40mg daily  -repeat CXR- showed effusion, Lt>rt, currently on lasix

## 2022-03-12 NOTE — PROGRESS NOTE ADULT - PROBLEM SELECTOR PLAN 10
-TF dependent via PEG   -Nutrition consult pending  -continue vitamin supplements given at nursing home

## 2022-03-12 NOTE — DISCHARGE NOTE PROVIDER - NSDCCPCAREPLAN_GEN_ALL_CORE_FT
Samra Aburto  YOB: 1965  Date of diagnosis:   Exported from  [facility]  Drive: Mar 3, 2022    Insulin Settings  Max Basal Rate              1 U/hr  Maximum Bolus               30 U  Duration of Insulin Action  240 min    Start time  Value  12:00 am    0.850  9:00 am     0.800  4:00 pm     0.900  10:00 pm    0.800  Total       20.450    Correction factor mg/dL/U  Start time  Value  12:00 am    55    Target BG mg/dL  Start time  Target  Correct Above  12:00 am    120     150    IC ratio g/U  Start time  Value  12:00 am    16  3:00 pm     12          Hypoglycemia:  Low blood sugar  Â   Definition:  Any blood sugar below 70 mg/dL. Â   Causes:             Skipped or delayed meals             Not enough food             Too much medication (insulin or diabetes pills)             Increased activity or exercise             Hot weather             Hot bath  Â   Onset:  Sudden  Â   Warning signs:             Shaking             Sweating              Dizziness             Impaired vision             Weakness and fatigue                     Hunger              Fast heartbeat             Irritability  Â   What to do:             1. Check your blood sugar level. 2. Take one dose of quick-acting sugar (15 g of carbohydrate). Examples:                         8 ounces with low-fat or fat-free milk. 6 Lifesavers candies -  Chew. 3 pieces of hard candy - Chew. One half cup of juice or regular soda. 1 tube of glucose gel. 3-4 glucose tablets. 3. Rest for 15 minutes and repeat blood sugar. 4. Repeat steps 1,2 and 3 until blood sugar is greater than 80 and you're feeling better. 5. Make a note of this in your logbook.              6. If it will be longer than 1 hour before you eat, eat 15 g of complex carbohydrates (bread, crackers or a half a sandwich). Â   Call your provider if you have two or more low blood sugars in a day or more than 3 in one week. Â   Â   INSULIN PUMP EMERGENCY PLAN  Â   If your pump is to be discontinued for less than 24 hours:  Â   1. Check your glucose every 2 hours. Â   2. Take a correction bolusÂ of rapid acting insulin by syringe for high glucose as above 150 . Â To calculate this, use 1 of the following formulas:  1. 1 unit of insulin for every 55 mg/dL (sensitivity) over 150 mg/dL (target). Â   Â   3. Take a bolus of food a rapid acting insulin by syringe (carbohydrate ratio):  1. Give 1 unit of insulin for every 15 space grams of carbohydrates he will be eating. Â   If your pump is to be discontinued for more than 24 hours:  Â   1. Â Â Follow the above guidelines plus take 18 units of long-acting insulin daily . Â   2. Â Â Do not restart insulin pump until 24 hours has passed since taking your long-acting insulin.   Â   Â   Emergency items to carry at all times:  Â   Â· A vial of rapid acting insulin  Â· Syringes  Â· Insulin reservoir  Â· Infusion set and   Â· IV prep pad/antiseptic white Line glucose tablets  Â· Extra batteries for pump  Â· Blood glucose meters, strips and extra battery  Â· Ketone strips  Â· Card with pump settings and provider phone numberÂ Â Â  PRINCIPAL DISCHARGE DIAGNOSIS  Diagnosis: Acute respiratory failure with hypoxia  Assessment and Plan of Treatment: You were admitted for acute hypoxic respiratory failure due to pneumonia and required mechanical ventilation.  Report any changes in your condition such as increasing shortness of breath, increasing sputum or increasing oxygen demand.      SECONDARY DISCHARGE DIAGNOSES  Diagnosis: Sepsis  Assessment and Plan of Treatment: You were admitted for sepsis which is classified as sever systemic infection likely caused by your pneumonia.  Continue taking your medication as prescribed and report any changes in your condition such as fever or chills, increasing generalized weakness, feeling foggy or confusion .    Diagnosis: Epileptic seizures  Assessment and Plan of Treatment: Continue taking your seizure medication as prescribed.    Diagnosis: Subdural hemorrhage  Assessment and Plan of Treatment: Report any new symptoms of confusion, nausea or vomiting associated with visual changes or headaches.    Diagnosis: Afib  Assessment and Plan of Treatment: Atrial fibrillation is the most common heart rhythm abnormality and increases your risk of stroke and heart attack.    It helps if you control your blood pressure, do not drink more than 1-2 alcohol drinks per day, cut down on caffeine, getting treatment for over active thyroid gland and getting exercise.  Report any symptoms of increasing heart racing or beating unusually, chest tightness or pain, lightheaded or dizziness, faint, shortness of breath.   It is important to take your heart medication as prescribed      Diagnosis: Pneumonia  Assessment and Plan of Treatment: You were admitted for a lung infection and you were treated with Intravenous antibiotic while in the hospital.  Please continue taking all prescribed medication and report any changes in your condition such as increasing shortness of breath, changes in  the color of your sputum, greenish or yellowish sputum production, fever or chills, temperature of 101.0F or higher.    Diagnosis: Acute respiratory failure with hypoxia  Assessment and Plan of Treatment:      PRINCIPAL DISCHARGE DIAGNOSIS  Diagnosis: Acute respiratory failure with hypoxia  Assessment and Plan of Treatment: You were admitted for acute hypoxic respiratory failure due to pneumonia and required mechanical ventilation.  Report any changes in your condition such as increasing shortness of breath, increasing sputum or increasing oxygen demand.  Please continue ventilator at night if unable to tolerate Respiratory weaning at night.  Please continue taking your medication as prescribed. If you have any questions or concerns about your medication please direct them to your prescribing Healthcare Provider.        SECONDARY DISCHARGE DIAGNOSES  Diagnosis: Sepsis  Assessment and Plan of Treatment: You were admitted for sepsis which is classified as sever systemic infection likely caused by your aspiration pneumonia.  Continue taking your medication as prescribed and report any changes in your condition such as fever or chills, increasing generalized weakness, feeling foggy or confusion .  You were found to have CRE in sputum. You completed a course of Zosyn. Please seek immediate help if you exhibit from the problem site if:  -fever, chills, shivering  -rapid heart rate, difficulty breathing  -worsening redness, drainage, foul odor  Please follow up with your Pulmonologist and Primary Care Physician in 1 week for futher management.    Diagnosis: Pneumonia  Assessment and Plan of Treatment: You were admitted for Aspiration Pneumonia a lung infection and you were treated with Intravenous antibiotic while in the hospital.  Please continue taking all prescribed medication and report any changes in your condition such as increasing shortness of breath, changes in  the color of your sputum, greenish or yellowish sputum production, fever or chills, temperature of 101.0F or higher.    Diagnosis: Afib  Assessment and Plan of Treatment: Atrial fibrillation is the most common heart rhythm abnormality and increases your risk of stroke and heart attack.    It helps if you control your blood pressure, do not drink more than 1-2 alcohol drinks per day, cut down on caffeine, getting treatment for over active thyroid gland and getting exercise.  Report any symptoms of increasing heart racing or beating unusually, chest tightness or pain, lightheaded or dizziness, faint, shortness of breath.   It is important to take your heart medication as prescribed      Diagnosis: Epileptic seizures  Assessment and Plan of Treatment: Please continue taking your medication as prescribed. If you have any questions or concerns about your medication please direct them to your prescribing Healthcare Provider.      Diagnosis: Subdural hemorrhage  Assessment and Plan of Treatment: Report any new symptoms of confusion, nausea or vomiting associated with visual changes or headaches.     PRINCIPAL DISCHARGE DIAGNOSIS  Diagnosis: Acute respiratory failure with hypoxia  Assessment and Plan of Treatment: You were admitted for acute hypoxic respiratory failure due to pneumonia and required mechanical ventilation.  Report any changes in your condition such as increasing shortness of breath, increasing sputum or increasing oxygen demand.  Please continue ventilator at night, and follow th directions of the ventilator facility regarding weaning off mechanical ventilator.  Please continue taking your medication as prescribed. If you have any questions or concerns about your medication please direct them to your prescribing Healthcare Provider.        SECONDARY DISCHARGE DIAGNOSES  Diagnosis: Sepsis  Assessment and Plan of Treatment: You were admitted for sepsis which is classified as sever systemic infection likely caused by your aspiration pneumonia.  Continue taking your medication as prescribed and report any changes in your condition such as fever or chills, increasing generalized weakness, feeling foggy or confusion .  You were found to have CRE in sputum. You completed a course of Zosyn. Please seek immediate help if you exhibit from the problem site if:  -fever, chills, shivering  -rapid heart rate, difficulty breathing  -worsening redness, drainage, foul odor  Please follow up with your Pulmonologist and Primary Care Physician in 1 week for futher management.    Diagnosis: Pneumonia  Assessment and Plan of Treatment: You were admitted for Aspiration Pneumonia a lung infection and you were treated with Intravenous antibiotic while in the hospital.  Please continue taking all prescribed medication and report any changes in your condition such as increasing shortness of breath, changes in  the color of your sputum, greenish or yellowish sputum production, fever or chills, temperature of 101.0F or higher.    Diagnosis: Afib  Assessment and Plan of Treatment: You have atrial fibrillation, this condition may have no symptoms, but when symptoms do appear they include palpitations, shortness of breath, and fatigue.  Please seek medical attention when you feel:  -chest pain   -dizziness, fatigue, inability to exercise, weakness, confusion  -fast , irregular, heart rate or palpitations, fluttering or thumping in chest  -shortness of breath  Please conitnue to take you medications as prescribed. Please follow up with your primary care physician and cardiologist in a week.    Diagnosis: Epileptic seizures  Assessment and Plan of Treatment: Please continue taking your medication as prescribed. If you have any questions or concerns about your medication please direct them to your prescribing Healthcare Provider.      Diagnosis: Subdural hemorrhage  Assessment and Plan of Treatment: Report any new symptoms of confusion, nausea or vomiting associated with visual changes or headaches.  Please continue taking your medication as prescribed. If you have any questions or concerns about your medication please direct them to your prescribing Healthcare Provider.

## 2022-03-12 NOTE — PROGRESS NOTE ADULT - PROBLEM SELECTOR PLAN 11
-hypokalemia resolved after supplement  -Na improving, continue free water 250ml Q6h via peg  -nutritionist following  -monitor electrolytes

## 2022-03-12 NOTE — DISCHARGE NOTE PROVIDER - CARE PROVIDER_API CALL
Asia Aguirre)  Internal Medicine  [address] [place] [address]  Phone: [phone]  Fax: [phone]  Follow Up Time:

## 2022-03-12 NOTE — PROGRESS NOTE ADULT - SUBJECTIVE AND OBJECTIVE BOX
NAYANA CLAROS    SCU progress note    INTERVAL HPI/OVERNIGHT EVENTS: Febrile overnight, T-max 100.4F, currently on Zosyn for + sputum cx growing pseudomonas, acinetobacter baumannii/nosocomialis group.      DNR [ ]   DNI  [  ]- FULL CODE.    Covid - 19 PCR:  COVID-19 PCR: Jackelyn (07 Mar 2022 02:23)      The 4Ms    What Matters Most: see GOC  Age appropriate Medications/Screen for High Risk Medication: Yes  Mentation: see CAM below  Mobility: defer to physical exam    The Confusion Assessment Method (CAM) Diagnostic Algorithm     1: Acute Onset or Fluctuating Course  - Is there evidence of an acute change in mental status from the patient’s baseline? Did the (abnormal) behavior  fluctuate during the day, that is, tend to come and go, or increase and decrease in severity?  [ ] YES [x ] NO     2: Inattention  - Did the patient have difficulty focusing attention, being easily distractible, or having difficulty keeping track of what was being said?  [ ] YES [ ] NO- Unable to assess     3: Disorganized thinking  -Was the patient’s thinking disorganized or incoherent, such as rambling or irrelevant conversation, unclear or illogical flow of ideas, or unpredictable switching from subject to subject?  [ ] YES [ ] NO- Unable to assess    4: Altered Level of consciousness?  [ ] YES [ ] NO- Unable to assess    The diagnosis of delirium by CAM requires the presence of features 1 and 2 and either 3 or 4.    PRESSORS: [ ] YES [x ] NO  piperacillin/tazobactam IVPB.. 3.375 Gram(s) IV Intermittent every 8 hours    Cardiovascular:  Heart Failure  Acute   Acute on Chronic  Chronic       aMIOdarone    Tablet 200 milliGRAM(s) Oral daily  carvedilol 6.25 milliGRAM(s) Oral every 12 hours  furosemide Solution 40 milliGRAM(s) Oral daily  tamsulosin 0.4 milliGRAM(s) Oral at bedtime    Pulmonary:  ALBUTerol    90 MICROgram(s) HFA Inhaler 2 Puff(s) Inhalation every 6 hours    Hematalogic:  enoxaparin Injectable 40 milliGRAM(s) SubCutaneous every 24 hours    Other:  acetaminophen    Suspension .. 650 milliGRAM(s) Enteral Tube every 6 hours PRN  amantadine Syrup 100 milliGRAM(s) Oral two times a day  ascorbic acid 500 milliGRAM(s) Oral daily  chlorhexidine 0.12% Liquid 15 milliLiter(s) Oral Mucosa every 12 hours  chlorhexidine 2% Cloths 1 Application(s) Topical <User Schedule>  collagenase Ointment 1 Application(s) Topical daily  folic acid 1 milliGRAM(s) Oral daily  lactulose Syrup 6.6667 Gram(s) Oral two times a day  levETIRAcetam  Solution 1000 milliGRAM(s) Oral two times a day  multivitamin 1 Tablet(s) Oral daily  predniSONE   Tablet 20 milliGRAM(s) Oral daily  senna Syrup 10 milliLiter(s) Oral at bedtime    acetaminophen    Suspension .. 650 milliGRAM(s) Enteral Tube every 6 hours PRN  ALBUTerol    90 MICROgram(s) HFA Inhaler 2 Puff(s) Inhalation every 6 hours  amantadine Syrup 100 milliGRAM(s) Oral two times a day  aMIOdarone    Tablet 200 milliGRAM(s) Oral daily  ascorbic acid 500 milliGRAM(s) Oral daily  carvedilol 6.25 milliGRAM(s) Oral every 12 hours  chlorhexidine 0.12% Liquid 15 milliLiter(s) Oral Mucosa every 12 hours  chlorhexidine 2% Cloths 1 Application(s) Topical <User Schedule>  collagenase Ointment 1 Application(s) Topical daily  enoxaparin Injectable 40 milliGRAM(s) SubCutaneous every 24 hours  folic acid 1 milliGRAM(s) Oral daily  furosemide Solution 40 milliGRAM(s) Oral daily  lactulose Syrup 6.6667 Gram(s) Oral two times a day  levETIRAcetam  Solution 1000 milliGRAM(s) Oral two times a day  multivitamin 1 Tablet(s) Oral daily  piperacillin/tazobactam IVPB.. 3.375 Gram(s) IV Intermittent every 8 hours  predniSONE   Tablet 20 milliGRAM(s) Oral daily  senna Syrup 10 milliLiter(s) Oral at bedtime  tamsulosin 0.4 milliGRAM(s) Oral at bedtime    Drug Dosing Weight  Height (cm): 172.7 (07 Mar 2022 01:15)  Weight (kg): 82.2 (08 Mar 2022 05:03)  BMI (kg/m2): 27.6 (08 Mar 2022 05:03)  BSA (m2): 1.96 (08 Mar 2022 05:03)    CENTRAL LINE: [ ] YES [x ] NO  LOCATION:   DATE INSERTED:  REMOVE: [ ] YES [ ] NO  EXPLAIN:    ESPITIA: [ ] YES [x ] NO    DATE INSERTED:  REMOVE:  [ ] YES [ ] NO  EXPLAIN:    PAST MEDICAL & SURGICAL HISTORY:  Essential hypertension    Primary osteoarthritis, unspecified site    Closed fracture of left radius, initial encounter    Morbid obesity, unspecified obesity type  h/o. - s/p gastric bypass- lost 113 lbs    KAREN (obstructive sleep apnea)  h/o - was on CPAP until gastric bypass surgery    Respiratory failure    Anemia    Subdural hemorrhage    Epilepsy    H/O gastrostomy    History of BPH    Afib    S/P gastric bypass  2008    Status post total replacement of left hip  2006    S/P bunionectomy  bilateral    S/P colonoscopy  approx 2012    History of abdominoplasty  and subsequent cosmetic surgery for removal of excess skin from face                  Mode: PS (Pressure Support)/ Spontaneous  FiO2: 40  PEEP: 5  PS: 10  ITime: 1  MAP: 9  PIP: 15      PHYSICAL EXAM:    GENERAL: NAD, chronically ill but not toxic appearing,   HEAD:  old healed sx scar, sunken rt temporal area  EYES: EOMI, PERRLA, conjunctiva and sclera clear  ENMT: No tonsillar erythema, exudates, or enlargement; Moist mucous membranes, Good dentition, No lesions  NECK: trach and vented, Supple, No JVD, Normal thyroid  NERVOUS SYSTEM:  Alert & awake, responsive to verbal and tactile stimuli, follows commands, able to squeeze with both hands, no lower extremities movements  CHEST/LUNG: trach and vented, moving air b/l w/ minimal rales, tolerating pressure support   HEART: Regular rate and rhythm; No murmurs, rubs, or gallops  ABDOMEN: obese abd, sft, nt, nd, no rebound or guarding, + BS, peg intact  EXTREMITIES:  2+ Peripheral Pulses, No clubbing, cyanosis, or edema  LYMPH: No lymphadenopathy noted  SKIN: Stage 1 Pressure Injury to the Bilateral Heels, as evident by non-blanchable erythema  -There is a Stage 3 Pressure Injury to the Sacrum with slough (15%), pink tissue, and red tissue        LABS:  CBC Full  -  ( 12 Mar 2022 08:05 )  WBC Count : 7.70 K/uL  RBC Count : 2.62 M/uL  Hemoglobin : 8.4 g/dL  Hematocrit : 28.1 %  Platelet Count - Automated : 273 K/uL  Mean Cell Volume : 107.3 fl  Mean Cell Hemoglobin : 32.1 pg  Mean Cell Hemoglobin Concentration : 29.9 gm/dL  Auto Neutrophil # : x  Auto Lymphocyte # : x  Auto Monocyte # : x  Auto Eosinophil # : x  Auto Basophil # : x  Auto Neutrophil % : x  Auto Lymphocyte % : x  Auto Monocyte % : x  Auto Eosinophil % : x  Auto Basophil % : x    03-12    145  |  110<H>  |  27<H>  ----------------------------<  177<H>  3.7   |  30  |  0.94    Ca    8.4      12 Mar 2022 08:05  Phos  2.9     03-12  Mg     2.4     03-12                [  ]  DVT Prophylaxis  [  ]  Nutrition, Brand, Rate         Goal Rate        Abnormal Nutritional Status -  Malnutrition   Cachexia      Morbid Obesity BMI >/=40    RADIOLOGY & ADDITIONAL STUDIES:  ***    Goals of Care Discussion with Family/Proxy/Other   - see note from/family meeting set up for...

## 2022-03-13 LAB
ANION GAP SERPL CALC-SCNC: 6 MMOL/L — SIGNIFICANT CHANGE UP (ref 5–17)
BUN SERPL-MCNC: 27 MG/DL — HIGH (ref 7–18)
CALCIUM SERPL-MCNC: 8.5 MG/DL — SIGNIFICANT CHANGE UP (ref 8.4–10.5)
CHLORIDE SERPL-SCNC: 107 MMOL/L — SIGNIFICANT CHANGE UP (ref 96–108)
CO2 SERPL-SCNC: 32 MMOL/L — HIGH (ref 22–31)
CREAT SERPL-MCNC: 0.93 MG/DL — SIGNIFICANT CHANGE UP (ref 0.5–1.3)
EGFR: 86 ML/MIN/1.73M2 — SIGNIFICANT CHANGE UP
GLUCOSE BLDC GLUCOMTR-MCNC: 137 MG/DL — HIGH (ref 70–99)
GLUCOSE BLDC GLUCOMTR-MCNC: 146 MG/DL — HIGH (ref 70–99)
GLUCOSE BLDC GLUCOMTR-MCNC: 160 MG/DL — HIGH (ref 70–99)
GLUCOSE BLDC GLUCOMTR-MCNC: 169 MG/DL — HIGH (ref 70–99)
GLUCOSE BLDC GLUCOMTR-MCNC: 184 MG/DL — HIGH (ref 70–99)
GLUCOSE SERPL-MCNC: 201 MG/DL — HIGH (ref 70–99)
HCT VFR BLD CALC: 28.7 % — LOW (ref 39–50)
HCV RNA FLD QL NAA+PROBE: SIGNIFICANT CHANGE UP
HGB BLD-MCNC: 8.7 G/DL — LOW (ref 13–17)
MCHC RBC-ENTMCNC: 30.3 GM/DL — LOW (ref 32–36)
MCHC RBC-ENTMCNC: 32.1 PG — SIGNIFICANT CHANGE UP (ref 27–34)
MCV RBC AUTO: 105.9 FL — HIGH (ref 80–100)
NRBC # BLD: 0 /100 WBCS — SIGNIFICANT CHANGE UP (ref 0–0)
PLATELET # BLD AUTO: 282 K/UL — SIGNIFICANT CHANGE UP (ref 150–400)
POTASSIUM SERPL-MCNC: 3.3 MMOL/L — LOW (ref 3.5–5.3)
POTASSIUM SERPL-SCNC: 3.3 MMOL/L — LOW (ref 3.5–5.3)
RBC # BLD: 2.71 M/UL — LOW (ref 4.2–5.8)
RBC # FLD: 18.8 % — HIGH (ref 10.3–14.5)
SODIUM SERPL-SCNC: 145 MMOL/L — SIGNIFICANT CHANGE UP (ref 135–145)
WBC # BLD: 8.44 K/UL — SIGNIFICANT CHANGE UP (ref 3.8–10.5)
WBC # FLD AUTO: 8.44 K/UL — SIGNIFICANT CHANGE UP (ref 3.8–10.5)

## 2022-03-13 RX ORDER — CHLORHEXIDINE GLUCONATE 213 G/1000ML
15 SOLUTION TOPICAL EVERY 12 HOURS
Refills: 0 | Status: DISCONTINUED | OUTPATIENT
Start: 2022-03-13 | End: 2022-03-16

## 2022-03-13 RX ORDER — POTASSIUM CHLORIDE 20 MEQ
40 PACKET (EA) ORAL EVERY 4 HOURS
Refills: 0 | Status: COMPLETED | OUTPATIENT
Start: 2022-03-13 | End: 2022-03-13

## 2022-03-13 RX ADMIN — Medication 100 MILLIGRAM(S): at 05:38

## 2022-03-13 RX ADMIN — ALBUTEROL 2 PUFF(S): 90 AEROSOL, METERED ORAL at 15:47

## 2022-03-13 RX ADMIN — CHLORHEXIDINE GLUCONATE 15 MILLILITER(S): 213 SOLUTION TOPICAL at 18:11

## 2022-03-13 RX ADMIN — LEVETIRACETAM 1000 MILLIGRAM(S): 250 TABLET, FILM COATED ORAL at 05:41

## 2022-03-13 RX ADMIN — SENNA PLUS 10 MILLILITER(S): 8.6 TABLET ORAL at 22:02

## 2022-03-13 RX ADMIN — AMIODARONE HYDROCHLORIDE 200 MILLIGRAM(S): 400 TABLET ORAL at 05:40

## 2022-03-13 RX ADMIN — ALBUTEROL 2 PUFF(S): 90 AEROSOL, METERED ORAL at 05:50

## 2022-03-13 RX ADMIN — Medication 40 MILLIGRAM(S): at 05:41

## 2022-03-13 RX ADMIN — Medication 40 MILLIEQUIVALENT(S): at 10:56

## 2022-03-13 RX ADMIN — SCOPALAMINE 1 PATCH: 1 PATCH, EXTENDED RELEASE TRANSDERMAL at 08:06

## 2022-03-13 RX ADMIN — SCOPALAMINE 1 PATCH: 1 PATCH, EXTENDED RELEASE TRANSDERMAL at 23:00

## 2022-03-13 RX ADMIN — Medication 500 MILLIGRAM(S): at 11:49

## 2022-03-13 RX ADMIN — Medication 40 MILLIEQUIVALENT(S): at 14:30

## 2022-03-13 RX ADMIN — PIPERACILLIN AND TAZOBACTAM 25 GRAM(S): 4; .5 INJECTION, POWDER, LYOPHILIZED, FOR SOLUTION INTRAVENOUS at 22:03

## 2022-03-13 RX ADMIN — Medication 100 MILLIGRAM(S): at 18:10

## 2022-03-13 RX ADMIN — Medication 1 MILLIGRAM(S): at 11:49

## 2022-03-13 RX ADMIN — CARVEDILOL PHOSPHATE 6.25 MILLIGRAM(S): 80 CAPSULE, EXTENDED RELEASE ORAL at 18:10

## 2022-03-13 RX ADMIN — PIPERACILLIN AND TAZOBACTAM 25 GRAM(S): 4; .5 INJECTION, POWDER, LYOPHILIZED, FOR SOLUTION INTRAVENOUS at 05:38

## 2022-03-13 RX ADMIN — PIPERACILLIN AND TAZOBACTAM 25 GRAM(S): 4; .5 INJECTION, POWDER, LYOPHILIZED, FOR SOLUTION INTRAVENOUS at 14:30

## 2022-03-13 RX ADMIN — LEVETIRACETAM 1000 MILLIGRAM(S): 250 TABLET, FILM COATED ORAL at 18:10

## 2022-03-13 RX ADMIN — LACTULOSE 6.67 GRAM(S): 10 SOLUTION ORAL at 05:39

## 2022-03-13 RX ADMIN — LACTULOSE 6.67 GRAM(S): 10 SOLUTION ORAL at 18:10

## 2022-03-13 RX ADMIN — ALBUTEROL 2 PUFF(S): 90 AEROSOL, METERED ORAL at 20:27

## 2022-03-13 RX ADMIN — Medication 20 MILLIGRAM(S): at 05:40

## 2022-03-13 RX ADMIN — CHLORHEXIDINE GLUCONATE 1 APPLICATION(S): 213 SOLUTION TOPICAL at 05:42

## 2022-03-13 RX ADMIN — ENOXAPARIN SODIUM 40 MILLIGRAM(S): 100 INJECTION SUBCUTANEOUS at 14:30

## 2022-03-13 RX ADMIN — ALBUTEROL 2 PUFF(S): 90 AEROSOL, METERED ORAL at 09:35

## 2022-03-13 RX ADMIN — Medication 1 TABLET(S): at 11:49

## 2022-03-13 RX ADMIN — TAMSULOSIN HYDROCHLORIDE 0.4 MILLIGRAM(S): 0.4 CAPSULE ORAL at 22:03

## 2022-03-13 RX ADMIN — CARVEDILOL PHOSPHATE 6.25 MILLIGRAM(S): 80 CAPSULE, EXTENDED RELEASE ORAL at 05:40

## 2022-03-13 RX ADMIN — CHLORHEXIDINE GLUCONATE 15 MILLILITER(S): 213 SOLUTION TOPICAL at 06:01

## 2022-03-13 NOTE — PROGRESS NOTE ADULT - PROBLEM SELECTOR PLAN 9
Wound care specialist consulted, c/w local wound tx as rec.   -There is a Stage 1 Pressure Injury to the Bilateral Heels, as evident by non-blanchable erythema  -There is a Stage 3 Pressure Injury to the Sacrum with slough (15%), pink tissue, and red tissue  Recommendation:  -Clean all wounds with normal saline and apply skin prep to the surrounding skin  -Apply Calcium Alginate (Aquacel) to the Sacral wound and cover with a Foam dressing Q 72hrs PRN  -Elevate/float the patient's heels using heel protectors and reposition Q 2hrs using wedges or pillows Wound care specialist consulted, c/w local wound tx as rec.   -There is a Stage 1 Pressure Injury to the Bilateral Heels, as evident by non-blanchable erythema  -There is a Stage 3 Pressure Injury to the Sacrum with slough (15%), pink tissue, and red tissue  Recommendation:  -Clean all wounds with normal saline and apply skin prep to the surrounding skin  -Apply Calcium Alginate (Aquacel) to the Sacral wound and cover with a Foam dressing Q 72hrs PRN  -Elevate/float the patient's heels using heel protectors and reposition Q 2hrs using wedges or pillows  - d/c collagenase per wound care recs

## 2022-03-13 NOTE — PROGRESS NOTE ADULT - SUBJECTIVE AND OBJECTIVE BOX
NAYANA CLAROS    SCU progress note    INTERVAL HPI/OVERNIGHT EVENTS: No acute events    DNR [ ]   DNI  [  ] FULL CODE.    Covid - 19 PCR: COVID-19 PCR: Jackelyn (07 Mar 2022 02:23)    The 4Ms    What Matters Most: see GOC  Age appropriate Medications/Screen for High Risk Medication: Yes  Mentation: see CAM below  Mobility: defer to physical exam    The Confusion Assessment Method (CAM) Diagnostic Algorithm     1: Acute Onset or Fluctuating Course  - Is there evidence of an acute change in mental status from the patient’s baseline? Did the (abnormal) behavior  fluctuate during the day, that is, tend to come and go, or increase and decrease in severity?  [ ] YES [x ] NO     2: Inattention  - Did the patient have difficulty focusing attention, being easily distractible, or having difficulty keeping track of what was being said?  [X] YES [ ] NO     3: Disorganized thinking  -Was the patient’s thinking disorganized or incoherent, such as rambling or irrelevant conversation, unclear or illogical flow of ideas, or unpredictable switching from subject to subject?  [ ] YES [ ] NO - limited assessment in setting of trach    4: Altered Level of consciousness?  [ ] YES [X ] NO    The diagnosis of delirium by CAM requires the presence of features 1 and 2 and either 3 or 4.    PRESSORS: [ ] YES [x ] NO  piperacillin/tazobactam IVPB.. 3.375 Gram(s) IV Intermittent every 8 hours    Cardiovascular:  Heart Failure  Acute   Acute on Chronic  Chronic       aMIOdarone    Tablet 200 milliGRAM(s) Oral daily  carvedilol 6.25 milliGRAM(s) Oral every 12 hours  furosemide Solution 40 milliGRAM(s) Oral daily  tamsulosin 0.4 milliGRAM(s) Oral at bedtime    Pulmonary:  ALBUTerol    90 MICROgram(s) HFA Inhaler 2 Puff(s) Inhalation every 6 hours    Hematalogic:  enoxaparin Injectable 40 milliGRAM(s) SubCutaneous every 24 hours    Other:  acetaminophen    Suspension .. 650 milliGRAM(s) Enteral Tube every 6 hours PRN  amantadine Syrup 100 milliGRAM(s) Oral two times a day  ascorbic acid 500 milliGRAM(s) Oral daily  chlorhexidine 0.12% Liquid 15 milliLiter(s) Oral Mucosa every 12 hours  chlorhexidine 2% Cloths 1 Application(s) Topical <User Schedule>  collagenase Ointment 1 Application(s) Topical daily  folic acid 1 milliGRAM(s) Oral daily  lactulose Syrup 6.6667 Gram(s) Oral two times a day  levETIRAcetam  Solution 1000 milliGRAM(s) Oral two times a day  multivitamin 1 Tablet(s) Oral daily  potassium chloride   Powder 40 milliEquivalent(s) Enteral Tube every 4 hours  senna Syrup 10 milliLiter(s) Oral at bedtime    acetaminophen    Suspension .. 650 milliGRAM(s) Enteral Tube every 6 hours PRN  ALBUTerol    90 MICROgram(s) HFA Inhaler 2 Puff(s) Inhalation every 6 hours  amantadine Syrup 100 milliGRAM(s) Oral two times a day  aMIOdarone    Tablet 200 milliGRAM(s) Oral daily  ascorbic acid 500 milliGRAM(s) Oral daily  carvedilol 6.25 milliGRAM(s) Oral every 12 hours  chlorhexidine 0.12% Liquid 15 milliLiter(s) Oral Mucosa every 12 hours  chlorhexidine 2% Cloths 1 Application(s) Topical <User Schedule>  collagenase Ointment 1 Application(s) Topical daily  enoxaparin Injectable 40 milliGRAM(s) SubCutaneous every 24 hours  folic acid 1 milliGRAM(s) Oral daily  furosemide Solution 40 milliGRAM(s) Oral daily  lactulose Syrup 6.6667 Gram(s) Oral two times a day  levETIRAcetam  Solution 1000 milliGRAM(s) Oral two times a day  multivitamin 1 Tablet(s) Oral daily  piperacillin/tazobactam IVPB.. 3.375 Gram(s) IV Intermittent every 8 hours  potassium chloride   Powder 40 milliEquivalent(s) Enteral Tube every 4 hours  senna Syrup 10 milliLiter(s) Oral at bedtime  tamsulosin 0.4 milliGRAM(s) Oral at bedtime    Drug Dosing Weight  Height (cm): 172.7 (07 Mar 2022 01:15)  Weight (kg): 82.2 (08 Mar 2022 05:03)  BMI (kg/m2): 27.6 (08 Mar 2022 05:03)  BSA (m2): 1.96 (08 Mar 2022 05:03)    CENTRAL LINE: [ ] YES [x ] NO  LOCATION:   DATE INSERTED:  REMOVE: [ ] YES [ ] NO  EXPLAIN:    ESPITIA: [ ] YES [x ] NO    DATE INSERTED:  REMOVE:  [ ] YES [ ] NO  EXPLAIN:    PAST MEDICAL & SURGICAL HISTORY:  Essential hypertension    Primary osteoarthritis, unspecified site    Closed fracture of left radius, initial encounter    Morbid obesity, unspecified obesity type  h/o. - s/p gastric bypass- lost 113 lbs    KAREN (obstructive sleep apnea)  h/o - was on CPAP until gastric bypass surgery    Respiratory failure    Anemia    Subdural hemorrhage    Epilepsy    H/O gastrostomy    History of BPH    Afib    S/P gastric bypass  2008    Status post total replacement of left hip  2006    S/P bunionectomy  bilateral    S/P colonoscopy  approx 2012    History of abdominoplasty  and subsequent cosmetic surgery for removal of excess skin from face      Mode: AC/ CMV (Assist Control/ Continuous Mandatory Ventilation)  RR (machine): 14  TV (machine): 420  FiO2: 40  PEEP: 5  ITime: 1  MAP: 8  PIP: 18      PHYSICAL EXAM:    GENERAL: NAD, chronically ill but well groomed   HEAD:  old healed sx scar, sunken rt temporal area  EYES: EOMI, PERRL, conjunctiva and sclera clear  ENMT: Moist mucous membranes, Good dentition, No lesions  NECK: trach and vented, Supple, No JVD  NERVOUS SYSTEM:  Alert & awake, responsive to verbal and tactile stimuli, follows commands, able to squeeze with both hands, limited movement of left upper extremity, right upper extremity weak; no movement to bilateral lower extremities  CHEST/LUNG: trach and vented, equal expansion bilaterally, diminished breath sounds bilateral lower lobes.    HEART: Regular rate and rhythm; No murmurs, rubs, or gallops  ABDOMEN: obese abd, mildly distended, normoactive bowel sounds, non-tender to palpation, soft. peg intact, last bowel movement documented 3/8  EXTREMITIES: bilateral lower extremities cool to touch, 2+ DP pulses palpated bilaterally. No clubbing, cyanosis, or 1+ LLE edema  SKIN: Stage 1 Pressure Injury to the Bilateral Heels, as evident by non-blanchable erythema  -There is a Stage 3 Pressure Injury to the Sacrum with slough (15%), pink tissue, and red tissue    LABS:  CBC Full  -  ( 13 Mar 2022 07:48 )  WBC Count : 8.44 K/uL  RBC Count : 2.71 M/uL  Hemoglobin : 8.7 g/dL  Hematocrit : 28.7 %  Platelet Count - Automated : 282 K/uL  Mean Cell Volume : 105.9 fl  Mean Cell Hemoglobin : 32.1 pg  Mean Cell Hemoglobin Concentration : 30.3 gm/dL  Auto Neutrophil # : x  Auto Lymphocyte # : x  Auto Monocyte # : x  Auto Eosinophil # : x  Auto Basophil # : x  Auto Neutrophil % : x  Auto Lymphocyte % : x  Auto Monocyte % : x  Auto Eosinophil % : x  Auto Basophil % : x    03-13    145  |  107  |  27<H>  ----------------------------<  201<H>  3.3<L>   |  32<H>  |  0.93    Ca    8.5      13 Mar 2022 07:48  Phos  2.9     03-12  Mg     2.4     03-12        [  ]  DVT Prophylaxis  [  ]  Nutrition, Brand, Rate         Goal Rate        Abnormal Nutritional Status -  Malnutrition   Cachexia      Morbid Obesity BMI >/=40    RADIOLOGY & ADDITIONAL STUDIES:   < from: Xray Chest 1 View- PORTABLE-Routine (Xray Chest 1 View- PORTABLE-Routine .) (03.11.22 @ 12:07) >  FINDINGS:  CATHETERS AND TUBES: Tracheostomy tube in place.    PULMONARY: LEFT basilar airspace consolidation and/or effusion obscuring LEFT diaphragm contour. Small RIGHT basilar airspace disease.  Upper zones clear. No pneumothorax.    HEART/VASCULAR: The  heart is enlarged in transverse diameter.    BONES: Visualized osseous structures are intact.    IMPRESSION:   LEFT greater than RIGHT bibasilar airspace disease and/or LEFT effusion obscuring LEFT diaphragm contour..    < end of copied text >  < from: Transthoracic Echocardiogram (03.07.22 @ 07:52) >  CONCLUSIONS:  1. Normal mitral valve. Mild mitral regurgitation.  2. Trileaflet aortic valve. No aortic stenosis. Mild aortic  regurgitation.  3. Severely dilated left atrium.  LA volume index = 53  cc/m2.  4. Upper limit normal left ventricular wall thickness.  5. Endocardium not well visualized; grossly normal left  ventricular systolic function; in the setting of atrial  fibrillation, ejection fraction can vary with the R-R  interval.  Segmental wall motion could not be assessed.  6. Unable to accurately assess diastolic function due to  presence of arrythmia.  7. Normal right ventricular size and function.  8. Unable to accurately assess right atrial pressure due to  technical aspects of the study.  9. Incomplete tricuspid regurgitation jet precludes  accurate assessment of pulmonary artery systolic pressure.  10. Left pleural effusion.    < end of copied text >        Goals of Care Discussion with Family/Proxy/Other   - see note from

## 2022-03-13 NOTE — PROGRESS NOTE ADULT - PROBLEM SELECTOR PLAN 2
-trend WBC, fever  -procalcitonin 0.11  -MRSA PCR negative.  -Bcx, Ucx NGTD 3/7  -sputum Cx- Acinobacter, Pseudomonas, (MDRO), continue contact/droplet isolation  -Pulm dr. Bishop  -ID Dr Quach following, appreciate recommendation  -Zosyn initiated prior to [facility] hospitalization for parotitis vs PNA (CT  3/2 as above.)  -Continue zosyn for 7-8 days per ID (through 3/14 or 3/15)

## 2022-03-13 NOTE — CHART NOTE - NSCHARTNOTEFT_GEN_A_CORE
Patient's cousin/ HCP Dr. Cora Moran called at [phone]. Non-descript message left to return [facility] available for an update on the patient's current clinical condition and plan of care. Awaiting return of call.

## 2022-03-13 NOTE — PROGRESS NOTE ADULT - PROBLEM SELECTOR PLAN 3
-Echo result : limited, mild MR/AR, Left pleural effusion. EF 55%  -initial CXR shows atelectatic change. Small pleural effusions   -BNP 8000s on admission----> 5800s  on 3/11  -trace edema, no respiratory distress on vent support  -on lasix 40mg daily  -repeat CXR- showed effusion, Lt>rt, currently on lasix

## 2022-03-13 NOTE — PROGRESS NOTE ADULT - PROBLEM SELECTOR PLAN 1
-secondary to aspiration pneumonia, trach dependent at nursing home (15L trach collar)  -presented to ED 3/7 from nursing home for respiratory distress  -Blood Culture result negative from 3/7  -Sputum cx grew Acinobacter, pseudomonas (Carbapenem resistant), on contact/droplet Isolation  -MRSA PCR negative.  -Continue ventilator support; wean as able-daily SBT during the day and place vent at night  will follow up SLP for swallowing eval if tolerating SBT.   -Continue zosyn for 7-8 days per ID (through 3/14 or 3/15)  -continue spiriva, albuterol   -Prednisone 20mg for 5 days -secondary to aspiration pneumonia, trach dependent at nursing home (15L trach collar)  -presented to ED 3/7 from nursing home for respiratory distress  -Blood Culture result negative from 3/7  -Sputum cx grew Acinobacter, pseudomonas (Carbapenem resistant), on contact/droplet Isolation  -MRSA PCR negative.  -Continue ventilator weaning; will use Spont mode PS 10/5 at night; trach collar during the day  will follow up SLP for swallowing eval if tolerating trach collar trials during day   -Continue zosyn for 7-8 days per ID (through 3/14 or 3/15)  -continue spiriva, albuterol   -Prednisone 20mg for 5 days

## 2022-03-13 NOTE — PROGRESS NOTE ADULT - ASSESSMENT
76 y.o. male with PMH Afib s/p Watchman device on ASA, prior gastric sleeve and history of R occipital hemorrhage (dx 2020) stable on imaging (11/19/2021). Patient was traveling in [facility] on 11/26/21 when he fell from a standing height and struck his head on the floor then presented to [facility] hospital in [facility] with a GCS of 3 found to have R subdural hematoma w/ R to L subflacine hernation s/p emergent R hemicraniectomy. Course notable for ventilator dependent respiratory failure s/p tracheostomy (12/9/21), CDfiff colitis (completed PO vanco), non convulsive seizures controlled with Keppra. Following 6 week ICU course in [facility] was repatriated on 1/8/22 and admitted to [facility] ([facility] course notable for initiation of amantadine, successful wean from ventilator to trach collar, vEEG (1/8-1/9) notable for mild background slowing and right hemispheric high amplitude slowing and breach artifact; there were no seizures captured, s/p PEG (1/13). 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 Recent course notable for new fever due to R parotitis vs PNA for which he was started on zosyn. Nursing home course notable for increased yellow sputum production, respiratory distress for which patient was brought to ER (3/7). ER course notable for continuation of zosyn, initiating vancomycin with cultures pending, ID following and respiratory distress managed with ventilator support.     Patient is admitted to   for further management of acute on chronic hypoxic respiratory failure and sepsis.  on vent support. FIO2 40%.   started on Zosyn and ID on board.  3/9 failed SBT with tachyneic, hypoxic, placed back on A/C mode.  3/10 failed SBT, on A/C mode.   3/11 attempt SBT, PS 10 today, repeat CXR  3/12- Pt tolerated SBT 10/5 w/ 40% and is now tolerating trach collar.

## 2022-03-13 NOTE — PROGRESS NOTE ADULT - PROBLEM SELECTOR PLAN 11
-hypokalemia in setting of diuresis; replete PRN to maintain K >4  -Hypernatremia -> continue free water 250ml Q6h via peg -> Na improving/stable  -nutritionist following  -monitor electrolytes

## 2022-03-13 NOTE — CHART NOTE - NSCHARTNOTEFT_GEN_A_CORE
Patient's cousin/ HCP Dr. Cora Moran updated at the bedside regarding the patient's current clinical status and the plan of care. She had no further questions at the end of our discussion. She is looking to speak with case management re: requirements at rehab to see if the patient can be discharged directly back to rehab from the hospital.

## 2022-03-13 NOTE — PROGRESS NOTE ADULT - PROBLEM SELECTOR PLAN 10
-TF dependent via PEG   -Nutrition consult pending  -continue vitamin supplements given at nursing home  - continue bowel regimen w/ senna, lactulose

## 2022-03-14 LAB
ANION GAP SERPL CALC-SCNC: 3 MMOL/L — LOW (ref 5–17)
BUN SERPL-MCNC: 30 MG/DL — HIGH (ref 7–18)
CALCIUM SERPL-MCNC: 8.3 MG/DL — LOW (ref 8.4–10.5)
CHLORIDE SERPL-SCNC: 110 MMOL/L — HIGH (ref 96–108)
CO2 SERPL-SCNC: 33 MMOL/L — HIGH (ref 22–31)
CREAT SERPL-MCNC: 0.94 MG/DL — SIGNIFICANT CHANGE UP (ref 0.5–1.3)
EGFR: 85 ML/MIN/1.73M2 — SIGNIFICANT CHANGE UP
GLUCOSE BLDC GLUCOMTR-MCNC: 133 MG/DL — HIGH (ref 70–99)
GLUCOSE BLDC GLUCOMTR-MCNC: 138 MG/DL — HIGH (ref 70–99)
GLUCOSE BLDC GLUCOMTR-MCNC: 156 MG/DL — HIGH (ref 70–99)
GLUCOSE BLDC GLUCOMTR-MCNC: 168 MG/DL — HIGH (ref 70–99)
GLUCOSE BLDC GLUCOMTR-MCNC: 170 MG/DL — HIGH (ref 70–99)
GLUCOSE SERPL-MCNC: 164 MG/DL — HIGH (ref 70–99)
HCT VFR BLD CALC: 29.3 % — LOW (ref 39–50)
HGB BLD-MCNC: 8.9 G/DL — LOW (ref 13–17)
MAGNESIUM SERPL-MCNC: 2.5 MG/DL — SIGNIFICANT CHANGE UP (ref 1.6–2.6)
MCHC RBC-ENTMCNC: 30.4 GM/DL — LOW (ref 32–36)
MCHC RBC-ENTMCNC: 32.6 PG — SIGNIFICANT CHANGE UP (ref 27–34)
MCV RBC AUTO: 107.3 FL — HIGH (ref 80–100)
NRBC # BLD: 0 /100 WBCS — SIGNIFICANT CHANGE UP (ref 0–0)
NT-PROBNP SERPL-SCNC: 6716 PG/ML — HIGH (ref 0–450)
PHOSPHATE SERPL-MCNC: 3.3 MG/DL — SIGNIFICANT CHANGE UP (ref 2.5–4.5)
PLATELET # BLD AUTO: 281 K/UL — SIGNIFICANT CHANGE UP (ref 150–400)
POTASSIUM SERPL-MCNC: 3.9 MMOL/L — SIGNIFICANT CHANGE UP (ref 3.5–5.3)
POTASSIUM SERPL-SCNC: 3.9 MMOL/L — SIGNIFICANT CHANGE UP (ref 3.5–5.3)
RBC # BLD: 2.73 M/UL — LOW (ref 4.2–5.8)
RBC # FLD: 19.3 % — HIGH (ref 10.3–14.5)
SARS-COV-2 RNA SPEC QL NAA+PROBE: SIGNIFICANT CHANGE UP
SODIUM SERPL-SCNC: 146 MMOL/L — HIGH (ref 135–145)
WBC # BLD: 8.46 K/UL — SIGNIFICANT CHANGE UP (ref 3.8–10.5)
WBC # FLD AUTO: 8.46 K/UL — SIGNIFICANT CHANGE UP (ref 3.8–10.5)

## 2022-03-14 RX ORDER — METOPROLOL TARTRATE 50 MG
5 TABLET ORAL ONCE
Refills: 0 | Status: COMPLETED | OUTPATIENT
Start: 2022-03-14 | End: 2022-03-14

## 2022-03-14 RX ADMIN — LACTULOSE 6.67 GRAM(S): 10 SOLUTION ORAL at 17:40

## 2022-03-14 RX ADMIN — ALBUTEROL 2 PUFF(S): 90 AEROSOL, METERED ORAL at 19:21

## 2022-03-14 RX ADMIN — ALBUTEROL 2 PUFF(S): 90 AEROSOL, METERED ORAL at 17:40

## 2022-03-14 RX ADMIN — SENNA PLUS 10 MILLILITER(S): 8.6 TABLET ORAL at 22:15

## 2022-03-14 RX ADMIN — TAMSULOSIN HYDROCHLORIDE 0.4 MILLIGRAM(S): 0.4 CAPSULE ORAL at 22:14

## 2022-03-14 RX ADMIN — Medication 100 MILLIGRAM(S): at 05:02

## 2022-03-14 RX ADMIN — SCOPALAMINE 1 PATCH: 1 PATCH, EXTENDED RELEASE TRANSDERMAL at 07:49

## 2022-03-14 RX ADMIN — Medication 100 MILLIGRAM(S): at 17:40

## 2022-03-14 RX ADMIN — Medication 40 MILLIGRAM(S): at 05:02

## 2022-03-14 RX ADMIN — ENOXAPARIN SODIUM 40 MILLIGRAM(S): 100 INJECTION SUBCUTANEOUS at 14:46

## 2022-03-14 RX ADMIN — AMIODARONE HYDROCHLORIDE 200 MILLIGRAM(S): 400 TABLET ORAL at 05:02

## 2022-03-14 RX ADMIN — ALBUTEROL 2 PUFF(S): 90 AEROSOL, METERED ORAL at 03:11

## 2022-03-14 RX ADMIN — LEVETIRACETAM 1000 MILLIGRAM(S): 250 TABLET, FILM COATED ORAL at 05:02

## 2022-03-14 RX ADMIN — Medication 1 MILLIGRAM(S): at 12:09

## 2022-03-14 RX ADMIN — Medication 1 TABLET(S): at 12:09

## 2022-03-14 RX ADMIN — CARVEDILOL PHOSPHATE 6.25 MILLIGRAM(S): 80 CAPSULE, EXTENDED RELEASE ORAL at 17:41

## 2022-03-14 RX ADMIN — LEVETIRACETAM 1000 MILLIGRAM(S): 250 TABLET, FILM COATED ORAL at 17:41

## 2022-03-14 RX ADMIN — CHLORHEXIDINE GLUCONATE 15 MILLILITER(S): 213 SOLUTION TOPICAL at 17:42

## 2022-03-14 RX ADMIN — ALBUTEROL 2 PUFF(S): 90 AEROSOL, METERED ORAL at 09:23

## 2022-03-14 RX ADMIN — CHLORHEXIDINE GLUCONATE 1 APPLICATION(S): 213 SOLUTION TOPICAL at 05:03

## 2022-03-14 RX ADMIN — Medication 500 MILLIGRAM(S): at 12:52

## 2022-03-14 RX ADMIN — CARVEDILOL PHOSPHATE 6.25 MILLIGRAM(S): 80 CAPSULE, EXTENDED RELEASE ORAL at 05:03

## 2022-03-14 RX ADMIN — Medication 5 MILLIGRAM(S): at 02:58

## 2022-03-14 RX ADMIN — LACTULOSE 6.67 GRAM(S): 10 SOLUTION ORAL at 05:03

## 2022-03-14 RX ADMIN — CHLORHEXIDINE GLUCONATE 15 MILLILITER(S): 213 SOLUTION TOPICAL at 05:04

## 2022-03-14 NOTE — SPEAKING VALVE EVALUATION - REMOVE VALVE FOR
SpO2 < 92%/Increase RR (1.5 x baseline)/Increased HR (1.5 x baseline)/Increased work of breathing/Evidence of air trapping/Excessive coughing/Subjective complaints

## 2022-03-14 NOTE — SPEAKING VALVE EVALUATION - RECOMMENDATIONS
Pt is a candidate to wear PMV for 10 minutes throughout the day: may slowly increase durations of time (10, 15, 20 mins, etc) if pt without s/s of distress, in order to restore airway patency to oral and nasal cavities, and improve communication and voice production.

## 2022-03-14 NOTE — SPEAKING VALVE EVALUATION - RECOMMENDED SPEAKING VALVE GUIDELINES
Placement of speaking valve as tolerated/During waking hours only/Suction prior to placement/Cuff fully deflated

## 2022-03-14 NOTE — PROGRESS NOTE ADULT - ASSESSMENT
Pneumonia  Fevers - resolved  Leukocytosis - normalized      Plan - Completed 7 day course of Zosyn                 Pneumonia  Fevers - resolved  Leukocytosis - normalized      Plan - Completed 7 day course of Zosyn, no need for further antibiotics  Will monitor off antibiotics                 Pneumonia  Fevers - resolved  Leukocytosis - normalized      Plan - Completed 7 day course of Zosyn, no need for further antibiotics  Will monitor off antibiotics    I agree with above

## 2022-03-14 NOTE — PROGRESS NOTE ADULT - PROBLEM SELECTOR PLAN 9
Wound care specialist consulted, c/w local wound tx as rec.   -There is a Stage 1 Pressure Injury to the Bilateral Heels, as evident by non-blanchable erythema  -There is a Stage 3 Pressure Injury to the Sacrum with slough (15%), pink tissue, and red tissue  Recommendation:  -Clean all wounds with normal saline and apply skin prep to the surrounding skin  -Apply Calcium Alginate (Aquacel) to the Sacral wound and cover with a Foam dressing Q 72hrs PRN  -Elevate/float the patient's heels using heel protectors and reposition Q 2hrs using wedges or pillows  - d/c collagenase per wound care recs

## 2022-03-14 NOTE — SPEAKING VALVE EVALUATION - COMMENTS
*(continuation): [facility] course notable for initiation of amantadine, successful wean from ventilator to trach collar, vEEG (1/8-1/9) notable for mild background slowing and right hemispheric high amplitude slowing and breach artifact; there were no seizures captured, s/p PEG (1/13). 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 Recent course notable for new fever due to R parotitis vs PNA for which he was started on zosyn. Nursing home course notable for increased yellow sputum production, respiratory distress for which patient was brought to ER (3/7). ER course notable for continuation of zosyn, initiating vancomycin with cultures pending, ID following and respiratory distress managed with ventilator support.    Pt AA+Ox2, HOB elevated to 45°, partially responsive to queries, able to follow simple commands and repeat.

## 2022-03-14 NOTE — SPEAKING VALVE EVALUATION - SLP PERTINENT HISTORY OF CURRENT PROBLEM
76 y.o. male with PMH Afib s/p Watchman device on ASA, prior gastric sleeve and history of R occipital hemorrhage (dx 2020) stable on imaging (11/19/2021). Patient was traveling in [facility] on 11/26/21 when he fell from a standing height and struck his head on the floor then presented to private hospital in [facility] with a GCS of 3 found to have R subdural hematoma w/ R to L subflacine hernation s/p emergent R hemicraniectomy. Course notable for ventilator dependent respiratory failure s/p tracheostomy (12/9/21), CDfiff colitis (completed PO vanco), non convulsive seizures controlled with Keppra. Following 6 week ICU course in [facility] was repatriated on 1/8/22 and admitted to [facility] ([facility]). (continues below)*

## 2022-03-14 NOTE — CHART NOTE - NSCHARTNOTEFT_GEN_A_CORE
EVENT: Elevated BP, see VS flow sheet    BRIEF HPI: 76 y.o. male with PMH Afib s/p Watchman device on ASA, prior gastric sleeve and history of R occipital hemorrhage (dx 2020) stable on imaging (11/19/2021). Patient was traveling in [facility] on 11/26/21 when he fell from a standing height and struck his head on the floor then presented to [facility] hospital in [facility] with a GCS of 3 found to have R subdural hematoma w/ R to L subflacine hernation s/p emergent R hemicraniectomy  Patient is admitted to   for further management of acute on chronic hypoxic respiratory failure and sepsis.  on vent support. FIO2 40%.   started on Zosyn and ID on board.  3/9 failed SBT with tachyneic, hypoxic, placed back on A/C mode.  3/10 failed SBT, on A/C mode.   3/11 attempt SBT, PS 10 today, repeat CXR  3/12- Pt tolerated SBT 10/5 w/ 40% and is now tolerating trach collar.      OBJECTIVE:  Vital Signs Last 24 Hrs  T(C): 37.1 (13 Mar 2022 21:11), Max: 37.1 (13 Mar 2022 21:11)  T(F): 98.8 (13 Mar 2022 21:11), Max: 98.8 (13 Mar 2022 21:11)  HR: 74 (14 Mar 2022 02:40) (74 - 93)  BP: 148/103 (14 Mar 2022 02:40) (142/94 - 172/103)  BP(mean): --  RR: 16 (14 Mar 2022 02:40) (14 - 18)  SpO2: 100% (14 Mar 2022 02:40) (98% - 100%)    FOCUSED PHYSICAL EXAM:  RESP: Trach to vent  CV: S1 S2    LABS:                        8.7    8.44  )-----------( 282      ( 13 Mar 2022 07:48 )             28.7     03-13    145  |  107  |  27<H>  ----------------------------<  201<H>  3.3<L>   |  32<H>  |  0.93    Ca    8.5      13 Mar 2022 07:48  Phos  2.9     03-12  Mg     2.4     03-12    PROBLEM: Elevated BP probably due to inadequate med dosing  PLAN:   1. Metoprolol tartrate Injectable 5 matthew GRAM(s) IV Push once  2. Cont amiodarone  Tablet 200 matthew GRAM(s) Oral daily  3. Cont carvedilol 6.25 matthew GRAM(s) Oral every 12 hours  4. Cont furosemide Solution 40 matthew GRAM(s) Oral daily    FOLLOW UP / RESULT: effectiveness of med

## 2022-03-14 NOTE — PROGRESS NOTE ADULT - SUBJECTIVE AND OBJECTIVE BOX
Time of Visit:  Patient seen and examined.     MEDICATIONS  (STANDING):  ALBUTerol    90 MICROgram(s) HFA Inhaler 2 Puff(s) Inhalation every 6 hours  amantadine Syrup 100 milliGRAM(s) Oral two times a day  aMIOdarone    Tablet 200 milliGRAM(s) Oral daily  ascorbic acid 500 milliGRAM(s) Oral daily  carvedilol 6.25 milliGRAM(s) Oral every 12 hours  chlorhexidine 0.12% Liquid 15 milliLiter(s) Oral Mucosa every 12 hours  chlorhexidine 2% Cloths 1 Application(s) Topical <User Schedule>  enoxaparin Injectable 40 milliGRAM(s) SubCutaneous every 24 hours  folic acid 1 milliGRAM(s) Oral daily  furosemide Solution 40 milliGRAM(s) Oral daily  lactulose Syrup 6.6667 Gram(s) Oral two times a day  levETIRAcetam  Solution 1000 milliGRAM(s) Oral two times a day  multivitamin 1 Tablet(s) Oral daily  senna Syrup 10 milliLiter(s) Oral at bedtime  tamsulosin 0.4 milliGRAM(s) Oral at bedtime      MEDICATIONS  (PRN):  acetaminophen    Suspension .. 650 milliGRAM(s) Enteral Tube every 6 hours PRN Temp greater or equal to 38C (100.4F), Moderate Pain (4 - 6)       Medications up to date at time of exam.      PHYSICAL EXAMINATION:    Vital Signs Last 24 Hrs  T(C): 36.8 (14 Mar 2022 05:24), Max: 37.1 (13 Mar 2022 21:11)  T(F): 98.3 (14 Mar 2022 05:24), Max: 98.8 (13 Mar 2022 21:11)  HR: 89 (14 Mar 2022 05:24) (72 - 93)  BP: 141/98 (14 Mar 2022 05:24) (137/86 - 172/103)  BP(mean): --  RR: 17 (14 Mar 2022 05:24) (14 - 18)  SpO2: 98% (14 Mar 2022 05:24) (98% - 100%)  Mode: standby   (if applicable)    General: Non verbal. On O2 via trach collar. No acute distress.      HEENT: Hx of cranioplasty. Has old Sx scar. No nasal tenderness. Has eye tracking. Mucous membranes are moist.     NECK: Has tracheostomy #6.     LUNGS: Non labored. No wheezing. No use of accessory muscle.     HEART: S1 S2 Regular rate and no click/ rub.     ABDOMEN: Soft, nontender, and nondistended.  Has Peg. Active bowel sounds .    : No bladder distention and tenderness.     NEUROLOGIC: Non verbal.      SKIN: Warm and moist. Non diaphoretic.       LABS:                        8.9    8.46  )-----------( 281      ( 14 Mar 2022 07:59 )             29.3     03-14    146<H>  |  110<H>  |  30<H>  ----------------------------<  164<H>  3.9   |  33<H>  |  0.94    Ca    8.3<L>      14 Mar 2022 07:59  Phos  3.3     03-14  Mg     2.5     03-14                  Serum Pro-Brain Natriuretic Peptide: 6716 pg/mL (03-14-22 @ 07:59)          MICROBIOLOGY: (if applicable)    RADIOLOGY & ADDITIONAL STUDIES:  EKG:   CXR:  ECHO:    IMPRESSION: 76y Male PAST MEDICAL & SURGICAL HISTORY:  Essential hypertension    Primary osteoarthritis, unspecified site    Closed fracture of left radius, initial encounter    Morbid obesity, unspecified obesity type  h/o. - s/p gastric bypass- lost 113 lbs    KAREN (obstructive sleep apnea)  h/o - was on CPAP until gastric bypass surgery    Respiratory failure    Anemia    Subdural hemorrhage    Epilepsy    H/O gastrostomy    History of BPH    Afib    S/P gastric bypass  2008    Status post total replacement of left hip  2006    S/P bunionectomy  bilateral    S/P colonoscopy  approx 2012    History of abdominoplasty  and subsequent cosmetic surgery for removal of excess skin from face     Impression: 75 Y/O Male from [facility] presented to ED with respiratory distress and hypoxia with O2 saturation 82%  from nursing home. Patient with Hx of  traveling in [facility] on 11/26/21 when he fell from a standing height and struck his head on the floor then presented to private hospital in [facility] with a GCS of 3 found to have R subdural hematoma w/ R to L subflacine herniation s/p emergent R hemicraniectomy. Course notable for ventilator dependent respiratory failure s/p tracheostomy (12/9/21), C-Diff colitis (completed PO vanco), non convulsive seizures controlled with Keppra. Following 6 week ICU course in [facility] was repatriated on 1/8/22 and admitted to [facility] ([facility] course notable for initiation of amantadine, successful wean from ventilator to trach collar, vEEG (1/8-1/9) notable for mild background slowing and right hemispheric high amplitude slowing and breach artifact; there were no seizures captured, s/p PEG (1/13). 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 Patient was stabilized and discharged to [facility] on 2/1/2022 for comprehensive inpatient rehabilitation for ADL and gait dysfunction 2/2 TBI then transferred to [facility] on 3/3/22 for skilled nursing. Recent course notable for new fever due to R parotitis vs PNA for which he was started on zosyn and completed the course of Zosyn at present time  . Admitted to  for management of Acute on chronic hypoxic respiratory failure and Sepsis . 03-14-22,03-07-22 Negative PCR for Covid 19 .     Suggestion:  O2 saturation 96% . So far tolerating O2 supplementation FIO2 35% via Trach collar.  Oral care with chlorhexidine , trach care , suction .  Maintain NPO . Aspiration precautions with HOB elevation.  Continue Albuterol 90 mcg 2 Puffs Q 6 Hours.  No need to drain Small Left Pleural Effusion. On Lasix 40 mg via Peg Daily.    DVT/ GI prophylactic.  Contact precaution due to CRE Sputum .   Completed Zosyn IVPB total for 7 days for Pneumonia.

## 2022-03-14 NOTE — SPEAKING VALVE EVALUATION - ADDITIONAL COMMENTS
After 15 minutes, PMV removed and noted signs of air trapping. New trial of 10 minutes and no signs of air trapping, discomfort, of change in vitals noted.

## 2022-03-14 NOTE — CHART NOTE - NSCHARTNOTEFT_GEN_A_CORE
Case discussed with pt's HCP Dr. Cora Moran at  and provided update regarding pt's current medical condition.  Daily labs and pt's current oxygen requirement discussed and all questions answered.

## 2022-03-14 NOTE — PROGRESS NOTE ADULT - PROBLEM SELECTOR PLAN 2
-trend WBC, fever  -procalcitonin 0.11  -MRSA PCR negative.  -Bcx, Ucx NGTD 3/7  -sputum Cx- Acinobacter, Pseudomonas, (MDRO), continue contact/droplet isolation  -Pulm dr. Bishop  -ID Dr Quach following, appreciate recommendation  -Zosyn initiated prior to [facility] hospitalization for parotitis vs PNA (CT  3/2 as above.)  -Continue zosyn for 7-8 days per ID (through 3/14 or 3/15) -trend WBC, fever  -procalcitonin 0.11  -MRSA PCR negative.  -Bcx, Ucx NGTD 3/7  -sputum Cx- Acinobacter, Pseudomonas, (MDRO), continue contact/droplet isolation  -Pulm dr. Bishop  -ID Dr Quach following, appreciate recommendation  -Zosyn initiated prior to [facility] hospitalization for parotitis vs PNA (CT  3/2 as above.)  -Completed Zosyn 3/14

## 2022-03-14 NOTE — PROGRESS NOTE ADULT - PROBLEM SELECTOR PLAN 1
-secondary to aspiration pneumonia, trach dependent at nursing home (15L trach collar)  -presented to ED 3/7 from nursing home for respiratory distress  -Blood Culture result negative from 3/7  -Sputum cx grew Acinobacter, pseudomonas (Carbapenem resistant), on contact/droplet Isolation  -MRSA PCR negative.  -Continue ventilator weaning; will use Spont mode PS 10/5 at night; trach collar during the day  will follow up SLP for swallowing eval if tolerating trach collar trials during day   -Continue zosyn for 7-8 days per ID (through 3/14 or 3/15)  -continue spiriva, albuterol   -Prednisone 20mg for 5 days -secondary to aspiration pneumonia, trach dependent at nursing home (15L trach collar)  -presented to ED 3/7 from nursing home for respiratory distress  -Blood Culture result negative from 3/7  -Sputum cx grew Acinobacter, pseudomonas (Carbapenem resistant), on contact/droplet Isolation  -MRSA PCR negative.  -Continue ventilator weaning; will use Spont mode PS 10/5 at night; trach collar during the day  will follow up SLP for swallowing eval if tolerating trach collar trials during day   -Completed zosyn on 3/14-   -continue spiriva, albuterol   -Prednisone 20mg for 5 days

## 2022-03-14 NOTE — PROGRESS NOTE ADULT - SUBJECTIVE AND OBJECTIVE BOX
76y Male is under our care for     REVIEW OF SYSTEMS:  [  ] Not able to elicit  General:	  Chest:	  GI:	  :  Skin:	  Musculoskeletal:	  Neuro:	    MEDS:    ALLERGIES: Allergies    No Known Allergies    Intolerances        VITALS:  Vital Signs Last 24 Hrs  T(C): 36.8 (14 Mar 2022 05:24), Max: 37.1 (13 Mar 2022 21:11)  T(F): 98.3 (14 Mar 2022 05:24), Max: 98.8 (13 Mar 2022 21:11)  HR: 89 (14 Mar 2022 05:24) (72 - 93)  BP: 141/98 (14 Mar 2022 05:24) (137/86 - 172/103)  BP(mean): --  RR: 17 (14 Mar 2022 05:24) (14 - 18)  SpO2: 98% (14 Mar 2022 05:24) (98% - 100%)      PHYSICAL EXAM:  HEENT:  Neck:  Respiratory:  Cardiovascular:  Gastrointestinal:  Extremities:  Skin:  Ortho:  Neuro:    LABS/DIAGNOSTIC TESTS:                        8.9    8.46  )-----------( 281      ( 14 Mar 2022 07:59 )             29.3     WBC Count: 8.46 K/uL (03-14 @ 07:59)  WBC Count: 8.44 K/uL (03-13 @ 07:48)  WBC Count: 7.70 K/uL (03-12 @ 08:05)  WBC Count: 6.68 K/uL (03-11 @ 07:12)  WBC Count: 8.51 K/uL (03-10 @ 06:44)    03-14    146<H>  |  110<H>  |  30<H>  ----------------------------<  164<H>  3.9   |  33<H>  |  0.94    Ca    8.3<L>      14 Mar 2022 07:59  Phos  3.3     03-14  Mg     2.5     03-14        CULTURES:   .Sputum Sputum  03-08 @ 18:33   Numerous Acinetobacter baumannii/nosocom group (Carbapenem Resistant)  Numerous Pseudomonas aeruginosa  Normal Respiratory Cheryl absent  --  Acinetobacter baumannii/nosocom group (Carbapenem Resistant)  Pseudomonas aeruginosa      .Blood Blood-Peripheral  03-07 @ 10:18   No Growth Final  --  --      Clean Catch Clean Catch (Midstream)  03-07 @ 08:41   No growth  --  --        RADIOLOGY:  no new studies 76y Male is under our care for pneumonia.  Patient was seen in good spirits laying comfortably in bed with no acute distress.  He denies any pain or discomfort at this time, remains afebrile and completed course of Zosyn.    REVIEW OF SYSTEMS:  [  ] Not able to elicit      MEDS:    ALLERGIES: Allergies    No Known Allergies    Intolerances        VITALS:  Vital Signs Last 24 Hrs  T(C): 36.8 (14 Mar 2022 05:24), Max: 37.1 (13 Mar 2022 21:11)  T(F): 98.3 (14 Mar 2022 05:24), Max: 98.8 (13 Mar 2022 21:11)  HR: 89 (14 Mar 2022 05:24) (72 - 93)  BP: 141/98 (14 Mar 2022 05:24) (137/86 - 172/103)  BP(mean): --  RR: 17 (14 Mar 2022 05:24) (14 - 18)  SpO2: 98% (14 Mar 2022 05:24) (98% - 100%)      PHYSICAL EXAM:  HEENT:  Neck:  Respiratory:  Cardiovascular:  Gastrointestinal:  Extremities:  Skin:  Ortho:  Neuro:    LABS/DIAGNOSTIC TESTS:                        8.9    8.46  )-----------( 281      ( 14 Mar 2022 07:59 )             29.3     WBC Count: 8.46 K/uL (03-14 @ 07:59)  WBC Count: 8.44 K/uL (03-13 @ 07:48)  WBC Count: 7.70 K/uL (03-12 @ 08:05)  WBC Count: 6.68 K/uL (03-11 @ 07:12)  WBC Count: 8.51 K/uL (03-10 @ 06:44)    03-14    146<H>  |  110<H>  |  30<H>  ----------------------------<  164<H>  3.9   |  33<H>  |  0.94    Ca    8.3<L>      14 Mar 2022 07:59  Phos  3.3     03-14  Mg     2.5     03-14        CULTURES:   .Sputum Sputum  03-08 @ 18:33   Numerous Acinetobacter baumannii/nosocom group (Carbapenem Resistant)  Numerous Pseudomonas aeruginosa  Normal Respiratory Cheryl absent  --  Acinetobacter baumannii/nosocom group (Carbapenem Resistant)  Pseudomonas aeruginosa      .Blood Blood-Peripheral  03-07 @ 10:18   No Growth Final  --  --      Clean Catch Clean Catch (Midstream)  03-07 @ 08:41   No growth  --  --        RADIOLOGY:  no new studies 76y Male is under our care for pneumonia.  Patient was seen in good spirits laying comfortably in bed with no acute distress.  He denies any pain or discomfort at this time, remains afebrile and completed course of Zosyn.    REVIEW OF SYSTEMS:  [  ] Not able to elicit  General: no fevers no malaise  Chest: no cough no sob  GI: no nvd  : no urinary sxs   Skin: no rashes  Musculoskeletal: no trauma no LBP  Neuro: no ha's no dizziness     MEDS:    ALLERGIES: Allergies    No Known Allergies    Intolerances        VITALS:  Vital Signs Last 24 Hrs  T(C): 36.8 (14 Mar 2022 05:24), Max: 37.1 (13 Mar 2022 21:11)  T(F): 98.3 (14 Mar 2022 05:24), Max: 98.8 (13 Mar 2022 21:11)  HR: 89 (14 Mar 2022 05:24) (72 - 93)  BP: 141/98 (14 Mar 2022 05:24) (137/86 - 172/103)  BP(mean): --  RR: 17 (14 Mar 2022 05:24) (14 - 18)  SpO2: 98% (14 Mar 2022 05:24) (98% - 100%)      PHYSICAL EXAM:  HEENT: trach +  Neck: supple no LN's   Respiratory: lung sounds clear no rales  Cardiovascular: S1 S2 reg no murmurs  Gastrointestinal: +BS with soft, nondistended abdomen; nontender, peg tube in place  Extremities: no edema  Skin: no rashes  Ortho: n/a  Neuro: Awake and alert    LABS/DIAGNOSTIC TESTS:                        8.9    8.46  )-----------( 281      ( 14 Mar 2022 07:59 )             29.3     WBC Count: 8.46 K/uL (03-14 @ 07:59)  WBC Count: 8.44 K/uL (03-13 @ 07:48)  WBC Count: 7.70 K/uL (03-12 @ 08:05)  WBC Count: 6.68 K/uL (03-11 @ 07:12)  WBC Count: 8.51 K/uL (03-10 @ 06:44)    03-14    146<H>  |  110<H>  |  30<H>  ----------------------------<  164<H>  3.9   |  33<H>  |  0.94    Ca    8.3<L>      14 Mar 2022 07:59  Phos  3.3     03-14  Mg     2.5     03-14        CULTURES:   .Sputum Sputum  03-08 @ 18:33   Numerous Acinetobacter baumannii/nosocom group (Carbapenem Resistant)  Numerous Pseudomonas aeruginosa  Normal Respiratory Cheryl absent  --  Acinetobacter baumannii/nosocom group (Carbapenem Resistant)  Pseudomonas aeruginosa      .Blood Blood-Peripheral  03-07 @ 10:18   No Growth Final  --  --      Clean Catch Clean Catch (Midstream)  03-07 @ 08:41   No growth  --  --        RADIOLOGY:  no new studies

## 2022-03-14 NOTE — PROGRESS NOTE ADULT - ASSESSMENT
76 y.o. male with PMH Afib s/p Watchman device on ASA, prior gastric sleeve and history of R occipital hemorrhage (dx 2020) stable on imaging (11/19/2021). Patient was traveling in [facility] on 11/26/21 when he fell from a standing height and struck his head on the floor then presented to [facility] hospital in [facility] with a GCS of 3 found to have R subdural hematoma w/ R to L subflacine hernation s/p emergent R hemicraniectomy. Course notable for ventilator dependent respiratory failure s/p tracheostomy (12/9/21), CDfiff colitis (completed PO vanco), non convulsive seizures controlled with Keppra. Following 6 week ICU course in [facility] was repatriated on 1/8/22 and admitted to [facility] ([facility] course notable for initiation of amantadine, successful wean from ventilator to trach collar, vEEG (1/8-1/9) notable for mild background slowing and right hemispheric high amplitude slowing and breach artifact; there were no seizures captured, s/p PEG (1/13). 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 Patient was stabilized and discharged to [facility] on 2/1/2022 for comprehensive inpatient rehabilitation for ADL and gait dysfunction 2/2 TBI then transferred to [facility] on 3/3/22 for skilled nursing. Recent course notable for new fever due to R parotitis vs PNA for which he was started on zosyn. Nursing home course notable for increased yellow sputum production, respiratory distress for which patient was brought to ER (3/7). ER course notable for continuation of zosyn, initiating vancomycin with cultures pending, ID following and respiratory distress managed with ventilator support.     Patient is admitted to   for further management of acute on chronic hypoxic respiratory failure and sepsis.  on vent support. FIO2 40%.   started on Zosyn and ID on board.  3/9 failed SBT with tachyneic, hypoxic, placed back on A/C mode.  3/10 failed SBT, on A/C mode.   3/11 attempt SBT, PS 10 today, repeat CXR  3/12- Pt tolerated SBT 10/5 w/ 40% and is now tolerating trach collar.     76 y.o. male with PMH Afib s/p Watchman device on ASA, prior gastric sleeve and history of R occipital hemorrhage (dx 2020) stable on imaging (11/19/2021). Patient was traveling in [facility] on 11/26/21 when he fell from a standing height and struck his head on the floor then presented to [facility] hospital in [facility] with a GCS of 3 found to have R subdural hematoma w/ R to L subflacine hernation s/p emergent R hemicraniectomy. Course notable for ventilator dependent respiratory failure s/p tracheostomy (12/9/21), CDfiff colitis (completed PO vanco), non convulsive seizures controlled with Keppra. Following 6 week ICU course in [facility] was repatriated on 1/8/22 and admitted to [facility] ([facility] course notable for initiation of amantadine, successful wean from ventilator to trach collar, vEEG (1/8-1/9) notable for mild background slowing and right hemispheric high amplitude slowing and breach artifact; there were no seizures captured, s/p PEG (1/13). 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 Patient was stabilized and discharged to [facility] on 2/1/2022 for comprehensive inpatient rehabilitation for ADL and gait dysfunction 2/2 TBI then transferred to [facility] on 3/3/22 for skilled nursing. Recent course notable for new fever due to R parotitis vs PNA for which he was started on zosyn. Nursing home course notable for increased yellow sputum production, respiratory distress for which patient was brought to ER (3/7). ER course notable for continuation of zosyn, initiating vancomycin with cultures pending, ID following and respiratory distress managed with ventilator support.     Patient is admitted to   for further management of acute on chronic hypoxic respiratory failure and sepsis.  on vent support. FIO2 40%.   started on Zosyn and ID on board.  3/9 failed SBT with tachyneic, hypoxic, placed back on A/C mode.  3/10 failed SBT, on A/C mode.   3/11 attempt SBT, PS 10 today, repeat CXR  3/12- Pt tolerated SBT 10/5 w/ 40% and is now tolerating trach collar.    3/14- Medically stable for D/C, awaiting bed.  COVID PCR 3/14 neg.

## 2022-03-14 NOTE — SPEAKING VALVE EVALUATION - DIAGNOSTIC IMPRESSIONS
Patient seen for passy-adele speaking valve evaluation. Patient on trach collar. Low pressure valve placed on the hub of the trach.  Patient was dysphonic. Vocal quality hoarse/breathy/hyphonic. Patient tolerated valve for 10 minutes. VSS stable throughout assessment. No signs of respiratoy distress. No increased work of breathing. No subjective complaints. Mild signs of air trapping. Valve removed after 10 minutes. Manual left at bedside.

## 2022-03-14 NOTE — PROGRESS NOTE ADULT - SUBJECTIVE AND OBJECTIVE BOX
NAYANA CLAROS    SCU progress note    INTERVAL HPI/OVERNIGHT EVENTS: ***    DNR [ ]   DNI  [  ]    Covid - 19 PCR:     [facility] 4Ms    What Matters Most: see GOC  Age appropriate Medications/Screen for High Risk Medication: Yes  Mentation: see CAM below  Mobility: defer to physical exam    The Confusion Assessment Method (CAM) Diagnostic Algorithm     1: Acute Onset or Fluctuating Course  - Is there evidence of an acute change in mental status from the patient’s baseline? Did the (abnormal) behavior  fluctuate during the day, that is, tend to come and go, or increase and decrease in severity?  [ ] YES [ ] NO     2: Inattention  - Did the patient have difficulty focusing attention, being easily distractible, or having difficulty keeping track of what was being said?  [ ] YES [ ] NO     3: Disorganized thinking  -Was the patient’s thinking disorganized or incoherent, such as rambling or irrelevant conversation, unclear or illogical flow of ideas, or unpredictable switching from subject to subject?  [ ] YES [ ] NO    4: Altered Level of consciousness?  [ ] YES [ ] NO    The diagnosis of delirium by CAM requires the presence of features 1 and 2 and either 3 or 4.    PRESSORS: [ ] YES [ ] NO    Cardiovascular:  Heart Failure  Acute   Acute on Chronic  Chronic       aMIOdarone    Tablet 200 milliGRAM(s) Oral daily  carvedilol 6.25 milliGRAM(s) Oral every 12 hours  furosemide Solution 40 milliGRAM(s) Oral daily  tamsulosin 0.4 milliGRAM(s) Oral at bedtime    Pulmonary:  ALBUTerol    90 MICROgram(s) HFA Inhaler 2 Puff(s) Inhalation every 6 hours    Hematalogic:  enoxaparin Injectable 40 milliGRAM(s) SubCutaneous every 24 hours    Other:  acetaminophen    Suspension .. 650 milliGRAM(s) Enteral Tube every 6 hours PRN  amantadine Syrup 100 milliGRAM(s) Oral two times a day  ascorbic acid 500 milliGRAM(s) Oral daily  chlorhexidine 0.12% Liquid 15 milliLiter(s) Oral Mucosa every 12 hours  chlorhexidine 2% Cloths 1 Application(s) Topical <User Schedule>  folic acid 1 milliGRAM(s) Oral daily  lactulose Syrup 6.6667 Gram(s) Oral two times a day  levETIRAcetam  Solution 1000 milliGRAM(s) Oral two times a day  multivitamin 1 Tablet(s) Oral daily  senna Syrup 10 milliLiter(s) Oral at bedtime    acetaminophen    Suspension .. 650 milliGRAM(s) Enteral Tube every 6 hours PRN  ALBUTerol    90 MICROgram(s) HFA Inhaler 2 Puff(s) Inhalation every 6 hours  amantadine Syrup 100 milliGRAM(s) Oral two times a day  aMIOdarone    Tablet 200 milliGRAM(s) Oral daily  ascorbic acid 500 milliGRAM(s) Oral daily  carvedilol 6.25 milliGRAM(s) Oral every 12 hours  chlorhexidine 0.12% Liquid 15 milliLiter(s) Oral Mucosa every 12 hours  chlorhexidine 2% Cloths 1 Application(s) Topical <User Schedule>  enoxaparin Injectable 40 milliGRAM(s) SubCutaneous every 24 hours  folic acid 1 milliGRAM(s) Oral daily  furosemide Solution 40 milliGRAM(s) Oral daily  lactulose Syrup 6.6667 Gram(s) Oral two times a day  levETIRAcetam  Solution 1000 milliGRAM(s) Oral two times a day  multivitamin 1 Tablet(s) Oral daily  senna Syrup 10 milliLiter(s) Oral at bedtime  tamsulosin 0.4 milliGRAM(s) Oral at bedtime    Drug Dosing Weight  Height (cm): 172.7 (07 Mar 2022 01:15)  Weight (kg): 82.2 (08 Mar 2022 05:03)  BMI (kg/m2): 27.6 (08 Mar 2022 05:03)  BSA (m2): 1.96 (08 Mar 2022 05:03)    CENTRAL LINE: [ ] YES [ ] NO  LOCATION:   DATE INSERTED:  REMOVE: [ ] YES [ ] NO  EXPLAIN:    OMKAR: [ ] YES [ ] NO    DATE INSERTED:  REMOVE:  [ ] YES [ ] NO  EXPLAIN:    PAST MEDICAL & SURGICAL HISTORY:  Essential hypertension    Primary osteoarthritis, unspecified site    Closed fracture of left radius, initial encounter    Morbid obesity, unspecified obesity type  h/o. - s/p gastric bypass- lost 113 lbs    KAREN (obstructive sleep apnea)  h/o - was on CPAP until gastric bypass surgery    Respiratory failure    Anemia    Subdural hemorrhage    Epilepsy    H/O gastrostomy    History of BPH    Afib    S/P gastric bypass  2008    Status post total replacement of left hip  2006    S/P bunionectomy  bilateral    S/P colonoscopy  approx 2012    History of abdominoplasty  and subsequent cosmetic surgery for removal of excess skin from face                03-13 @ 07:01  -  03-14 @ 07:00  --------------------------------------------------------  IN: 1100 mL / OUT: 0 mL / NET: 1100 mL        Mode: PS (Pressure Support)/ Spontaneous  FiO2: 40  PEEP: 5  ITime: 1  MAP: 9  PIP: 16      PHYSICAL EXAM:    GENERAL: NAD, well-groomed, well-developed  HEAD:  Atraumatic, Normocephalic  EYES: EOMI, PERRLA, conjunctiva and sclera clear  ENMT: No tonsillar erythema, exudates, or enlargement; Moist mucous membranes, Good dentition, No lesions  NECK: Supple, No JVD, Normal thyroid  NERVOUS SYSTEM:  Alert & Oriented X3, Good concentration; Motor Strength 5/5 B/L upper and lower extremities; DTRs 2+ intact and symmetric  CHEST/LUNG: Clear to percussion bilaterally; No rales, rhonchi, wheezing, or rubs  HEART: Regular rate and rhythm; No murmurs, rubs, or gallops  ABDOMEN: Soft, Nontender, Nondistended; Bowel sounds present  EXTREMITIES:  2+ Peripheral Pulses, No clubbing, cyanosis, or edema  LYMPH: No lymphadenopathy noted  SKIN: No rashes or lesions      LABS:  CBC Full  -  ( 14 Mar 2022 07:59 )  WBC Count : 8.46 K/uL  RBC Count : 2.73 M/uL  Hemoglobin : 8.9 g/dL  Hematocrit : 29.3 %  Platelet Count - Automated : 281 K/uL  Mean Cell Volume : 107.3 fl  Mean Cell Hemoglobin : 32.6 pg  Mean Cell Hemoglobin Concentration : 30.4 gm/dL  Auto Neutrophil # : x  Auto Lymphocyte # : x  Auto Monocyte # : x  Auto Eosinophil # : x  Auto Basophil # : x  Auto Neutrophil % : x  Auto Lymphocyte % : x  Auto Monocyte % : x  Auto Eosinophil % : x  Auto Basophil % : x    03-14    146<H>  |  110<H>  |  30<H>  ----------------------------<  164<H>  3.9   |  33<H>  |  0.94    Ca    8.3<L>      14 Mar 2022 07:59  Phos  3.3     03-14  Mg     2.5     03-14                [  ]  DVT Prophylaxis  [  ]  Nutrition, Brand, Rate         Goal Rate        Abnormal Nutritional Status -  Malnutrition   Cachexia      Morbid Obesity BMI >/=40    RADIOLOGY & ADDITIONAL STUDIES:  ***    Goals of Care Discussion with Family/Proxy/Other   - see note from/family meeting set up for...     NAYANA CLAROS    SCU progress note    INTERVAL HPI/OVERNIGHT EVENTS: No acute events overnight.    DNR [ ]   DNI  [  ]- FULL CODE    Covid - 19 PCR: COVID-19 PCR: Jackelyn (14 Mar 2022 07:15)  COVID-19 PCR: Stanleytejuliocesar (07 Mar 2022 02:23)      [facility] 4[facility]    What Matters Most: see GOC  Age appropriate Medications/Screen for High Risk Medication: Yes  Mentation: see CAM below  Mobility: defer to physical exam    The Confusion Assessment Method (CAM) Diagnostic Algorithm     1: Acute Onset or Fluctuating Course  - Is there evidence of an acute change in mental status from the patient’s baseline? Did the (abnormal) behavior  fluctuate during the day, that is, tend to come and go, or increase and decrease in severity?  [ ] YES [x ] NO     2: Inattention  - Did the patient have difficulty focusing attention, being easily distractible, or having difficulty keeping track of what was being said?  [ ] YES [x ] NO     3: Disorganized thinking  -Was the patient’s thinking disorganized or incoherent, such as rambling or irrelevant conversation, unclear or illogical flow of ideas, or unpredictable switching from subject to subject?  [ ] YES [ ] NO- Unable to assess    4: Altered Level of consciousness?  [ ] YES [ ] NO- Unable to assess    The diagnosis of delirium by CAM requires the presence of features 1 and 2 and either 3 or 4.    PRESSORS: [ ] YES [x ] NO     Cardiovascular:  Heart Failure  Acute   Acute on Chronic  Chronic       aMIOdarone    Tablet 200 milliGRAM(s) Oral daily  carvedilol 6.25 milliGRAM(s) Oral every 12 hours  furosemide Solution 40 milliGRAM(s) Oral daily  tamsulosin 0.4 milliGRAM(s) Oral at bedtime    Pulmonary:  ALBUTerol    90 MICROgram(s) HFA Inhaler 2 Puff(s) Inhalation every 6 hours    Hematalogic:  enoxaparin Injectable 40 milliGRAM(s) SubCutaneous every 24 hours    Other:  acetaminophen    Suspension .. 650 milliGRAM(s) Enteral Tube every 6 hours PRN  amantadine Syrup 100 milliGRAM(s) Oral two times a day  ascorbic acid 500 milliGRAM(s) Oral daily  chlorhexidine 0.12% Liquid 15 milliLiter(s) Oral Mucosa every 12 hours  chlorhexidine 2% Cloths 1 Application(s) Topical <User Schedule>  folic acid 1 milliGRAM(s) Oral daily  lactulose Syrup 6.6667 Gram(s) Oral two times a day  levETIRAcetam  Solution 1000 milliGRAM(s) Oral two times a day  multivitamin 1 Tablet(s) Oral daily  senna Syrup 10 milliLiter(s) Oral at bedtime    acetaminophen    Suspension .. 650 milliGRAM(s) Enteral Tube every 6 hours PRN  ALBUTerol    90 MICROgram(s) HFA Inhaler 2 Puff(s) Inhalation every 6 hours  amantadine Syrup 100 milliGRAM(s) Oral two times a day  aMIOdarone    Tablet 200 milliGRAM(s) Oral daily  ascorbic acid 500 milliGRAM(s) Oral daily  carvedilol 6.25 milliGRAM(s) Oral every 12 hours  chlorhexidine 0.12% Liquid 15 milliLiter(s) Oral Mucosa every 12 hours  chlorhexidine 2% Cloths 1 Application(s) Topical <User Schedule>  enoxaparin Injectable 40 milliGRAM(s) SubCutaneous every 24 hours  folic acid 1 milliGRAM(s) Oral daily  furosemide Solution 40 milliGRAM(s) Oral daily  lactulose Syrup 6.6667 Gram(s) Oral two times a day  levETIRAcetam  Solution 1000 milliGRAM(s) Oral two times a day  multivitamin 1 Tablet(s) Oral daily  senna Syrup 10 milliLiter(s) Oral at bedtime  tamsulosin 0.4 milliGRAM(s) Oral at bedtime    Drug Dosing Weight  Height (cm): 172.7 (07 Mar 2022 01:15)  Weight (kg): 82.2 (08 Mar 2022 05:03)  BMI (kg/m2): 27.6 (08 Mar 2022 05:03)  BSA (m2): 1.96 (08 Mar 2022 05:03)    CENTRAL LINE: [ ] YES [x ] NO  LOCATION:   DATE INSERTED:  REMOVE: [ ] YES [ ] NO  EXPLAIN:    ESPITIA: [ ] YES [x ] NO    DATE INSERTED:  REMOVE:  [ ] YES [ ] NO  EXPLAIN:    PAST MEDICAL & SURGICAL HISTORY:  Essential hypertension    Primary osteoarthritis, unspecified site    Closed fracture of left radius, initial encounter    Morbid obesity, unspecified obesity type  h/o. - s/p gastric bypass- lost 113 lbs    KAREN (obstructive sleep apnea)  h/o - was on CPAP until gastric bypass surgery    Respiratory failure    Anemia    Subdural hemorrhage    Epilepsy    H/O gastrostomy    History of BPH    Afib    S/P gastric bypass  2008    Status post total replacement of left hip  2006    S/P bunionectomy  bilateral    S/P colonoscopy  approx 2012    History of abdominoplasty  and subsequent cosmetic surgery for removal of excess skin from face                03-13 @ 07:01  -  03-14 @ 07:00  --------------------------------------------------------  IN: 1100 mL / OUT: 0 mL / NET: 1100 mL        Mode: PS (Pressure Support)/ Spontaneous  FiO2: 40  PEEP: 5  ITime: 1  MAP: 9  PIP: 16      PHYSICAL EXAM:    GENERAL: NAD, well-groomed, well-developed  HEAD:  Atraumatic, Normocephalic  EYES: EOMI, PERRLA, conjunctiva and sclera clear  ENMT: No tonsillar erythema, exudates, or enlargement; Moist mucous membranes, Good dentition, No lesions  NECK: Supple, No JVD, Normal thyroid  NERVOUS SYSTEM:  Alert & Oriented X3, Good concentration; Motor Strength 5/5 B/L upper and lower extremities; DTRs 2+ intact and symmetric  CHEST/LUNG: Clear to percussion bilaterally; No rales, rhonchi, wheezing, or rubs  HEART: Regular rate and rhythm; No murmurs, rubs, or gallops  ABDOMEN: Soft, Nontender, Nondistended; Bowel sounds present  EXTREMITIES:  2+ Peripheral Pulses, No clubbing, cyanosis, or edema  LYMPH: No lymphadenopathy noted  SKIN: No rashes or lesions      LABS:  CBC Full  -  ( 14 Mar 2022 07:59 )  WBC Count : 8.46 K/uL  RBC Count : 2.73 M/uL  Hemoglobin : 8.9 g/dL  Hematocrit : 29.3 %  Platelet Count - Automated : 281 K/uL  Mean Cell Volume : 107.3 fl  Mean Cell Hemoglobin : 32.6 pg  Mean Cell Hemoglobin Concentration : 30.4 gm/dL  Auto Neutrophil # : x  Auto Lymphocyte # : x  Auto Monocyte # : x  Auto Eosinophil # : x  Auto Basophil # : x  Auto Neutrophil % : x  Auto Lymphocyte % : x  Auto Monocyte % : x  Auto Eosinophil % : x  Auto Basophil % : x    03-14    146<H>  |  110<H>  |  30<H>  ----------------------------<  164<H>  3.9   |  33<H>  |  0.94    Ca    8.3<L>      14 Mar 2022 07:59  Phos  3.3     03-14  Mg     2.5     03-14                [  ]  DVT Prophylaxis  [  ]  Nutrition, Brand, Rate         Goal Rate        Abnormal Nutritional Status -  Malnutrition   Cachexia      Morbid Obesity BMI >/=40    RADIOLOGY & ADDITIONAL STUDIES:  ***    Goals of Care Discussion with Family/Proxy/Other   - see note from/family meeting set up for...     NAYANA CLAROS    SCU progress note    INTERVAL HPI/OVERNIGHT EVENTS: No acute events overnight.    DNR [ ]   DNI  [  ]- FULL CODE    Covid - 19 PCR: COVID-19 PCR: Jackelyn (14 Mar 2022 07:15)  COVID-19 PCR: Stanleytejuliocesar (07 Mar 2022 02:23)      [facility] 4[facility]    What Matters Most: see GOC  Age appropriate Medications/Screen for High Risk Medication: Yes  Mentation: see CAM below  Mobility: defer to physical exam    The Confusion Assessment Method (CAM) Diagnostic Algorithm     1: Acute Onset or Fluctuating Course  - Is there evidence of an acute change in mental status from the patient’s baseline? Did the (abnormal) behavior  fluctuate during the day, that is, tend to come and go, or increase and decrease in severity?  [ ] YES [x ] NO     2: Inattention  - Did the patient have difficulty focusing attention, being easily distractible, or having difficulty keeping track of what was being said?  [ ] YES [x ] NO     3: Disorganized thinking  -Was the patient’s thinking disorganized or incoherent, such as rambling or irrelevant conversation, unclear or illogical flow of ideas, or unpredictable switching from subject to subject?  [ ] YES [ ] NO- Unable to assess    4: Altered Level of consciousness?  [ ] YES [ ] NO- Unable to assess    The diagnosis of delirium by CAM requires the presence of features 1 and 2 and either 3 or 4.    PRESSORS: [ ] YES [x ] NO     Cardiovascular:  Heart Failure  Acute   Acute on Chronic  Chronic       aMIOdarone    Tablet 200 milliGRAM(s) Oral daily  carvedilol 6.25 milliGRAM(s) Oral every 12 hours  furosemide Solution 40 milliGRAM(s) Oral daily  tamsulosin 0.4 milliGRAM(s) Oral at bedtime    Pulmonary:  ALBUTerol    90 MICROgram(s) HFA Inhaler 2 Puff(s) Inhalation every 6 hours    Hematalogic:  enoxaparin Injectable 40 milliGRAM(s) SubCutaneous every 24 hours    Other:  acetaminophen    Suspension .. 650 milliGRAM(s) Enteral Tube every 6 hours PRN  amantadine Syrup 100 milliGRAM(s) Oral two times a day  ascorbic acid 500 milliGRAM(s) Oral daily  chlorhexidine 0.12% Liquid 15 milliLiter(s) Oral Mucosa every 12 hours  chlorhexidine 2% Cloths 1 Application(s) Topical <User Schedule>  folic acid 1 milliGRAM(s) Oral daily  lactulose Syrup 6.6667 Gram(s) Oral two times a day  levETIRAcetam  Solution 1000 milliGRAM(s) Oral two times a day  multivitamin 1 Tablet(s) Oral daily  senna Syrup 10 milliLiter(s) Oral at bedtime    acetaminophen    Suspension .. 650 milliGRAM(s) Enteral Tube every 6 hours PRN  ALBUTerol    90 MICROgram(s) HFA Inhaler 2 Puff(s) Inhalation every 6 hours  amantadine Syrup 100 milliGRAM(s) Oral two times a day  aMIOdarone    Tablet 200 milliGRAM(s) Oral daily  ascorbic acid 500 milliGRAM(s) Oral daily  carvedilol 6.25 milliGRAM(s) Oral every 12 hours  chlorhexidine 0.12% Liquid 15 milliLiter(s) Oral Mucosa every 12 hours  chlorhexidine 2% Cloths 1 Application(s) Topical <User Schedule>  enoxaparin Injectable 40 milliGRAM(s) SubCutaneous every 24 hours  folic acid 1 milliGRAM(s) Oral daily  furosemide Solution 40 milliGRAM(s) Oral daily  lactulose Syrup 6.6667 Gram(s) Oral two times a day  levETIRAcetam  Solution 1000 milliGRAM(s) Oral two times a day  multivitamin 1 Tablet(s) Oral daily  senna Syrup 10 milliLiter(s) Oral at bedtime  tamsulosin 0.4 milliGRAM(s) Oral at bedtime    Drug Dosing Weight  Height (cm): 172.7 (07 Mar 2022 01:15)  Weight (kg): 82.2 (08 Mar 2022 05:03)  BMI (kg/m2): 27.6 (08 Mar 2022 05:03)  BSA (m2): 1.96 (08 Mar 2022 05:03)    CENTRAL LINE: [ ] YES [x ] NO  LOCATION:   DATE INSERTED:  REMOVE: [ ] YES [ ] NO  EXPLAIN:    ESPITIA: [ ] YES [x ] NO    DATE INSERTED:  REMOVE:  [ ] YES [ ] NO  EXPLAIN:    PAST MEDICAL & SURGICAL HISTORY:  Essential hypertension    Primary osteoarthritis, unspecified site    Closed fracture of left radius, initial encounter    Morbid obesity, unspecified obesity type  h/o. - s/p gastric bypass- lost 113 lbs    KAREN (obstructive sleep apnea)  h/o - was on CPAP until gastric bypass surgery    Respiratory failure    Anemia    Subdural hemorrhage    Epilepsy    H/O gastrostomy    History of BPH    Afib    S/P gastric bypass  2008    Status post total replacement of left hip  2006    S/P bunionectomy  bilateral    S/P colonoscopy  approx 2012    History of abdominoplasty  and subsequent cosmetic surgery for removal of excess skin from face                03-13 @ 07:01  -  03-14 @ 07:00  --------------------------------------------------------  IN: 1100 mL / OUT: 0 mL / NET: 1100 mL        Mode: PS (Pressure Support)/ Spontaneous  FiO2: 40  PEEP: 5  ITime: 1  MAP: 9  PIP: 16      PHYSICAL EXAM:    GENERAL: resting comfortably, NAD  HEAD:  old healed sx scar, sunken rt temporal  EYES: EOMI, PERRLA, conjunctiva and sclera clear  ENMT: No tonsillar erythema, exudates, or enlargement; Moist mucous membranes, Good dentition, No lesions  NECK: + trach, supple, No JVD, Normal thyroid  NERVOUS SYSTEM:  Awake and alert, response to verbal and tactile stimuli, able to squeeze fingers w/ rt hand, no  concentration; Motor Strength 5/5 B/L upper and lower extremities; DTRs 2+ intact and symmetric  CHEST/LUNG: Clear to percussion bilaterally; No rales, rhonchi, wheezing, or rubs  HEART: Regular rate and rhythm; No murmurs, rubs, or gallops  ABDOMEN: Soft, Nontender, Nondistended; Bowel sounds present  EXTREMITIES:  2+ Peripheral Pulses, No clubbing, cyanosis, or edema  LYMPH: No lymphadenopathy noted  SKIN: No rashes or lesions      LABS:  CBC Full  -  ( 14 Mar 2022 07:59 )  WBC Count : 8.46 K/uL  RBC Count : 2.73 M/uL  Hemoglobin : 8.9 g/dL  Hematocrit : 29.3 %  Platelet Count - Automated : 281 K/uL  Mean Cell Volume : 107.3 fl  Mean Cell Hemoglobin : 32.6 pg  Mean Cell Hemoglobin Concentration : 30.4 gm/dL  Auto Neutrophil # : x  Auto Lymphocyte # : x  Auto Monocyte # : x  Auto Eosinophil # : x  Auto Basophil # : x  Auto Neutrophil % : x  Auto Lymphocyte % : x  Auto Monocyte % : x  Auto Eosinophil % : x  Auto Basophil % : x    03-14    146<H>  |  110<H>  |  30<H>  ----------------------------<  164<H>  3.9   |  33<H>  |  0.94    Ca    8.3<L>      14 Mar 2022 07:59  Phos  3.3     03-14  Mg     2.5     03-14                [  ]  DVT Prophylaxis  [  ]  Nutrition, Brand, Rate         Goal Rate        Abnormal Nutritional Status -  Malnutrition   Cachexia      Morbid Obesity BMI >/=40    RADIOLOGY & ADDITIONAL STUDIES:  ***    Goals of Care Discussion with Family/Proxy/Other   - see note from/family meeting set up for...     NAYANA CLAROS    SCU progress note    INTERVAL HPI/OVERNIGHT EVENTS: No acute events overnight, completed last dose of Zosyn on 3/13.  Afebrile thus far. D/c planning ongoing. COVID PCR neg 3/14.      DNR [ ]   DNI  [  ]- FULL CODE    Covid - 19 PCR: COVID-19 PCR: NotDetec (14 Mar 2022 07:15)  COVID-19 PCR: NotDetec (07 Mar 2022 02:23)      [facility] 4Ms    What Matters Most: see GOC  Age appropriate Medications/Screen for High Risk Medication: Yes  Mentation: see CAM below  Mobility: defer to physical exam    The Confusion Assessment Method (CAM) Diagnostic Algorithm     1: Acute Onset or Fluctuating Course  - Is there evidence of an acute change in mental status from the patient’s baseline? Did the (abnormal) behavior  fluctuate during the day, that is, tend to come and go, or increase and decrease in severity?  [ ] YES [x ] NO     2: Inattention  - Did the patient have difficulty focusing attention, being easily distractible, or having difficulty keeping track of what was being said?  [ ] YES [x ] NO     3: Disorganized thinking  -Was the patient’s thinking disorganized or incoherent, such as rambling or irrelevant conversation, unclear or illogical flow of ideas, or unpredictable switching from subject to subject?  [ ] YES [ ] NO- Unable to assess    4: Altered Level of consciousness?  [ ] YES [ ] NO- Unable to assess    The diagnosis of delirium by CAM requires the presence of features 1 and 2 and either 3 or 4.    PRESSORS: [ ] YES [x ] NO     Cardiovascular:  Heart Failure  Acute   Acute on Chronic  Chronic       aMIOdarone    Tablet 200 milliGRAM(s) Oral daily  carvedilol 6.25 milliGRAM(s) Oral every 12 hours  furosemide Solution 40 milliGRAM(s) Oral daily  tamsulosin 0.4 milliGRAM(s) Oral at bedtime    Pulmonary:  ALBUTerol    90 MICROgram(s) HFA Inhaler 2 Puff(s) Inhalation every 6 hours    Hematalogic:  enoxaparin Injectable 40 milliGRAM(s) SubCutaneous every 24 hours    Other:  acetaminophen    Suspension .. 650 milliGRAM(s) Enteral Tube every 6 hours PRN  amantadine Syrup 100 milliGRAM(s) Oral two times a day  ascorbic acid 500 milliGRAM(s) Oral daily  chlorhexidine 0.12% Liquid 15 milliLiter(s) Oral Mucosa every 12 hours  chlorhexidine 2% Cloths 1 Application(s) Topical <User Schedule>  folic acid 1 milliGRAM(s) Oral daily  lactulose Syrup 6.6667 Gram(s) Oral two times a day  levETIRAcetam  Solution 1000 milliGRAM(s) Oral two times a day  multivitamin 1 Tablet(s) Oral daily  senna Syrup 10 milliLiter(s) Oral at bedtime    acetaminophen    Suspension .. 650 milliGRAM(s) Enteral Tube every 6 hours PRN  ALBUTerol    90 MICROgram(s) HFA Inhaler 2 Puff(s) Inhalation every 6 hours  amantadine Syrup 100 milliGRAM(s) Oral two times a day  aMIOdarone    Tablet 200 milliGRAM(s) Oral daily  ascorbic acid 500 milliGRAM(s) Oral daily  carvedilol 6.25 milliGRAM(s) Oral every 12 hours  chlorhexidine 0.12% Liquid 15 milliLiter(s) Oral Mucosa every 12 hours  chlorhexidine 2% Cloths 1 Application(s) Topical <User Schedule>  enoxaparin Injectable 40 milliGRAM(s) SubCutaneous every 24 hours  folic acid 1 milliGRAM(s) Oral daily  furosemide Solution 40 milliGRAM(s) Oral daily  lactulose Syrup 6.6667 Gram(s) Oral two times a day  levETIRAcetam  Solution 1000 milliGRAM(s) Oral two times a day  multivitamin 1 Tablet(s) Oral daily  senna Syrup 10 milliLiter(s) Oral at bedtime  tamsulosin 0.4 milliGRAM(s) Oral at bedtime    Drug Dosing Weight  Height (cm): 172.7 (07 Mar 2022 01:15)  Weight (kg): 82.2 (08 Mar 2022 05:03)  BMI (kg/m2): 27.6 (08 Mar 2022 05:03)  BSA (m2): 1.96 (08 Mar 2022 05:03)    CENTRAL LINE: [ ] YES [x ] NO  LOCATION:   DATE INSERTED:  REMOVE: [ ] YES [ ] NO  EXPLAIN:    ESPITIA: [ ] YES [x ] NO    DATE INSERTED:  REMOVE:  [ ] YES [ ] NO  EXPLAIN:    PAST MEDICAL & SURGICAL HISTORY:  Essential hypertension    Primary osteoarthritis, unspecified site    Closed fracture of left radius, initial encounter    Morbid obesity, unspecified obesity type  h/o. - s/p gastric bypass- lost 113 lbs    KAREN (obstructive sleep apnea)  h/o - was on CPAP until gastric bypass surgery    Respiratory failure    Anemia    Subdural hemorrhage    Epilepsy    H/O gastrostomy    History of BPH    Afib    S/P gastric bypass  2008    Status post total replacement of left hip  2006    S/P bunionectomy  bilateral    S/P colonoscopy  approx 2012    History of abdominoplasty  and subsequent cosmetic surgery for removal of excess skin from face                03-13 @ 07:01  -  03-14 @ 07:00  --------------------------------------------------------  IN: 1100 mL / OUT: 0 mL / NET: 1100 mL        Mode: PS (Pressure Support)/ Spontaneous  FiO2: 40  PEEP: 5  ITime: 1  MAP: 9  PIP: 16      PHYSICAL EXAM:    GENERAL: resting comfortably, NAD  HEAD:  old healed sx scar, sunken rt temporal  EYES: EOMI, PERRLA, conjunctiva and sclera clear  ENMT: No tonsillar erythema, exudates, or enlargement; Moist mucous membranes, Good dentition, No lesions  NECK: + trach, supple, No JVD, Normal thyroid  NERVOUS SYSTEM: Awake and alert, response to verbal and tactile stimuli, able to squeeze fingers w/ rt hand, unable to range lower extremities  CHEST/LUNG: moving air b/l and tolerating trach collar  HEART: Regular rate and rhythm; No murmurs, rubs, or gallops  ABDOMEN: peg intact, sft, nd, nt, no rebound or guarding   EXTREMITIES:  2+ Peripheral Pulses, No clubbing, cyanosis, or edema  LYMPH: No lymphadenopathy noted  SKIN: No rashes or lesions      LABS:  CBC Full  -  ( 14 Mar 2022 07:59 )  WBC Count : 8.46 K/uL  RBC Count : 2.73 M/uL  Hemoglobin : 8.9 g/dL  Hematocrit : 29.3 %  Platelet Count - Automated : 281 K/uL  Mean Cell Volume : 107.3 fl  Mean Cell Hemoglobin : 32.6 pg  Mean Cell Hemoglobin Concentration : 30.4 gm/dL  Auto Neutrophil # : x  Auto Lymphocyte # : x  Auto Monocyte # : x  Auto Eosinophil # : x  Auto Basophil # : x  Auto Neutrophil % : x  Auto Lymphocyte % : x  Auto Monocyte % : x  Auto Eosinophil % : x  Auto Basophil % : x    03-14    146<H>  |  110<H>  |  30<H>  ----------------------------<  164<H>  3.9   |  33<H>  |  0.94    Ca    8.3<L>      14 Mar 2022 07:59  Phos  3.3     03-14  Mg     2.5     03-14                [  ]  DVT Prophylaxis  [  ]  Nutrition, Brand, Rate         Goal Rate        Abnormal Nutritional Status -  Malnutrition   Cachexia      Morbid Obesity BMI >/=40    RADIOLOGY & ADDITIONAL STUDIES:  ***    Goals of Care Discussion with Family/Proxy/Other   - see note from/family meeting set up for...

## 2022-03-15 LAB
ANION GAP SERPL CALC-SCNC: 5 MMOL/L — SIGNIFICANT CHANGE UP (ref 5–17)
BUN SERPL-MCNC: 30 MG/DL — HIGH (ref 7–18)
CALCIUM SERPL-MCNC: 8.6 MG/DL — SIGNIFICANT CHANGE UP (ref 8.4–10.5)
CHLORIDE SERPL-SCNC: 108 MMOL/L — SIGNIFICANT CHANGE UP (ref 96–108)
CO2 SERPL-SCNC: 32 MMOL/L — HIGH (ref 22–31)
CREAT SERPL-MCNC: 0.91 MG/DL — SIGNIFICANT CHANGE UP (ref 0.5–1.3)
EGFR: 88 ML/MIN/1.73M2 — SIGNIFICANT CHANGE UP
GLUCOSE BLDC GLUCOMTR-MCNC: 149 MG/DL — HIGH (ref 70–99)
GLUCOSE SERPL-MCNC: 130 MG/DL — HIGH (ref 70–99)
HCT VFR BLD CALC: 29.1 % — LOW (ref 39–50)
HGB BLD-MCNC: 9 G/DL — LOW (ref 13–17)
LEVETIRACETAM SERPL-MCNC: 32 UG/ML — SIGNIFICANT CHANGE UP (ref 10–40)
MAGNESIUM SERPL-MCNC: 2.7 MG/DL — HIGH (ref 1.6–2.6)
MCHC RBC-ENTMCNC: 30.9 GM/DL — LOW (ref 32–36)
MCHC RBC-ENTMCNC: 32.5 PG — SIGNIFICANT CHANGE UP (ref 27–34)
MCV RBC AUTO: 105.1 FL — HIGH (ref 80–100)
NRBC # BLD: 0 /100 WBCS — SIGNIFICANT CHANGE UP (ref 0–0)
PHOSPHATE SERPL-MCNC: 3.4 MG/DL — SIGNIFICANT CHANGE UP (ref 2.5–4.5)
PLATELET # BLD AUTO: 272 K/UL — SIGNIFICANT CHANGE UP (ref 150–400)
POTASSIUM SERPL-MCNC: 3.7 MMOL/L — SIGNIFICANT CHANGE UP (ref 3.5–5.3)
POTASSIUM SERPL-SCNC: 3.7 MMOL/L — SIGNIFICANT CHANGE UP (ref 3.5–5.3)
RBC # BLD: 2.77 M/UL — LOW (ref 4.2–5.8)
RBC # FLD: 19.1 % — HIGH (ref 10.3–14.5)
SODIUM SERPL-SCNC: 145 MMOL/L — SIGNIFICANT CHANGE UP (ref 135–145)
WBC # BLD: 8.7 K/UL — SIGNIFICANT CHANGE UP (ref 3.8–10.5)
WBC # FLD AUTO: 8.7 K/UL — SIGNIFICANT CHANGE UP (ref 3.8–10.5)

## 2022-03-15 RX ORDER — AMIODARONE HYDROCHLORIDE 400 MG/1
1 TABLET ORAL
Qty: 0 | Refills: 0 | DISCHARGE
Start: 2022-03-15

## 2022-03-15 RX ORDER — ACETAMINOPHEN 500 MG
20.31 TABLET ORAL
Qty: 0 | Refills: 0 | DISCHARGE
Start: 2022-03-15

## 2022-03-15 RX ORDER — ASCORBIC ACID 60 MG
5 TABLET,CHEWABLE ORAL
Qty: 0 | Refills: 0 | DISCHARGE
Start: 2022-03-15

## 2022-03-15 RX ORDER — SENNA PLUS 8.6 MG/1
10 TABLET ORAL
Qty: 0 | Refills: 0 | DISCHARGE
Start: 2022-03-15

## 2022-03-15 RX ORDER — TAMSULOSIN HYDROCHLORIDE 0.4 MG/1
1 CAPSULE ORAL
Qty: 0 | Refills: 0 | DISCHARGE

## 2022-03-15 RX ORDER — FOLIC ACID 0.8 MG
1 TABLET ORAL
Qty: 0 | Refills: 0 | DISCHARGE
Start: 2022-03-15

## 2022-03-15 RX ORDER — AMIODARONE HYDROCHLORIDE 400 MG/1
2 TABLET ORAL
Qty: 0 | Refills: 0 | DISCHARGE

## 2022-03-15 RX ORDER — ASCORBIC ACID 60 MG
5 TABLET,CHEWABLE ORAL
Qty: 0 | Refills: 0 | DISCHARGE

## 2022-03-15 RX ORDER — FUROSEMIDE 40 MG
5 TABLET ORAL
Qty: 0 | Refills: 0 | DISCHARGE
Start: 2022-03-15

## 2022-03-15 RX ORDER — SENNA PLUS 8.6 MG/1
10 TABLET ORAL
Qty: 0 | Refills: 0 | DISCHARGE

## 2022-03-15 RX ORDER — LEVETIRACETAM 250 MG/1
10 TABLET, FILM COATED ORAL
Qty: 0 | Refills: 0 | DISCHARGE
Start: 2022-03-15

## 2022-03-15 RX ORDER — AMANTADINE HCL 100 MG
10 CAPSULE ORAL
Qty: 0 | Refills: 0 | DISCHARGE

## 2022-03-15 RX ORDER — CARVEDILOL PHOSPHATE 80 MG/1
1 CAPSULE, EXTENDED RELEASE ORAL
Qty: 0 | Refills: 0 | DISCHARGE
Start: 2022-03-15

## 2022-03-15 RX ORDER — PIPERACILLIN AND TAZOBACTAM 4; .5 G/20ML; G/20ML
4.5 INJECTION, POWDER, LYOPHILIZED, FOR SOLUTION INTRAVENOUS
Qty: 0 | Refills: 0 | DISCHARGE

## 2022-03-15 RX ORDER — LACTULOSE 10 G/15ML
10 SOLUTION ORAL
Qty: 0 | Refills: 0 | DISCHARGE

## 2022-03-15 RX ORDER — LACTULOSE 10 G/15ML
10 SOLUTION ORAL
Qty: 0 | Refills: 0 | DISCHARGE
Start: 2022-03-15

## 2022-03-15 RX ORDER — IPRATROPIUM/ALBUTEROL SULFATE 18-103MCG
3 AEROSOL WITH ADAPTER (GRAM) INHALATION
Qty: 0 | Refills: 0 | DISCHARGE

## 2022-03-15 RX ORDER — ASCORBIC ACID 60 MG
1 TABLET,CHEWABLE ORAL
Qty: 0 | Refills: 0 | DISCHARGE
Start: 2022-03-15

## 2022-03-15 RX ORDER — SCOPALAMINE 1 MG/3D
1 PATCH, EXTENDED RELEASE TRANSDERMAL ONCE
Refills: 0 | Status: COMPLETED | OUTPATIENT
Start: 2022-03-15 | End: 2022-03-15

## 2022-03-15 RX ORDER — LEVETIRACETAM 250 MG/1
2.5 TABLET, FILM COATED ORAL
Qty: 0 | Refills: 0 | DISCHARGE

## 2022-03-15 RX ORDER — FUROSEMIDE 40 MG
1 TABLET ORAL
Qty: 0 | Refills: 0 | DISCHARGE
Start: 2022-03-15

## 2022-03-15 RX ORDER — CARVEDILOL PHOSPHATE 80 MG/1
1 CAPSULE, EXTENDED RELEASE ORAL
Qty: 0 | Refills: 0 | DISCHARGE

## 2022-03-15 RX ORDER — FOLIC ACID 0.8 MG
1 TABLET ORAL
Qty: 0 | Refills: 0 | DISCHARGE

## 2022-03-15 RX ORDER — AMANTADINE HCL 100 MG
10 CAPSULE ORAL
Qty: 0 | Refills: 0 | DISCHARGE
Start: 2022-03-15

## 2022-03-15 RX ORDER — ALBUTEROL 90 UG/1
2 AEROSOL, METERED ORAL
Qty: 0 | Refills: 0 | DISCHARGE
Start: 2022-03-15

## 2022-03-15 RX ORDER — TAMSULOSIN HYDROCHLORIDE 0.4 MG/1
1 CAPSULE ORAL
Qty: 0 | Refills: 0 | DISCHARGE
Start: 2022-03-15

## 2022-03-15 RX ORDER — COLLAGENASE CLOSTRIDIUM HIST. 250 UNIT/G
1 OINTMENT (GRAM) TOPICAL
Qty: 0 | Refills: 0 | DISCHARGE

## 2022-03-15 RX ADMIN — LACTULOSE 6.67 GRAM(S): 10 SOLUTION ORAL at 06:19

## 2022-03-15 RX ADMIN — SCOPALAMINE 1 PATCH: 1 PATCH, EXTENDED RELEASE TRANSDERMAL at 23:30

## 2022-03-15 RX ADMIN — ALBUTEROL 2 PUFF(S): 90 AEROSOL, METERED ORAL at 23:27

## 2022-03-15 RX ADMIN — LEVETIRACETAM 1000 MILLIGRAM(S): 250 TABLET, FILM COATED ORAL at 06:18

## 2022-03-15 RX ADMIN — ALBUTEROL 2 PUFF(S): 90 AEROSOL, METERED ORAL at 08:37

## 2022-03-15 RX ADMIN — LEVETIRACETAM 1000 MILLIGRAM(S): 250 TABLET, FILM COATED ORAL at 18:40

## 2022-03-15 RX ADMIN — Medication 1 MILLIGRAM(S): at 12:43

## 2022-03-15 RX ADMIN — Medication 1 TABLET(S): at 12:43

## 2022-03-15 RX ADMIN — SCOPALAMINE 1 PATCH: 1 PATCH, EXTENDED RELEASE TRANSDERMAL at 18:37

## 2022-03-15 RX ADMIN — ALBUTEROL 2 PUFF(S): 90 AEROSOL, METERED ORAL at 02:24

## 2022-03-15 RX ADMIN — Medication 100 MILLIGRAM(S): at 06:39

## 2022-03-15 RX ADMIN — CARVEDILOL PHOSPHATE 6.25 MILLIGRAM(S): 80 CAPSULE, EXTENDED RELEASE ORAL at 18:38

## 2022-03-15 RX ADMIN — AMIODARONE HYDROCHLORIDE 200 MILLIGRAM(S): 400 TABLET ORAL at 06:19

## 2022-03-15 RX ADMIN — CHLORHEXIDINE GLUCONATE 15 MILLILITER(S): 213 SOLUTION TOPICAL at 18:39

## 2022-03-15 RX ADMIN — SCOPALAMINE 1 PATCH: 1 PATCH, EXTENDED RELEASE TRANSDERMAL at 03:16

## 2022-03-15 RX ADMIN — CARVEDILOL PHOSPHATE 6.25 MILLIGRAM(S): 80 CAPSULE, EXTENDED RELEASE ORAL at 06:19

## 2022-03-15 RX ADMIN — SCOPALAMINE 1 PATCH: 1 PATCH, EXTENDED RELEASE TRANSDERMAL at 08:37

## 2022-03-15 RX ADMIN — SCOPALAMINE 1 PATCH: 1 PATCH, EXTENDED RELEASE TRANSDERMAL at 07:27

## 2022-03-15 RX ADMIN — Medication 40 MILLIGRAM(S): at 06:19

## 2022-03-15 RX ADMIN — Medication 100 MILLIGRAM(S): at 18:38

## 2022-03-15 RX ADMIN — SENNA PLUS 10 MILLILITER(S): 8.6 TABLET ORAL at 23:00

## 2022-03-15 RX ADMIN — CHLORHEXIDINE GLUCONATE 15 MILLILITER(S): 213 SOLUTION TOPICAL at 06:48

## 2022-03-15 RX ADMIN — Medication 500 MILLIGRAM(S): at 12:52

## 2022-03-15 RX ADMIN — TAMSULOSIN HYDROCHLORIDE 0.4 MILLIGRAM(S): 0.4 CAPSULE ORAL at 23:00

## 2022-03-15 RX ADMIN — CHLORHEXIDINE GLUCONATE 1 APPLICATION(S): 213 SOLUTION TOPICAL at 06:48

## 2022-03-15 RX ADMIN — ENOXAPARIN SODIUM 40 MILLIGRAM(S): 100 INJECTION SUBCUTANEOUS at 12:43

## 2022-03-15 RX ADMIN — ALBUTEROL 2 PUFF(S): 90 AEROSOL, METERED ORAL at 16:20

## 2022-03-15 RX ADMIN — LACTULOSE 6.67 GRAM(S): 10 SOLUTION ORAL at 18:39

## 2022-03-15 NOTE — CHART NOTE - NSCHARTNOTESELECT_GEN_ALL_CORE
Family Update/Event Note
family/Event Note
BP trending elevated, see VS flowsheet/Event Note
Event Note
Event Note
Family Update - attempt/Event Note
Family Update/Event Note
Nutrition Services
family update/Event Note
unable SBT/Event Note

## 2022-03-15 NOTE — PROGRESS NOTE ADULT - PROBLEM SELECTOR PLAN 1
secondary to aspiration pneumonia, trach dependent at nursing home (15L trach collar)  Blood Culture result negative from 3/7  Sputum cx grew Acinobacter, pseudomonas (Carbapenem resistant), on contact/droplet Isolation  MRSA PCR negative.  Continue ventilator weaning; will use Spont mode PS 10/5 at night; trach collar during the day  Completed zosyn on 3/14-   Continue Spiriva, albuterol   completed Prednisone 20mg for 5 days secondary to aspiration pneumonia, trach dependent at nursing home (15L trach collar)  Blood Culture result negative from 3/7  Sputum cx grew Acinobacter, pseudomonas (Carbapenem resistant), on contact/droplet Isolation  MRSA PCR negative.  Continue tracheostomy  ventilator night and trach collar during the day  Completed zosyn on 3/14  Continue Spiriva, albuterol   completed steroid therapy  continue speaking valve for 10minutes a day- gradual increase at 10minute increments daily or as tolerable

## 2022-03-15 NOTE — PROGRESS NOTE ADULT - PROBLEM SELECTOR PLAN 11
-hypokalemia in setting of diuresis; replete PRN to maintain K >4  -Hypernatremia -> continue free water 250ml Q6h via peg -> Na improving/stable  -nutritionist following  -monitor electrolytes likely chronic condition  no report bleeding  unknown baseline  FOBT negative. low iron, TIBC, normal ferritin  continue Multivitamin

## 2022-03-15 NOTE — CHART NOTE - NSCHARTNOTEFT_GEN_A_CORE
Assessment:   76yMalePatient is a 76y old  Male who presents with a chief complaint of Acute hypoxia respiratory failure (15 Mar 2022 11:08) Pt visited, Pt is On contact isolation. Pt is on vent via Trach, TF Jevity 1.5 infusing @ 50 ml /hr x 24  Providing 1200 ml, 1800 kcal,  Protein 77 gms, Free water 912 ml/day. +Prosource 1  pkt bid TO Provide additionally  120 Kcal, 30 gm's. TF tolerated.  . Pt is with Blue Boots, SCD device. Labs noted.  BUN 30, . Meds Noted On Vit C, Mvim, Folic Acid.       Factors impacting intake: [ ] none [ ] nausea  [ ] vomiting [ ] diarrhea [ ] constipation  [ ]chewing problems [ ] swallowing issues  [ ] other:     Diet Prescription: Diet, NPO with Tube Feed:   Tube Feeding Modality: Jejunostomy  Jevity 1.5 Daryn  Total Volume for 24 Hours (mL): 1200  Continuous  Starting Tube Feed Rate {mL per Hour}: 30  Increase Tube Feed Rate by (mL): 10     Every 6 hours  Until Goal Tube Feed Rate (mL per Hour): 50  Tube Feed Duration (in Hours): 24  Tube Feed Start Time: 16:00  No Carb Prosource TF     Qty per Day:  1 PKT BID (03-08-22 @ 16:01)    Intake:  NPO w TF    Current Weight:    Bed scale with out Blue Boots and SCD Device 173 LB   % Weight Change 173 lb with out  SCD Device and Blue Boots    Pertinent Medications: MEDICATIONS  (STANDING):  ALBUTerol    90 MICROgram(s) HFA Inhaler 2 Puff(s) Inhalation every 6 hours  amantadine Syrup 100 milliGRAM(s) Oral two times a day  aMIOdarone    Tablet 200 milliGRAM(s) Oral daily  ascorbic acid 500 milliGRAM(s) Oral daily  carvedilol 6.25 milliGRAM(s) Oral every 12 hours  chlorhexidine 0.12% Liquid 15 milliLiter(s) Oral Mucosa every 12 hours  chlorhexidine 2% Cloths 1 Application(s) Topical <User Schedule>  enoxaparin Injectable 40 milliGRAM(s) SubCutaneous every 24 hours  folic acid 1 milliGRAM(s) Oral daily  furosemide Solution 40 milliGRAM(s) Oral daily  lactulose Syrup 6.6667 Gram(s) Oral two times a day  levETIRAcetam  Solution 1000 milliGRAM(s) Oral two times a day  multivitamin 1 Tablet(s) Oral daily  senna Syrup 10 milliLiter(s) Oral at bedtime  tamsulosin 0.4 milliGRAM(s) Oral at bedtime    MEDICATIONS  (PRN):  acetaminophen    Suspension .. 650 milliGRAM(s) Enteral Tube every 6 hours PRN Temp greater or equal to 38C (100.4F), Moderate Pain (4 - 6)    Pertinent Labs: 03-15 Na145 mmol/L Glu 130 mg/dL<H> K+ 3.7 mmol/L Cr  0.91 mg/dL BUN 30 mg/dL<H> 03-15 Phos 3.4 mg/dL 03-10 Alb 1.8 g/dL<L>     CAPILLARY BLOOD GLUCOSE      POCT Blood Glucose.: 149 mg/dL (15 Mar 2022 05:57)  POCT Blood Glucose.: 133 mg/dL (14 Mar 2022 23:28)  POCT Blood Glucose.: 138 mg/dL (14 Mar 2022 17:14)    Skin: Stage 3 @ sacrum    Estimated Needs:   [ ] no change since previous assessment  [ ] recalculated:     Previous Nutrition Diagnosis:   [ ] Inadequate Energy Intake [ ]Inadequate Oral Intake [ ] Excessive Energy Intake   [ ] Underweight [ ] Increased Nutrient Needs [ ] Overweight/Obesity   [ ] Altered GI Function [ ] Unintended Weight Loss [ ] Food & Nutrition Related Knowledge Deficit [ ] Malnutrition   (x) Swallowing difficulty Continuos  .  Nutrition Diagnosis is [ ] ongoing  [ ] resolved [ ] not applicable     New Nutrition Diagnosis: [ ] not applicable       Interventions:   Recommend  [ ] Change Diet To:  [x ] Nutrition Supplement Continue Current TF Rx.  [ ] Nutrition Support  [ ] Other:     Monitoring and Evaluation:   [ ] PO intake [ x ] Tolerance to diet prescription [ x ] weights [ x ] labs[ x ] follow up per protocol  [ ] other:

## 2022-03-15 NOTE — PROGRESS NOTE ADULT - ASSESSMENT
Patient is a 76 year old male with PMH Afib s/p Watchman device on ASA, prior gastric sleeve and history of R occipital hemorrhage (dx 2020) stable on imaging (11/19/2021). Patient was traveling in [facility] on 11/26/21 when he fell from a standing height and struck his head on the floor then presented to [facility] hospital in [facility] with a GCS of 3 found to have R subdural hematoma w/ R to L subflacine hernation s/p emergent R hemicraniectomy. Course notable for ventilator dependent respiratory failure s/p tracheostomy (12/9/21), CDfiff colitis (completed PO vanco), non convulsive seizures controlled with Keppra. Following 6 week ICU course in [facility] was repatriated on 1/8/22 and admitted to [facility] ([facility] course notable for initiation of amantadine, successful wean from ventilator to trach collar, vEEG (1/8-1/9) notable for mild background slowing and right hemispheric high amplitude slowing and breach artifact; there were no seizures captured, s/p PEG (1/13). 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 Recent course notable for new fever due to R parotitis vs PNA for which he was started on zosyn. Nursing home course notable for increased yellow sputum production, respiratory distress for which patient was brought to ER (3/7). ER course notable for continuation of zosyn, initiating vancomycin with cultures pending, ID following and respiratory distress managed with ventilator support.     Patient is admitted to   for further management of acute on chronic hypoxic respiratory failure and sepsis.  on vent support. FIO2 40%.   started on Zosyn and ID on board.  3/9 failed SBT with tachyneic, hypoxic, placed back on A/C mode.  3/10 failed SBT, on A/C mode.   3/11 attempt SBT, PS 10 today, repeat CXR  3/12- Pt tolerated SBT 10/5 w/ 40% and is now tolerating trach collar.    3/14- Medically stable for D/C, awaiting bed.  COVID PCR 3/14 neg.   3/15 Tolerated ventilator, no overnight events, tolerating trach collar during day Patient is a 76 year old male with PMH Afib s/p Watchman device on ASA, prior gastric sleeve and history of R occipital hemorrhage (dx 2020) stable on imaging (11/19/2021). Patient was traveling in [facility] on 11/26/21 when he fell from a standing height and struck his head on the floor then presented to [facility] hospital in [facility] with a GCS of 3 found to have Right subdural hematoma w/ R to L subflacine hernation s/p emergent R hemicraniectomy. Course notable for ventilator dependent respiratory failure s/p tracheostomy (12/9/21), CDfiff colitis (completed PO vanco), non convulsive seizures controlled with Keppra. Following 6 week ICU course in [facility] was repatriated on 1/8/22 and admitted to [facility] ([facility]). [facility] course notable for initiation of amantadine, successful wean from ventilator to trach collar, vEEG (1/8-1/9) notable for mild background slowing and right hemispheric high amplitude slowing and breach artifact; there were no seizures captured, s/p PEG (1/13). 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 Recent course notable for new fever due to R parotitis vs PNA for which he was started on zosyn. Nursing home course notable for increased yellow sputum production, respiratory distress for which patient was brought to ER (3/7). Patient admitted tp SCU for Aspiration Pneumonia, completed course of zosyn, cultures resulting CRE in sputum, ID following and respiratory distress managed with ventilator support. Patient able to tolerate Trach collar during the day, however requiring ventilator support at night. Patient to be discharged to ventilator facility.

## 2022-03-15 NOTE — PROGRESS NOTE ADULT - SUBJECTIVE AND OBJECTIVE BOX
76y Male is under our care for     REVIEW OF SYSTEMS:  [  ] Not able to elicit  General:	  Chest:	  GI:	  :  Skin:	  Musculoskeletal:	  Neuro:	    MEDS:    ALLERGIES: Allergies    No Known Allergies    Intolerances        VITALS:  Vital Signs Last 24 Hrs  T(C): 35.9 (15 Mar 2022 05:28), Max: 37.1 (14 Mar 2022 13:39)  T(F): 96.7 (15 Mar 2022 05:28), Max: 98.8 (14 Mar 2022 13:39)  HR: 75 (15 Mar 2022 05:28) (75 - 99)  BP: 148/90 (15 Mar 2022 05:28) (139/84 - 154/94)  BP(mean): --  RR: 19 (15 Mar 2022 05:28) (18 - 25)  SpO2: 99% (15 Mar 2022 05:28) (97% - 99%)      PHYSICAL EXAM:  HEENT:  Neck:  Respiratory:  Cardiovascular:  Gastrointestinal:  Extremities:  Skin:  Ortho:  Neuro:    LABS/DIAGNOSTIC TESTS:                        9.0    8.70  )-----------( 272      ( 15 Mar 2022 06:38 )             29.1     WBC Count: 8.70 K/uL (03-15 @ 06:38)  WBC Count: 8.46 K/uL (03-14 @ 07:59)  WBC Count: 8.44 K/uL (03-13 @ 07:48)  WBC Count: 7.70 K/uL (03-12 @ 08:05)  WBC Count: 6.68 K/uL (03-11 @ 07:12)    03-15    145  |  108  |  30<H>  ----------------------------<  130<H>  3.7   |  32<H>  |  0.91    Ca    8.6      15 Mar 2022 06:38  Phos  3.4     03-15  Mg     2.7     03-15        CULTURES:   .Sputum Sputum  03-08 @ 18:33   Numerous Acinetobacter baumannii/nosocom group (Carbapenem Resistant)  Numerous Pseudomonas aeruginosa  Normal Respiratory Cheryl absent  --  Acinetobacter baumannii/nosocom group (Carbapenem Resistant)  Pseudomonas aeruginosa      .Blood Blood-Peripheral  03-07 @ 10:18   No Growth Final  --  --      Clean Catch Clean Catch (Midstream)  03-07 @ 08:41   No growth  --  --        RADIOLOGY:  no new studies 76y Male is under our care for pneumonia.  Patient was seen laying comfortably in bed with no acute distress.  Patient remains afebrile and is awaiting acceptance to vent facility.    REVIEW OF SYSTEMS:  [  ] Not able to elicit  General: no fevers no malaise  Chest: no cough no sob  GI: no nvd  : no urinary sxs   Skin: no rashes  Musculoskeletal: no trauma no LBP  Neuro: no ha's no dizziness     MEDS:    ALLERGIES: Allergies    No Known Allergies    Intolerances        VITALS:  Vital Signs Last 24 Hrs  T(C): 35.9 (15 Mar 2022 05:28), Max: 37.1 (14 Mar 2022 13:39)  T(F): 96.7 (15 Mar 2022 05:28), Max: 98.8 (14 Mar 2022 13:39)  HR: 75 (15 Mar 2022 05:28) (75 - 99)  BP: 148/90 (15 Mar 2022 05:28) (139/84 - 154/94)  BP(mean): --  RR: 19 (15 Mar 2022 05:28) (18 - 25)  SpO2: 99% (15 Mar 2022 05:28) (97% - 99%)      PHYSICAL EXAM:  HEENT: trach +  Neck: supple no LN's   Respiratory: lung sounds clear no rales  Cardiovascular: S1 S2 reg no murmurs  Gastrointestinal: +BS with soft, nondistended abdomen; nontender, peg tube in place  Extremities: no edema  Skin: no rashes  Ortho: n/a  Neuro: Awake and alert    LABS/DIAGNOSTIC TESTS:                        9.0    8.70  )-----------( 272      ( 15 Mar 2022 06:38 )             29.1     WBC Count: 8.70 K/uL (03-15 @ 06:38)  WBC Count: 8.46 K/uL (03-14 @ 07:59)  WBC Count: 8.44 K/uL (03-13 @ 07:48)  WBC Count: 7.70 K/uL (03-12 @ 08:05)  WBC Count: 6.68 K/uL (03-11 @ 07:12)    03-15    145  |  108  |  30<H>  ----------------------------<  130<H>  3.7   |  32<H>  |  0.91    Ca    8.6      15 Mar 2022 06:38  Phos  3.4     03-15  Mg     2.7     03-15        CULTURES:   .Sputum Sputum  03-08 @ 18:33   Numerous Acinetobacter baumannii/nosocom group (Carbapenem Resistant)  Numerous Pseudomonas aeruginosa  Normal Respiratory Cheryl absent  --  Acinetobacter baumannii/nosocom group (Carbapenem Resistant)  Pseudomonas aeruginosa      .Blood Blood-Peripheral  03-07 @ 10:18   No Growth Final  --  --      Clean Catch Clean Catch (Midstream)  03-07 @ 08:41   No growth  --  --        RADIOLOGY:  no new studies

## 2022-03-15 NOTE — PROGRESS NOTE ADULT - PROBLEM SELECTOR PLAN 3
-Echo result : limited, mild MR/AR, Left pleural effusion. EF 55%  -initial CXR shows atelectatic change. Small pleural effusions   -BNP 8000s on admission----> 5800s  on 3/11  -trace edema, no respiratory distress on vent support  -on lasix 40mg daily  -repeat CXR- showed effusion, Lt>rt, currently on lasix Echo result : limited, mild MR/AR, Left pleural effusion. EF 55%  initial CXR shows atelectatic change. Small pleural effusions   BNP 8000s on admission----> 5800s  on 3/11  trace edema, no respiratory distress on vent support  continue lasix 40mg daily  repeat CXR- showed effusion, Lt>rt, currently on lasix

## 2022-03-15 NOTE — PROGRESS NOTE ADULT - PROBLEM SELECTOR PLAN 10
-TF dependent via PEG   -Nutrition consult pending  -continue vitamin supplements given at nursing home  - continue bowel regimen w/ senna, lactulose TF dependent via PEG   continue vitamin supplements given at nursing home  continue bowel regimen w/ senna, lactulose  Nutrition consult

## 2022-03-15 NOTE — PROGRESS NOTE ADULT - PROBLEM SELECTOR PLAN 2
-trend WBC, fever  -procalcitonin 0.11  -MRSA PCR negative.  -Bcx, Ucx NGTD 3/7  -sputum Cx- Acinobacter, Pseudomonas, (MDRO), continue contact/droplet isolation  -Pulm dr. Bishop  -ID Dr Quach following, appreciate recommendation  -Zosyn initiated prior to [facility] hospitalization for parotitis vs PNA (CT  3/2 as above.)  -Completed Zosyn 3/14 likely aspiration Pneumonia  procalcitonin 0.11  MRSA PCR negative.  Blood and urine culture negative 3/7  sputum Cx- Acinobacter, Pseudomonas, (MDRO)  continue contact/droplet isolation  Completed [facility] 3/14  Pulmonology dr. Bishop  ID Dr Quach

## 2022-03-15 NOTE — PROGRESS NOTE ADULT - ASSESSMENT
Pneumonia  Fevers - resolved  Leukocytosis - normalized      Plan - Completed 7 day course of Zosyn, no need for further antibiotics  Will monitor off antibiotics  DC Planning                   Pneumonia  Fevers - resolved  Leukocytosis - normalized      Plan - Completed 7 day course of Zosyn, no need for further antibiotics  Will continue to monitor off antibiotics  DC Planning                   Pneumonia  Fevers - resolved  Leukocytosis - normalized      Plan - Completed 7 day course of Zosyn, no need for further antibiotics  Will continue to monitor off antibiotics  DC Planning    I agree with above

## 2022-03-15 NOTE — PROGRESS NOTE ADULT - SUBJECTIVE AND OBJECTIVE BOX
NAYANA CLAROS    SCU progress note    INTERVAL HPI/OVERNIGHT EVENTS: *** Patient seen and examined at bedside. Patient comfortable in bed. Trach to vent at night- trach collar at night.     DNR [ ]   DNI  [  ] Full code    Covid - 19 PCR: negative 3/14    The 4Ms    What Matters Most: see GOC  Age appropriate Medications/Screen for High Risk Medication: Yes  Mentation: see CAM below  Mobility: defer to physical exam    The Confusion Assessment Method (CAM) Diagnostic Algorithm     1: Acute Onset or Fluctuating Course  - Is there evidence of an acute change in mental status from the patient’s baseline? Did the (abnormal) behavior  fluctuate during the day, that is, tend to come and go, or increase and decrease in severity?  [ ] YES [ ] NO unable to assess     2: Inattention  - Did the patient have difficulty focusing attention, being easily distractible, or having difficulty keeping track of what was being said?  [ ] YES [ ] NO unable to assess     3: Disorganized thinking  -Was the patient’s thinking disorganized or incoherent, such as rambling or irrelevant conversation, unclear or illogical flow of ideas, or unpredictable switching from subject to subject?  [ ] YES [ ] NO unable to assess    4: Altered Level of consciousness?  [ ] YES [ ] NO unable to assess    The diagnosis of delirium by CAM requires the presence of features 1 and 2 and either 3 or 4.    PRESSORS: [ ] YES [ x] NO    Cardiovascular:  Heart Failure  Acute   Acute on Chronic  Chronic       aMIOdarone    Tablet 200 milliGRAM(s) Oral daily  carvedilol 6.25 milliGRAM(s) Oral every 12 hours  furosemide Solution 40 milliGRAM(s) Oral daily  tamsulosin 0.4 milliGRAM(s) Oral at bedtime    Pulmonary:  ALBUTerol    90 MICROgram(s) HFA Inhaler 2 Puff(s) Inhalation every 6 hours    Hematalogic:  enoxaparin Injectable 40 milliGRAM(s) SubCutaneous every 24 hours    Other:  acetaminophen    Suspension .. 650 milliGRAM(s) Enteral Tube every 6 hours PRN  amantadine Syrup 100 milliGRAM(s) Oral two times a day  ascorbic acid 500 milliGRAM(s) Oral daily  chlorhexidine 0.12% Liquid 15 milliLiter(s) Oral Mucosa every 12 hours  chlorhexidine 2% Cloths 1 Application(s) Topical <User Schedule>  folic acid 1 milliGRAM(s) Oral daily  lactulose Syrup 6.6667 Gram(s) Oral two times a day  levETIRAcetam  Solution 1000 milliGRAM(s) Oral two times a day  multivitamin 1 Tablet(s) Oral daily  senna Syrup 10 milliLiter(s) Oral at bedtime    acetaminophen    Suspension .. 650 milliGRAM(s) Enteral Tube every 6 hours PRN  ALBUTerol    90 MICROgram(s) HFA Inhaler 2 Puff(s) Inhalation every 6 hours  amantadine Syrup 100 milliGRAM(s) Oral two times a day  aMIOdarone    Tablet 200 milliGRAM(s) Oral daily  ascorbic acid 500 milliGRAM(s) Oral daily  carvedilol 6.25 milliGRAM(s) Oral every 12 hours  chlorhexidine 0.12% Liquid 15 milliLiter(s) Oral Mucosa every 12 hours  chlorhexidine 2% Cloths 1 Application(s) Topical <User Schedule>  enoxaparin Injectable 40 milliGRAM(s) SubCutaneous every 24 hours  folic acid 1 milliGRAM(s) Oral daily  furosemide Solution 40 milliGRAM(s) Oral daily  lactulose Syrup 6.6667 Gram(s) Oral two times a day  levETIRAcetam  Solution 1000 milliGRAM(s) Oral two times a day  multivitamin 1 Tablet(s) Oral daily  senna Syrup 10 milliLiter(s) Oral at bedtime  tamsulosin 0.4 milliGRAM(s) Oral at bedtime    Drug Dosing Weight  Height (cm): 172.7 (07 Mar 2022 01:15)  Weight (kg): 82.2 (08 Mar 2022 05:03)  BMI (kg/m2): 27.6 (08 Mar 2022 05:03)  BSA (m2): 1.96 (08 Mar 2022 05:03)    CENTRAL LINE: [ ] YES [x ] NO  LOCATION:   DATE INSERTED:  REMOVE: [ ] YES [ ] NO  EXPLAIN:    ESPITIA: [ ] YES [ x] NO    DATE INSERTED:  REMOVE:  [ ] YES [ ] NO  EXPLAIN:    PAST MEDICAL & SURGICAL HISTORY:  Essential hypertension    Primary osteoarthritis, unspecified site    Closed fracture of left radius, initial encounter    Morbid obesity, unspecified obesity type  h/o. - s/p gastric bypass- lost 113 lbs    KAREN (obstructive sleep apnea)  h/o - was on CPAP until gastric bypass surgery    Respiratory failure    Anemia    Subdural hemorrhage    Epilepsy    H/O gastrostomy    History of BPH    Afib    S/P gastric bypass  2008    Status post total replacement of left hip  2006    S/P bunionectomy  bilateral    S/P colonoscopy  approx 2012    History of abdominoplasty  and subsequent cosmetic surgery for removal of excess skin from face        Mode: standby      PHYSICAL EXAM:    GENERAL: nonverbal, flat affect  HEAD:  traumatic, right scalp indented  EYES: EOMI, PERRLA, conjunctiva and sclera clear  ENMT: No tonsillar erythema, exudates, or enlargement; Moist mucous membranes, Good dentition, No lesions  NECK: tracheostomy   NERVOUS SYSTEM:  Alert, awake, non-verbal, unable to follow commands  CHEST/LUNG: Clear to percussion bilaterally; No rales, rhonchi, wheezing, or rubs  HEART: Regular rate and rhythm; No murmurs, rubs, or gallops  ABDOMEN: PEG t ube, Soft, Nontender, Nondistended; Bowel sounds present  EXTREMITIES:  2+ Peripheral Pulses, No clubbing, cyanosis, or edema  LYMPH: No lymphadenopathy noted  SKIN: Pressure Injury Stage 1bilateral heel, Stage 3 sacrum      LABS:  CBC Full  -  ( 15 Mar 2022 06:38 )  WBC Count : 8.70 K/uL  RBC Count : 2.77 M/uL  Hemoglobin : 9.0 g/dL  Hematocrit : 29.1 %  Platelet Count - Automated : 272 K/uL  Mean Cell Volume : 105.1 fl  Mean Cell Hemoglobin : 32.5 pg  Mean Cell Hemoglobin Concentration : 30.9 gm/dL  Auto Neutrophil # : x  Auto Lymphocyte # : x  Auto Monocyte # : x  Auto Eosinophil # : x  Auto Basophil # : x  Auto Neutrophil % : x  Auto Lymphocyte % : x  Auto Monocyte % : x  Auto Eosinophil % : x  Auto Basophil % : x    03-15    145  |  108  |  30<H>  ----------------------------<  130<H>  3.7   |  32<H>  |  0.91    Ca    8.6      15 Mar 2022 06:38  Phos  3.4     03-15  Mg     2.7     03-15      [  x]  DVT Prophylaxis  [  ]  Nutrition, Brand, Rate         Goal Rate        Abnormal Nutritional Status -  Malnutrition   Cachexia          RADIOLOGY & ADDITIONAL STUDIES:  ***  < from: Xray Chest 1 View- PORTABLE-Routine (Xray Chest 1 View- PORTABLE-Routine .) (03.11.22 @ 12:07) >  FINDINGS:  CATHETERS AND TUBES: Tracheostomy tube in place.    PULMONARY: LEFT basilar airspace consolidation and/or effusion obscuring   LEFT diaphragm contour. Small RIGHT basilar airspace disease.  Upper zones clear.  No pneumothorax.    HEART/VASCULAR: The  heart is enlarged in transverse diameter.    BONES: Visualized osseous structures are intact.    IMPRESSION:   LEFT greater than RIGHT bibasilar airspace disease and/or   LEFT effusion obscuring LEFT diaphragm contour..    --- End of Report ---    < end of copied text >    Goals of Care Discussion with Family/Proxy/Other   - see note from/family meeting set up for...

## 2022-03-15 NOTE — PROGRESS NOTE ADULT - PROBLEM SELECTOR PLAN 12
-likely chronic condition, no report bleeding  -Hgb stable   -unknown baseline  -FOBT negative. low iron, TIBC, normal ferritin  -c/w Multivitamin.   -monitor CBC Tracheostomy and PEG- monitor for aspiratory precaution  Bedbound- complete bed care  Pressure Injury- turn and reposition  discharge to ventilator facility

## 2022-03-15 NOTE — PROGRESS NOTE ADULT - PROBLEM SELECTOR PLAN 4
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

## 2022-03-15 NOTE — PROGRESS NOTE ADULT - PROBLEM SELECTOR PLAN 7
-continue amiodarone, coreg   -EKG PRN  -monitor VS, rate control continue amiodarone and coreg   rate controlled

## 2022-03-15 NOTE — PROGRESS NOTE ADULT - PROBLEM SELECTOR PLAN 5
-continue keppra  -maintain seizure precautions continue keppra  maintain seizure precautions  no active seizures

## 2022-03-15 NOTE — PROGRESS NOTE ADULT - PROBLEM SELECTOR PLAN 6
-currently controlled  -continue coreg with hold parameters  -per OSH records was previously on losartan and Amlodipine as well; resume if needed/able controlled  continue carvedilol and Lasix

## 2022-03-15 NOTE — CHART NOTE - NSCHARTNOTEFT_GEN_A_CORE
Contacted and spoke with Cora Moran. Explained current condition. Encouraged family to address concerns and emotional support given, all concerns and questions addressed.

## 2022-03-16 ENCOUNTER — TRANSCRIPTION ENCOUNTER (OUTPATIENT)
Age: 77
End: 2022-03-16

## 2022-03-16 VITALS
SYSTOLIC BLOOD PRESSURE: 136 MMHG | HEART RATE: 83 BPM | RESPIRATION RATE: 14 BRPM | TEMPERATURE: 98 F | DIASTOLIC BLOOD PRESSURE: 99 MMHG | OXYGEN SATURATION: 99 %

## 2022-03-16 LAB
ANION GAP SERPL CALC-SCNC: 3 MMOL/L — LOW (ref 5–17)
BUN SERPL-MCNC: 30 MG/DL — HIGH (ref 7–18)
CALCIUM SERPL-MCNC: 8.7 MG/DL — SIGNIFICANT CHANGE UP (ref 8.4–10.5)
CHLORIDE SERPL-SCNC: 107 MMOL/L — SIGNIFICANT CHANGE UP (ref 96–108)
CO2 SERPL-SCNC: 33 MMOL/L — HIGH (ref 22–31)
CREAT SERPL-MCNC: 0.84 MG/DL — SIGNIFICANT CHANGE UP (ref 0.5–1.3)
EGFR: 91 ML/MIN/1.73M2 — SIGNIFICANT CHANGE UP
GLUCOSE BLDC GLUCOMTR-MCNC: 123 MG/DL — HIGH (ref 70–99)
GLUCOSE BLDC GLUCOMTR-MCNC: 144 MG/DL — HIGH (ref 70–99)
GLUCOSE SERPL-MCNC: 136 MG/DL — HIGH (ref 70–99)
HCT VFR BLD CALC: 29.7 % — LOW (ref 39–50)
HGB BLD-MCNC: 9.3 G/DL — LOW (ref 13–17)
MAGNESIUM SERPL-MCNC: 2.5 MG/DL — SIGNIFICANT CHANGE UP (ref 1.6–2.6)
MCHC RBC-ENTMCNC: 31.3 GM/DL — LOW (ref 32–36)
MCHC RBC-ENTMCNC: 32.9 PG — SIGNIFICANT CHANGE UP (ref 27–34)
MCV RBC AUTO: 104.9 FL — HIGH (ref 80–100)
NRBC # BLD: 0 /100 WBCS — SIGNIFICANT CHANGE UP (ref 0–0)
PHOSPHATE SERPL-MCNC: 3.2 MG/DL — SIGNIFICANT CHANGE UP (ref 2.5–4.5)
PLATELET # BLD AUTO: 262 K/UL — SIGNIFICANT CHANGE UP (ref 150–400)
POTASSIUM SERPL-MCNC: 3.6 MMOL/L — SIGNIFICANT CHANGE UP (ref 3.5–5.3)
POTASSIUM SERPL-SCNC: 3.6 MMOL/L — SIGNIFICANT CHANGE UP (ref 3.5–5.3)
RBC # BLD: 2.83 M/UL — LOW (ref 4.2–5.8)
RBC # FLD: 19 % — HIGH (ref 10.3–14.5)
SODIUM SERPL-SCNC: 143 MMOL/L — SIGNIFICANT CHANGE UP (ref 135–145)
WBC # BLD: 8.7 K/UL — SIGNIFICANT CHANGE UP (ref 3.8–10.5)
WBC # FLD AUTO: 8.7 K/UL — SIGNIFICANT CHANGE UP (ref 3.8–10.5)

## 2022-03-16 PROCEDURE — 99285 EMERGENCY DEPT VISIT HI MDM: CPT

## 2022-03-16 PROCEDURE — 83735 ASSAY OF MAGNESIUM: CPT

## 2022-03-16 PROCEDURE — 82728 ASSAY OF FERRITIN: CPT

## 2022-03-16 PROCEDURE — 87086 URINE CULTURE/COLONY COUNT: CPT

## 2022-03-16 PROCEDURE — 86803 HEPATITIS C AB TEST: CPT

## 2022-03-16 PROCEDURE — 83880 ASSAY OF NATRIURETIC PEPTIDE: CPT

## 2022-03-16 PROCEDURE — 85652 RBC SED RATE AUTOMATED: CPT

## 2022-03-16 PROCEDURE — 87077 CULTURE AEROBIC IDENTIFY: CPT

## 2022-03-16 PROCEDURE — 87640 STAPH A DNA AMP PROBE: CPT

## 2022-03-16 PROCEDURE — 80177 DRUG SCRN QUAN LEVETIRACETAM: CPT

## 2022-03-16 PROCEDURE — 85027 COMPLETE CBC AUTOMATED: CPT

## 2022-03-16 PROCEDURE — 83605 ASSAY OF LACTIC ACID: CPT

## 2022-03-16 PROCEDURE — 82746 ASSAY OF FOLIC ACID SERUM: CPT

## 2022-03-16 PROCEDURE — 87184 SC STD DISK METHOD PER PLATE: CPT

## 2022-03-16 PROCEDURE — 82962 GLUCOSE BLOOD TEST: CPT

## 2022-03-16 PROCEDURE — 84100 ASSAY OF PHOSPHORUS: CPT

## 2022-03-16 PROCEDURE — 87040 BLOOD CULTURE FOR BACTERIA: CPT

## 2022-03-16 PROCEDURE — 83550 IRON BINDING TEST: CPT

## 2022-03-16 PROCEDURE — 84466 ASSAY OF TRANSFERRIN: CPT

## 2022-03-16 PROCEDURE — 82803 BLOOD GASES ANY COMBINATION: CPT

## 2022-03-16 PROCEDURE — 92597 ORAL SPEECH DEVICE EVAL: CPT

## 2022-03-16 PROCEDURE — 93005 ELECTROCARDIOGRAM TRACING: CPT

## 2022-03-16 PROCEDURE — 83540 ASSAY OF IRON: CPT

## 2022-03-16 PROCEDURE — 87070 CULTURE OTHR SPECIMN AEROBIC: CPT

## 2022-03-16 PROCEDURE — 82607 VITAMIN B-12: CPT

## 2022-03-16 PROCEDURE — 36415 COLL VENOUS BLD VENIPUNCTURE: CPT

## 2022-03-16 PROCEDURE — 81001 URINALYSIS AUTO W/SCOPE: CPT

## 2022-03-16 PROCEDURE — 71045 X-RAY EXAM CHEST 1 VIEW: CPT

## 2022-03-16 PROCEDURE — 93306 TTE W/DOPPLER COMPLETE: CPT

## 2022-03-16 PROCEDURE — 84484 ASSAY OF TROPONIN QUANT: CPT

## 2022-03-16 PROCEDURE — 80053 COMPREHEN METABOLIC PANEL: CPT

## 2022-03-16 PROCEDURE — 82272 OCCULT BLD FECES 1-3 TESTS: CPT

## 2022-03-16 PROCEDURE — 87635 SARS-COV-2 COVID-19 AMP PRB: CPT

## 2022-03-16 PROCEDURE — 87521 HEPATITIS C PROBE&RVRS TRNSC: CPT

## 2022-03-16 PROCEDURE — 85610 PROTHROMBIN TIME: CPT

## 2022-03-16 PROCEDURE — 94003 VENT MGMT INPAT SUBQ DAY: CPT

## 2022-03-16 PROCEDURE — 94002 VENT MGMT INPAT INIT DAY: CPT

## 2022-03-16 PROCEDURE — 86140 C-REACTIVE PROTEIN: CPT

## 2022-03-16 PROCEDURE — 94760 N-INVAS EAR/PLS OXIMETRY 1: CPT

## 2022-03-16 PROCEDURE — 94640 AIRWAY INHALATION TREATMENT: CPT

## 2022-03-16 PROCEDURE — 85730 THROMBOPLASTIN TIME PARTIAL: CPT

## 2022-03-16 PROCEDURE — 85025 COMPLETE CBC W/AUTO DIFF WBC: CPT

## 2022-03-16 PROCEDURE — 87186 SC STD MICRODIL/AGAR DIL: CPT

## 2022-03-16 PROCEDURE — 87641 MR-STAPH DNA AMP PROBE: CPT

## 2022-03-16 PROCEDURE — 84145 PROCALCITONIN (PCT): CPT

## 2022-03-16 PROCEDURE — 80048 BASIC METABOLIC PNL TOTAL CA: CPT

## 2022-03-16 RX ORDER — SCOPALAMINE 1 MG/3D
1 PATCH, EXTENDED RELEASE TRANSDERMAL ONCE
Refills: 0 | Status: COMPLETED | OUTPATIENT
Start: 2022-03-16 | End: 2022-03-16

## 2022-03-16 RX ORDER — SCOPALAMINE 1 MG/3D
1 PATCH, EXTENDED RELEASE TRANSDERMAL
Qty: 0 | Refills: 0 | DISCHARGE
Start: 2022-03-16

## 2022-03-16 RX ADMIN — CHLORHEXIDINE GLUCONATE 1 APPLICATION(S): 213 SOLUTION TOPICAL at 05:57

## 2022-03-16 RX ADMIN — Medication 500 MILLIGRAM(S): at 11:40

## 2022-03-16 RX ADMIN — AMIODARONE HYDROCHLORIDE 200 MILLIGRAM(S): 400 TABLET ORAL at 05:56

## 2022-03-16 RX ADMIN — ALBUTEROL 2 PUFF(S): 90 AEROSOL, METERED ORAL at 11:28

## 2022-03-16 RX ADMIN — LEVETIRACETAM 1000 MILLIGRAM(S): 250 TABLET, FILM COATED ORAL at 05:55

## 2022-03-16 RX ADMIN — CHLORHEXIDINE GLUCONATE 15 MILLILITER(S): 213 SOLUTION TOPICAL at 05:57

## 2022-03-16 RX ADMIN — ALBUTEROL 2 PUFF(S): 90 AEROSOL, METERED ORAL at 03:55

## 2022-03-16 RX ADMIN — Medication 40 MILLIGRAM(S): at 05:55

## 2022-03-16 RX ADMIN — Medication 1 MILLIGRAM(S): at 11:40

## 2022-03-16 RX ADMIN — ENOXAPARIN SODIUM 40 MILLIGRAM(S): 100 INJECTION SUBCUTANEOUS at 13:34

## 2022-03-16 RX ADMIN — Medication 100 MILLIGRAM(S): at 05:56

## 2022-03-16 RX ADMIN — CARVEDILOL PHOSPHATE 6.25 MILLIGRAM(S): 80 CAPSULE, EXTENDED RELEASE ORAL at 05:56

## 2022-03-16 RX ADMIN — Medication 1 TABLET(S): at 11:40

## 2022-03-16 RX ADMIN — LACTULOSE 6.67 GRAM(S): 10 SOLUTION ORAL at 05:55

## 2022-03-16 RX ADMIN — SCOPALAMINE 1 PATCH: 1 PATCH, EXTENDED RELEASE TRANSDERMAL at 11:40

## 2022-03-16 NOTE — PROGRESS NOTE ADULT - PROBLEM SELECTOR PLAN 12
Tracheostomy and PEG- monitor for aspiratory precaution  Bedbound- complete bed care  Pressure Injury- turn and reposition  discharge to ventilator facility

## 2022-03-16 NOTE — PROGRESS NOTE ADULT - ASSESSMENT
Pneumonia  Fevers - resolved  Leukocytosis - normalized      Plan - Completed 7 day course of Zosyn, no need for further antibiotics  Awaiting discharge to [facility]                     Pneumonia  Fevers - resolved  Leukocytosis - normalized      Plan - Completed 7 day course of Zosyn, no need for further antibiotics  Awaiting discharge to [facility]    I agree with above

## 2022-03-16 NOTE — PROGRESS NOTE ADULT - PROBLEM SELECTOR PLAN 3
Echo result : limited, mild MR/AR, Left pleural effusion. EF 55%  initial CXR shows atelectatic change. Small pleural effusions   BNP 8000s on admission----> 5800s  on 3/11  trace edema, no respiratory distress on vent support  repeat CXR- showed effusion, Lt>rt  continue lasix 40mg daily

## 2022-03-16 NOTE — PROGRESS NOTE ADULT - ASSESSMENT
Patient is a 76 year old male with PMH Afib s/p Watchman device on ASA, prior gastric sleeve and history of R occipital hemorrhage (dx 2020) stable on imaging (11/19/2021). Patient was traveling in [facility] on 11/26/21 when he fell from a standing height and struck his head on the floor then presented to [facility] hospital in [facility] with a GCS of 3 found to have Right subdural hematoma w/ R to L subflacine hernation s/p emergent R hemicraniectomy. Course notable for ventilator dependent respiratory failure s/p tracheostomy (12/9/21), CDfiff colitis (completed PO vanco), non convulsive seizures controlled with Keppra. Following 6 week ICU course in [facility] was repatriated on 1/8/22 and admitted to [facility] ([facility] course notable for initiation of amantadine, successful wean from ventilator to trach collar, vEEG (1/8-1/9) notable for mild background slowing and right hemispheric high amplitude slowing and breach artifact; there were no seizures captured, s/p PEG (1/13). 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 Patient was stabilized and discharged to [facility] on 2/1/2022 for comprehensive inpatient rehabilitation for ADL and gait dysfunction 2/2 TBI then transferred to [facility] on 3/3/22 for skilled nursing. Recent course notable for new fever due to R parotitis vs PNA for which he was started on zosyn. Nursing home course notable for increased yellow sputum production, respiratory distress for which patient was brought to ER (3/7). Patient admitted tp SCU for Aspiration Pneumonia, completed course of zosyn, cultures resulting CRE in sputum, ID following and respiratory distress managed with ventilator support. Patient able to tolerate Trach collar during the day, however requiring ventilator support at night. Patient to be discharged to ventilator facility.

## 2022-03-16 NOTE — PROGRESS NOTE ADULT - PROBLEM SELECTOR PLAN 1
secondary to aspiration pneumonia  Blood Culture result negative from 3/7  Sputum cx grew Acinobacter, pseudomonas (Carbapenem resistant), on contact/droplet Isolation  MRSA PCR negative.  Continue tracheostomy  ventilator night and trach collar during the day  Completed zosyn on 3/14  Continue Spiriva, albuterol   completed steroid therapy  continue speaking valve for 10minutes a day- gradual increase at 10minute increments daily or as tolerable  continue ventilator support at night- trach collar 4L during day- patient requiring frequent pulmonary toileting

## 2022-03-16 NOTE — PROGRESS NOTE ADULT - PROBLEM SELECTOR PLAN 11
likely chronic condition  no report bleeding  unknown baseline  FOBT negative. low iron, TIBC, normal ferritin  continue Multivitamin

## 2022-03-16 NOTE — PROGRESS NOTE ADULT - ATTENDING COMMENTS
Discharge today
Problem/Plan - 1:  ·  Problem: Acute respiratory failure with hypoxia.   ·  Plan: -secondary to aspiration pneumonia, trach dependent at nursing home (15L trach collar)  -presented to ED 3/7 from nursing home for respiratory distress  -Blood Culture result negative from 3/7  -MRSA PCR negative.  -Continue ventilator support; wean as able-daily SBT during the day and place vent at night  will follow up SLP for swallowing eval if tolerating SBT.   -Continue zosyn  -continue spiriva, albuterol   -Prednisone 20mg for 5 days  --Sputum cx grew Acinobacter, pseudomonas (Carbapenem resistant), on contact/droplet Isolation.     Problem/Plan - 2:  ·  Problem: Pneumonia.   ·  Plan: -trend WBC, fever  -procalcitonin 0.11  -MRSA PCR negative.  -Bcx, Ucx NGTD 3/7  -sputum Cx- Acinobacter, Pseudomonas, (MDRO), continue contact/droplet isolation  -Pulm dr. Bishop  -ID Dr Quach following, appreciate recommendation  -Zosyn initiated prior to [facility] hospitalization for parotitis vs PNA (CT  3/2 as above.)  -c/w Zosyn for now.     Problem/Plan - 3:  ·  Problem: Pleural effusion, left.   ·  Plan: -Echo result : limited, mild MR/AR, Left pleural effusion. EF 55%  -initial CXR shows atelectatic change. Small pleural effusions   -BNP 8000s on admission----> 5800s  on 3/11  -trace edema, no respiratory distress on vent support  -on lasix 40mg daily  -repeat CXR- showed effusion, Lt>rt, currently on lasix.
Problem/Plan - 1:  ·  Problem: Acute respiratory failure with hypoxia.   ·  Plan: -secondary to aspiration pneumonia, trach dependent at nursing home (15L trach collar)  -presented to ED 3/7 from nursing home for respiratory distress  -Blood Culture result negative from 3/7  -Sputum cx grew Acinobacter, pseudomonas (Carbapenem resistant), on contact/droplet Isolation  -MRSA PCR negative.  -Continue ventilator weaning; will use Spont mode PS 10/5 at night; trach collar during the day  will follow up SLP for swallowing eval if tolerating trach collar trials during day   -Continue zosyn for 7-8 days per ID (through 3/14 or 3/15)  -continue spiriva, albuterol   -Prednisone 20mg for 5 days.     Problem/Plan - 2:  ·  Problem: Pneumonia.   ·  Plan: -trend WBC, fever  -procalcitonin 0.11  -MRSA PCR negative.  -Bcx, Ucx NGTD 3/7  -sputum Cx- Acinobacter, Pseudomonas, (MDRO), continue contact/droplet isolation  -Pulodilia Bishop  -ID Dr Quach following, appreciate recommendation  -Zosyn initiated prior to [facility] hospitalization for parotitis vs PNA (CT  3/2 as above.)  -Continue zosyn for 7-8 days per ID (through 3/14 or 3/15).
Problem/Plan - 1:  ·  Problem: Acute respiratory failure with hypoxia.   ·  Plan: secondary to aspiration pneumonia, trach dependent at nursing home (15L trach collar)  presented to ED 3/7 from nursing home for respiratory distress  Continue ventilator support; wean as able-daily SBT during the day and place vent at night  will follow up SLP for swallowing eval if tolerating SBT.   Continue zosyn, vancomycin  continue spiriva, albuterol   Prednisone 20mg for 5 days    Culture result negative from 3/7  MRSA PCR negative.     Problem/Plan - 2:  ·  Problem: Pneumonia.   ·  Plan: continue empiric antibiotics (vancomycin discontinued)  Continue zosyn   trend WBC, fever  procalcitonin 0.11  MRSA PCR negative.  Bcx, Ucx NGTD 3/7  ID Dr Quach following, appreciate recommendation  Zosyn initiated prior to [facility] hospitalization for parotitis vs PNA; CT neck 3/2 as above.  c/w Zosyn for now.     Problem/Plan - 3:  ·  Problem: Subdural hemorrhage.   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  CT head 2/15/22 as above.     Problem/Plan - 4:  ·  Problem: Epileptic seizures.   ·  Plan: continue keppra  maintain seizure precautions.
Problem/Plan - 1:  ·  Problem: Acute respiratory failure with hypoxia.   ·  Plan: secondary to aspiration pneumonia, trach dependent at nursing home (15L trach collar)  presented to ED 3/7 from nursing home for respiratory distress  Continue ventilator support; wean as able  Continue zosyn, vancomycin  continue spiriva, albuterol   Prednisone 20mg for 5 days initiated today   follow up culture data.     Problem/Plan - 2:  ·  Problem: Pneumonia.   ·  Plan: continue empiric antibiotics (vancomycin discontinued)  Continue zosyn   trend WBC, fever  procalcitonin 0.11  follow up culture data  ID Dr Quach following, appreciate recommendation  Zosyn initiated prior to [facility] hospitalization for parotitis vs PNA; CT neck 3/2 as above.     Problem/Plan - 3:  ·  Problem: Subdural hemorrhage.   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  CT head 2/15/22 as above.     Problem/Plan - 4:  ·  Problem: Epileptic seizures.   ·  Plan: continue keppra  maintain seizure precautions.
Tolerating trach collar during day time  Vent support overnight   Completed antibiotics  Tube feedings to be continued  Discharge planning to vent capable facility
Tolerating trach collar during day time  Vent support overnight   Completed antibiotics  Tube feedings to be continued  Discharge planning to vent capable facility
Problem/Plan - 1:  ·  Problem: Acute respiratory failure with hypoxia.   ·  Plan: secondary to aspiration pneumonia, trach dependent at nursing home (15L trach collar)  presented to ED 3/7 from nursing home for respiratory distress  Continue ventilator support; wean as able-daily SBT during the day and place vent at night  will follow up SLP for swallowing eval if tolerating SBT.   Continue zosyn, vancomycin  continue spiriva, albuterol   Prednisone 20mg for 5 days    Culture result negative from 3/7  f/u MRSA pcr.     Problem/Plan - 2:  ·  Problem: Pneumonia.   ·  Plan: continue empiric antibiotics (vancomycin discontinued)  Continue zosyn   trend WBC, fever  procalcitonin 0.11  Bcx, Ucx NGTD 3/7  ID Dr Quach following, appreciate recommendation  Zosyn initiated prior to [facility] hospitalization for parotitis vs PNA; CT neck 3/2 as above.  c/w Zosyn for now  f/u MRSA pcr.     Problem/Plan - 3:  ·  Problem: Subdural hemorrhage.   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  CT head 2/15/22 as above.     Problem/Plan - 4:  ·  Problem: Epileptic seizures.   ·  Plan: continue keppra  maintain seizure precautions.
Problem/Plan - 1:  ·  Problem: Acute respiratory failure with hypoxia.   ·  Plan: -secondary to aspiration pneumonia, trach dependent at nursing home (15L trach collar)  -presented to ED 3/7 from nursing home for respiratory distress  -Blood Culture result negative from 3/7  -MRSA PCR negative.  -Continue ventilator support; wean as able-daily SBT during the day and place vent at night  will follow up SLP for swallowing eval if tolerating SBT.   -Continue zosyn  -continue spiriva, albuterol   -Prednisone 20mg for 5 days  --Sputum cx grew acinobacter, pseudomonas (Carbapenem resistant), on contact/droplet Isolation.     Problem/Plan - 2:  ·  Problem: Pneumonia.   ·  Plan: -trend WBC, fever  -procalcitonin 0.11  -MRSA PCR negative.  -Bcx, Ucx NGTD 3/7  -sputum Cx-Acinobacter, Pseudomonas, (MDRO), continue contact/droplet isolation  -Pulm dr. Bishop  -ID Dr Quach following, appreciate recommendation  -Zosyn initiated prior to [facility] hospitalization for parotitis vs PNA (CT  3/2 as above.)  -c/w Zosyn for now.

## 2022-03-16 NOTE — DISCHARGE NOTE NURSING/CASE MANAGEMENT/SOCIAL WORK - NSDCPEFALRISK_GEN_ALL_CORE
For information on Fall & Injury Prevention, visit: [email or web address] OR  [email or web address] OR  https://www.cdc.gov/steadi/patient.html

## 2022-03-16 NOTE — PROGRESS NOTE ADULT - PROVIDER SPECIALTY LIST ADULT
Critical Care
Infectious Disease
Infectious Disease
Critical Care
Critical Care
Infectious Disease
Pulmonology
Infectious Disease
Infectious Disease
Critical Care

## 2022-03-16 NOTE — PROGRESS NOTE ADULT - PROBLEM SELECTOR PLAN 10
TF dependent via PEG   continue vitamin supplements given at nursing home  continue bowel regimen w/ senna, lactulose  Nutrition consult

## 2022-03-16 NOTE — PROGRESS NOTE ADULT - PROBLEM SELECTOR PLAN 4
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

## 2022-03-16 NOTE — DISCHARGE NOTE NURSING/CASE MANAGEMENT/SOCIAL WORK - PATIENT PORTAL LINK FT
You can access the FollowMyHealth Patient Portal offered by [facility] by registering at the following website: [email or web address]. By joining [employer]’s FollowMyHealth portal, you will also be able to view your health information using other applications (apps) compatible with our system.

## 2022-03-16 NOTE — PROGRESS NOTE ADULT - SUBJECTIVE AND OBJECTIVE BOX
NAYANA CLAROS    SCU progress note    INTERVAL HPI/OVERNIGHT EVENTS: *** Patient seen and examined at bedside. Patient off venti transitioned to Trach collar at 4LNC, tolerating well.    DNR [ ]   DNI  [  ] Full Code    Covid - 19 PCR: negative 3/14    The 4Ms    What Matters Most: see GOC  Age appropriate Medications/Screen for High Risk Medication: Yes  Mentation: see CAM below  Mobility: defer to physical exam    The Confusion Assessment Method (CAM) Diagnostic Algorithm     1: Acute Onset or Fluctuating Course  - Is there evidence of an acute change in mental status from the patient’s baseline? Did the (abnormal) behavior  fluctuate during the day, that is, tend to come and go, or increase and decrease in severity?  [ ] YES [ ] NO unable to assess     2: Inattention  - Did the patient have difficulty focusing attention, being easily distractible, or having difficulty keeping track of what was being said?  [ ] YES [ ] NO unable to assess     3: Disorganized thinking  -Was the patient’s thinking disorganized or incoherent, such as rambling or irrelevant conversation, unclear or illogical flow of ideas, or unpredictable switching from subject to subject?  [ ] YES [ ] NO unable to assess    4: Altered Level of consciousness?  [ ] YES [ ] NO unable to assess    The diagnosis of delirium by CAM requires the presence of features 1 and 2 and either 3 or 4.    PRESSORS: [ ] YES [x ] NO    Cardiovascular:  Heart Failure  Acute   Acute on Chronic  Chronic       aMIOdarone    Tablet 200 milliGRAM(s) Oral daily  carvedilol 6.25 milliGRAM(s) Oral every 12 hours  furosemide Solution 40 milliGRAM(s) Oral daily  tamsulosin 0.4 milliGRAM(s) Oral at bedtime    Pulmonary:  ALBUTerol    90 MICROgram(s) HFA Inhaler 2 Puff(s) Inhalation every 6 hours    Hematalogic:  enoxaparin Injectable 40 milliGRAM(s) SubCutaneous every 24 hours    Other:  acetaminophen    Suspension .. 650 milliGRAM(s) Enteral Tube every 6 hours PRN  amantadine Syrup 100 milliGRAM(s) Oral two times a day  ascorbic acid 500 milliGRAM(s) Oral daily  chlorhexidine 0.12% Liquid 15 milliLiter(s) Oral Mucosa every 12 hours  chlorhexidine 2% Cloths 1 Application(s) Topical <User Schedule>  folic acid 1 milliGRAM(s) Oral daily  lactulose Syrup 6.6667 Gram(s) Oral two times a day  levETIRAcetam  Solution 1000 milliGRAM(s) Oral two times a day  multivitamin 1 Tablet(s) Oral daily  scopolamine 1 mG/72 Hr(s) Patch 1 Patch Transdermal once  senna Syrup 10 milliLiter(s) Oral at bedtime    acetaminophen    Suspension .. 650 milliGRAM(s) Enteral Tube every 6 hours PRN  ALBUTerol    90 MICROgram(s) HFA Inhaler 2 Puff(s) Inhalation every 6 hours  amantadine Syrup 100 milliGRAM(s) Oral two times a day  aMIOdarone    Tablet 200 milliGRAM(s) Oral daily  ascorbic acid 500 milliGRAM(s) Oral daily  carvedilol 6.25 milliGRAM(s) Oral every 12 hours  chlorhexidine 0.12% Liquid 15 milliLiter(s) Oral Mucosa every 12 hours  chlorhexidine 2% Cloths 1 Application(s) Topical <User Schedule>  enoxaparin Injectable 40 milliGRAM(s) SubCutaneous every 24 hours  folic acid 1 milliGRAM(s) Oral daily  furosemide Solution 40 milliGRAM(s) Oral daily  lactulose Syrup 6.6667 Gram(s) Oral two times a day  levETIRAcetam  Solution 1000 milliGRAM(s) Oral two times a day  multivitamin 1 Tablet(s) Oral daily  scopolamine 1 mG/72 Hr(s) Patch 1 Patch Transdermal once  senna Syrup 10 milliLiter(s) Oral at bedtime  tamsulosin 0.4 milliGRAM(s) Oral at bedtime    Drug Dosing Weight  Height (cm): 172.7 (07 Mar 2022 01:15)  Weight (kg): 82.2 (08 Mar 2022 05:03)  BMI (kg/m2): 27.6 (08 Mar 2022 05:03)  BSA (m2): 1.96 (08 Mar 2022 05:03)    CENTRAL LINE: [ ] YES [x ] NO  LOCATION:   DATE INSERTED:  REMOVE: [ ] YES [ ] NO  EXPLAIN:    ESPITIA: [ ] YES [ x] NO    DATE INSERTED:  REMOVE:  [ ] YES [ ] NO  EXPLAIN:    PAST MEDICAL & SURGICAL HISTORY:  Essential hypertension    Primary osteoarthritis, unspecified site    Closed fracture of left radius, initial encounter    Morbid obesity, unspecified obesity type  h/o. - s/p gastric bypass- lost 113 lbs    KAREN (obstructive sleep apnea)  h/o - was on CPAP until gastric bypass surgery    Respiratory failure    Anemia    Subdural hemorrhage    Epilepsy    H/O gastrostomy    History of BPH    Afib    S/P gastric bypass  2008    Status post total replacement of left hip  2006    S/P bunionectomy  bilateral    S/P colonoscopy  approx 2012    History of abdominoplasty  and subsequent cosmetic surgery for removal of excess skin from face      03-15 @ 07:01  -  03-16 @ 07:00  --------------------------------------------------------  IN: 500 mL / OUT: 850 mL / NET: -350 mL      Mode: AC/ CMV (Assist Control/ Continuous Mandatory Ventilation)  RR (machine): 14  TV (machine): 420  FiO2: 40  PEEP: 5  ITime: 1  MAP: 12  PC:   PIP: 24    PHYSICAL EXAM:    GENERAL: nonverbal, flat affect  HEAD:  traumatic, right scalp indented  EYES: EOMI, PERRLA, conjunctiva and sclera clear  ENMT: No tonsillar erythema, exudates, or enlargement; Moist mucous membranes, Good dentition, No lesions  NECK: tracheostomy   NERVOUS SYSTEM:  Alert, awake, non-verbal, unable to follow commands  CHEST/LUNG: Clear to percussion bilaterally; No rales, rhonchi, wheezing, or rubs  HEART: Regular rate and rhythm; No murmurs, rubs, or gallops  ABDOMEN: PEG tube, Soft, Nontender, Nondistended; Bowel sounds present  EXTREMITIES:  2+ Peripheral Pulses, No clubbing, cyanosis, or edema  LYMPH: No lymphadenopathy noted  SKIN: Pressure Injury Stage 1bilateral heel, Stage 3 sacrum        LABS:  CBC Full  -  ( 15 Mar 2022 06:38 )  WBC Count : 8.70 K/uL  RBC Count : 2.77 M/uL  Hemoglobin : 9.0 g/dL  Hematocrit : 29.1 %  Platelet Count - Automated : 272 K/uL  Mean Cell Volume : 105.1 fl  Mean Cell Hemoglobin : 32.5 pg  Mean Cell Hemoglobin Concentration : 30.9 gm/dL  Auto Neutrophil # : x  Auto Lymphocyte # : x  Auto Monocyte # : x  Auto Eosinophil # : x  Auto Basophil # : x  Auto Neutrophil % : x  Auto Lymphocyte % : x  Auto Monocyte % : x  Auto Eosinophil % : x  Auto Basophil % : x    03-15    145  |  108  |  30<H>  ----------------------------<  130<H>  3.7   |  32<H>  |  0.91    Ca    8.6      15 Mar 2022 06:38  Phos  3.4     03-15  Mg     2.7     03-15    [ x ]  DVT Prophylaxis  [  ]  Nutrition, Brand, Rate         Goal Rate        Abnormal Nutritional Status -  Malnutrition   Cachexia          RADIOLOGY & ADDITIONAL STUDIES:  ***  < from: Xray Chest 1 View- PORTABLE-Routine (Xray Chest 1 View- PORTABLE-Routine .) (03.11.22 @ 12:07) >    FINDINGS:  CATHETERS AND TUBES: Tracheostomy tube in place.    PULMONARY: LEFT basilar airspace consolidation and/or effusion obscuring   LEFT diaphragm contour. Small RIGHT basilar airspace disease.  Upper zones clear.  No pneumothorax.    HEART/VASCULAR: The  heart is enlarged in transverse diameter.    BONES: Visualized osseous structures are intact.    IMPRESSION:   LEFT greater than RIGHT bibasilar airspace disease and/or   LEFT effusion obscuring LEFT diaphragm contour..    --- End of Report ---    < end of copied text >  < from: Transthoracic Echocardiogram (03.07.22 @ 07:52) >  Derived Variables:  LVMI: 77 g/m2  RWT: 0.53  Ejection Fraction Visual Estimate: 55 %    ------------------------------------------------------------------------  OBSERVATIONS:  Mitral Valve: Normal mitral valve. Mild mitral  regurgitation.  Aortic Root: Aortic Root: 3.7 cm. Ascending Aorta: 3.8 cm.  Aortic Valve: Trileaflet aortic valve. No aortic stenosis.  Mild aortic regurgitation.  Left Atrium: Severely dilated left atrium.  LA volume index  = 53 cc/m2.  Left Ventricle: Endocardium not well visualized; grossly  normal left ventricular systolic function; in the setting  of atrial fibrillation, ejection fraction can vary with the  R-R interval.  Segmental wall motion could not be assessed.  Upper limit normal left ventricular wall thickness.  Unable  to accurately assess diastolic function due to presence of  arrythmia.  Right Heart: Normal right atrium. Normal right ventricular  size and function. Tricuspid valve not well visualized,  probably normal. Trace tricuspid regurgitation. Normal  pulmonic valve.  Pericardium/PleuraNormal pericardium with no pericardial  effusion. Left pleural effusion.  Hemodynamic: Unable to accurately assess right atrial  pressure due to technical aspects of the study.  Incomplete  tricuspid regurgitation jet precludes accurate assessment  of pulmonary artery systolic pressure.  ------------------------------------------------------------------------  CONCLUSIONS:  1. Normal mitral valve. Mild mitral regurgitation.  2. Trileaflet aortic valve. No aortic stenosis. Mild aortic  regurgitation.  3. Severely dilated left atrium.  LA volume index = 53  cc/m2.  4. Upper limit normal left ventricular wall thickness.  5. Endocardium not well visualized; grossly normal left  ventricular systolic function; in the setting of atrial  fibrillation, ejection fraction can vary with the R-R  interval.  Segmental wall motion could not be assessed.  6. Unable to accurately assess diastolic function due to  presence of arrythmia.  7. Normal right ventricular size and function.  8. Unable to accurately assess right atrial pressure due to  technical aspects of the study.  9. Incomplete tricuspid regurgitation jet precludes  accurate assessment of pulmonary artery systolic pressure.  10. Left pleural effusion.    *** No previous Echo exam.  ------------------------------------------------------------------------  Confirmed on  3/8/2022 - 13:04:57 by Lisa Vidal MD  ------------------------------------------------------------------------    < end of copied text >    Goals of Care Discussion with Family/Proxy/Other

## 2022-03-16 NOTE — PROGRESS NOTE ADULT - REASON FOR ADMISSION
Acute hypoxia respiratory failure

## 2022-03-16 NOTE — PROGRESS NOTE ADULT - PROBLEM SELECTOR PLAN 2
likely aspiration Pneumonia  procalcitonin 0.11  MRSA PCR negative.  Blood and urine culture negative 3/7  sputum Cx- Acinobacter, Pseudomonas, (MDRO)  continue contact/droplet isolation  Completed [facility] 3/14  Pulmonology Dr. Bishop  ID Dr Quach

## 2022-03-30 NOTE — ED ADULT NURSE NOTE - CHPI ED NUR SYMPTOMS POS
I have faxed [facility] for records from   Dr. Scarlett Topete or Dr. Isabella Martin FEVER/SHORTNESS OF BREATH

## 2022-04-08 ENCOUNTER — INPATIENT (INPATIENT)
Facility: HOSPITAL | Age: 77
LOS: 38 days | Discharge: NURSING, REHAB OR LONG-TERM CARE | End: 2022-05-17
Attending: INTERNAL MEDICINE | Admitting: INTERNAL MEDICINE
Payer: COMMERCIAL

## 2022-04-08 VITALS
RESPIRATION RATE: 24 BRPM | SYSTOLIC BLOOD PRESSURE: 93 MMHG | HEIGHT: 68 IN | HEART RATE: 90 BPM | DIASTOLIC BLOOD PRESSURE: 64 MMHG

## 2022-04-08 DIAGNOSIS — Z98.84 BARIATRIC SURGERY STATUS: Chronic | ICD-10-CM

## 2022-04-08 DIAGNOSIS — Z98.89 OTHER SPECIFIED POSTPROCEDURAL STATES: Chronic | ICD-10-CM

## 2022-04-08 DIAGNOSIS — N17.9 ACUTE KIDNEY FAILURE, UNSPECIFIED: ICD-10-CM

## 2022-04-08 DIAGNOSIS — Z96.642 PRESENCE OF LEFT ARTIFICIAL HIP JOINT: Chronic | ICD-10-CM

## 2022-04-08 LAB
ALBUMIN SERPL ELPH-MCNC: 2.5 G/DL — LOW (ref 3.3–5)
ALP SERPL-CCNC: 99 U/L — SIGNIFICANT CHANGE UP (ref 40–120)
ALT FLD-CCNC: 25 U/L — SIGNIFICANT CHANGE UP (ref 4–41)
ANION GAP SERPL CALC-SCNC: 15 MMOL/L — HIGH (ref 7–14)
APPEARANCE UR: ABNORMAL
APTT BLD: 24.2 SEC — LOW (ref 27–36.3)
AST SERPL-CCNC: 38 U/L — SIGNIFICANT CHANGE UP (ref 4–40)
BACTERIA # UR AUTO: ABNORMAL
BASOPHILS # BLD AUTO: 0.02 K/UL — SIGNIFICANT CHANGE UP (ref 0–0.2)
BASOPHILS NFR BLD AUTO: 0.2 % — SIGNIFICANT CHANGE UP (ref 0–2)
BILIRUB SERPL-MCNC: <0.2 MG/DL — SIGNIFICANT CHANGE UP (ref 0.2–1.2)
BILIRUB UR-MCNC: NEGATIVE — SIGNIFICANT CHANGE UP
BLOOD GAS VENOUS COMPREHENSIVE RESULT: SIGNIFICANT CHANGE UP
BUN SERPL-MCNC: 67 MG/DL — HIGH (ref 7–23)
CALCIUM SERPL-MCNC: 8 MG/DL — LOW (ref 8.4–10.5)
CHLORIDE SERPL-SCNC: 100 MMOL/L — SIGNIFICANT CHANGE UP (ref 98–107)
CO2 SERPL-SCNC: 23 MMOL/L — SIGNIFICANT CHANGE UP (ref 22–31)
COLOR SPEC: YELLOW — SIGNIFICANT CHANGE UP
CREAT SERPL-MCNC: 2.39 MG/DL — HIGH (ref 0.5–1.3)
DIFF PNL FLD: ABNORMAL
EGFR: 28 ML/MIN/1.73M2 — LOW
EOSINOPHIL # BLD AUTO: 0.29 K/UL — SIGNIFICANT CHANGE UP (ref 0–0.5)
EOSINOPHIL NFR BLD AUTO: 3.3 % — SIGNIFICANT CHANGE UP (ref 0–6)
GLUCOSE SERPL-MCNC: 101 MG/DL — HIGH (ref 70–99)
GLUCOSE UR QL: NEGATIVE — SIGNIFICANT CHANGE UP
HCT VFR BLD CALC: 24 % — LOW (ref 39–50)
HGB BLD-MCNC: 7.4 G/DL — LOW (ref 13–17)
IANC: 7.56 K/UL — HIGH (ref 1.8–7.4)
IMM GRANULOCYTES NFR BLD AUTO: 1.1 % — SIGNIFICANT CHANGE UP (ref 0–1.5)
INR BLD: 1.23 RATIO — HIGH (ref 0.88–1.16)
KETONES UR-MCNC: ABNORMAL
LEUKOCYTE ESTERASE UR-ACNC: ABNORMAL
LYMPHOCYTES # BLD AUTO: 0.51 K/UL — LOW (ref 1–3.3)
LYMPHOCYTES # BLD AUTO: 5.8 % — LOW (ref 13–44)
MCHC RBC-ENTMCNC: 30.8 GM/DL — LOW (ref 32–36)
MCHC RBC-ENTMCNC: 31.8 PG — SIGNIFICANT CHANGE UP (ref 27–34)
MCV RBC AUTO: 103 FL — HIGH (ref 80–100)
MONOCYTES # BLD AUTO: 0.33 K/UL — SIGNIFICANT CHANGE UP (ref 0–0.9)
MONOCYTES NFR BLD AUTO: 3.7 % — SIGNIFICANT CHANGE UP (ref 2–14)
NEUTROPHILS # BLD AUTO: 7.56 K/UL — HIGH (ref 1.8–7.4)
NEUTROPHILS NFR BLD AUTO: 85.9 % — HIGH (ref 43–77)
NITRITE UR-MCNC: NEGATIVE — SIGNIFICANT CHANGE UP
NRBC # BLD: 0 /100 WBCS — SIGNIFICANT CHANGE UP
NRBC # FLD: 0 K/UL — SIGNIFICANT CHANGE UP
PH UR: 6 — SIGNIFICANT CHANGE UP (ref 5–8)
PLATELET # BLD AUTO: 159 K/UL — SIGNIFICANT CHANGE UP (ref 150–400)
POTASSIUM SERPL-MCNC: 4.8 MMOL/L — SIGNIFICANT CHANGE UP (ref 3.5–5.3)
POTASSIUM SERPL-SCNC: 4.8 MMOL/L — SIGNIFICANT CHANGE UP (ref 3.5–5.3)
PROT SERPL-MCNC: 7 G/DL — SIGNIFICANT CHANGE UP (ref 6–8.3)
PROT UR-MCNC: ABNORMAL
PROTHROM AB SERPL-ACNC: 14.3 SEC — HIGH (ref 10.5–13.4)
RBC # BLD: 2.33 M/UL — LOW (ref 4.2–5.8)
RBC # FLD: 18.5 % — HIGH (ref 10.3–14.5)
RBC CASTS # UR COMP ASSIST: 75 /HPF — HIGH (ref 0–4)
SARS-COV-2 RNA SPEC QL NAA+PROBE: SIGNIFICANT CHANGE UP
SODIUM SERPL-SCNC: 138 MMOL/L — SIGNIFICANT CHANGE UP (ref 135–145)
SP GR SPEC: 1.02 — SIGNIFICANT CHANGE UP (ref 1–1.05)
UROBILINOGEN FLD QL: SIGNIFICANT CHANGE UP
WBC # BLD: 8.81 K/UL — SIGNIFICANT CHANGE UP (ref 3.8–10.5)
WBC # FLD AUTO: 8.81 K/UL — SIGNIFICANT CHANGE UP (ref 3.8–10.5)
WBC UR QL: 15 /HPF — HIGH (ref 0–5)

## 2022-04-08 PROCEDURE — 99285 EMERGENCY DEPT VISIT HI MDM: CPT | Mod: GC

## 2022-04-08 PROCEDURE — 71045 X-RAY EXAM CHEST 1 VIEW: CPT | Mod: 26

## 2022-04-08 PROCEDURE — 99291 CRITICAL CARE FIRST HOUR: CPT

## 2022-04-08 PROCEDURE — 74176 CT ABD & PELVIS W/O CONTRAST: CPT | Mod: 26

## 2022-04-08 PROCEDURE — 70450 CT HEAD/BRAIN W/O DYE: CPT | Mod: 26

## 2022-04-08 RX ORDER — SODIUM CHLORIDE 9 MG/ML
1000 INJECTION INTRAMUSCULAR; INTRAVENOUS; SUBCUTANEOUS ONCE
Refills: 0 | Status: COMPLETED | OUTPATIENT
Start: 2022-04-08 | End: 2022-04-08

## 2022-04-08 RX ORDER — MEROPENEM 1 G/30ML
1000 INJECTION INTRAVENOUS ONCE
Refills: 0 | Status: COMPLETED | OUTPATIENT
Start: 2022-04-08 | End: 2022-04-08

## 2022-04-08 RX ORDER — CHLORHEXIDINE GLUCONATE 213 G/1000ML
15 SOLUTION TOPICAL EVERY 12 HOURS
Refills: 0 | Status: DISCONTINUED | OUTPATIENT
Start: 2022-04-08 | End: 2022-05-17

## 2022-04-08 RX ORDER — ACETAMINOPHEN 500 MG
1000 TABLET ORAL ONCE
Refills: 0 | Status: COMPLETED | OUTPATIENT
Start: 2022-04-08 | End: 2022-04-08

## 2022-04-08 RX ORDER — VANCOMYCIN HCL 1 G
1000 VIAL (EA) INTRAVENOUS ONCE
Refills: 0 | Status: COMPLETED | OUTPATIENT
Start: 2022-04-08 | End: 2022-04-08

## 2022-04-08 RX ADMIN — Medication 1000 MILLIGRAM(S): at 19:00

## 2022-04-08 RX ADMIN — MEROPENEM 100 MILLIGRAM(S): 1 INJECTION INTRAVENOUS at 21:29

## 2022-04-08 RX ADMIN — Medication 1000 MILLIGRAM(S): at 19:45

## 2022-04-08 RX ADMIN — SODIUM CHLORIDE 1000 MILLILITER(S): 9 INJECTION INTRAMUSCULAR; INTRAVENOUS; SUBCUTANEOUS at 19:46

## 2022-04-08 RX ADMIN — SODIUM CHLORIDE 1000 MILLILITER(S): 9 INJECTION INTRAMUSCULAR; INTRAVENOUS; SUBCUTANEOUS at 18:26

## 2022-04-08 RX ADMIN — Medication 400 MILLIGRAM(S): at 18:26

## 2022-04-08 RX ADMIN — Medication 250 MILLIGRAM(S): at 20:06

## 2022-04-08 NOTE — ED ADULT NURSE REASSESSMENT NOTE - NS ED NURSE REASSESS COMMENT FT1
Benson catheter changed.   14 fr benson catheter placed with minimal urine output noted after securement.

## 2022-04-08 NOTE — ED PROVIDER NOTE - CLINICAL SUMMARY MEDICAL DECISION MAKING FREE TEXT BOX
76 yom, Hx: Afib s/p Watchman, gastric sleeve, SDH, L subflacine hernation s/p emergent R hemicraniectomy, Trach Dependent, Seizure disorder (Keppra), complicated ICU course for Pseudomonal HCAP and MDR - now presenting from NH with increasing oxygen requirement and worsening mental status. Exam, presentation, and history concerning for occult infection - consider VAP vs. Pyelo (chronic benson) vs. AMS iso CVA/ICH. Plan: CBC, CMP, EKG, CXR, BCx, UA, UCx, Benson Exchange, Soft Fluids (2/2 to HF history), CT Head, CT Abd/Pelvis, Vanc/Ethan (MDR bacteremia in the past). VSS, guarded prognosis.

## 2022-04-08 NOTE — ED ADULT NURSE NOTE - NSIMPLEMENTINTERV_GEN_ALL_ED
Implemented All Fall with Harm Risk Interventions:  [place] to call system. Call bell, personal items and telephone within reach. Instruct patient to call for assistance. Room bathroom lighting operational. Non-slip footwear when patient is off stretcher. Physically safe environment: no spills, clutter or unnecessary equipment. Stretcher in lowest position, wheels locked, appropriate side rails in place. Provide visual cue, wrist band, yellow gown, etc. Monitor gait and stability. Monitor for mental status changes and reorient to person, place, and time. Review medications for side effects contributing to fall risk. Reinforce activity limits and safety measures with patient and family. Provide visual clues: red socks.

## 2022-04-08 NOTE — ED ADULT NURSE NOTE - OBJECTIVE STATEMENT
Pt received to trauma room C trached and vented from nursing home.    Pt vent dependent at baseline.   Requiring increased amount of O2 today 30% -40% FiO2.    Pt also noted to have altered mental status.    Pt not following commands or able to express needs on assessment in trauma room C.    Limited collateral from nursing home provided.    As per EMS, pt also had "abnormal blood work" on 4/4/22.   Pt placed on hospital ventilator.    Trach site unremarkable.   Inline suction attached.    Pt placed on continuous cardiac monitor with NSR noted.   PMH a-fib.    Pt hypotensive.    Rectal temp as charted with sepsis workup complete.    Fluids, tylenol and abx given.   Stage 2 sacral ulcer noted when pt changed, turned and positioned.   2 new IVs placed.   Garcia catheter in place with dark, cloudy urine noted.    Awaiting further plan of care.

## 2022-04-08 NOTE — ED ADULT NURSE REASSESSMENT NOTE - NS ED NURSE REASSESS COMMENT FT1
Received report from day shift RN. Pt in Tr C. Pt on vented trach, vitally stable with improved BP post medicating with antibiotics. Pt satting 100% with vented trach. Pt nonverbal currently, but has spontaneous eye response with verbal command. Pt suctioned to clear airway. Safety maintained and environment free of clutter.

## 2022-04-08 NOTE — ED PROVIDER NOTE - ATTENDING CONTRIBUTION TO CARE
Francisco Javier Davidson DO:  patient seen and evaluated with the resident.  I was present for key portions of the History & Physical, and I agree with the Impression & Plan. 75 yo m pmh Afib s/p Watchman device on ASA, prior gastric sleeve and history of R occipital hemorrhage (dx 2020) stable on imaging (11/19/2021, pw estuardo. collateral from ems bedside and EMR and nursing home records. reports on 4/4 pt had bloodwork which noted "abnormal electrolytes" and "worsening renal function". today results returned prompting EMS call. on scene ems noted pt to be satting 80% on trach 40 fio2, increased to 50%. upon arrival satting in 90s on 40 fio2. pt unable to provide hx. arrives in seemingly baseline mental status. pt has suffered many different infectious diseases in last two years, will cover w/ broad spectrum abx, obtain ct head, check lytes, plan for admission.

## 2022-04-08 NOTE — ED PROVIDER NOTE - PHYSICAL EXAMINATION
GEN - NAD; chronically ill appearing  HENT - No visible Ecchymosis, No Abrasions, No Lac/Tears, MMM, no discharge  EYES -  PERRL, no conjunctival pallor, no scleral icterus  NECK - Neck supple, No LAD, No Swelling  PULM - Rhonchi B/L,  symmetric breath sounds  CV -  S1 S2, 2+ Pulses B/L UE  GI - (+) abd distension;, no guarding, no rebound, no masses    MSK/EXT- no edema, no gross deformity, warm and well perfused, no calf tenderness/swelling/erythema   NEUROLOGIC - obtunded, vent dependent

## 2022-04-08 NOTE — ED PROVIDER NOTE - OBJECTIVE STATEMENT
76 yom, Hx: Afib s/p Watchman, gastric sleeve, SDH, L subflacine hernation s/p emergent R hemicraniectomy, Trach Dependent, Seizure disorder (Keppra), complicated ICU course for Pseudomonal HCAP and MDR - now presenting from NH with increasing oxygen requirement and worsening mental status. Pt is non-verbal here, vent dependent. Normally at 30% FiO2 now requiring 40%, with increased RR. POCT BG en route 130's per EMS; hypotensive on arrival s/p trace cystalloid en route as BP normalized.

## 2022-04-08 NOTE — ED ADULT NURSE NOTE - CHIEF COMPLAINT QUOTE
EMS states " Patient is coming from [facility] with recent labs show elevated WBC and renal failure and sent in as septic . patient is chroniv vent dependent and responsive to pain at baseline. patient went to trauma C as notification.

## 2022-04-08 NOTE — ED PROVIDER NOTE - COVID-19 ORDERING FACILITY
[FreeTextEntry1] : Covid PCR sent to lab.\par Will follow up by phone once results are available.\par Parent/pt aware of need to quarantine until test is resulted.\par Recommend supportive care including antipyretics, fluids, OTC cough/cold medications if age-appropriate, and nasal saline followed by nasal suction. \par History and physical consistent with pharyngitis.\par Rapid strep test negative in office, throat culture sent to lab.\par Will follow up by phone if throat culture results are positive.\par Advise supportive care. Tylenol or Motrin as needed for pain or fever. Increase fluids, rest.\par Follow up if symptoms persist or worsen.\par \par 
[facility]

## 2022-04-08 NOTE — ED ADULT TRIAGE NOTE - CHIEF COMPLAINT QUOTE
EMS states " Patient is coming from [facility] with recent labs show elevated WBC and renal failure and sent in as septic . patient is chroniv vent dependent and responsive to pain at baseline. patient went to trauma C as notification.

## 2022-04-09 PROBLEM — I48.91 UNSPECIFIED ATRIAL FIBRILLATION: Chronic | Status: ACTIVE | Noted: 2022-03-07

## 2022-04-09 PROBLEM — Z87.438 PERSONAL HISTORY OF OTHER DISEASES OF MALE GENITAL ORGANS: Chronic | Status: ACTIVE | Noted: 2022-03-07

## 2022-04-09 PROBLEM — I62.00 NONTRAUMATIC SUBDURAL HEMORRHAGE, UNSPECIFIED: Chronic | Status: ACTIVE | Noted: 2022-03-07

## 2022-04-09 PROBLEM — Z98.890 OTHER SPECIFIED POSTPROCEDURAL STATES: Chronic | Status: ACTIVE | Noted: 2022-03-07

## 2022-04-09 PROBLEM — J96.90 RESPIRATORY FAILURE, UNSPECIFIED, UNSPECIFIED WHETHER WITH HYPOXIA OR HYPERCAPNIA: Chronic | Status: ACTIVE | Noted: 2022-03-07

## 2022-04-09 PROBLEM — G40.909 EPILEPSY, UNSPECIFIED, NOT INTRACTABLE, WITHOUT STATUS EPILEPTICUS: Chronic | Status: ACTIVE | Noted: 2022-03-07

## 2022-04-09 PROBLEM — D64.9 ANEMIA, UNSPECIFIED: Chronic | Status: ACTIVE | Noted: 2022-03-07

## 2022-04-09 LAB
A1C WITH ESTIMATED AVERAGE GLUCOSE RESULT: 5.9 % — HIGH (ref 4–5.6)
ANION GAP SERPL CALC-SCNC: 14 MMOL/L — SIGNIFICANT CHANGE UP (ref 7–14)
ANION GAP SERPL CALC-SCNC: 15 MMOL/L — HIGH (ref 7–14)
BASE EXCESS BLDA CALC-SCNC: 2.4 MMOL/L — SIGNIFICANT CHANGE UP (ref -2–3)
BLD GP AB SCN SERPL QL: NEGATIVE — SIGNIFICANT CHANGE UP
BLOOD GAS ARTERIAL COMPREHENSIVE RESULT: SIGNIFICANT CHANGE UP
BUN SERPL-MCNC: 71 MG/DL — HIGH (ref 7–23)
BUN SERPL-MCNC: 74 MG/DL — HIGH (ref 7–23)
CALCIUM SERPL-MCNC: 7.7 MG/DL — LOW (ref 8.4–10.5)
CALCIUM SERPL-MCNC: 7.9 MG/DL — LOW (ref 8.4–10.5)
CHLORIDE SERPL-SCNC: 100 MMOL/L — SIGNIFICANT CHANGE UP (ref 98–107)
CHLORIDE SERPL-SCNC: 101 MMOL/L — SIGNIFICANT CHANGE UP (ref 98–107)
CO2 BLDA-SCNC: 28 MMOL/L — HIGH (ref 19–24)
CO2 SERPL-SCNC: 22 MMOL/L — SIGNIFICANT CHANGE UP (ref 22–31)
CO2 SERPL-SCNC: 23 MMOL/L — SIGNIFICANT CHANGE UP (ref 22–31)
CREAT ?TM UR-MCNC: 49 MG/DL — SIGNIFICANT CHANGE UP
CREAT SERPL-MCNC: 2.54 MG/DL — HIGH (ref 0.5–1.3)
CREAT SERPL-MCNC: 2.76 MG/DL — HIGH (ref 0.5–1.3)
EGFR: 23 ML/MIN/1.73M2 — LOW
EGFR: 26 ML/MIN/1.73M2 — LOW
ESTIMATED AVERAGE GLUCOSE: 123 — SIGNIFICANT CHANGE UP
GLUCOSE BLDC GLUCOMTR-MCNC: 111 MG/DL — HIGH (ref 70–99)
GLUCOSE BLDC GLUCOMTR-MCNC: 84 MG/DL — SIGNIFICANT CHANGE UP (ref 70–99)
GLUCOSE BLDC GLUCOMTR-MCNC: 95 MG/DL — SIGNIFICANT CHANGE UP (ref 70–99)
GLUCOSE BLDC GLUCOMTR-MCNC: 97 MG/DL — SIGNIFICANT CHANGE UP (ref 70–99)
GLUCOSE SERPL-MCNC: 86 MG/DL — SIGNIFICANT CHANGE UP (ref 70–99)
GLUCOSE SERPL-MCNC: 96 MG/DL — SIGNIFICANT CHANGE UP (ref 70–99)
HCO3 BLDA-SCNC: 26 MMOL/L — SIGNIFICANT CHANGE UP (ref 21–28)
HCT VFR BLD CALC: 21.1 % — LOW (ref 39–50)
HCT VFR BLD CALC: 22.2 % — LOW (ref 39–50)
HCT VFR BLD CALC: 25.1 % — LOW (ref 39–50)
HGB BLD-MCNC: 6.6 G/DL — CRITICAL LOW (ref 13–17)
HGB BLD-MCNC: 6.8 G/DL — CRITICAL LOW (ref 13–17)
HGB BLD-MCNC: 8.2 G/DL — LOW (ref 13–17)
MAGNESIUM SERPL-MCNC: 2.9 MG/DL — HIGH (ref 1.6–2.6)
MCHC RBC-ENTMCNC: 30.6 GM/DL — LOW (ref 32–36)
MCHC RBC-ENTMCNC: 31.3 GM/DL — LOW (ref 32–36)
MCHC RBC-ENTMCNC: 31.5 PG — SIGNIFICANT CHANGE UP (ref 27–34)
MCHC RBC-ENTMCNC: 31.7 PG — SIGNIFICANT CHANGE UP (ref 27–34)
MCHC RBC-ENTMCNC: 32.2 PG — SIGNIFICANT CHANGE UP (ref 27–34)
MCHC RBC-ENTMCNC: 32.7 GM/DL — SIGNIFICANT CHANGE UP (ref 32–36)
MCV RBC AUTO: 101.4 FL — HIGH (ref 80–100)
MCV RBC AUTO: 102.8 FL — HIGH (ref 80–100)
MCV RBC AUTO: 98.4 FL — SIGNIFICANT CHANGE UP (ref 80–100)
MRSA PCR RESULT.: SIGNIFICANT CHANGE UP
NRBC # BLD: 0 /100 WBCS — SIGNIFICANT CHANGE UP
NRBC # FLD: 0 K/UL — SIGNIFICANT CHANGE UP
NT-PROBNP SERPL-SCNC: HIGH PG/ML
PCO2 BLDA: 38 MMHG — SIGNIFICANT CHANGE UP (ref 35–48)
PH BLDA: 7.45 — SIGNIFICANT CHANGE UP (ref 7.35–7.45)
PHOSPHATE SERPL-MCNC: 3.9 MG/DL — SIGNIFICANT CHANGE UP (ref 2.5–4.5)
PLATELET # BLD AUTO: 132 K/UL — LOW (ref 150–400)
PLATELET # BLD AUTO: 138 K/UL — LOW (ref 150–400)
PLATELET # BLD AUTO: 145 K/UL — LOW (ref 150–400)
PO2 BLDA: 115 MMHG — HIGH (ref 83–108)
POTASSIUM SERPL-MCNC: 3.7 MMOL/L — SIGNIFICANT CHANGE UP (ref 3.5–5.3)
POTASSIUM SERPL-MCNC: 3.9 MMOL/L — SIGNIFICANT CHANGE UP (ref 3.5–5.3)
POTASSIUM SERPL-SCNC: 3.7 MMOL/L — SIGNIFICANT CHANGE UP (ref 3.5–5.3)
POTASSIUM SERPL-SCNC: 3.9 MMOL/L — SIGNIFICANT CHANGE UP (ref 3.5–5.3)
RBC # BLD: 2.08 M/UL — LOW (ref 4.2–5.8)
RBC # BLD: 2.16 M/UL — LOW (ref 4.2–5.8)
RBC # BLD: 2.55 M/UL — LOW (ref 4.2–5.8)
RBC # FLD: 18.1 % — HIGH (ref 10.3–14.5)
RBC # FLD: 18.1 % — HIGH (ref 10.3–14.5)
RBC # FLD: 18.3 % — HIGH (ref 10.3–14.5)
RH IG SCN BLD-IMP: POSITIVE — SIGNIFICANT CHANGE UP
RH IG SCN BLD-IMP: POSITIVE — SIGNIFICANT CHANGE UP
S AUREUS DNA NOSE QL NAA+PROBE: SIGNIFICANT CHANGE UP
SAO2 % BLDA: 99.7 % — HIGH (ref 94–98)
SODIUM SERPL-SCNC: 137 MMOL/L — SIGNIFICANT CHANGE UP (ref 135–145)
SODIUM SERPL-SCNC: 138 MMOL/L — SIGNIFICANT CHANGE UP (ref 135–145)
SODIUM UR-SCNC: <20 MMOL/L — SIGNIFICANT CHANGE UP
T4 FREE SERPL-MCNC: 0.5 NG/DL — LOW (ref 0.9–1.8)
TSH SERPL-MCNC: 47.92 UIU/ML — HIGH (ref 0.27–4.2)
UUN UR-MCNC: 484 MG/DL — SIGNIFICANT CHANGE UP
WBC # BLD: 7 K/UL — SIGNIFICANT CHANGE UP (ref 3.8–10.5)
WBC # BLD: 7.51 K/UL — SIGNIFICANT CHANGE UP (ref 3.8–10.5)
WBC # BLD: 8.07 K/UL — SIGNIFICANT CHANGE UP (ref 3.8–10.5)
WBC # FLD AUTO: 7 K/UL — SIGNIFICANT CHANGE UP (ref 3.8–10.5)
WBC # FLD AUTO: 7.51 K/UL — SIGNIFICANT CHANGE UP (ref 3.8–10.5)
WBC # FLD AUTO: 8.07 K/UL — SIGNIFICANT CHANGE UP (ref 3.8–10.5)

## 2022-04-09 PROCEDURE — 99233 SBSQ HOSP IP/OBS HIGH 50: CPT

## 2022-04-09 PROCEDURE — 99222 1ST HOSP IP/OBS MODERATE 55: CPT | Mod: GC

## 2022-04-09 RX ORDER — ACETAMINOPHEN 500 MG
650 TABLET ORAL EVERY 4 HOURS
Refills: 0 | Status: DISCONTINUED | OUTPATIENT
Start: 2022-04-09 | End: 2022-05-17

## 2022-04-09 RX ORDER — LACTULOSE 10 G/15ML
10 SOLUTION ORAL
Refills: 0 | Status: DISCONTINUED | OUTPATIENT
Start: 2022-04-09 | End: 2022-04-16

## 2022-04-09 RX ORDER — TAMSULOSIN HYDROCHLORIDE 0.4 MG/1
0.4 CAPSULE ORAL AT BEDTIME
Refills: 0 | Status: DISCONTINUED | OUTPATIENT
Start: 2022-04-09 | End: 2022-04-09

## 2022-04-09 RX ORDER — LEVETIRACETAM 250 MG/1
1000 TABLET, FILM COATED ORAL
Refills: 0 | Status: DISCONTINUED | OUTPATIENT
Start: 2022-04-09 | End: 2022-05-05

## 2022-04-09 RX ORDER — AMANTADINE HCL 100 MG
100 CAPSULE ORAL
Refills: 0 | Status: DISCONTINUED | OUTPATIENT
Start: 2022-04-09 | End: 2022-05-17

## 2022-04-09 RX ORDER — AMIODARONE HYDROCHLORIDE 400 MG/1
200 TABLET ORAL DAILY
Refills: 0 | Status: DISCONTINUED | OUTPATIENT
Start: 2022-04-09 | End: 2022-05-17

## 2022-04-09 RX ORDER — MEROPENEM 1 G/30ML
1000 INJECTION INTRAVENOUS EVERY 12 HOURS
Refills: 0 | Status: DISCONTINUED | OUTPATIENT
Start: 2022-04-09 | End: 2022-04-10

## 2022-04-09 RX ORDER — CHLORHEXIDINE GLUCONATE 213 G/1000ML
1 SOLUTION TOPICAL DAILY
Refills: 0 | Status: DISCONTINUED | OUTPATIENT
Start: 2022-04-09 | End: 2022-05-17

## 2022-04-09 RX ORDER — DOXAZOSIN MESYLATE 4 MG
4 TABLET ORAL AT BEDTIME
Refills: 0 | Status: DISCONTINUED | OUTPATIENT
Start: 2022-04-09 | End: 2022-04-10

## 2022-04-09 RX ORDER — CARVEDILOL PHOSPHATE 80 MG/1
6.25 CAPSULE, EXTENDED RELEASE ORAL EVERY 12 HOURS
Refills: 0 | Status: DISCONTINUED | OUTPATIENT
Start: 2022-04-09 | End: 2022-05-05

## 2022-04-09 RX ORDER — HEPARIN SODIUM 5000 [USP'U]/ML
5000 INJECTION INTRAVENOUS; SUBCUTANEOUS EVERY 8 HOURS
Refills: 0 | Status: DISCONTINUED | OUTPATIENT
Start: 2022-04-09 | End: 2022-04-19

## 2022-04-09 RX ORDER — SENNA PLUS 8.6 MG/1
5 TABLET ORAL AT BEDTIME
Refills: 0 | Status: DISCONTINUED | OUTPATIENT
Start: 2022-04-09 | End: 2022-04-19

## 2022-04-09 RX ADMIN — HEPARIN SODIUM 5000 UNIT(S): 5000 INJECTION INTRAVENOUS; SUBCUTANEOUS at 21:29

## 2022-04-09 RX ADMIN — Medication 100 MILLIGRAM(S): at 17:43

## 2022-04-09 RX ADMIN — HEPARIN SODIUM 5000 UNIT(S): 5000 INJECTION INTRAVENOUS; SUBCUTANEOUS at 06:20

## 2022-04-09 RX ADMIN — HEPARIN SODIUM 5000 UNIT(S): 5000 INJECTION INTRAVENOUS; SUBCUTANEOUS at 17:54

## 2022-04-09 RX ADMIN — LEVETIRACETAM 1000 MILLIGRAM(S): 250 TABLET, FILM COATED ORAL at 17:42

## 2022-04-09 RX ADMIN — CHLORHEXIDINE GLUCONATE 1 APPLICATION(S): 213 SOLUTION TOPICAL at 11:36

## 2022-04-09 RX ADMIN — CARVEDILOL PHOSPHATE 6.25 MILLIGRAM(S): 80 CAPSULE, EXTENDED RELEASE ORAL at 06:19

## 2022-04-09 RX ADMIN — Medication 4 MILLIGRAM(S): at 21:30

## 2022-04-09 RX ADMIN — Medication 100 MILLIGRAM(S): at 06:19

## 2022-04-09 RX ADMIN — AMIODARONE HYDROCHLORIDE 200 MILLIGRAM(S): 400 TABLET ORAL at 06:19

## 2022-04-09 RX ADMIN — MEROPENEM 100 MILLIGRAM(S): 1 INJECTION INTRAVENOUS at 17:55

## 2022-04-09 RX ADMIN — CARVEDILOL PHOSPHATE 6.25 MILLIGRAM(S): 80 CAPSULE, EXTENDED RELEASE ORAL at 17:54

## 2022-04-09 RX ADMIN — CHLORHEXIDINE GLUCONATE 15 MILLILITER(S): 213 SOLUTION TOPICAL at 11:36

## 2022-04-09 RX ADMIN — LEVETIRACETAM 1000 MILLIGRAM(S): 250 TABLET, FILM COATED ORAL at 06:20

## 2022-04-09 RX ADMIN — LACTULOSE 10 GRAM(S): 10 SOLUTION ORAL at 06:20

## 2022-04-09 RX ADMIN — CHLORHEXIDINE GLUCONATE 15 MILLILITER(S): 213 SOLUTION TOPICAL at 06:21

## 2022-04-09 RX ADMIN — CHLORHEXIDINE GLUCONATE 15 MILLILITER(S): 213 SOLUTION TOPICAL at 18:40

## 2022-04-09 RX ADMIN — SENNA PLUS 5 MILLILITER(S): 8.6 TABLET ORAL at 21:30

## 2022-04-09 RX ADMIN — LACTULOSE 10 GRAM(S): 10 SOLUTION ORAL at 17:42

## 2022-04-09 NOTE — CONSULT NOTE ADULT - ASSESSMENT
is a 76 year old gentleman with PMH of Afib s/p watchman, gastric sleeve, SDH with L subfalcine herniation after fall (11/2021) s/p emergent R hemicraniectomy, trach/vent/PEG dependant, seizure d/o, and recent hospitalisation in [place] for MDR pseudomonas and CRE acinitobacter baumanii s/p 7 days of zosyn who  was sent in from nursing home for increasing oxygen requirements and worsening mental status. Per report, patient on 30% FiO2 normally, however has required 40% at NH and noted to be tachypneic. Patient seen and examined at bedside. Currently on vent at 30% FiO2, appearing comfortable. Patient remains non-verbal however is awake and alert. In ED patient noted to be febrile to 101.9 however was able to be titrated back down to baseline of 30% FiO2. s/p vancomycin/meropenem in ED.      WORKUP  Sputum cx (3/8): Acinetobacter baumannii/nosocom group (Carbapenem Resistant) sensitive only to tobramycin and amikacin and  Pseudomonas aeruginosa (R to Zosyn and cefepime)  UA (3/8):  Nitrite: Negative, Leuk Esterase: Moderate WBC 15 /HPF Bacteria: Few  MRSA PCR Result.: NotDetec (03-08  CT Abdomen and Pelvis No Cont (04.08.22 @ 23:22): Dependent lung parenchymal opacities, left greater than right, may  represent atelectasis or infection in the appropriate clinical setting.    IMPRESSION  ·	Vent associated Pneumonia    RECOMMENDATIONS  Febrile to 101.9 with no leukocytosis  s/p dose of vanc  Currently on meropenem  IVPB 1000 every 12 hours  MRSA, Blood cx and Urine cx in lab.   Given fevers, Lower lobe opacities and increased O2 requriement on admission, -> concern for possible pneumonia with potential mucus plugging event  c/w airway clearance measures, Chest PT, and suctioning for secretions  Please send sputum cx    PT TO BE SEEN. PRELIM NOTE  PENDING RECS. PLEASE WAIT FOR FINAL RECS AFTER DISCUSSION WITH ATTENDINGLian Hartman MD, PGY4   ID fellow  [employer] Teams Preferred  After 5pm/weekends call [phone]    is a 76 year old gentleman with PMH of Afib s/p watchman, gastric sleeve, SDH with L subfalcine herniation after fall (11/2021) s/p emergent R hemicraniectomy, trach/vent/PEG dependant, seizure d/o, and recent hospitalisation in [place] for MDR pseudomonas and CRE acinitobacter baumanii s/p 7 days of zosyn who  was sent in from nursing home for increasing oxygen requirements and worsening mental status. Per report, patient on 30% FiO2 normally, however has required 40% at NH and noted to be tachypneic. Patient seen and examined at bedside. Currently on vent at 30% FiO2, appearing comfortable. Patient remains non-verbal however is awake and alert. In ED patient noted to be febrile to 101.9 however was able to be titrated back down to baseline of 30% FiO2. s/p vancomycin/meropenem in ED.      WORKUP  Sputum cx (3/8): Acinetobacter baumannii/nosocom group (Carbapenem Resistant) sensitive only to tobramycin and amikacin and  Pseudomonas aeruginosa (R to Zosyn and cefepime)  UA (3/8):  Nitrite: Negative, Leuk Esterase: Moderate WBC 15 /HPF Bacteria: Few  MRSA PCR Result.: NotDetec (03-08  CT Abdomen and Pelvis No Cont (04.08.22 @ 23:22): Dependent lung parenchymal opacities, left greater than right, may  represent atelectasis or infection in the appropriate clinical setting.    IMPRESSION  ·	Vent associated Pneumonia    RECOMMENDATIONS  Febrile to 101.9 with no leukocytosis  s/p dose of vanc  Given fevers, Lower lobe opacities and increased O2 requriement on admission, -> concern for possible pneumonia with potential mucus plugging event  Currently on meropenem  IVPB 1000 every 12 hours -> Continue with meropenem  MRSA, Blood cx and Urine cx in lab.   c/w airway clearance measures, Chest PT, and suctioning for secretions  Please send sputum cx    Pt seen and examined. Case d/w attending and primary team    Josr Hartman MD, PGY4   ID fellow  [employer] Teams Preferred  After 5pm/weekends call [phone]

## 2022-04-09 NOTE — H&P ADULT - ATTENDING COMMENTS
Admit to ICU/RCU with acute on chronic respiratory failure of vent dependent patient likely secondary to mucous plugging.  Continue AED  vent settings 450/7/30-40% titrate to sat >92%, airway clearance, abx for presumed vent related pna  w/u for FLORIAN

## 2022-04-09 NOTE — PATIENT PROFILE ADULT - FALL HARM RISK - HARM RISK INTERVENTIONS
Assistance with ambulation/Assistance OOB with selected safe patient handling equipment/Communicate Risk of Fall with Harm to all staff/Discuss with provider need for PT consult/Monitor gait and stability/Reinforce activity limits and safety measures with patient and family/Tailored Fall Risk Interventions/Visual Cue: Yellow wristband and red socks/Bed in lowest position, wheels locked, appropriate side rails in place/Call bell, personal items and telephone in reach/Instruct patient to call for assistance before getting out of bed or chair/Non-slip footwear when patient is out of bed/[place] to call system/Physically safe environment - no spills, clutter or unnecessary equipment/Purposeful Proactive Rounding/Room/bathroom lighting operational, light cord in reach

## 2022-04-09 NOTE — H&P ADULT - HISTORY OF PRESENT ILLNESS
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     Patient sent in from nursing home for increasing oxygen requirements and worsening mental status. Per report, patient on 30% FiO2 normally, however has required 40% at NH and noted to be tachypneic. Patient seen and examined at bedside. Currently on vent at 30% FiO2, appearing comfortable. Patient remains non-verbal however is awake and alert.     On arrival to ED, patient noted to be febrile to 101.9 however was able to be titrated back down to baseline of 30% FiO2. Given 1g tylenol, 1L NS, and vancomycin/meropenem in ED. Patient to be admitted to RCU for further management.

## 2022-04-09 NOTE — CONSULT NOTE ADULT - SUBJECTIVE AND OBJECTIVE BOX
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 Per report, patient on 30% FiO2 normally, however has required 40% at NH and noted to be tachypneic. Patient seen and examined at bedside. Currently on vent at 30% FiO2, appearing comfortable. Patient remains non-verbal however is awake and alert. In ED patient noted to be febrile to 101.9 however was able to be titrated back down to baseline of 30% FiO2. s/p vancomycin/meropenem in ED.      REVIEW OF SYSTEMS  [  ] ROS unobtainable because:    [ x ] All other systems negative except as noted below    Constitutional:  [ ] fever [ ] chills  [ ] weight loss  [ ]night sweat  [ ]poor appetite/PO intake [ ]fatigue   Skin:  [ ] rash [ ] phlebitis	  Eyes: [ ] icterus [ ] pain  [ ] discharge	  ENMT: [ ] sore throat  [ ] thrush [ ] ulcers [ ] exudates [ ]anosmia  Respiratory: [ ] dyspnea [ ] hemoptysis [ ] cough [ ] sputum	  Cardiovascular:  [ ] chest pain [ ] palpitations [ ] edema	  Gastrointestinal:  [ ] nausea [ ] vomiting [ ] diarrhea [ ] constipation [ ] pain	  Genitourinary:  [ ] dysuria [ ] frequency [ ] hematuria [ ] discharge [ ] flank pain  [ ] incontinence  Musculoskeletal:  [ ] myalgias [ ] arthralgias [ ] arthritis  [ ] back pain  Neurological:  [ ] headache [ ] weakness [ ] seizures  [ ] confusion/altered mental status    prior hospital charts reviewed [V]  primary team notes reviewed [V]  other consultant notes reviewed [V]    PAST MEDICAL & SURGICAL HISTORY:  Essential hypertension  Primary osteoarthritis, unspecified site  Closed fracture of left radius, initial encounter  Morbid obesity, unspecified obesity type h/o. - s/p gastric bypass- lost 113 lbs  KAREN (obstructive sleep apnea) h/o - was on CPAP until gastric bypass surgery  Respiratory failure   Anemia  Subdural hemorrhage  Epilepsy  H/O gastrostomy  History of BPH  Afib  S/P gastric bypass 2008  Status post total replacement of left hip   S/P bunionectomy bilateral  S/P colonoscopy approx 2012  History of abdominoplasty and subsequent cosmetic surgery for removal of excess skin from face    SOCIAL HISTORY:  - Denied smoking/vaping/alcohol/recreational drug use    FAMILY HISTORY:  Family history of renal cell carcinoma (Mother)    Family history of malignant melanoma (Sibling)        Allergies  No Known Allergies        ANTIMICROBIALS:  meropenem  IVPB 1000 every 12 hours      ANTIMICROBIALS (past 90 days):  MEDICATIONS  (STANDING):  meropenem  IVPB   100 mL/Hr IV Intermittent (22 @ 21:29)    vancomycin  IVPB.   250 mL/Hr IV Intermittent (22 @ 20:06)        OTHER MEDS:   MEDICATIONS  (STANDING):  acetaminophen    Suspension .. 650 every 4 hours PRN  amantadine Syrup 100 two times a day  aMIOdarone    Tablet 200 daily  carvedilol 6.25 every 12 hours  doxazosin 4 at bedtime  heparin   Injectable 5000 every 8 hours  lactulose Syrup 10 two times a day  levETIRAcetam  Solution 1000 two times a day  senna Syrup 5 at bedtime      VITALS:  Vital Signs Last 24 Hrs  T(F): 98.7 (22 @ 10:00), Max: 101.9 (22 @ 18:27)    Vital Signs Last 24 Hrs  HR: 80 (22 @ 11:32) (72 - 99)  BP: 103/73 (22 @ 10:00) (90/55 - 109/63)  RR: 13 (22 @ 10:00)  SpO2: 97% (22 @ 11:32) (96% - 100%)  Wt(kg): --    EXAM:    GA: NAD, AOx3  HEENT: oral cavity no lesion  CV: nl S1/S2, no RMG  Lungs: CTAB, No distress  Abd: BS+, soft, nontender, no rebounding pain  Ext: no edema  Neuro: No focal deficits  Skin: Intact  IV: no phlebitis    Labs:                        6.8    7.51  )-----------( 138      ( 2022 08:43 )             22.2         137  |  100  |  71<H>  ----------------------------<  86  3.7   |  22  |  2.54<H>    Ca    7.7<L>      2022 06:43  Phos  3.9       Mg     2.90         TPro  7.0  /  Alb  2.5<L>  /  TBili  <0.2  /  DBili  x   /  AST  38  /  ALT  25  /  AlkPhos  99        WBC Trend:  WBC Count: 7.51 (22 @ 08:43)  WBC Count: 8.07 (22 @ 06:43)  WBC Count: 8.81 (22 @ 18:26)      Auto Neutrophil #: 7.56 K/uL (22 @ 18:26)  Auto Neutrophil #: 14.82 K/uL (22 @ 02:23)      Creatine Trend:  Creatinine, Serum: 2.54 ()  Creatinine, Serum: 2.39 ()      Liver Biochemical Testing Trend:  Alanine Aminotransferase (ALT/SGPT): 25 ()  Alanine Aminotransferase (ALT/SGPT): 29 (03-10)  Alanine Aminotransferase (ALT/SGPT): 26 ()  Alanine Aminotransferase (ALT/SGPT): 31 ()  Alanine Aminotransferase (ALT/SGPT): 29 ()  Aspartate Aminotransferase (AST/SGOT): 38 (22 @ 18:26)  Aspartate Aminotransferase (AST/SGOT): 25 (03-10-22 @ 06:44)  Aspartate Aminotransferase (AST/SGOT): 25 (22 @ 07:15)  Aspartate Aminotransferase (AST/SGOT): 32 (22 @ 06:42)  Aspartate Aminotransferase (AST/SGOT): 25 (22 @ 05:34)  Bilirubin Total, Serum: <0.2 ()  Bilirubin Total, Serum: 0.2 (03-10)  Bilirubin Total, Serum: 0.2 ()  Bilirubin Total, Serum: 0.2 ()  Bilirubin Total, Serum: 0.2 ()      Trend LDH      Auto Eosinophil %: 3.3 % (22 @ 18:26)      Urinalysis Basic - ( 2022 23:15 )    Color: Yellow / Appearance: Slightly Turbid / S.023 / pH: x  Gluc: x / Ketone: Trace  / Bili: Negative / Urobili: <2 mg/dL   Blood: x / Protein: 300 mg/dL / Nitrite: Negative   Leuk Esterase: Moderate / RBC: 75 /HPF / WBC 15 /HPF   Sq Epi: x / Non Sq Epi: x / Bacteria: Few        MICROBIOLOGY:    MRSA PCR Result.: NotDetec (22 @ 18:29)      Culture - Sputum (collected 08 Mar 2022 18:33)  Source: .Sputum Sputum  Final Report:    Numerous Acinetobacter baumannii/nosocom group (Carbapenem Resistant)    Numerous Pseudomonas aeruginosa    Normal Respiratory Cheryl absent  Organism: Acinetobacter baumannii/nosocom group (Carbapenem Resistant)  Pseudomonas aeruginosa  Organism: Pseudomonas aeruginosa    Sensitivities:      -  Amikacin: S <=16      -  Aztreonam: R >16      -  Cefepime: I 16      -  Ceftazidime: R >16      -  Ciprofloxacin: S <=0.25      -  Gentamicin: S <=2      -  Imipenem: S <=1      -  Levofloxacin: S <=0.5      -  Meropenem: S <=1      -  Piperacillin/Tazobactam: R >64      -  Tobramycin: S <=2      Method Type: IZZY  Organism: Acinetobacter baumannii/nosocom group (Carbapenem Resistant)    Sensitivities:      -  Imipenem: R      -  Piperacillin/Tazobactam: R      Method Type: KB  Organism: Acinetobacter baumannii/nosocom group (Carbapenem Resistant)    Sensitivities:      -  Amikacin: S <=16      -  Ampicillin/Sulbactam: I 16/8      -  Cefepime: R >16      -  Ceftazidime: R >16      -  Ciprofloxacin: R >2      -  Gentamicin: R >8      -  Levofloxacin: R >4      -  Meropenem: R >8      -  Tobramycin: S <=2      -  Trimethoprim/Sulfamethoxazole: R >2/38      Method Type: IZZY    Culture - Blood (collected 07 Mar 2022 10:18)  Source: .Blood Blood-Peripheral  Final Report:    No Growth Final    Culture - Blood (collected 07 Mar 2022 10:18)  Source: .Blood Blood-Peripheral  Final Report:    No Growth Final    Culture - Urine (collected 07 Mar 2022 08:41)  Source: Clean Catch Clean Catch (Midstream)  Final Report:    No growth                          COVID-19 PCR: NotDetec (22 @ 18:44)  COVID-19 PCR: NotDetec (22 @ 07:15)  COVID-19 PCR: NotDetec (22 @ 02:23)                  Serum Pro-Brain Natriuretic Peptide: 16487 ()      Blood Gas Arterial, Lactate: 1.8 ( @ 04:20)  Blood Gas Venous - Lactate: 1.8 ( @ 18:26)    A1C with Estimated Average Glucose Result: 5.9 % (22 @ 06:43)    Sedimentation Rate, Erythrocyte: 109 mm/Hr (22 @ 05:34)          RADIOLOGY:  imaging below personally reviewed    < from: Xray Chest 1 View- PORTABLE-Urgent (22 @ 17:36) >  Unchanged left retrocardiac opacity. Right basilar atelectasis.  < end of copied text >    < from: CT Head No Cont (22 @ 23:22) >  Right frontoparietal temporal craniectomy with wire mesh cranioplasty.  Extensive right cerebral gliosis and areas of encephalomalacia with  volume loss either from prior infarct or trauma in the left frontal and  anterior right temporal lobes. Thin chronic subdural along the falx. Appearance of gyriform thickening in the right frontoparietal parenchyma  underneath the cranioplasty is indeterminate. Possibility of intracranial  infection is not excluded. Consider follow-up with contrast-enhanced   brain MRI for better evaluation.   < end of copied text >      < from: CT Abdomen and Pelvis No Cont (22 @ 23:22) >  Dependent lung parenchymal opacities, left greater than right, may  represent atelectasis or infection in the appropriate clinical setting.  Small left pleural effusion with adjacent left basilar compressive  atelectasis. Small pericardial effusion.  Post gastric bypass with gastrostomy tube localized to the excluded  stomach of pancreaticobiliary limb. Slight distention of the excluded  gastric fundus with layering dense material, likely from prior contrast administration. Correlate clinically. No bowel obstruction. Moderate stool of the colon. Correlate for constipation. Coronary artery calcification.   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 Per report, patient on 30% FiO2 normally, however has required 40% at NH and noted to be tachypneic. Patient seen and examined at bedside. Currently on vent at 30% FiO2, appearing comfortable. Patient remains non-verbal however is awake and alert. In ED patient noted to be febrile to 101.9 however was able to be titrated back down to baseline of 30% FiO2. s/p vancomycin/meropenem in ED.      REVIEW OF SYSTEMS  [  ] ROS unobtainable because:    [ x ] All other systems negative except as noted below    Constitutional:  [ ] fever [ ] chills  [ ] weight loss  [ ]night sweat  [ ]poor appetite/PO intake [ ]fatigue   Skin:  [ ] rash [ ] phlebitis	  Eyes: [ ] icterus [ ] pain  [ ] discharge	  ENMT: [ ] sore throat  [ ] thrush [ ] ulcers [ ] exudates [ ]anosmia  Respiratory: [ ] dyspnea [ ] hemoptysis [ ] cough [ ] sputum	  Cardiovascular:  [ ] chest pain [ ] palpitations [ ] edema	  Gastrointestinal:  [ ] nausea [ ] vomiting [ ] diarrhea [ ] constipation [ ] pain	  Genitourinary:  [ ] dysuria [ ] frequency [ ] hematuria [ ] discharge [ ] flank pain  [ ] incontinence  Musculoskeletal:  [ ] myalgias [ ] arthralgias [ ] arthritis  [ ] back pain  Neurological:  [ ] headache [ ] weakness [ ] seizures  [ ] confusion/altered mental status    prior hospital charts reviewed [V]  primary team notes reviewed [V]  other consultant notes reviewed [V]    PAST MEDICAL & SURGICAL HISTORY:  Essential hypertension  Primary osteoarthritis, unspecified site  Closed fracture of left radius, initial encounter  Morbid obesity, unspecified obesity type h/o. - s/p gastric bypass- lost 113 lbs  KAREN (obstructive sleep apnea) h/o - was on CPAP until gastric bypass surgery  Respiratory failure   Anemia  Subdural hemorrhage  Epilepsy  H/O gastrostomy  History of BPH  Afib  S/P gastric bypass 2008  Status post total replacement of left hip   S/P bunionectomy bilateral  S/P colonoscopy approx 2012  History of abdominoplasty and subsequent cosmetic surgery for removal of excess skin from face    SOCIAL HISTORY:  - Denied smoking/vaping/alcohol/recreational drug use    FAMILY HISTORY:  Family history of renal cell carcinoma (Mother)    Family history of malignant melanoma (Sibling)        Allergies  No Known Allergies        ANTIMICROBIALS:  meropenem  IVPB 1000 every 12 hours      ANTIMICROBIALS (past 90 days):  MEDICATIONS  (STANDING):  meropenem  IVPB   100 mL/Hr IV Intermittent (22 @ 21:29)    vancomycin  IVPB.   250 mL/Hr IV Intermittent (22 @ 20:06)        OTHER MEDS:   MEDICATIONS  (STANDING):  acetaminophen    Suspension .. 650 every 4 hours PRN  amantadine Syrup 100 two times a day  aMIOdarone    Tablet 200 daily  carvedilol 6.25 every 12 hours  doxazosin 4 at bedtime  heparin   Injectable 5000 every 8 hours  lactulose Syrup 10 two times a day  levETIRAcetam  Solution 1000 two times a day  senna Syrup 5 at bedtime      VITALS:  Vital Signs Last 24 Hrs  T(F): 98.7 (22 @ 10:00), Max: 101.9 (22 @ 18:27)    Vital Signs Last 24 Hrs  HR: 80 (22 @ 11:32) (72 - 99)  BP: 103/73 (22 @ 10:00) (90/55 - 109/63)  RR: 13 (22 @ 10:00)  SpO2: 97% (22 @ 11:32) (96% - 100%)  Wt(kg): --    EXAM:        Labs:                        6.8    7.51  )-----------( 138      ( 2022 08:43 )             22.2         137  |  100  |  71<H>  ----------------------------<  86  3.7   |  22  |  2.54<H>    Ca    7.7<L>      2022 06:43  Phos  3.9       Mg     2.90         TPro  7.0  /  Alb  2.5<L>  /  TBili  <0.2  /  DBili  x   /  AST  38  /  ALT  25  /  AlkPhos  99        WBC Trend:  WBC Count: 7.51 (22 @ 08:43)  WBC Count: 8.07 (22 @ 06:43)  WBC Count: 8.81 (22 @ 18:26)      Auto Neutrophil #: 7.56 K/uL (22 @ 18:26)  Auto Neutrophil #: 14.82 K/uL (22 @ 02:23)      Creatine Trend:  Creatinine, Serum: 2.54 ()  Creatinine, Serum: 2.39 ()      Liver Biochemical Testing Trend:  Alanine Aminotransferase (ALT/SGPT): 25 ()  Alanine Aminotransferase (ALT/SGPT): 29 (03-10)  Alanine Aminotransferase (ALT/SGPT): 26 ()  Alanine Aminotransferase (ALT/SGPT): 31 ()  Alanine Aminotransferase (ALT/SGPT): 29 ()  Aspartate Aminotransferase (AST/SGOT): 38 (22 @ 18:26)  Aspartate Aminotransferase (AST/SGOT): 25 (03-10-22 @ 06:44)  Aspartate Aminotransferase (AST/SGOT): 25 (22 @ 07:15)  Aspartate Aminotransferase (AST/SGOT): 32 (22 @ 06:42)  Aspartate Aminotransferase (AST/SGOT): 25 (22 @ 05:34)  Bilirubin Total, Serum: <0.2 ()  Bilirubin Total, Serum: 0.2 (03-10)  Bilirubin Total, Serum: 0.2 ()  Bilirubin Total, Serum: 0.2 ()  Bilirubin Total, Serum: 0.2 ()      Trend LDH      Auto Eosinophil %: 3.3 % (22 @ 18:26)      Urinalysis Basic - ( 2022 23:15 )    Color: Yellow / Appearance: Slightly Turbid / S.023 / pH: x  Gluc: x / Ketone: Trace  / Bili: Negative / Urobili: <2 mg/dL   Blood: x / Protein: 300 mg/dL / Nitrite: Negative   Leuk Esterase: Moderate / RBC: 75 /HPF / WBC 15 /HPF   Sq Epi: x / Non Sq Epi: x / Bacteria: Few        MICROBIOLOGY:    MRSA PCR Result.: NotDetec (22 @ 18:29)      Culture - Sputum (collected 08 Mar 2022 18:33)  Source: .Sputum Sputum  Final Report:    Numerous Acinetobacter baumannii/nosocom group (Carbapenem Resistant)    Numerous Pseudomonas aeruginosa    Normal Respiratory Cheryl absent  Organism: Acinetobacter baumannii/nosocom group (Carbapenem Resistant)  Pseudomonas aeruginosa  Organism: Pseudomonas aeruginosa    Sensitivities:      -  Amikacin: S <=16      -  Aztreonam: R >16      -  Cefepime: I 16      -  Ceftazidime: R >16      -  Ciprofloxacin: S <=0.25      -  Gentamicin: S <=2      -  Imipenem: S <=1      -  Levofloxacin: S <=0.5      -  Meropenem: S <=1      -  Piperacillin/Tazobactam: R >64      -  Tobramycin: S <=2      Method Type: IZZY  Organism: Acinetobacter baumannii/nosocom group (Carbapenem Resistant)    Sensitivities:      -  Imipenem: R      -  Piperacillin/Tazobactam: R      Method Type: KB  Organism: Acinetobacter baumannii/nosocom group (Carbapenem Resistant)    Sensitivities:      -  Amikacin: S <=16      -  Ampicillin/Sulbactam: I 16/8      -  Cefepime: R >16      -  Ceftazidime: R >16      -  Ciprofloxacin: R >2      -  Gentamicin: R >8      -  Levofloxacin: R >4      -  Meropenem: R >8      -  Tobramycin: S <=2      -  Trimethoprim/Sulfamethoxazole: R >2/38      Method Type: IZZY    Culture - Blood (collected 07 Mar 2022 10:18)  Source: .Blood Blood-Peripheral  Final Report:    No Growth Final    Culture - Blood (collected 07 Mar 2022 10:18)  Source: .Blood Blood-Peripheral  Final Report:    No Growth Final    Culture - Urine (collected 07 Mar 2022 08:41)  Source: Clean Catch Clean Catch (Midstream)  Final Report:    No growth                          COVID-19 PCR: NotDetec (22 @ 18:44)  COVID-19 PCR: NotDetec (22 @ 07:15)  COVID-19 PCR: NotDetec (22 @ 02:23)                  Serum Pro-Brain Natriuretic Peptide: 64825 ()      Blood Gas Arterial, Lactate: 1.8 ( @ 04:20)  Blood Gas Venous - Lactate: 1.8 ( @ 18:26)    A1C with Estimated Average Glucose Result: 5.9 % (22 @ 06:43)    Sedimentation Rate, Erythrocyte: 109 mm/Hr (22 @ 05:34)          RADIOLOGY:  imaging below personally reviewed    < from: Xray Chest 1 View- PORTABLE-Urgent (22 @ 17:36) >  Unchanged left retrocardiac opacity. Right basilar atelectasis.  < end of copied text >    < from: CT Head No Cont (22 @ 23:22) >  Right frontoparietal temporal craniectomy with wire mesh cranioplasty.  Extensive right cerebral gliosis and areas of encephalomalacia with  volume loss either from prior infarct or trauma in the left frontal and  anterior right temporal lobes. Thin chronic subdural along the falx. Appearance of gyriform thickening in the right frontoparietal parenchyma  underneath the cranioplasty is indeterminate. Possibility of intracranial  infection is not excluded. Consider follow-up with contrast-enhanced   brain MRI for better evaluation.   < end of copied text >      < from: CT Abdomen and Pelvis No Cont (22 @ 23:22) >  Dependent lung parenchymal opacities, left greater than right, may  represent atelectasis or infection in the appropriate clinical setting.  Small left pleural effusion with adjacent left basilar compressive  atelectasis. Small pericardial effusion.  Post gastric bypass with gastrostomy tube localized to the excluded  stomach of pancreaticobiliary limb. Slight distention of the excluded  gastric fundus with layering dense material, likely from prior contrast administration. Correlate clinically. No bowel obstruction. Moderate stool of the colon. Correlate for constipation. Coronary artery calcification.   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 Per report, patient on 30% FiO2 normally, however has required 40% at NH and noted to be tachypneic. Patient seen and examined at bedside. Currently on vent at 30% FiO2, appearing comfortable. Patient remains non-verbal however is awake and alert. In ED patient noted to be febrile to 101.9 however was able to be titrated back down to baseline of 30% FiO2. s/p vancomycin/meropenem in ED.      REVIEW OF SYSTEMS  [  ] ROS unobtainable because:    [ x ] All other systems negative except as noted below    Constitutional:  [ ] fever [ ] chills  [ ] weight loss  [ ]night sweat  [ ]poor appetite/PO intake [ ]fatigue   Skin:  [ ] rash [ ] phlebitis	  Eyes: [ ] icterus [ ] pain  [ ] discharge	  ENMT: [ ] sore throat  [ ] thrush [ ] ulcers [ ] exudates [ ]anosmia  Respiratory: [ ] dyspnea [ ] hemoptysis [ ] cough [ ] sputum	  Cardiovascular:  [ ] chest pain [ ] palpitations [ ] edema	  Gastrointestinal:  [ ] nausea [ ] vomiting [ ] diarrhea [ ] constipation [ ] pain	  Genitourinary:  [ ] dysuria [ ] frequency [ ] hematuria [ ] discharge [ ] flank pain  [ ] incontinence  Musculoskeletal:  [ ] myalgias [ ] arthralgias [ ] arthritis  [ ] back pain  Neurological:  [ ] headache [ ] weakness [ ] seizures  [ ] confusion/altered mental status    prior hospital charts reviewed [V]  primary team notes reviewed [V]  other consultant notes reviewed [V]    PAST MEDICAL & SURGICAL HISTORY:  Essential hypertension  Primary osteoarthritis, unspecified site  Closed fracture of left radius, initial encounter  Morbid obesity, unspecified obesity type h/o. - s/p gastric bypass- lost 113 lbs  KAREN (obstructive sleep apnea) h/o - was on CPAP until gastric bypass surgery  Respiratory failure   Anemia  Subdural hemorrhage  Epilepsy  H/O gastrostomy  History of BPH  Afib  S/P gastric bypass 2008  Status post total replacement of left hip   S/P bunionectomy bilateral  S/P colonoscopy approx 2012  History of abdominoplasty and subsequent cosmetic surgery for removal of excess skin from face    SOCIAL HISTORY:  - Denied smoking/vaping/alcohol/recreational drug use    FAMILY HISTORY:  Family history of renal cell carcinoma (Mother)  Family history of malignant melanoma (Sibling)    Allergies  No Known Allergies    ANTIMICROBIALS:  vancomycin  IVPB (4/8 x1)  meropenem  IVPB 1000 every 12 hours (-)    MEDICATIONS  (STANDING):  acetaminophen    Suspension .. 650 every 4 hours PRN  amantadine Syrup 100 two times a day  aMIOdarone    Tablet 200 daily  carvedilol 6.25 every 12 hours  doxazosin 4 at bedtime  heparin   Injectable 5000 every 8 hours  lactulose Syrup 10 two times a day  levETIRAcetam  Solution 1000 two times a day  senna Syrup 5 at bedtime    Vital Signs Last 24 Hrs  T(F): 98.7 (22 @ 10:00), Max: 101.9 (22 @ 18:27)    Vital Signs Last 24 Hrs  HR: 80 (22 @ 11:32) (72 - 99)  BP: 103/73 (22 @ 10:00) (90/55 - 109/63)  RR: 13 (22 @ 10:00)  SpO2: 97% (22 @ 11:32) (96% - 100%)  Wt(kg): --    EXAM:                              6.8    7.51  )-----------( 138      ( 2022 08:43 )             22.2     137  |  100  |  71<H>  ----------------------------<  86  3.7   |  22  |  2.54<H>    Ca    7.7<L>      2022 06:43  Phos  3.9     04-  Mg     2.90     04-    TPro  7.0  /  Alb  2.5<L>  /  TBili  <0.2  /  DBili  x   /  AST  38  /  ALT  25  /  AlkPhos  99  04-08    WBC Count: 7.51 (22 @ 08:43)  WBC Count: 8.07 (22 @ 06:43)  WBC Count: 8.81 (22 @ 18:26)    Blood Gas Arterial, Lactate: 1.8 ( @ 04:20)  Blood Gas Venous - Lactate: 1.8 ( @ 18:26)    Urinalysis Basic - ( 2022 23:15 )  Color: Yellow / Appearance: Slightly Turbid / S.023 / pH: x  Gluc: x / Ketone: Trace  / Bili: Negative / Urobili: <2 mg/dL   Blood: x / Protein: 300 mg/dL / Nitrite: Negative   Leuk Esterase: Moderate / RBC: 75 /HPF / WBC 15 /HPF   Sq Epi: x / Non Sq Epi: x / Bacteria: Few    MICROBIOLOGY:   BC,  pending    MRSA PCR Result.: NotDete (22 @ 18:29)    Culture - Sputum (collected 08 Mar 2022 18:33)  Source: .Sputum Sputum  Final Report:    Numerous Acinetobacter baumannii/nosocom group (Carbapenem Resistant)    Numerous Pseudomonas aeruginosa    Normal Respiratory Cheryl absent  Organism: Acinetobacter baumannii/nosocom group (Carbapenem Resistant)  Pseudomonas aeruginosa  Organism: Pseudomonas aeruginosa    Sensitivities:      -  Amikacin: S <=16      -  Aztreonam: R >16      -  Cefepime: I 16      -  Ceftazidime: R >16      -  Ciprofloxacin: S <=0.25      -  Gentamicin: S <=2      -  Imipenem: S <=1      -  Levofloxacin: S <=0.5      -  Meropenem: S <=1      -  Piperacillin/Tazobactam: R >64      -  Tobramycin: S <=2      Method Type: IZZY  Organism: Acinetobacter baumannii/nosocom group (Carbapenem Resistant)    Sensitivities:      -  Imipenem: R      -  Piperacillin/Tazobactam: R      Method Type: KB  Organism: Acinetobacter baumannii/nosocom group (Carbapenem Resistant)    Sensitivities:      -  Amikacin: S <=16      -  Ampicillin/Sulbactam: I 16/8      -  Cefepime: R >16      -  Ceftazidime: R >16      -  Ciprofloxacin: R >2      -  Gentamicin: R >8      -  Levofloxacin: R >4      -  Meropenem: R >8      -  Tobramycin: S <=2      -  Trimethoprim/Sulfamethoxazole: R >2/38      Method Type: IZZY    Culture - Blood (collected 07 Mar 2022 10:18)  Source: .Blood Blood-Peripheral  Final Report:    No Growth Final    Culture - Blood (collected 07 Mar 2022 10:18)  Source: .Blood Blood-Peripheral  Final Report:    No Growth Final    Culture - Urine (collected 07 Mar 2022 08:41)  Source: Clean Catch Clean Catch (Midstream)  Final Report:    No growth  COVID-19 PCR: NotDetec (22 @ 18:44)  COVID-19 PCR: NotDetec (22 @ 07:15)  COVID-19 PCR: NotDetec (22 @ 02:23)    RADIOLOGY:  imaging below personally reviewed    Xray Chest 1 View- PORTABLE-Urgent (22 @ 17:36) >  Unchanged left retrocardiac opacity. Right basilar atelectasis.    CT Head No Cont (22 @ 23:22) >  Right frontoparietal temporal craniectomy with wire mesh cranioplasty.  Extensive right cerebral gliosis and areas of encephalomalacia with  volume loss either from prior infarct or trauma in the left frontal and  anterior right temporal lobes. Thin chronic subdural along the falx. Appearance of gyriform thickening in the right frontoparietal parenchyma  underneath the cranioplasty is indeterminate. Possibility of intracranial  infection is not excluded. Consider follow-up with contrast-enhanced   brain MRI for better evaluation.     CT Abdomen and Pelvis No Cont (22 @ 23:22) >  Dependent lung parenchymal opacities, left greater than right, may represent atelectasis or infection in the appropriate clinical setting.  Small left pleural effusion with adjacent left basilar compressive  atelectasis. Small pericardial effusion.  Post gastric bypass with gastrostomy tube localized to the excluded  stomach of pancreaticobiliary limb. Slight distention of the excluded  gastric fundus with layering dense material, likely from prior contrast administration. Correlate clinically. No bowel obstruction. Moderate stool of the colon. Correlate for constipation. Coronary artery calcification.      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 Per report, patient on 30% FiO2 normally, however has required 40% at NH and noted to be tachypneic. Patient seen and examined at bedside. Currently on vent at 30% FiO2, appearing comfortable. Patient remains non-verbal however is awake and alert. In ED patient noted to be febrile to 101.9 however was able to be titrated back down to baseline of 30% FiO2. s/p vancomycin/meropenem in ED.      REVIEW OF SYSTEMS  [  ] ROS unobtainable because:    [ x ] All other systems negative except as noted below    Constitutional:  [ ] fever [ ] chills  [ ] weight loss  [ ]night sweat  [ ]poor appetite/PO intake [ ]fatigue   Skin:  [ ] rash [ ] phlebitis	  Eyes: [ ] icterus [ ] pain  [ ] discharge	  ENMT: [ ] sore throat  [ ] thrush [ ] ulcers [ ] exudates [ ]anosmia  Respiratory: [ ] dyspnea [ ] hemoptysis [ ] cough [ ] sputum	  Cardiovascular:  [ ] chest pain [ ] palpitations [ ] edema	  Gastrointestinal:  [ ] nausea [ ] vomiting [ ] diarrhea [ ] constipation [ ] pain	  Genitourinary:  [ ] dysuria [ ] frequency [ ] hematuria [ ] discharge [ ] flank pain  [ ] incontinence  Musculoskeletal:  [ ] myalgias [ ] arthralgias [ ] arthritis  [ ] back pain  Neurological:  [ ] headache [ ] weakness [ ] seizures  [ ] confusion/altered mental status    prior hospital charts reviewed [V]  primary team notes reviewed [V]  other consultant notes reviewed [V]    PAST MEDICAL & SURGICAL HISTORY:  Essential hypertension  Primary osteoarthritis, unspecified site  Closed fracture of left radius, initial encounter  Morbid obesity, unspecified obesity type h/o. - s/p gastric bypass- lost 113 lbs  KARNE (obstructive sleep apnea) h/o - was on CPAP until gastric bypass surgery  Respiratory failure   Anemia  Subdural hemorrhage  Epilepsy  H/O gastrostomy  History of BPH  Afib  S/P gastric bypass 2008  Status post total replacement of left hip   S/P bunionectomy bilateral  S/P colonoscopy approx 2012  History of abdominoplasty and subsequent cosmetic surgery for removal of excess skin from face    SOCIAL HISTORY:  - Denied smoking/vaping/alcohol/recreational drug use    FAMILY HISTORY:  Family history of renal cell carcinoma (Mother)  Family history of malignant melanoma (Sibling)    Allergies  No Known Allergies    ANTIMICROBIALS:  vancomycin  IVPB (4/8 x1)  meropenem  IVPB 1000 every 12 hours (-)    MEDICATIONS  (STANDING):  acetaminophen    Suspension .. 650 every 4 hours PRN  amantadine Syrup 100 two times a day  aMIOdarone    Tablet 200 daily  carvedilol 6.25 every 12 hours  doxazosin 4 at bedtime  heparin   Injectable 5000 every 8 hours  lactulose Syrup 10 two times a day  levETIRAcetam  Solution 1000 two times a day  senna Syrup 5 at bedtime    Vital Signs Last 24 Hrs  T(F): 98.7 (22 @ 10:00), Max: 101.9 (22 @ 18:27)    Vital Signs Last 24 Hrs  HR: 80 (22 @ 11:32) (72 - 99)  BP: 103/73 (22 @ 10:00) (90/55 - 109/63)  RR: 13 (22 @ 10:00)  SpO2: 97% (22 @ 11:32) (96% - 100%)  Wt(kg): --    PHYSICAL EXAM:  Constitutional: non-toxic  HEAD/EYES: anicteric  ENT:  trache site okay  Cardiovascular:   normal S1, S2  Respiratory:  coarse b/l breath sounds  GI:  soft, non-tender, normal bowel sounds  :  benson with cloudy urine and debris  Musculoskeletal:  no synovitis, normal ROM  Neurologic: awake  Skin:  no rash  Psychiatric:  awake, appropriate mood                       6.8    7.51  )-----------( 138      ( 2022 08:43 )             22.2     137  |  100  |  71<H>  ----------------------------<  86  3.7   |  22  |  2.54<H>    Ca    7.7<L>      2022 06:43  Phos  3.9       Mg     2.90         TPro  7.0  /  Alb  2.5<L>  /  TBili  <0.2  /  DBili  x   /  AST  38  /  ALT  25  /  AlkPhos  99      WBC Count: 7.51 (22 @ 08:43)  WBC Count: 8.07 (22 @ 06:43)  WBC Count: 8.81 (22 @ 18:26)    Blood Gas Arterial, Lactate: 1.8 ( @ 04:20)  Blood Gas Venous - Lactate: 1.8 ( @ 18:26)    Urinalysis Basic - ( 2022 23:15 )  Color: Yellow / Appearance: Slightly Turbid / S.023 / pH: x  Gluc: x / Ketone: Trace  / Bili: Negative / Urobili: <2 mg/dL   Blood: x / Protein: 300 mg/dL / Nitrite: Negative   Leuk Esterase: Moderate / RBC: 75 /HPF / WBC 15 /HPF   Sq Epi: x / Non Sq Epi: x / Bacteria: Few    MICROBIOLOGY:   BC,  pending    MRSA PCR Result.: Jackelyn (22 @ 18:29)    Culture - Sputum (collected 08 Mar 2022 18:33)  Source: .Sputum Sputum  Final Report:    Numerous Acinetobacter baumannii/nosocom group (Carbapenem Resistant)    Numerous Pseudomonas aeruginosa    Normal Respiratory Cheryl absent  Organism: Acinetobacter baumannii/nosocom group (Carbapenem Resistant)  Pseudomonas aeruginosa  Organism: Pseudomonas aeruginosa    Sensitivities:      -  Amikacin: S <=16      -  Aztreonam: R >16      -  Cefepime: I 16      -  Ceftazidime: R >16      -  Ciprofloxacin: S <=0.25      -  Gentamicin: S <=2      -  Imipenem: S <=1      -  Levofloxacin: S <=0.5      -  Meropenem: S <=1      -  Piperacillin/Tazobactam: R >64      -  Tobramycin: S <=2      Method Type: IZZY  Organism: Acinetobacter baumannii/nosocom group (Carbapenem Resistant)    Sensitivities:      -  Imipenem: R      -  Piperacillin/Tazobactam: R      Method Type: KB  Organism: Acinetobacter baumannii/nosocom group (Carbapenem Resistant)    Sensitivities:      -  Amikacin: S <=16      -  Ampicillin/Sulbactam: I 16/8      -  Cefepime: R >16      -  Ceftazidime: R >16      -  Ciprofloxacin: R >2      -  Gentamicin: R >8      -  Levofloxacin: R >4      -  Meropenem: R >8      -  Tobramycin: S <=2      -  Trimethoprim/Sulfamethoxazole: R >2/38      Method Type: IZZY    Culture - Blood (collected 07 Mar 2022 10:18)  Source: .Blood Blood-Peripheral  Final Report:    No Growth Final    Culture - Blood (collected 07 Mar 2022 10:18)  Source: .Blood Blood-Peripheral  Final Report:    No Growth Final    Culture - Urine (collected 07 Mar 2022 08:41)  Source: Clean Catch Clean Catch (Midstream)  Final Report:    No growth  COVID-19 PCR: NotDetec (22 @ 18:44)  COVID-19 PCR: NotDetec (22 @ 07:15)  COVID-19 PCR: NotDetec (22 @ 02:23)    RADIOLOGY:  imaging below personally reviewed    Xray Chest 1 View- PORTABLE-Urgent (22 @ 17:36) >  Unchanged left retrocardiac opacity. Right basilar atelectasis.    CT Head No Cont (22 @ 23:22) >  Right frontoparietal temporal craniectomy with wire mesh cranioplasty.  Extensive right cerebral gliosis and areas of encephalomalacia with  volume loss either from prior infarct or trauma in the left frontal and  anterior right temporal lobes. Thin chronic subdural along the falx. Appearance of gyriform thickening in the right frontoparietal parenchyma  underneath the cranioplasty is indeterminate. Possibility of intracranial  infection is not excluded. Consider follow-up with contrast-enhanced   brain MRI for better evaluation.     CT Abdomen and Pelvis No Cont (22 @ 23:22) >  Dependent lung parenchymal opacities, left greater than right, may represent atelectasis or infection in the appropriate clinical setting.  Small left pleural effusion with adjacent left basilar compressive  atelectasis. Small pericardial effusion.  Post gastric bypass with gastrostomy tube localized to the excluded  stomach of pancreaticobiliary limb. Slight distention of the excluded  gastric fundus with layering dense material, likely from prior contrast administration. Correlate clinically. No bowel obstruction. Moderate stool of the colon. Correlate for constipation. Coronary artery calcification.      Patient is a 76y old  Male who presents with a chief complaint of Hypoxia (2022 12:39)    HPI:  is a 76 year old gentleman with PMH of Afib s/p watchman, gastric sleeve, SDH with L subfalcine herniation after fall (2021) s/p emergent R hemicraniectomy, trach/vent/PEG dependant, seizure d/o, and recent hospitalisation in [place] for MDR pseudomonas and CRE acinitobacter baumanii s/p 7 days of zosyn who  was sent in from nursing home for increasing oxygen requirements and worsening mental status. Per report, patient on 30% FiO2 normally, however has required 40% at NH and noted to be tachypneic. Patient seen and examined at bedside. Currently on vent at 30% FiO2, appearing comfortable. Patient remains non-verbal however is awake and alert. In ED patient noted to be febrile to 101.9 however was able to be titrated back down to baseline of 30% FiO2. s/p vancomycin/meropenem in ED.      REVIEW OF SYSTEMS  [ x ] ROS unobtainable because:  Pt is non verbal  [ ] All other systems negative except as noted below    Constitutional:  [ ] fever [ ] chills  [ ] weight loss  [ ]night sweat  [ ]poor appetite/PO intake [ ]fatigue   Skin:  [ ] rash [ ] phlebitis	  Eyes: [ ] icterus [ ] pain  [ ] discharge	  ENMT: [ ] sore throat  [ ] thrush [ ] ulcers [ ] exudates [ ]anosmia  Respiratory: [ ] dyspnea [ ] hemoptysis [ ] cough [ ] sputum	  Cardiovascular:  [ ] chest pain [ ] palpitations [ ] edema	  Gastrointestinal:  [ ] nausea [ ] vomiting [ ] diarrhea [ ] constipation [ ] pain	  Genitourinary:  [ ] dysuria [ ] frequency [ ] hematuria [ ] discharge [ ] flank pain  [ ] incontinence  Musculoskeletal:  [ ] myalgias [ ] arthralgias [ ] arthritis  [ ] back pain  Neurological:  [ ] headache [ ] weakness [ ] seizures  [ ] confusion/altered mental status    prior hospital charts reviewed [V]  primary team notes reviewed [V]  other consultant notes reviewed [V]    PAST MEDICAL & SURGICAL HISTORY:  Essential hypertension  Primary osteoarthritis, unspecified site  Closed fracture of left radius, initial encounter  Morbid obesity, unspecified obesity type h/o. - s/p gastric bypass- lost 113 lbs  KAREN (obstructive sleep apnea) h/o - was on CPAP until gastric bypass surgery  Respiratory failure   Anemia  Subdural hemorrhage  Epilepsy  H/O gastrostomy  History of BPH  Afib  S/P gastric bypass   Status post total replacement of left hip   S/P bunionectomy bilateral  S/P colonoscopy approx 2012  History of abdominoplasty and subsequent cosmetic surgery for removal of excess skin from face    SOCIAL HISTORY:  - Denied smoking/vaping/alcohol/recreational drug use    FAMILY HISTORY:  Family history of renal cell carcinoma (Mother)  Family history of malignant melanoma (Sibling)    Allergies  No Known Allergies    ANTIMICROBIALS:  vancomycin  IVPB (4/8 x1)  meropenem  IVPB 1000 every 12 hours (-)    MEDICATIONS  (STANDING):  acetaminophen    Suspension .. 650 every 4 hours PRN  amantadine Syrup 100 two times a day  aMIOdarone    Tablet 200 daily  carvedilol 6.25 every 12 hours  doxazosin 4 at bedtime  heparin   Injectable 5000 every 8 hours  lactulose Syrup 10 two times a day  levETIRAcetam  Solution 1000 two times a day  senna Syrup 5 at bedtime    Vital Signs Last 24 Hrs  T(F): 98.7 (22 @ 10:00), Max: 101.9 (22 @ 18:27)    Vital Signs Last 24 Hrs  HR: 80 (22 @ 11:32) (72 - 99)  BP: 103/73 (22 @ 10:00) (90/55 - 109/63)  RR: 13 (22 @ 10:00)  SpO2: 97% (22 @ 11:32) (96% - 100%)  Wt(kg): --    PHYSICAL EXAM:  Constitutional: non-toxic  HEAD/EYES: anicteric  ENT:  trache site okay, some purulent sputum  Cardiovascular:   normal S1, S2  Respiratory:  coarse b/l breath sounds  GI:  soft, non-tender, normal bowel sounds  :  benson with cloudy urine and debris  Musculoskeletal:  no synovitis, normal ROM  Neurologic: awake  Skin:  no rash  Psychiatric:  awake, appropriate mood                       6.8    7.51  )-----------( 138      ( 2022 08:43 )             22.2     137  |  100  |  71<H>  ----------------------------<  86  3.7   |  22  |  2.54<H>    Ca    7.7<L>      2022 06:43  Phos  3.9       Mg     2.90         TPro  7.0  /  Alb  2.5<L>  /  TBili  <0.2  /  DBili  x   /  AST  38  /  ALT  25  /  AlkPhos  99      WBC Count: 7.51 (22 @ 08:43)  WBC Count: 8.07 (22 @ 06:43)  WBC Count: 8.81 (22 @ 18:26)    Blood Gas Arterial, Lactate: 1.8 ( @ 04:20)  Blood Gas Venous - Lactate: 1.8 ( @ 18:26)    Urinalysis Basic - ( 2022 23:15 )  Color: Yellow / Appearance: Slightly Turbid / S.023 / pH: x  Gluc: x / Ketone: Trace  / Bili: Negative / Urobili: <2 mg/dL   Blood: x / Protein: 300 mg/dL / Nitrite: Negative   Leuk Esterase: Moderate / RBC: 75 /HPF / WBC 15 /HPF   Sq Epi: x / Non Sq Epi: x / Bacteria: Few    MICROBIOLOGY:   BC,  pending    MRSA PCR Result.: Jackelyn (22 @ 18:29)    Culture - Sputum (collected 08 Mar 2022 18:33)  Source: .Sputum Sputum  Final Report:    Numerous Acinetobacter baumannii/nosocom group (Carbapenem Resistant)    Numerous Pseudomonas aeruginosa    Normal Respiratory Cheryl absent  Organism: Acinetobacter baumannii/nosocom group (Carbapenem Resistant)  Pseudomonas aeruginosa  Organism: Pseudomonas aeruginosa    Sensitivities:      -  Amikacin: S <=16      -  Aztreonam: R >16      -  Cefepime: I 16      -  Ceftazidime: R >16      -  Ciprofloxacin: S <=0.25      -  Gentamicin: S <=2      -  Imipenem: S <=1      -  Levofloxacin: S <=0.5      -  Meropenem: S <=1      -  Piperacillin/Tazobactam: R >64      -  Tobramycin: S <=2      Method Type: IZZY  Organism: Acinetobacter baumannii/nosocom group (Carbapenem Resistant)    Sensitivities:      -  Imipenem: R      -  Piperacillin/Tazobactam: R      Method Type: KB  Organism: Acinetobacter baumannii/nosocom group (Carbapenem Resistant)    Sensitivities:      -  Amikacin: S <=16      -  Ampicillin/Sulbactam: I 16/8      -  Cefepime: R >16      -  Ceftazidime: R >16      -  Ciprofloxacin: R >2      -  Gentamicin: R >8      -  Levofloxacin: R >4      -  Meropenem: R >8      -  Tobramycin: S <=2      -  Trimethoprim/Sulfamethoxazole: R >2/38      Method Type: IZZY    Culture - Blood (collected 07 Mar 2022 10:18)  Source: .Blood Blood-Peripheral  Final Report:    No Growth Final    Culture - Blood (collected 07 Mar 2022 10:18)  Source: .Blood Blood-Peripheral  Final Report:    No Growth Final    Culture - Urine (collected 07 Mar 2022 08:41)  Source: Clean Catch Clean Catch (Midstream)  Final Report:    No growth  COVID-19 PCR: NotDetec (22 @ 18:44)  COVID-19 PCR: NotDetec (22 @ 07:15)  COVID-19 PCR: NotDetec (22 @ 02:23)    RADIOLOGY:  imaging below personally reviewed    Xray Chest 1 View- PORTABLE-Urgent (22 @ 17:36) >  Unchanged left retrocardiac opacity. Right basilar atelectasis.    CT Head No Cont (22 @ 23:22) >  Right frontoparietal temporal craniectomy with wire mesh cranioplasty.  Extensive right cerebral gliosis and areas of encephalomalacia with  volume loss either from prior infarct or trauma in the left frontal and  anterior right temporal lobes. Thin chronic subdural along the falx. Appearance of gyriform thickening in the right frontoparietal parenchyma  underneath the cranioplasty is indeterminate. Possibility of intracranial  infection is not excluded. Consider follow-up with contrast-enhanced   brain MRI for better evaluation.     CT Abdomen and Pelvis No Cont (22 @ 23:22) >  Dependent lung parenchymal opacities, left greater than right, may represent atelectasis or infection in the appropriate clinical setting.  Small left pleural effusion with adjacent left basilar compressive  atelectasis. Small pericardial effusion.  Post gastric bypass with gastrostomy tube localized to the excluded  stomach of pancreaticobiliary limb. Slight distention of the excluded  gastric fundus with layering dense material, likely from prior contrast administration. Correlate clinically. No bowel obstruction. Moderate stool of the colon. Correlate for constipation. Coronary artery calcification.

## 2022-04-09 NOTE — H&P ADULT - NSHPLABSRESULTS_GEN_ALL_CORE
LABS:                         7.4    8.81  )-----------( 159      ( 2022 18:26 )             24.0     04    138  |  100  |  67<H>  ----------------------------<  101<H>  4.8   |  23  |  2.39<H>    Ca    8.0<L>      2022 18:26    TPro  7.0  /  Alb  2.5<L>  /  TBili  <0.2  /  DBili  x   /  AST  38  /  ALT  25  /  AlkPhos  99  04-08    PT/INR - ( 2022 18:26 )   PT: 14.3 sec;   INR: 1.23 ratio      PTT - ( 2022 18:26 )  PTT:24.2 sec  Urinalysis Basic - ( 2022 23:15 )    Color: Yellow / Appearance: Slightly Turbid / S.023 / pH: x  Gluc: x / Ketone: Trace  / Bili: Negative / Urobili: <2 mg/dL   Blood: x / Protein: 300 mg/dL / Nitrite: Negative   Leuk Esterase: Moderate / RBC: 75 /HPF / WBC 15 /HPF   Sq Epi: x / Non Sq Epi: x / Bacteria: Few    RADIOLOGY, EKG & ADDITIONAL TESTS: Reviewed.

## 2022-04-09 NOTE — CONSULT NOTE ADULT - ATTENDING COMMENTS
76M with Afib s/p watchman, gastric sleeve, SDH with L subfalcine herniation after fall (11/2021) s/p emergent R hemicraniectomy, trach/vent/PEG dependant, seizure d/o, and recent hospitalization at [facility] for pneumonia s/p zosyn.  Discharged to NH and admitted to [facility] with increasing oxygen requirements and worsening mental status.  Fever, no leukocytosis.  Cultures sent.  Back to baseline respiratory status. 76M with Afib s/p watchman, gastric sleeve, SDH with L subfalcine herniation after fall (11/2021) s/p emergent R hemicraniectomy, trach/vent/PEG dependant, seizure d/o, and recent hospitalization at [facility] for pneumonia s/p zosyn.  Discharged to NH and admitted to [facility] with increasing oxygen requirements and worsening mental status.  Fever, no leukocytosis.  Cultures sent.      Hypoxic Respiratory Failure  - agree with meropenem for now  - mucous plugging versus pneumonia  - if further decompensates, can add tobramycin but would hold for now israel with florian  - f/u all cultures  - please send sputum for culture    Fever  - continue meropenem  - f/u all cultures    FLORIAN  - renally dose meropenem    I have discussed plan of care as detailed above with RCU NP

## 2022-04-09 NOTE — H&P ADULT - NSHPPHYSICALEXAM_GEN_ALL_CORE
PHYSICAL EXAM:  GENERAL: NAD, lying in bed comfortably, alert  HEENT: NC/AT, EOMI, PERRL, tracheostomy present, thick whitish secretions noted in suction catheter  NECK: Supple, No JVD  CHEST/LUNG: CTAB, no increased WOB  HEART: RRR, no m/r/g  ABDOMEN: soft, NT, ND, BS+, PEG tube present, dressing c/d/i  : benson catheter noted with clear urine and scant amount of blood  EXTREMITIES:  2+ peripheral pulses, no clubbing, no edema  NERVOUS SYSTEM: A&Ox0, non-verbal  SKIN: No rashes or lesions

## 2022-04-09 NOTE — PROGRESS NOTE ADULT - NS ATTEND AMEND GEN_ALL_CORE FT
Attestation Statements:  I have personally seen and examined the patient.  I fully participated in the care of this patient.  I have made amendments to the documentation where necessary, and agree with the history, physical exam, and plan as documented by the Fellow.     76M with Afib s/p watchman, gastric sleeve, SDH with L subfalcine herniation after fall (11/2021) s/p emergent R hemicraniectomy, trach/vent/PEG dependant, seizure d/o, and recent hospitalization at [facility] for pneumonia s/p zosyn.  Discharged to NH and admitted to [facility] with increasing oxygen requirements and worsening mental status.  Fever, no leukocytosis.  Cultures sent.      Hypoxic Respiratory Failure  - meropenem AS PER ID   - mucous plugging versus pneumonia  - if further decompensates, can add tobramycin but would hold for now israel with florian  - f/u all cultures  - please send sputum for culture  \FLORIAN  - renally dose meropenem----NEED NEPHROLOGY OPINION     I have discussed plan of care as detailed above with RCU NP.

## 2022-04-09 NOTE — PROGRESS NOTE ADULT - SUBJECTIVE AND OBJECTIVE BOX
CHIEF COMPLAINT: Patient is a 76y old  Male who presents with a chief complaint of Hypoxia (2022 00:40)      Interval Events:    REVIEW OF SYSTEMS:  [ ] All other systems negative  [ ] Unable to assess ROS because ________    Mode: AC/ CMV (Assist Control/ Continuous Mandatory Ventilation), RR (machine): 12, TV (machine): 450, FiO2: 30, PEEP: 7, ITime: 0.8, MAP: 10, PIP: 20  Arterial Blood Gas:   @ 06:43  7.45/38/115/26/99.7/2.4  ABG lactate: --  Arterial Blood Gas:   @ 04:20  7.39/45/39/27/72.1/2.0  ABG lactate: --      OBJECTIVE:  ICU Vital Signs Last 24 Hrs  T(C): 37.1 (2022 10:00), Max: 38.8 (2022 18:27)  T(F): 98.7 (2022 10:00), Max: 101.9 (2022 18:27)  HR: 80 (2022 11:32) (72 - 99)  BP: 103/73 (2022 10:00) (90/55 - 109/63)  BP(mean): 85 (2022 00:13) (77 - 85)  ABP: --  ABP(mean): --  RR: 13 (2022 10:00) (12 - 24)  SpO2: 97% (2022 11:32) (96% - 100%)    Mode: AC/ CMV (Assist Control/ Continuous Mandatory Ventilation), RR (machine): 12, TV (machine): 450, FiO2: 30, PEEP: 7, ITime: 0.8, MAP: 10, PIP: 20    - @ 07:01  -   @ 07:00  --------------------------------------------------------  IN: 0 mL / OUT: 270 mL / NET: -270 mL      CAPILLARY BLOOD GLUCOSE      POCT Blood Glucose.: 95 mg/dL (2022 11:27)      HOSPITAL MEDICATIONS:  MEDICATIONS  (STANDING):  amantadine Syrup 100 milliGRAM(s) Oral two times a day  aMIOdarone    Tablet 200 milliGRAM(s) Oral daily  carvedilol 6.25 milliGRAM(s) Oral every 12 hours  chlorhexidine 0.12% Liquid 15 milliLiter(s) Oral Mucosa every 12 hours  chlorhexidine 2% Cloths 1 Application(s) Topical daily  doxazosin 4 milliGRAM(s) Oral at bedtime  heparin   Injectable 5000 Unit(s) SubCutaneous every 8 hours  lactulose Syrup 10 Gram(s) Oral two times a day  levETIRAcetam  Solution 1000 milliGRAM(s) Oral two times a day  meropenem  IVPB 1000 milliGRAM(s) IV Intermittent every 12 hours  senna Syrup 5 milliLiter(s) Oral at bedtime    MEDICATIONS  (PRN):  acetaminophen    Suspension .. 650 milliGRAM(s) Oral every 4 hours PRN Temp greater or equal to 38C (100.4F), Mild Pain (1 - 3)      LABS:                        6.8    7.51  )-----------( 138      ( 2022 08:43 )             22.2     04-09    137  |  100  |  71<H>  ----------------------------<  86  3.7   |  22  |  2.54<H>    Ca    7.7<L>      2022 06:43  Phos  3.9     04-  Mg     2.90     04-    TPro  7.0  /  Alb  2.5<L>  /  TBili  <0.2  /  DBili  x   /  AST  38  /  ALT  25  /  AlkPhos  99  04-08    PT/INR - ( 2022 18:26 )   PT: 14.3 sec;   INR: 1.23 ratio         PTT - ( 2022 18:26 )  PTT:24.2 sec  Urinalysis Basic - ( 2022 23:15 )    Color: Yellow / Appearance: Slightly Turbid / S.023 / pH: x  Gluc: x / Ketone: Trace  / Bili: Negative / Urobili: <2 mg/dL   Blood: x / Protein: 300 mg/dL / Nitrite: Negative   Leuk Esterase: Moderate / RBC: 75 /HPF / WBC 15 /HPF   Sq Epi: x / Non Sq Epi: x / Bacteria: Few      Arterial Blood Gas:   @ 06:43  7.45/38/115/26/99.7/2.4  ABG lactate: --  Arterial Blood Gas:   @ 04:20  7.39/45/39/27/72.1/2.0  ABG lactate: --    Venous Blood Gas:   @ 18:26  7.35/47/51/26/77.6  VBG Lactate: 1.8      PAST MEDICAL & SURGICAL HISTORY:  Essential hypertension    Primary osteoarthritis, unspecified site    Closed fracture of left radius, initial encounter    Morbid obesity, unspecified obesity type  h/o. - s/p gastric bypass- lost 113 lbs    KAREN (obstructive sleep apnea)  h/o - was on CPAP until gastric bypass surgery    Respiratory failure    Anemia    Subdural hemorrhage    Epilepsy    H/O gastrostomy    History of BPH    Afib    S/P gastric bypass  2008    Status post total replacement of left hip  2006    S/P bunionectomy  bilateral    S/P colonoscopy  approx 2012    History of abdominoplasty  and subsequent cosmetic surgery for removal of excess skin from face        FAMILY HISTORY:  Family history of renal cell carcinoma (Mother)    Family history of malignant melanoma (Sibling)        Social History:      RADIOLOGY:  [ ] Reviewed and interpreted by me    PULMONARY FUNCTION TESTS:    EKG: CHIEF COMPLAINT: Patient is a 76y old  Male who presents with a chief complaint of Hypoxia (2022 00:40)      Interval Events: Pt transferred to floor from ED during night     REVIEW OF SYSTEMS:  [x ] Unable to assess ROS because ________    Mode: AC/ CMV (Assist Control/ Continuous Mandatory Ventilation), RR (machine): 12, TV (machine): 450, FiO2: 30, PEEP: 7, ITime: 0.8, MAP: 10, PIP: 20  Arterial Blood Gas:   @ 06:43  7.45/38/115/26/99.7/2.4  ABG lactate: --  Arterial Blood Gas:   @ 04:20  7.39/45/39/27/72.1/2.0  ABG lactate: --      OBJECTIVE:  ICU Vital Signs Last 24 Hrs  T(C): 37.1 (2022 10:00), Max: 38.8 (2022 18:27)  T(F): 98.7 (2022 10:00), Max: 101.9 (2022 18:27)  HR: 80 (2022 11:32) (72 - 99)  BP: 103/73 (2022 10:00) (90/55 - 109/63)  BP(mean): 85 (2022 00:13) (77 - 85)  ABP: --  ABP(mean): --  RR: 13 (2022 10:00) (12 - 24)  SpO2: 97% (2022 11:32) (96% - 100%)    Mode: AC/ CMV (Assist Control/ Continuous Mandatory Ventilation), RR (machine): 12, TV (machine): 450, FiO2: 30, PEEP: 7, ITime: 0.8, MAP: 10, PIP: 20     @ 07:01  -   @ 07:00  --------------------------------------------------------  IN: 0 mL / OUT: 270 mL / NET: -270 mL      CAPILLARY BLOOD GLUCOSE      POCT Blood Glucose.: 95 mg/dL (2022 11:27)      HOSPITAL MEDICATIONS:  MEDICATIONS  (STANDING):  amantadine Syrup 100 milliGRAM(s) Oral two times a day  aMIOdarone    Tablet 200 milliGRAM(s) Oral daily  carvedilol 6.25 milliGRAM(s) Oral every 12 hours  chlorhexidine 0.12% Liquid 15 milliLiter(s) Oral Mucosa every 12 hours  chlorhexidine 2% Cloths 1 Application(s) Topical daily  doxazosin 4 milliGRAM(s) Oral at bedtime  heparin   Injectable 5000 Unit(s) SubCutaneous every 8 hours  lactulose Syrup 10 Gram(s) Oral two times a day  levETIRAcetam  Solution 1000 milliGRAM(s) Oral two times a day  meropenem  IVPB 1000 milliGRAM(s) IV Intermittent every 12 hours  senna Syrup 5 milliLiter(s) Oral at bedtime    MEDICATIONS  (PRN):  acetaminophen    Suspension .. 650 milliGRAM(s) Oral every 4 hours PRN Temp greater or equal to 38C (100.4F), Mild Pain (1 - 3)      LABS:                        6.8    7.51  )-----------( 138      ( 2022 08:43 )             22.2     04-09    137  |  100  |  71<H>  ----------------------------<  86  3.7   |  22  |  2.54<H>    Ca    7.7<L>      2022 06:43  Phos  3.9     04-09  Mg     2.90     04-    TPro  7.0  /  Alb  2.5<L>  /  TBili  <0.2  /  DBili  x   /  AST  38  /  ALT  25  /  AlkPhos  99  04-08    PT/INR - ( 2022 18:26 )   PT: 14.3 sec;   INR: 1.23 ratio         PTT - ( 2022 18:26 )  PTT:24.2 sec  Urinalysis Basic - ( 2022 23:15 )    Color: Yellow / Appearance: Slightly Turbid / S.023 / pH: x  Gluc: x / Ketone: Trace  / Bili: Negative / Urobili: <2 mg/dL   Blood: x / Protein: 300 mg/dL / Nitrite: Negative   Leuk Esterase: Moderate / RBC: 75 /HPF / WBC 15 /HPF   Sq Epi: x / Non Sq Epi: x / Bacteria: Few      Arterial Blood Gas:   @ 06:43  7.45/38/115/26/99.7/2.4  ABG lactate: --  Arterial Blood Gas:   @ 04:20  7.39/45/39/27/72.1/2.0  ABG lactate: --    Venous Blood Gas:   @ 18:26  7.35/47/51/26/77.6  VBG Lactate: 1.8      PAST MEDICAL & SURGICAL HISTORY:  Essential hypertension    Primary osteoarthritis, unspecified site    Closed fracture of left radius, initial encounter    Morbid obesity, unspecified obesity type  h/o. - s/p gastric bypass- lost 113 lbs    KAREN (obstructive sleep apnea)  h/o - was on CPAP until gastric bypass surgery    Respiratory failure    Anemia    Subdural hemorrhage    Epilepsy    H/O gastrostomy    History of BPH    Afib    S/P gastric bypass  2008    Status post total replacement of left hip  2006    S/P bunionectomy  bilateral    S/P colonoscopy  approx 2012    History of abdominoplasty  and subsequent cosmetic surgery for removal of excess skin from face        FAMILY HISTORY:  Family history of renal cell carcinoma (Mother)    Family history of malignant melanoma (Sibling)        Social History:      RADIOLOGY:  [ ] Reviewed and interpreted by me    PULMONARY FUNCTION TESTS:    EKG:

## 2022-04-09 NOTE — H&P ADULT - ASSESSMENT
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     Neuro  - Currently A&Ox0, awake and alert but non-verbal, not following commands  - based on prior documentation, appears to be at baseline mental status  - CT head obtained, pending read    CVS  #Atrial Fibrillation  - Currently rate controlled   - s/p watchman device placement  - c/w home amiodarone 200mg qd  - c/w home carvedilol 6.25mg BID  #HFpEF  - most recent echo on 3/2022 with EF 55% and grossly normal LV fxn with severely dilated LA  - will check pBNP  - Pt appearing euvolemic at this time  - will hold off on home lasix in setting of possible ongoing infection    RESP  #Acute hypoxic respiratory failure  - Patient with episode of desaturation and increased WOB prior to arrival   - At facility patient is on trach collar during the day and on vent at night with 30% fio2 requirement  - initially required 40% fiO2 in ED, now weaned back to 30% FiO2 and saturating at 100%  - concern for possible pneumonia with potential mucus plugging event  - c/w airway clearance measures, Chest PT, and suctioning for secretions  - s/p vanc/arnold in ED, will c/w arnold for now for possible pneumonia  - will obtain sputum culture  - check ABG to ensure adequate oxygenation on current vent settings    GI  - no active issues  - PEG tube present  - will plan to start trickle feeds  - pending CT A/P for possible abdominal pathology    Renal  #FLORIAN  - Noted with elevated serum Cr to 2.39, with previous baseline of 0.8-0.9  - possibly in setting of current infection  - s/p 1L NS in ED  - chronic benson exchanged in ED with drainage of clear urine  - monitor I&Os  - trend Cr    Endo  - no active issues  - no history of DM or thyroid disease  - Glucose WNL on BMPs  - can obtain A1C/TSH with AM labs    Heme  #Anemia  - noted with anemia to 7.4, decreased from 8-9 on previous admissions  - no evidence of overt bleeding at this time  - will continue to trend Hgb daily    ID  #c/f Pneumonia  - Patient with questionable retrocardiac opacity on CXR  - CT A/P showing small L pleural effusion  - UA pending, chronic benson catheter exchanged  - s/p vanc/arnold in ED  - will c/w meropenem in setting of recent MDR Pseudomonas infection (sensitive to arnold)  - check MRSA PCR  - check sputum/blood cultures    PPx  HSQ for DVT prophylaxis    Ethics  FULL CODE

## 2022-04-10 DIAGNOSIS — N17.9 ACUTE KIDNEY FAILURE, UNSPECIFIED: ICD-10-CM

## 2022-04-10 LAB
ANION GAP SERPL CALC-SCNC: 15 MMOL/L — HIGH (ref 7–14)
APPEARANCE UR: ABNORMAL
BILIRUB UR-MCNC: NEGATIVE — SIGNIFICANT CHANGE UP
BUN SERPL-MCNC: 76 MG/DL — HIGH (ref 7–23)
CALCIUM SERPL-MCNC: 7.9 MG/DL — LOW (ref 8.4–10.5)
CHLORIDE SERPL-SCNC: 101 MMOL/L — SIGNIFICANT CHANGE UP (ref 98–107)
CO2 SERPL-SCNC: 23 MMOL/L — SIGNIFICANT CHANGE UP (ref 22–31)
COLOR SPEC: YELLOW — SIGNIFICANT CHANGE UP
CREAT ?TM UR-MCNC: 49 MG/DL — SIGNIFICANT CHANGE UP
CREAT SERPL-MCNC: 2.93 MG/DL — HIGH (ref 0.5–1.3)
CULTURE RESULTS: SIGNIFICANT CHANGE UP
DIFF PNL FLD: ABNORMAL
EGFR: 22 ML/MIN/1.73M2 — LOW
GLUCOSE BLDC GLUCOMTR-MCNC: 106 MG/DL — HIGH (ref 70–99)
GLUCOSE BLDC GLUCOMTR-MCNC: 115 MG/DL — HIGH (ref 70–99)
GLUCOSE BLDC GLUCOMTR-MCNC: 115 MG/DL — HIGH (ref 70–99)
GLUCOSE BLDC GLUCOMTR-MCNC: 116 MG/DL — HIGH (ref 70–99)
GLUCOSE SERPL-MCNC: 106 MG/DL — HIGH (ref 70–99)
GLUCOSE UR QL: NEGATIVE — SIGNIFICANT CHANGE UP
GRAM STN FLD: SIGNIFICANT CHANGE UP
HCT VFR BLD CALC: 23.8 % — LOW (ref 39–50)
HGB BLD-MCNC: 7.6 G/DL — LOW (ref 13–17)
KETONES UR-MCNC: NEGATIVE — SIGNIFICANT CHANGE UP
LEUKOCYTE ESTERASE UR-ACNC: ABNORMAL
MAGNESIUM SERPL-MCNC: 3 MG/DL — HIGH (ref 1.6–2.6)
MCHC RBC-ENTMCNC: 31.3 PG — SIGNIFICANT CHANGE UP (ref 27–34)
MCHC RBC-ENTMCNC: 31.9 GM/DL — LOW (ref 32–36)
MCV RBC AUTO: 97.9 FL — SIGNIFICANT CHANGE UP (ref 80–100)
NITRITE UR-MCNC: NEGATIVE — SIGNIFICANT CHANGE UP
NRBC # BLD: 0 /100 WBCS — SIGNIFICANT CHANGE UP
NRBC # FLD: 0 K/UL — SIGNIFICANT CHANGE UP
OB PNL STL: NEGATIVE — SIGNIFICANT CHANGE UP
PH UR: 6 — SIGNIFICANT CHANGE UP (ref 5–8)
PHOSPHATE SERPL-MCNC: 4.4 MG/DL — SIGNIFICANT CHANGE UP (ref 2.5–4.5)
PLATELET # BLD AUTO: 133 K/UL — LOW (ref 150–400)
POTASSIUM SERPL-MCNC: 3.8 MMOL/L — SIGNIFICANT CHANGE UP (ref 3.5–5.3)
POTASSIUM SERPL-SCNC: 3.8 MMOL/L — SIGNIFICANT CHANGE UP (ref 3.5–5.3)
PROT ?TM UR-MCNC: 154 MG/DL — SIGNIFICANT CHANGE UP
PROT UR-MCNC: ABNORMAL
RBC # BLD: 2.43 M/UL — LOW (ref 4.2–5.8)
RBC # FLD: 19.2 % — HIGH (ref 10.3–14.5)
SODIUM SERPL-SCNC: 139 MMOL/L — SIGNIFICANT CHANGE UP (ref 135–145)
SP GR SPEC: 1.02 — SIGNIFICANT CHANGE UP (ref 1–1.05)
SPECIMEN SOURCE: SIGNIFICANT CHANGE UP
SPECIMEN SOURCE: SIGNIFICANT CHANGE UP
UROBILINOGEN FLD QL: SIGNIFICANT CHANGE UP
WBC # BLD: 6.16 K/UL — SIGNIFICANT CHANGE UP (ref 3.8–10.5)
WBC # FLD AUTO: 6.16 K/UL — SIGNIFICANT CHANGE UP (ref 3.8–10.5)

## 2022-04-10 PROCEDURE — 99233 SBSQ HOSP IP/OBS HIGH 50: CPT

## 2022-04-10 PROCEDURE — 99232 SBSQ HOSP IP/OBS MODERATE 35: CPT

## 2022-04-10 RX ORDER — DOXAZOSIN MESYLATE 4 MG
2 TABLET ORAL AT BEDTIME
Refills: 0 | Status: DISCONTINUED | OUTPATIENT
Start: 2022-04-10 | End: 2022-05-17

## 2022-04-10 RX ORDER — MEROPENEM 1 G/30ML
500 INJECTION INTRAVENOUS EVERY 12 HOURS
Refills: 0 | Status: DISCONTINUED | OUTPATIENT
Start: 2022-04-10 | End: 2022-04-14

## 2022-04-10 RX ADMIN — HEPARIN SODIUM 5000 UNIT(S): 5000 INJECTION INTRAVENOUS; SUBCUTANEOUS at 21:47

## 2022-04-10 RX ADMIN — HEPARIN SODIUM 5000 UNIT(S): 5000 INJECTION INTRAVENOUS; SUBCUTANEOUS at 13:11

## 2022-04-10 RX ADMIN — MEROPENEM 100 MILLIGRAM(S): 1 INJECTION INTRAVENOUS at 05:04

## 2022-04-10 RX ADMIN — AMIODARONE HYDROCHLORIDE 200 MILLIGRAM(S): 400 TABLET ORAL at 05:05

## 2022-04-10 RX ADMIN — LACTULOSE 10 GRAM(S): 10 SOLUTION ORAL at 05:05

## 2022-04-10 RX ADMIN — CHLORHEXIDINE GLUCONATE 15 MILLILITER(S): 213 SOLUTION TOPICAL at 18:03

## 2022-04-10 RX ADMIN — CHLORHEXIDINE GLUCONATE 1 APPLICATION(S): 213 SOLUTION TOPICAL at 11:36

## 2022-04-10 RX ADMIN — Medication 2 MILLIGRAM(S): at 21:47

## 2022-04-10 RX ADMIN — CHLORHEXIDINE GLUCONATE 15 MILLILITER(S): 213 SOLUTION TOPICAL at 21:47

## 2022-04-10 RX ADMIN — Medication 100 MILLIGRAM(S): at 18:03

## 2022-04-10 RX ADMIN — CARVEDILOL PHOSPHATE 6.25 MILLIGRAM(S): 80 CAPSULE, EXTENDED RELEASE ORAL at 18:02

## 2022-04-10 RX ADMIN — MEROPENEM 100 MILLIGRAM(S): 1 INJECTION INTRAVENOUS at 17:57

## 2022-04-10 RX ADMIN — CHLORHEXIDINE GLUCONATE 15 MILLILITER(S): 213 SOLUTION TOPICAL at 05:04

## 2022-04-10 RX ADMIN — LEVETIRACETAM 1000 MILLIGRAM(S): 250 TABLET, FILM COATED ORAL at 05:04

## 2022-04-10 RX ADMIN — LACTULOSE 10 GRAM(S): 10 SOLUTION ORAL at 18:03

## 2022-04-10 RX ADMIN — LEVETIRACETAM 1000 MILLIGRAM(S): 250 TABLET, FILM COATED ORAL at 18:02

## 2022-04-10 RX ADMIN — Medication 100 MILLIGRAM(S): at 05:09

## 2022-04-10 RX ADMIN — HEPARIN SODIUM 5000 UNIT(S): 5000 INJECTION INTRAVENOUS; SUBCUTANEOUS at 05:09

## 2022-04-10 RX ADMIN — CARVEDILOL PHOSPHATE 6.25 MILLIGRAM(S): 80 CAPSULE, EXTENDED RELEASE ORAL at 05:05

## 2022-04-10 NOTE — CONSULT NOTE ADULT - ATTENDING COMMENTS
He was admitted with acute medical illness.   Baseline from paper chart with NH notes - Hemiplegia/hemiparesis, minimal verbal output.    MS: alert, ??right gaze preference. Follows command - "show me two fingers" with right hand.   CN: EOMI; Left facial weakness.   Motor: moves right arm well.  Moves R>L arm. minimal movement in the legs.   Right arm - 4 -->4+ in the elbow movements.     A/P  Mr. Conrad is a 77 yo man with h/o head trauma, right hemicraniectomy who was admitted for acute medical illness.  The only documentation of previous function is from paper NH chart as above.   Please do MRI brain if he is not at his baseline function and we don't have any previous imaging available.  Findings on CT may be chronic.  Likely diagnosis is toxic metabolic septic encephalopathy.     Thank you    Please call us for any further questions He was admitted with acute medical illness.   Baseline from paper chart with NH notes - Hemiplegia/hemiparesis, minimal verbal output.    MS: alert, ??right gaze preference. Follows command - "show me two fingers" with right hand.   CN: EOMI; Left facial weakness.   Motor: moves right arm well.  Moves R>L arm. minimal movement in the legs.   Right arm - 4 -->4+ in the elbow movements.     A/P  Mr. Conrad is a 77 yo man with h/o head trauma, right hemicraniectomy who was admitted for acute medical illness.  The only documentation of previous function is from paper NH chart as above.   Please do MRI brain if he is not at his baseline function and we don't have any previous imaging available.  Findings on CT may be chronic.  Likely diagnosis is toxic metabolic septic encephalopathy.   Continue current management as prior to admission.    Thank you    Please call us for any further questions

## 2022-04-10 NOTE — CONSULT NOTE ADULT - SUBJECTIVE AND OBJECTIVE BOX
[facility] DIVISION OF KIDNEY DISEASES AND HYPERTENSION -- [phone]  -- INITIAL CONSULT NOTE  --------------------------------------------------------------------------------  HPI:  Pt. is a 76 y.o. M with PMHx of Afib (s/p watchman), Hx. 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 likely secondary to aspiration pneumonia. Nephrology consulted for FLORIAN. history provided by team and chart review as Pt. is non-verbal. Pt. admitted with SCr. of 2.3(4/8) which has trended to 2.9 today(4/10). Scr. was 0.8 on 3/16/22. Pt. with chronic Garcia that was replaced in ED on day of admission. Non- contrast CT abdomen and pelvis negative for hydronephrosis or renal structural abnormalities. Currently on vent at 30% FiO2, appearing comfortable. Pt. on Carvedilol and Doxazosin for hypertension maintained throughout hospital course with borderline hypotensive blood pressures. UA positive for blood, protein and leukocyte esterase. Pt. transfused on 4/9 for HgB of 6.6. No active bleeding observed.       PAST HISTORY  --------------------------------------------------------------------------------  PAST MEDICAL & SURGICAL HISTORY:  Essential hypertension    Primary osteoarthritis, unspecified site    Closed fracture of left radius, initial encounter    Morbid obesity, unspecified obesity type  h/o. - s/p gastric bypass- lost 113 lbs    KAREN (obstructive sleep apnea)  h/o - was on CPAP until gastric bypass surgery    Respiratory failure    Anemia    Subdural hemorrhage    Epilepsy    H/O gastrostomy    History of BPH    Afib    S/P gastric bypass  2008    Status post total replacement of left hip  2006    S/P bunionectomy  bilateral    S/P colonoscopy  approx 2012    History of abdominoplasty  and subsequent cosmetic surgery for removal of excess skin from face      FAMILY HISTORY:  Family history of renal cell carcinoma (Mother)    Family history of malignant melanoma (Sibling)      PAST SOCIAL HISTORY:    ALLERGIES & MEDICATIONS  --------------------------------------------------------------------------------  Allergies    No Known Allergies    Intolerances      Standing Inpatient Medications  amantadine Syrup 100 milliGRAM(s) Oral two times a day  aMIOdarone    Tablet 200 milliGRAM(s) Oral daily  carvedilol 6.25 milliGRAM(s) Oral every 12 hours  chlorhexidine 0.12% Liquid 15 milliLiter(s) Oral Mucosa every 12 hours  chlorhexidine 2% Cloths 1 Application(s) Topical daily  doxazosin 2 milliGRAM(s) Oral at bedtime  heparin   Injectable 5000 Unit(s) SubCutaneous every 8 hours  lactulose Syrup 10 Gram(s) Oral two times a day  levETIRAcetam  Solution 1000 milliGRAM(s) Oral two times a day  meropenem  IVPB 500 milliGRAM(s) IV Intermittent every 12 hours  senna Syrup 5 milliLiter(s) Oral at bedtime    PRN Inpatient Medications  acetaminophen    Suspension .. 650 milliGRAM(s) Oral every 4 hours PRN      REVIEW OF SYSTEMS  --------------------------------------------------------------------------------  Unable to obtain    VITALS/PHYSICAL EXAM  --------------------------------------------------------------------------------  T(C): 37 (04-10-22 @ 10:00), Max: 37 (04-10-22 @ 10:00)  HR: 70 (04-10-22 @ 10:34) (68 - 88)  BP: 111/76 (04-10-22 @ 10:00) (101/70 - 115/80)  RR: 20 (04-10-22 @ 10:00) (15 - 20)  SpO2: 99% (04-10-22 @ 10:34) (98% - 100%)  Wt(kg): --  Height (cm): 172.7 (04-08-22 @ 17:16)  Weight (kg): 81.4 (04-09-22 @ 01:32)  BMI (kg/m2): 27.3 (04-09-22 @ 01:32)  BSA (m2): 1.95 (04-09-22 @ 01:32)      04-09-22 @ 07:01  -  04-10-22 @ 07:00  --------------------------------------------------------  IN: 1140 mL / OUT: 550 mL / NET: 590 mL      Physical Exam:  	Gen: NAD  	HEENT: Trach+   	Pulm: CTA B/L anteriorly  	CV: S1S2  	Abd: Soft, PEG+, Garcia+  	Ext: No LE edema B/L  	Neuro: Awake  	Skin: Warm and dry  	Vascular access: Peripheral     LABS/STUDIES  --------------------------------------------------------------------------------              7.6    6.16  >-----------<  133      [04-10-22 @ 06:42]              23.8     139  |  101  |  76  ----------------------------<  106      [04-10-22 @ 06:42]  3.8   |  23  |  2.93        Ca     7.9     [04-10-22 @ 06:42]      Mg     3.00     [04-10-22 @ 06:42]      Phos  4.4     [04-10-22 @ 06:42]    TPro  7.0  /  Alb  2.5  /  TBili  <0.2  /  DBili  x   /  AST  38  /  ALT  25  /  AlkPhos  99  [04-08-22 @ 18:26]    PT/INR: PT 14.3 , INR 1.23       [04-08-22 @ 18:26]  PTT: 24.2       [04-08-22 @ 18:26]      Creatinine Trend:  SCr 2.93 [04-10 @ 06:42]  SCr 2.76 [04-09 @ 18:40]  SCr 2.54 [04-09 @ 06:43]  SCr 2.39 [04-08 @ 18:26]  SCr 0.84 [03-16 @ 08:44]    Urinalysis - [04-08-22 @ 23:15]      Color Yellow / Appearance Slightly Turbid / SG 1.023 / pH 6.0      Gluc Negative / Ketone Trace  / Bili Negative / Urobili <2 mg/dL       Blood Moderate / Protein 300 mg/dL / Leuk Est Moderate / Nitrite Negative      RBC 75 / WBC 15 / Hyaline  / Gran  / Sq Epi  / Non Sq Epi  / Bacteria Few    Urine Creatinine 49      [04-09-22 @ 06:31]  Urine Sodium <20      [04-09-22 @ 06:31]  Urine Urea Nitrogen 484.0      [04-09-22 @ 06:31]    Iron 41, TIBC 162, %sat 25      [03-10-22 @ 06:44]  Ferritin 267      [03-10-22 @ 10:16]  TSH 47.92      [04-09-22 @ 06:43]    HCV 1.43, Weakly Reactive Hepatitis C AB  S/CO Ratio                        Interpretation  < 1.00                                   Non-Reactive  1.00 - 4.99                         Weakly-Reactive  >= 5.00                                Reactive  Non-Reactive: A person with a non-reactive HCV antibody result is  considered uninfected.  No further action is needed unless recent  infection is suspected.  In these cases, consider repeat testing later to  detect seroconversion..  Weakly-Reactive: HCV antibody test is abnormal, HCV RNA Qualitative test  will follow.  Reactive: HCV antibody test is abnormal, HCV RNA Qualitative test will  follow.  Note: HCV antibody testing is performed on the Abbott  system.      [03-08-22 @ 10:30]     [facility] DIVISION OF KIDNEY DISEASES AND HYPERTENSION -- [phone]  -- INITIAL CONSULT NOTE  --------------------------------------------------------------------------------  HPI: Pt. is a 76 y.o. 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 likely secondary to aspiration pneumonia. Nephrology consulted for FLORIAN. History provided by team and chart review as Pt. is non-verbal. Pt. admitted with SCr. of 2.3(4/8) which has trended to 2.9 today(4/10). Scr. was 0.8 on 3/16/22. Pt. with chronic Garcia that was replaced in ED on day of admission. Non-contrast CT abdomen and pelvis negative for hydronephrosis or renal structural abnormalities. Currently on vent at 30% FiO2, appearing comfortable. Pt. on Carvedilol and Doxazosin for hypertension maintained throughout hospital course with borderline hypotensive blood pressures. UA positive for blood, protein and leukocyte esterase. Pt. transfused on 4/9 for HgB of 6.6. No active bleeding observed.       PAST HISTORY  --------------------------------------------------------------------------------  PAST MEDICAL & SURGICAL HISTORY:  Essential hypertension    Primary osteoarthritis, unspecified site    Closed fracture of left radius, initial encounter    Morbid obesity, unspecified obesity type  h/o. - s/p gastric bypass- lost 113 lbs    KAREN (obstructive sleep apnea)  h/o - was on CPAP until gastric bypass surgery    Respiratory failure    Anemia    Subdural hemorrhage    Epilepsy    H/O gastrostomy    History of BPH    Afib    S/P gastric bypass  2008    Status post total replacement of left hip  2006    S/P bunionectomy  bilateral    S/P colonoscopy  approx 2012    History of abdominoplasty  and subsequent cosmetic surgery for removal of excess skin from face      FAMILY HISTORY:  Family history of renal cell carcinoma (Mother)    Family history of malignant melanoma (Sibling)      PAST SOCIAL HISTORY:    ALLERGIES & MEDICATIONS  --------------------------------------------------------------------------------  Allergies    No Known Allergies    Intolerances    Standing Inpatient Medications  amantadine Syrup 100 milliGRAM(s) Oral two times a day  aMIOdarone    Tablet 200 milliGRAM(s) Oral daily  carvedilol 6.25 milliGRAM(s) Oral every 12 hours  chlorhexidine 0.12% Liquid 15 milliLiter(s) Oral Mucosa every 12 hours  chlorhexidine 2% Cloths 1 Application(s) Topical daily  doxazosin 2 milliGRAM(s) Oral at bedtime  heparin   Injectable 5000 Unit(s) SubCutaneous every 8 hours  lactulose Syrup 10 Gram(s) Oral two times a day  levETIRAcetam  Solution 1000 milliGRAM(s) Oral two times a day  meropenem  IVPB 500 milliGRAM(s) IV Intermittent every 12 hours  senna Syrup 5 milliLiter(s) Oral at bedtime    PRN Inpatient Medications  acetaminophen    Suspension .. 650 milliGRAM(s) Oral every 4 hours PRN    REVIEW OF SYSTEMS  --------------------------------------------------------------------------------  Unable to obtain ROS    VITALS/PHYSICAL EXAM  --------------------------------------------------------------------------------  T(C): 37 (04-10-22 @ 10:00), Max: 37 (04-10-22 @ 10:00)  HR: 70 (04-10-22 @ 10:34) (68 - 88)  BP: 111/76 (04-10-22 @ 10:00) (101/70 - 115/80)  RR: 20 (04-10-22 @ 10:00) (15 - 20)  SpO2: 99% (04-10-22 @ 10:34) (98% - 100%)  Wt(kg): --  Height (cm): 172.7 (04-08-22 @ 17:16)  Weight (kg): 81.4 (04-09-22 @ 01:32)  BMI (kg/m2): 27.3 (04-09-22 @ 01:32)  BSA (m2): 1.95 (04-09-22 @ 01:32)    04-09-22 @ 07:01  -  04-10-22 @ 07:00  --------------------------------------------------------  IN: 1140 mL / OUT: 550 mL / NET: 590 mL    Physical Exam:  	Gen: resting  	HEENT: Trach+   	Pulm: CTA B/L anteriorly  	CV: S1S2+  	Abd: Soft, PEG+, Garcia+  	Ext: No LE edema B/L  	Neuro: Awake  	Skin: Warm and dry  	Vascular access: Peripheral IV line    LABS/STUDIES  --------------------------------------------------------------------------------              7.6    6.16  >-----------<  133      [04-10-22 @ 06:42]              23.8     139  |  101  |  76  ----------------------------<  106      [04-10-22 @ 06:42]  3.8   |  23  |  2.93        Ca     7.9     [04-10-22 @ 06:42]      Mg     3.00     [04-10-22 @ 06:42]      Phos  4.4     [04-10-22 @ 06:42]    TPro  7.0  /  Alb  2.5  /  TBili  <0.2  /  DBili  x   /  AST  38  /  ALT  25  /  AlkPhos  99  [04-08-22 @ 18:26]    Creatinine Trend:  SCr 2.93 [04-10 @ 06:42]  SCr 2.76 [04-09 @ 18:40]  SCr 2.54 [04-09 @ 06:43]  SCr 2.39 [04-08 @ 18:26]  SCr 0.84 [03-16 @ 08:44]    Urinalysis - [04-08-22 @ 23:15]      Color Yellow / Appearance Slightly Turbid / SG 1.023 / pH 6.0      Gluc Negative / Ketone Trace  / Bili Negative / Urobili <2 mg/dL       Blood Moderate / Protein 300 mg/dL / Leuk Est Moderate / Nitrite Negative      RBC 75 / WBC 15 / Hyaline  / Gran  / Sq Epi  / Non Sq Epi  / Bacteria Few    Urine Creatinine 49      [04-09-22 @ 06:31]  Urine Sodium <20      [04-09-22 @ 06:31]  Urine Urea Nitrogen 484.0      [04-09-22 @ 06:31]    Iron 41, TIBC 162, %sat 25      [03-10-22 @ 06:44]  Ferritin 267      [03-10-22 @ 10:16]  TSH 47.92      [04-09-22 @ 06:43]    HCV 1.43, Weakly Reactive Hepatitis C AB  S/CO Ratio                        Interpretation  < 1.00                                   Non-Reactive  1.00 - 4.99                         Weakly-Reactive  >= 5.00                                Reactive  Non-Reactive: A person with a non-reactive HCV antibody result is  considered uninfected.  No further action is needed unless recent  infection is suspected.  In these cases, consider repeat testing later to  detect seroconversion..  Weakly-Reactive: HCV antibody test is abnormal, HCV RNA Qualitative test  will follow.  Reactive: HCV antibody test is abnormal, HCV RNA Qualitative test will  follow.  Note: HCV antibody testing is performed on the Abbott  system.      [03-08-22 @ 10:30]

## 2022-04-10 NOTE — PROGRESS NOTE ADULT - NS ATTEND AMEND GEN_ALL_CORE FT
I have personally seen and examined the patient.  I fully participated in the care of this patient.  I have made amendments to the documentation where necessary, and agree with the history, physical exam, and plan as documented by the  NP      76M with Afib s/p watchman, gastric sleeve, SDH with L subfalcine herniation after fall (11/2021) s/p emergent R hemicraniectomy, trach/vent/PEG dependant, seizure d/o, and recent hospitalization at [facility] for pneumonia s/p zosyn.  Discharged to NH and admitted to [facility] with increasing oxygen requirements and worsening mental status.  Fever, no leukocytosis.  Cultures sent.      Hypoxic Respiratory Failure  - meropenem AS PER ID   - mucous plugging versus pneumonia  - if further decompensates, can add tobramycin but would hold for now israel with florian  - f/u all cultures  - please send sputum for culture  \FLORIAN  - renally dose meropenem--   ELEVATED S CREATININE  --- --NEED NEPHROLOGY OPINION     ON

## 2022-04-10 NOTE — CONSULT NOTE ADULT - ATTENDING COMMENTS
Pt. with FLORIAN, ? ATN. Pt. seen and examined today (4/11/22). Pt. with tracheostomy, unable to provide ROS. Labs reviewed. Scr increased to 2.93 on 4/10/22. Scr elevated/stable at 2.87 today (4/11/22). Pt. non-oliguric. Monitor labs and urine output. Avoid any potential nephrotoxins. Dose medications as per eGFR.

## 2022-04-10 NOTE — CONSULT NOTE ADULT - ASSESSMENT
76 y.o. M with afib s/p watchman, gastric sleeve, chronic SDH and L falcine herniation s/p R hemicraniectomy due to fall, trach/vent dependent, seizure (keppra 1g q12), [facility] ICU course for HCAP, presented to the [facility] ED from nursing home facility on 4/8 due to hypoxia. Currently getting treatment for new PNA. Neurology consulted for abnormal CTH findings showing R frontoparietal temporal craniectomy with extensive cerebral gliosis and encephalomalacia. Chronic subdural along the falx and gyriform thickening in the R frontoparietal parenchyma underneath cranioplasty were seen as well.     Recommendations:  [] MRI brain w/o contrast (would ideally get w/wo contrast however given acute FLORIAN, may need to repeat with contrast later on if any new concerning symptoms arise)  [] STAT CTH for any changes in neuro examination or focal findings, especially for gaze preference that is unable to be overcome, anisocoria  [] Consider neurosx evaluation if imagings with MRI reveal any midline shift due to edema or signs of infection  [] Neurocheck and vital per unit protocol  [] C/w care per primary team     Case to be seen and discussed with general neurology attending in AM

## 2022-04-10 NOTE — DIETITIAN INITIAL EVALUATION ADULT - OTHER INFO
77 y/o male admitted with dx acute renal failure. Pt nonverbal with Trach to Vent at night. Receiving PEG feeding of Jevity 1.2 and tolerating with last BM 4/9. Recommend increasing TF to goal rate of 65 mL/hr x 24 hrs which will offer pt 1871 kcals, 87 gms protein, 1258 mL free H2O in 1560 mL total volume. Pt has higher protein need due to advanced stage pressure injury therefore suggest adding No Carb Prosource x 2/day (30 gms protein) to provide a daily protein amount of 117 gms along with TF. Enteral recommendation will give pt 23 kcals/kg and 1.4 gms protein/kg of dosing weight. Request weekly weights to assure proper TF rate. RDN services to remain available as needed.

## 2022-04-10 NOTE — PROGRESS NOTE ADULT - SUBJECTIVE AND OBJECTIVE BOX
Follow Up:  PNA    Interval History/ROS: Afebrile with no leukocytosis. Worsening creat      REVIEW OF SYSTEMS  [ x ] ROS unobtainable because:  Pt is non verbal  [  ] All other systems negative except as noted below    Constitutional:  [ ] fever [ ] chills  [ ] weight loss  [ ]night sweat  [ ]poor appetite/PO intake [ ]fatigue   Skin:  [ ] rash [ ] phlebitis	  Eyes: [ ] icterus [ ] pain  [ ] discharge	  ENMT: [ ] sore throat  [ ] thrush [ ] ulcers [ ] exudates [ ]anosmia  Respiratory: [ ] dyspnea [ ] hemoptysis [ ] cough [ ] sputum	  Cardiovascular:  [ ] chest pain [ ] palpitations [ ] edema	  Gastrointestinal:  [ ] nausea [ ] vomiting [ ] diarrhea [ ] constipation [ ] pain	  Genitourinary:  [ ] dysuria [ ] frequency [ ] hematuria [ ] discharge [ ] flank pain  [ ] incontinence  Musculoskeletal:  [ ] myalgias [ ] arthralgias [ ] arthritis  [ ] back pain  Neurological:  [ ] headache [ ] weakness [ ] seizures  [ ] confusion/altered mental status    Allergies  No Known Allergies        ANTIMICROBIALS:    meropenem  IVPB 1000 every 12 hours        OTHER MEDS: MEDICATIONS  (STANDING):  acetaminophen    Suspension .. 650 every 4 hours PRN  amantadine Syrup 100 two times a day  aMIOdarone    Tablet 200 daily  carvedilol 6.25 every 12 hours  doxazosin 4 at bedtime  heparin   Injectable 5000 every 8 hours  lactulose Syrup 10 two times a day  levETIRAcetam  Solution 1000 two times a day  senna Syrup 5 at bedtime      Vital Signs Last 24 Hrs  T(F): 97.8 (04-10-22 @ 04:57), Max: 101.9 (22 @ 18:27)    Vital Signs Last 24 Hrs  HR: 70 (04-10-22 @ 10:34) (68 - 88)  BP: 115/80 (04-10-22 @ 04:57) (101/70 - 115/80)  RR: 20 (04-10-22 @ 04:57)  SpO2: 99% (04-10-22 @ 10:34) (97% - 100%)  Wt(kg): --    EXAM:    Constitutional: non-toxic  HEAD/EYES: anicteric  ENT:  trache site okay, some purulent sputum  Cardiovascular:   normal S1, S2  Respiratory:  coarse b/l breath sounds  GI:  soft, non-tender, normal bowel sounds  :  benson with cloudy urine and debris  Musculoskeletal:  no synovitis, normal ROM  Neurologic: awake  Skin:  no rash  Psychiatric:  awake, appropriate mood      Labs:                        7.6    6.16  )-----------( 133      ( 10 Apr 2022 06:42 )             23.8     04-10    139  |  101  |  76<H>  ----------------------------<  106<H>  3.8   |  23  |  2.93<H>    Ca    7.9<L>      10 Apr 2022 06:42  Phos  4.4     04-10  Mg     3.00     04-10    TPro  7.0  /  Alb  2.5<L>  /  TBili  <0.2  /  DBili  x   /  AST  38  /  ALT  25  /  AlkPhos  99  -08      WBC Trend:  WBC Count: 6.16 (04-10-22 @ 06:42)  WBC Count: 7.00 (22 @ 18:40)  WBC Count: 7.51 (22 @ 08:43)  WBC Count: 8.07 (22 @ 06:43)      Creatine Trend:  Creatinine, Serum: 2.93 (04-10)  Creatinine, Serum: 2.76 ()  Creatinine, Serum: 2.54 ()  Creatinine, Serum: 2.39 ()      Liver Biochemical Testing Trend:  Alanine Aminotransferase (ALT/SGPT): 25 ()  Alanine Aminotransferase (ALT/SGPT): 29 (03-10)  Alanine Aminotransferase (ALT/SGPT): 26 ()  Alanine Aminotransferase (ALT/SGPT): 31 ()  Alanine Aminotransferase (ALT/SGPT): 29 ()  Aspartate Aminotransferase (AST/SGOT): 38 (22 @ 18:26)  Aspartate Aminotransferase (AST/SGOT): 25 (03-10-22 @ 06:44)  Aspartate Aminotransferase (AST/SGOT): 25 (22 @ 07:15)  Aspartate Aminotransferase (AST/SGOT): 32 (22 @ 06:42)  Aspartate Aminotransferase (AST/SGOT): 25 (22 @ 05:34)  Bilirubin Total, Serum: <0.2 (-)  Bilirubin Total, Serum: 0.2 (-10)  Bilirubin Total, Serum: 0.2 (-)  Bilirubin Total, Serum: 0.2 (-)  Bilirubin Total, Serum: 0.2 (-)      Trend LDH      Urinalysis Basic - ( 2022 23:15 )    Color: Yellow / Appearance: Slightly Turbid / S.023 / pH: x  Gluc: x / Ketone: Trace  / Bili: Negative / Urobili: <2 mg/dL   Blood: x / Protein: 300 mg/dL / Nitrite: Negative   Leuk Esterase: Moderate / RBC: 75 /HPF / WBC 15 /HPF   Sq Epi: x / Non Sq Epi: x / Bacteria: Few        MICROBIOLOGY:    MRSA PCR Result.: NotDetec (22 @ 09:10)  MRSA PCR Result.: NotDetec (22 @ 18:29)      Culture - Urine (collected 2022 23:03)  Source: Catheterized Catheterized  Final Report:    <10,000 CFU/mL Normal Urogenital Cheryl    Culture - Blood (collected 2022 21:21)  Source: .Blood Blood-Peripheral  Preliminary Report:    No growth to date.    Culture - Blood (collected 2022 21:19)  Source: .Blood Blood-Peripheral  Preliminary Report:    No growth to date.    Culture - Sputum (collected 08 Mar 2022 18:33)  Source: .Sputum Sputum  Final Report:    Numerous Acinetobacter baumannii/nosocom group (Carbapenem Resistant)    Numerous Pseudomonas aeruginosa    Normal Respiratory Cheryl absent  Organism: Acinetobacter baumannii/nosocom group (Carbapenem Resistant)  Pseudomonas aeruginosa  Organism: Pseudomonas aeruginosa    Sensitivities:      -  Amikacin: S <=16      -  Aztreonam: R >16      -  Cefepime: I 16      -  Ceftazidime: R >16      -  Ciprofloxacin: S <=0.25      -  Gentamicin: S <=2      -  Imipenem: S <=1      -  Levofloxacin: S <=0.5      -  Meropenem: S <=1      -  Piperacillin/Tazobactam: R >64      -  Tobramycin: S <=2      Method Type: IZZY  Organism: Acinetobacter baumannii/nosocom group (Carbapenem Resistant)    Sensitivities:      -  Imipenem: R      -  Piperacillin/Tazobactam: R      Method Type: KB  Organism: Acinetobacter baumannii/nosocom group (Carbapenem Resistant)    Sensitivities:      -  Amikacin: S <=16      -  Ampicillin/Sulbactam: I 16/8      -  Cefepime: R >16      -  Ceftazidime: R >16      -  Ciprofloxacin: R >2      -  Gentamicin: R >8      -  Levofloxacin: R >4      -  Meropenem: R >8      -  Tobramycin: S <=2      -  Trimethoprim/Sulfamethoxazole: R >2/38      Method Type: IZZY    Culture - Blood (collected 07 Mar 2022 10:18)  Source: .Blood Blood-Peripheral  Final Report:    No Growth Final    Culture - Blood (collected 07 Mar 2022 10:18)  Source: .Blood Blood-Peripheral  Final Report:    No Growth Final    Culture - Urine (collected 07 Mar 2022 08:41)  Source: Clean Catch Clean Catch (Midstream)  Final Report:    No growth                          COVID-19 PCR: NotDetec (22 @ 18:44)  COVID-19 PCR: NotDetec (22 @ 07:15)  COVID-19 PCR: NotDetec (22 @ 02:23)                Serum Pro-Brain Natriuretic Peptide: 34810 ()      Blood Gas Arterial, Lactate: 1.8 ( @ 04:20)  Blood Gas Venous - Lactate: 1.8 ( @ 18:26)    Sedimentation Rate, Erythrocyte: 109 mm/Hr (22 @ 05:34)      RADIOLOGY:  imaging below personally reviewed      Xray Chest 1 View- PORTABLE-Urgent (22 @ 17:36) >  Unchanged left retrocardiac opacity. Right basilar atelectasis.    CT Head No Cont (22 @ 23:22) >  Right frontoparietal temporal craniectomy with wire mesh cranioplasty.  Extensive right cerebral gliosis and areas of encephalomalacia with  volume loss either from prior infarct or trauma in the left frontal and  anterior right temporal lobes. Thin chronic subdural along the falx. Appearance of gyriform thickening in the right frontoparietal parenchyma  underneath the cranioplasty is indeterminate. Possibility of intracranial  infection is not excluded. Consider follow-up with contrast-enhanced   brain MRI for better evaluation.     CT Abdomen and Pelvis No Cont (22 @ 23:22) >  Dependent lung parenchymal opacities, left greater than right, may represent atelectasis or infection in the appropriate clinical setting.  Small left pleural effusion with adjacent left basilar compressive  atelectasis. Small pericardial effusion.  Post gastric bypass with gastrostomy tube localized to the excluded  stomach of pancreaticobiliary limb. Slight distention of the excluded  gastric fundus with layering dense material, likely from prior contrast administration. Correlate clinically. No bowel obstruction. Moderate stool of the colon. Correlate for constipation. Coronary artery calcification.            Follow Up:  PNA    Interval History/ROS: Afebrile with no leukocytosis. Worsening creat    REVIEW OF SYSTEMS  [ x ] ROS unobtainable because:  Pt is non verbal  [  ] All other systems negative except as noted below    Constitutional:  [ ] fever [ ] chills  [ ] weight loss  [ ]night sweat  [ ]poor appetite/PO intake [ ]fatigue   Skin:  [ ] rash [ ] phlebitis	  Eyes: [ ] icterus [ ] pain  [ ] discharge	  ENMT: [ ] sore throat  [ ] thrush [ ] ulcers [ ] exudates [ ]anosmia  Respiratory: [ ] dyspnea [ ] hemoptysis [ ] cough [ ] sputum	  Cardiovascular:  [ ] chest pain [ ] palpitations [ ] edema	  Gastrointestinal:  [ ] nausea [ ] vomiting [ ] diarrhea [ ] constipation [ ] pain	  Genitourinary:  [ ] dysuria [ ] frequency [ ] hematuria [ ] discharge [ ] flank pain  [ ] incontinence  Musculoskeletal:  [ ] myalgias [ ] arthralgias [ ] arthritis  [ ] back pain  Neurological:  [ ] headache [ ] weakness [ ] seizures  [ ] confusion/altered mental status    PAST MEDICAL & SURGICAL HISTORY:  Essential hypertension  Primary osteoarthritis, unspecified site  Closed fracture of left radius, initial encounter  Morbid obesity, unspecified obesity type h/o. - s/p gastric bypass- lost 113 lbs  KAREN (obstructive sleep apnea) h/o - was on CPAP until gastric bypass surgery  Respiratory failure   Anemia  Subdural hemorrhage  Epilepsy  H/O gastrostomy  History of BPH  Afib  S/P gastric bypass 2008  Status post total replacement of left hip 2006  S/P bunionectomy bilateral  S/P colonoscopy approx 2012  History of abdominoplasty and subsequent cosmetic surgery for removal of excess skin from face    Allergies  No Known Allergies    ANTIMICROBIALS:  vancomycin  IVPB (4/8 x1)  meropenem  IVPB 1000 every 12 hours (-)    MEDICATIONS  (STANDING):  amantadine Syrup 100 two times a day  aMIOdarone    Tablet 200 daily  carvedilol 6.25 every 12 hours  doxazosin 4 at bedtime  heparin   Injectable 5000 every 8 hours  lactulose Syrup 10 two times a day  levETIRAcetam  Solution 1000 two times a day  senna Syrup 5 at bedtime    Vital Signs Last 24 Hrs  T(F): 97.8 (04-10-22 @ 04:57), Max: 101.9 (22 @ 18:27)    Vital Signs Last 24 Hrs  HR: 70 (04-10-22 @ 10:34) (68 - 88)  BP: 115/80 (04-10-22 @ 04:57) (101/70 - 115/80)  RR: 20 (04-10-22 @ 04:57)  SpO2: 99% (04-10-22 @ 10:34) (97% - 100%)  Wt(kg): --    Constitutional: non-toxic  HEAD/EYES: anicteric  ENT:  trache site okay, some purulent sputum  Cardiovascular:   normal S1, S2  Respiratory:  coarse b/l breath sounds  GI:  soft, non-tender, normal bowel sounds  :  benson with cloudy urine and debris  Musculoskeletal:  no synovitis, normal ROM  Neurologic: awake  Skin:  no rash  Psychiatric:  awake, appropriate mood                        7.6    6.16  )-----------( 133      ( 10 Apr 2022 06:42 )             23.8 04-10    139  |  101  |  76  ----------------------------<  106  3.8   |  23  |  2.93  Ca    7.9      10 Apr 2022 06:42Phos  4.4     04-10Mg     3.00     04-10  TPro  7.0  /  Alb  2.5  /  TBili  <0.2  /  DBili  x   /  AST  38  /  ALT  25  /  AlkPhos  99  04-08    Urinalysis Basic - ( 2022 23:15 )  Color: Yellow / Appearance: Slightly Turbid / S.023 / pH: x  Gluc: x / Ketone: Trace  / Bili: Negative / Urobili: <2 mg/dL   Blood: x / Protein: 300 mg/dL / Nitrite: Negative   Leuk Esterase: Moderate / RBC: 75 /HPF / WBC 15 /HPF   Sq Epi: x / Non Sq Epi: x / Bacteria: Few    MICROBIOLOGY:  MRSA PCR Result.: NotDetec (22 @ 09:10)  MRSA PCR Result.: NotDetec (22 @ 18:29)    Culture - Urine (collected 2022 23:03)  Source: Catheterized Catheterized  Final Report:    <10,000 CFU/mL Normal Urogenital Cheryl    Culture - Blood (collected 2022 21:21)  Source: .Blood Blood-Peripheral  Preliminary Report:    No growth to date.    Culture - Blood (collected 2022 21:19)  Source: .Blood Blood-Peripheral  Preliminary Report:    No growth to date.    Culture - Sputum (collected 08 Mar 2022 18:33)  Source: .Sputum Sputum  Final Report:    Numerous Acinetobacter baumannii/nosocom group (Carbapenem Resistant)    Numerous Pseudomonas aeruginosa    Normal Respiratory Cheryl absent  Organism: Acinetobacter baumannii/nosocom group (Carbapenem Resistant)  Pseudomonas aeruginosa  Organism: Pseudomonas aeruginosa    Sensitivities:      -  Amikacin: S <=16      -  Aztreonam: R >16      -  Cefepime: I 16      -  Ceftazidime: R >16      -  Ciprofloxacin: S <=0.25      -  Gentamicin: S <=2      -  Imipenem: S <=1      -  Levofloxacin: S <=0.5      -  Meropenem: S <=1      -  Piperacillin/Tazobactam: R >64      -  Tobramycin: S <=2      Method Type: IZZY  Organism: Acinetobacter baumannii/nosocom group (Carbapenem Resistant)    Sensitivities:      -  Imipenem: R      -  Piperacillin/Tazobactam: R      Method Type: KB  Organism: Acinetobacter baumannii/nosocom group (Carbapenem Resistant)    Sensitivities:      -  Amikacin: S <=16      -  Ampicillin/Sulbactam: I 16/8      -  Cefepime: R >16      -  Ceftazidime: R >16      -  Ciprofloxacin: R >2      -  Gentamicin: R >8      -  Levofloxacin: R >4      -  Meropenem: R >8      -  Tobramycin: S <=2      -  Trimethoprim/Sulfamethoxazole: R >2/38      Method Type: IZZY    Culture - Blood (collected 07 Mar 2022 10:18)  Source: .Blood Blood-Peripheral  Final Report:    No Growth Final    Culture - Blood (collected 07 Mar 2022 10:18)  Source: .Blood Blood-Peripheral  Final Report:    No Growth Final    Culture - Urine (collected 07 Mar 2022 08:41)  Source: Clean Catch Clean Catch (Midstream)  Final Report:    No growth    COVID-19 PCR: NotDetec (22 @ 18:44)  COVID-19 PCR: NotDetec (22 @ 07:15)  COVID-19 PCR: NotDetec (22 @ 02:23)    RADIOLOGY:  imaging below personally reviewed    Xray Chest 1 View- PORTABLE-Urgent (04.08.22 @ 17:36) >  Unchanged left retrocardiac opacity. Right basilar atelectasis.    CT Head No Cont (.. @ 23:22) >  Right frontoparietal temporal craniectomy with wire mesh cranioplasty.  Extensive right cerebral gliosis and areas of encephalomalacia with  volume loss either from prior infarct or trauma in the left frontal and  anterior right temporal lobes. Thin chronic subdural along the falx. Appearance of gyriform thickening in the right frontoparietal parenchyma  underneath the cranioplasty is indeterminate. Possibility of intracranial  infection is not excluded. Consider follow-up with contrast-enhanced   brain MRI for better evaluation.     CT Abdomen and Pelvis No Cont (. @ 23:22) >  Dependent lung parenchymal opacities, left greater than right, may represent atelectasis or infection in the appropriate clinical setting.  Small left pleural effusion with adjacent left basilar compressive  atelectasis. Small pericardial effusion.  Post gastric bypass with gastrostomy tube localized to the excluded  stomach of pancreaticobiliary limb. Slight distention of the excluded  gastric fundus with layering dense material, likely from prior contrast administration. Correlate clinically. No bowel obstruction. Moderate stool of the colon. Correlate for constipation. Coronary artery calcification.

## 2022-04-10 NOTE — DIETITIAN INITIAL EVALUATION ADULT - PERTINENT MEDS FT
MEDICATIONS  (STANDING):  amantadine Syrup 100 milliGRAM(s) Oral two times a day  aMIOdarone    Tablet 200 milliGRAM(s) Oral daily  carvedilol 6.25 milliGRAM(s) Oral every 12 hours  chlorhexidine 0.12% Liquid 15 milliLiter(s) Oral Mucosa every 12 hours  chlorhexidine 2% Cloths 1 Application(s) Topical daily  doxazosin 4 milliGRAM(s) Oral at bedtime  heparin   Injectable 5000 Unit(s) SubCutaneous every 8 hours  lactulose Syrup 10 Gram(s) Oral two times a day  levETIRAcetam  Solution 1000 milliGRAM(s) Oral two times a day  meropenem  IVPB 1000 milliGRAM(s) IV Intermittent every 12 hours  senna Syrup 5 milliLiter(s) Oral at bedtime    MEDICATIONS  (PRN):  acetaminophen    Suspension .. 650 milliGRAM(s) Oral every 4 hours PRN Temp greater or equal to 38C (100.4F), Mild Pain (1 - 3)

## 2022-04-10 NOTE — DIETITIAN INITIAL EVALUATION ADULT - NSFNSGIIOFT_GEN_A_CORE
04-09-22 @ 07:01  -  04-10-22 @ 07:00  --------------------------------------------------------  OUT:  Total OUT: 0 mL    Total NET: 840 mL

## 2022-04-10 NOTE — CONSULT NOTE ADULT - PROBLEM SELECTOR RECOMMENDATION 9
Pt with FLORIAN in the setting of sepsis, deranged hemodynamics?(anemia and borderline hypotension) causing ATN? Unable to rule out glomerulonephritis. Microscopy performed by myself did not show any dysmorphic RBCs or RBC casts. Did observe many granular and some hyaline casts. Pt. admitted with SCr. of 2.3(4/8) which has trended to 2.9 today(4/10). Scr. was 0.8 on 3/16/22. Pt. with chronic Garcia that was replaced in ED on day of admission. Non- contrast CT abdomen and pelvis negative for hydronephrosis or renal structural abnormalities. Please repeat UA, and send spot urine TP/CR. Urine electrolytes suggesting of intrinsic renal disease per [facility] (Pt. on Lasix Po as outpatient) and pre-renal per [facility]. Low threshold to send GN workup (ISABELLE, P-ANCA, C-ANCA, Anti-GBM ab, HBsAg, Hep C antibody, HIV, Parvovirus, C3, C4, SPEP, serum immunofixation, free kappa lambda light chain, anti PLA2R) given recent history of infections. Will consider Renal biopsy if renal function does not improve. Will need to consider HD if renal failure continues to worsen. Monitor labs and urine output. Avoid NSAIDs, ACEI/ARBS, RCA and nephrotoxins. Dose medications as per eGFR. Please decrease Doxazosin to allow for increased blood pressure. Goal is to have BP consistently in 120s-130s/70s-80s.      If you have any questions, please feel free to contact me  Ahmet Elliott  Nephrology Fellow  [phone]; Prefer [employer] TEAMS  (After 5pm or on weekends please page the on-call fellow) Pt with FLORIAN in the setting of sepsis, deranged hemodynamics?(anemia and borderline hypotension) causing ATN? Unable to rule out glomerulonephritis. Microscopy performed by myself did not show any dysmorphic RBCs or RBC casts. Did observe many granular and some hyaline casts. Pt. admitted with SCr. of 2.3(4/8) which has trended to 2.9 today(4/10). Scr. was 0.8 on 3/16/22. Pt. with chronic Garcia that was replaced in ED on day of admission. Non- contrast CT abdomen and pelvis negative for hydronephrosis or renal structural abnormalities. Please repeat UA, and send spot urine TP/CR. Urine electrolytes suggesting of intrinsic renal disease per FeUrea (Pt. on Lasix Po as outpatient) and pre-renal per Fe[facility]. Blas <20 however Pt. appears euvolemic. Low threshold to send GN workup (ISABELLE, P-ANCA, C-ANCA, Anti-GBM ab, HBsAg, Hep C antibody, HIV, Parvovirus, C3, C4, SPEP, serum immunofixation, free kappa lambda light chain, anti PLA2R) given recent history of infections. Will consider Renal biopsy if renal function does not improve. Will need to consider HD if renal failure continues to worsen. Monitor labs and urine output. Avoid NSAIDs, ACEI/ARBS, RCA and nephrotoxins. Dose medications as per eGFR. Please decrease Doxazosin to allow for increased blood pressure. Goal is to have BP consistently in 120s-130s/70s-80s. Please send haptoglobin and repeat CBC to look for schistocytes.     If you have any questions, please feel free to contact me  Ahmet Elliott  Nephrology Fellow  [phone]; Prefer [employer] TEAMS  (After 5pm or on weekends please page the on-call fellow) Pt with FLORIAN in the setting of sepsis, deranged hemodynamics?(anemia and borderline hypotension) causing ATN? Unable to rule out glomerulonephritis. Microscopy performed by myself did not show any dysmorphic RBCs or RBC casts. Did observe many granular and some hyaline casts. Pt. admitted with SCr. of 2.3(4/8) which has trended to 2.9 today(4/10). Scr. was 0.8 on 3/16/22. Pt. with chronic Garcia that was replaced in ED on day of admission. Non- contrast CT abdomen and pelvis negative for hydronephrosis or renal structural abnormalities. Please repeat UA, and send spot urine TP/CR. Urine electrolytes suggesting of intrinsic renal disease per FeUrea (Pt. on Lasix Po as outpatient) and pre-renal per Fe[facility]. Blas <20 however Pt. appears euvolemic. Please send GN workup (ISABELLE, P-ANCA, C-ANCA, Anti-GBM ab, HBsAg, Hep C antibody, HIV, Parvovirus, C3, C4, SPEP, serum immunofixation, free kappa lambda light chain, anti PLA2R) given recent history of infections. Will consider Renal biopsy if renal function does not improve. Will need to consider HD if renal failure continues to worsen. Monitor labs and urine output. Avoid NSAIDs, ACEI/ARBS, RCA and nephrotoxins. Dose medications as per eGFR. Please decrease Doxazosin to allow for increased blood pressure. Goal is to have BP consistently in 120s-130s/70s-80s. Please send haptoglobin and repeat CBC to look for schistocytes.     If you have any questions, please feel free to contact me  Ahmet Elliott  Nephrology Fellow  [phone]; Prefer [employer] TEAMS  (After 5pm or on weekends please page the on-call fellow) Pt with FLORIAN in the setting of sepsis, anemia and borderline hypotension. Pt. with ? ATN Unable to rule out glomerulonephritis. Microscopy performed by myself did not show any dysmorphic RBCs or RBC casts. Did observe many granular and some hyaline casts. Pt. admitted with SCr. of 2.3(4/8) which has trended to 2.9 today(4/10). Scr. was 0.8 on 3/16/22. Pt. with chronic Garcia that was replaced in ED on day of admission. Non- contrast CT abdomen and pelvis negative for hydronephrosis or renal structural abnormalities. Please repeat UA, and send spot urine TP/CR. Urine electrolytes suggesting of intrinsic renal disease per FeUrea (Pt. on Lasix Po as outpatient) and pre-renal per [facility]. Blas <20 however pt. appears euvolemic. Please send GN workup (ISABELLE, P-ANCA, C-ANCA, Anti-GBM ab, HBsAg, Hep C antibody, HIV, Parvovirus, C3, C4, SPEP, serum immunofixation, free kappa lambda light chain, anti PLA2R) given recent history of infections. Will need to consider renal biopsy if renal function does not improve. Will need to consider HD if renal failure continues to worsen. Monitor labs and urine output. Avoid NSAIDs, ACEI/ARBS, RCA and nephrotoxins. Dose medications as per eGFR. Please decrease Doxazosin to allow for increased blood pressure. Goal is to have BP consistently in 120s-130s/70s-80s. Please send haptoglobin and repeat CBC to look for schistocytes.     If you have any questions, please feel free to contact me  Ahmet Elliott  Nephrology Fellow  [phone]; Prefer [employer] TEAMS  (After 5pm or on weekends please page the on-call fellow)

## 2022-04-10 NOTE — PHYSICAL THERAPY INITIAL EVALUATION ADULT - DIAGNOSIS, PT EVAL
Upon evaluation, pt presents with impairments in functional mobility, strength, balance, and endurance. Pt would benefit from skilled PT services in the acute care setting to address impairments to facilitate return to prior level of function. Pt admitted from LTAC; vent/trach dependent

## 2022-04-10 NOTE — DIETITIAN INITIAL EVALUATION ADULT - NS FNS DIET ORDER
Diet, NPO with Tube Feed:   Tube Feeding Modality: Gastrostomy  Jevity 1.2 Daryn (JEVITY1.2RTH)  Total Volume for 24 Hours (mL): 960  Continuous  Starting Tube Feed Rate {mL per Hour}: 10  Increase Tube Feed Rate by (mL): 10     Every 4 hours  Until Goal Tube Feed Rate (mL per Hour): 40  Tube Feed Duration (in Hours): 24  Tube Feed Start Time: 05:00  Free Water Flush (04-09-22 @ 04:24)

## 2022-04-10 NOTE — PROGRESS NOTE ADULT - ASSESSMENT
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 Per report, patient on 30% FiO2 normally, however has required 40% at NH and noted to be tachypneic. Patient seen and examined at bedside. Currently on vent at 30% FiO2, appearing comfortable. Patient remains non-verbal however is awake and alert. In ED patient noted to be febrile to 101.9 however was able to be titrated back down to baseline of 30% FiO2. s/p vancomycin/meropenem in ED.      WORKUP  Sputum cx (3/8): Acinetobacter baumannii/nosocom group (Carbapenem Resistant) sensitive only to tobramycin and amikacin and  Pseudomonas aeruginosa (R to Zosyn and cefepime)  UA (3/8):  Nitrite: Negative, Leuk Esterase: Moderate WBC 15 /HPF Bacteria: Few  MRSA PCR Result.: NotDetec (03-08) and 4/9  CT Abdomen and Pelvis No Cont (04.08.22 @ 23:22): Dependent lung parenchymal opacities, left greater than right, may  represent atelectasis or infection in the appropriate clinical setting.  Urine cx and blood cx (4/8): Negative    IMPRESSION  ·	Vent associated Pneumonia  ·	FLORIAN on CKD    RECOMMENDATIONS  Febrile to 101.9 on admission with no leukocytosis  s/p dose of vanc in ER  Given fevers, Lower lobe opacities and increased O2 requriement on admission, -> concern for possible pneumonia with potential mucus plugging event  Currently on meropenem  IVPB 1000 every 12 hours -> Continue with meropenem  Renally dose Meropenem to 500mg BID given worsening FLORIAN  Blood cx and Urine cx negative  c/w airway clearance measures, Chest PT, and suctioning for secretions  Please send sputum cx    Pt seen and examined. Case d/w attending and primary team    Josr Hartman MD, PGY4   ID fellow  [employer] Teams Preferred  After 5pm/weekends call [phone]

## 2022-04-10 NOTE — CONSULT NOTE ADULT - SUBJECTIVE AND OBJECTIVE BOX
HPI:  76 y.o. M with afib s/p watchman, gastric sleeve, chronic SDH and L falcine herniation s/p R hemicraniectomy due to fall, trach/vent dependent, seizure (keppra 1g q12), [facility] ICU course for HCAP, presented to the [facility] ED from nursing home facility on  due to hypoxia. Currently getting treatment for new PNA. Neurology consulted for abnormal CTH findings.       REVIEW OF SYSTEMS    A 10-system ROS was performed and is negative except for those items noted above and/or in the HPI.    PAST MEDICAL & SURGICAL HISTORY:  Essential hypertension    Primary osteoarthritis, unspecified site    Closed fracture of left radius, initial encounter    Morbid obesity, unspecified obesity type  h/o. - s/p gastric bypass- lost 113 lbs    KAREN (obstructive sleep apnea)  h/o - was on CPAP until gastric bypass surgery    Respiratory failure    Anemia    Subdural hemorrhage    Epilepsy    H/O gastrostomy    History of BPH    Afib    S/P gastric bypass      Status post total replacement of left hip  2006    S/P bunionectomy  bilateral    S/P colonoscopy  approx 2012    History of abdominoplasty  and subsequent cosmetic surgery for removal of excess skin from face      FAMILY HISTORY:  Family history of renal cell carcinoma (Mother)    Family history of malignant melanoma (Sibling)      SOCIAL HISTORY: as noted in HPI    MEDICATIONS (HOME):  Home Medications:  acetaminophen 160 mg/5 mL oral suspension: 20.31 milliliter(s) orally every 6 hours, As needed, Temp greater or equal to 38C (100.4F), Moderate Pain (4 - 6) (15 Mar 2022 16:09)  albuterol 90 mcg/inh inhalation aerosol: 2 puff(s) inhaled every 6 hours (15 Mar 2022 16:09)  amantadine 50 mg/5 mL oral syrup: 10 milliliter(s) orally 2 times a day (15 Mar 2022 16:09)  amiodarone 200 mg oral tablet: 1 tab(s) orally once a day (15 Mar 2022 16:09)  ascorbic acid 500 mg oral tablet: 1 tab(s) orally once a day (15 Mar 2022 16:09)  carvedilol 6.25 mg oral tablet: 1 tab(s) orally every 12 hours (15 Mar 2022 16:09)  folic acid 1 mg oral tablet: 1 tab(s) orally once a day (15 Mar 2022 16:09)  furosemide 40 mg/5 mL oral solution: 5 milliliter(s) orally once a day (15 Mar 2022 16:09)  lactulose 10 g/15 mL oral syrup: 10 milliliter(s) orally 2 times a day (15 Mar 2022 16:09)  levETIRAcetam 100 mg/mL oral solution: 10 milliliter(s) orally 2 times a day (15 Mar 2022 16:09)  Multiple Vitamins oral tablet: 1 tab(s) orally once a day (15 Mar 2022 16:09)  scopolamine: 1 milligram(s) transdermal every 3 days, excessive secretion (16 Mar 2022 10:51)  senna 8.8 mg/5 mL oral syrup: 10 milliliter(s) orally once a day (at bedtime) (15 Mar 2022 16:09)  tamsulosin 0.4 mg oral capsule: 1 cap(s) orally once a day (at bedtime) (15 Mar 2022 16:09)    MEDICATIONS  (STANDING):  amantadine Syrup 100 milliGRAM(s) Oral two times a day  aMIOdarone    Tablet 200 milliGRAM(s) Oral daily  carvedilol 6.25 milliGRAM(s) Oral every 12 hours  chlorhexidine 0.12% Liquid 15 milliLiter(s) Oral Mucosa every 12 hours  chlorhexidine 2% Cloths 1 Application(s) Topical daily  doxazosin 2 milliGRAM(s) Oral at bedtime  heparin   Injectable 5000 Unit(s) SubCutaneous every 8 hours  lactulose Syrup 10 Gram(s) Oral two times a day  levETIRAcetam  Solution 1000 milliGRAM(s) Oral two times a day  meropenem  IVPB 500 milliGRAM(s) IV Intermittent every 12 hours  senna Syrup 5 milliLiter(s) Oral at bedtime    MEDICATIONS  (PRN):  acetaminophen    Suspension .. 650 milliGRAM(s) Oral every 4 hours PRN Temp greater or equal to 38C (100.4F), Mild Pain (1 - 3)    ALLERGIES/INTOLERANCES:  Allergies  No Known Allergies    Intolerances    VITALS & EXAMINATION:  Vital Signs Last 24 Hrs  T(C): 37 (10 Apr 2022 10:00), Max: 37 (10 Apr 2022 10:00)  T(F): 98.6 (10 Apr 2022 10:00), Max: 98.6 (10 Apr 2022 10:00)  HR: 70 (10 Apr 2022 10:34) (68 - 88)  BP: 111/76 (10 Apr 2022 10:00) (101/70 - 115/80)  BP(mean): --  RR: 20 (10 Apr 2022 10:00) (15 - 20)  SpO2: 99% (10 Apr 2022 10:34) (98% - 100%)    General:  Constitutional: Obese Male, appears stated age, in no apparent distress including pain  Head: Normocephalic & atraumatic.  Neck: Trach  Abd: PEG for tube feed    Neurological (>12):  MS: Eyes open. Awake, alert, oriented to person, place, situation, time. Flat affect. Follows simple one step commands.    Language: Mute but able to whisper "yes" rarely and appropriately to simple question    CNs: PERRL on R>L (R = 3mm, L = 3mm). BTT on R. Has R gaze preference but able to cross midline and turn head intermittently to L. V1-3 intact to LT, well developed masseter muscles b/l. No facial asymmetry b/l, full eye closure strength b/l. Hearing grossly normal (rubbing fingers) b/l. Head turning & shoulder shrug intact b/l. Tongue midline, normal movements, no atrophy.    Motor: Normal muscle bulk & tone. No noticeable tremor or seizure. No pronator drift. Withdraws to noxious stimuli in LLE only. No response on RLE. RUE is able to be lifted up antigravity without drift, strength at least 3/5. There is more spontaneous movement of RUE than LUE. LUE has mild stimulus induced myoclonic jerks     Sensation: Does not grimace throughout to noxious stimuli    Reflexes:              Biceps(C5)       BR(C6)     Patellar(L4)    Achilles(S1)    Plantar Resp  R	2	          2	             Mute		    Mute		Unequivocal  L	2	          2	             Mute		    Mute		Unequivocal    Coordination: Was able to take R thumb only and perform FTN without dysmetria. Was not able to perform FTN on LUE or toe to hand on b/l LE    Gait: Deferred    LABORATORY:  CBC                       7.6    6.16  )-----------( 133      ( 10 Apr 2022 06:42 )             23.8     Chem 04-10    139  |  101  |  76<H>  ----------------------------<  106<H>  3.8   |  23  |  2.93<H>    Ca    7.9<L>      10 Apr 2022 06:42  Phos  4.4     04-10  Mg     3.00     04-10    TPro  7.0  /  Alb  2.5<L>  /  TBili  <0.2  /  DBili  x   /  AST  38  /  ALT  25  /  AlkPhos  99  04-08    LFTs LIVER FUNCTIONS - ( 2022 18:26 )  Alb: 2.5 g/dL / Pro: 7.0 g/dL / ALK PHOS: 99 U/L / ALT: 25 U/L / AST: 38 U/L / GGT: x           Coagulopathy PT/INR - ( 2022 18:26 )   PT: 14.3 sec;   INR: 1.23 ratio         PTT - ( 2022 18:26 )  PTT:24.2 sec  Lipid Panel   A1c   Cardiac enzymes     U/A Urinalysis Basic - ( 2022 23:15 )    Color: Yellow / Appearance: Slightly Turbid / S.023 / pH: x  Gluc: x / Ketone: Trace  / Bili: Negative / Urobili: <2 mg/dL   Blood: x / Protein: 300 mg/dL / Nitrite: Negative   Leuk Esterase: Moderate / RBC: 75 /HPF / WBC 15 /HPF   Sq Epi: x / Non Sq Epi: x / Bacteria: Few      CSF  Immunological  Other    STUDIES & IMAGING:  Studies (EKG, EEG, EMG, etc):     Radiology (XR, CT, MR, U/S, TTE/SRINIVAS):  < from: CT Head No Cont (22 @ 23:22) >  IMPRESSION:  Motion degraded exam.    Right frontoparietal temporal craniectomy with wire mesh cranioplasty.   Extensive right cerebral gliosis and areas of encephalomalacia with   volume loss either from prior infarct or trauma in the left frontal and   anterior right temporal lobes. Thin chronic subdural along the falx.  Appearance of gyriform thickening in the right frontoparietal parenchyma   underneath the cranioplasty is indeterminate. Possibility of intracranial   infection is not excluded. Consider follow-up with contrast-enhanced   brain MRI for better evaluation.  Ventriculomegaly superimposed on cerebral volume loss. No acute   intracranial hemorrhage, midline shift or acute extra-axial collection.    --- End of Report ---      < end of copied text >   HPI:  76 y.o. M with afib s/p watchman, gastric sleeve, chronic SDH and L falcine herniation s/p R hemicraniectomy due to fall, trach/vent dependent, seizure (keppra 1g q12), [facility] ICU course for HCAP, presented to the [facility] ED from nursing home facility on  due to hypoxia. Currently getting treatment for new PNA. Neurology consulted for abnormal CTH findings. Per [facility] chart review, pt sustained a fall in [facility] in 2021, later found unconscious by hotel staff and had prolonged hospitalization there with SDH s/p cranitomy. Was stabilized and on 22 air lifted to [facility]. Was weaned from vent in [place] and went to LTAC at [facility] and then recently transferred to [facility] for continued ELIZABETH/LTC on 3/3/22. Weaned off vent and was put on trache with supplement O2 and speaking valve. At the time of the exam, pt is not verbal except for rare occasion where he would whisper yes. Unable to obtain ROS due to not answer questions.       REVIEW OF SYSTEMS: unable to be obtained due to pt status    PAST MEDICAL & SURGICAL HISTORY:  Essential hypertension    Primary osteoarthritis, unspecified site    Closed fracture of left radius, initial encounter    Morbid obesity, unspecified obesity type  h/o. - s/p gastric bypass- lost 113 lbs    KAREN (obstructive sleep apnea)  h/o - was on CPAP until gastric bypass surgery    Respiratory failure    Anemia    Subdural hemorrhage    Epilepsy    H/O gastrostomy    History of BPH    Afib    S/P gastric bypass      Status post total replacement of left hip  2006    S/P bunionectomy  bilateral    S/P colonoscopy  approx     History of abdominoplasty  and subsequent cosmetic surgery for removal of excess skin from face      FAMILY HISTORY:  Family history of renal cell carcinoma (Mother)    Family history of malignant melanoma (Sibling)      SOCIAL HISTORY: as noted in HPI    MEDICATIONS (HOME):  Home Medications:  acetaminophen 160 mg/5 mL oral suspension: 20.31 milliliter(s) orally every 6 hours, As needed, Temp greater or equal to 38C (100.4F), Moderate Pain (4 - 6) (15 Mar 2022 16:09)  albuterol 90 mcg/inh inhalation aerosol: 2 puff(s) inhaled every 6 hours (15 Mar 2022 16:09)  amantadine 50 mg/5 mL oral syrup: 10 milliliter(s) orally 2 times a day (15 Mar 2022 16:09)  amiodarone 200 mg oral tablet: 1 tab(s) orally once a day (15 Mar 2022 16:09)  ascorbic acid 500 mg oral tablet: 1 tab(s) orally once a day (15 Mar 2022 16:09)  carvedilol 6.25 mg oral tablet: 1 tab(s) orally every 12 hours (15 Mar 2022 16:09)  folic acid 1 mg oral tablet: 1 tab(s) orally once a day (15 Mar 2022 16:09)  furosemide 40 mg/5 mL oral solution: 5 milliliter(s) orally once a day (15 Mar 2022 16:09)  lactulose 10 g/15 mL oral syrup: 10 milliliter(s) orally 2 times a day (15 Mar 2022 16:09)  levETIRAcetam 100 mg/mL oral solution: 10 milliliter(s) orally 2 times a day (15 Mar 2022 16:09)  Multiple Vitamins oral tablet: 1 tab(s) orally once a day (15 Mar 2022 16:09)  scopolamine: 1 milligram(s) transdermal every 3 days, excessive secretion (16 Mar 2022 10:51)  senna 8.8 mg/5 mL oral syrup: 10 milliliter(s) orally once a day (at bedtime) (15 Mar 2022 16:09)  tamsulosin 0.4 mg oral capsule: 1 cap(s) orally once a day (at bedtime) (15 Mar 2022 16:09)    MEDICATIONS  (STANDING):  amantadine Syrup 100 milliGRAM(s) Oral two times a day  aMIOdarone    Tablet 200 milliGRAM(s) Oral daily  carvedilol 6.25 milliGRAM(s) Oral every 12 hours  chlorhexidine 0.12% Liquid 15 milliLiter(s) Oral Mucosa every 12 hours  chlorhexidine 2% Cloths 1 Application(s) Topical daily  doxazosin 2 milliGRAM(s) Oral at bedtime  heparin   Injectable 5000 Unit(s) SubCutaneous every 8 hours  lactulose Syrup 10 Gram(s) Oral two times a day  levETIRAcetam  Solution 1000 milliGRAM(s) Oral two times a day  meropenem  IVPB 500 milliGRAM(s) IV Intermittent every 12 hours  senna Syrup 5 milliLiter(s) Oral at bedtime    MEDICATIONS  (PRN):  acetaminophen    Suspension .. 650 milliGRAM(s) Oral every 4 hours PRN Temp greater or equal to 38C (100.4F), Mild Pain (1 - 3)    ALLERGIES/INTOLERANCES:  Allergies  No Known Allergies    Intolerances    VITALS & EXAMINATION:  Vital Signs Last 24 Hrs  T(C): 37 (10 Apr 2022 10:00), Max: 37 (10 Apr 2022 10:00)  T(F): 98.6 (10 Apr 2022 10:00), Max: 98.6 (10 Apr 2022 10:00)  HR: 70 (10 Apr 2022 10:34) (68 - 88)  BP: 111/76 (10 Apr 2022 10:00) (101/70 - 115/80)  BP(mean): --  RR: 20 (10 Apr 2022 10:00) (15 - 20)  SpO2: 99% (10 Apr 2022 10:34) (98% - 100%)    General:  Constitutional: Obese Male, appears stated age, in no apparent distress including pain  Head: Normocephalic & atraumatic.  Neck: Trach  Abd: PEG for tube feed    Neurological (>12):  MS: Eyes open. Awake, alert, oriented to person, place, situation, time. Flat affect. Follows simple one step commands.    Language: Mute but able to whisper "yes" rarely and appropriately to simple question    CNs: PERRL on R>L (R = 3mm, L = 3mm). BTT on R. Has R gaze preference but able to cross midline and turn head intermittently to L. V1-3 intact to LT, well developed masseter muscles b/l. No facial asymmetry b/l, full eye closure strength b/l. Hearing grossly normal (rubbing fingers) b/l. Head turning & shoulder shrug intact b/l. Tongue midline, normal movements, no atrophy.    Motor: Normal muscle bulk & tone. No noticeable tremor or seizure. No pronator drift. Withdraws to noxious stimuli in LLE only. No response on RLE. RUE is able to be lifted up antigravity without drift, strength at least 3/5. There is more spontaneous movement of RUE than LUE. LUE has mild stimulus induced myoclonic jerks     Sensation: Does not grimace throughout to noxious stimuli    Reflexes:              Biceps(C5)       BR(C6)     Patellar(L4)    Achilles(S1)    Plantar Resp  R	2	          2	             Mute		    Mute		Unequivocal  L	2	          2	             Mute		    Mute		Unequivocal    Coordination: Was able to take R thumb only and perform FTN without dysmetria. Was not able to perform FTN on LUE or toe to hand on b/l LE    Gait: Deferred    LABORATORY:  CBC                       7.6    6.16  )-----------( 133      ( 10 Apr 2022 06:42 )             23.8     Chem 04-10    139  |  101  |  76<H>  ----------------------------<  106<H>  3.8   |  23  |  2.93<H>    Ca    7.9<L>      10 Apr 2022 06:42  Phos  4.4     04-10  Mg     3.00     04-10    TPro  7.0  /  Alb  2.5<L>  /  TBili  <0.2  /  DBili  x   /  AST  38  /  ALT  25  /  AlkPhos  99  04-08    LFTs LIVER FUNCTIONS - ( 2022 18:26 )  Alb: 2.5 g/dL / Pro: 7.0 g/dL / ALK PHOS: 99 U/L / ALT: 25 U/L / AST: 38 U/L / GGT: x           Coagulopathy PT/INR - ( 2022 18:26 )   PT: 14.3 sec;   INR: 1.23 ratio         PTT - ( 2022 18:26 )  PTT:24.2 sec  Lipid Panel   A1c   Cardiac enzymes     U/A Urinalysis Basic - ( 2022 23:15 )    Color: Yellow / Appearance: Slightly Turbid / S.023 / pH: x  Gluc: x / Ketone: Trace  / Bili: Negative / Urobili: <2 mg/dL   Blood: x / Protein: 300 mg/dL / Nitrite: Negative   Leuk Esterase: Moderate / RBC: 75 /HPF / WBC 15 /HPF   Sq Epi: x / Non Sq Epi: x / Bacteria: Few      CSF  Immunological  Other    STUDIES & IMAGING:  Studies (EKG, EEG, EMG, etc):     Radiology (XR, CT, MR, U/S, TTE/SRINIVAS):  < from: CT Head No Cont (22 @ 23:22) >  IMPRESSION:  Motion degraded exam.    Right frontoparietal temporal craniectomy with wire mesh cranioplasty.   Extensive right cerebral gliosis and areas of encephalomalacia with   volume loss either from prior infarct or trauma in the left frontal and   anterior right temporal lobes. Thin chronic subdural along the falx.  Appearance of gyriform thickening in the right frontoparietal parenchyma   underneath the cranioplasty is indeterminate. Possibility of intracranial   infection is not excluded. Consider follow-up with contrast-enhanced   brain MRI for better evaluation.  Ventriculomegaly superimposed on cerebral volume loss. No acute   intracranial hemorrhage, midline shift or acute extra-axial collection.    --- End of Report ---      < end of copied text >   HPI:  76 y.o. M with afib s/p watchman, gastric sleeve, chronic SDH and L falcine herniation s/p R hemicraniectomy due to fall, trach/vent dependent, seizure (keppra 1g q12), [facility] ICU course for HCAP, presented to the [facility] ED from nursing home facility on  due to hypoxia. Currently getting treatment for new PNA. Neurology consulted for abnormal CTH findings. Per [facility] chart review, pt sustained a fall in [facility] in 2021, later found unconscious by hotel staff and had prolonged hospitalization there with SDH s/p cranitomy. Was stabilized and on 22 air lifted to [facility]. Was weaned from vent in [place] and went to LTAC at [facility] and then recently transferred to [facility] for continued ELIZABETH/LTC on 3/3/22. Weaned off vent and was put on trache with supplement O2 and speaking valve. At the time of the exam, pt is not verbal except for rare occasion where he would whisper yes. Unable to obtain ROS due to not answer questions.       REVIEW OF SYSTEMS: unable to be obtained due to pt status    PAST MEDICAL & SURGICAL HISTORY:  Essential hypertension    Primary osteoarthritis, unspecified site    Closed fracture of left radius, initial encounter    Morbid obesity, unspecified obesity type  h/o. - s/p gastric bypass- lost 113 lbs    KAREN (obstructive sleep apnea)  h/o - was on CPAP until gastric bypass surgery    Respiratory failure    Anemia    Subdural hemorrhage    Epilepsy    H/O gastrostomy    History of BPH    Afib    S/P gastric bypass      Status post total replacement of left hip  2006    S/P bunionectomy  bilateral    S/P colonoscopy  approx     History of abdominoplasty  and subsequent cosmetic surgery for removal of excess skin from face      FAMILY HISTORY:  Family history of renal cell carcinoma (Mother)    Family history of malignant melanoma (Sibling)      SOCIAL HISTORY: as noted in HPI    MEDICATIONS (HOME):  Home Medications:  acetaminophen 160 mg/5 mL oral suspension: 20.31 milliliter(s) orally every 6 hours, As needed, Temp greater or equal to 38C (100.4F), Moderate Pain (4 - 6) (15 Mar 2022 16:09)  albuterol 90 mcg/inh inhalation aerosol: 2 puff(s) inhaled every 6 hours (15 Mar 2022 16:09)  amantadine 50 mg/5 mL oral syrup: 10 milliliter(s) orally 2 times a day (15 Mar 2022 16:09)  amiodarone 200 mg oral tablet: 1 tab(s) orally once a day (15 Mar 2022 16:09)  ascorbic acid 500 mg oral tablet: 1 tab(s) orally once a day (15 Mar 2022 16:09)  carvedilol 6.25 mg oral tablet: 1 tab(s) orally every 12 hours (15 Mar 2022 16:09)  folic acid 1 mg oral tablet: 1 tab(s) orally once a day (15 Mar 2022 16:09)  furosemide 40 mg/5 mL oral solution: 5 milliliter(s) orally once a day (15 Mar 2022 16:09)  lactulose 10 g/15 mL oral syrup: 10 milliliter(s) orally 2 times a day (15 Mar 2022 16:09)  levETIRAcetam 100 mg/mL oral solution: 10 milliliter(s) orally 2 times a day (15 Mar 2022 16:09)  Multiple Vitamins oral tablet: 1 tab(s) orally once a day (15 Mar 2022 16:09)  scopolamine: 1 milligram(s) transdermal every 3 days, excessive secretion (16 Mar 2022 10:51)  senna 8.8 mg/5 mL oral syrup: 10 milliliter(s) orally once a day (at bedtime) (15 Mar 2022 16:09)  tamsulosin 0.4 mg oral capsule: 1 cap(s) orally once a day (at bedtime) (15 Mar 2022 16:09)    MEDICATIONS  (STANDING):  amantadine Syrup 100 milliGRAM(s) Oral two times a day  aMIOdarone    Tablet 200 milliGRAM(s) Oral daily  carvedilol 6.25 milliGRAM(s) Oral every 12 hours  chlorhexidine 0.12% Liquid 15 milliLiter(s) Oral Mucosa every 12 hours  chlorhexidine 2% Cloths 1 Application(s) Topical daily  doxazosin 2 milliGRAM(s) Oral at bedtime  heparin   Injectable 5000 Unit(s) SubCutaneous every 8 hours  lactulose Syrup 10 Gram(s) Oral two times a day  levETIRAcetam  Solution 1000 milliGRAM(s) Oral two times a day  meropenem  IVPB 500 milliGRAM(s) IV Intermittent every 12 hours  senna Syrup 5 milliLiter(s) Oral at bedtime    MEDICATIONS  (PRN):  acetaminophen    Suspension .. 650 milliGRAM(s) Oral every 4 hours PRN Temp greater or equal to 38C (100.4F), Mild Pain (1 - 3)    ALLERGIES/INTOLERANCES:  Allergies  No Known Allergies    Intolerances    VITALS & EXAMINATION:  Vital Signs Last 24 Hrs  T(C): 37 (10 Apr 2022 10:00), Max: 37 (10 Apr 2022 10:00)  T(F): 98.6 (10 Apr 2022 10:00), Max: 98.6 (10 Apr 2022 10:00)  HR: 70 (10 Apr 2022 10:34) (68 - 88)  BP: 111/76 (10 Apr 2022 10:00) (101/70 - 115/80)  BP(mean): --  RR: 20 (10 Apr 2022 10:00) (15 - 20)  SpO2: 99% (10 Apr 2022 10:34) (98% - 100%)    General:  Constitutional: Obese Male, appears stated age, in no apparent distress including pain  Head: Normocephalic & atraumatic.  Neck: Trach  Abd: PEG for tube feed    Neurological (>12):  MS: Eyes open. Awake, alert. Flat affect. Follows simple one step commands.  followed command - "show me two fingers" with his right hand.     Language: Mute but able to whisper "yes" rarely and appropriately to simple question    CNs: PERRL on R>L (R = 3mm, L = 3mm). BTT on R. Has R gaze preference but able to cross midline and turn head intermittently to L. V1-3 intact to LT, well developed masseter muscles b/l. No facial asymmetry b/l, full eye closure strength b/l. Hearing grossly normal (rubbing fingers) b/l. Head turning & shoulder shrug intact b/l. Tongue midline, normal movements, no atrophy.    Motor: Normal muscle bulk & tone. No noticeable tremor or seizure. No pronator drift. Withdraws to noxious stimuli in LLE only. No response on RLE. RUE is able to be lifted up antigravity without drift, strength at least 3/5. There is more spontaneous movement of RUE than LUE. LUE has mild stimulus induced myoclonic jerks.    Sensation: Does not grimace throughout to noxious stimuli    Reflexes:              Biceps(C5)       BR(C6)     Patellar(L4)    Achilles(S1)    Plantar Resp  R	2	          2	             Mute		    Mute		Unequivocal  L	2	          2	             Mute		    Mute		Unequivocal    Coordination: Was able to take R thumb only and perform FTN without dysmetria. Was not able to perform FTN on LUE or toe to hand on b/l LE    Gait: Deferred    LABORATORY:  CBC                       7.6    6.16  )-----------( 133      ( 10 Apr 2022 06:42 )             23.8     Chem 04-10    139  |  101  |  76<H>  ----------------------------<  106<H>  3.8   |  23  |  2.93<H>    Ca    7.9<L>      10 Apr 2022 06:42  Phos  4.4     04-10  Mg     3.00     04-10    TPro  7.0  /  Alb  2.5<L>  /  TBili  <0.2  /  DBili  x   /  AST  38  /  ALT  25  /  AlkPhos  99  04-08    LFTs LIVER FUNCTIONS - ( 2022 18:26 )  Alb: 2.5 g/dL / Pro: 7.0 g/dL / ALK PHOS: 99 U/L / ALT: 25 U/L / AST: 38 U/L / GGT: x           Coagulopathy PT/INR - ( 2022 18:26 )   PT: 14.3 sec;   INR: 1.23 ratio         PTT - ( 2022 18:26 )  PTT:24.2 sec  Lipid Panel   A1c   Cardiac enzymes     U/A Urinalysis Basic - ( 2022 23:15 )    Color: Yellow / Appearance: Slightly Turbid / S.023 / pH: x  Gluc: x / Ketone: Trace  / Bili: Negative / Urobili: <2 mg/dL   Blood: x / Protein: 300 mg/dL / Nitrite: Negative   Leuk Esterase: Moderate / RBC: 75 /HPF / WBC 15 /HPF   Sq Epi: x / Non Sq Epi: x / Bacteria: Few      CSF  Immunological  Other    STUDIES & IMAGING:  Studies (EKG, EEG, EMG, etc):     Radiology (XR, CT, MR, U/S, TTE/SRINIVAS):  < from: CT Head No Cont (22 @ 23:22) >  IMPRESSION:  Motion degraded exam.    Right frontoparietal temporal craniectomy with wire mesh cranioplasty.   Extensive right cerebral gliosis and areas of encephalomalacia with   volume loss either from prior infarct or trauma in the left frontal and   anterior right temporal lobes. Thin chronic subdural along the falx.  Appearance of gyriform thickening in the right frontoparietal parenchyma   underneath the cranioplasty is indeterminate. Possibility of intracranial   infection is not excluded. Consider follow-up with contrast-enhanced   brain MRI for better evaluation.  Ventriculomegaly superimposed on cerebral volume loss. No acute   intracranial hemorrhage, midline shift or acute extra-axial collection.    --- End of Report ---      < end of copied text >

## 2022-04-10 NOTE — DIETITIAN INITIAL EVALUATION ADULT - ETIOLOGY
related to TF not running at goal rate O-Z Plasty Text: The defect edges were debeveled with a #15 scalpel blade.  Given the location of the defect, shape of the defect and the proximity to free margins an O-Z plasty (double transposition flap) was deemed most appropriate.  Using a sterile surgical marker, the appropriate transposition flaps were drawn incorporating the defect and placing the expected incisions within the relaxed skin tension lines where possible.    The area thus outlined was incised deep to adipose tissue with a #15 scalpel blade.  The skin margins were undermined to an appropriate distance in all directions utilizing iris scissors.  Hemostasis was achieved with electrocautery.  The flaps were then transposed into place, one clockwise and the other counterclockwise, and anchored with interrupted buried subcutaneous sutures.

## 2022-04-10 NOTE — DIETITIAN INITIAL EVALUATION ADULT - PERTINENT LABORATORY DATA
04-10    139  |  101  |  76<H>  ----------------------------<  106<H>  3.8   |  23  |  2.93<H>    Ca    7.9<L>      10 Apr 2022 06:42  Phos  4.4     04-10  Mg     3.00     04-10    TPro  7.0  /  Alb  2.5<L>  /  TBili  <0.2  /  DBili  x   /  AST  38  /  ALT  25  /  AlkPhos  99  04-08  POCT Blood Glucose.: 115 mg/dL (04-10-22 @ 05:57)  A1C with Estimated Average Glucose Result: 5.9 % (04-09-22 @ 06:43)

## 2022-04-10 NOTE — PROGRESS NOTE ADULT - SUBJECTIVE AND OBJECTIVE BOX
CHIEF COMPLAINT: Patient is a 76y old  Male who presents with a chief complaint of Hypoxia (2022 13:58)      Interval Events: Pt seen at bedside     REVIEW OF SYSTEMS:  [x ] Unable to assess ROS because  AMS    Mode: AC/ CMV (Assist Control/ Continuous Mandatory Ventilation), RR (machine): 12, TV (machine): 450, FiO2: 30, PEEP: 7, ITime: 0.8, MAP: 9, PIP: 18  Arterial Blood Gas:   @ 06:43  7.45/38/115/26/99.7/2.4  ABG lactate: --  Arterial Blood Gas:   @ 04:20  7.39/45/39/27/72.1/2.0  ABG lactate: --      OBJECTIVE:  ICU Vital Signs Last 24 Hrs  T(C): 36.6 (10 Apr 2022 04:57), Max: 37.1 (2022 10:00)  T(F): 97.8 (10 Apr 2022 04:57), Max: 98.7 (2022 10:00)  HR: 68 (10 Apr 2022 07:37) (68 - 88)  BP: 115/80 (10 Apr 2022 04:57) (101/70 - 115/80)  BP(mean): --  ABP: --  ABP(mean): --  RR: 20 (10 Apr 2022 04:57) (13 - 20)  SpO2: 100% (10 Apr 2022 07:37) (97% - 100%)    Mode: AC/ CMV (Assist Control/ Continuous Mandatory Ventilation), RR (machine): 12, TV (machine): 450, FiO2: 30, PEEP: 7, ITime: 0.8, MAP: 9, PIP: 18     @ 07:01  -  04-10 @ 07:00  --------------------------------------------------------  IN: 1140 mL / OUT: 550 mL / NET: 590 mL      CAPILLARY BLOOD GLUCOSE      POCT Blood Glucose.: 115 mg/dL (10 Apr 2022 05:57)      HOSPITAL MEDICATIONS:  MEDICATIONS  (STANDING):  amantadine Syrup 100 milliGRAM(s) Oral two times a day  aMIOdarone    Tablet 200 milliGRAM(s) Oral daily  carvedilol 6.25 milliGRAM(s) Oral every 12 hours  chlorhexidine 0.12% Liquid 15 milliLiter(s) Oral Mucosa every 12 hours  chlorhexidine 2% Cloths 1 Application(s) Topical daily  doxazosin 4 milliGRAM(s) Oral at bedtime  heparin   Injectable 5000 Unit(s) SubCutaneous every 8 hours  lactulose Syrup 10 Gram(s) Oral two times a day  levETIRAcetam  Solution 1000 milliGRAM(s) Oral two times a day  meropenem  IVPB 1000 milliGRAM(s) IV Intermittent every 12 hours  senna Syrup 5 milliLiter(s) Oral at bedtime    MEDICATIONS  (PRN):  acetaminophen    Suspension .. 650 milliGRAM(s) Oral every 4 hours PRN Temp greater or equal to 38C (100.4F), Mild Pain (1 - 3)      LABS:                        7.6    6.16  )-----------( 133      ( 10 Apr 2022 06:42 )             23.8     04-10    139  |  101  |  76<H>  ----------------------------<  106<H>  3.8   |  23  |  2.93<H>    Ca    7.9<L>      10 Apr 2022 06:42  Phos  4.4     04-10  Mg     3.00     04-10    TPro  7.0  /  Alb  2.5<L>  /  TBili  <0.2  /  DBili  x   /  AST  38  /  ALT  25  /  AlkPhos  99  04-08    PT/INR - ( 2022 18:26 )   PT: 14.3 sec;   INR: 1.23 ratio         PTT - ( 2022 18:26 )  PTT:24.2 sec  Urinalysis Basic - ( 2022 23:15 )    Color: Yellow / Appearance: Slightly Turbid / S.023 / pH: x  Gluc: x / Ketone: Trace  / Bili: Negative / Urobili: <2 mg/dL   Blood: x / Protein: 300 mg/dL / Nitrite: Negative   Leuk Esterase: Moderate / RBC: 75 /HPF / WBC 15 /HPF   Sq Epi: x / Non Sq Epi: x / Bacteria: Few      Arterial Blood Gas:   @ 06:43  7.45/38/115/26/99.7/2.4  ABG lactate: --  Arterial Blood Gas:   @ 04:20  7.39/45/39/27/72.1/2.0  ABG lactate: --    Venous Blood Gas:   @ 18:26  7.35/47/51/26/77.6  VBG Lactate: 1.8      PAST MEDICAL & SURGICAL HISTORY:  Essential hypertension    Primary osteoarthritis, unspecified site    Closed fracture of left radius, initial encounter    Morbid obesity, unspecified obesity type  h/o. - s/p gastric bypass- lost 113 lbs    KAREN (obstructive sleep apnea)  h/o - was on CPAP until gastric bypass surgery    Respiratory failure    Anemia    Subdural hemorrhage    Epilepsy    H/O gastrostomy    History of BPH    Afib    S/P gastric bypass  2008    Status post total replacement of left hip  2006    S/P bunionectomy  bilateral    S/P colonoscopy  approx 2012    History of abdominoplasty  and subsequent cosmetic surgery for removal of excess skin from face        FAMILY HISTORY:  Family history of renal cell carcinoma (Mother)    Family history of malignant melanoma (Sibling)        Social History:      RADIOLOGY:  [ ] Reviewed and interpreted by me    PULMONARY FUNCTION TESTS:    EKG:

## 2022-04-10 NOTE — PROGRESS NOTE ADULT - ASSESSMENT
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     Neuro  - Currently A&Ox0, awake and alert but non-verbal, not following commands  - based on prior documentation, appears to be at baseline mental status  - CT head obtained- No acute intracranial hemorrhage, midline shift or acute extra-axial collection    CVS  #Atrial Fibrillation  - Currently rate controlled   - s/p watchman device placement  - c/w home amiodarone 200mg qd  - c/w home carvedilol 6.25mg BID  #HFpEF  - most recent echo on 3/2022 with EF 55% and grossly normal LV fxn with severely dilated LA  - will check pBNP  - Pt appearing euvolemic at this time  - will hold off on home lasix in setting of possible ongoing infection  - Tolerating NH vent settings     RESP  #Acute hypoxic respiratory failure  - Patient with episode of desaturation and increased WOB prior to arrival   - At facility patient is on trach collar during the day and on vent at night with 30% fio2 requirement  - initially required 40% fiO2 in ED, now weaned back to 30% FiO2 and saturating at 100%  - concern for possible pneumonia with potential mucus plugging event  - c/w airway clearance measures, Chest PT, and suctioning for secretions  - s/p vanc/arnold in ED, will c/w arnold for now for possible pneumonia  - will obtain sputum culture  - check ABG to ensure adequate oxygenation on current vent settings    GI  - no active issues  - PEG tube present  - will plan to start trickle feeds  - pending CT A/P for possible abdominal pathology  - + BM with CT showing mod. stool burden now having stools     Renal  #FLORIAN  - Noted with elevated serum Cr to 2.39, with previous baseline of 0.8-0.9  - possibly in setting of current infection  - s/p 1L NS in ED  - chronic benson exchanged in ED with drainage of clear urine  - monitor I&Os  - trend Cr FU BMP s/p PRBC / fluids in ED     Endo  - no active issues  - no history of DM or thyroid disease  - Glucose WNL on BMPs  - can obtain A1C/TSH with AM labs  - A!C 5.9 accuchecks ordered /nutrition consult   - TSh elevated continue synthroid - pt with hypothyroidims 2/2 amiodorone use per family     Heme  #Anemia  - noted with anemia to 7.4, decreased from 8-9 on previous admissions  - no evidence of overt bleeding at this time  - will continue to trend Hgb daily  - Hgb 6.6 after fluids in ED- fu 6.8 when repeated S/P one unit PRBC FU post cbc   - Stool for OB     ID  #c/f Pneumonia  - Patient with questionable retrocardiac opacity on CXR  - CT A/P showing small L pleural effusion  - UA pending, chronic benson catheter exchanged  - s/p vanc/arnold in ED  - will c/w meropenem in setting of recent MDR Pseudomonas infection (sensitive to arnold)  - check MRSA PCR  - check sputum/blood cultures  - Seen by ID - continue present abx arnold /vanco x1 for now  Await cx     PPx  HSQ for DVT prophylaxis    Ethics  FULL CODE 75 y/o M with afib s/p watchman, gastric sleeve, SDH, 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     Neuro  - Currently A&Ox0, awake and alert but non-verbal, not following commands  - based on prior documentation, appears to be at baseline mental status  - CT head obtained- No acute intracranial hemorrhage, midline shift or acute extra-axial collection ? infection   - Neuro consult called - FU MRI brain ordered no contrast 2/2 FLORIAN    CVS  #Atrial Fibrillation  - Currently rate controlled   - s/p watchman device placement  - c/w home amiodarone 200mg qd  - c/w home carvedilol 6.25mg BID  #HFpEF  - most recent echo on 3/2022 with EF 55% and grossly normal LV fxn with severely dilated LA  - will check pBNP  - Pt appearing euvolemic at this time  - will hold off on home lasix in setting of possible ongoing infection/FLORIAN       RESP  #Acute hypoxic respiratory failure  - Patient with episode of desaturation and increased WOB prior to arrival   - At facility patient is on trach collar during the day and on vent at night with 30% fio2 requirement  - initially required 40% fiO2 in ED, now weaned back to 30% FiO2 and saturating at 100%  - concern for possible pneumonia with potential mucus plugging event  - c/w airway clearance measures, Chest PT, and suctioning for secretions  - s/p vanc/arnold in ED, will c/w arnold for now for possible pneumonia  - will obtain sputum culture  - Tolerating NH vent settings /PS trials     GI  - no active issues  - PEG tube present  - will plan to start trickle feeds  - pending CT A/P for possible abdominal pathology  - + BM with CT showing mod. stool burden now having stools     Renal  #FLORIAN  - Noted with elevated serum Cr to 2.39, with previous baseline of 0.8-0.9  - possibly in setting of current infection  - s/p 1L NS in ED  - chronic benson exchanged in ED with drainage of clear urine  - monitor I&Os  - trend Cr FU BMP s/p PRBC / fluids in ED   - Urine Na low - Nephrology called for consult ? glomerulonephritis Work up in progress     Endo  - no active issues  - no history of DM or thyroid disease  - Glucose WNL on BMPs  - can obtain A1C/TSH with AM labs  - A!C 5.9 accuchecks ordered /nutrition consult   - TSh elevated continue synthroid - pt with hypothyroidims 2/2 amiodorone use per family     Heme  #Anemia  - noted with anemia to 7.4, decreased from 8-9 on previous admissions  - no evidence of overt bleeding at this time  - will continue to trend Hgb daily  - Hgb 6.6 after fluids in ED- fu 6.8 when repeated S/P one unit PRBC FU post cbc   - Stool for OB     ID  #c/f Pneumonia  - Patient with questionable retrocardiac opacity on CXR  - CT A/P showing small L pleural effusion  - UA pending, chronic benson catheter exchanged  - s/p vanc/arnold in ED  - will c/w meropenem in setting of recent MDR Pseudomonas infection (sensitive to arnold)  - check MRSA PCR  - check sputum/blood cultures  - Seen by ID - continue present abx arnold /vanco x1 for now  Await cx   - Dose adjusted for FLORIAN     PPx  HSQ for DVT prophylaxis    Ethics- Per HCP pt is full code   FULL CODE

## 2022-04-11 LAB
ANION GAP SERPL CALC-SCNC: 15 MMOL/L — HIGH (ref 7–14)
BUN SERPL-MCNC: 77 MG/DL — HIGH (ref 7–23)
C3 SERPL-MCNC: 107 MG/DL — SIGNIFICANT CHANGE UP (ref 90–180)
C4 SERPL-MCNC: 47 MG/DL — HIGH (ref 10–40)
CALCIUM SERPL-MCNC: 7.7 MG/DL — LOW (ref 8.4–10.5)
CHLORIDE SERPL-SCNC: 101 MMOL/L — SIGNIFICANT CHANGE UP (ref 98–107)
CO2 SERPL-SCNC: 24 MMOL/L — SIGNIFICANT CHANGE UP (ref 22–31)
CREAT SERPL-MCNC: 2.87 MG/DL — HIGH (ref 0.5–1.3)
EGFR: 22 ML/MIN/1.73M2 — LOW
GLUCOSE BLDC GLUCOMTR-MCNC: 110 MG/DL — HIGH (ref 70–99)
GLUCOSE BLDC GLUCOMTR-MCNC: 121 MG/DL — HIGH (ref 70–99)
GLUCOSE BLDC GLUCOMTR-MCNC: 123 MG/DL — HIGH (ref 70–99)
GLUCOSE BLDC GLUCOMTR-MCNC: 134 MG/DL — HIGH (ref 70–99)
GLUCOSE SERPL-MCNC: 95 MG/DL — SIGNIFICANT CHANGE UP (ref 70–99)
HAPTOGLOB SERPL-MCNC: 323 MG/DL — HIGH (ref 34–200)
HCT VFR BLD CALC: 24.9 % — LOW (ref 39–50)
HGB BLD-MCNC: 7.8 G/DL — LOW (ref 13–17)
HIV 1+2 AB+HIV1 P24 AG SERPL QL IA: SIGNIFICANT CHANGE UP
MAGNESIUM SERPL-MCNC: 2.9 MG/DL — HIGH (ref 1.6–2.6)
MCHC RBC-ENTMCNC: 31.2 PG — SIGNIFICANT CHANGE UP (ref 27–34)
MCHC RBC-ENTMCNC: 31.3 GM/DL — LOW (ref 32–36)
MCV RBC AUTO: 99.6 FL — SIGNIFICANT CHANGE UP (ref 80–100)
NRBC # BLD: 0 /100 WBCS — SIGNIFICANT CHANGE UP
NRBC # FLD: 0 K/UL — SIGNIFICANT CHANGE UP
PHOSPHATE SERPL-MCNC: 4.4 MG/DL — SIGNIFICANT CHANGE UP (ref 2.5–4.5)
PLATELET # BLD AUTO: 145 K/UL — LOW (ref 150–400)
POTASSIUM SERPL-MCNC: 3.9 MMOL/L — SIGNIFICANT CHANGE UP (ref 3.5–5.3)
POTASSIUM SERPL-SCNC: 3.9 MMOL/L — SIGNIFICANT CHANGE UP (ref 3.5–5.3)
PROT SERPL-MCNC: 6.7 G/DL — SIGNIFICANT CHANGE UP (ref 6–8.3)
RBC # BLD: 2.5 M/UL — LOW (ref 4.2–5.8)
RBC # FLD: 18.6 % — HIGH (ref 10.3–14.5)
SODIUM SERPL-SCNC: 140 MMOL/L — SIGNIFICANT CHANGE UP (ref 135–145)
WBC # BLD: 6.01 K/UL — SIGNIFICANT CHANGE UP (ref 3.8–10.5)
WBC # FLD AUTO: 6.01 K/UL — SIGNIFICANT CHANGE UP (ref 3.8–10.5)

## 2022-04-11 PROCEDURE — 99223 1ST HOSP IP/OBS HIGH 75: CPT | Mod: GC

## 2022-04-11 PROCEDURE — 99233 SBSQ HOSP IP/OBS HIGH 50: CPT

## 2022-04-11 PROCEDURE — 84165 PROTEIN E-PHORESIS SERUM: CPT | Mod: 26

## 2022-04-11 PROCEDURE — 99232 SBSQ HOSP IP/OBS MODERATE 35: CPT

## 2022-04-11 PROCEDURE — 99222 1ST HOSP IP/OBS MODERATE 55: CPT

## 2022-04-11 PROCEDURE — 86334 IMMUNOFIX E-PHORESIS SERUM: CPT | Mod: 26

## 2022-04-11 RX ORDER — COLLAGENASE CLOSTRIDIUM HIST. 250 UNIT/G
1 OINTMENT (GRAM) TOPICAL DAILY
Refills: 0 | Status: DISCONTINUED | OUTPATIENT
Start: 2022-04-11 | End: 2022-05-17

## 2022-04-11 RX ADMIN — HEPARIN SODIUM 5000 UNIT(S): 5000 INJECTION INTRAVENOUS; SUBCUTANEOUS at 06:02

## 2022-04-11 RX ADMIN — Medication 100 MILLIGRAM(S): at 06:01

## 2022-04-11 RX ADMIN — SENNA PLUS 5 MILLILITER(S): 8.6 TABLET ORAL at 21:58

## 2022-04-11 RX ADMIN — LEVETIRACETAM 1000 MILLIGRAM(S): 250 TABLET, FILM COATED ORAL at 06:01

## 2022-04-11 RX ADMIN — LACTULOSE 10 GRAM(S): 10 SOLUTION ORAL at 06:01

## 2022-04-11 RX ADMIN — MEROPENEM 100 MILLIGRAM(S): 1 INJECTION INTRAVENOUS at 06:00

## 2022-04-11 RX ADMIN — Medication 2 MILLIGRAM(S): at 22:01

## 2022-04-11 RX ADMIN — Medication 100 MILLIGRAM(S): at 21:56

## 2022-04-11 RX ADMIN — LACTULOSE 10 GRAM(S): 10 SOLUTION ORAL at 18:52

## 2022-04-11 RX ADMIN — MEROPENEM 100 MILLIGRAM(S): 1 INJECTION INTRAVENOUS at 18:52

## 2022-04-11 RX ADMIN — HEPARIN SODIUM 5000 UNIT(S): 5000 INJECTION INTRAVENOUS; SUBCUTANEOUS at 21:58

## 2022-04-11 RX ADMIN — LEVETIRACETAM 1000 MILLIGRAM(S): 250 TABLET, FILM COATED ORAL at 18:52

## 2022-04-11 RX ADMIN — CARVEDILOL PHOSPHATE 6.25 MILLIGRAM(S): 80 CAPSULE, EXTENDED RELEASE ORAL at 18:52

## 2022-04-11 RX ADMIN — HEPARIN SODIUM 5000 UNIT(S): 5000 INJECTION INTRAVENOUS; SUBCUTANEOUS at 14:00

## 2022-04-11 RX ADMIN — Medication 1 APPLICATION(S): at 21:58

## 2022-04-11 RX ADMIN — CHLORHEXIDINE GLUCONATE 1 APPLICATION(S): 213 SOLUTION TOPICAL at 12:05

## 2022-04-11 RX ADMIN — CARVEDILOL PHOSPHATE 6.25 MILLIGRAM(S): 80 CAPSULE, EXTENDED RELEASE ORAL at 06:01

## 2022-04-11 RX ADMIN — AMIODARONE HYDROCHLORIDE 200 MILLIGRAM(S): 400 TABLET ORAL at 06:01

## 2022-04-11 RX ADMIN — CHLORHEXIDINE GLUCONATE 15 MILLILITER(S): 213 SOLUTION TOPICAL at 19:14

## 2022-04-11 NOTE — CONSULT NOTE ADULT - ASSESSMENT
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   Wound consulted requested to assist with management of sacral unstageable pressure injury      Sacrum unstageable pressure injury  -6.5cmx9.5cmx0.3cm  -moist firmly attached slough  -no fluctuance, no induration, no drainable collection.  -no s/s of acute skin infection/cellulitis  -no sharp debridement indicated; will enzymatically debride.  -CT abdomen/pelvis, findings without mention of osteomyelitis, defer remaining findings to primary team.  -Topical recommendations: cleanse with ns. collagenase rama thickness, silicone foam with border, change daily.  -offload pressure; low airloss support surface, t&p per protocol with use of fluidized positioning devices.  -perineal care per protocol, single breathable incontinence pad.  -Nutrition Consult for optimization as tolerated, enteral feeds via PEG, sacral unstageable pressure injury        consider MVI & Vit C to promote wound healing        encourage high quality protein when appropriate      Remainder of care per primary team  Will follow periodically throughout hospitalization. Please reconsult earlier if needed.    Upon discharge f/u as outpatient at [facility] [address] [place], [place] [phone]  Seen w/ Dr. Gordillo, findings and plan discussed in detail with primary team    Thank you for this consult  DEBBIE Bryant, CWEDISONN (pager #[phone])    If after 4PM or before 7:30AM on Mon-Friday or weekend/holiday please contact general surgery for urgent matters.   Team A- [id number]   Team B- [id number]  For non-urgent matters e-mail [email or web address]    We spent 70 minutes face to face with this patient of which more than 50% of the time was spent counseling & coordinating care of this pt

## 2022-04-11 NOTE — PROGRESS NOTE ADULT - SUBJECTIVE AND OBJECTIVE BOX
CHIEF COMPLAINT: Patient is a 76y old  Male who presents with a chief complaint of Hypoxia (10 Apr 2022 14:14)      Interval Events:    REVIEW OF SYSTEMS:  [ ] All other systems negative  [ ] Unable to assess ROS because ________    Mode: AC/ CMV (Assist Control/ Continuous Mandatory Ventilation), RR (machine): 12, TV (machine): 450, FiO2: 40, PEEP: 7, ITime: 0.8, MAP: 9, PIP: 20      OBJECTIVE:  ICU Vital Signs Last 24 Hrs  T(C): 36.2 (2022 05:45), Max: 36.8 (10 Apr 2022 18:42)  T(F): 97.1 (2022 05:45), Max: 98.2 (10 Apr 2022 18:42)  HR: 66 (2022 07:23) (66 - 77)  BP: 130/89 (2022 05:45) (123/88 - 130/89)  BP(mean): --  ABP: --  ABP(mean): --  RR: 13 (2022 05:45) (13 - 20)  SpO2: 100% (2022 07:23) (98% - 100%)    Mode: AC/ CMV (Assist Control/ Continuous Mandatory Ventilation), RR (machine): 12, TV (machine): 450, FiO2: 40, PEEP: 7, ITime: 0.8, MAP: 9, PIP: 20    04-10 @ 07:01  -  04-11 @ 07:00  --------------------------------------------------------  IN: 0 mL / OUT: 550 mL / NET: -550 mL      CAPILLARY BLOOD GLUCOSE      POCT Blood Glucose.: 110 mg/dL (2022 06:23)      HOSPITAL MEDICATIONS:  MEDICATIONS  (STANDING):  amantadine Syrup 100 milliGRAM(s) Oral two times a day  aMIOdarone    Tablet 200 milliGRAM(s) Oral daily  carvedilol 6.25 milliGRAM(s) Oral every 12 hours  chlorhexidine 0.12% Liquid 15 milliLiter(s) Oral Mucosa every 12 hours  chlorhexidine 2% Cloths 1 Application(s) Topical daily  doxazosin 2 milliGRAM(s) Oral at bedtime  heparin   Injectable 5000 Unit(s) SubCutaneous every 8 hours  lactulose Syrup 10 Gram(s) Oral two times a day  levETIRAcetam  Solution 1000 milliGRAM(s) Oral two times a day  meropenem  IVPB 500 milliGRAM(s) IV Intermittent every 12 hours  senna Syrup 5 milliLiter(s) Oral at bedtime    MEDICATIONS  (PRN):  acetaminophen    Suspension .. 650 milliGRAM(s) Oral every 4 hours PRN Temp greater or equal to 38C (100.4F), Mild Pain (1 - 3)      LABS:                        7.8    6.01  )-----------( 145      ( 2022 07:09 )             24.9     04-11    140  |  101  |  77<H>  ----------------------------<  95  3.9   |  24  |  2.87<H>    Ca    7.7<L>      2022 07:09  Phos  4.4     -  Mg     2.90     -    TPro  6.7  /  Alb  x   /  TBili  x   /  DBili  x   /  AST  x   /  ALT  x   /  AlkPhos  x         Urinalysis Basic - ( 10 Apr 2022 17:22 )    Color: Yellow / Appearance: Slightly Turbid / S.019 / pH: x  Gluc: x / Ketone: Negative  / Bili: Negative / Urobili: <2 mg/dL   Blood: x / Protein: 100 mg/dL / Nitrite: Negative   Leuk Esterase: Large / RBC: 86 /HPF / WBC 81 /HPF   Sq Epi: x / Non Sq Epi: 4 /HPF / Bacteria: Negative            PAST MEDICAL & SURGICAL HISTORY:  Essential hypertension    Primary osteoarthritis, unspecified site    Closed fracture of left radius, initial encounter    Morbid obesity, unspecified obesity type  h/o. - s/p gastric bypass- lost 113 lbs    KAREN (obstructive sleep apnea)  h/o - was on CPAP until gastric bypass surgery    Respiratory failure    Anemia    Subdural hemorrhage    Epilepsy    H/O gastrostomy    History of BPH    Afib    S/P gastric bypass      Status post total replacement of left hip      S/P bunionectomy  bilateral    S/P colonoscopy  approx 2012    History of abdominoplasty  and subsequent cosmetic surgery for removal of excess skin from face        FAMILY HISTORY:  Family history of renal cell carcinoma (Mother)    Family history of malignant melanoma (Sibling)        Social History:      RADIOLOGY:  [ ] Reviewed and interpreted by me    PULMONARY FUNCTION TESTS:    EKG: CHIEF COMPLAINT: Patient is a 76y old  Male who presents with a chief complaint of Hypoxia (10 Apr 2022 14:14)    Interval Events: None overnight. Unable to fully assess ROS 2/2 drowsiness. Did not tolerate CPAP today.     REVIEW OF SYSTEMS:  See above  [x] Unable to assess ROS because poor mental status/drowsiness     Mode: AC/ CMV (Assist Control/ Continuous Mandatory Ventilation), RR (machine): 12, TV (machine): 450, FiO2: 40, PEEP: 7, ITime: 0.8, MAP: 9, PIP: 20      OBJECTIVE:  ICU Vital Signs Last 24 Hrs  T(C): 36.2 (2022 05:45), Max: 36.8 (10 Apr 2022 18:42)  T(F): 97.1 (2022 05:45), Max: 98.2 (10 Apr 2022 18:42)  HR: 66 (2022 07:23) (66 - 77)  BP: 130/89 (2022 05:45) (123/88 - 130/89)  BP(mean): --  ABP: --  ABP(mean): --  RR: 13 (2022 05:45) (13 - 20)  SpO2: 100% (2022 07:23) (98% - 100%)    Mode: AC/ CMV (Assist Control/ Continuous Mandatory Ventilation), RR (machine): 12, TV (machine): 450, FiO2: 40, PEEP: 7, ITime: 0.8, MAP: 9, PIP: 20    04-10 @ 07:01  -  04-11 @ 07:00  --------------------------------------------------------  IN: 0 mL / OUT: 550 mL / NET: -550 mL      CAPILLARY BLOOD GLUCOSE      POCT Blood Glucose.: 110 mg/dL (2022 06:23)      HOSPITAL MEDICATIONS:  MEDICATIONS  (STANDING):  amantadine Syrup 100 milliGRAM(s) Oral two times a day  aMIOdarone    Tablet 200 milliGRAM(s) Oral daily  carvedilol 6.25 milliGRAM(s) Oral every 12 hours  chlorhexidine 0.12% Liquid 15 milliLiter(s) Oral Mucosa every 12 hours  chlorhexidine 2% Cloths 1 Application(s) Topical daily  doxazosin 2 milliGRAM(s) Oral at bedtime  heparin   Injectable 5000 Unit(s) SubCutaneous every 8 hours  lactulose Syrup 10 Gram(s) Oral two times a day  levETIRAcetam  Solution 1000 milliGRAM(s) Oral two times a day  meropenem  IVPB 500 milliGRAM(s) IV Intermittent every 12 hours  senna Syrup 5 milliLiter(s) Oral at bedtime    MEDICATIONS  (PRN):  acetaminophen    Suspension .. 650 milliGRAM(s) Oral every 4 hours PRN Temp greater or equal to 38C (100.4F), Mild Pain (1 - 3)    PHYSICAL EXAM  GENERAL: Laying in bed, NAD  HEENT: +Trach, area c/d/i  Card: +s1, s2  Pulm: + coarse breath sound b/l  GI: +PEG, area c/d/i, obese abd, soft, nt/nd, no peritoneal signs noted  Ext: no asymmetry, no swelling, no calf tenderness  Neuro: alert and awake, following simple commands such as squeezing hands     LABS:                        7.8    6.01  )-----------( 145      ( 2022 07:09 )             24.9     04-11    140  |  101  |  77<H>  ----------------------------<  95  3.9   |  24  |  2.87<H>    Ca    7.7<L>      2022 07:09  Phos  4.4       Mg     2.90         TPro  6.7  /  Alb  x   /  TBili  x   /  DBili  x   /  AST  x   /  ALT  x   /  AlkPhos  x   -      Urinalysis Basic - ( 10 Apr 2022 17:22 )    Color: Yellow / Appearance: Slightly Turbid / S.019 / pH: x  Gluc: x / Ketone: Negative  / Bili: Negative / Urobili: <2 mg/dL   Blood: x / Protein: 100 mg/dL / Nitrite: Negative   Leuk Esterase: Large / RBC: 86 /HPF / WBC 81 /HPF   Sq Epi: x / Non Sq Epi: 4 /HPF / Bacteria: Negative      PAST MEDICAL & SURGICAL HISTORY:  Essential hypertension    Primary osteoarthritis, unspecified site    Closed fracture of left radius, initial encounter    Morbid obesity, unspecified obesity type  h/o. - s/p gastric bypass- lost 113 lbs    KAREN (obstructive sleep apnea)  h/o - was on CPAP until gastric bypass surgery    Respiratory failure    Anemia    Subdural hemorrhage    Epilepsy    H/O gastrostomy    History of BPH    Afib    S/P gastric bypass  2008    Status post total replacement of left hip  2006    S/P bunionectomy  bilateral    S/P colonoscopy  approx 2012    History of abdominoplasty  and subsequent cosmetic surgery for removal of excess skin from face      FAMILY HISTORY:  Family history of renal cell carcinoma (Mother)    Family history of malignant melanoma (Sibling)    Social History:    RADIOLOGY:  [ ] Reviewed and interpreted by me    PULMONARY FUNCTION TESTS:    EKG:

## 2022-04-11 NOTE — PROGRESS NOTE ADULT - ASSESSMENT
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     Neuro  - Currently A&Ox0, awake and alert but non-verbal, not following commands  - based on prior documentation, appears to be at baseline mental status  - CT head obtained- No acute intracranial hemorrhage, midline shift or acute extra-axial collection ? infection   - Neuro consult called - FU MRI brain ordered no contrast 2/2 FLORIAN    CVS  #Atrial Fibrillation  - Currently rate controlled   - s/p watchman device placement  - c/w home amiodarone 200mg qd  - c/w home carvedilol 6.25mg BID  #HFpEF  - most recent echo on 3/2022 with EF 55% and grossly normal LV fxn with severely dilated LA  - will check pBNP  - Pt appearing euvolemic at this time  - will hold off on home lasix in setting of possible ongoing infection/FLORIAN       RESP  #Acute hypoxic respiratory failure  - Patient with episode of desaturation and increased WOB prior to arrival   - At facility patient is on trach collar during the day and on vent at night with 30% fio2 requirement  - initially required 40% fiO2 in ED, now weaned back to 30% FiO2 and saturating at 100%  - concern for possible pneumonia with potential mucus plugging event  - c/w airway clearance measures, Chest PT, and suctioning for secretions  - s/p vanc/arnold in ED, will c/w arnold for now for possible pneumonia  - will obtain sputum culture  - Tolerating NH vent settings /PS trials     GI  - no active issues  - PEG tube present  - will plan to start trickle feeds  - pending CT A/P for possible abdominal pathology  - + BM with CT showing mod. stool burden now having stools     Renal  #FLORIAN  - Noted with elevated serum Cr to 2.39, with previous baseline of 0.8-0.9  - possibly in setting of current infection  - s/p 1L NS in ED  - chronic benson exchanged in ED with drainage of clear urine  - monitor I&Os  - trend Cr FU BMP s/p PRBC / fluids in ED   - Urine Na low - Nephrology called for consult ? glomerulonephritis Work up in progress     Endo  - no active issues  - no history of DM or thyroid disease  - Glucose WNL on BMPs  - can obtain A1C/TSH with AM labs  - A!C 5.9 accuchecks ordered /nutrition consult   - TSh elevated continue synthroid - pt with hypothyroidims 2/2 amiodorone use per family     Heme  #Anemia  - noted with anemia to 7.4, decreased from 8-9 on previous admissions  - no evidence of overt bleeding at this time  - will continue to trend Hgb daily  - Hgb 6.6 after fluids in ED- fu 6.8 when repeated S/P one unit PRBC FU post cbc   - Stool for OB     ID  #c/f Pneumonia  - Patient with questionable retrocardiac opacity on CXR  - CT A/P showing small L pleural effusion  - UA pending, chronic benson catheter exchanged  - s/p vanc/arnold in ED  - will c/w meropenem in setting of recent MDR Pseudomonas infection (sensitive to arnold)  - check MRSA PCR  - check sputum/blood cultures  - Seen by ID - continue present abx arnold /vanco x1 for now  Await cx   - Dose adjusted for FLORIAN     PPx  HSQ for DVT prophylaxis    Ethics- Per HCP pt is full code   FULL CODE 75 y/o M with afib s/p watchman, gastric sleeve, SDH, 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     Neuro  - Currently A&Ox0, awake and alert but non-verbal  - based on prior documentation, appears to be at baseline mental status  - CT head obtained- No acute intracranial hemorrhage, midline shift or acute extra-axial collection ? infection   - Neuro consult called - MRI brain ordered no contrast 2/2 FLORIAN pending     CVS  #Atrial Fibrillation  - Currently rate controlled   - s/p watchman device placement  - c/w home amiodarone 200mg qd  - c/w home carvedilol 6.25mg BID  #HFpEF  - most recent echo on 3/2022 with EF 55% and grossly normal LV fxn with severely dilated LA  - will check pBNP  - Pt appearing euvolemic at this time  - will hold off on home Lasix in setting of possible ongoing infection/FLORIAN     RESP  #Acute hypoxic respiratory failure  - Patient with episode of desaturation and increased WOB prior to arrival   - At facility patient is on trach collar during the day and on vent at night with 30% fio2 requirement  - initially required 40% fiO2 in ED, now weaned back to 30% FiO2 and saturating at 100%  - concern for possible pneumonia with potential mucus plugging event  - c/w airway clearance measures, Chest PT, and suctioning for secretions  - s/p vanc/arnold in ED - c/w Arnold for now for possible pneumonia  - will obtain sputum culture  - Tolerating NH vent settings / did not tolerate PS trials today    GI  - No active issues  - PEG tube present, c/w TF at current rate   - CT A/P done for possible abdominal pathology - see full report   - + BM with CT showing mod. stool burden now having stools     Renal  #FLORIAN  - Noted with elevated serum Cr to 2.39, with previous baseline of 0.8-0.9  - possibly in setting of current infection  - chronic Benson - exchanged in ED with drainage of clear urine  - monitor I&Os  - Trend Cr FU BMP s/p PRBC / fluids in ED   - Urine Na low - Nephrology called for consult ? glomerulonephritis Work up in progress     Endo  - no active issues  - no history of DM or thyroid disease  - Glucose WNL on BMPs  - A1C 5.9 accuchecks ordered /nutrition consult   - TSH elevated continue synthroid - pt with hypothyroidims 2/2 amiodorone use per family     Heme  #Anemia  - noted with anemia to 7.4, decreased from 8-9 on previous admissions  - no evidence of overt bleeding at this time  - will continue to trend Hgb daily  - Hgb 6.6 after fluids in ED- fu 6.8 when repeated S/P one unit PRBC FU post cbc - now stable   - Stool for OB     ID  #c/f Pneumonia  - Patient with questionable retrocardiac opacity on CXR  - CT A/P showing small L pleural effusion  - UA pending, chronic benson catheter exchanged  - s/p vanc/arnold in ED  - will c/w meropenem in setting of recent MDR Pseudomonas infection (sensitive to arnold)  - check MRSA PCR  - check sputum/blood cultures  - Seen by ID - c/w Meropenem for now  - Dose adjusted for FLORIAN     PPx  HSQ for DVT prophylaxis    Ethics- Per HCP pt is full code   FULL CODE

## 2022-04-11 NOTE — CONSULT NOTE ADULT - SUBJECTIVE AND OBJECTIVE BOX
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     Patient sent in from nursing home for increasing oxygen requirements and worsening mental status. Per report, patient on 30% FiO2 normally, however has required 40% at NH and noted to be tachypneic. Patient seen and examined at bedside. Currently on vent at 30% FiO2, appearing comfortable. Patient remains non-verbal however is awake and alert.     On arrival to ED, patient noted to be febrile to 101.9 however was able to be titrated back down to baseline of 30% FiO2. Given 1g tylenol, 1L NS, and vancomycin/meropenem in ED. Patient to be admitted to RCU for further management. (2022 00:40)    Wound consult requested to assist w/ management of sacral unstageable pressure injury present on admission.      Current Diet: Diet, NPO with Tube Feed:   Tube Feeding Modality: Gastrostomy  Jevity 1.2 Daryn (JEVITY1.2RTH)  Total Volume for 24 Hours (mL): 1560  Continuous  Starting Tube Feed Rate mL per Hour: 10  Increase Tube Feed Rate by (mL): 10     Every 4 hours  Until Goal Tube Feed Rate (mL per Hour): 65  Tube Feed Duration (in Hours): 24  Tube Feed Start Time: 05:00  Free Water Flush  No Carb Prosource (1pkg = 15gms Protein)     Qty per Day:  2 (04-10-22 @ 11:04)      PAST MEDICAL & SURGICAL HISTORY:  Essential hypertension    Primary osteoarthritis, unspecified site    Closed fracture of left radius, initial encounter    Morbid obesity, unspecified obesity type  h/o. - s/p gastric bypass- lost 113 lbs    KAREN (obstructive sleep apnea)  h/o - was on CPAP until gastric bypass surgery    Respiratory failure    Anemia    Subdural hemorrhage    Epilepsy    H/O gastrostomy    History of BPH    Afib    S/P gastric bypass      Status post total replacement of left hip  2006    S/P bunionectomy  bilateral    S/P colonoscopy  approx 2012    History of abdominoplasty  and subsequent cosmetic surgery for removal of excess skin from face        REVIEW OF SYSTEMS: Pt unable to offer    MEDICATIONS  (STANDING):  amantadine Syrup 100 milliGRAM(s) Oral two times a day  aMIOdarone    Tablet 200 milliGRAM(s) Oral daily  carvedilol 6.25 milliGRAM(s) Oral every 12 hours  chlorhexidine 0.12% Liquid 15 milliLiter(s) Oral Mucosa every 12 hours  chlorhexidine 2% Cloths 1 Application(s) Topical daily  collagenase Ointment 1 Application(s) Topical daily  doxazosin 2 milliGRAM(s) Oral at bedtime  heparin   Injectable 5000 Unit(s) SubCutaneous every 8 hours  lactulose Syrup 10 Gram(s) Oral two times a day  levETIRAcetam  Solution 1000 milliGRAM(s) Oral two times a day  meropenem  IVPB 500 milliGRAM(s) IV Intermittent every 12 hours  senna Syrup 5 milliLiter(s) Oral at bedtime    MEDICATIONS  (PRN):  acetaminophen    Suspension .. 650 milliGRAM(s) Oral every 4 hours PRN Temp greater or equal to 38C (100.4F), Mild Pain (1 - 3)      Allergies    No Known Allergies    Intolerances        SOCIAL HISTORY: From NH. Non-smoker, no etoh.  Bedbound.    FAMILY HISTORY:  Family history of renal cell carcinoma (Mother)    Family history of malignant melanoma (Sibling)        PHYSICAL EXAM:  Vital Signs Last 24 Hrs  T(C): 36.7 (2022 12:00), Max: 36.8 (10 Apr 2022 18:42)  T(F): 98 (2022 12:00), Max: 98.2 (10 Apr 2022 18:42)  HR: 73 (2022 15:48) (66 - 77)  BP: 119/76 (2022 12:00) (119/76 - 130/89)  BP(mean): 86 (2022 12:00) (86 - 86)  RR: 14 (2022 12:00) (13 - 20)  SpO2: 100% (2022 15:48) (98% - 100%)  Wt: 81.4 kg ()  BMI: 27.3 kg/m2 (2022)      Constitutional: NAD, non verbal, a&o x 0, awake, eyes open spontaneously  Well groomed.  (+) low airloss support surface, (+) fluidized positioning devices, (+) complete cair boots  HEENT:  NC/AT, PERRL, EOMI, mucosa moist, trach in place peristomal skin intact  Cardiovascular: rate regular  Respiratory: ventilator via trach  Gastrointestinal: soft NT/ND, PEG peritubular skin intact, enteral feeds, incontinent stool  : indwelling benson catheter  Neurology:  weakened strength & sensation, able to squeeze with right hand subtly.  Musculoskeletal:  limited, functional quadriplegic b/l upper and lower extremities rigid in extension.  Vascular: BLE equally warm, no overt ischemia noted  Skin:  moist w/ good turgor  Sacrum- unstageable pressure injury, turned to left side. 6.5cmx9.5cmx0.3cm, 100% firmly attached, moist slough. no fluctuance, no induration, no drainable collection, no crepitus. scant serofibrinous drainage. Periwound skin no edema, no erythema, no increased warmth noted. No indication for sharp debridement  No odor, erythema, increased warmth, tenderness, induration, fluctuance    LABS/ CULTURES/ RADIOLOGY:                        7.8    6.01  )-----------( 145      ( 2022 07:09 )             24.9       140  |  101  |  77  ----------------------------<  95      [22 @ 07:09]  3.9   |  24  |  2.87        Ca     7.7     [22 @ 07:09]      Mg     2.90     [22 @ 07:09]      Phos  4.4     [22 @ 07:09]    TPro  6.7  /  Alb  x   /  TBili  x   /  DBili  x   /  AST  x   /  ALT  x   /  AlkPhos  x   [22 @ 07:09]      Urinalysis Basic - ( 10 Apr 2022 17:22 )    Color: Yellow / Appearance: Slightly Turbid / S.019 / pH: x  Gluc: x / Ketone: Negative  / Bili: Negative / Urobili: <2 mg/dL   Blood: x / Protein: 100 mg/dL / Nitrite: Negative   Leuk Esterase: Large / RBC: 86 /HPF / WBC 81 /HPF   Sq Epi: x / Non Sq Epi: 4 /HPF / Bacteria: Negative      Culture - Blood (collected 22 @ 21:21)  Source: .Blood Blood-Peripheral  Preliminary Report (22 @ 22:02):    No growth to date.    Culture - Blood (collected 22 @ 21:19)  Source: .Blood Blood-Peripheral  Preliminary Report (22 @ 22:02):    No growth to date.      < from: CT Abdomen and Pelvis No Cont (22 @ 23:22) >  ACC: [id number] EXAM:  CT ABDOMEN AND PELVIS                          PROCEDURE DATE:  2022          INTERPRETATION:  CLINICAL INFORMATION: Abdominal distention and fever    COMPARISON: None.    CONTRAST/COMPLICATIONS:  IV Contrast: None  Oral Contrast: None  Complications: None    PROCEDURE:  CT of the Abdomen and Pelvis was performed.  Sagittal and coronal reformats were performed.    FINDINGS:  LOWER CHEST: Partially imaged bilateral dependent consolidative opacities   may represent atelectasis or infection. Small left pleural effusion with   adjacent compressive atelectasis. Small pericardial effusion. Coronary   artery calcification versus stenting.    Solid organ evaluation limited due to noncontrast technique.    LIVER: Liver size measures 20.9 cm in craniocaudal dimension.  BILE DUCTS: No biliary distention  GALLBLADDER: Unremarkable CT appearance  SPLEEN: Normal size  PANCREAS: No main ductal dilatation  ADRENALS: Slightly nodular left adrenal gland.  KIDNEYS/URETERS: No hydronephrosis.    BLADDER: Benson catheter.  REPRODUCTIVE ORGANS: Prostate within normal limits.    BOWEL: Status post gastric bypass. JJ anastomosis of the left   hemiabdomen. Gastrostomy tube localized to excluded stomach along   pancreatic or biliarylimb. Trace amount of high density noted within the   partially distended excluded gastric fundus, could be from prior contrast   administration via PEG tube. No small bowel distention or evidence of   bowel obstruction. Appendix not visualized. No secondary sign of acute   appendicitis. Moderate stool burden of the colon limits evaluation of the   colonic mucosa. Colonic diverticulosis without diverticulitis.  PERITONEUM: Trace ascites.  VESSELS: No aneurysm of the abdominal aorta. Atheromatous change.  RETROPERITONEUM/LYMPH NODES: Small volume nodes.  ABDOMINAL WALL: Soft tissue edema. Small fat-containing inguinal hernias.  BONES: Left hip prosthesis. Degenerative changes and osseous   demineralization. Grade 1 anterolisthesis of L4 on L5.    IMPRESSION:    Dependent lung parenchymal opacities, left greater than right, may   represent atelectasis or infection in the appropriate clinical setting.   Small left pleural effusion with adjacent left basilar compressive   atelectasis. Small pericardial effusion.    Post gastric bypass with gastrostomy tube localized to the excluded   stomach of pancreaticobiliary limb. Slight distention of the excluded   gastric fundus with layering dense material, likely from prior contrast   administration. Correlate clinically. No bowel obstruction.    Moderate stool of the colon. Correlate for constipation.    Coronary artery calcification.    --- End of Report ---          AMARI HERNANDEZ MD; Resident Radiologist  This document has been electronically signed.  ZHANNA MORTENSEN M.D., ATTENDING RADIOLOGIST  This document has been electronically signed. 2022 10:28AM    < end of copied text >
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
## 2022-04-12 LAB
-  AMIKACIN: SIGNIFICANT CHANGE UP
-  AMPICILLIN/SULBACTAM: SIGNIFICANT CHANGE UP
-  CEFEPIME: SIGNIFICANT CHANGE UP
-  CEFTAZIDIME: SIGNIFICANT CHANGE UP
-  CIPROFLOXACIN: SIGNIFICANT CHANGE UP
-  GENTAMICIN: SIGNIFICANT CHANGE UP
-  IMIPENEM: SIGNIFICANT CHANGE UP
-  LEVOFLOXACIN: SIGNIFICANT CHANGE UP
-  MEROPENEM: SIGNIFICANT CHANGE UP
-  PIPERACILLIN/TAZOBACTAM: SIGNIFICANT CHANGE UP
-  TOBRAMYCIN: SIGNIFICANT CHANGE UP
-  TRIMETHOPRIM/SULFAMETHOXAZOLE: SIGNIFICANT CHANGE UP
ANA TITR SER: NEGATIVE — SIGNIFICANT CHANGE UP
GLUCOSE BLDC GLUCOMTR-MCNC: 105 MG/DL — HIGH (ref 70–99)
GLUCOSE BLDC GLUCOMTR-MCNC: 134 MG/DL — HIGH (ref 70–99)
GLUCOSE BLDC GLUCOMTR-MCNC: 142 MG/DL — HIGH (ref 70–99)
INTERPRETATION SERPL IFE-IMP: SIGNIFICANT CHANGE UP
METHOD TYPE: SIGNIFICANT CHANGE UP
METHOD TYPE: SIGNIFICANT CHANGE UP
MPO AB + PR3 PNL SER: SIGNIFICANT CHANGE UP

## 2022-04-12 PROCEDURE — 99232 SBSQ HOSP IP/OBS MODERATE 35: CPT

## 2022-04-12 PROCEDURE — 70551 MRI BRAIN STEM W/O DYE: CPT | Mod: 26

## 2022-04-12 PROCEDURE — 99232 SBSQ HOSP IP/OBS MODERATE 35: CPT | Mod: GC

## 2022-04-12 PROCEDURE — 99233 SBSQ HOSP IP/OBS HIGH 50: CPT

## 2022-04-12 RX ADMIN — LACTULOSE 10 GRAM(S): 10 SOLUTION ORAL at 18:19

## 2022-04-12 RX ADMIN — MEROPENEM 100 MILLIGRAM(S): 1 INJECTION INTRAVENOUS at 05:09

## 2022-04-12 RX ADMIN — MEROPENEM 100 MILLIGRAM(S): 1 INJECTION INTRAVENOUS at 18:18

## 2022-04-12 RX ADMIN — CHLORHEXIDINE GLUCONATE 15 MILLILITER(S): 213 SOLUTION TOPICAL at 18:19

## 2022-04-12 RX ADMIN — CARVEDILOL PHOSPHATE 6.25 MILLIGRAM(S): 80 CAPSULE, EXTENDED RELEASE ORAL at 05:06

## 2022-04-12 RX ADMIN — Medication 100 MILLIGRAM(S): at 05:05

## 2022-04-12 RX ADMIN — CHLORHEXIDINE GLUCONATE 15 MILLILITER(S): 213 SOLUTION TOPICAL at 05:08

## 2022-04-12 RX ADMIN — HEPARIN SODIUM 5000 UNIT(S): 5000 INJECTION INTRAVENOUS; SUBCUTANEOUS at 15:59

## 2022-04-12 RX ADMIN — LACTULOSE 10 GRAM(S): 10 SOLUTION ORAL at 05:06

## 2022-04-12 RX ADMIN — HEPARIN SODIUM 5000 UNIT(S): 5000 INJECTION INTRAVENOUS; SUBCUTANEOUS at 05:08

## 2022-04-12 RX ADMIN — LEVETIRACETAM 1000 MILLIGRAM(S): 250 TABLET, FILM COATED ORAL at 18:18

## 2022-04-12 RX ADMIN — Medication 2 MILLIGRAM(S): at 21:30

## 2022-04-12 RX ADMIN — SENNA PLUS 5 MILLILITER(S): 8.6 TABLET ORAL at 21:34

## 2022-04-12 RX ADMIN — Medication 100 MILLIGRAM(S): at 18:19

## 2022-04-12 RX ADMIN — Medication 1 APPLICATION(S): at 12:50

## 2022-04-12 RX ADMIN — HEPARIN SODIUM 5000 UNIT(S): 5000 INJECTION INTRAVENOUS; SUBCUTANEOUS at 21:34

## 2022-04-12 RX ADMIN — CHLORHEXIDINE GLUCONATE 1 APPLICATION(S): 213 SOLUTION TOPICAL at 12:51

## 2022-04-12 RX ADMIN — CARVEDILOL PHOSPHATE 6.25 MILLIGRAM(S): 80 CAPSULE, EXTENDED RELEASE ORAL at 18:19

## 2022-04-12 RX ADMIN — AMIODARONE HYDROCHLORIDE 200 MILLIGRAM(S): 400 TABLET ORAL at 05:06

## 2022-04-12 RX ADMIN — LEVETIRACETAM 1000 MILLIGRAM(S): 250 TABLET, FILM COATED ORAL at 05:05
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
## 2022-04-12 NOTE — PROGRESS NOTE ADULT - SUBJECTIVE AND OBJECTIVE BOX
CHIEF COMPLAINT: Patient is a 76y old  Male who presents with a chief complaint of Hypoxia (2022 16:56)    Interval Events:    REVIEW OF SYSTEMS:  [ ] All other systems negative  [ ] Unable to assess ROS because ________    Mode: AC/ CMV (Assist Control/ Continuous Mandatory Ventilation), RR (machine): 12, TV (machine): 450, FiO2: 40, PEEP: 7, MAP: 10, PIP: 17      OBJECTIVE:  ICU Vital Signs Last 24 Hrs  T(C): 36.6 (2022 22:00), Max: 36.7 (2022 12:00)  T(F): 97.8 (2022 22:00), Max: 98 (2022 12:00)  HR: 77 (2022 06:33) (67 - 88)  BP: 143/97 (2022 22:00) (119/76 - 143/97)  BP(mean): 86 (2022 12:00) (86 - 86)  ABP: --  ABP(mean): --  RR: 16 (2022 22:00) (14 - 16)  SpO2: 100% (2022 06:33) (98% - 100%)    Mode: AC/ CMV (Assist Control/ Continuous Mandatory Ventilation), RR (machine): 12, TV (machine): 450, FiO2: 40, PEEP: 7, MAP: 10, PIP: 17    04-11 @ 07:01  -  04-12 @ 07:00  --------------------------------------------------------  IN: 780 mL / OUT: 450 mL / NET: 330 mL      CAPILLARY BLOOD GLUCOSE      POCT Blood Glucose.: 142 mg/dL (2022 05:35)      HOSPITAL MEDICATIONS:  MEDICATIONS  (STANDING):  amantadine Syrup 100 milliGRAM(s) Oral two times a day  aMIOdarone    Tablet 200 milliGRAM(s) Oral daily  carvedilol 6.25 milliGRAM(s) Oral every 12 hours  chlorhexidine 0.12% Liquid 15 milliLiter(s) Oral Mucosa every 12 hours  chlorhexidine 2% Cloths 1 Application(s) Topical daily  collagenase Ointment 1 Application(s) Topical daily  doxazosin 2 milliGRAM(s) Oral at bedtime  heparin   Injectable 5000 Unit(s) SubCutaneous every 8 hours  lactulose Syrup 10 Gram(s) Oral two times a day  levETIRAcetam  Solution 1000 milliGRAM(s) Oral two times a day  meropenem  IVPB 500 milliGRAM(s) IV Intermittent every 12 hours  senna Syrup 5 milliLiter(s) Oral at bedtime    MEDICATIONS  (PRN):  acetaminophen    Suspension .. 650 milliGRAM(s) Oral every 4 hours PRN Temp greater or equal to 38C (100.4F), Mild Pain (1 - 3)      LABS:                        7.8    6.01  )-----------( 145      ( 2022 07:09 )             24.9     04-11    140  |  101  |  77<H>  ----------------------------<  95  3.9   |  24  |  2.87<H>    Ca    7.7<L>      2022 07:09  Phos  4.4     -  Mg     2.90     -    TPro  6.7  /  Alb  x   /  TBili  x   /  DBili  x   /  AST  x   /  ALT  x   /  AlkPhos  x       Urinalysis Basic - ( 10 Apr 2022 17:22 )    Color: Yellow / Appearance: Slightly Turbid / S.019 / pH: x  Gluc: x / Ketone: Negative  / Bili: Negative / Urobili: <2 mg/dL   Blood: x / Protein: 100 mg/dL / Nitrite: Negative   Leuk Esterase: Large / RBC: 86 /HPF / WBC 81 /HPF   Sq Epi: x / Non Sq Epi: 4 /HPF / Bacteria: Negative      PAST MEDICAL & SURGICAL HISTORY:  Essential hypertension    Primary osteoarthritis, unspecified site    Closed fracture of left radius, initial encounter    Morbid obesity, unspecified obesity type  h/o. - s/p gastric bypass- lost 113 lbs    KAREN (obstructive sleep apnea)  h/o - was on CPAP until gastric bypass surgery    Respiratory failure    Anemia    Subdural hemorrhage    Epilepsy    H/O gastrostomy    History of BPH    Afib    S/P gastric bypass      Status post total replacement of left hip      S/P bunionectomy  bilateral    S/P colonoscopy  approx 2012    History of abdominoplasty  and subsequent cosmetic surgery for removal of excess skin from face      FAMILY HISTORY:  Family history of renal cell carcinoma (Mother)    Family history of malignant melanoma (Sibling)      Social History:      RADIOLOGY:  [ ] Reviewed and interpreted by me    PULMONARY FUNCTION TESTS:    EKG: CHIEF COMPLAINT: Patient is a 76y old  Male who presents with a chief complaint of Hypoxia (2022 16:56)    Interval Events: None reported overnight. Clinically improving. Pt seen and examined at bedside during morning round. Unable to fully assess ROS.     REVIEW OF SYSTEMS:  See above  [x] Unable to assess ROS because     Mode: AC/ CMV (Assist Control/ Continuous Mandatory Ventilation), RR (machine): 12, TV (machine): 450, FiO2: 40, PEEP: 7, MAP: 10, PIP: 17      OBJECTIVE:  ICU Vital Signs Last 24 Hrs  T(C): 36.6 (2022 22:00), Max: 36.7 (2022 12:00)  T(F): 97.8 (2022 22:00), Max: 98 (2022 12:00)  HR: 77 (2022 06:33) (67 - 88)  BP: 143/97 (2022 22:00) (119/76 - 143/97)  BP(mean): 86 (2022 12:00) (86 - 86)  ABP: --  ABP(mean): --  RR: 16 (2022 22:00) (14 - 16)  SpO2: 100% (2022 06:33) (98% - 100%)    Mode: AC/ CMV (Assist Control/ Continuous Mandatory Ventilation), RR (machine): 12, TV (machine): 450, FiO2: 40, PEEP: 7, MAP: 10, PIP: 17    04-11 @ 07:01  -  04-12 @ 07:00  --------------------------------------------------------  IN: 780 mL / OUT: 450 mL / NET: 330 mL      CAPILLARY BLOOD GLUCOSE      POCT Blood Glucose.: 142 mg/dL (2022 05:35)      HOSPITAL MEDICATIONS:  MEDICATIONS  (STANDING):  amantadine Syrup 100 milliGRAM(s) Oral two times a day  aMIOdarone    Tablet 200 milliGRAM(s) Oral daily  carvedilol 6.25 milliGRAM(s) Oral every 12 hours  chlorhexidine 0.12% Liquid 15 milliLiter(s) Oral Mucosa every 12 hours  chlorhexidine 2% Cloths 1 Application(s) Topical daily  collagenase Ointment 1 Application(s) Topical daily  doxazosin 2 milliGRAM(s) Oral at bedtime  heparin   Injectable 5000 Unit(s) SubCutaneous every 8 hours  lactulose Syrup 10 Gram(s) Oral two times a day  levETIRAcetam  Solution 1000 milliGRAM(s) Oral two times a day  meropenem  IVPB 500 milliGRAM(s) IV Intermittent every 12 hours  senna Syrup 5 milliLiter(s) Oral at bedtime    MEDICATIONS  (PRN):  acetaminophen    Suspension .. 650 milliGRAM(s) Oral every 4 hours PRN Temp greater or equal to 38C (100.4F), Mild Pain (1 - 3)      LABS:                        7.8    6.01  )-----------( 145      ( 2022 07:09 )             24.9     04-11    140  |  101  |  77<H>  ----------------------------<  95  3.9   |  24  |  2.87<H>    Ca    7.7<L>      2022 07:09  Phos  4.4     04-  Mg     2.90     -    TPro  6.7  /  Alb  x   /  TBili  x   /  DBili  x   /  AST  x   /  ALT  x   /  AlkPhos  x   -    Urinalysis Basic - ( 10 Apr 2022 17:22 )    Color: Yellow / Appearance: Slightly Turbid / S.019 / pH: x  Gluc: x / Ketone: Negative  / Bili: Negative / Urobili: <2 mg/dL   Blood: x / Protein: 100 mg/dL / Nitrite: Negative   Leuk Esterase: Large / RBC: 86 /HPF / WBC 81 /HPF   Sq Epi: x / Non Sq Epi: 4 /HPF / Bacteria: Negative      PAST MEDICAL & SURGICAL HISTORY:  Essential hypertension    Primary osteoarthritis, unspecified site    Closed fracture of left radius, initial encounter    Morbid obesity, unspecified obesity type  h/o. - s/p gastric bypass- lost 113 lbs    KAREN (obstructive sleep apnea)  h/o - was on CPAP until gastric bypass surgery    Respiratory failure    Anemia    Subdural hemorrhage    Epilepsy    H/O gastrostomy    History of BPH    Afib    S/P gastric bypass  2008    Status post total replacement of left hip      S/P bunionectomy  bilateral    S/P colonoscopy  approx 2012    History of abdominoplasty  and subsequent cosmetic surgery for removal of excess skin from face      FAMILY HISTORY:  Family history of renal cell carcinoma (Mother)    Family history of malignant melanoma (Sibling)      Social History:      RADIOLOGY:  [ ] Reviewed and interpreted by me    PULMONARY FUNCTION TESTS:    EKG: CHIEF COMPLAINT: Patient is a 76y old  Male who presents with a chief complaint of Hypoxia (2022 16:56)    Interval Events: None reported overnight. Clinically improving. Pt seen and examined at bedside during morning round. Unable to fully assess ROS. However, following simple commands and nodding head yes/no to answer simple questions. Pt tolerated CPAP 5/5 FI02 40% well throughout the day. MRI Brain done today, official report pending.     REVIEW OF SYSTEMS:  See above  [x] Unable to assess ROS because     Mode: AC/ CMV (Assist Control/ Continuous Mandatory Ventilation), RR (machine): 12, TV (machine): 450, FiO2: 40, PEEP: 7, MAP: 10, PIP: 17    OBJECTIVE:  ICU Vital Signs Last 24 Hrs  T(C): 36.6 (2022 22:00), Max: 36.7 (2022 12:00)  T(F): 97.8 (2022 22:00), Max: 98 (2022 12:00)  HR: 77 (2022 06:33) (67 - 88)  BP: 143/97 (2022 22:00) (119/76 - 143/97)  BP(mean): 86 (2022 12:00) (86 - 86)  ABP: --  ABP(mean): --  RR: 16 (2022 22:00) (14 - 16)  SpO2: 100% (2022 06:33) (98% - 100%)    Mode: AC/ CMV (Assist Control/ Continuous Mandatory Ventilation), RR (machine): 12, TV (machine): 450, FiO2: 40, PEEP: 7, MAP: 10, PIP: 17    04-11 @ 07:01  -  -12 @ 07:00  --------------------------------------------------------  IN: 780 mL / OUT: 450 mL / NET: 330 mL      CAPILLARY BLOOD GLUCOSE      POCT Blood Glucose.: 142 mg/dL (2022 05:35)      HOSPITAL MEDICATIONS:  MEDICATIONS  (STANDING):  amantadine Syrup 100 milliGRAM(s) Oral two times a day  aMIOdarone    Tablet 200 milliGRAM(s) Oral daily  carvedilol 6.25 milliGRAM(s) Oral every 12 hours  chlorhexidine 0.12% Liquid 15 milliLiter(s) Oral Mucosa every 12 hours  chlorhexidine 2% Cloths 1 Application(s) Topical daily  collagenase Ointment 1 Application(s) Topical daily  doxazosin 2 milliGRAM(s) Oral at bedtime  heparin   Injectable 5000 Unit(s) SubCutaneous every 8 hours  lactulose Syrup 10 Gram(s) Oral two times a day  levETIRAcetam  Solution 1000 milliGRAM(s) Oral two times a day  meropenem  IVPB 500 milliGRAM(s) IV Intermittent every 12 hours  senna Syrup 5 milliLiter(s) Oral at bedtime    MEDICATIONS  (PRN):  acetaminophen    Suspension .. 650 milliGRAM(s) Oral every 4 hours PRN Temp greater or equal to 38C (100.4F), Mild Pain (1 - 3)    PHYSICAL EXAM  GENERAL: Laying in bed, NAD  HEENT: +Trach, area c/d/i  Card: +s1, s2  Pulm: + coarse breath sound b/l  GI: +PEG, area c/d/i, obese abd, soft, nt/nd, no peritoneal signs noted  Ext: no asymmetry, no swelling, no calf tenderness  Neuro: alert and awake, following simple commands such as squeezing hands     LABS:                        7.8    6.01  )-----------( 145      ( 2022 07:09 )             24.9     04-11    140  |  101  |  77<H>  ----------------------------<  95  3.9   |  24  |  2.87<H>    Ca    7.7<L>      2022 07:09  Phos  4.4     04-11  Mg     2.90     04-11    TPro  6.7  /  Alb  x   /  TBili  x   /  DBili  x   /  AST  x   /  ALT  x   /  AlkPhos  x   -11    Urinalysis Basic - ( 10 Apr 2022 17:22 )    Color: Yellow / Appearance: Slightly Turbid / S.019 / pH: x  Gluc: x / Ketone: Negative  / Bili: Negative / Urobili: <2 mg/dL   Blood: x / Protein: 100 mg/dL / Nitrite: Negative   Leuk Esterase: Large / RBC: 86 /HPF / WBC 81 /HPF   Sq Epi: x / Non Sq Epi: 4 /HPF / Bacteria: Negative      PAST MEDICAL & SURGICAL HISTORY:  Essential hypertension    Primary osteoarthritis, unspecified site    Closed fracture of left radius, initial encounter    Morbid obesity, unspecified obesity type  h/o. - s/p gastric bypass- lost 113 lbs    KAREN (obstructive sleep apnea)  h/o - was on CPAP until gastric bypass surgery    Respiratory failure    Anemia    Subdural hemorrhage    Epilepsy    H/O gastrostomy    History of BPH    Afib    S/P gastric bypass  2008    Status post total replacement of left hip  2006    S/P bunionectomy  bilateral    S/P colonoscopy  approx 2012    History of abdominoplasty  and subsequent cosmetic surgery for removal of excess skin from face      FAMILY HISTORY:  Family history of renal cell carcinoma (Mother)    Family history of malignant melanoma (Sibling)      Social History:      RADIOLOGY:  [ ] Reviewed and interpreted by me    PULMONARY FUNCTION TESTS:    EKG:

## 2022-04-12 NOTE — PROGRESS NOTE ADULT - ASSESSMENT
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  Discharged to NH and admitted to [facility] with increasing oxygen requirements and worsening mental status.  Fever, no leukocytosis.  Cultures sent.      Hypoxic Respiratory Failure  - today is D#5 IV meropenem  - clinically better despite resistant acinetobacter (tige thanh <2, S-amie)  - will have micro add/release further sensitivities  - mucous plugging versus pneumonia    Fever  - continue meropenem  - f/u all cultures    FLORIAN  - renally dose meropenem  - decrease to 500mg q12    I have discussed plan of care as detailed above with consulting team

## 2022-04-12 NOTE — PROGRESS NOTE ADULT - SUBJECTIVE AND OBJECTIVE BOX
[facility] DIVISION OF KIDNEY DISEASES AND HYPERTENSION -- FOLLOW UP NOTE  --------------------------------------------------------------------------------  Chief Complaint: FLORIAN    24 hour events/subjective: Pt. was seen and examined this morning. Pt. has tracheostomy and unable to verbally communicate. Unable to examine ROS.     PAST HISTORY  --------------------------------------------------------------------------------  No significant changes to PMH, PSH, FHx, SHx, unless otherwise noted    ALLERGIES & MEDICATIONS  --------------------------------------------------------------------------------  Allergies    No Known Allergies    Intolerances    Standing Inpatient Medications  amantadine Syrup 100 milliGRAM(s) Oral two times a day  aMIOdarone    Tablet 200 milliGRAM(s) Oral daily  carvedilol 6.25 milliGRAM(s) Oral every 12 hours  chlorhexidine 0.12% Liquid 15 milliLiter(s) Oral Mucosa every 12 hours  chlorhexidine 2% Cloths 1 Application(s) Topical daily  collagenase Ointment 1 Application(s) Topical daily  doxazosin 2 milliGRAM(s) Oral at bedtime  heparin   Injectable 5000 Unit(s) SubCutaneous every 8 hours  lactulose Syrup 10 Gram(s) Oral two times a day  levETIRAcetam  Solution 1000 milliGRAM(s) Oral two times a day  meropenem  IVPB 500 milliGRAM(s) IV Intermittent every 12 hours  senna Syrup 5 milliLiter(s) Oral at bedtime    PRN Inpatient Medications  acetaminophen    Suspension .. 650 milliGRAM(s) Oral every 4 hours PRN    REVIEW OF SYSTEMS  Unable to obtain.    VITALS/PHYSICAL EXAM  --------------------------------------------------------------------------------  T(C): 36.6 (04-11-22 @ 22:00), Max: 36.7 (04-11-22 @ 12:00)  HR: 77 (04-12-22 @ 06:33) (67 - 88)  BP: 143/97 (04-11-22 @ 22:00) (119/76 - 143/97)  RR: 16 (04-11-22 @ 22:00) (14 - 16)  SpO2: 100% (04-12-22 @ 06:33) (98% - 100%)  Wt(kg): --    04-11-22 @ 07:01  -  04-12-22 @ 07:00  --------------------------------------------------------  IN: 780 mL / OUT: 450 mL / NET: 330 mL    Physical Exam:  	Gen: resting  	HEENT: Trach+   	Pulm: CTA B/L anteriorly  	CV: S1S2+  	Abd: Soft, PEG+, Garcia+  	Ext: No LE edema B/L  	Neuro: Awake not alert   	Skin: Warm and dry    LABS/STUDIES  --------------------------------------------------------------------------------              7.8    6.01  >-----------<  145      [04-11-22 @ 07:09]              24.9     140  |  101  |  77  ----------------------------<  95      [04-11-22 @ 07:09]  3.9   |  24  |  2.87        Ca     7.7     [04-11-22 @ 07:09]      Mg     2.90     [04-11-22 @ 07:09]      Phos  4.4     [04-11-22 @ 07:09]    TPro  6.7  /  Alb  x   /  TBili  x   /  DBili  x   /  AST  x   /  ALT  x   /  AlkPhos  x   [04-11-22 @ 07:09]          Creatinine Trend:  SCr 2.87 [04-11 @ 07:09]  SCr 2.93 [04-10 @ 06:42]  SCr 2.76 [04-09 @ 18:40]  SCr 2.54 [04-09 @ 06:43]  SCr 2.39 [04-08 @ 18:26]    Urinalysis - [04-10-22 @ 17:22]      Color Yellow / Appearance Slightly Turbid / SG 1.019 / pH 6.0      Gluc Negative / Ketone Negative  / Bili Negative / Urobili <2 mg/dL       Blood Moderate / Protein 100 mg/dL / Leuk Est Large / Nitrite Negative      RBC 86 / WBC 81 / Hyaline 3 / Gran  / Sq Epi  / Non Sq Epi 4 / Bacteria Negative    Urine Creatinine 49      [04-10-22 @ 17:22]  Urine Protein 154      [04-10-22 @ 17:22]  Urine Sodium <20      [04-09-22 @ 06:31]  Urine Urea Nitrogen 484.0      [04-09-22 @ 06:31]    Iron 41, TIBC 162, %sat 25      [03-10-22 @ 06:44]  Ferritin 267      [03-10-22 @ 10:16]  TSH 47.92      [04-09-22 @ 06:43]    HIV Nonreact      [04-11-22 @ 07:09]    C3 Complement 107      [04-11-22 @ 07:09]  C4 Complement 47      [04-11-22 @ 07:09]   [facility] DIVISION OF KIDNEY DISEASES AND HYPERTENSION -- FOLLOW UP NOTE  --------------------------------------------------------------------------------  Chief Complaint: FLORIAN    24 hour events/subjective: Pt. was seen and examined this morning. Pt. has tracheostomy and unable to verbally communicate. Unable to examine ROS.     PAST HISTORY  --------------------------------------------------------------------------------  No significant changes to PMH, PSH, FHx, SHx, unless otherwise noted    ALLERGIES & MEDICATIONS  --------------------------------------------------------------------------------  Allergies    No Known Allergies    Intolerances    Standing Inpatient Medications  amantadine Syrup 100 milliGRAM(s) Oral two times a day  aMIOdarone    Tablet 200 milliGRAM(s) Oral daily  carvedilol 6.25 milliGRAM(s) Oral every 12 hours  chlorhexidine 0.12% Liquid 15 milliLiter(s) Oral Mucosa every 12 hours  chlorhexidine 2% Cloths 1 Application(s) Topical daily  collagenase Ointment 1 Application(s) Topical daily  doxazosin 2 milliGRAM(s) Oral at bedtime  heparin   Injectable 5000 Unit(s) SubCutaneous every 8 hours  lactulose Syrup 10 Gram(s) Oral two times a day  levETIRAcetam  Solution 1000 milliGRAM(s) Oral two times a day  meropenem  IVPB 500 milliGRAM(s) IV Intermittent every 12 hours  senna Syrup 5 milliLiter(s) Oral at bedtime    PRN Inpatient Medications  acetaminophen    Suspension .. 650 milliGRAM(s) Oral every 4 hours PRN    REVIEW OF SYSTEMS  Unable to obtain.    VITALS/PHYSICAL EXAM  --------------------------------------------------------------------------------  T(C): 36.6 (04-11-22 @ 22:00), Max: 36.7 (04-11-22 @ 12:00)  HR: 77 (04-12-22 @ 06:33) (67 - 88)  BP: 143/97 (04-11-22 @ 22:00) (119/76 - 143/97)  RR: 16 (04-11-22 @ 22:00) (14 - 16)  SpO2: 100% (04-12-22 @ 06:33) (98% - 100%)  Wt(kg): --    04-11-22 @ 07:01  -  04-12-22 @ 07:00  --------------------------------------------------------  IN: 780 mL / OUT: 450 mL / NET: 330 mL    Physical Exam:  	Gen: resting  	HEENT: Trach+   	Pulm: CTA B/L anteriorly  	CV: S1S2+  	Abd: Soft, PEG+, Garcia+  	Ext: No LE edema B/L  	Neuro: Awake not alert   	Skin: Warm and dry    LABS/STUDIES  --------------------------------------------------------------------------------              7.8    6.01  >-----------<  145      [04-11-22 @ 07:09]              24.9     140  |  101  |  77  ----------------------------<  95      [04-11-22 @ 07:09]  3.9   |  24  |  2.87        Ca     7.7     [04-11-22 @ 07:09]      Mg     2.90     [04-11-22 @ 07:09]      Phos  4.4     [04-11-22 @ 07:09]    TPro  6.7  /  Alb  x   /  TBili  x   /  DBili  x   /  AST  x   /  ALT  x   /  AlkPhos  x   [04-11-22 @ 07:09]    Creatinine Trend:  SCr 2.87 [04-11 @ 07:09]  SCr 2.93 [04-10 @ 06:42]  SCr 2.76 [04-09 @ 18:40]  SCr 2.54 [04-09 @ 06:43]  SCr 2.39 [04-08 @ 18:26]    Urinalysis - [04-10-22 @ 17:22]      Color Yellow / Appearance Slightly Turbid / SG 1.019 / pH 6.0      Gluc Negative / Ketone Negative  / Bili Negative / Urobili <2 mg/dL       Blood Moderate / Protein 100 mg/dL / Leuk Est Large / Nitrite Negative      RBC 86 / WBC 81 / Hyaline 3 / Gran  / Sq Epi  / Non Sq Epi 4 / Bacteria Negative    Urine Creatinine 49      [04-10-22 @ 17:22]  Urine Protein 154      [04-10-22 @ 17:22]  Urine Sodium <20      [04-09-22 @ 06:31]  Urine Urea Nitrogen 484.0      [04-09-22 @ 06:31]    HIV Nonreact      [04-11-22 @ 07:09]    C3 Complement 107      [04-11-22 @ 07:09]  C4 Complement 47      [04-11-22 @ 07:09]

## 2022-04-12 NOTE — PROGRESS NOTE ADULT - PROBLEM SELECTOR PLAN 1
Pt. with FLORIAN in the setting of sepsis, anemia and borderline hypotension. Pt. with likely ATN. Upon review of [facility] SCR 0.8 on 3/16/2022. Upon admission SCr of 2.3 and most recently SCr of 2.87 (4/11/22). Follow up AM labs. Pt. with chronic Benson that was replaced on day of admission. Non- contrast CT abdomen and pelvis negative for hydronephrosis or renal structural abnormalities. UA with proteinuria and hematuria (which can be in the setting of benson catheter exchange). Repeat UA and send spot urine TP/CR ratio. C3 and C4 not low, HIV negative.   Follow up serologies for work up of secondary causes for renal failure (ISABELLE, P-ANCA, C-ANCA, Anti-GBM ab, HBsAg, Hep C antibody, Parvovirus, SPEP, serum immunofixation, free kappa lambda light chain, anti PLA2R). Monitor labs and urine output. Avoid NSAIDs, ACEI/ARBS, RCA and nephrotoxins. Dose medications as per eGFR.     If any questions, please feel free to contact me     Ritchie Estes  Nephrology Fellow  [facility] Pager: [phone] Pt. with FLORIAN in the setting of sepsis, anemia and hypotension. Pt. with likely ATN. [facility] Scr was 0.8 on 3/16/2022. On admission, Scr was 2.3. Scr elevated at 2.87 on 4/11/22. Pt. with chronic Garcia catheter that was replaced on day of admission. Non-contrast CT abdomen and pelvis negative for hydronephrosis or renal structural abnormalities. UA showed proteinuria and hematuria. Repeat UA and send spot urine TP/CR ratio. C3 and C4 not low, HIV negative. Follow up serologies for work up of secondary causes for renal failure (ISABELLE, P-ANCA, C-ANCA, Anti-GBM ab, HBsAg, Hep C antibody, Parvovirus, SPEP, serum immunofixation, free kappa lambda light chain, anti-PLA2R). Monitor labs and urine output. Avoid NSAIDs, ACEI/ARBS, RCA and nephrotoxins. Dose medications as per eGFR.     If any questions, please feel free to contact me     Ritchie Estes  Nephrology Fellow  [facility] Pager: [phone]

## 2022-04-12 NOTE — PROGRESS NOTE ADULT - ASSESSMENT
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     Neuro  - Currently A&Ox0, awake and alert but non-verbal  - based on prior documentation, appears to be at baseline mental status  - CT head obtained- No acute intracranial hemorrhage, midline shift or acute extra-axial collection ? infection   - Neuro consult called - MRI brain ordered no contrast 2/2 FLORIAN pending     CVS  #Atrial Fibrillation  - Currently rate controlled   - s/p watchman device placement  - c/w home amiodarone 200mg qd  - c/w home carvedilol 6.25mg BID  #HFpEF  - most recent echo on 3/2022 with EF 55% and grossly normal LV fxn with severely dilated LA  - will check pBNP  - Pt appearing euvolemic at this time  - will hold off on home Lasix in setting of possible ongoing infection/FLORIAN     RESP  #Acute hypoxic respiratory failure  - Patient with episode of desaturation and increased WOB prior to arrival   - At facility patient is on trach collar during the day and on vent at night with 30% fio2 requirement  - initially required 40% fiO2 in ED, now weaned back to 30% FiO2 and saturating at 100%  - concern for possible pneumonia with potential mucus plugging event  - c/w airway clearance measures, Chest PT, and suctioning for secretions  - s/p vanc/arnold in ED - c/w Arnold for now for possible pneumonia  - will obtain sputum culture  - Tolerating NH vent settings / did not tolerate PS trials today    GI  - No active issues  - PEG tube present, c/w TF at current rate   - CT A/P done for possible abdominal pathology - see full report   - + BM with CT showing mod. stool burden now having stools     Renal  #FLORIAN  - Noted with elevated serum Cr to 2.39, with previous baseline of 0.8-0.9  - possibly in setting of current infection  - chronic Benson - exchanged in ED with drainage of clear urine  - monitor I&Os  - Trend Cr FU BMP s/p PRBC / fluids in ED   - Urine Na low - Nephrology called for consult ? glomerulonephritis Work up in progress     Endo  - no active issues  - no history of DM or thyroid disease  - Glucose WNL on BMPs  - A1C 5.9 accuchecks ordered /nutrition consult   - TSH elevated continue synthroid - pt with hypothyroidims 2/2 amiodorone use per family     Heme  #Anemia  - noted with anemia to 7.4, decreased from 8-9 on previous admissions  - no evidence of overt bleeding at this time  - will continue to trend Hgb daily  - Hgb 6.6 after fluids in ED- fu 6.8 when repeated S/P one unit PRBC FU post cbc - now stable   - Stool for OB     ID  #c/f Pneumonia  - Patient with questionable retrocardiac opacity on CXR  - CT A/P showing small L pleural effusion  - UA pending, chronic benson catheter exchanged  - s/p vanc/arnold in ED  - will c/w meropenem in setting of recent MDR Pseudomonas infection (sensitive to arnold)  - check MRSA PCR  - check sputum/blood cultures  - Seen by ID - c/w Meropenem for now  - Dose adjusted for FLORIAN     PPx  HSQ for DVT prophylaxis    Ethics- Per HCP pt is full code   FULL CODE 77 y/o M with afib s/p watchman, gastric sleeve, SDH, 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     Neuro  - Currently A&Ox0, awake and alert but non-verbal  - based on prior documentation, appears to be at baseline mental status  - CT head obtained- No acute intracranial hemorrhage, midline shift or acute extra-axial collection ? infection   - Neuro consult called - MRI brain ordered no contrast- done, official reading pending     CVS  #Atrial Fibrillation  - Currently rate controlled   - s/p watchman device placement  - c/w home amiodarone 200mg qd  - c/w home carvedilol 6.25mg BID  #HFpEF  - most recent echo on 3/2022 with EF 55% and grossly normal LV fxn with severely dilated LA  - will check pBNP  - Pt appearing euvolemic at this time  - will hold off on home Lasix in setting of possible ongoing infection/FLORIAN     RESP  #Acute hypoxic respiratory failure  - Patient with episode of desaturation and increased WOB prior to arrival   - At facility patient is on trach collar during the day and on vent at night with 30% fio2 requirement  - initially required 40% fiO2 in ED, now weaned back to 30% FiO2 and saturating at 100%  - concern for possible pneumonia with potential mucus plugging event  - c/w airway clearance measures, Chest PT, and suctioning for secretions  - s/p vanc/arnold in ED - c/w Arnold for now for possible pneumonia  - Sputum culture grew acinetobacter, R to Arnold, however clinically improving - ID following  - Tolerating CPAP 5/5 FIO2 40% throughout the day     GI  - No active issues  - PEG tube present, c/w TF at current rate   - CT A/P done for possible abdominal pathology - see full report   - + BM with CT showing mod. stool burden now having stools     Renal  #FLORIAN  - Noted with elevated serum Cr to 2.39, with previous baseline of 0.8-0.9  - possibly in setting of current infection  - chronic Benson - exchanged in ED with drainage of clear urine  - monitor I&Os  - Trend Cr FU BMP s/p PRBC / fluids in ED   - Urine Na low - Nephrology called for consult ? Glomerulonephritis. Work up in progress     Endo  - no active issues  - no history of DM or thyroid disease  - Glucose WNL on BMPs  - A1C 5.9 accuchecks ordered /nutrition consult   - TSH elevated continue synthroid - pt with hypothyroidism 2/2 amiodarone use per family     Heme  #Anemia  - Noted with anemia to 7.4, decreased from 8-9 on previous admissions  - No evidence of overt bleeding at this time  - h/h stable - Will continue to trend Hgb daily   - Stool for OB     ID  #c/f Pneumonia  - Patient with questionable retrocardiac opacity on CXR  - CT A/P showing small L pleural effusion  - UA pending, chronic benson catheter exchanged  - s/p vanc/arnold in ED  - will c/w meropenem in setting of recent MDR Pseudomonas infection (sensitive to arnold)  - check MRSA PCR  - check sputum/blood cultures  - Seen by ID - c/w Meropenem for now, despite recent Scx growing Acinetobacter R to Arnold   - Dose adjusted for FLORIAN     PPx  HSQ for DVT prophylaxis    Ethics- Per HCP pt is full code   FULL CODE

## 2022-04-13 LAB
% ALBUMIN: 35.8 % — SIGNIFICANT CHANGE UP
% ALPHA 1: 4.3 % — SIGNIFICANT CHANGE UP
% ALPHA 2: 16.7 % — SIGNIFICANT CHANGE UP
% BETA: 13.6 % — SIGNIFICANT CHANGE UP
% GAMMA: 29.7 % — SIGNIFICANT CHANGE UP
-  AMIKACIN: SIGNIFICANT CHANGE UP
-  AZTREONAM: SIGNIFICANT CHANGE UP
-  CEFEPIME: SIGNIFICANT CHANGE UP
-  CEFTAZIDIME: SIGNIFICANT CHANGE UP
-  CIPROFLOXACIN: SIGNIFICANT CHANGE UP
-  GENTAMICIN: SIGNIFICANT CHANGE UP
-  IMIPENEM: SIGNIFICANT CHANGE UP
-  LEVOFLOXACIN: SIGNIFICANT CHANGE UP
-  MEROPENEM: SIGNIFICANT CHANGE UP
-  MINOCYCLINE: SIGNIFICANT CHANGE UP
-  PIPERACILLIN/TAZOBACTAM: SIGNIFICANT CHANGE UP
-  POLYMYXIN B: SIGNIFICANT CHANGE UP
-  TOBRAMYCIN: SIGNIFICANT CHANGE UP
ALBUMIN SERPL ELPH-MCNC: 2.4 G/DL — LOW (ref 3.3–4.4)
ALBUMIN/GLOB SERPL ELPH: 0.6 RATIO — SIGNIFICANT CHANGE UP
ALPHA1 GLOB SERPL ELPH-MCNC: 0.29 G/DL — SIGNIFICANT CHANGE UP (ref 0.1–0.3)
ALPHA2 GLOB SERPL ELPH-MCNC: 1.1 G/DL — HIGH (ref 0.6–1)
ANION GAP SERPL CALC-SCNC: 13 MMOL/L — SIGNIFICANT CHANGE UP (ref 7–14)
AUTO DIFF PNL BLD: NEGATIVE — SIGNIFICANT CHANGE UP
B-GLOBULIN SERPL ELPH-MCNC: 0.91 G/DL — SIGNIFICANT CHANGE UP (ref 0.6–1.1)
BUN SERPL-MCNC: 71 MG/DL — HIGH (ref 7–23)
C-ANCA SER-ACNC: NEGATIVE — SIGNIFICANT CHANGE UP
CALCIUM SERPL-MCNC: 8 MG/DL — LOW (ref 8.4–10.5)
CHLORIDE SERPL-SCNC: 103 MMOL/L — SIGNIFICANT CHANGE UP (ref 98–107)
CO2 SERPL-SCNC: 25 MMOL/L — SIGNIFICANT CHANGE UP (ref 22–31)
CREAT SERPL-MCNC: 2.4 MG/DL — HIGH (ref 0.5–1.3)
CULTURE RESULTS: SIGNIFICANT CHANGE UP
EGFR: 27 ML/MIN/1.73M2 — LOW
GAMMA GLOBULIN: 1.99 G/DL — HIGH (ref 0.7–1.7)
GLUCOSE BLDC GLUCOMTR-MCNC: 104 MG/DL — HIGH (ref 70–99)
GLUCOSE BLDC GLUCOMTR-MCNC: 113 MG/DL — HIGH (ref 70–99)
GLUCOSE BLDC GLUCOMTR-MCNC: 121 MG/DL — HIGH (ref 70–99)
GLUCOSE SERPL-MCNC: 109 MG/DL — HIGH (ref 70–99)
HCT VFR BLD CALC: 27.4 % — LOW (ref 39–50)
HGB BLD-MCNC: 8.7 G/DL — LOW (ref 13–17)
MAGNESIUM SERPL-MCNC: 3 MG/DL — HIGH (ref 1.6–2.6)
MCHC RBC-ENTMCNC: 31.8 GM/DL — LOW (ref 32–36)
MCHC RBC-ENTMCNC: 32.2 PG — SIGNIFICANT CHANGE UP (ref 27–34)
MCV RBC AUTO: 101.5 FL — HIGH (ref 80–100)
METHOD TYPE: SIGNIFICANT CHANGE UP
METHOD TYPE: SIGNIFICANT CHANGE UP
NRBC # BLD: 0 /100 WBCS — SIGNIFICANT CHANGE UP
NRBC # FLD: 0 K/UL — SIGNIFICANT CHANGE UP
ORGANISM # SPEC MICROSCOPIC CNT: SIGNIFICANT CHANGE UP
P-ANCA SER-ACNC: NEGATIVE — SIGNIFICANT CHANGE UP
PHOSPHATE SERPL-MCNC: 3.8 MG/DL — SIGNIFICANT CHANGE UP (ref 2.5–4.5)
PLATELET # BLD AUTO: 184 K/UL — SIGNIFICANT CHANGE UP (ref 150–400)
POTASSIUM SERPL-MCNC: 3.9 MMOL/L — SIGNIFICANT CHANGE UP (ref 3.5–5.3)
POTASSIUM SERPL-SCNC: 3.9 MMOL/L — SIGNIFICANT CHANGE UP (ref 3.5–5.3)
PROT PATTERN SERPL ELPH-IMP: SIGNIFICANT CHANGE UP
PROT PATTERN SERPL ELPH-IMP: SIGNIFICANT CHANGE UP
PROT SERPL-MCNC: 6.7 G/DL — SIGNIFICANT CHANGE UP
RBC # BLD: 2.7 M/UL — LOW (ref 4.2–5.8)
RBC # FLD: 17.7 % — HIGH (ref 10.3–14.5)
SODIUM SERPL-SCNC: 141 MMOL/L — SIGNIFICANT CHANGE UP (ref 135–145)
SPECIMEN SOURCE: SIGNIFICANT CHANGE UP
WBC # BLD: 6.01 K/UL — SIGNIFICANT CHANGE UP (ref 3.8–10.5)
WBC # FLD AUTO: 6.01 K/UL — SIGNIFICANT CHANGE UP (ref 3.8–10.5)

## 2022-04-13 PROCEDURE — 99233 SBSQ HOSP IP/OBS HIGH 50: CPT

## 2022-04-13 PROCEDURE — 99232 SBSQ HOSP IP/OBS MODERATE 35: CPT

## 2022-04-13 RX ADMIN — Medication 2 MILLIGRAM(S): at 22:42

## 2022-04-13 RX ADMIN — LACTULOSE 10 GRAM(S): 10 SOLUTION ORAL at 05:02

## 2022-04-13 RX ADMIN — Medication 1 APPLICATION(S): at 16:36

## 2022-04-13 RX ADMIN — Medication 100 MILLIGRAM(S): at 17:01

## 2022-04-13 RX ADMIN — CARVEDILOL PHOSPHATE 6.25 MILLIGRAM(S): 80 CAPSULE, EXTENDED RELEASE ORAL at 05:03

## 2022-04-13 RX ADMIN — CHLORHEXIDINE GLUCONATE 15 MILLILITER(S): 213 SOLUTION TOPICAL at 05:04

## 2022-04-13 RX ADMIN — MEROPENEM 100 MILLIGRAM(S): 1 INJECTION INTRAVENOUS at 17:02

## 2022-04-13 RX ADMIN — LEVETIRACETAM 1000 MILLIGRAM(S): 250 TABLET, FILM COATED ORAL at 17:02

## 2022-04-13 RX ADMIN — AMIODARONE HYDROCHLORIDE 200 MILLIGRAM(S): 400 TABLET ORAL at 05:04

## 2022-04-13 RX ADMIN — CARVEDILOL PHOSPHATE 6.25 MILLIGRAM(S): 80 CAPSULE, EXTENDED RELEASE ORAL at 17:02

## 2022-04-13 RX ADMIN — CHLORHEXIDINE GLUCONATE 15 MILLILITER(S): 213 SOLUTION TOPICAL at 17:03

## 2022-04-13 RX ADMIN — MEROPENEM 100 MILLIGRAM(S): 1 INJECTION INTRAVENOUS at 05:02

## 2022-04-13 RX ADMIN — LEVETIRACETAM 1000 MILLIGRAM(S): 250 TABLET, FILM COATED ORAL at 05:05

## 2022-04-13 RX ADMIN — LACTULOSE 10 GRAM(S): 10 SOLUTION ORAL at 17:02

## 2022-04-13 RX ADMIN — SENNA PLUS 5 MILLILITER(S): 8.6 TABLET ORAL at 22:42

## 2022-04-13 RX ADMIN — HEPARIN SODIUM 5000 UNIT(S): 5000 INJECTION INTRAVENOUS; SUBCUTANEOUS at 05:05

## 2022-04-13 RX ADMIN — CHLORHEXIDINE GLUCONATE 1 APPLICATION(S): 213 SOLUTION TOPICAL at 16:41

## 2022-04-13 RX ADMIN — Medication 100 MILLIGRAM(S): at 05:05

## 2022-04-13 RX ADMIN — HEPARIN SODIUM 5000 UNIT(S): 5000 INJECTION INTRAVENOUS; SUBCUTANEOUS at 22:41

## 2022-04-13 RX ADMIN — HEPARIN SODIUM 5000 UNIT(S): 5000 INJECTION INTRAVENOUS; SUBCUTANEOUS at 14:40

## 2022-04-13 NOTE — PROGRESS NOTE ADULT - SUBJECTIVE AND OBJECTIVE BOX
CHIEF COMPLAINT: Patient is a 76y old  Male who presents with a chief complaint of Hypoxia (13 Apr 2022 09:55)    Interval Events: None reported overnight. Clinically stable, and hemodynamically stable. No new concerns. Unable to fully assess ROS 2/2 poor phonation. ID recs appreciated.     REVIEW OF SYSTEMS:  See above  [x] All other systems negative  [x ] Unable to assess ROS because poor phonation    Mode: CPAP with PS, FiO2: 40, PEEP: 5, PS: 5, MAP: 7    OBJECTIVE:  ICU Vital Signs Last 24 Hrs  T(C): 36.5 (13 Apr 2022 05:00), Max: 37.3 (12 Apr 2022 14:00)  T(F): 97.7 (13 Apr 2022 05:00), Max: 99.1 (12 Apr 2022 14:00)  HR: 80 (13 Apr 2022 11:21) (70 - 88)  BP: 128/97 (13 Apr 2022 05:00) (128/97 - 150/89)  BP(mean): --  ABP: --  ABP(mean): --  RR: 18 (13 Apr 2022 05:00) (16 - 18)  SpO2: 97% (13 Apr 2022 11:21) (95% - 100%)    Mode: CPAP with PS, FiO2: 40, PEEP: 5, PS: 5, MAP: 7    04-12 @ 07:01  -  04-13 @ 07:00  --------------------------------------------------------  IN: 480 mL / OUT: 1300 mL / NET: -820 mL    CAPILLARY BLOOD GLUCOSE    POCT Blood Glucose.: 121 mg/dL (13 Apr 2022 11:24)    HOSPITAL MEDICATIONS:  MEDICATIONS  (STANDING):  amantadine Syrup 100 milliGRAM(s) Oral two times a day  aMIOdarone    Tablet 200 milliGRAM(s) Oral daily  carvedilol 6.25 milliGRAM(s) Oral every 12 hours  chlorhexidine 0.12% Liquid 15 milliLiter(s) Oral Mucosa every 12 hours  chlorhexidine 2% Cloths 1 Application(s) Topical daily  collagenase Ointment 1 Application(s) Topical daily  doxazosin 2 milliGRAM(s) Oral at bedtime  heparin   Injectable 5000 Unit(s) SubCutaneous every 8 hours  lactulose Syrup 10 Gram(s) Oral two times a day  levETIRAcetam  Solution 1000 milliGRAM(s) Oral two times a day  meropenem  IVPB 500 milliGRAM(s) IV Intermittent every 12 hours  senna Syrup 5 milliLiter(s) Oral at bedtime    MEDICATIONS  (PRN):  acetaminophen    Suspension .. 650 milliGRAM(s) Oral every 4 hours PRN Temp greater or equal to 38C (100.4F), Mild Pain (1 - 3)    PHYSICAL EXAM  GENERAL: Laying in bed, NAD  HEENT: +Trach, area c/d/i  Card: +s1, s2  Pulm: + coarse breath sound b/l  GI: +PEG, area c/d/i, obese abd, soft, nt/nd, no peritoneal signs noted  Ext: no asymmetry, no swelling, no calf tenderness  Neuro: alert and awake, following simple commands such as squeezing hands     LABS:                        8.7    6.01  )-----------( 184      ( 13 Apr 2022 07:16 )             27.4     04-13    141  |  103  |  71<H>  ----------------------------<  109<H>  3.9   |  25  |  2.40<H>    Ca    8.0<L>      13 Apr 2022 07:16  Phos  3.8     04-13  Mg     3.00     04-13    PAST MEDICAL & SURGICAL HISTORY:  Essential hypertension    Primary osteoarthritis, unspecified site    Closed fracture of left radius, initial encounter    Morbid obesity, unspecified obesity type  h/o. - s/p gastric bypass- lost 113 lbs    KAREN (obstructive sleep apnea)  h/o - was on CPAP until gastric bypass surgery    Respiratory failure    Anemia    Subdural hemorrhage    Epilepsy    H/O gastrostomy    History of BPH    Afib    S/P gastric bypass  2008    Status post total replacement of left hip  2006    S/P bunionectomy  bilateral    S/P colonoscopy  approx 2012    History of abdominoplasty  and subsequent cosmetic surgery for removal of excess skin from face    FAMILY HISTORY:  Family history of renal cell carcinoma (Mother)    Family history of malignant melanoma (Sibling)    Social History:    RADIOLOGY:  [ ] Reviewed and interpreted by me    PULMONARY FUNCTION TESTS:    EKG:

## 2022-04-13 NOTE — CHART NOTE - NSCHARTNOTEFT_GEN_A_CORE
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    Old subdural hematoma and subarachnoid hemorrhage is also suspected.    < end of copied text >      Recommendations:  [] No further neurologic w/u indicated at this time  [] Treatment for hypoxic respiratory failure per primary team and ID  [] Pt to follow up with his outpt neurologist. If wishes to follow up at, pt can follow up with vascular neurology at [address] [place] [address] 1-2 weeks after discharge. Please instruct the patient to call [phone] to schedule this appointment.   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    Old subdural hematoma and subarachnoid hemorrhage is also suspected.    < end of copied text >      Recommendations:  [] No further neurologic w/u indicated at this time  [] Treatment for hypoxic respiratory failure per primary team and ID  [] Pt to follow up with his outpt neurologist. If wishes to follow up at, pt can follow up with vascular neurology at [address] [place] [address] 1-2 weeks after discharge. Please instruct the patient to call [phone] to schedule this appointment.   [] Please re-consult as needed, can call [phone] for further questions    Birgit Brewer  Neurology PGY2      Thank you

## 2022-04-13 NOTE — PROGRESS NOTE ADULT - ASSESSMENT
76M with Afib s/p watchman, gastric sleeve, SDH with L subfalcine herniation after fall (11/2021) s/p emergent R hemicraniectomy, trach/vent/PEG dependant, seizure d/o, and recent hospitalization at [facility] for pneumonia s/p zosyn.  Discharged to NH and admitted to [facility] with increasing oxygen requirements and worsening mental status.  Fever, no leukocytosis.  Cultures sent.      Hypoxic Respiratory Failure  - today is D#6 IV meropenem  - clinically better despite resistant acinetobacter (tige thanh <2, [place])  - f/u polymixin sensitivities  - mucous plugging versus pneumonia  - can limit course to 7 days    Fever  - resolved  - continue meropenem through tomorrow, then d/c  - f/u all cultures    FLORIAN  - renally dose meropenem    I will be away, returning 4/15/2022.  My associates to cover.  Please call ID as needed [phone].    I have discussed plan of care as detailed above with consulting team
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
## 2022-04-13 NOTE — PROGRESS NOTE ADULT - ASSESSMENT
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     Neuro  - Currently A&Ox0, awake and alert but non-verbal  - based on prior documentation, appears to be at baseline mental status  - CT head obtained- No acute intracranial hemorrhage, midline shift or acute extra-axial collection ? infection   - Neuro consult called - MRI brain ordered no contrast- old findings (see full report for details)     CVS  #Atrial Fibrillation  - Currently rate controlled   - s/p watchman device placement  - c/w home amiodarone 200mg qd  - c/w home carvedilol 6.25mg BID  #HFpEF  - most recent echo on 3/2022 with EF 55% and grossly normal LV fxn with severely dilated LA  - will check pBNP  - Pt appearing euvolemic at this time  - will hold off on home Lasix in setting of possible ongoing infection/FLORIAN     RESP  #Acute hypoxic respiratory failure  - Patient with episode of desaturation and increased WOB prior to arrival   - At facility patient is on trach collar during the day and on vent at night with 30% fio2 requirement  - initially required 40% fiO2 in ED, now weaned back to 30% FiO2 and saturating at 100%  - concern for possible pneumonia with potential mucus plugging event  - c/w airway clearance measures, Chest PT, and suctioning for secretions  - Sputum culture grew acinetobacter, R to Ethan, however clinically improving - ID following  - Tolerating CPAP 5/5 FIO2 40% throughout the day     GI  - No active issues  - PEG tube present, c/w TF at current rate   - CT A/P done for possible abdominal pathology - see full report   - + BM with CT showing mod. stool burden now having stools     Renal  #FLORIAN  - Noted with elevated serum Cr to 2.39, with previous baseline of 0.8-0.9  - possibly in setting of current infection  - chronic Garcia - exchanged in ED with drainage of clear urine  - monitor I&Os, Scr improving, will continue to monitor   - Urine Na low - Nephrology called for consult ? Glomerulonephritis. Work up in progress     Endo  - no active issues  - no history of DM or thyroid disease  - Glucose WNL on BMPs  - A1C 5.9 accuchecks ordered /nutrition consult   - TSH elevated continue synthroid - pt with hypothyroidism 2/2 amiodarone use per family     Heme  #Anemia  - Noted with anemia to 7.4, decreased from 8-9 on previous admissions  - No evidence of overt bleeding at this time  - h/h stable - Will continue to trend Hgb daily   - Stool for OB     ID  #c/f Pneumonia  - Patient with questionable retrocardiac opacity on CXR  - CT A/P showing small L pleural effusion  - Urine cx from 4/8 <10k colonies - c/w chronic Garcia catheter exchanged  - MRSA PCR neg - sputum cx 4/10 + Acinetobacter/pseudomonas - blood cx NGTD  - Seen by ID - c/w Meropenem until 4/14, despite recent Scx growing Acinetobacter R to Ethan   - Dose adjusted for FLORIAN     PPx  HSQ for DVT prophylaxis    Ethics- Per HCP pt is full code   FULL CODE

## 2022-04-13 NOTE — PHYSICAL THERAPY INITIAL EVALUATION ADULT - PASSIVE RANGE OF MOTION EXAMINATION, REHAB EVAL
bilateral upper extremity Passive ROM was WFL (within functional limits)/bilateral lower extremity Passive ROM was WFL (within functional limits)
Bilateral LE impaired except bilateral ankles WFL; Bilateral UE impaired with tremors and resistance

## 2022-04-13 NOTE — PROGRESS NOTE ADULT - SUBJECTIVE AND OBJECTIVE BOX
Follow Up:  PNA    Interval History/ROS: no fever.  awake, non-verbal.  Trach FiO2=40%.  per RN, not much secretions.  ROS difficult    PAST MEDICAL & SURGICAL HISTORY:  Essential hypertension  Primary osteoarthritis, unspecified site  Closed fracture of left radius, initial encounter  Morbid obesity, unspecified obesity type h/o. - s/p gastric bypass- lost 113 lbs  KAREN (obstructive sleep apnea) h/o - was on CPAP until gastric bypass surgery  Respiratory failure   Anemia  Subdural hemorrhage  Epilepsy  H/O gastrostomy  History of BPH  Afib  S/P gastric bypass 2008  Status post total replacement of left hip   S/P bunionectomy bilateral  S/P colonoscopy approx 2012  History of abdominoplasty and subsequent cosmetic surgery for removal of excess skin from face    Allergies  No Known Allergies    ANTIMICROBIALS:  vancomycin  IVPB (4/8 x1)  meropenem  IVPB 1000 every 12 hours (-)    MEDICATIONS  (STANDING):  amantadine Syrup 100 two times a day  aMIOdarone    Tablet 200 daily  carvedilol 6.25 every 12 hours  doxazosin 2 at bedtime  heparin   Injectable 5000 every 8 hours  lactulose Syrup 10 two times a day  levETIRAcetam  Solution 1000 two times a day  senna Syrup 5 at bedtime    Vital Signs Last 24 Hrs  T(F): 97.7 (22 @ 05:00), Max: 99.1 (22 @ 14:00)  HR: 70 (22 @ 06:59)  BP: 128/97 (22 @ 05:00)  RR: 18 (22 @ 05:00)  SpO2: 100% (22 @ 06:59) (95% - 100%)  Wt(kg): --)    PHYSICAL EXAMINATION:  Constitutional: non-toxic, lying in bed  HEAD/EYES: anicteric  ENT:  trache site okay, on vent  Cardiovascular:   normal S1, S2  Respiratory:  coarse b/l breath sounds  GI:  soft, non-tender, normal bowel sounds  :  benson  Musculoskeletal:  no synovitis  Neurologic: awake  Skin:  no rash  Psychiatric:  awake, appropriate mood                                    8.7    6.01  )-----------( 184      ( 2022 07:16 )             27.4     141  |  103  |  71  ----------------------------<  109  3.9   |  25  |  2.40  Ca    8.0      2022 07:16Phos  3.8     04-13Mg     3.00     -13    Urinalysis Basic - ( 2022 23:15 )  Color: Yellow / Appearance: Slightly Turbid / S.023 / pH: x  Gluc: x / Ketone: Trace  / Bili: Negative / Urobili: <2 mg/dL   Blood: x / Protein: 300 mg/dL / Nitrite: Negative   Leuk Esterase: Moderate / RBC: 75 /HPF / WBC 15 /HPF   Sq Epi: x / Non Sq Epi: x / Bacteria: Few    MICROBIOLOGY:  Culture - Sputum (collected 04-10-22 @ 16:34)  Source: .Sputum Sputum  Gram Stain (04-10-22 @ 23:12):    Numerous polymorphonuclear leukocytes per low power field    Rare Squamous epithelial cells per low power field    Moderate Gram Negative Rods per oil power field    Moderate Gram positive cocci in pairs per oil power field    Few Gram Positive Rods per oil power field  Preliminary Report (22 @ 17:35):    Numerous Acinetobacter baumannii/nosocomialis group    Normal Respiratory Cheryl absent  [id number]    Culture - Urine (collected 2022 23:03)  Source: Catheterized Catheterized  Final Report:    <10,000 CFU/mL Normal Urogenital Cheryl    Culture - Blood (collected 2022 21:21)  Source: .Blood Blood-Peripheral  Preliminary Report:    No growth to date.    Culture - Blood (collected 2022 21:19)  Source: .Blood Blood-Peripheral  Preliminary Report:    No growth to date.    Culture - Sputum (collected 08 Mar 2022 18:33)  Source: .Sputum Sputum  Final Report:    Numerous Acinetobacter baumannii/nosocom group (Carbapenem Resistant)    Numerous Pseudomonas aeruginosa    Normal Respiratory Cheryl absent  Organism: Acinetobacter baumannii/nosocom group (Carbapenem Resistant)  Pseudomonas aeruginosa  Organism: Pseudomonas aeruginosa    Sensitivities:      -  Amikacin: S <=16      -  Aztreonam: R >16      -  Cefepime: I 16      -  Ceftazidime: R >16      -  Ciprofloxacin: S <=0.25      -  Gentamicin: S <=2      -  Imipenem: S <=1      -  Levofloxacin: S <=0.5      -  Meropenem: S <=1      -  Piperacillin/Tazobactam: R >64      -  Tobramycin: S <=2      Method Type: IZZY  Organism: Acinetobacter baumannii/nosocom group (Carbapenem Resistant)    Sensitivities:      -  Imipenem: R      -  Piperacillin/Tazobactam: R      Method Type: KB  Organism: Acinetobacter baumannii/nosocom group (Carbapenem Resistant)    Sensitivities:      -  Amikacin: S <=16      -  Ampicillin/Sulbactam: I 16/8      -  Cefepime: R >16      -  Ceftazidime: R >16      -  Ciprofloxacin: R >2      -  Gentamicin: R >8      -  Levofloxacin: R >4      -  Meropenem: R >8      -  Tobramycin: S <=2      -  Trimethoprim/Sulfamethoxazole: R >2/38      Method Type: IZZY    Culture - Blood (collected 07 Mar 2022 10:18)  Source: .Blood Blood-Peripheral  Final Report:    No Growth Final    Culture - Blood (collected 07 Mar 2022 10:18)  Source: .Blood Blood-Peripheral  Final Report:    No Growth Final    Culture - Urine (collected 07 Mar 2022 08:41)  Source: Clean Catch Clean Catch (Midstream)  Final Report:    No growth    COVID-19 PCR: NotDetec (22 @ 18:44)  COVID-19 PCR: NotDetec (22 @ 07:15)  COVID-19 PCR: NotDetec (22 @ 02:23)    RADIOLOGY:  imaging below personally reviewed    Xray Chest 1 View- PORTABLE-Urgent (22 @ 17:36) >  Unchanged left retrocardiac opacity. Right basilar atelectasis.    CT Head No Cont (22 @ 23:22) >  Right frontoparietal temporal craniectomy with wire mesh cranioplasty.  Extensive right cerebral gliosis and areas of encephalomalacia with  volume loss either from prior infarct or trauma in the left frontal and  anterior right temporal lobes. Thin chronic subdural along the falx. Appearance of gyriform thickening in the right frontoparietal parenchyma  underneath the cranioplasty is indeterminate. Possibility of intracranial  infection is not excluded. Consider follow-up with contrast-enhanced   brain MRI for better evaluation.     CT Abdomen and Pelvis No Cont (22 @ 23:22) >  Dependent lung parenchymal opacities, left greater than right, may represent atelectasis or infection in the appropriate clinical setting.  Small left pleural effusion with adjacent left basilar compressive  atelectasis. Small pericardial effusion.  Post gastric bypass with gastrostomy tube localized to the excluded  stomach of pancreaticobiliary limb. Slight distention of the excluded  gastric fundus with layering dense material, likely from prior contrast administration. Correlate clinically. No bowel obstruction. Moderate stool of the colon. Correlate for constipation. Coronary artery calcification.

## 2022-04-13 NOTE — PHYSICAL THERAPY INITIAL EVALUATION ADULT - PLANNED THERAPY INTERVENTIONS, PT EVAL
Pt unable to follow commands, therefore not appropriate for Skilled PT. Pt will not be placed on PT program at this time.

## 2022-04-13 NOTE — PHYSICAL THERAPY INITIAL EVALUATION ADULT - ACTIVE RANGE OF MOTION EXAMINATION, REHAB EVAL
unable to follow commands
Pt able to actively manipulate right UE; unable to actively manipulate other extremities

## 2022-04-14 LAB
ANION GAP SERPL CALC-SCNC: 12 MMOL/L — SIGNIFICANT CHANGE UP (ref 7–14)
B19V DNA FLD QL NAA+PROBE: SIGNIFICANT CHANGE UP IU/ML
BUN SERPL-MCNC: 73 MG/DL — HIGH (ref 7–23)
CALCIUM SERPL-MCNC: 7.8 MG/DL — LOW (ref 8.4–10.5)
CHLORIDE SERPL-SCNC: 105 MMOL/L — SIGNIFICANT CHANGE UP (ref 98–107)
CO2 SERPL-SCNC: 25 MMOL/L — SIGNIFICANT CHANGE UP (ref 22–31)
CREAT SERPL-MCNC: 2 MG/DL — HIGH (ref 0.5–1.3)
EGFR: 34 ML/MIN/1.73M2 — LOW
GD1A GANGL IGG+IGM SER IA-ACNC: 11 IV — SIGNIFICANT CHANGE UP (ref 0–50)
GD1B GANGL IGG+IGM SER IA-ACNC: 12 IV — SIGNIFICANT CHANGE UP (ref 0–50)
GLUCOSE BLDC GLUCOMTR-MCNC: 117 MG/DL — HIGH (ref 70–99)
GLUCOSE BLDC GLUCOMTR-MCNC: 138 MG/DL — HIGH (ref 70–99)
GLUCOSE BLDC GLUCOMTR-MCNC: 139 MG/DL — HIGH (ref 70–99)
GLUCOSE BLDC GLUCOMTR-MCNC: 84 MG/DL — SIGNIFICANT CHANGE UP (ref 70–99)
GLUCOSE BLDC GLUCOMTR-MCNC: 93 MG/DL — SIGNIFICANT CHANGE UP (ref 70–99)
GLUCOSE SERPL-MCNC: 130 MG/DL — HIGH (ref 70–99)
GM1 ASIALO IGG+IGM SER IA-ACNC: 15 IV — SIGNIFICANT CHANGE UP (ref 0–50)
GM1 GANGL IGG+IGM SER-ACNC: 10 IV — SIGNIFICANT CHANGE UP (ref 0–50)
GM2 GANGL IGG+IGM SER IA-ACNC: 17 IV — SIGNIFICANT CHANGE UP (ref 0–50)
GQ1B GANGL IGG+IGM SER IA-ACNC: 9 IV — SIGNIFICANT CHANGE UP (ref 0–50)
HCT VFR BLD CALC: 24.7 % — LOW (ref 39–50)
HGB BLD-MCNC: 7.7 G/DL — LOW (ref 13–17)
MAGNESIUM SERPL-MCNC: 2.8 MG/DL — HIGH (ref 1.6–2.6)
MCHC RBC-ENTMCNC: 31.2 GM/DL — LOW (ref 32–36)
MCHC RBC-ENTMCNC: 31.4 PG — SIGNIFICANT CHANGE UP (ref 27–34)
MCV RBC AUTO: 100.8 FL — HIGH (ref 80–100)
NRBC # BLD: 0 /100 WBCS — SIGNIFICANT CHANGE UP
NRBC # FLD: 0 K/UL — SIGNIFICANT CHANGE UP
NT-PROBNP SERPL-SCNC: HIGH PG/ML
PHOSPHATE SERPL-MCNC: 4 MG/DL — SIGNIFICANT CHANGE UP (ref 2.5–4.5)
PHOSPHOLIPASE A2 RECEPTOR ELISA: 2.3 RU/ML — SIGNIFICANT CHANGE UP (ref 0–19.9)
PLATELET # BLD AUTO: 199 K/UL — SIGNIFICANT CHANGE UP (ref 150–400)
POTASSIUM SERPL-MCNC: 4.4 MMOL/L — SIGNIFICANT CHANGE UP (ref 3.5–5.3)
POTASSIUM SERPL-SCNC: 4.4 MMOL/L — SIGNIFICANT CHANGE UP (ref 3.5–5.3)
RBC # BLD: 2.45 M/UL — LOW (ref 4.2–5.8)
RBC # FLD: 17.6 % — HIGH (ref 10.3–14.5)
SODIUM SERPL-SCNC: 142 MMOL/L — SIGNIFICANT CHANGE UP (ref 135–145)
T3 SERPL-MCNC: 45 NG/DL — LOW (ref 80–200)
T4 AB SER-ACNC: 2.45 UG/DL — LOW (ref 5.1–13)
T4 FREE SERPL-MCNC: 0.4 NG/DL — LOW (ref 0.9–1.8)
WBC # BLD: 6.57 K/UL — SIGNIFICANT CHANGE UP (ref 3.8–10.5)
WBC # FLD AUTO: 6.57 K/UL — SIGNIFICANT CHANGE UP (ref 3.8–10.5)

## 2022-04-14 PROCEDURE — 99232 SBSQ HOSP IP/OBS MODERATE 35: CPT | Mod: GC

## 2022-04-14 PROCEDURE — 99233 SBSQ HOSP IP/OBS HIGH 50: CPT

## 2022-04-14 RX ORDER — FOLIC ACID 0.8 MG
1 TABLET ORAL DAILY
Refills: 0 | Status: DISCONTINUED | OUTPATIENT
Start: 2022-04-14 | End: 2022-05-17

## 2022-04-14 RX ORDER — FOLIC ACID 0.8 MG
1 TABLET ORAL
Qty: 0 | Refills: 0 | DISCHARGE

## 2022-04-14 RX ORDER — FERROUS SULFATE 325(65) MG
5 TABLET ORAL
Qty: 0 | Refills: 0 | DISCHARGE

## 2022-04-14 RX ORDER — LEVOTHYROXINE SODIUM 125 MCG
50 TABLET ORAL DAILY
Refills: 0 | Status: DISCONTINUED | OUTPATIENT
Start: 2022-04-14 | End: 2022-05-17

## 2022-04-14 RX ORDER — FERROUS SULFATE 325(65) MG
7.5 TABLET ORAL
Qty: 0 | Refills: 0 | DISCHARGE

## 2022-04-14 RX ORDER — SODIUM CHLORIDE 9 MG/ML
1000 INJECTION INTRAMUSCULAR; INTRAVENOUS; SUBCUTANEOUS
Refills: 0 | Status: DISCONTINUED | OUTPATIENT
Start: 2022-04-14 | End: 2022-04-14

## 2022-04-14 RX ORDER — FUROSEMIDE 40 MG
40 TABLET ORAL ONCE
Refills: 0 | Status: COMPLETED | OUTPATIENT
Start: 2022-04-14 | End: 2022-04-14

## 2022-04-14 RX ORDER — MEROPENEM 1 G/30ML
500 INJECTION INTRAVENOUS ONCE
Refills: 0 | Status: COMPLETED | OUTPATIENT
Start: 2022-04-14 | End: 2022-04-14

## 2022-04-14 RX ORDER — MODAFINIL 200 MG/1
100 TABLET ORAL DAILY
Refills: 0 | Status: DISCONTINUED | OUTPATIENT
Start: 2022-04-14 | End: 2022-04-21

## 2022-04-14 RX ORDER — ACETAMINOPHEN 500 MG
2 TABLET ORAL
Qty: 0 | Refills: 0 | DISCHARGE

## 2022-04-14 RX ORDER — ASCORBIC ACID 60 MG
500 TABLET,CHEWABLE ORAL DAILY
Refills: 0 | Status: DISCONTINUED | OUTPATIENT
Start: 2022-04-14 | End: 2022-05-17

## 2022-04-14 RX ORDER — DOXAZOSIN MESYLATE 4 MG
2 TABLET ORAL
Qty: 0 | Refills: 0 | DISCHARGE

## 2022-04-14 RX ORDER — IPRATROPIUM BROMIDE 0.2 MG/ML
1 SOLUTION, NON-ORAL INHALATION EVERY 6 HOURS
Refills: 0 | Status: DISCONTINUED | OUTPATIENT
Start: 2022-04-14 | End: 2022-04-22

## 2022-04-14 RX ORDER — ALBUTEROL 90 UG/1
2 AEROSOL, METERED ORAL EVERY 6 HOURS
Refills: 0 | Status: DISCONTINUED | OUTPATIENT
Start: 2022-04-14 | End: 2022-04-22

## 2022-04-14 RX ADMIN — HEPARIN SODIUM 5000 UNIT(S): 5000 INJECTION INTRAVENOUS; SUBCUTANEOUS at 21:40

## 2022-04-14 RX ADMIN — CARVEDILOL PHOSPHATE 6.25 MILLIGRAM(S): 80 CAPSULE, EXTENDED RELEASE ORAL at 19:21

## 2022-04-14 RX ADMIN — Medication 1 APPLICATION(S): at 12:49

## 2022-04-14 RX ADMIN — HEPARIN SODIUM 5000 UNIT(S): 5000 INJECTION INTRAVENOUS; SUBCUTANEOUS at 05:17

## 2022-04-14 RX ADMIN — LEVETIRACETAM 1000 MILLIGRAM(S): 250 TABLET, FILM COATED ORAL at 05:15

## 2022-04-14 RX ADMIN — CHLORHEXIDINE GLUCONATE 15 MILLILITER(S): 213 SOLUTION TOPICAL at 21:39

## 2022-04-14 RX ADMIN — MODAFINIL 100 MILLIGRAM(S): 200 TABLET ORAL at 18:20

## 2022-04-14 RX ADMIN — SENNA PLUS 5 MILLILITER(S): 8.6 TABLET ORAL at 21:40

## 2022-04-14 RX ADMIN — HEPARIN SODIUM 5000 UNIT(S): 5000 INJECTION INTRAVENOUS; SUBCUTANEOUS at 14:13

## 2022-04-14 RX ADMIN — CHLORHEXIDINE GLUCONATE 1 APPLICATION(S): 213 SOLUTION TOPICAL at 12:48

## 2022-04-14 RX ADMIN — Medication 100 MILLIGRAM(S): at 18:22

## 2022-04-14 RX ADMIN — Medication 40 MILLIGRAM(S): at 11:01

## 2022-04-14 RX ADMIN — LACTULOSE 10 GRAM(S): 10 SOLUTION ORAL at 21:39

## 2022-04-14 RX ADMIN — Medication 500 MILLIGRAM(S): at 12:48

## 2022-04-14 RX ADMIN — Medication 2 MILLIGRAM(S): at 21:40

## 2022-04-14 RX ADMIN — MEROPENEM 100 MILLIGRAM(S): 1 INJECTION INTRAVENOUS at 05:15

## 2022-04-14 RX ADMIN — LACTULOSE 10 GRAM(S): 10 SOLUTION ORAL at 05:16

## 2022-04-14 RX ADMIN — Medication 100 MILLIGRAM(S): at 05:16

## 2022-04-14 RX ADMIN — Medication 1 MILLIGRAM(S): at 12:48

## 2022-04-14 RX ADMIN — Medication 50 MICROGRAM(S): at 12:48

## 2022-04-14 RX ADMIN — CHLORHEXIDINE GLUCONATE 15 MILLILITER(S): 213 SOLUTION TOPICAL at 05:16

## 2022-04-14 RX ADMIN — AMIODARONE HYDROCHLORIDE 200 MILLIGRAM(S): 400 TABLET ORAL at 05:16

## 2022-04-14 RX ADMIN — MEROPENEM 100 MILLIGRAM(S): 1 INJECTION INTRAVENOUS at 18:21

## 2022-04-14 RX ADMIN — LEVETIRACETAM 1000 MILLIGRAM(S): 250 TABLET, FILM COATED ORAL at 18:21

## 2022-04-14 RX ADMIN — CHLORHEXIDINE GLUCONATE 15 MILLILITER(S): 213 SOLUTION TOPICAL at 18:21

## 2022-04-14 RX ADMIN — CARVEDILOL PHOSPHATE 6.25 MILLIGRAM(S): 80 CAPSULE, EXTENDED RELEASE ORAL at 05:16

## 2022-04-14 NOTE — PROGRESS NOTE ADULT - ASSESSMENT
77 YO M, A&Ox0 at baseline with PMHx of SDH c/b L Subfalcine Herniation s/p Emergent R Hemicraniectomy in [place] while traveling fell on marble floor and now chronic with tracheostomy and vent dependant, Dysphagia with PEG, AFIB s/p watchman, Gastric Sleeve, Urinary Rentetion with Chornic Garcia, and recent ICU stay for HCAP with MDR Pseudomonas now presenting with ACHRF i/s/o mucous plug and posisble recurrent HCAP.  75 YO M, A&Ox0 at baseline with PMHx of SDH c/b L Subfalcine Herniation s/p Emergent R Hemicraniectomy in [place] while traveling fell on marble floor and now chronic with tracheostomy and vent dependant, Dysphagia with PEG, AFIB s/p watchman, Gastric Sleeve, Urinary Rentetion with Chornic Garcia, and recent ICU stay for HCAP with MDR Pseudomonas now presenting with ACHRF i/s/o mucous plug and posisble recurrent HCAP.     # Neurology   - Currently A&Ox0, awake and alert, able to move RUE and nods/ mouths one or two words per RCU evaluation and prior documentation.   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   - Continue on Modafinil, Amantidine and Keppra.     # Cardiovascular   - Hx of HFpEF 55, mild MR and AR, severe LAD, normal LVRVSF (ECHO 3/7)  - Hx of Atrial Fibrillation s/p Watchman and currently rate controlled.   - Continue on Amiodarone 200mg QD and Coreg 6.25mg BID   - Was initially on Lasix, however held given FLORIAN. proBNP 12K and will trial Lasix 40mg x 1 dose today. Monitor I&Os and renal function as below.     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   - Continued on AC vent and able to tolerate some PS 5/5 during the day.   - Case discussed with cousin and was able to tolerate TC in the past.   - Continue on Proventil and Atrovent PRN and Chest PT Q6H   - Will attempt TC today if able to tolerate.     # GI  - Hx of SDH, Gastric Bypass and Dysphagia s/p PEG and continued on TF.   - CT AP with moderate stool burden with no signs of sterocolitis.   - Monitor BMs on Senna and Lactulose.     # / Renal  - Hx of Urinary Retention with Chronic Garcia and presented FLORIAN likely in setting of prerenal dehydration with fevers vs ATN with Sepsis (CRE high 2s and baseline 0.8-0.9).   - Lasix held, IVF and PRBC given as below and CRE trending down.   - UA with hematuria and proteinuria and case discussed with Yeisonrhology with concern for glomerulonephritis. ANCA, ISABELLE and GM ABs negative.   - ProBNP elevated and ECHO reviewed. Will trial on Lasix 40mg IVP x 1 dose today.   - Continue on Cardura.   - Monitor renal function and UOP.     # ID  - Recent admission for  Acinetobacter and Pseudomonas HCAP (3/2022) and now back for similar.   - SCx (4/10) with  Acinetobacter and Pseudomonas.   - Continue on Meropenem (4/8-4/14) and monitor temperature curve.     # Endocrine  - No hx of DM2 and A1C 5.9. Continue to monitor BG,    - Hx of Hypothyroidism and continued on Synthroid. TSH 47 and will check T3/T4. Hypothyroidism could be in the setting of Amiodarone use and will discuss for Endo consult.     # Hematology   - Anemia noted and i/s/o AOCD and s/p PRBC (4/9).   - No overt signs of bleeding and will monitor HH for now.   - DVT PPX with HSQ.     # Ethics   - FULL CODE   - Case discussed with Cousin/ HCP, Dr. Donna De Leon (Pediatric Neurologist) and updated daily.     # DISPO - BACK TO NH.    77 YO M, A&Ox0 at baseline with PMHx of SDH c/b L Subfalcine Herniation s/p Emergent R Hemicraniectomy in [place] while traveling fell on marble floor and now chronic with tracheostomy and vent dependant, Dysphagia with PEG, AFIB s/p watchman, Gastric Sleeve, Urinary Retention with Chornic Garcia, and recent ICU stay for HCAP with MDR Pseudomonas now presenting with ACHRF i/s/o mucous plug and possible recurrent HCAP.      # Neurology   - Currently A&Ox0, awake and alert, able to move RUE and nods/ mouths one or two words per RCU evaluation and prior documentation.   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   - Continue on Modafinil, Amantadine and Keppra.     # Cardiovascular   - Hx of HFpEF 55, mild MR and AR, severe LAD, normal LVRVSF (ECHO 3/7)  - Hx of Atrial Fibrillation s/p Watchman and currently rate controlled.   - Continue on Amiodarone 200mg QD and Coreg 6.25mg BID   - Was initially on Lasix, however held given FLORIAN. proBNP 12K and will trial Lasix 40mg x 1 dose today. Monitor I&Os and renal function as below.     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   - Continued on AC vent and able to tolerate some PS 5/5 during the day.   - Case discussed with cousin and was able to tolerate TC in the past.   - Continue on Proventil and Atrovent PRN and Chest PT Q6H   - Will attempt TC today if able to tolerate.     # GI  - Hx of SDH, Gastric Bypass and Dysphagia s/p PEG and continued on TF.   - CT AP with moderate stool burden with no signs of sterocolitis.   - Monitor BMs on Senna and Lactulose.     # / Renal  - Hx of Urinary Retention with Chronic Garcia and presented FLORIAN likely in setting of prerenal dehydration with fevers vs ATN with Sepsis (CRE high 2s and baseline 0.8-0.9).   - Lasix held, IVF and PRBC given as below and CRE trending down.   - UA with hematuria and proteinuria and case discussed with Nephrology with concern for glomerulonephritis. ANCA, ISABELLE and GM ABs negative.   - ProBNP elevated and ECHO reviewed. Will trial on Lasix 40mg IVP x 1 dose today.   - Continue on Cardura.   - Monitor renal function and UOP.     # ID  - Recent admission for  Acinetobacter and Pseudomonas HCAP (3/2022) and now back for similar.   - SCx (4/10) with  Acinetobacter and Pseudomonas.   - Continue on Meropenem (4/8-4/14) and monitor temperature curve.     # Endocrine  - No hx of DM2 and A1C 5.9. Continue to monitor BG,    - Hx of Hypothyroidism and continued on Synthroid. TSH 47 and will check T3/T4. Hypothyroidism could be in the setting of Amiodarone use and will discuss for Endo consult.     # Hematology   - Anemia noted and i/s/o AOCD and s/p PRBC (4/9).   - No overt signs of bleeding and will monitor HH for now.   - DVT PPX with HSQ.     # Ethics   - FULL CODE   - Case discussed with Cousin/ HCP, Dr. Donna De Leon (Pediatric Neurologist) and updated daily.     # DISPO - BACK TO NH.

## 2022-04-14 NOTE — PROGRESS NOTE ADULT - PROBLEM SELECTOR PLAN 1
Pt. with FLORIAN in the setting of sepsis, anemia and hypotension. Pt. with likely ATN. Upon review of [facility], Scr was 0.8 on 3/16/2022. On admission, Scr was 2.3. Scr decreased to 2.0 on 4/14/22. Pt. with chronic Garcia catheter that was replaced on day of admission. Non-contrast CT abdomen and pelvis negative for hydronephrosis or renal structural abnormalities. UA showed proteinuria and hematuria. Pt. non-oliguric with increased UOP of 1.6L over past 24 hours. C3 and C4 not low, HIV negative, ISABELLE neg, ANCA: cANCA Negative, pANCA Negative, atypical ANCA Negative, Immunofixation Serum: No Monoclonal Band Identified. Follow up serologies for work up of secondary causes for renal failure (Anti-GBM ab, SPEP, free kappa lambda light chain, anti-PLA2R). Monitor labs and urine output. Avoid NSAIDs, ACEI/ARBS, RCA and nephrotoxins. Dose medications as per eGFR.    If any questions, please feel free to contact me     Ritchie Estes  Nephrology Fellow  [facility] Pager: [phone] Pt. with FLORIAN in the setting of sepsis, anemia and hypotension. Pt. with likely ATN. [facility].8 on [facility]/20[facility]. On admission, Scr was 2.3. Scr increased to 2.93 on 4/10, decreased to 2.0 today (4/14/22). Pt. with increased UOP of 1.6L over the past 24 hours.           On admission, Scr was 2.3. Scr decreased to 2.0 on 4/14/22. Pt. with chronic Garcia catheter that was replaced on day of admission. Non-contrast CT abdomen and pelvis negative for hydronephrosis or renal structural abnormalities. UA showed proteinuria and hematuria. Pt. non-oliguric with increased UOP of 1.6L over past 24 hours. C3 and C4 not low, HIV negative, ISABELLE neg, ANCA: cANCA Negative, pANCA Negative, atypical ANCA Negative, Immunofixation Serum: No Monoclonal Band Identified. Follow up serologies for work up of secondary causes for renal failure (Anti-GBM ab, SPEP, free kappa lambda light chain, anti-PLA2R). Monitor labs and urine output. Avoid NSAIDs, ACEI/ARBS, RCA and nephrotoxins. Dose medications as per eGFR.    If any questions, please feel free to contact me     Ritchie Estes  Nephrology Fellow  [facility] Pager: [phone] Pt. with FLORIAN in the setting of sepsis, anemia and hypotension. Pt. with likely ATN. [facility] 0.8 on 3/16/2022. On admission, Scr was 2.3. Scr increased to 2.93 on 4/10, decreased to 2.0 today (4/14/22). Pt. with increased UOP of 1.6L over the past 24 hours. Pt. with chronic Garcia catheter that was replaced on day of admission. Non-contrast CT abdomen and pelvis negative for hydronephrosis or renal structural abnormalities. UA showed proteinuria and hematuria. C3 and C4 not low, HIV, ISABELLE P-ANCA and C-ANCA were all negative and no monoclonal band identified seen on serum ISIDRA. Monitor labs and urine output. Avoid NSAIDs, ACEI/ARBS, RCA and nephrotoxins. Dose medications as per eGFR.    If any questions, please feel free to contact me     Ritchie Estes  Nephrology Fellow  [facility] Pager: [phone]

## 2022-04-14 NOTE — PROGRESS NOTE ADULT - SUBJECTIVE AND OBJECTIVE BOX
CHIEF COMPLAINT: Patient is a 76y old  Male who presents with a chief complaint of Hypoxia (13 Apr 2022 13:02)      INTERVAL EVENTS:    REVIEW OF SYSTEMS: Seen by bedside during AM rounds and     Mode: AC/ CMV (Assist Control/ Continuous Mandatory Ventilation), RR (machine): 12, TV (machine): 450, FiO2: 40, PEEP: 7, MAP: 9, PIP: 14      OBJECTIVE:  ICU Vital Signs Last 24 Hrs  T(C): 36.8 (14 Apr 2022 05:13), Max: 36.8 (13 Apr 2022 14:45)  T(F): 98.2 (14 Apr 2022 05:13), Max: 98.3 (13 Apr 2022 14:45)  HR: 80 (14 Apr 2022 06:48) (75 - 95)  BP: 122/75 (14 Apr 2022 05:13) (122/75 - 149/87)  BP(mean): --  ABP: --  ABP(mean): --  RR: 12 (14 Apr 2022 05:13) (12 - 19)  SpO2: 100% (14 Apr 2022 06:48) (97% - 100%)    Mode: AC/ CMV (Assist Control/ Continuous Mandatory Ventilation), RR (machine): 12, TV (machine): 450, FiO2: 40, PEEP: 7, MAP: 9, PIP: 14    04-13 @ 07:01  -  04-14 @ 07:00  --------------------------------------------------------  IN: 845 mL / OUT: 1600 mL / NET: -755 mL      CAPILLARY BLOOD GLUCOSE      POCT Blood Glucose.: 139 mg/dL (14 Apr 2022 05:26)      HOSPITAL MEDICATIONS:  MEDICATIONS  (STANDING):  amantadine Syrup 100 milliGRAM(s) Oral two times a day  aMIOdarone    Tablet 200 milliGRAM(s) Oral daily  carvedilol 6.25 milliGRAM(s) Oral every 12 hours  chlorhexidine 0.12% Liquid 15 milliLiter(s) Oral Mucosa every 12 hours  chlorhexidine 2% Cloths 1 Application(s) Topical daily  collagenase Ointment 1 Application(s) Topical daily  doxazosin 2 milliGRAM(s) Oral at bedtime  heparin   Injectable 5000 Unit(s) SubCutaneous every 8 hours  lactulose Syrup 10 Gram(s) Oral two times a day  levETIRAcetam  Solution 1000 milliGRAM(s) Oral two times a day  meropenem  IVPB 500 milliGRAM(s) IV Intermittent every 12 hours  senna Syrup 5 milliLiter(s) Oral at bedtime    MEDICATIONS  (PRN):  acetaminophen    Suspension .. 650 milliGRAM(s) Oral every 4 hours PRN Temp greater or equal to 38C (100.4F), Mild Pain (1 - 3)      PHYSICAL EXAMINATION    LABS:                        7.7    6.57  )-----------( 199      ( 14 Apr 2022 05:34 )             24.7     04-14    142  |  105  |  73<H>  ----------------------------<  130<H>  4.4   |  25  |  2.00<H>    Ca    7.8<L>      14 Apr 2022 05:34  Phos  4.0     04-14  Mg     2.80     04-14                PAST MEDICAL & SURGICAL HISTORY:  Essential hypertension    Primary osteoarthritis, unspecified site    Closed fracture of left radius, initial encounter    Morbid obesity, unspecified obesity type  h/o. - s/p gastric bypass- lost 113 lbs    KAREN (obstructive sleep apnea)  h/o - was on CPAP until gastric bypass surgery    Respiratory failure    Anemia    Subdural hemorrhage    Epilepsy    H/O gastrostomy    History of BPH    Afib    S/P gastric bypass  2008    Status post total replacement of left hip  2006    S/P bunionectomy  bilateral    S/P colonoscopy  approx 2012    History of abdominoplasty  and subsequent cosmetic surgery for removal of excess skin from face        FAMILY HISTORY:  Family history of renal cell carcinoma (Mother)    Family history of malignant melanoma (Sibling)        Social History:      RADIOLOGY:  [ ] Reviewed and interpreted by me    PULMONARY FUNCTION TESTS:    EKG: CHIEF COMPLAINT: Patient is a 76y old  Male who presents with a chief complaint of Hypoxia (13 Apr 2022 13:02)    INTERVAL EVENTS:  - No interval events overight.     REVIEW OF SYSTEMS: Seen by bedside during AM rounds and unable to assess completes ROS. Nods no to pain.     Mode: AC/ CMV (Assist Control/ Continuous Mandatory Ventilation), RR (machine): 12, TV (machine): 450, FiO2: 40, PEEP: 7, MAP: 9, PIP: 14    OBJECTIVE:  ICU Vital Signs Last 24 Hrs  T(C): 36.8 (14 Apr 2022 05:13), Max: 36.8 (13 Apr 2022 14:45)  T(F): 98.2 (14 Apr 2022 05:13), Max: 98.3 (13 Apr 2022 14:45)  HR: 80 (14 Apr 2022 06:48) (75 - 95)  BP: 122/75 (14 Apr 2022 05:13) (122/75 - 149/87)  BP(mean): --  ABP: --  ABP(mean): --  RR: 12 (14 Apr 2022 05:13) (12 - 19)  SpO2: 100% (14 Apr 2022 06:48) (97% - 100%)    Mode: AC/ CMV (Assist Control/ Continuous Mandatory Ventilation), RR (machine): 12, TV (machine): 450, FiO2: 40, PEEP: 7, MAP: 9, PIP: 14    04-13 @ 07:01  -  04-14 @ 07:00  --------------------------------------------------------  IN: 845 mL / OUT: 1600 mL / NET: -755 mL    CAPILLARY BLOOD GLUCOSE  POCT Blood Glucose.: 139 mg/dL (14 Apr 2022 05:26)    HOSPITAL MEDICATIONS:  MEDICATIONS  (STANDING):  amantadine Syrup 100 milliGRAM(s) Oral two times a day  aMIOdarone    Tablet 200 milliGRAM(s) Oral daily  carvedilol 6.25 milliGRAM(s) Oral every 12 hours  chlorhexidine 0.12% Liquid 15 milliLiter(s) Oral Mucosa every 12 hours  chlorhexidine 2% Cloths 1 Application(s) Topical daily  collagenase Ointment 1 Application(s) Topical daily  doxazosin 2 milliGRAM(s) Oral at bedtime  heparin   Injectable 5000 Unit(s) SubCutaneous every 8 hours  lactulose Syrup 10 Gram(s) Oral two times a day  levETIRAcetam  Solution 1000 milliGRAM(s) Oral two times a day  meropenem  IVPB 500 milliGRAM(s) IV Intermittent every 12 hours  senna Syrup 5 milliLiter(s) Oral at bedtime    MEDICATIONS  (PRN):  acetaminophen    Suspension .. 650 milliGRAM(s) Oral every 4 hours PRN Temp greater or equal to 38C (100.4F), Mild Pain (1 - 3)    PHYSICAL EXAMINATION  General: NAD   Neck: Trach (+)   Cards: S1/S2, no murmurs   Pulm: Course breath sounds bilaterally. No wheezes.   Abdomen: Soft, NTND. BS (+) PEG (+)   Extremities: No pedal edema. KIMBERLEY of RUE ONLY.  Neurology: Awake and alert and able to nod/ mouth one word answer and KIMBERLEY of RUE ONLY. No focal neurological deficits     LABS:                        7.7    6.57  )-----------( 199      ( 14 Apr 2022 05:34 )             24.7     04-14    142  |  105  |  73<H>  ----------------------------<  130<H>  4.4   |  25  |  2.00<H>    Ca    7.8<L>      14 Apr 2022 05:34  Phos  4.0     04-14  Mg     2.80     04-14    PAST MEDICAL & SURGICAL HISTORY:  Essential hypertension    Primary osteoarthritis, unspecified site    Closed fracture of left radius, initial encounter    Morbid obesity, unspecified obesity type  h/o. - s/p gastric bypass- lost 113 lbs    KAREN (obstructive sleep apnea)  h/o - was on CPAP until gastric bypass surgery    Respiratory failure    Anemia    Subdural hemorrhage    Epilepsy    H/O gastrostomy    History of BPH    Afib    S/P gastric bypass  2008    Status post total replacement of left hip  2006    S/P bunionectomy  bilateral    S/P colonoscopy  approx 2012    History of abdominoplasty  and subsequent cosmetic surgery for removal of excess skin from face    FAMILY HISTORY:  Family history of renal cell carcinoma (Mother)    Family history of malignant melanoma (Sibling)    SOCIAL HISTORY:    RADIOLOGY:  [ ] Reviewed and interpreted by me    PULMONARY FUNCTION TESTS:    EKG:

## 2022-04-14 NOTE — PROGRESS NOTE ADULT - SUBJECTIVE AND OBJECTIVE BOX
[facility] DIVISION OF KIDNEY DISEASES AND HYPERTENSION -- FOLLOW UP NOTE  --------------------------------------------------------------------------------  Chief Complaint: FLORIAN    HPI: Pt. with FLORIAN in the setting of sepsis, anemia and hypotension. Pt. with likely ATN. [facility]r was 0.8 on 3/16/2022. On admission, Scr was 2.3. Scr decreased at 2.0 on 4/14/22. Pt. with increased UOP of 1.6L over the past 24 hours.     Pt. was seen and examined this morning. Pt. has tracheostomy and unable to verbally communicate. Unable to examine ROS.     PAST HISTORY  --------------------------------------------------------------------------------  No significant changes to PMH, PSH, FHx, SHx, unless otherwise noted    ALLERGIES & MEDICATIONS  --------------------------------------------------------------------------------  Allergies    No Known Allergies    Intolerances    Standing Inpatient Medications  amantadine Syrup 100 milliGRAM(s) Oral two times a day  aMIOdarone    Tablet 200 milliGRAM(s) Oral daily  ascorbic acid 500 milliGRAM(s) Oral daily  carvedilol 6.25 milliGRAM(s) Oral every 12 hours  chlorhexidine 0.12% Liquid 15 milliLiter(s) Oral Mucosa every 12 hours  chlorhexidine 2% Cloths 1 Application(s) Topical daily  collagenase Ointment 1 Application(s) Topical daily  doxazosin 2 milliGRAM(s) Oral at bedtime  folic acid 1 milliGRAM(s) Oral daily  furosemide   Injectable 40 milliGRAM(s) IV Push once  heparin   Injectable 5000 Unit(s) SubCutaneous every 8 hours  lactulose Syrup 10 Gram(s) Oral two times a day  levETIRAcetam  Solution 1000 milliGRAM(s) Oral two times a day  levothyroxine 50 MICROGram(s) Oral daily  meropenem  IVPB 500 milliGRAM(s) IV Intermittent every 12 hours  modafinil 100 milliGRAM(s) Oral daily  senna Syrup 5 milliLiter(s) Oral at bedtime    PRN Inpatient Medications  acetaminophen    Suspension .. 650 milliGRAM(s) Oral every 4 hours PRN    REVIEW OF SYSTEMS  Unable to obtain.    VITALS/PHYSICAL EXAM  --------------------------------------------------------------------------------  T(C): 36.8 (04-14-22 @ 05:13), Max: 36.8 (04-13-22 @ 14:45)  HR: 80 (04-14-22 @ 06:48) (75 - 95)  BP: 122/75 (04-14-22 @ 05:13) (122/75 - 149/87)  RR: 12 (04-14-22 @ 05:13) (12 - 19)  SpO2: 100% (04-14-22 @ 06:48) (97% - 100%)  Wt(kg): --    04-13-22 @ 07:01  -  04-14-22 @ 07:00  --------------------------------------------------------  IN: 845 mL / OUT: 1600 mL / NET: -755 mL    04-14-22 @ 07:01  -  04-14-22 @ 10:41  --------------------------------------------------------  IN: 195 mL / OUT: 0 mL / NET: 195 mL    Physical Exam:  	Gen: resting, chronically ill appearing  	HEENT: Trach+   	Pulm: CTA B/L anteriorly  	CV: S1S2+  	Abd: Soft, PEG+, Garcia+  	Ext: No LE edema B/L  	Neuro: not awake or alert   	Skin: Warm and dry    LABS/STUDIES  --------------------------------------------------------------------------------              7.7    6.57  >-----------<  199      [04-14-22 @ 05:34]              24.7     142  |  105  |  73  ----------------------------<  130      [04-14-22 @ 05:34]  4.4   |  25  |  2.00        Ca     7.8     [04-14-22 @ 05:34]      Mg     2.80     [04-14-22 @ 05:34]      Phos  4.0     [04-14-22 @ 05:34]    Creatinine Trend:  SCr 2.00 [04-14 @ 05:34]  SCr 2.40 [04-13 @ 07:16]  SCr 2.87 [04-11 @ 07:09]  SCr 2.93 [04-10 @ 06:42]  SCr 2.76 [04-09 @ 18:40]    Urinalysis - [04-10-22 @ 17:22]      Color Yellow / Appearance Slightly Turbid / SG 1.019 / pH 6.0      Gluc Negative / Ketone Negative  / Bili Negative / Urobili <2 mg/dL       Blood Moderate / Protein 100 mg/dL / Leuk Est Large / Nitrite Negative      RBC 86 / WBC 81 / Hyaline 3 / Gran  / Sq Epi  / Non Sq Epi 4 / Bacteria Negative    Urine Creatinine 49      [04-10-22 @ 17:22]  Urine Protein 154      [04-10-22 @ 17:22]  Urine Sodium <20      [04-09-22 @ 06:31]  Urine Urea Nitrogen 484.0      [04-09-22 @ 06:31]    Iron 41, TIBC 162, %sat 25      [03-10-22 @ 06:44]  Ferritin 267      [03-10-22 @ 10:16]  TSH 47.92      [04-09-22 @ 06:43]    HIV Nonreact      [04-11-22 @ 07:09]    ISABELLE: titer Negative, pattern --      [04-11-22 @ 09:18]  C3 Complement 107      [04-11-22 @ 07:09]  C4 Complement 47      [04-11-22 @ 07:09]  ANCA: cANCA Negative, pANCA Negative, atypical ANCA Negative      [04-11-22 @ 09:18]  Immunofixation Serum:   No Monoclonal Band Identified. SARAH Marks MD  Reference Range: None Detected      [04-11-22 @ 07:09]  SPEP Interpretation: reaction. SARAH Marks MD      [04-11-22 @ 07:09] [facility] DIVISION OF KIDNEY DISEASES AND HYPERTENSION -- FOLLOW UP NOTE  --------------------------------------------------------------------------------  Chief Complaint: FLORIAN    HPI: Pt. with FLORIAN in the setting of sepsis, anemia and hypotension. Pt. with likely ATN. [facility] was 0.8 on [facility]/16/2022. On admission, Scr was 2.3. Scr increased to 2.93 on 4/10, decreased to 2.0 today (4/14/22). Pt. with increased UOP of 1.6L over the past 24 hours.     Pt. was seen and examined this morning. Pt. with tracheostomy, unable to provide ROS.     PAST HISTORY  --------------------------------------------------------------------------------  No significant changes to PMH, PSH, FHx, SHx, unless otherwise noted    ALLERGIES & MEDICATIONS  --------------------------------------------------------------------------------  Allergies    No Known Allergies    Intolerances    Standing Inpatient Medications  amantadine Syrup 100 milliGRAM(s) Oral two times a day  aMIOdarone    Tablet 200 milliGRAM(s) Oral daily  ascorbic acid 500 milliGRAM(s) Oral daily  carvedilol 6.25 milliGRAM(s) Oral every 12 hours  chlorhexidine 0.12% Liquid 15 milliLiter(s) Oral Mucosa every 12 hours  chlorhexidine 2% Cloths 1 Application(s) Topical daily  collagenase Ointment 1 Application(s) Topical daily  doxazosin 2 milliGRAM(s) Oral at bedtime  folic acid 1 milliGRAM(s) Oral daily  furosemide   Injectable 40 milliGRAM(s) IV Push once  heparin   Injectable 5000 Unit(s) SubCutaneous every 8 hours  lactulose Syrup 10 Gram(s) Oral two times a day  levETIRAcetam  Solution 1000 milliGRAM(s) Oral two times a day  levothyroxine 50 MICROGram(s) Oral daily  meropenem  IVPB 500 milliGRAM(s) IV Intermittent every 12 hours  modafinil 100 milliGRAM(s) Oral daily  senna Syrup 5 milliLiter(s) Oral at bedtime    PRN Inpatient Medications  acetaminophen    Suspension .. 650 milliGRAM(s) Oral every 4 hours PRN    REVIEW OF SYSTEMS  Unable to obtain ROS.    VITALS/PHYSICAL EXAM  --------------------------------------------------------------------------------  T(C): 36.8 (04-14-22 @ 05:13), Max: 36.8 (04-13-22 @ 14:45)  HR: 80 (04-14-22 @ 06:48) (75 - 95)  BP: 122/75 (04-14-22 @ 05:13) (122/75 - 149/87)  RR: 12 (04-14-22 @ 05:13) (12 - 19)  SpO2: 100% (04-14-22 @ 06:48) (97% - 100%)  Wt(kg): --    04-13-22 @ 07:01  -  04-14-22 @ 07:00  --------------------------------------------------------  IN: 845 mL / OUT: 1600 mL / NET: -755 mL    04-14-22 @ 07:01  -  04-14-22 @ 10:41  --------------------------------------------------------  IN: 195 mL / OUT: 0 mL / NET: 195 mL    Physical Exam:  	Gen: resting, chronically ill appearing  	HEENT: Trach+   	Pulm: CTA B/L anteriorly  	CV: S1S2+  	Abd: Soft, PEG+, Garcia+  	Ext: No LE edema B/L  	Neuro: not awake or alert   	Skin: Warm and dry    LABS/STUDIES  --------------------------------------------------------------------------------              7.7    6.57  >-----------<  199      [04-14-22 @ 05:34]              24.7     142  |  105  |  73  ----------------------------<  130      [04-14-22 @ 05:34]  4.4   |  25  |  2.00        Ca     7.8     [04-14-22 @ 05:34]      Mg     2.80     [04-14-22 @ 05:34]      Phos  4.0     [04-14-22 @ 05:34]    Creatinine Trend:  SCr 2.00 [04-14 @ 05:34]  SCr 2.40 [04-13 @ 07:16]  SCr 2.87 [04-11 @ 07:09]  SCr 2.93 [04-10 @ 06:42]  SCr 2.76 [04-09 @ 18:40]    Urinalysis - [04-10-22 @ 17:22]      Color Yellow / Appearance Slightly Turbid / SG 1.019 / pH 6.0      Gluc Negative / Ketone Negative  / Bili Negative / Urobili <2 mg/dL       Blood Moderate / Protein 100 mg/dL / Leuk Est Large / Nitrite Negative      RBC 86 / WBC 81 / Hyaline 3 / Gran  / Sq Epi  / Non Sq Epi 4 / Bacteria Negative    Urine Creatinine 49      [04-10-22 @ 17:22]  Urine Protein 154      [04-10-22 @ 17:22]  Urine Sodium <20      [04-09-22 @ 06:31]  Urine Urea Nitrogen 484.0      [04-09-22 @ 06:31]    Iron 41, TIBC 162, %sat 25      [03-10-22 @ 06:44]  Ferritin 267      [03-10-22 @ 10:16]  TSH 47.92      [04-09-22 @ 06:43]    HIV Nonreact      [04-11-22 @ 07:09]    ISABELLE: titer Negative, pattern --      [04-11-22 @ 09:18]  C3 Complement 107      [04-11-22 @ 07:09]  C4 Complement 47      [04-11-22 @ 07:09]  ANCA: cANCA Negative, pANCA Negative, atypical ANCA Negative      [04-11-22 @ 09:18]  Immunofixation Serum:   No Monoclonal Band Identified. SARAH Marks MD  Reference Range: None Detected      [04-11-22 @ 07:09]  SPEP Interpretation: reaction. SARAH Marks MD      [04-11-22 @ 07:09]
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
## 2022-04-15 DIAGNOSIS — E87.0 HYPEROSMOLALITY AND HYPERNATREMIA: ICD-10-CM

## 2022-04-15 LAB
ANION GAP SERPL CALC-SCNC: 13 MMOL/L — SIGNIFICANT CHANGE UP (ref 7–14)
BLD GP AB SCN SERPL QL: NEGATIVE — SIGNIFICANT CHANGE UP
BUN SERPL-MCNC: 77 MG/DL — HIGH (ref 7–23)
CALCIUM SERPL-MCNC: 8 MG/DL — LOW (ref 8.4–10.5)
CHLORIDE SERPL-SCNC: 108 MMOL/L — HIGH (ref 98–107)
CO2 SERPL-SCNC: 25 MMOL/L — SIGNIFICANT CHANGE UP (ref 22–31)
CREAT SERPL-MCNC: 1.85 MG/DL — HIGH (ref 0.5–1.3)
EGFR: 38 ML/MIN/1.73M2 — LOW
GLUCOSE BLDC GLUCOMTR-MCNC: 133 MG/DL — HIGH (ref 70–99)
GLUCOSE SERPL-MCNC: 136 MG/DL — HIGH (ref 70–99)
HCT VFR BLD CALC: 25 % — LOW (ref 39–50)
HGB BLD-MCNC: 7.4 G/DL — LOW (ref 13–17)
LEVETIRACETAM SERPL-MCNC: 83 UG/ML — HIGH (ref 10–40)
MAGNESIUM SERPL-MCNC: 2.8 MG/DL — HIGH (ref 1.6–2.6)
MCHC RBC-ENTMCNC: 29.6 GM/DL — LOW (ref 32–36)
MCHC RBC-ENTMCNC: 31.2 PG — SIGNIFICANT CHANGE UP (ref 27–34)
MCV RBC AUTO: 105.5 FL — HIGH (ref 80–100)
NRBC # BLD: 0 /100 WBCS — SIGNIFICANT CHANGE UP
NRBC # FLD: 0 K/UL — SIGNIFICANT CHANGE UP
PHOSPHATE SERPL-MCNC: 4 MG/DL — SIGNIFICANT CHANGE UP (ref 2.5–4.5)
PLATELET # BLD AUTO: 213 K/UL — SIGNIFICANT CHANGE UP (ref 150–400)
POTASSIUM SERPL-MCNC: 4.5 MMOL/L — SIGNIFICANT CHANGE UP (ref 3.5–5.3)
POTASSIUM SERPL-SCNC: 4.5 MMOL/L — SIGNIFICANT CHANGE UP (ref 3.5–5.3)
RBC # BLD: 2.37 M/UL — LOW (ref 4.2–5.8)
RBC # FLD: 18 % — HIGH (ref 10.3–14.5)
RH IG SCN BLD-IMP: POSITIVE — SIGNIFICANT CHANGE UP
SARS-COV-2 RNA SPEC QL NAA+PROBE: SIGNIFICANT CHANGE UP
SODIUM SERPL-SCNC: 146 MMOL/L — HIGH (ref 135–145)
WBC # BLD: 7.88 K/UL — SIGNIFICANT CHANGE UP (ref 3.8–10.5)
WBC # FLD AUTO: 7.88 K/UL — SIGNIFICANT CHANGE UP (ref 3.8–10.5)

## 2022-04-15 PROCEDURE — 99232 SBSQ HOSP IP/OBS MODERATE 35: CPT | Mod: GC

## 2022-04-15 PROCEDURE — 99233 SBSQ HOSP IP/OBS HIGH 50: CPT

## 2022-04-15 PROCEDURE — 99232 SBSQ HOSP IP/OBS MODERATE 35: CPT

## 2022-04-15 RX ADMIN — HEPARIN SODIUM 5000 UNIT(S): 5000 INJECTION INTRAVENOUS; SUBCUTANEOUS at 05:32

## 2022-04-15 RX ADMIN — HEPARIN SODIUM 5000 UNIT(S): 5000 INJECTION INTRAVENOUS; SUBCUTANEOUS at 13:37

## 2022-04-15 RX ADMIN — CHLORHEXIDINE GLUCONATE 15 MILLILITER(S): 213 SOLUTION TOPICAL at 17:10

## 2022-04-15 RX ADMIN — LACTULOSE 10 GRAM(S): 10 SOLUTION ORAL at 05:17

## 2022-04-15 RX ADMIN — SENNA PLUS 5 MILLILITER(S): 8.6 TABLET ORAL at 21:39

## 2022-04-15 RX ADMIN — LEVETIRACETAM 1000 MILLIGRAM(S): 250 TABLET, FILM COATED ORAL at 05:18

## 2022-04-15 RX ADMIN — Medication 500 MILLIGRAM(S): at 11:18

## 2022-04-15 RX ADMIN — MODAFINIL 100 MILLIGRAM(S): 200 TABLET ORAL at 11:28

## 2022-04-15 RX ADMIN — CARVEDILOL PHOSPHATE 6.25 MILLIGRAM(S): 80 CAPSULE, EXTENDED RELEASE ORAL at 17:11

## 2022-04-15 RX ADMIN — CARVEDILOL PHOSPHATE 6.25 MILLIGRAM(S): 80 CAPSULE, EXTENDED RELEASE ORAL at 05:20

## 2022-04-15 RX ADMIN — Medication 100 MILLIGRAM(S): at 05:19

## 2022-04-15 RX ADMIN — Medication 50 MICROGRAM(S): at 05:32

## 2022-04-15 RX ADMIN — LEVETIRACETAM 1000 MILLIGRAM(S): 250 TABLET, FILM COATED ORAL at 17:10

## 2022-04-15 RX ADMIN — LACTULOSE 10 GRAM(S): 10 SOLUTION ORAL at 17:09

## 2022-04-15 RX ADMIN — Medication 1 APPLICATION(S): at 11:18

## 2022-04-15 RX ADMIN — AMIODARONE HYDROCHLORIDE 200 MILLIGRAM(S): 400 TABLET ORAL at 05:19

## 2022-04-15 RX ADMIN — CHLORHEXIDINE GLUCONATE 1 APPLICATION(S): 213 SOLUTION TOPICAL at 11:20

## 2022-04-15 RX ADMIN — Medication 2 MILLIGRAM(S): at 21:39

## 2022-04-15 RX ADMIN — Medication 100 MILLIGRAM(S): at 17:10

## 2022-04-15 RX ADMIN — HEPARIN SODIUM 5000 UNIT(S): 5000 INJECTION INTRAVENOUS; SUBCUTANEOUS at 21:38

## 2022-04-15 RX ADMIN — Medication 1 MILLIGRAM(S): at 11:18

## 2022-04-15 NOTE — PROGRESS NOTE ADULT - SUBJECTIVE AND OBJECTIVE BOX
CHIEF COMPLAINT: Patient is a 76y old  Male who presents with a chief complaint of Hypoxia (14 Apr 2022 10:40)    INTERVAL EVENTS:  - No interval events overnight.   - s/p Lasix and CRE slightly improved this morning. Hold today's dose and monitor renal function.     REVIEW OF SYSTEMS: Seen by bedside during AM rounds and     Mode: AC/ CMV (Assist Control/ Continuous Mandatory Ventilation), RR (machine): 12, TV (machine): 450, FiO2: 40, PEEP: 5, MAP: 7, PIP: 15    OBJECTIVE:  ICU Vital Signs Last 24 Hrs  T(C): 36.4 (15 Apr 2022 05:13), Max: 36.4 (14 Apr 2022 12:00)  T(F): 97.5 (15 Apr 2022 05:13), Max: 97.6 (14 Apr 2022 12:00)  HR: 81 (15 Apr 2022 06:49) (76 - 90)  BP: 124/86 (15 Apr 2022 05:13) (124/86 - 141/96)  BP(mean): 109 (14 Apr 2022 12:00) (109 - 109)  ABP: --  ABP(mean): --  RR: 15 (15 Apr 2022 05:13) (15 - 16)  SpO2: 100% (15 Apr 2022 06:49) (96% - 100%)    Mode: AC/ CMV (Assist Control/ Continuous Mandatory Ventilation), RR (machine): 12, TV (machine): 450, FiO2: 40, PEEP: 5, MAP: 7, PIP: 15    04-14 @ 07:01  -  04-15 @ 07:00  --------------------------------------------------------  IN: 1050 mL / OUT: 1900 mL / NET: -850 mL    CAPILLARY BLOOD GLUCOSE  POCT Blood Glucose.: 133 mg/dL (15 Apr 2022 05:53)    HOSPITAL MEDICATIONS:  MEDICATIONS  (STANDING):  amantadine Syrup 100 milliGRAM(s) Oral two times a day  aMIOdarone    Tablet 200 milliGRAM(s) Oral daily  ascorbic acid 500 milliGRAM(s) Oral daily  carvedilol 6.25 milliGRAM(s) Oral every 12 hours  chlorhexidine 0.12% Liquid 15 milliLiter(s) Oral Mucosa every 12 hours  chlorhexidine 2% Cloths 1 Application(s) Topical daily  collagenase Ointment 1 Application(s) Topical daily  doxazosin 2 milliGRAM(s) Oral at bedtime  folic acid 1 milliGRAM(s) Oral daily  heparin   Injectable 5000 Unit(s) SubCutaneous every 8 hours  lactulose Syrup 10 Gram(s) Oral two times a day  levETIRAcetam  Solution 1000 milliGRAM(s) Oral two times a day  levothyroxine 50 MICROGram(s) Oral daily  modafinil 100 milliGRAM(s) Oral daily  senna Syrup 5 milliLiter(s) Oral at bedtime    MEDICATIONS  (PRN):  acetaminophen    Suspension .. 650 milliGRAM(s) Oral every 4 hours PRN Temp greater or equal to 38C (100.4F), Mild Pain (1 - 3)  ALBUTerol    90 MICROgram(s) HFA Inhaler 2 Puff(s) Inhalation every 6 hours PRN Shortness of Breath and/or Wheezing  ipratropium 17 MICROgram(s) HFA Inhaler 1 Puff(s) Inhalation every 6 hours PRN SOB and wheezing    PHYSICAL EXAMINATION    LABS:                        7.4    7.88  )-----------( 213      ( 15 Apr 2022 05:52 )             25.0     04-15    146<H>  |  108<H>  |  77<H>  ----------------------------<  136<H>  4.5   |  25  |  1.85<H>    Ca    8.0<L>      15 Apr 2022 05:52  Phos  4.0     04-15  Mg     2.80     04-15    PAST MEDICAL & SURGICAL HISTORY:  Essential hypertension    Primary osteoarthritis, unspecified site    Closed fracture of left radius, initial encounter    Morbid obesity, unspecified obesity type  h/o. - s/p gastric bypass- lost 113 lbs    KAREN (obstructive sleep apnea)  h/o - was on CPAP until gastric bypass surgery    Respiratory failure    Anemia    Subdural hemorrhage    Epilepsy    H/O gastrostomy    History of BPH    Afib    S/P gastric bypass  2008    Status post total replacement of left hip  2006    S/P bunionectomy  bilateral    S/P colonoscopy  approx 2012    History of abdominoplasty  and subsequent cosmetic surgery for removal of excess skin from face    FAMILY HISTORY:  Family history of renal cell carcinoma (Mother)    Family history of malignant melanoma (Sibling)    Social History:    RADIOLOGY:  [ ] Reviewed and interpreted by me    PULMONARY FUNCTION TESTS:    EKG: CHIEF COMPLAINT: Patient is a 76y old  Male who presents with a chief complaint of Hypoxia (14 Apr 2022 10:40)    INTERVAL EVENTS:  - No interval events overnight.   - s/p Lasix and CRE slightly improved this morning. Hold today's dose and monitor renal function.     REVIEW OF SYSTEMS: Seen by bedside during AM rounds and unable to assess ROS given poor mentation.     Mode: AC/ CMV (Assist Control/ Continuous Mandatory Ventilation), RR (machine): 12, TV (machine): 450, FiO2: 40, PEEP: 5, MAP: 7, PIP: 15    OBJECTIVE:  ICU Vital Signs Last 24 Hrs  T(C): 36.4 (15 Apr 2022 05:13), Max: 36.4 (14 Apr 2022 12:00)  T(F): 97.5 (15 Apr 2022 05:13), Max: 97.6 (14 Apr 2022 12:00)  HR: 81 (15 Apr 2022 06:49) (76 - 90)  BP: 124/86 (15 Apr 2022 05:13) (124/86 - 141/96)  BP(mean): 109 (14 Apr 2022 12:00) (109 - 109)  ABP: --  ABP(mean): --  RR: 15 (15 Apr 2022 05:13) (15 - 16)  SpO2: 100% (15 Apr 2022 06:49) (96% - 100%)    Mode: AC/ CMV (Assist Control/ Continuous Mandatory Ventilation), RR (machine): 12, TV (machine): 450, FiO2: 40, PEEP: 5, MAP: 7, PIP: 15    04-14 @ 07:01  -  04-15 @ 07:00  --------------------------------------------------------  IN: 1050 mL / OUT: 1900 mL / NET: -850 mL    CAPILLARY BLOOD GLUCOSE  POCT Blood Glucose.: 133 mg/dL (15 Apr 2022 05:53)    HOSPITAL MEDICATIONS:  MEDICATIONS  (STANDING):  amantadine Syrup 100 milliGRAM(s) Oral two times a day  aMIOdarone    Tablet 200 milliGRAM(s) Oral daily  ascorbic acid 500 milliGRAM(s) Oral daily  carvedilol 6.25 milliGRAM(s) Oral every 12 hours  chlorhexidine 0.12% Liquid 15 milliLiter(s) Oral Mucosa every 12 hours  chlorhexidine 2% Cloths 1 Application(s) Topical daily  collagenase Ointment 1 Application(s) Topical daily  doxazosin 2 milliGRAM(s) Oral at bedtime  folic acid 1 milliGRAM(s) Oral daily  heparin   Injectable 5000 Unit(s) SubCutaneous every 8 hours  lactulose Syrup 10 Gram(s) Oral two times a day  levETIRAcetam  Solution 1000 milliGRAM(s) Oral two times a day  levothyroxine 50 MICROGram(s) Oral daily  modafinil 100 milliGRAM(s) Oral daily  senna Syrup 5 milliLiter(s) Oral at bedtime    MEDICATIONS  (PRN):  acetaminophen    Suspension .. 650 milliGRAM(s) Oral every 4 hours PRN Temp greater or equal to 38C (100.4F), Mild Pain (1 - 3)  ALBUTerol    90 MICROgram(s) HFA Inhaler 2 Puff(s) Inhalation every 6 hours PRN Shortness of Breath and/or Wheezing  ipratropium 17 MICROgram(s) HFA Inhaler 1 Puff(s) Inhalation every 6 hours PRN SOB and wheezing    PHYSICAL EXAMINATION  General: NAD   Neck: Trach (+)   Cards: S1/S2, no murmurs   Pulm: Course breath sounds bilaterally. No wheezes.   Abdomen: Soft, NTND. BS (+) PEG (+)   Extremities: No pedal edema. KIMBERLEY of RUE ONLY.  Neurology: Awake and alert and able to nod/ mouth one word answer and KIMBERLEY of RUE ONLY. No focal neurological deficits     LABS:                        7.4    7.88  )-----------( 213      ( 15 Apr 2022 05:52 )             25.0     04-15    146<H>  |  108<H>  |  77<H>  ----------------------------<  136<H>  4.5   |  25  |  1.85<H>    Ca    8.0<L>      15 Apr 2022 05:52  Phos  4.0     04-15  Mg     2.80     04-15    PAST MEDICAL & SURGICAL HISTORY:  Essential hypertension    Primary osteoarthritis, unspecified site    Closed fracture of left radius, initial encounter    Morbid obesity, unspecified obesity type  h/o. - s/p gastric bypass- lost 113 lbs    KAREN (obstructive sleep apnea)  h/o - was on CPAP until gastric bypass surgery    Respiratory failure    Anemia    Subdural hemorrhage    Epilepsy    H/O gastrostomy    History of BPH    Afib    S/P gastric bypass  2008    Status post total replacement of left hip  2006    S/P bunionectomy  bilateral    S/P colonoscopy  approx 2012    History of abdominoplasty  and subsequent cosmetic surgery for removal of excess skin from face    FAMILY HISTORY:  Family history of renal cell carcinoma (Mother)    Family history of malignant melanoma (Sibling)    Social History:    RADIOLOGY:  [ ] Reviewed and interpreted by me    PULMONARY FUNCTION TESTS:    EKG:

## 2022-04-15 NOTE — PROGRESS NOTE ADULT - SUBJECTIVE AND OBJECTIVE BOX
Follow Up:  PNA    Interval History/ROS:  no fever.  awake, non-verbal. ROS non-verbal    PAST MEDICAL & SURGICAL HISTORY:  Essential hypertension  Primary osteoarthritis, unspecified site  Closed fracture of left radius, initial encounter  Morbid obesity, unspecified obesity type h/o. - s/p gastric bypass- lost 113 lbs  KAREN (obstructive sleep apnea) h/o - was on CPAP until gastric bypass surgery  Respiratory failure   Anemia  Subdural hemorrhage  Epilepsy  H/O gastrostomy  History of BPH  Afib  S/P gastric bypass   Status post total replacement of left hip   S/P bunionectomy bilateral  S/P colonoscopy approx 2012  History of abdominoplasty and subsequent cosmetic surgery for removal of excess skin from face    Allergies  No Known Allergies    ANTIMICROBIALS:  vancomycin  IVPB (4/8 x1)  meropenem  IVPB 1000 every 12 hours (-)    MEDICATIONS  (STANDING):  amantadine Syrup 100 two times a day  aMIOdarone    Tablet 200 daily  carvedilol 6.25 every 12 hours  doxazosin 2 at bedtime  heparin   Injectable 5000 every 8 hours  lactulose Syrup 10 two times a day  levETIRAcetam  Solution 1000 two times a day  levothyroxine 50 daily  modafinil 100 daily  senna Syrup 5 at bedtime    Vital Signs Last 24 Hrs  T(F): 97.5 (04-15-22 @ 10:00), Max: 97.6 (22 @ 12:00)  HR: 88 (04-15-22 @ 10:26)  BP: 131/96 (04-15-22 @ 10:00)  RR: 16 (04-15-22 @ 10:00)  SpO2: 100% (04-15-22 @ 10:26) (96% - 100%)    PHYSICAL EXAMINATION:  Constitutional: non-toxic, lying in bed  HEAD/EYES: anicteric  ENT:  trache site okay, on vent  Cardiovascular:   normal S1, S2  Respiratory:  coarse b/l breath sounds  GI:  soft, non-tender, normal bowel sounds  :  benson  Musculoskeletal:  no synovitis  Neurologic: awake  Skin:  no rash  Psychiatric:  awake, appropriate mood                         7.4    7.88  )-----------( 213      ( 15 Apr 2022 05:52 )             25.0 -15    146  |  108  |  77  ----------------------------<  136  4.5   |  25  |  1.85  Ca    8.0      15 Apr 2022 05:52Phos  4.0     04-15Mg     2.80     04-15                        Creatinine, Serum: 1.85 (-15)  Creatinine, Serum: 2.00 (-14)  Creatinine, Serum: 2.40 (-13)  Creatinine, Serum: 2.87 (-11)  Creatinine, Serum: 2.93 (-10)  Creatinine, Serum: 2.76 (-)  Creatinine, Serum: 2.54 (-)  Creatinine, Serum: 2.39 (-08)    Urinalysis Basic - ( 2022 23:15 )  Color: Yellow / Appearance: Slightly Turbid / S.023 / pH: x  Gluc: x / Ketone: Trace  / Bili: Negative / Urobili: <2 mg/dL   Blood: x / Protein: 300 mg/dL / Nitrite: Negative   Leuk Esterase: Moderate / RBC: 75 /HPF / WBC 15 /HPF   Sq Epi: x / Non Sq Epi: x / Bacteria: Few    MICROBIOLOGY:  Culture - Sputum (collected 04-10-22 @ 14:32)  Source: .Sputum Sputum  Gram Stain (04-10-22 @ 23:12):    Numerous polymorphonuclear leukocytes per low power field    Rare Squamous epithelial cells per low power field    Moderate Gram Negative Rods per oil power field    Moderate Gram positive cocci in pairs per oil power field    Few Gram Positive Rods per oil power field  Final Report (22 @ 16:10):    Numerous Acinetobacter baumannii/nosocom group (Carbapenem Resistant)    Cefiderocol = Intermediate Interpretations based on FDA breakpoints    Few Pseudomonas aeruginosa    Normal Respiratory Cheryl absent  Organism: Acinetobacter baumannii/nosocom group (Carbapenem Resistant)  Pseudomonas aeruginosa (22 @ 16:10)  Organism: Acinetobacter baumannii/nosocom group (Carbapenem Resistant) (22 @ 16:03)      -  Polymyxin B: S 1      Method Type: ETEST  Organism: Pseudomonas aeruginosa (22 @ 16:00)      -  Amikacin: S <=16      -  Aztreonam: S <=4      -  Cefepime: S <=2      -  Ceftazidime: S <=1      -  Ciprofloxacin: S <=0.25      -  Gentamicin: S <=2      -  Imipenem: S <=1      -  Levofloxacin: S <=0.5      -  Meropenem: S <=1      -  Piperacillin/Tazobactam: S <=8      -  Tobramycin: S <=2      Method Type: IZZY  Organism: Acinetobacter baumannii/nosocom group (Carbapenem Resistant) (22 @ 17:43)      -  Imipenem: R      -  Piperacillin/Tazobactam: R      Method Type: KB  Organism: Acinetobacter baumannii/nosocom group (Carbapenem Resistant) (22 @ 17:43)      -  Amikacin: S <=16      -  Ampicillin/Sulbactam: I 16/8      -  Cefepime: R >16      -  Ceftazidime: R >16      -  Ciprofloxacin: R >2      -  Gentamicin: R >8      -  Levofloxacin: R >4      -  Meropenem: R >8      -  Minocycline: S <=4      -  Tobramycin: S <=2      -  Trimethoprim/Sulfamethoxazole: R >2/38      Method Type: IZZY    Culture - Urine (collected 22 @ 23:03)  Source: Catheterized Catheterized  Final Report (04-10-22 @ 05:16):    <10,000 CFU/mL Normal Urogenital Cheryl    Culture - Blood (collected 22 @ 18:00)  Source: .Blood Blood-Peripheral  Final Report (22 @ 22:00):    No Growth Final    Culture - Blood (collected 22 @ 18:00)  Source: .Blood Blood-Peripheral  Final Report (22 @ 22:00):    No Growth Final    Culture - Sputum (collected 08 Mar 2022 18:33)  Source: .Sputum Sputum  Final Report:    Numerous Acinetobacter baumannii/nosocom group (Carbapenem Resistant)    Numerous Pseudomonas aeruginosa    Normal Respiratory Cheryl absent  Organism: Acinetobacter baumannii/nosocom group (Carbapenem Resistant)  Pseudomonas aeruginosa  Organism: Pseudomonas aeruginosa    Sensitivities:      -  Amikacin: S <=16      -  Aztreonam: R >16      -  Cefepime: I 16      -  Ceftazidime: R >16      -  Ciprofloxacin: S <=0.25      -  Gentamicin: S <=2      -  Imipenem: S <=1      -  Levofloxacin: S <=0.5      -  Meropenem: S <=1      -  Piperacillin/Tazobactam: R >64      -  Tobramycin: S <=2      Method Type: IZZY  Organism: Acinetobacter baumannii/nosocom group (Carbapenem Resistant)    Sensitivities:      -  Imipenem: R      -  Piperacillin/Tazobactam: R      Method Type: KB  Organism: Acinetobacter baumannii/nosocom group (Carbapenem Resistant)    Sensitivities:      -  Amikacin: S <=16      -  Ampicillin/Sulbactam: I 16/8      -  Cefepime: R >16      -  Ceftazidime: R >16      -  Ciprofloxacin: R >2      -  Gentamicin: R >8      -  Levofloxacin: R >4      -  Meropenem: R >8      -  Tobramycin: S <=2      -  Trimethoprim/Sulfamethoxazole: R >2/38      Method Type: IZZY    Culture - Blood (collected 07 Mar 2022 10:18)  Source: .Blood Blood-Peripheral  Final Report:    No Growth Final    Culture - Blood (collected 07 Mar 2022 10:18)  Source: .Blood Blood-Peripheral  Final Report:    No Growth Final    Culture - Urine (collected 07 Mar 2022 08:41)  Source: Clean Catch Clean Catch (Midstream)  Final Report:    No growth    COVID-19 PCR: NotDetec (22 @ 18:44)  COVID-19 PCR: NotDetec (22 @ 07:15)  COVID-19 PCR: NotDetec (22 @ 02:23)    RADIOLOGY:  imaging below personally reviewed    Xray Chest 1 View- PORTABLE-Urgent (22 @ 17:36) >  Unchanged left retrocardiac opacity. Right basilar atelectasis.    CT Head No Cont (22 @ 23:22) >  Right frontoparietal temporal craniectomy with wire mesh cranioplasty.  Extensive right cerebral gliosis and areas of encephalomalacia with  volume loss either from prior infarct or trauma in the left frontal and  anterior right temporal lobes. Thin chronic subdural along the falx. Appearance of gyriform thickening in the right frontoparietal parenchyma  underneath the cranioplasty is indeterminate. Possibility of intracranial  infection is not excluded. Consider follow-up with contrast-enhanced   brain MRI for better evaluation.     CT Abdomen and Pelvis No Cont (22 @ 23:22) >  Dependent lung parenchymal opacities, left greater than right, may represent atelectasis or infection in the appropriate clinical setting.  Small left pleural effusion with adjacent left basilar compressive  atelectasis. Small pericardial effusion.  Post gastric bypass with gastrostomy tube localized to the excluded  stomach of pancreaticobiliary limb. Slight distention of the excluded  gastric fundus with layering dense material, likely from prior contrast administration. Correlate clinically. No bowel obstruction. Moderate stool of the colon. Correlate for constipation. Coronary artery calcification.

## 2022-04-15 NOTE — PROGRESS NOTE ADULT - PROBLEM SELECTOR PLAN 2
Pt. with hypernatremia with Sna of 146 likely in setting of post ATN diuresis and decreased free water intake. Recommend hypotonic IVF hydration. Monitor SNa.     If any questions, please feel free to contact me     Ritchie Estes  Nephrology Fellow  [facility] Pager: [phone] Pt. with mild hypernatremia in setting of increased UOP, likely from diuretic/recovery phase of ATN. Recommend IV hypotonic fluid (IV D5W at 75cc/hr). Monitor SNa daily.     If any questions, please feel free to contact me     Ritchie Estes  Nephrology Fellow  [facility] Pager: [phone]

## 2022-04-15 NOTE — PROGRESS NOTE ADULT - SUBJECTIVE AND OBJECTIVE BOX
[facility] DIVISION OF KIDNEY DISEASES AND HYPERTENSION -- FOLLOW UP NOTE  --------------------------------------------------------------------------------  Chief Complaint: FLORIAN    HPI: Pt. with FLORIAN in the setting of sepsis, anemia and hypotension. Pt. with likely ATN. [facility] was 0.8 on 3/16/2022. On admission, Scr was 2.3. Scr increased to 2.93 on 4/10, decreased to 1.85 today (4/15/22). Pt. with increased UOP of 2L over the past 24 hours.     Pt. was seen and examined this morning. Pt. with tracheostomy, unable to provide ROS.     PAST HISTORY  --------------------------------------------------------------------------------  No significant changes to PMH, PSH, FHx, SHx, unless otherwise noted    ALLERGIES & MEDICATIONS  --------------------------------------------------------------------------------  Allergies    No Known Allergies    Intolerances    Standing Inpatient Medications  amantadine Syrup 100 milliGRAM(s) Oral two times a day  aMIOdarone    Tablet 200 milliGRAM(s) Oral daily  ascorbic acid 500 milliGRAM(s) Oral daily  carvedilol 6.25 milliGRAM(s) Oral every 12 hours  chlorhexidine 0.12% Liquid 15 milliLiter(s) Oral Mucosa every 12 hours  chlorhexidine 2% Cloths 1 Application(s) Topical daily  collagenase Ointment 1 Application(s) Topical daily  doxazosin 2 milliGRAM(s) Oral at bedtime  folic acid 1 milliGRAM(s) Oral daily  heparin   Injectable 5000 Unit(s) SubCutaneous every 8 hours  lactulose Syrup 10 Gram(s) Oral two times a day  levETIRAcetam  Solution 1000 milliGRAM(s) Oral two times a day  levothyroxine 50 MICROGram(s) Oral daily  modafinil 100 milliGRAM(s) Oral daily  senna Syrup 5 milliLiter(s) Oral at bedtime    PRN Inpatient Medications  acetaminophen    Suspension .. 650 milliGRAM(s) Oral every 4 hours PRN  ALBUTerol    90 MICROgram(s) HFA Inhaler 2 Puff(s) Inhalation every 6 hours PRN  ipratropium 17 MICROgram(s) HFA Inhaler 1 Puff(s) Inhalation every 6 hours PRN    REVIEW OF SYSTEMS  Unable to obtain.    VITALS/PHYSICAL EXAM  --------------------------------------------------------------------------------  T(C): 36.4 (04-15-22 @ 05:13), Max: 36.4 (04-14-22 @ 12:00)  HR: 81 (04-15-22 @ 06:49) (76 - 90)  BP: 124/86 (04-15-22 @ 05:13) (124/86 - 141/96)  RR: 15 (04-15-22 @ 05:13) (15 - 16)  SpO2: 100% (04-15-22 @ 06:49) (96% - 100%)  Wt(kg): --    04-14-22 @ 07:01  -  04-15-22 @ 07:00  --------------------------------------------------------  IN: 1050 mL / OUT: 1900 mL / NET: -850 mL    Physical Exam:  	Gen: resting, chronically ill appearing  	HEENT: Trach+   	Pulm: CTA B/L anteriorly  	CV: S1S2+  	Abd: Soft, PEG+, Garcia+  	Ext: No LE edema B/L  	Neuro: not awake or alert   	Skin: Warm and dry    LABS/STUDIES  --------------------------------------------------------------------------------              7.4    7.88  >-----------<  213      [04-15-22 @ 05:52]              25.0     146  |  108  |  77  ----------------------------<  136      [04-15-22 @ 05:52]  4.5   |  25  |  1.85        Ca     8.0     [04-15-22 @ 05:52]      Mg     2.80     [04-15-22 @ 05:52]      Phos  4.0     [04-15-22 @ 05:52]    Creatinine Trend:  SCr 1.85 [04-15 @ 05:52]  SCr 2.00 [04-14 @ 05:34]  SCr 2.40 [04-13 @ 07:16]  SCr 2.87 [04-11 @ 07:09]  SCr 2.93 [04-10 @ 06:42]    Urinalysis - [04-10-22 @ 17:22]      Color Yellow / Appearance Slightly Turbid / SG 1.019 / pH 6.0      Gluc Negative / Ketone Negative  / Bili Negative / Urobili <2 mg/dL       Blood Moderate / Protein 100 mg/dL / Leuk Est Large / Nitrite Negative      RBC 86 / WBC 81 / Hyaline 3 / Gran  / Sq Epi  / Non Sq Epi 4 / Bacteria Negative    Urine Creatinine 49      [04-10-22 @ 17:22]  Urine Protein 154      [04-10-22 @ 17:22]  Urine Sodium <20      [04-09-22 @ 06:31]  Urine Urea Nitrogen 484.0      [04-09-22 @ 06:31]    Iron 41, TIBC 162, %sat 25      [03-10-22 @ 06:44]  Ferritin 267      [03-10-22 @ 10:16]  TSH 47.92      [04-09-22 @ 06:43]    HIV Nonreact      [04-11-22 @ 07:09]    ISABELLE: titer Negative, pattern --      [04-11-22 @ 09:18]  C3 Complement 107      [04-11-22 @ 07:09]  C4 Complement 47      [04-11-22 @ 07:09]  ANCA: cANCA Negative, pANCA Negative, atypical ANCA Negative      [04-11-22 @ 09:18]  PLA2R: ANCA 2.3, IFA --      [04-11-22 @ 10:45]  Immunofixation Serum:   No Monoclonal Band Identified. SARAH Marks MD  Reference Range: None Detected      [04-11-22 @ 07:09]  SPEP Interpretation: reaction. SARAH Marks MD      [04-11-22 @ 07:09]

## 2022-04-15 NOTE — CONSULT NOTE ADULT - SUBJECTIVE AND OBJECTIVE BOX
ENDOCRINE INITIAL CONSULT - Hypothyroidism   HPI:  75 y/o M with afib s/p watchman, gastric sleeve, SDH, 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     Patient sent in from nursing home for increasing oxygen requirements and worsening mental status. Per report, patient on 30% FiO2 normally, however has required 40% at NH and noted to be tachypneic. Patient seen and examined at bedside. Currently on vent at 30% FiO2, appearing comfortable. Patient remains non-verbal however is awake and alert.     On arrival to ED, patient noted to be febrile to 101.9 however was able to be titrated back down to baseline of 30% FiO2. Given 1g tylenol, 1L NS, and vancomycin/meropenem in ED. Patient to be admitted to RCU for further management. (09 Apr 2022 00:40)      PAST MEDICAL & SURGICAL HISTORY:  Essential hypertension    Primary osteoarthritis, unspecified site    Closed fracture of left radius, initial encounter    Morbid obesity, unspecified obesity type  h/o. - s/p gastric bypass- lost 113 lbs    KAREN (obstructive sleep apnea)  h/o - was on CPAP until gastric bypass surgery    Respiratory failure    Anemia    Subdural hemorrhage    Epilepsy    H/O gastrostomy    History of BPH    Afib    S/P gastric bypass  2008    Status post total replacement of left hip  2006    S/P bunionectomy  bilateral    S/P colonoscopy  approx 2012    History of abdominoplasty  and subsequent cosmetic surgery for removal of excess skin from face        FAMILY HISTORY:  Family history of renal cell carcinoma (Mother)    Family history of malignant melanoma (Sibling)    Social History: from NH    Home Medications:  acetaminophen 325 mg oral tablet: 2 tab(s) by gastrostomy tube every 6 hours, As Needed (14 Apr 2022 11:16)  amantadine 50 mg/5 mL oral syrup: 10 milliliter(s) by gastrostomy tube 2 times a day (14 Apr 2022 11:16)  amiodarone 200 mg oral tablet: 1 tab(s) by gastrostomy tube once a day (14 Apr 2022 11:16)  ascorbic acid 500 mg/5 mL oral liquid: 5 milliliter(s) by gastrostomy tube once a day (14 Apr 2022 11:16)  Ativan 2 mg/mL injectable solution: 2 milligram(s) injectable as needed for seizure activity (14 Apr 2022 11:16)  Cardura 1 mg oral tablet: 2 tab(s) by gastrostomy tube once a day (14 Apr 2022 11:16)  carvedilol 6.25 mg oral tablet: 1 tab(s) by gastrostomy tube every 12 hours (14 Apr 2022 11:16)  DuoNeb 0.5 mg-2.5 mg/3 mL inhalation solution: 3 milliliter(s) inhaled 4 times a day (14 Apr 2022 11:16)  famotidine 20 mg oral tablet: 1 tab(s) by gastrostomy tube 2 times a day (14 Apr 2022 11:16)  ferrous sulfate 220 mg/5 mL (44 mg/5 mL elemental iron) oral elixir: 7.5 milliliter(s) by gastrostomy tube once a day (14 Apr 2022 11:16)  folic acid 1 mg oral tablet: 1 tab(s) by gastrostomy tube once a day (14 Apr 2022 11:16)  furosemide 40 mg oral tablet: 1 tab(s) by gastrostomy tube once a day (14 Apr 2022 11:16)  lactulose 10 g/15 mL oral syrup: 10 milliliter(s) by gastrostomy tube 2 times a day (14 Apr 2022 11:16)  levETIRAcetam 100 mg/mL oral solution: 10 milliliter(s) by gastrostomy tube 2 times a day (14 Apr 2022 11:16)  levothyroxine 50 mcg (0.05 mg) oral capsule: 1 cap(s) by gastrostomy tube once a day (14 Apr 2022 11:16)  Multiple Vitamins oral tablet: 1 tab(s) by gastrostomy tube once a day (14 Apr 2022 11:16)  Santyl 250 units/g topical ointment: apply to sacral pressure ulcer with normal saline, apply santyl and wet kerlex with normal saline and then cover with dry dressing daily and prn (14 Apr 2022 11:16)  scopolamine: 1 milligram(s) transdermal every 3 days, excessive secretion (14 Apr 2022 11:16)  senna 8.8 mg/5 mL oral syrup: 10 milliliter(s) by gastrostomy tube once a day (at bedtime) (14 Apr 2022 11:16)      MEDICATIONS  (STANDING):  amantadine Syrup 100 milliGRAM(s) Oral two times a day  aMIOdarone    Tablet 200 milliGRAM(s) Oral daily  ascorbic acid 500 milliGRAM(s) Oral daily  carvedilol 6.25 milliGRAM(s) Oral every 12 hours  chlorhexidine 0.12% Liquid 15 milliLiter(s) Oral Mucosa every 12 hours  chlorhexidine 2% Cloths 1 Application(s) Topical daily  collagenase Ointment 1 Application(s) Topical daily  doxazosin 2 milliGRAM(s) Oral at bedtime  folic acid 1 milliGRAM(s) Oral daily  heparin   Injectable 5000 Unit(s) SubCutaneous every 8 hours  lactulose Syrup 10 Gram(s) Oral two times a day  levETIRAcetam  Solution 1000 milliGRAM(s) Oral two times a day  levothyroxine 50 MICROGram(s) Oral daily  modafinil 100 milliGRAM(s) Oral daily  senna Syrup 5 milliLiter(s) Oral at bedtime    MEDICATIONS  (PRN):  acetaminophen    Suspension .. 650 milliGRAM(s) Oral every 4 hours PRN Temp greater or equal to 38C (100.4F), Mild Pain (1 - 3)  ALBUTerol    90 MICROgram(s) HFA Inhaler 2 Puff(s) Inhalation every 6 hours PRN Shortness of Breath and/or Wheezing  ipratropium 17 MICROgram(s) HFA Inhaler 1 Puff(s) Inhalation every 6 hours PRN SOB and wheezing      Allergies    No Known Allergies    Intolerances      Review of Systems:  UNABLE TO OBTAIN    PHYSICAL EXAM:  VITALS: T(C): 36.4 (04-15-22 @ 10:00)  T(F): 97.5 (04-15-22 @ 10:00), Max: 97.6 (04-14-22 @ 12:00)  HR: 87 (04-15-22 @ 11:09) (79 - 90)  BP: 131/96 (04-15-22 @ 10:00) (124/86 - 141/96)  RR:  (15 - 16)  SpO2:  (96% - 100%)  Wt(kg): --  GENERAL: NAD, well-groomed, well-developed  EYES: No proptosis, no lid lag, anicteric  HEENT:  Atraumatic, Normocephalic, moist mucous membranes  THYROID: Normal size, no palpable nodules  RESPIRATORY: Clear to auscultation bilaterally; No rales, rhonchi, wheezing  CARDIOVASCULAR: Regular rate and rhythm; No murmurs; no peripheral edema  GI: Soft, nontender, non distended, normal bowel sounds  SKIN: Dry, intact, No rashes or lesions  MUSCULOSKELETAL: Full range of motion, normal strength  NEURO: sensation intact, extraocular movements intact, no tremor  PSYCH: Alert and oriented x 3, normal affect, normal mood  CUSHING'S SIGNS: no striae    POCT Blood Glucose.: 133 mg/dL (04-15-22 @ 05:53)  POCT Blood Glucose.: 117 mg/dL (04-14-22 @ 23:23)  POCT Blood Glucose.: 138 mg/dL (04-14-22 @ 11:24)  POCT Blood Glucose.: 139 mg/dL (04-14-22 @ 05:26)  POCT Blood Glucose.: 93 mg/dL (04-14-22 @ 00:30)  POCT Blood Glucose.: 84 mg/dL (04-14-22 @ 00:18)  POCT Blood Glucose.: 113 mg/dL (04-13-22 @ 17:17)  POCT Blood Glucose.: 121 mg/dL (04-13-22 @ 11:24)  POCT Blood Glucose.: 104 mg/dL (04-13-22 @ 05:37)  POCT Blood Glucose.: 105 mg/dL (04-12-22 @ 23:06)  POCT Blood Glucose.: 134 mg/dL (04-12-22 @ 11:23)                            7.4    7.88  )-----------( 213      ( 15 Apr 2022 05:52 )             25.0       04-15    146<H>  |  108<H>  |  77<H>  ----------------------------<  136<H>  4.5   |  25  |  1.85<H>    EGFR if : x   EGFR if non : x     Ca    8.0<L>      04-15  Mg     2.80     04-15  Phos  4.0     04-15        Thyroid Function Tests:  04-14 @ 05:35 TSH -- FreeT4 0.4 T3 45 Anti TPO -- Anti Thyroglobulin Ab -- TSI --  04-09 @ 06:45 TSH -- FreeT4 0.5 T3 -- Anti TPO -- Anti Thyroglobulin Ab -- TSI --      TSH 47.92  Free T4 0.4  TT3 45        Radiology:

## 2022-04-15 NOTE — PROGRESS NOTE ADULT - ASSESSMENT
76M with Afib s/p watchman, gastric sleeve, SDH with L subfalcine herniation after fall (11/2021) s/p emergent R hemicraniectomy, trach/vent/PEG dependant, seizure d/o, and recent hospitalization at [facility] for pneumonia s/p zosyn.  Discharged to NH and admitted to [facility] with increasing oxygen requirements and worsening mental status.  Fever, no leukocytosis.  Cultures sent.      Hypoxic Respiratory Failure  - completed 7 days antibiotics  - fever resolved  - monitoring off antibiotics    Fever  - resolved  - completed 7 days meropenem    FLORIAN  - improved    Please call Infectious Diseases if there is a change in status.  Thank you.  [phone].

## 2022-04-15 NOTE — PROGRESS NOTE ADULT - ASSESSMENT
77 YO M, A&Ox0 at baseline with PMHx of SDH c/b L Subfalcine Herniation s/p Emergent R Hemicraniectomy in [place] while traveling fell on marble floor and now chronic with tracheostomy and vent dependant, Dysphagia with PEG, AFIB s/p watchman, Gastric Sleeve, Urinary Retention with Chornic Garcia, and recent ICU stay for HCAP with MDR Pseudomonas now presenting with ACHRF i/s/o mucous plug and possible recurrent HCAP.       77 YO M, A&Ox0 at baseline with PMHx of SDH c/b L Subfalcine Herniation s/p Emergent R Hemicraniectomy in [place] while traveling fell on marble floor and now chronic with tracheostomy and vent dependant, Dysphagia with PEG, AFIB s/p watchman, Gastric Sleeve, Urinary Retention with Chornic Garcia, and recent ICU stay for HCAP with MDR Pseudomonas now presenting with ACHRF i/s/o mucous plug and possible recurrent HCAP.    # Neurology   - Currently A&Ox0, awake and alert, able to move RUE and nods/ mouths one or two words per RCU evaluation and prior documentation.   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   - Continue on Modafinil, Amantadine and Keppra.     # Cardiovascular   - Hx of HFpEF 55, mild MR and AR, severe LAD, normal LVRVSF (ECHO 3/7)  - Hx of Atrial Fibrillation s/p Watchman and currently rate controlled.   - Continue on Amiodarone 200mg QD and Coreg 6.25mg BID   - Was initially on Lasix, however held given FLORIAN. proBNP 12K and s/p Lasix 40mg dose (4/15). Continue QOD per dose and monitor I&Os.     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   - Continued on AC vent and able to tolerate PS and TC today.   - Continue on Proventil and Atrovent PRN and Chest PT Q6H     # GI  - Hx of SDH, Gastric Bypass and Dysphagia s/p PEG and continued on TF.   - CT AP with moderate stool burden with no signs of sterocolitis.   - Monitor BMs on Senna and Lactulose.     # / Renal  - Hx of Urinary Retention with Chronic Garcia and presented FLORIAN likely in setting of prerenal dehydration with fevers vs ATN with Sepsis (CRE high 2s and baseline 0.8-0.9).   - UA with hematuria and proteinuria and case discussed with Neprhology with concern for glomerulonephritis. ANCA, ISABELLE and GM ABs negative.   - Lasix held, IVF and PRBC and CRE improved slighty, however remained in low 2s. ProBNP elevated and ECHO reviewed, s/p Lasix 40mg IVP (4/15) with good response and CRE down trending. Will continue Lasix per dose and monitor UOP/ renal function.   - Continue on Cardura.     # ID  - Recent admission for  Acinetobacter and Pseudomonas HCAP (3/2022)   - SCx (4/10) with  Acinetobacter and Pseudomonas.   - Continue on Meropenem (4/8-4/14) and monitor temperature curve.     # Endocrine  - No hx of DM2 and A1C 5.9. Continue to monitor BG,    - Hx of Hypothyroidism and continued on Synthroid. TSH 47 with low T3/T4 and Free T4. TSH 80s (3/2022) and Synthroid increased at NH. Hypothyroidism could be in the setting of Amiodarone use and current Synthroid dose appears to be working.   - Next TFT to be done in June 2022.     # Hematology   - Anemia noted and i/s/o AOCD and s/p PRBC (4/9).   - No overt signs of bleeding and will monitor HH for now.   - DVT PPX with HSQ.     # Ethics   - FULL CODE   - Case discussed with Cousin/ HCP, Dr. Donna Hagen (Pediatric Neurologist, [phone]) and updated daily.     # DISPO - BACK TO NH.        77 YO M, A&Ox0 at baseline with PMHx of SDH c/b L Subfalcine Herniation s/p Emergent R Hemicraniectomy in [place] while traveling fell on marble floor and now chronic with tracheostomy and vent dependant, Dysphagia with PEG, AFIB s/p watchman, Gastric Sleeve, Urinary Retention with Chornic Garcia, and recent ICU stay for HCAP with MDR Pseudomonas now presenting with ACHRF i/s/o mucous plug and possible recurrent HCAP.      # Neurology   - Currently A&Ox0, awake and alert, able to move RUE and nods/ mouths one or two words per RCU evaluation and prior documentation.   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   - Continue on Modafinil, Amantadine and Keppra.     # Cardiovascular   - Hx of HFpEF 55, mild MR and AR, severe LAD, normal LVRVSF (ECHO 3/7)  - Hx of Atrial Fibrillation s/p Watchman and currently rate controlled.   - Continue on Amiodarone 200mg QD and Coreg 6.25mg BID   - Was initially on Lasix, however held given FLORIAN. proBNP 12K and s/p Lasix 40mg dose (4/15). Continue QOD per dose and monitor I&Os.     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   - Continued on AC vent and able to tolerate PS and TC today.   - Continue on Proventil and Atrovent PRN and Chest PT Q6H     # GI  - Hx of SDH, Gastric Bypass and Dysphagia s/p PEG and continued on TF.   - CT AP with moderate stool burden with no signs of sterocolitis.   - Monitor BMs on Senna and Lactulose.     # / Renal  - Hx of Urinary Retention with Chronic Garcia and presented FLORIAN likely in setting of prerenal dehydration with fevers vs ATN with Sepsis (CRE high 2s and baseline 0.8-0.9).   - UA with hematuria and proteinuria and case discussed with Neprhology with concern for glomerulonephritis. ANCA, ISABELLE and GM ABs negative.   - Lasix held, IVF and PRBC and CRE improved slighty, however remained in low 2s. ProBNP elevated and ECHO reviewed, s/p Lasix 40mg IVP (4/15) with good response and CRE down trending. Will continue Lasix per dose and monitor UOP/ renal function.   - Continue on Cardura.     # ID  - Recent admission for  Acinetobacter and Pseudomonas HCAP (3/2022)   - SCx (4/10) with  Acinetobacter and Pseudomonas.   - Continue on Meropenem (4/8-4/14) and monitor temperature curve.     # Endocrine  - No hx of DM2 and A1C 5.9. Continue to monitor BG,    - Hx of Hypothyroidism and continued on Synthroid. TSH 47 with low T3/T4 and Free T4. TSH 80s (3/2022) and Synthroid increased at NH. Hypothyroidism could be in the setting of Amiodarone use and current Synthroid dose appears to be working.   - Next TFT to be done in June 2022.     # Hematology   - Anemia noted and i/s/o AOCD and s/p PRBC (4/9).   - No overt signs of bleeding and will monitor HH for now.   - DVT PPX with HSQ.     # Ethics   - FULL CODE   - Case discussed with Cousin/ HCP, Dr. Donna Hagen (Pediatric Neurologist, [phone]) and updated daily.     # DISPO - BACK TO NH.

## 2022-04-15 NOTE — CONSULT NOTE ADULT - ASSESSMENT
Hypothyroidism   TSH 47.92  Free T4 0.4  TT3 45  Home LT4 dose 50mcg via PEG   Recommendations:   - Increase LT4 to   - Repeat FT4 in   DC Planning:     PreDM  A1c 5.9%   - Carb Consistent Diet
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
## 2022-04-15 NOTE — PROGRESS NOTE ADULT - PROBLEM SELECTOR PLAN 1
Pt. with FLORIAN in the setting of sepsis, anemia and hypotension. Pt. with likely ATN. [facility].8 on [facility]/2022. On admission, Scr was 2.3. Scr increased to 2.93 on 4/10, decreased to 1.85 today (4/15/22). Pt. with increased UOP of 2L over the past 24 hours. Pt. with chronic Garcia catheter that was replaced on day of admission. Non-contrast CT abdomen and pelvis negative for hydronephrosis or renal structural abnormalities. UA showed proteinuria and hematuria. C3 and C4 not low, HIV, ISABELLE P-ANCA and C-ANCA were all negative and no monoclonal band identified seen on serum ISIDRA. Monitor labs and urine output. Avoid NSAIDs, ACEI/ARBS, RCA and nephrotoxins. Dose medications as per eGFR.

## 2022-04-16 ENCOUNTER — TRANSCRIPTION ENCOUNTER (OUTPATIENT)
Age: 77
End: 2022-04-16

## 2022-04-16 LAB
ANION GAP SERPL CALC-SCNC: 10 MMOL/L — SIGNIFICANT CHANGE UP (ref 7–14)
BUN SERPL-MCNC: 77 MG/DL — HIGH (ref 7–23)
CALCIUM SERPL-MCNC: 8.3 MG/DL — LOW (ref 8.4–10.5)
CHLORIDE SERPL-SCNC: 111 MMOL/L — HIGH (ref 98–107)
CO2 SERPL-SCNC: 27 MMOL/L — SIGNIFICANT CHANGE UP (ref 22–31)
CREAT SERPL-MCNC: 1.63 MG/DL — HIGH (ref 0.5–1.3)
EGFR: 44 ML/MIN/1.73M2 — LOW
GLUCOSE SERPL-MCNC: 151 MG/DL — HIGH (ref 70–99)
HCT VFR BLD CALC: 23.7 % — LOW (ref 39–50)
HGB BLD-MCNC: 7.2 G/DL — LOW (ref 13–17)
MAGNESIUM SERPL-MCNC: 2.7 MG/DL — HIGH (ref 1.6–2.6)
MCHC RBC-ENTMCNC: 30.4 GM/DL — LOW (ref 32–36)
MCHC RBC-ENTMCNC: 32 PG — SIGNIFICANT CHANGE UP (ref 27–34)
MCV RBC AUTO: 105.3 FL — HIGH (ref 80–100)
NRBC # BLD: 0 /100 WBCS — SIGNIFICANT CHANGE UP
NRBC # FLD: 0 K/UL — SIGNIFICANT CHANGE UP
PHOSPHATE SERPL-MCNC: 3.6 MG/DL — SIGNIFICANT CHANGE UP (ref 2.5–4.5)
PLATELET # BLD AUTO: 212 K/UL — SIGNIFICANT CHANGE UP (ref 150–400)
POTASSIUM SERPL-MCNC: 4.9 MMOL/L — SIGNIFICANT CHANGE UP (ref 3.5–5.3)
POTASSIUM SERPL-SCNC: 4.9 MMOL/L — SIGNIFICANT CHANGE UP (ref 3.5–5.3)
RBC # BLD: 2.25 M/UL — LOW (ref 4.2–5.8)
RBC # FLD: 18.5 % — HIGH (ref 10.3–14.5)
SODIUM SERPL-SCNC: 148 MMOL/L — HIGH (ref 135–145)
WBC # BLD: 8.59 K/UL — SIGNIFICANT CHANGE UP (ref 3.8–10.5)
WBC # FLD AUTO: 8.59 K/UL — SIGNIFICANT CHANGE UP (ref 3.8–10.5)

## 2022-04-16 PROCEDURE — 99233 SBSQ HOSP IP/OBS HIGH 50: CPT

## 2022-04-16 RX ORDER — LACTULOSE 10 G/15ML
20 SOLUTION ORAL
Refills: 0 | Status: DISCONTINUED | OUTPATIENT
Start: 2022-04-16 | End: 2022-04-29

## 2022-04-16 RX ORDER — POLYETHYLENE GLYCOL 3350 17 G/17G
17 POWDER, FOR SOLUTION ORAL DAILY
Refills: 0 | Status: DISCONTINUED | OUTPATIENT
Start: 2022-04-16 | End: 2022-05-03

## 2022-04-16 RX ADMIN — CARVEDILOL PHOSPHATE 6.25 MILLIGRAM(S): 80 CAPSULE, EXTENDED RELEASE ORAL at 17:50

## 2022-04-16 RX ADMIN — CHLORHEXIDINE GLUCONATE 15 MILLILITER(S): 213 SOLUTION TOPICAL at 18:14

## 2022-04-16 RX ADMIN — Medication 1 MILLIGRAM(S): at 12:08

## 2022-04-16 RX ADMIN — AMIODARONE HYDROCHLORIDE 200 MILLIGRAM(S): 400 TABLET ORAL at 05:04

## 2022-04-16 RX ADMIN — Medication 1 APPLICATION(S): at 12:07

## 2022-04-16 RX ADMIN — SENNA PLUS 5 MILLILITER(S): 8.6 TABLET ORAL at 21:16

## 2022-04-16 RX ADMIN — HEPARIN SODIUM 5000 UNIT(S): 5000 INJECTION INTRAVENOUS; SUBCUTANEOUS at 14:18

## 2022-04-16 RX ADMIN — LEVETIRACETAM 1000 MILLIGRAM(S): 250 TABLET, FILM COATED ORAL at 05:04

## 2022-04-16 RX ADMIN — Medication 2 MILLIGRAM(S): at 21:16

## 2022-04-16 RX ADMIN — LACTULOSE 10 GRAM(S): 10 SOLUTION ORAL at 18:13

## 2022-04-16 RX ADMIN — Medication 100 MILLIGRAM(S): at 17:50

## 2022-04-16 RX ADMIN — Medication 500 MILLIGRAM(S): at 12:08

## 2022-04-16 RX ADMIN — LACTULOSE 10 GRAM(S): 10 SOLUTION ORAL at 05:04

## 2022-04-16 RX ADMIN — CHLORHEXIDINE GLUCONATE 1 APPLICATION(S): 213 SOLUTION TOPICAL at 12:07

## 2022-04-16 RX ADMIN — Medication 50 MICROGRAM(S): at 05:06

## 2022-04-16 RX ADMIN — MODAFINIL 100 MILLIGRAM(S): 200 TABLET ORAL at 12:06

## 2022-04-16 RX ADMIN — Medication 100 MILLIGRAM(S): at 05:05

## 2022-04-16 RX ADMIN — LEVETIRACETAM 1000 MILLIGRAM(S): 250 TABLET, FILM COATED ORAL at 17:50

## 2022-04-16 RX ADMIN — POLYETHYLENE GLYCOL 3350 17 GRAM(S): 17 POWDER, FOR SOLUTION ORAL at 18:14

## 2022-04-16 RX ADMIN — CARVEDILOL PHOSPHATE 6.25 MILLIGRAM(S): 80 CAPSULE, EXTENDED RELEASE ORAL at 05:05

## 2022-04-16 RX ADMIN — HEPARIN SODIUM 5000 UNIT(S): 5000 INJECTION INTRAVENOUS; SUBCUTANEOUS at 05:06

## 2022-04-16 RX ADMIN — HEPARIN SODIUM 5000 UNIT(S): 5000 INJECTION INTRAVENOUS; SUBCUTANEOUS at 21:16

## 2022-04-16 RX ADMIN — CHLORHEXIDINE GLUCONATE 15 MILLILITER(S): 213 SOLUTION TOPICAL at 05:03
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
## 2022-04-16 NOTE — PROGRESS NOTE ADULT - ASSESSMENT
77 YO M, A&Ox0 at baseline with PMHx of SDH c/b L Subfalcine Herniation s/p Emergent R Hemicraniectomy in [place] while traveling fell on marble floor and now chronic with tracheostomy and vent dependant, Dysphagia with PEG, AFIB s/p watchman, Gastric Sleeve, Urinary Retention with Chornic Garcia, and recent ICU stay for HCAP with MDR Pseudomonas now presenting with ACHRF i/s/o  Actineobacter and Pseudomonas HCAP s/p ABX c/b FLORIAN. Now DISPO planning.  77 YO M, A&Ox0 at baseline with PMHx of SDH c/b L Subfalcine Herniation s/p Emergent R Hemicraniectomy in [place] while traveling fell on marble floor and now chronic with tracheostomy and vent dependant, Dysphagia with PEG, AFIB s/p watchman, Gastric Sleeve, Urinary Retention with Chronic Garcia, and recent ICU stay for HCAP with MDR Pseudomonas now presenting with ACHRF i/s/o  Actineobacter and Pseudomonas HCAP s/p ABX c/b FLORIAN. Now DISPO planning.     # Neurology   - Currently A&Ox0, awake and alert, able to move RUE and nods/ mouths one or two words per RCU evaluation and prior documentation.   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   - Continue on Modafinil, Amantidine and Keppra.     # Cardiovascular   - Hx of HFpEF 56, mild MR and AR, severe LAD, normal LVRVSF (ECHO 3/7)  - Hx of Atrial Fibrillation s/p Watchman and currently rate controlled.   - Continue on Amiodarone 200mg QD and Coreg 6.25mg BID   - Was initially on Lasix, however held given FLORIAN. proBNP 12K and s/p Lasix 40mg dose (4/15). Continue per dose and monitor I&Os.     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   - Continued on AC vent and able to tolerate PS and TC (from 4/15 during the day).   - Continue on Proventil and Atrovent PRN and Chest PT Q6H     # GI  - Hx of SDH, Gastric Bypass and Dysphagia s/p PEG and continued on TF.   - CT AP with moderate stool burden with no signs of sterocolitis.   - Monitor BMs on Senna and Lactulose.     # / Renal  - Hx of Urinary Retention with Chronic Garcia and presented FLORIAN likely in setting of prerenal dehydration with fevers vs ATN with Sepsis (CRE high 2s and baseline 0.8-0.9).   - UA with hematuria and proteinuria and case discussed with Neprhology with concern for glomerulonephritis. ANCA, ISABELLE and GM ABs negative.   - Home Lasix held, IVF and PRBC given and CRE improving.   - ProBNP elevated and ECHO reviewed, s/p Lasix 40mg IVP (4/15) with good response and CRE down trending, however Na rising.   - Hold further Lasix doses and c/w  QID.   - Continue on Cardura.   - Monitor renal function and UOP.     # ID  - Recent admission for  Acinetobacter and Pseudomonas HCAP (3/2022)   - SCx (4/10) with  Acinetobacter and Pseudomonas.   - Completed Meropenem (4/8-4/14) and will monitor off ABX.     # Endocrine  - No hx of DM2 and A1C 5.9. Continue to monitor BG,    - Hx of Hypothyroidism and continued on Synthroid. TSH 47 with low T3/T4 and Free T4. TSH 80s (3/2022) and Synthroid increased at NH. Hypothyroidism could be in the setting of Amiodarone use and current Synthroid dose appears to be working.   - Next TFT to be done in June 2022.     # Hematology   - Anemia noted and i/s/o AOCD and s/p PRBC (4/9).   - No overt signs of bleeding and will monitor HH for now.   - DVT PPX with HSQ.     # Ethics   - FULL CODE   - Case discussed with Cousin/ HCP, Dr. Donna Hagen (Pediatric Neurologist, [phone]) and updated daily.     # DISPO - planning to go back to NH with improvement in renal function/ electrolytes.

## 2022-04-16 NOTE — DISCHARGE NOTE PROVIDER - NSDCMRMEDTOKEN_GEN_ALL_CORE_FT
acetaminophen 325 mg oral tablet: 2 tab(s) by gastrostomy tube every 6 hours, As Needed  amantadine 50 mg/5 mL oral syrup: 10 milliliter(s) by gastrostomy tube 2 times a day  amiodarone 200 mg oral tablet: 1 tab(s) by gastrostomy tube once a day  ascorbic acid 500 mg/5 mL oral liquid: 5 milliliter(s) by gastrostomy tube once a day  Ativan 2 mg/mL injectable solution: 2 milligram(s) injectable as needed for seizure activity  Cardura 1 mg oral tablet: 2 tab(s) by gastrostomy tube once a day  carvedilol 6.25 mg oral tablet: 1 tab(s) by gastrostomy tube every 12 hours  DuoNeb 0.5 mg-2.5 mg/3 mL inhalation solution: 3 milliliter(s) inhaled 4 times a day  famotidine 20 mg oral tablet: 1 tab(s) by gastrostomy tube 2 times a day  ferrous sulfate 220 mg/5 mL (44 mg/5 mL elemental iron) oral elixir: 7.5 milliliter(s) by gastrostomy tube once a day  folic acid 1 mg oral tablet: 1 tab(s) by gastrostomy tube once a day  furosemide 40 mg oral tablet: 1 tab(s) by gastrostomy tube once a day  lactulose 10 g/15 mL oral syrup: 10 milliliter(s) by gastrostomy tube 2 times a day  levETIRAcetam 100 mg/mL oral solution: 10 milliliter(s) by gastrostomy tube 2 times a day  levothyroxine 50 mcg (0.05 mg) oral capsule: 1 cap(s) by gastrostomy tube once a day  Multiple Vitamins oral tablet: 1 tab(s) by gastrostomy tube once a day  Santyl 250 units/g topical ointment: apply to sacral pressure ulcer with normal saline, apply santyl and wet kerlex with normal saline and then cover with dry dressing daily and prn  scopolamine: 1 milligram(s) transdermal every 3 days, excessive secretion  senna 8.8 mg/5 mL oral syrup: 10 milliliter(s) by gastrostomy tube once a day (at bedtime)   acetaminophen 160 mg/5 mL oral suspension: 20.31 milliliter(s) by gastrostomy tube every 4 hours as needed for pain and fever.   albuterol 90 mcg/inh inhalation aerosol: 2 puff(s) inhaled vent/ tracheostomy every 6 hours  amantadine 50 mg/5 mL oral syrup: 10 milliliter(s) orally 2 times a day  amiodarone 200 mg oral tablet: 1 tab(s) by gastrostomy tube once a day  ascorbic acid 500 mg/5 mL oral liquid: 5 milliliter(s) by gastrostomy tube once a day  collagenase 250 units/g topical ointment: 1 application topically once a day to sacral ulcer per wound care orders.   doxazosin 2 mg oral tablet: 1 tab(s) by gastrostomy tube once a day (at bedtime)  ferrous sulfate 220 mg/5 mL (44 mg/5 mL elemental iron) oral elixir: 7.5 milliliter(s) by gastrostomy tube once a day  folic acid 1 mg oral tablet: 1 tab(s) by gastrostomy tube once a day  ipratropium CFC free 17 mcg/inh inhalation aerosol: 1 puff(s) inhaled via vent/ tracheostomy every 6 hours  levETIRAcetam 100 mg/mL oral solution: 10 milliliter(s) by gastrostomy tube 2 times a day  levothyroxine 50 mcg (0.05 mg) oral tablet: 1 tab(s) by gastrostomy tube once a day  Multiple Vitamins oral tablet: 1 tab(s) by gastrostomy tube once a day  pantoprazole 40 mg oral granule, delayed release: 1 tab(s) by gastrostomy tube once a day  senna 8.8 mg/5 mL oral syrup: 10 milliliter(s) by gastrostomy tube once a day (at bedtime), as needed for constipation   sodium chloride 3% inhalation solution: 4 milliliter(s) inhaled via vent or tracheotomy every 6 hours  sodium hypochlorite 0.125% topical solution: 1 application topically once a day to sacral ulcer per wound care orders.

## 2022-04-16 NOTE — DISCHARGE NOTE PROVIDER - HOSPITAL COURSE
Mr. Conrad is a 77 YO Male, A&Ox0 at baseline with PMHx of Left Sided SDH c/b L Subfalcine Herniation s/p Emergent R Hemicraniectomy in [place] while traveling fell on marble floor and now chronic with tracheostomy and vent dependant, Dysphagia with PEG, AFIB s/p watchman, Gastric Sleeve, Urinary Retention with Chornic Garcia, and recent ICU stay for HCAP with MDR Pseudomonas now presenting with ACHRF in setting of  Actineobacter and pan-sensitive Pseudomonas HCAP. s/p Meropenem (4/8-4/14) with improvement in secretions, however course c/b FLORIAN and anemia likely in setting of chronic disease. Home Lasix held, s/p IVF and 1U PRBC (4/9), and renal function improved. Mr. Conrad is a 77 YO Male, A&Ox0 at baseline with PMHx of Left Sided SDH c/b L Subfalcine Herniation s/p Emergent R Hemicraniectomy in [place] while traveling fell on marble floor and now chronic with tracheostomy and vent dependant, Dysphagia with PEG, AFIB s/p watchman, Gastric Sleeve, Urinary Retention with Chornic Garcia, and recent ICU stay for HCAP with MDR Pseudomonas who re-presented on 4/8/2022 with AMS and ACHRF in setting of  Actineobacter and pan-sensitive Pseudomonas HCAP and metabolic encephalopathy and admitted to RCU for further management.     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 He complete a course of Meropenem (4/8-4/14) with improvement in secretions and mild improvement in mentation. He was able to move his RUE, blink and follow intermittent commands. Case discussed with Cousin, Dr. Cora Hagen who noted he was at his baseline mentation. Course further complicated with FLORIAN and anemia likely in setting of chronic disease. Home Lasix held, s/p IVF and 1U PRBC (4/9), and renal function improved. Course further complicated by large firm left nack mass and s/p CT NECK (4/30) with concern for parotitis i/s/o left intraparotid stone. Case discussed with ENT and warm compresses applied with massages TID with improvement. 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 Wound care called and s/p selective debulking debridement on 5/9. Wound care recommendations continued in house. Patient was able to wean from ventilator back to baseline TC during the day and vent QHS/ PRN as per baseline NH respiratory status, however course complicated on 5/3 with increased secretions and fever spike i/s/o repeat HCAP concern and bradycardia. ECG reviewed and noted with SB with no HB. Electrolytes reviewed with no abnormalities. Coreg dc'ed with slight improvement in HR (55-65 baseline). SCx (5/2 and 5/4) with pansensitive Pseudomonas and complete repeat course of Zosyn (5/3-5/10) and ALONDRA (5/7-5/13) with improvement in secretions. PS trials attempted but limited second to physical debilitation. WBC trended up (5/15-5/17), however patient remained afebrile, nontoxic and hemodynamically stable with little to no concern or s/s of infection. No further ABX or work up recommended at this time.     Case discussed with Cousin, Donna Hagen ([phone]) at length who wished for patient to be discharged to NH in [place]. MARCIAL/ JASIEL alves and worked with Yesenia for facility decision. On 5/17, case discussed with Dr. Krause and patient is medically cleared and stable for discharge.

## 2022-04-16 NOTE — DISCHARGE NOTE PROVIDER - CARE PROVIDER_API CALL
Rest of care as per medical team at facility.,   Phone: (   )    -  Fax: (   )    -  Follow Up Time:

## 2022-04-16 NOTE — PROGRESS NOTE ADULT - SUBJECTIVE AND OBJECTIVE BOX
CHIEF COMPLAINT: Patient is a 76y old  Male who presents with a chief complaint of Hypoxia (15 Apr 2022 10:52)    INTERVAL EVENTS:  - No interval events overnight.     REVIEW OF SYSTEMS: Seen by bedside during AM rounds and     Mode: AC/ CMV (Assist Control/ Continuous Mandatory Ventilation), RR (machine): 12, TV (machine): 450, FiO2: 30, PEEP: 5, ITime: 0.8, MAP: 8, PIP: 14    OBJECTIVE:  ICU Vital Signs Last 24 Hrs  T(C): 36.9 (16 Apr 2022 04:56), Max: 36.9 (16 Apr 2022 04:56)  T(F): 98.4 (16 Apr 2022 04:56), Max: 98.4 (16 Apr 2022 04:56)  HR: 91 (16 Apr 2022 06:49) (80 - 98)  BP: 129/87 (16 Apr 2022 04:56) (121/88 - 145/95)  BP(mean): --  ABP: --  ABP(mean): --  RR: 15 (16 Apr 2022 04:56) (15 - 19)  SpO2: 100% (16 Apr 2022 06:49) (100% - 100%)    Mode: AC/ CMV (Assist Control/ Continuous Mandatory Ventilation), RR (machine): 12, TV (machine): 450, FiO2: 30, PEEP: 5, ITime: 0.8, MAP: 8, PIP: 14    04-15 @ 07:01  -  04-16 @ 07:00  --------------------------------------------------------  IN: 1620 mL / OUT: 1500 mL / NET: 120 mL    CAPILLARY BLOOD GLUCOSE  POCT Blood Glucose.: 133 mg/dL (15 Apr 2022 05:53)    HOSPITAL MEDICATIONS:  MEDICATIONS  (STANDING):  amantadine Syrup 100 milliGRAM(s) Oral two times a day  aMIOdarone    Tablet 200 milliGRAM(s) Oral daily  ascorbic acid 500 milliGRAM(s) Oral daily  carvedilol 6.25 milliGRAM(s) Oral every 12 hours  chlorhexidine 0.12% Liquid 15 milliLiter(s) Oral Mucosa every 12 hours  chlorhexidine 2% Cloths 1 Application(s) Topical daily  collagenase Ointment 1 Application(s) Topical daily  doxazosin 2 milliGRAM(s) Oral at bedtime  folic acid 1 milliGRAM(s) Oral daily  heparin   Injectable 5000 Unit(s) SubCutaneous every 8 hours  lactulose Syrup 10 Gram(s) Oral two times a day  levETIRAcetam  Solution 1000 milliGRAM(s) Oral two times a day  levothyroxine 50 MICROGram(s) Oral daily  modafinil 100 milliGRAM(s) Oral daily  senna Syrup 5 milliLiter(s) Oral at bedtime    MEDICATIONS  (PRN):  acetaminophen    Suspension .. 650 milliGRAM(s) Oral every 4 hours PRN Temp greater or equal to 38C (100.4F), Mild Pain (1 - 3)  ALBUTerol    90 MICROgram(s) HFA Inhaler 2 Puff(s) Inhalation every 6 hours PRN Shortness of Breath and/or Wheezing  ipratropium 17 MICROgram(s) HFA Inhaler 1 Puff(s) Inhalation every 6 hours PRN SOB and wheezing    PHYSICAL EXAMINATION    LABS:                        7.2    8.59  )-----------( 212      ( 16 Apr 2022 06:48 )             23.7     04-16    148<H>  |  111<H>  |  77<H>  ----------------------------<  151<H>  4.9   |  27  |  1.63<H>    Ca    8.3<L>      16 Apr 2022 06:48  Phos  3.6     04-16  Mg     2.70     04-16    PAST MEDICAL & SURGICAL HISTORY:  Essential hypertension    Primary osteoarthritis, unspecified site    Closed fracture of left radius, initial encounter    Morbid obesity, unspecified obesity type  h/o. - s/p gastric bypass- lost 113 lbs    KAREN (obstructive sleep apnea)  h/o - was on CPAP until gastric bypass surgery    Respiratory failure    Anemia    Subdural hemorrhage    Epilepsy    H/O gastrostomy    History of BPH    Afib    S/P gastric bypass  2008    Status post total replacement of left hip  2006    S/P bunionectomy  bilateral    S/P colonoscopy  approx 2012    History of abdominoplasty  and subsequent cosmetic surgery for removal of excess skin from face    FAMILY HISTORY:  Family history of renal cell carcinoma (Mother)    Family history of malignant melanoma (Sibling)    Social History:    RADIOLOGY:  [ ] Reviewed and interpreted by me    PULMONARY FUNCTION TESTS:    EKG: CHIEF COMPLAINT: Patient is a 76y old  Male who presents with a chief complaint of Hypoxia (15 Apr 2022 10:52)    INTERVAL EVENTS:  - No interval events overnight.   - Na trending up and  Q6H added. Lasix dose held.     REVIEW OF SYSTEMS: Seen by bedside during AM rounds and unable to assess ROS as vented.     Mode: AC/ CMV (Assist Control/ Continuous Mandatory Ventilation), RR (machine): 12, TV (machine): 450, FiO2: 30, PEEP: 5, ITime: 0.8, MAP: 8, PIP: 14    OBJECTIVE:  ICU Vital Signs Last 24 Hrs  T(C): 36.9 (16 Apr 2022 04:56), Max: 36.9 (16 Apr 2022 04:56)  T(F): 98.4 (16 Apr 2022 04:56), Max: 98.4 (16 Apr 2022 04:56)  HR: 91 (16 Apr 2022 06:49) (80 - 98)  BP: 129/87 (16 Apr 2022 04:56) (121/88 - 145/95)  BP(mean): --  ABP: --  ABP(mean): --  RR: 15 (16 Apr 2022 04:56) (15 - 19)  SpO2: 100% (16 Apr 2022 06:49) (100% - 100%)    Mode: AC/ CMV (Assist Control/ Continuous Mandatory Ventilation), RR (machine): 12, TV (machine): 450, FiO2: 30, PEEP: 5, ITime: 0.8, MAP: 8, PIP: 14    04-15 @ 07:01  -  04-16 @ 07:00  --------------------------------------------------------  IN: 1620 mL / OUT: 1500 mL / NET: 120 mL    CAPILLARY BLOOD GLUCOSE  POCT Blood Glucose.: 133 mg/dL (15 Apr 2022 05:53)    HOSPITAL MEDICATIONS:  MEDICATIONS  (STANDING):  amantadine Syrup 100 milliGRAM(s) Oral two times a day  aMIOdarone    Tablet 200 milliGRAM(s) Oral daily  ascorbic acid 500 milliGRAM(s) Oral daily  carvedilol 6.25 milliGRAM(s) Oral every 12 hours  chlorhexidine 0.12% Liquid 15 milliLiter(s) Oral Mucosa every 12 hours  chlorhexidine 2% Cloths 1 Application(s) Topical daily  collagenase Ointment 1 Application(s) Topical daily  doxazosin 2 milliGRAM(s) Oral at bedtime  folic acid 1 milliGRAM(s) Oral daily  heparin   Injectable 5000 Unit(s) SubCutaneous every 8 hours  lactulose Syrup 10 Gram(s) Oral two times a day  levETIRAcetam  Solution 1000 milliGRAM(s) Oral two times a day  levothyroxine 50 MICROGram(s) Oral daily  modafinil 100 milliGRAM(s) Oral daily  senna Syrup 5 milliLiter(s) Oral at bedtime    MEDICATIONS  (PRN):  acetaminophen    Suspension .. 650 milliGRAM(s) Oral every 4 hours PRN Temp greater or equal to 38C (100.4F), Mild Pain (1 - 3)  ALBUTerol    90 MICROgram(s) HFA Inhaler 2 Puff(s) Inhalation every 6 hours PRN Shortness of Breath and/or Wheezing  ipratropium 17 MICROgram(s) HFA Inhaler 1 Puff(s) Inhalation every 6 hours PRN SOB and wheezing    PHYSICAL EXAMINATION  General: NAD   Neck: Trach (+)   Cards: S1/S2, no murmurs   Pulm: Course breath sounds bilaterally. No wheezes.   Abdomen: Soft, NTND. BS (+) PEG (+)   Extremities: No pedal edema. KIMBERLEY of RUE ONLY.  Neurology: Awake and alert and able to nod/ mouth one word answer and KIMBERLEY of RUE ONLY. No focal neurological deficits     LABS:                        7.2    8.59  )-----------( 212      ( 16 Apr 2022 06:48 )             23.7     04-16    148<H>  |  111<H>  |  77<H>  ----------------------------<  151<H>  4.9   |  27  |  1.63<H>    Ca    8.3<L>      16 Apr 2022 06:48  Phos  3.6     04-16  Mg     2.70     04-16    PAST MEDICAL & SURGICAL HISTORY:  Essential hypertension    Primary osteoarthritis, unspecified site    Closed fracture of left radius, initial encounter    Morbid obesity, unspecified obesity type  h/o. - s/p gastric bypass- lost 113 lbs    KAREN (obstructive sleep apnea)  h/o - was on CPAP until gastric bypass surgery    Respiratory failure    Anemia    Subdural hemorrhage    Epilepsy    H/O gastrostomy    History of BPH    Afib    S/P gastric bypass  2008    Status post total replacement of left hip  2006    S/P bunionectomy  bilateral    S/P colonoscopy  approx 2012    History of abdominoplasty  and subsequent cosmetic surgery for removal of excess skin from face    FAMILY HISTORY:  Family history of renal cell carcinoma (Mother)    Family history of malignant melanoma (Sibling)    Social History:    RADIOLOGY:  [ ] Reviewed and interpreted by me    PULMONARY FUNCTION TESTS:    EKG:

## 2022-04-16 NOTE — DISCHARGE NOTE PROVIDER - NSDCCPCAREPLAN_GEN_ALL_CORE_FT
PRINCIPAL DISCHARGE DIAGNOSIS  Diagnosis: Pneumonia  Assessment and Plan of Treatment: - Patient with history of multiple pneumonia and trachitis admissions in the past who presented with pneumonia concern. He was treated for carbapenem resistant acinteobacter and pan-sensitive pseudomonas and was treated with a course of Meropenem in house. He was also noted with recurrent pneumonia and found with pansensitive pneumonia. Patient ultimately treated with course of Zosyn and Tobramycin inhlaed with improvement.   - Continue respiratory failure in setting of traumatic brain injury and now physical debilitation. Continue on AC volume control with settings of 12/450/5/30% and airway clearance with proventil, atrovent and hypersal every 6 hours. Suction as needed and tracheostomy care QD per facility.   - Rest of medical care per medical team at facility.      SECONDARY DISCHARGE DIAGNOSES  Diagnosis: FLORIAN (acute kidney injury)  Assessment and Plan of Treatment: - Patient noted with acute kidney injury/ dehydration second to infection vs dehydration with diuresis. Diuresis with Lasix held and kidney injury improved.   - Monitor fluid status.    Diagnosis: Bradycardia  Assessment and Plan of Treatment: - Patient noted with bradycardia (55-60) in house. Coreg held with slight improvement. No electrolyte abnormalities or heart block noted. Please continue to hold beta-blocker.   - Continue to follow up with medical team at facility.    Diagnosis: Urinary retention  Assessment and Plan of Treatment: - Patient noted with history of BPH and urinary retention with chronic Garcia. Trial of voided attempted in house on 5/5/2022 with failure and Garcia replaced.   - Monitor UOP and change Garcia per facility protocol.    Diagnosis: Anemia of chronic disease  Assessment and Plan of Treatment: - Anemia noted in house likely second to chronic illness.   - Continue on iron supplements and monitor anemia per medical team at facility.    Diagnosis: Decubitus ulcer  Assessment and Plan of Treatment: Wound care recommendations  - Peritubular skin of PEG- Cleanse q shift with NS, apply liquid barrier film beneath kishor disc.  If redness noted under kishor disc bumper apply thin foam  dressing without border (Mepilex Lite)- cut to mid dressing with a 'Y' shape. Secondary securement to abdomen taping in 'H' fashion with Steri-strips.   - Tracheostomy- Cleanse around trach site with NS. Pat dry. Apply Silicone foam dressing without border beneath trach collar, cut mid dressing to "Y" shape. Change foam dressing every shift and prn. Change trach ties every 3 days.   - Stage 4 Sacrum Ulcer: Cleanse wound with Dakins 1/4 strength, rinse wound and periwound skin with NS. Pat dry. Apply liquid barrier film to periwound skin. Apply collagenase nickel thickness to wound bed, Lightly pack with moistened saline gauze. Cover with dry gauze and tegaderm. Change daily or PRN if soiled.   - Continue Fecal external  to contain loose stools   - Continue to provide perineum care as per protocol   - Continue to offload heels with complete cair boots   - Continue to offload as per hospital protocol   - Wound care to be continued per facility.     PRINCIPAL DISCHARGE DIAGNOSIS  Diagnosis: Pneumonia  Assessment and Plan of Treatment: - Patient with history of multiple pneumonia and trachitis admissions in the past who presented with pneumonia concern. He was treated for carbapenem resistant acinteobacter and pan-sensitive pseudomonas and was treated with a course of Meropenem in house. He was also noted with recurrent pneumonia and found with pansensitive pneumonia. Patient ultimately treated with course of Zosyn and Tobramycin inhlaed with improvement.   - Continue respiratory failure in setting of traumatic brain injury and now physical debilitation. Continue on AC volume control with settings of 12/450/5/30% and airway clearance with proventil, atrovent and hypersal every 6 hours. Suction as needed and tracheostomy care QD per facility.   - Rest of medical care per medical team at facility.      SECONDARY DISCHARGE DIAGNOSES  Diagnosis: FLORIAN (acute kidney injury)  Assessment and Plan of Treatment: - Patient noted with acute kidney injury/ dehydration second to infection vs dehydration with diuresis. Diuresis with Lasix held and kidney injury improved.   - Monitor fluid status.    Diagnosis: Bradycardia  Assessment and Plan of Treatment: - Patient noted with bradycardia and 1st degree HB (55-60) in house. Coreg held with slight improvement. No electrolyte abnormalities. Please continue to hold beta-blocker.   - Continue to follow up with medical team at facility.    Diagnosis: Urinary retention  Assessment and Plan of Treatment: - Patient noted with history of BPH and urinary retention with chronic Garcia. Trial of voided attempted in house on 5/5/2022 with failure and Garcia replaced.   - Monitor UOP and change Garcia per facility protocol.    Diagnosis: Anemia of chronic disease  Assessment and Plan of Treatment: - Anemia noted in house likely second to chronic illness.   - Continue on iron supplements and monitor anemia per medical team at facility.    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   - Continue on Synthroid 50mcg daily and TSH due to be checked in June 2022.   - Rest of care per medical team at facility.    Diagnosis: Decubitus ulcer  Assessment and Plan of Treatment: Wound care recommendations  - Peritubular skin of PEG- Cleanse q shift with NS, apply liquid barrier film beneath kishor disc.  If redness noted under kishor disc bumper apply thin foam  dressing without border (Mepilex Lite)- cut to mid dressing with a 'Y' shape. Secondary securement to abdomen taping in 'H' fashion with Steri-strips.   - Tracheostomy- Cleanse around trach site with NS. Pat dry. Apply Silicone foam dressing without border beneath trach collar, cut mid dressing to "Y" shape. Change foam dressing every shift and prn. Change trach ties every 3 days.   - Stage 4 Sacrum Ulcer: Cleanse wound with Dakins 1/4 strength, rinse wound and periwound skin with NS. Pat dry. Apply liquid barrier film to periwound skin. Apply collagenase nickel thickness to wound bed, Lightly pack with moistened saline gauze. Cover with dry gauze and tegaderm. Change daily or PRN if soiled.   - Continue Fecal external  to contain loose stools   - Continue to provide perineum care as per protocol   - Continue to offload heels with complete cair boots   - Continue to offload as per hospital protocol   - Wound care to be continued per facility.

## 2022-04-17 LAB
ANION GAP SERPL CALC-SCNC: 10 MMOL/L — SIGNIFICANT CHANGE UP (ref 7–14)
ANION GAP SERPL CALC-SCNC: 12 MMOL/L — SIGNIFICANT CHANGE UP (ref 7–14)
BUN SERPL-MCNC: 74 MG/DL — HIGH (ref 7–23)
BUN SERPL-MCNC: 76 MG/DL — HIGH (ref 7–23)
CALCIUM SERPL-MCNC: 8.1 MG/DL — LOW (ref 8.4–10.5)
CALCIUM SERPL-MCNC: 8.4 MG/DL — SIGNIFICANT CHANGE UP (ref 8.4–10.5)
CHLORIDE SERPL-SCNC: 108 MMOL/L — HIGH (ref 98–107)
CHLORIDE SERPL-SCNC: 110 MMOL/L — HIGH (ref 98–107)
CO2 SERPL-SCNC: 25 MMOL/L — SIGNIFICANT CHANGE UP (ref 22–31)
CO2 SERPL-SCNC: 25 MMOL/L — SIGNIFICANT CHANGE UP (ref 22–31)
CREAT SERPL-MCNC: 1.53 MG/DL — HIGH (ref 0.5–1.3)
CREAT SERPL-MCNC: 1.55 MG/DL — HIGH (ref 0.5–1.3)
EGFR: 46 ML/MIN/1.73M2 — LOW
EGFR: 47 ML/MIN/1.73M2 — LOW
GLUCOSE SERPL-MCNC: 127 MG/DL — HIGH (ref 70–99)
GLUCOSE SERPL-MCNC: 148 MG/DL — HIGH (ref 70–99)
HCT VFR BLD CALC: 23.3 % — LOW (ref 39–50)
HGB BLD-MCNC: 7.2 G/DL — LOW (ref 13–17)
MAGNESIUM SERPL-MCNC: 2.7 MG/DL — HIGH (ref 1.6–2.6)
MAGNESIUM SERPL-MCNC: 2.7 MG/DL — HIGH (ref 1.6–2.6)
MCHC RBC-ENTMCNC: 30.9 GM/DL — LOW (ref 32–36)
MCHC RBC-ENTMCNC: 32.4 PG — SIGNIFICANT CHANGE UP (ref 27–34)
MCV RBC AUTO: 105 FL — HIGH (ref 80–100)
NRBC # BLD: 0 /100 WBCS — SIGNIFICANT CHANGE UP
NRBC # FLD: 0 K/UL — SIGNIFICANT CHANGE UP
PHOSPHATE SERPL-MCNC: 3.6 MG/DL — SIGNIFICANT CHANGE UP (ref 2.5–4.5)
PHOSPHATE SERPL-MCNC: 3.7 MG/DL — SIGNIFICANT CHANGE UP (ref 2.5–4.5)
PLATELET # BLD AUTO: 216 K/UL — SIGNIFICANT CHANGE UP (ref 150–400)
POTASSIUM SERPL-MCNC: 5.4 MMOL/L — HIGH (ref 3.5–5.3)
POTASSIUM SERPL-MCNC: 5.5 MMOL/L — HIGH (ref 3.5–5.3)
POTASSIUM SERPL-SCNC: 5.4 MMOL/L — HIGH (ref 3.5–5.3)
POTASSIUM SERPL-SCNC: 5.5 MMOL/L — HIGH (ref 3.5–5.3)
RBC # BLD: 2.22 M/UL — LOW (ref 4.2–5.8)
RBC # FLD: 18.9 % — HIGH (ref 10.3–14.5)
SARS-COV-2 RNA SPEC QL NAA+PROBE: SIGNIFICANT CHANGE UP
SODIUM SERPL-SCNC: 145 MMOL/L — SIGNIFICANT CHANGE UP (ref 135–145)
SODIUM SERPL-SCNC: 145 MMOL/L — SIGNIFICANT CHANGE UP (ref 135–145)
WBC # BLD: 8.69 K/UL — SIGNIFICANT CHANGE UP (ref 3.8–10.5)
WBC # FLD AUTO: 8.69 K/UL — SIGNIFICANT CHANGE UP (ref 3.8–10.5)

## 2022-04-17 PROCEDURE — 99233 SBSQ HOSP IP/OBS HIGH 50: CPT

## 2022-04-17 RX ORDER — SODIUM ZIRCONIUM CYCLOSILICATE 10 G/10G
10 POWDER, FOR SUSPENSION ORAL ONCE
Refills: 0 | Status: COMPLETED | OUTPATIENT
Start: 2022-04-17 | End: 2022-04-17

## 2022-04-17 RX ADMIN — LEVETIRACETAM 1000 MILLIGRAM(S): 250 TABLET, FILM COATED ORAL at 05:21

## 2022-04-17 RX ADMIN — Medication 1 MILLIGRAM(S): at 11:14

## 2022-04-17 RX ADMIN — Medication 500 MILLIGRAM(S): at 11:14

## 2022-04-17 RX ADMIN — CARVEDILOL PHOSPHATE 6.25 MILLIGRAM(S): 80 CAPSULE, EXTENDED RELEASE ORAL at 05:22

## 2022-04-17 RX ADMIN — SODIUM ZIRCONIUM CYCLOSILICATE 10 GRAM(S): 10 POWDER, FOR SUSPENSION ORAL at 11:25

## 2022-04-17 RX ADMIN — LACTULOSE 20 GRAM(S): 10 SOLUTION ORAL at 17:57

## 2022-04-17 RX ADMIN — Medication 100 MILLIGRAM(S): at 05:21

## 2022-04-17 RX ADMIN — CHLORHEXIDINE GLUCONATE 15 MILLILITER(S): 213 SOLUTION TOPICAL at 05:21

## 2022-04-17 RX ADMIN — LACTULOSE 20 GRAM(S): 10 SOLUTION ORAL at 05:22

## 2022-04-17 RX ADMIN — Medication 1 APPLICATION(S): at 11:14

## 2022-04-17 RX ADMIN — POLYETHYLENE GLYCOL 3350 17 GRAM(S): 17 POWDER, FOR SOLUTION ORAL at 11:14

## 2022-04-17 RX ADMIN — HEPARIN SODIUM 5000 UNIT(S): 5000 INJECTION INTRAVENOUS; SUBCUTANEOUS at 05:21

## 2022-04-17 RX ADMIN — AMIODARONE HYDROCHLORIDE 200 MILLIGRAM(S): 400 TABLET ORAL at 05:22

## 2022-04-17 RX ADMIN — HEPARIN SODIUM 5000 UNIT(S): 5000 INJECTION INTRAVENOUS; SUBCUTANEOUS at 21:18

## 2022-04-17 RX ADMIN — CHLORHEXIDINE GLUCONATE 1 APPLICATION(S): 213 SOLUTION TOPICAL at 11:14

## 2022-04-17 RX ADMIN — Medication 50 MICROGRAM(S): at 05:22

## 2022-04-17 RX ADMIN — HEPARIN SODIUM 5000 UNIT(S): 5000 INJECTION INTRAVENOUS; SUBCUTANEOUS at 13:05

## 2022-04-17 RX ADMIN — Medication 2 MILLIGRAM(S): at 21:18

## 2022-04-17 RX ADMIN — SENNA PLUS 5 MILLILITER(S): 8.6 TABLET ORAL at 21:18

## 2022-04-17 RX ADMIN — MODAFINIL 100 MILLIGRAM(S): 200 TABLET ORAL at 11:14

## 2022-04-17 RX ADMIN — Medication 100 MILLIGRAM(S): at 17:57

## 2022-04-17 RX ADMIN — CHLORHEXIDINE GLUCONATE 15 MILLILITER(S): 213 SOLUTION TOPICAL at 17:56

## 2022-04-17 RX ADMIN — CARVEDILOL PHOSPHATE 6.25 MILLIGRAM(S): 80 CAPSULE, EXTENDED RELEASE ORAL at 17:56

## 2022-04-17 RX ADMIN — LEVETIRACETAM 1000 MILLIGRAM(S): 250 TABLET, FILM COATED ORAL at 17:56

## 2022-04-17 NOTE — PROGRESS NOTE ADULT - ASSESSMENT
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 Now DISPO planning.    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 Now DISPO planning.     # Neurology   - Currently A&Ox0, awake and alert, able to move RUE and nods/ mouths one or two words per RCU evaluation and prior documentation.   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   - Continue on Modafinil, Amantidine and Keppra.     # Cardiovascular   - Hx of HFpEF 55, mild MR and AR, severe LAD, normal LVRVSF (ECHO 3/7)  - Hx of Atrial Fibrillation s/p Watchman and currently rate controlled.   - Continue on Amiodarone 200mg QD and Coreg 6.25mg BID   - Was initially on Lasix, however held given FLORIAN. proBNP 12K and s/p Lasix 40mg dose (4/15). Continue per dose and monitor I&Os.     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   - Continued on AC vent and able to tolerate PS and TC (from 4/15 during the day).   - Continue on Proventil and Atrovent PRN and Chest PT Q6H     # GI  - Hx of SDH, Gastric Bypass and Dysphagia s/p PEG and continued on TF.   - CT AP with moderate stool burden with no signs of sterocolitis.   - Monitor BMs on Senna and Lactulose.     # / Renal  - Hx of Urinary Retention with Chronic Garcia and presented FLORIAN likely in setting of prerenal dehydration with fevers vs ATN with Sepsis (CRE high 2s and baseline 0.8-0.9).   - UA with hematuria and proteinuria and case discussed with Neprhology with concern for glomerulonephritis. ANCA, ISABELLE and GM ABs negative.   - Home Lasix held, IVF and PRBC given and CRE improving.   - ProBNP elevated and ECHO reviewed, s/p Lasix 40mg IVP (4/15) with good response and CRE down trending, however Na rising.   - Hold further Lasix doses and c/w  QID.   - Continue on Cardura.   - Monitor renal function and UOP.     # ID  - Recent admission for  Acinetobacter and Pseudomonas HCAP (3/2022)   - SCx (4/10) with  Acinetobacter and Pseudomonas.   - Completed Meropenem (4/8-4/14) and will monitor off ABX.     # Endocrine  - No hx of DM2 and A1C 5.9. Continue to monitor BG,    - Hx of Hypothyroidism and continued on Synthroid. TSH 47 with low T3/T4 and Free T4. TSH 80s (3/2022) and Synthroid increased at NH. Hypothyroidism could be in the setting of Amiodarone use and current Synthroid dose appears to be working.   - Next TFT to be done in June 2022.     # Hematology   - Anemia noted and i/s/o AOCD and s/p PRBC (4/9).   - No overt signs of bleeding and will monitor HH for now.   - DVT PPX with HSQ.     # Ethics   - FULL CODE   - Case discussed with Cousin/ HCP, Dr. Donna Hagen (Pediatric Neurologist, [phone]) and updated daily.     # DISPO - planning to go back to NH with improvement in renal function/ electrolytes.

## 2022-04-17 NOTE — PROGRESS NOTE ADULT - SUBJECTIVE AND OBJECTIVE BOX
CHIEF COMPLAINT: Patient is a 76y old  Male who presents with a chief complaint of Hypoxia (16 Apr 2022 13:01)    INTERVAL EVENTS:  - No interval events overnight.     REVIEW OF SYSTEMS: Seen by bedside during AM rounds and     Mode: AC/ CMV (Assist Control/ Continuous Mandatory Ventilation), RR (machine): 12, TV (machine): 450, FiO2: 30, PEEP: 5, MAP: 7, PIP: 13    OBJECTIVE:  ICU Vital Signs Last 24 Hrs  T(C): 36.8 (17 Apr 2022 04:46), Max: 37.1 (16 Apr 2022 14:18)  T(F): 98.2 (17 Apr 2022 04:46), Max: 98.8 (16 Apr 2022 14:18)  HR: 96 (17 Apr 2022 07:26) (83 - 102)  BP: 145/94 (17 Apr 2022 04:46) (136/91 - 155/89)  BP(mean): --  ABP: --  ABP(mean): --  RR: 15 (17 Apr 2022 04:46) (15 - 20)  SpO2: 100% (17 Apr 2022 07:26) (95% - 100%)    Mode: AC/ CMV (Assist Control/ Continuous Mandatory Ventilation), RR (machine): 12, TV (machine): 450, FiO2: 30, PEEP: 5, MAP: 7, PIP: 13    04-16 @ 07:01  -  04-17 @ 07:00  --------------------------------------------------------  IN: 2620 mL / OUT: 1940 mL / NET: 680 mL    CAPILLARY BLOOD GLUCOSE    HOSPITAL MEDICATIONS:  MEDICATIONS  (STANDING):  amantadine Syrup 100 milliGRAM(s) Oral two times a day  aMIOdarone    Tablet 200 milliGRAM(s) Oral daily  ascorbic acid 500 milliGRAM(s) Oral daily  carvedilol 6.25 milliGRAM(s) Oral every 12 hours  chlorhexidine 0.12% Liquid 15 milliLiter(s) Oral Mucosa every 12 hours  chlorhexidine 2% Cloths 1 Application(s) Topical daily  collagenase Ointment 1 Application(s) Topical daily  doxazosin 2 milliGRAM(s) Oral at bedtime  folic acid 1 milliGRAM(s) Oral daily  heparin   Injectable 5000 Unit(s) SubCutaneous every 8 hours  lactulose Syrup 20 Gram(s) Oral two times a day  levETIRAcetam  Solution 1000 milliGRAM(s) Oral two times a day  levothyroxine 50 MICROGram(s) Oral daily  modafinil 100 milliGRAM(s) Oral daily  polyethylene glycol 3350 17 Gram(s) Oral daily  senna Syrup 5 milliLiter(s) Oral at bedtime    MEDICATIONS  (PRN):  acetaminophen    Suspension .. 650 milliGRAM(s) Oral every 4 hours PRN Temp greater or equal to 38C (100.4F), Mild Pain (1 - 3)  ALBUTerol    90 MICROgram(s) HFA Inhaler 2 Puff(s) Inhalation every 6 hours PRN Shortness of Breath and/or Wheezing  ipratropium 17 MICROgram(s) HFA Inhaler 1 Puff(s) Inhalation every 6 hours PRN SOB and wheezing    PHYSICAL EXAMINATION    LABS:                        7.2    8.69  )-----------( 216      ( 17 Apr 2022 06:43 )             23.3     04-17    145  |  108<H>  |  74<H>  ----------------------------<  127<H>  5.5<H>   |  25  |  1.53<H>    Ca    8.4      17 Apr 2022 06:43  Phos  3.7     04-17  Mg     2.70     04-17    PAST MEDICAL & SURGICAL HISTORY:  Essential hypertension    Primary osteoarthritis, unspecified site    Closed fracture of left radius, initial encounter    Morbid obesity, unspecified obesity type  h/o. - s/p gastric bypass- lost 113 lbs    KAREN (obstructive sleep apnea)  h/o - was on CPAP until gastric bypass surgery    Respiratory failure    Anemia    Subdural hemorrhage    Epilepsy    H/O gastrostomy    History of BPH    Afib    S/P gastric bypass  2008    Status post total replacement of left hip  2006    S/P bunionectomy  bilateral    S/P colonoscopy  approx 2012    History of abdominoplasty  and subsequent cosmetic surgery for removal of excess skin from face    FAMILY HISTORY:  Family history of renal cell carcinoma (Mother)    Family history of malignant melanoma (Sibling)    Social History:    RADIOLOGY:  [ ] Reviewed and interpreted by me    PULMONARY FUNCTION TESTS:    EKG: CHIEF COMPLAINT: Patient is a 76y old  Male who presents with a chief complaint of Hypoxia (16 Apr 2022 13:01)    INTERVAL EVENTS:  - No interval events overnight.   - Able to tolerate TC during the day since 4/15.     REVIEW OF SYSTEMS: Seen by bedside during AM rounds and unable to assess ROS given poor mentation at baseline.     Mode: AC/ CMV (Assist Control/ Continuous Mandatory Ventilation), RR (machine): 12, TV (machine): 450, FiO2: 30, PEEP: 5, MAP: 7, PIP: 13    OBJECTIVE:  ICU Vital Signs Last 24 Hrs  T(C): 36.8 (17 Apr 2022 04:46), Max: 37.1 (16 Apr 2022 14:18)  T(F): 98.2 (17 Apr 2022 04:46), Max: 98.8 (16 Apr 2022 14:18)  HR: 96 (17 Apr 2022 07:26) (83 - 102)  BP: 145/94 (17 Apr 2022 04:46) (136/91 - 155/89)  BP(mean): --  ABP: --  ABP(mean): --  RR: 15 (17 Apr 2022 04:46) (15 - 20)  SpO2: 100% (17 Apr 2022 07:26) (95% - 100%)    Mode: AC/ CMV (Assist Control/ Continuous Mandatory Ventilation), RR (machine): 12, TV (machine): 450, FiO2: 30, PEEP: 5, MAP: 7, PIP: 13    04-16 @ 07:01  -  04-17 @ 07:00  --------------------------------------------------------  IN: 2620 mL / OUT: 1940 mL / NET: 680 mL    CAPILLARY BLOOD GLUCOSE    HOSPITAL MEDICATIONS:  MEDICATIONS  (STANDING):  amantadine Syrup 100 milliGRAM(s) Oral two times a day  aMIOdarone    Tablet 200 milliGRAM(s) Oral daily  ascorbic acid 500 milliGRAM(s) Oral daily  carvedilol 6.25 milliGRAM(s) Oral every 12 hours  chlorhexidine 0.12% Liquid 15 milliLiter(s) Oral Mucosa every 12 hours  chlorhexidine 2% Cloths 1 Application(s) Topical daily  collagenase Ointment 1 Application(s) Topical daily  doxazosin 2 milliGRAM(s) Oral at bedtime  folic acid 1 milliGRAM(s) Oral daily  heparin   Injectable 5000 Unit(s) SubCutaneous every 8 hours  lactulose Syrup 20 Gram(s) Oral two times a day  levETIRAcetam  Solution 1000 milliGRAM(s) Oral two times a day  levothyroxine 50 MICROGram(s) Oral daily  modafinil 100 milliGRAM(s) Oral daily  polyethylene glycol 3350 17 Gram(s) Oral daily  senna Syrup 5 milliLiter(s) Oral at bedtime    MEDICATIONS  (PRN):  acetaminophen    Suspension .. 650 milliGRAM(s) Oral every 4 hours PRN Temp greater or equal to 38C (100.4F), Mild Pain (1 - 3)  ALBUTerol    90 MICROgram(s) HFA Inhaler 2 Puff(s) Inhalation every 6 hours PRN Shortness of Breath and/or Wheezing  ipratropium 17 MICROgram(s) HFA Inhaler 1 Puff(s) Inhalation every 6 hours PRN SOB and wheezing    PHYSICAL EXAMINATION  General: NAD   Neck: Trach (+)   Cards: S1/S2, no murmurs   Pulm: Course breath sounds bilaterally. No wheezes.   Abdomen: Soft, NTND. BS (+) PEG (+)   Extremities: No pedal edema. KIMBERLEY of RUE ONLY.  Neurology: Awake and alert and able to nod/ mouth one word answer and KIMBERLEY of RUE ONLY. No focal neurological deficits     LABS:                        7.2    8.69  )-----------( 216      ( 17 Apr 2022 06:43 )             23.3     04-17    145  |  108<H>  |  74<H>  ----------------------------<  127<H>  5.5<H>   |  25  |  1.53<H>    Ca    8.4      17 Apr 2022 06:43  Phos  3.7     04-17  Mg     2.70     04-17    PAST MEDICAL & SURGICAL HISTORY:  Essential hypertension    Primary osteoarthritis, unspecified site    Closed fracture of left radius, initial encounter    Morbid obesity, unspecified obesity type  h/o. - s/p gastric bypass- lost 113 lbs    KAREN (obstructive sleep apnea)  h/o - was on CPAP until gastric bypass surgery    Respiratory failure    Anemia    Subdural hemorrhage    Epilepsy    H/O gastrostomy    History of BPH    Afib    S/P gastric bypass  2008    Status post total replacement of left hip  2006    S/P bunionectomy  bilateral    S/P colonoscopy  approx 2012    History of abdominoplasty  and subsequent cosmetic surgery for removal of excess skin from face    FAMILY HISTORY:  Family history of renal cell carcinoma (Mother)    Family history of malignant melanoma (Sibling)    Social History:    RADIOLOGY:  [ ] Reviewed and interpreted by me    PULMONARY FUNCTION TESTS:    EKG:
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
## 2022-04-18 DIAGNOSIS — E87.5 HYPERKALEMIA: ICD-10-CM

## 2022-04-18 LAB
ANION GAP SERPL CALC-SCNC: 12 MMOL/L — SIGNIFICANT CHANGE UP (ref 7–14)
ANION GAP SERPL CALC-SCNC: 9 MMOL/L — SIGNIFICANT CHANGE UP (ref 7–14)
ANION GAP SERPL CALC-SCNC: 9 MMOL/L — SIGNIFICANT CHANGE UP (ref 7–14)
BLD GP AB SCN SERPL QL: NEGATIVE — SIGNIFICANT CHANGE UP
BUN SERPL-MCNC: 72 MG/DL — HIGH (ref 7–23)
BUN SERPL-MCNC: 75 MG/DL — HIGH (ref 7–23)
BUN SERPL-MCNC: 76 MG/DL — HIGH (ref 7–23)
CALCIUM SERPL-MCNC: 8.1 MG/DL — LOW (ref 8.4–10.5)
CALCIUM SERPL-MCNC: 8.3 MG/DL — LOW (ref 8.4–10.5)
CALCIUM SERPL-MCNC: 8.4 MG/DL — SIGNIFICANT CHANGE UP (ref 8.4–10.5)
CHLORIDE SERPL-SCNC: 110 MMOL/L — HIGH (ref 98–107)
CHLORIDE SERPL-SCNC: 111 MMOL/L — HIGH (ref 98–107)
CHLORIDE SERPL-SCNC: 112 MMOL/L — HIGH (ref 98–107)
CO2 SERPL-SCNC: 25 MMOL/L — SIGNIFICANT CHANGE UP (ref 22–31)
CO2 SERPL-SCNC: 27 MMOL/L — SIGNIFICANT CHANGE UP (ref 22–31)
CO2 SERPL-SCNC: 27 MMOL/L — SIGNIFICANT CHANGE UP (ref 22–31)
CREAT SERPL-MCNC: 1.5 MG/DL — HIGH (ref 0.5–1.3)
CREAT SERPL-MCNC: 1.5 MG/DL — HIGH (ref 0.5–1.3)
CREAT SERPL-MCNC: 1.54 MG/DL — HIGH (ref 0.5–1.3)
EGFR: 47 ML/MIN/1.73M2 — LOW
EGFR: 48 ML/MIN/1.73M2 — LOW
EGFR: 48 ML/MIN/1.73M2 — LOW
GLUCOSE BLDC GLUCOMTR-MCNC: 112 MG/DL — HIGH (ref 70–99)
GLUCOSE BLDC GLUCOMTR-MCNC: 168 MG/DL — HIGH (ref 70–99)
GLUCOSE SERPL-MCNC: 114 MG/DL — HIGH (ref 70–99)
GLUCOSE SERPL-MCNC: 141 MG/DL — HIGH (ref 70–99)
GLUCOSE SERPL-MCNC: 157 MG/DL — HIGH (ref 70–99)
HCT VFR BLD CALC: 20.2 % — CRITICAL LOW (ref 39–50)
HCT VFR BLD CALC: 21.7 % — LOW (ref 39–50)
HCT VFR BLD CALC: 22.1 % — LOW (ref 39–50)
HCT VFR BLD CALC: 22.9 % — LOW (ref 39–50)
HGB BLD-MCNC: 6.6 G/DL — CRITICAL LOW (ref 13–17)
HGB BLD-MCNC: 6.6 G/DL — CRITICAL LOW (ref 13–17)
HGB BLD-MCNC: 6.9 G/DL — CRITICAL LOW (ref 13–17)
HGB BLD-MCNC: 7.1 G/DL — LOW (ref 13–17)
MAGNESIUM SERPL-MCNC: 2.6 MG/DL — SIGNIFICANT CHANGE UP (ref 1.6–2.6)
MAGNESIUM SERPL-MCNC: 2.6 MG/DL — SIGNIFICANT CHANGE UP (ref 1.6–2.6)
MCHC RBC-ENTMCNC: 30.1 GM/DL — LOW (ref 32–36)
MCHC RBC-ENTMCNC: 30.4 GM/DL — LOW (ref 32–36)
MCHC RBC-ENTMCNC: 32.1 GM/DL — SIGNIFICANT CHANGE UP (ref 32–36)
MCHC RBC-ENTMCNC: 32.1 PG — SIGNIFICANT CHANGE UP (ref 27–34)
MCHC RBC-ENTMCNC: 32.4 PG — SIGNIFICANT CHANGE UP (ref 27–34)
MCHC RBC-ENTMCNC: 32.6 PG — SIGNIFICANT CHANGE UP (ref 27–34)
MCHC RBC-ENTMCNC: 32.7 GM/DL — SIGNIFICANT CHANGE UP (ref 32–36)
MCHC RBC-ENTMCNC: 33 PG — SIGNIFICANT CHANGE UP (ref 27–34)
MCV RBC AUTO: 101 FL — HIGH (ref 80–100)
MCV RBC AUTO: 101.4 FL — HIGH (ref 80–100)
MCV RBC AUTO: 106.4 FL — HIGH (ref 80–100)
MCV RBC AUTO: 106.5 FL — HIGH (ref 80–100)
NRBC # BLD: 0 /100 WBCS — SIGNIFICANT CHANGE UP
NRBC # FLD: 0.02 K/UL — HIGH
NRBC # FLD: 0.03 K/UL — HIGH
OB PNL STL: POSITIVE
PHOSPHATE SERPL-MCNC: 3.7 MG/DL — SIGNIFICANT CHANGE UP (ref 2.5–4.5)
PHOSPHATE SERPL-MCNC: 3.7 MG/DL — SIGNIFICANT CHANGE UP (ref 2.5–4.5)
PLATELET # BLD AUTO: 175 K/UL — SIGNIFICANT CHANGE UP (ref 150–400)
PLATELET # BLD AUTO: 201 K/UL — SIGNIFICANT CHANGE UP (ref 150–400)
PLATELET # BLD AUTO: 211 K/UL — SIGNIFICANT CHANGE UP (ref 150–400)
PLATELET # BLD AUTO: 241 K/UL — SIGNIFICANT CHANGE UP (ref 150–400)
POTASSIUM SERPL-MCNC: 5.4 MMOL/L — HIGH (ref 3.5–5.3)
POTASSIUM SERPL-MCNC: 5.5 MMOL/L — HIGH (ref 3.5–5.3)
POTASSIUM SERPL-MCNC: 5.6 MMOL/L — HIGH (ref 3.5–5.3)
POTASSIUM SERPL-SCNC: 5.4 MMOL/L — HIGH (ref 3.5–5.3)
POTASSIUM SERPL-SCNC: 5.5 MMOL/L — HIGH (ref 3.5–5.3)
POTASSIUM SERPL-SCNC: 5.6 MMOL/L — HIGH (ref 3.5–5.3)
RBC # BLD: 2 M/UL — LOW (ref 4.2–5.8)
RBC # BLD: 2.04 M/UL — LOW (ref 4.2–5.8)
RBC # BLD: 2.15 M/UL — LOW (ref 4.2–5.8)
RBC # BLD: 2.18 M/UL — LOW (ref 4.2–5.8)
RBC # FLD: 19.1 % — HIGH (ref 10.3–14.5)
RBC # FLD: 19.3 % — HIGH (ref 10.3–14.5)
RBC # FLD: 22 % — HIGH (ref 10.3–14.5)
RBC # FLD: 22.5 % — HIGH (ref 10.3–14.5)
RH IG SCN BLD-IMP: POSITIVE — SIGNIFICANT CHANGE UP
SODIUM SERPL-SCNC: 147 MMOL/L — HIGH (ref 135–145)
SODIUM SERPL-SCNC: 147 MMOL/L — HIGH (ref 135–145)
SODIUM SERPL-SCNC: 148 MMOL/L — HIGH (ref 135–145)
WBC # BLD: 10.83 K/UL — HIGH (ref 3.8–10.5)
WBC # BLD: 10.87 K/UL — HIGH (ref 3.8–10.5)
WBC # BLD: 11.07 K/UL — HIGH (ref 3.8–10.5)
WBC # BLD: 11.37 K/UL — HIGH (ref 3.8–10.5)
WBC # FLD AUTO: 10.83 K/UL — HIGH (ref 3.8–10.5)
WBC # FLD AUTO: 10.87 K/UL — HIGH (ref 3.8–10.5)
WBC # FLD AUTO: 11.07 K/UL — HIGH (ref 3.8–10.5)
WBC # FLD AUTO: 11.37 K/UL — HIGH (ref 3.8–10.5)

## 2022-04-18 PROCEDURE — 99233 SBSQ HOSP IP/OBS HIGH 50: CPT | Mod: GC

## 2022-04-18 PROCEDURE — 99233 SBSQ HOSP IP/OBS HIGH 50: CPT

## 2022-04-18 PROCEDURE — 36000 PLACE NEEDLE IN VEIN: CPT

## 2022-04-18 RX ORDER — DEXTROSE 50 % IN WATER 50 %
50 SYRINGE (ML) INTRAVENOUS ONCE
Refills: 0 | Status: COMPLETED | OUTPATIENT
Start: 2022-04-18 | End: 2022-04-18

## 2022-04-18 RX ORDER — SODIUM ZIRCONIUM CYCLOSILICATE 10 G/10G
10 POWDER, FOR SUSPENSION ORAL ONCE
Refills: 0 | Status: COMPLETED | OUTPATIENT
Start: 2022-04-18 | End: 2022-04-18

## 2022-04-18 RX ORDER — INSULIN HUMAN 100 [IU]/ML
10 INJECTION, SOLUTION SUBCUTANEOUS ONCE
Refills: 0 | Status: COMPLETED | OUTPATIENT
Start: 2022-04-18 | End: 2022-04-18

## 2022-04-18 RX ORDER — PANTOPRAZOLE SODIUM 20 MG/1
40 TABLET, DELAYED RELEASE ORAL
Refills: 0 | Status: DISCONTINUED | OUTPATIENT
Start: 2022-04-18 | End: 2022-04-19

## 2022-04-18 RX ADMIN — LEVETIRACETAM 1000 MILLIGRAM(S): 250 TABLET, FILM COATED ORAL at 17:19

## 2022-04-18 RX ADMIN — AMIODARONE HYDROCHLORIDE 200 MILLIGRAM(S): 400 TABLET ORAL at 05:16

## 2022-04-18 RX ADMIN — SODIUM ZIRCONIUM CYCLOSILICATE 10 GRAM(S): 10 POWDER, FOR SUSPENSION ORAL at 11:11

## 2022-04-18 RX ADMIN — INSULIN HUMAN 10 UNIT(S): 100 INJECTION, SOLUTION SUBCUTANEOUS at 11:30

## 2022-04-18 RX ADMIN — Medication 100 MILLIGRAM(S): at 17:20

## 2022-04-18 RX ADMIN — LEVETIRACETAM 1000 MILLIGRAM(S): 250 TABLET, FILM COATED ORAL at 05:17

## 2022-04-18 RX ADMIN — POLYETHYLENE GLYCOL 3350 17 GRAM(S): 17 POWDER, FOR SOLUTION ORAL at 13:57

## 2022-04-18 RX ADMIN — Medication 1 MILLIGRAM(S): at 13:58

## 2022-04-18 RX ADMIN — CARVEDILOL PHOSPHATE 6.25 MILLIGRAM(S): 80 CAPSULE, EXTENDED RELEASE ORAL at 05:16

## 2022-04-18 RX ADMIN — Medication 2 MILLIGRAM(S): at 21:11

## 2022-04-18 RX ADMIN — SENNA PLUS 5 MILLILITER(S): 8.6 TABLET ORAL at 21:11

## 2022-04-18 RX ADMIN — LACTULOSE 20 GRAM(S): 10 SOLUTION ORAL at 05:17

## 2022-04-18 RX ADMIN — CHLORHEXIDINE GLUCONATE 1 APPLICATION(S): 213 SOLUTION TOPICAL at 12:57

## 2022-04-18 RX ADMIN — HEPARIN SODIUM 5000 UNIT(S): 5000 INJECTION INTRAVENOUS; SUBCUTANEOUS at 21:12

## 2022-04-18 RX ADMIN — Medication 50 MICROGRAM(S): at 05:16

## 2022-04-18 RX ADMIN — HEPARIN SODIUM 5000 UNIT(S): 5000 INJECTION INTRAVENOUS; SUBCUTANEOUS at 13:58

## 2022-04-18 RX ADMIN — Medication 50 MILLILITER(S): at 11:30

## 2022-04-18 RX ADMIN — Medication 100 MILLIGRAM(S): at 06:52

## 2022-04-18 RX ADMIN — HEPARIN SODIUM 5000 UNIT(S): 5000 INJECTION INTRAVENOUS; SUBCUTANEOUS at 05:17

## 2022-04-18 RX ADMIN — LACTULOSE 20 GRAM(S): 10 SOLUTION ORAL at 17:19

## 2022-04-18 RX ADMIN — MODAFINIL 100 MILLIGRAM(S): 200 TABLET ORAL at 14:03

## 2022-04-18 RX ADMIN — CHLORHEXIDINE GLUCONATE 15 MILLILITER(S): 213 SOLUTION TOPICAL at 17:19

## 2022-04-18 RX ADMIN — CHLORHEXIDINE GLUCONATE 15 MILLILITER(S): 213 SOLUTION TOPICAL at 05:17

## 2022-04-18 RX ADMIN — Medication 500 MILLIGRAM(S): at 13:58

## 2022-04-18 RX ADMIN — Medication 1 APPLICATION(S): at 13:57

## 2022-04-18 NOTE — CHART NOTE - NSCHARTNOTEFT_GEN_A_CORE
Endocrine consulted for hypothyroidism on 4/15 morning. Consult later canceled 4/15 afternoon by primary team Tejas CUELLO, as patient with recently diagnosed hypothyroidism, evaluated at NH/outpatient basis by endocrine (outside) prior to this admission & TFTs improving in response to LT4 tx.     Please reconsult if any new concerns.     Eliza Maxwell DO   Endocrinology Fellow   Can be reached on [employer] Teams  Please call [phone] after hours and on weekends/holidays.

## 2022-04-18 NOTE — PROGRESS NOTE ADULT - SUBJECTIVE AND OBJECTIVE BOX
[facility] DIVISION OF KIDNEY DISEASES AND HYPERTENSION -- FOLLOW UP NOTE  --------------------------------------------------------------------------------  Chief Complaint: FLORIAN    HPI: Pt. with FLORIAN in the setting of sepsis, anemia and hypotension. Pt. with likely ATN. [facility] Scr was 0.8 on 3/16/2022. On admission, Scr was 2.3. Scr increased to 2.93 on 4/10, decreased to 1.5 today (4/18/22).     Pt. was seen and examined this morning. Pt. with tracheostomy, unable to provide ROS.       PAST HISTORY  --------------------------------------------------------------------------------  No significant changes to PMH, PSH, FHx, SHx, unless otherwise noted    ALLERGIES & MEDICATIONS  --------------------------------------------------------------------------------  Allergies    No Known Allergies    Intolerances      Standing Inpatient Medications  amantadine Syrup 100 milliGRAM(s) Oral two times a day  aMIOdarone    Tablet 200 milliGRAM(s) Oral daily  ascorbic acid 500 milliGRAM(s) Oral daily  carvedilol 6.25 milliGRAM(s) Oral every 12 hours  chlorhexidine 0.12% Liquid 15 milliLiter(s) Oral Mucosa every 12 hours  chlorhexidine 2% Cloths 1 Application(s) Topical daily  collagenase Ointment 1 Application(s) Topical daily  dextrose 50% Injectable 50 milliLiter(s) IV Push once  doxazosin 2 milliGRAM(s) Oral at bedtime  folic acid 1 milliGRAM(s) Oral daily  heparin   Injectable 5000 Unit(s) SubCutaneous every 8 hours  insulin regular  human recombinant 10 Unit(s) IV Push once  lactulose Syrup 20 Gram(s) Oral two times a day  levETIRAcetam  Solution 1000 milliGRAM(s) Oral two times a day  levothyroxine 50 MICROGram(s) Oral daily  modafinil 100 milliGRAM(s) Oral daily  polyethylene glycol 3350 17 Gram(s) Oral daily  senna Syrup 5 milliLiter(s) Oral at bedtime  sodium zirconium cyclosilicate 10 Gram(s) Oral once    PRN Inpatient Medications  acetaminophen    Suspension .. 650 milliGRAM(s) Oral every 4 hours PRN  ALBUTerol    90 MICROgram(s) HFA Inhaler 2 Puff(s) Inhalation every 6 hours PRN  ipratropium 17 MICROgram(s) HFA Inhaler 1 Puff(s) Inhalation every 6 hours PRN      REVIEW OF SYSTEMS  Unable to obtain    VITALS/PHYSICAL EXAM  --------------------------------------------------------------------------------  T(C): 37 (04-18-22 @ 08:00), Max: 37 (04-18-22 @ 08:00)  HR: 95 (04-18-22 @ 08:27) (84 - 105)  BP: 119/86 (04-18-22 @ 08:00) (119/86 - 134/92)  RR: 20 (04-18-22 @ 08:27) (12 - 20)  SpO2: 100% (04-18-22 @ 08:27) (99% - 100%)  Wt(kg): --    Weight (kg): 81.6 (04-17-22 @ 04:46)      04-17-22 @ 07:01  -  04-18-22 @ 07:00  --------------------------------------------------------  IN: 1530 mL / OUT: 2050 mL / NET: -520 mL    04-18-22 @ 07:01  -  04-18-22 @ 10:40  --------------------------------------------------------  IN: 130 mL / OUT: 0 mL / NET: 130 mL      Physical Exam:  	Gen: resting, chronically ill appearing  	HEENT: Trach+   	Pulm: CTA B/L anteriorly  	CV: S1S2+  	Abd: Soft, PEG+, Garcia+  	Ext: No LE edema B/L  	Neuro: not awake or alert   	Skin: Warm and dry    LABS/STUDIES  --------------------------------------------------------------------------------              6.6    10.83 >-----------<  201      [04-18-22 @ 08:47]              21.7     147  |  111  |  72  ----------------------------<  157      [04-18-22 @ 08:47]  5.5   |  27  |  1.50        Ca     8.4     [04-18-22 @ 08:47]      Mg     2.60     [04-18-22 @ 08:47]      Phos  3.7     [04-18-22 @ 08:47]    Creatinine Trend:  SCr 1.50 [04-18 @ 08:47]  SCr 1.50 [04-18 @ 06:17]  SCr 1.55 [04-17 @ 08:54]  SCr 1.53 [04-17 @ 06:43]  SCr 1.63 [04-16 @ 06:48]    Urinalysis - [04-10-22 @ 17:22]      Color Yellow / Appearance Slightly Turbid / SG 1.019 / pH 6.0      Gluc Negative / Ketone Negative  / Bili Negative / Urobili <2 mg/dL       Blood Moderate / Protein 100 mg/dL / Leuk Est Large / Nitrite Negative      RBC 86 / WBC 81 / Hyaline 3 / Gran  / Sq Epi  / Non Sq Epi 4 / Bacteria Negative      Iron 41, TIBC 162, %sat 25      [03-10-22 @ 06:44]  Ferritin 267      [03-10-22 @ 10:16]  TSH 47.92      [04-09-22 @ 06:43]    HIV Nonreact      [04-11-22 @ 07:09]    ISABELLE: titer Negative, pattern --      [04-11-22 @ 09:18]  C3 Complement 107      [04-11-22 @ 07:09]  C4 Complement 47      [04-11-22 @ 07:09]  ANCA: cANCA Negative, pANCA Negative, atypical ANCA Negative      [04-11-22 @ 09:18]  PLA2R: ANCA 2.3, IFA --      [04-11-22 @ 10:45]  Immunofixation Serum:   No Monoclonal Band Identified. SARAH Marks MD  Reference Range: None Detected      [04-11-22 @ 07:09]  SPEP Interpretation: reaction. SARAH Marks MD      [04-11-22 @ 07:09]

## 2022-04-18 NOTE — PROGRESS NOTE ADULT - ASSESSMENT
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 Now DISPO planning.     # Neurology   - Currently A&Ox0, awake and alert, able to move RUE and nods/ mouths one or two words per RCU evaluation and prior documentation.   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   - Continue on Modafinil, Amantidine and Keppra.     # Cardiovascular   - Hx of HFpEF 55, mild MR and AR, severe LAD, normal LVRVSF (ECHO 3/7)  - Hx of Atrial Fibrillation s/p Watchman and currently rate controlled.   - Continue on Amiodarone 200mg QD and Coreg 6.25mg BID   - Was initially on Lasix, however held given FLORIAN. proBNP 12K and s/p Lasix 40mg dose (4/15). Continue per dose and monitor I&Os.     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   - Continued on AC vent and able to tolerate PS and TC (from 4/15 during the day).   - Continue on Proventil and Atrovent PRN and Chest PT Q6H     # GI  - Hx of SDH, Gastric Bypass and Dysphagia s/p PEG and continued on TF.   - CT AP with moderate stool burden with no signs of sterocolitis.   - Monitor BMs on Senna and Lactulose.     # / Renal  - Hx of Urinary Retention with Chronic Garcia and presented FLORIAN likely in setting of prerenal dehydration with fevers vs ATN with Sepsis (CRE high 2s and baseline 0.8-0.9).   - UA with hematuria and proteinuria and case discussed with Neprhology with concern for glomerulonephritis. ANCA, ISABELLE and GM ABs negative.   - Home Lasix held, IVF and PRBC given and CRE improving.   - ProBNP elevated and ECHO reviewed, s/p Lasix 40mg IVP (4/15) with good response and CRE down trending, however Na rising.   - Hold further Lasix doses and c/w  QID.   - Continue on Cardura.   - Monitor renal function and UOP.     # ID  - Recent admission for  Acinetobacter and Pseudomonas HCAP (3/2022)   - SCx (4/10) with  Acinetobacter and Pseudomonas.   - Completed Meropenem (4/8-4/14) and will monitor off ABX.     # Endocrine  - No hx of DM2 and A1C 5.9. Continue to monitor BG,    - Hx of Hypothyroidism and continued on Synthroid. TSH 47 with low T3/T4 and Free T4. TSH 80s (3/2022) and Synthroid increased at NH. Hypothyroidism could be in the setting of Amiodarone use and current Synthroid dose appears to be working.   - Next TFT to be done in June 2022.     # Hematology   - Anemia noted and i/s/o AOCD and s/p PRBC (4/9).   - No overt signs of bleeding and will monitor HH for now.   - DVT PPX with HSQ.     # Ethics   - FULL CODE   - Case discussed with Cousin/ HCP, Dr. Donna Hagen (Pediatric Neurologist, [phone]) and updated daily.     # DISPO - planning to go back to NH with improvement in renal function/ electrolytes.      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 Now DISPO planning.     # Neurology   - Currently A&Ox0, awake and alert, able to move RUE and nods/ mouths one or two words per RCU evaluation and prior documentation.   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   - Continue on Modafinil, Amantidine and Keppra.     # Cardiovascular   - Hx of HFpEF 55, mild MR and AR, severe LAD, normal LVRVSF (ECHO 3/7)  - Hx of Atrial Fibrillation s/p Watchman and currently rate controlled.   - Continue on Amiodarone 200mg QD and Coreg 6.25mg BID   - Was initially on Lasix, however held given FLORIAN. proBNP 12K and s/p Lasix 40mg dose (4/15). Continue per dose and monitor I&Os.     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   - Continued on AC vent and able to tolerate PS and TC (from 4/15 during the day).   - Continue on Proventil and Atrovent PRN and Chest PT Q6H     # GI  - Hx of SDH, Gastric Bypass and Dysphagia s/p PEG and continued on TF.   - CT AP with moderate stool burden with no signs of sterocolitis.   - Monitor BMs on Senna and Lactulose.     # / Renal  - Hx of Urinary Retention with Chronic Garcia and presented FLORIAN likely in setting of prerenal dehydration with fevers vs ATN with Sepsis (CRE high 2s and baseline 0.8-0.9).   - UA with hematuria and proteinuria and case discussed with Neprhology with concern for glomerulonephritis. ANCA, ISABELLE and GM ABs negative.   - Home Lasix held, IVF and PRBC given and CRE improving.   - ProBNP elevated and ECHO reviewed, s/p Lasix 40mg IVP (4/15) with good response and CRE down trending, however Na rising.   - Hold further Lasix doses and c/w  QID.   - Continue on Cardura.   - Monitor renal function and UOP.   - + Hyperkalemia - change to Nepro TF, Insulin /d50x 1, Lokelma FU BMP in am     # ID  - Recent admission for  Acinetobacter and Pseudomonas HCAP (3/2022)   - SCx (4/10) with  Acinetobacter and Pseudomonas.   - Completed Meropenem (4/8-4/14) and will monitor off ABX.     # Endocrine  - No hx of DM2 and A1C 5.9. Continue to monitor BG,    - Hx of Hypothyroidism and continued on Synthroid. TSH 47 with low T3/T4 and Free T4. TSH 80s (3/2022) and Synthroid increased at NH. Hypothyroidism could be in the setting of Amiodarone use and current Synthroid dose appears to be working.   - Next TFT to be done in June 2022.     # Hematology   - Anemia noted and i/s/o AOCD and s/p PRBC (4/9).   - No overt signs of bleeding and will monitor HH for now.   - DVT PPX with HSQ.     # Ethics   - FULL CODE   - Case discussed with Cousin/ HCP, Dr. Donna Hagen (Pediatric Neurologist, [phone]) and updated daily.     # DISPO - planning to go back to NH with improvement in renal function/ electrolytes. Family requesting [facility] - SW aware

## 2022-04-18 NOTE — PROGRESS NOTE ADULT - PROBLEM SELECTOR PLAN 1
Pt. with FLORIAN in the setting of sepsis, anemia and hypotension. Pt. with likely ATN. [facility] 0.8 on 3/16/2022. On admission, Scr was 2.3. Scr increased to 2.93 on 4/10, decreased to 1.85 today (4/15/22). Pt. with increased UOP of 2L over the past 24 hours. Pt. with chronic Garcia catheter that was replaced on day of admission. Non-contrast CT abdomen and pelvis negative for hydronephrosis or renal structural abnormalities. UA showed proteinuria and hematuria. C3 and C4 not low, HIV, ISABELLE P-ANCA and C-ANCA were all negative and no monoclonal band identified seen on serum ISIDRA. Monitor labs and urine output. Avoid NSAIDs, ACEI/ARBS, RCA and nephrotoxins. Dose medications as per eGFR.

## 2022-04-18 NOTE — PROVIDER CONTACT NOTE (CRITICAL VALUE NOTIFICATION) - RECOMMENDATIONS
Contact provider
Provider reordered CBC, BMP and T&S.
PA made aware, repeat CBC sent STAT.
Provider aware
Provider aware

## 2022-04-18 NOTE — PROGRESS NOTE ADULT - SUBJECTIVE AND OBJECTIVE BOX
CHIEF COMPLAINT: Patient is a 76y old  Male who presents with a chief complaint of Hypoxia (17 Apr 2022 08:05)      Interval Events:    REVIEW OF SYSTEMS:  [ ] All other systems negative  [ ] Unable to assess ROS because ________    Mode: standby      OBJECTIVE:  ICU Vital Signs Last 24 Hrs  T(C): 37 (18 Apr 2022 08:00), Max: 37.3 (17 Apr 2022 10:00)  T(F): 98.6 (18 Apr 2022 08:00), Max: 99.1 (17 Apr 2022 10:00)  HR: 95 (18 Apr 2022 08:27) (84 - 105)  BP: 119/86 (18 Apr 2022 08:00) (119/86 - 139/95)  BP(mean): --  ABP: --  ABP(mean): --  RR: 20 (18 Apr 2022 08:27) (12 - 20)  SpO2: 100% (18 Apr 2022 08:27) (99% - 100%)    Mode: standby    04-17 @ 07:01 - 04-18 @ 07:00  --------------------------------------------------------  IN: 1530 mL / OUT: 2050 mL / NET: -520 mL    04-18 @ 07:01  - 04-18 @ 09:15  --------------------------------------------------------  IN: 130 mL / OUT: 0 mL / NET: 130 mL      CAPILLARY BLOOD GLUCOSE          [facility] MEDICATIONS:  MEDICATIONS  (STANDING):  amantadine Syrup 100 milliGRAM(s) Oral two times a day  aMIOdarone    Tablet 200 milliGRAM(s) Oral daily  ascorbic acid 500 milliGRAM(s) Oral daily  carvedilol 6.25 milliGRAM(s) Oral every 12 hours  chlorhexidine 0.12% Liquid 15 milliLiter(s) Oral Mucosa every 12 hours  chlorhexidine 2% Cloths 1 Application(s) Topical daily  collagenase Ointment 1 Application(s) Topical daily  doxazosin 2 milliGRAM(s) Oral at bedtime  folic acid 1 milliGRAM(s) Oral daily  heparin   Injectable 5000 Unit(s) SubCutaneous every 8 hours  lactulose Syrup 20 Gram(s) Oral two times a day  levETIRAcetam  Solution 1000 milliGRAM(s) Oral two times a day  levothyroxine 50 MICROGram(s) Oral daily  modafinil 100 milliGRAM(s) Oral daily  polyethylene glycol 3350 17 Gram(s) Oral daily  senna Syrup 5 milliLiter(s) Oral at bedtime    MEDICATIONS  (PRN):  acetaminophen    Suspension .. 650 milliGRAM(s) Oral every 4 hours PRN Temp greater or equal to 38C (100.4F), Mild Pain (1 - 3)  ALBUTerol    90 MICROgram(s) HFA Inhaler 2 Puff(s) Inhalation every 6 hours PRN Shortness of Breath and/or Wheezing  ipratropium 17 MICROgram(s) HFA Inhaler 1 Puff(s) Inhalation every 6 hours PRN SOB and wheezing      LABS:                        6.9    10.87 )-----------( 211      ( 18 Apr 2022 06:17 )             22.9     04-18    147<H>  |  110<H>  |  76<H>  ----------------------------<  141<H>  5.6<H>   |  25  |  1.50<H>    Ca    8.3<L>      18 Apr 2022 06:17  Phos  3.7     04-18  Mg     2.60     04-18                PAST MEDICAL & SURGICAL HISTORY:  Essential hypertension    Primary osteoarthritis, unspecified site    Closed fracture of left radius, initial encounter    Morbid obesity, unspecified obesity type  h/o. - s/p gastric bypass- lost 113 lbs    KAREN (obstructive sleep apnea)  h/o - was on CPAP until gastric bypass surgery    Respiratory failure    Anemia    Subdural hemorrhage    Epilepsy    H/O gastrostomy    History of BPH    Afib    S/P gastric bypass  2008    Status post total replacement of left hip  2006    S/P bunionectomy  bilateral    S/P colonoscopy  approx 2012    History of abdominoplasty  and subsequent cosmetic surgery for removal of excess skin from face        FAMILY HISTORY:  Family history of renal cell carcinoma (Mother)    Family history of malignant melanoma (Sibling)        Social History:      RADIOLOGY:  [ ] Reviewed and interpreted by me    PULMONARY FUNCTION TESTS:    EKG: CHIEF COMPLAINT: Patient is a 76y old  Male who presents with a chief complaint of Hypoxia (17 Apr 2022 08:05)      Interval Events: Pt seen at bedside opens eyes turns voice K+ elevated     REVIEW OF SYSTEMS:  [x ] Unable to assess ROS because trach /AMS   Mode: standby      OBJECTIVE:  ICU Vital Signs Last 24 Hrs  T(C): 37 (18 Apr 2022 08:00), Max: 37.3 (17 Apr 2022 10:00)  T(F): 98.6 (18 Apr 2022 08:00), Max: 99.1 (17 Apr 2022 10:00)  HR: 95 (18 Apr 2022 08:27) (84 - 105)  BP: 119/86 (18 Apr 2022 08:00) (119/86 - 139/95)  BP(mean): --  ABP: --  ABP(mean): --  RR: 20 (18 Apr 2022 08:27) (12 - 20)  SpO2: 100% (18 Apr 2022 08:27) (99% - 100%)    Mode: standby    04-17 @ 07:01 - 04-18 @ 07:00  --------------------------------------------------------  IN: 1530 mL / OUT: 2050 mL / NET: -520 mL    04-18 @ 07:01  - 04-18 @ 09:15  --------------------------------------------------------  IN: 130 mL / OUT: 0 mL / NET: 130 mL      CAPILLARY BLOOD GLUCOSE          HOSPITAL MEDICATIONS:  MEDICATIONS  (STANDING):  amantadine Syrup 100 milliGRAM(s) Oral two times a day  aMIOdarone    Tablet 200 milliGRAM(s) Oral daily  ascorbic acid 500 milliGRAM(s) Oral daily  carvedilol 6.25 milliGRAM(s) Oral every 12 hours  chlorhexidine 0.12% Liquid 15 milliLiter(s) Oral Mucosa every 12 hours  chlorhexidine 2% Cloths 1 Application(s) Topical daily  collagenase Ointment 1 Application(s) Topical daily  doxazosin 2 milliGRAM(s) Oral at bedtime  folic acid 1 milliGRAM(s) Oral daily  heparin   Injectable 5000 Unit(s) SubCutaneous every 8 hours  lactulose Syrup 20 Gram(s) Oral two times a day  levETIRAcetam  Solution 1000 milliGRAM(s) Oral two times a day  levothyroxine 50 MICROGram(s) Oral daily  modafinil 100 milliGRAM(s) Oral daily  polyethylene glycol 3350 17 Gram(s) Oral daily  senna Syrup 5 milliLiter(s) Oral at bedtime    MEDICATIONS  (PRN):  acetaminophen    Suspension .. 650 milliGRAM(s) Oral every 4 hours PRN Temp greater or equal to 38C (100.4F), Mild Pain (1 - 3)  ALBUTerol    90 MICROgram(s) HFA Inhaler 2 Puff(s) Inhalation every 6 hours PRN Shortness of Breath and/or Wheezing  ipratropium 17 MICROgram(s) HFA Inhaler 1 Puff(s) Inhalation every 6 hours PRN SOB and wheezing      LABS:                        6.9    10.87 )-----------( 211      ( 18 Apr 2022 06:17 )             22.9     04-18    147<H>  |  110<H>  |  76<H>  ----------------------------<  141<H>  5.6<H>   |  25  |  1.50<H>    Ca    8.3<L>      18 Apr 2022 06:17  Phos  3.7     04-18  Mg     2.60     04-18                PAST MEDICAL & SURGICAL HISTORY:  Essential hypertension    Primary osteoarthritis, unspecified site    Closed fracture of left radius, initial encounter    Morbid obesity, unspecified obesity type  h/o. - s/p gastric bypass- lost 113 lbs    KAREN (obstructive sleep apnea)  h/o - was on CPAP until gastric bypass surgery    Respiratory failure    Anemia    Subdural hemorrhage    Epilepsy    H/O gastrostomy    History of BPH    Afib    S/P gastric bypass  2008    Status post total replacement of left hip  2006    S/P bunionectomy  bilateral    S/P colonoscopy  approx 2012    History of abdominoplasty  and subsequent cosmetic surgery for removal of excess skin from face        FAMILY HISTORY:  Family history of renal cell carcinoma (Mother)    Family history of malignant melanoma (Sibling)        Social History:      RADIOLOGY:  [ ] Reviewed and interpreted by me    PULMONARY FUNCTION TESTS:    EKG:

## 2022-04-18 NOTE — PROGRESS NOTE ADULT - PROBLEM SELECTOR PLAN 2
Pt. with mild hypernatremia in setting of increased UOP, likely from diuretic/recovery phase of ATN. Currently on free water flushes through feeding tube. Can increase to 300mL q6hrs. Monitor SNa daily.

## 2022-04-19 LAB
ANION GAP SERPL CALC-SCNC: 11 MMOL/L — SIGNIFICANT CHANGE UP (ref 7–14)
ANION GAP SERPL CALC-SCNC: 12 MMOL/L — SIGNIFICANT CHANGE UP (ref 7–14)
ANION GAP SERPL CALC-SCNC: 12 MMOL/L — SIGNIFICANT CHANGE UP (ref 7–14)
BUN SERPL-MCNC: 72 MG/DL — HIGH (ref 7–23)
BUN SERPL-MCNC: 74 MG/DL — HIGH (ref 7–23)
BUN SERPL-MCNC: 75 MG/DL — HIGH (ref 7–23)
CALCIUM SERPL-MCNC: 7.8 MG/DL — LOW (ref 8.4–10.5)
CALCIUM SERPL-MCNC: 7.8 MG/DL — LOW (ref 8.4–10.5)
CALCIUM SERPL-MCNC: 8 MG/DL — LOW (ref 8.4–10.5)
CHLORIDE SERPL-SCNC: 108 MMOL/L — HIGH (ref 98–107)
CO2 SERPL-SCNC: 23 MMOL/L — SIGNIFICANT CHANGE UP (ref 22–31)
CO2 SERPL-SCNC: 25 MMOL/L — SIGNIFICANT CHANGE UP (ref 22–31)
CO2 SERPL-SCNC: 26 MMOL/L — SIGNIFICANT CHANGE UP (ref 22–31)
CREAT ?TM UR-MCNC: 38 MG/DL — SIGNIFICANT CHANGE UP
CREAT SERPL-MCNC: 1.53 MG/DL — HIGH (ref 0.5–1.3)
CREAT SERPL-MCNC: 1.6 MG/DL — HIGH (ref 0.5–1.3)
CREAT SERPL-MCNC: 1.6 MG/DL — HIGH (ref 0.5–1.3)
EGFR: 45 ML/MIN/1.73M2 — LOW
EGFR: 45 ML/MIN/1.73M2 — LOW
EGFR: 47 ML/MIN/1.73M2 — LOW
GAS PNL BLDA: SIGNIFICANT CHANGE UP
GLUCOSE BLDC GLUCOMTR-MCNC: 116 MG/DL — HIGH (ref 70–99)
GLUCOSE BLDC GLUCOMTR-MCNC: 125 MG/DL — HIGH (ref 70–99)
GLUCOSE BLDC GLUCOMTR-MCNC: 132 MG/DL — HIGH (ref 70–99)
GLUCOSE BLDC GLUCOMTR-MCNC: 151 MG/DL — HIGH (ref 70–99)
GLUCOSE BLDC GLUCOMTR-MCNC: 97 MG/DL — SIGNIFICANT CHANGE UP (ref 70–99)
GLUCOSE SERPL-MCNC: 135 MG/DL — HIGH (ref 70–99)
GLUCOSE SERPL-MCNC: 78 MG/DL — SIGNIFICANT CHANGE UP (ref 70–99)
GLUCOSE SERPL-MCNC: 86 MG/DL — SIGNIFICANT CHANGE UP (ref 70–99)
HCT VFR BLD CALC: 21.1 % — LOW (ref 39–50)
HCT VFR BLD CALC: 24.5 % — LOW (ref 39–50)
HGB BLD-MCNC: 6.4 G/DL — CRITICAL LOW (ref 13–17)
HGB BLD-MCNC: 8 G/DL — LOW (ref 13–17)
MAGNESIUM SERPL-MCNC: 2.6 MG/DL — SIGNIFICANT CHANGE UP (ref 1.6–2.6)
MCHC RBC-ENTMCNC: 30.3 GM/DL — LOW (ref 32–36)
MCHC RBC-ENTMCNC: 31.5 PG — SIGNIFICANT CHANGE UP (ref 27–34)
MCHC RBC-ENTMCNC: 31.6 PG — SIGNIFICANT CHANGE UP (ref 27–34)
MCHC RBC-ENTMCNC: 32.7 GM/DL — SIGNIFICANT CHANGE UP (ref 32–36)
MCV RBC AUTO: 103.9 FL — HIGH (ref 80–100)
MCV RBC AUTO: 96.8 FL — SIGNIFICANT CHANGE UP (ref 80–100)
NRBC # BLD: 0 /100 WBCS — SIGNIFICANT CHANGE UP
NRBC # BLD: 0 /100 WBCS — SIGNIFICANT CHANGE UP
NRBC # FLD: 0 K/UL — SIGNIFICANT CHANGE UP
NRBC # FLD: 0.02 K/UL — HIGH
OSMOLALITY UR: 457 MOSM/KG — SIGNIFICANT CHANGE UP (ref 50–1200)
PHOSPHATE SERPL-MCNC: 4.3 MG/DL — SIGNIFICANT CHANGE UP (ref 2.5–4.5)
PLATELET # BLD AUTO: 112 K/UL — LOW (ref 150–400)
PLATELET # BLD AUTO: 185 K/UL — SIGNIFICANT CHANGE UP (ref 150–400)
POTASSIUM SERPL-MCNC: 5.5 MMOL/L — HIGH (ref 3.5–5.3)
POTASSIUM SERPL-MCNC: 5.6 MMOL/L — HIGH (ref 3.5–5.3)
POTASSIUM SERPL-MCNC: 6 MMOL/L — HIGH (ref 3.5–5.3)
POTASSIUM SERPL-SCNC: 5.5 MMOL/L — HIGH (ref 3.5–5.3)
POTASSIUM SERPL-SCNC: 5.6 MMOL/L — HIGH (ref 3.5–5.3)
POTASSIUM SERPL-SCNC: 6 MMOL/L — HIGH (ref 3.5–5.3)
POTASSIUM UR-SCNC: 51.9 MMOL/L — SIGNIFICANT CHANGE UP
RBC # BLD: 2.03 M/UL — LOW (ref 4.2–5.8)
RBC # BLD: 2.53 M/UL — LOW (ref 4.2–5.8)
RBC # FLD: 21.5 % — HIGH (ref 10.3–14.5)
RBC # FLD: 21.7 % — HIGH (ref 10.3–14.5)
SARS-COV-2 RNA SPEC QL NAA+PROBE: SIGNIFICANT CHANGE UP
SODIUM SERPL-SCNC: 143 MMOL/L — SIGNIFICANT CHANGE UP (ref 135–145)
SODIUM SERPL-SCNC: 144 MMOL/L — SIGNIFICANT CHANGE UP (ref 135–145)
SODIUM SERPL-SCNC: 146 MMOL/L — HIGH (ref 135–145)
WBC # BLD: 11.89 K/UL — HIGH (ref 3.8–10.5)
WBC # BLD: 12.09 K/UL — HIGH (ref 3.8–10.5)
WBC # FLD AUTO: 11.89 K/UL — HIGH (ref 3.8–10.5)
WBC # FLD AUTO: 12.09 K/UL — HIGH (ref 3.8–10.5)

## 2022-04-19 PROCEDURE — 99233 SBSQ HOSP IP/OBS HIGH 50: CPT | Mod: GC

## 2022-04-19 PROCEDURE — 93010 ELECTROCARDIOGRAM REPORT: CPT

## 2022-04-19 RX ORDER — HYDROCORTISONE 1 %
1 OINTMENT (GRAM) TOPICAL AT BEDTIME
Refills: 0 | Status: COMPLETED | OUTPATIENT
Start: 2022-04-19 | End: 2022-04-23

## 2022-04-19 RX ORDER — SODIUM ZIRCONIUM CYCLOSILICATE 10 G/10G
5 POWDER, FOR SUSPENSION ORAL ONCE
Refills: 0 | Status: COMPLETED | OUTPATIENT
Start: 2022-04-19 | End: 2022-04-19

## 2022-04-19 RX ORDER — PANTOPRAZOLE SODIUM 20 MG/1
8 TABLET, DELAYED RELEASE ORAL
Qty: 80 | Refills: 0 | Status: DISCONTINUED | OUTPATIENT
Start: 2022-04-19 | End: 2022-04-19

## 2022-04-19 RX ORDER — INSULIN HUMAN 100 [IU]/ML
10 INJECTION, SOLUTION SUBCUTANEOUS ONCE
Refills: 0 | Status: COMPLETED | OUTPATIENT
Start: 2022-04-19 | End: 2022-04-19

## 2022-04-19 RX ORDER — PANTOPRAZOLE SODIUM 20 MG/1
40 TABLET, DELAYED RELEASE ORAL
Refills: 0 | Status: DISCONTINUED | OUTPATIENT
Start: 2022-04-19 | End: 2022-04-26

## 2022-04-19 RX ORDER — SENNA PLUS 8.6 MG/1
10 TABLET ORAL AT BEDTIME
Refills: 0 | Status: DISCONTINUED | OUTPATIENT
Start: 2022-04-19 | End: 2022-05-03

## 2022-04-19 RX ORDER — DEXTROSE 50 % IN WATER 50 %
50 SYRINGE (ML) INTRAVENOUS ONCE
Refills: 0 | Status: COMPLETED | OUTPATIENT
Start: 2022-04-19 | End: 2022-04-19

## 2022-04-19 RX ORDER — ALBUTEROL 90 UG/1
10 AEROSOL, METERED ORAL ONCE
Refills: 0 | Status: COMPLETED | OUTPATIENT
Start: 2022-04-19 | End: 2022-04-19

## 2022-04-19 RX ADMIN — LEVETIRACETAM 1000 MILLIGRAM(S): 250 TABLET, FILM COATED ORAL at 17:26

## 2022-04-19 RX ADMIN — INSULIN HUMAN 10 UNIT(S): 100 INJECTION, SOLUTION SUBCUTANEOUS at 14:31

## 2022-04-19 RX ADMIN — SENNA PLUS 10 MILLILITER(S): 8.6 TABLET ORAL at 21:52

## 2022-04-19 RX ADMIN — Medication 100 MILLIGRAM(S): at 17:26

## 2022-04-19 RX ADMIN — HEPARIN SODIUM 5000 UNIT(S): 5000 INJECTION INTRAVENOUS; SUBCUTANEOUS at 05:06

## 2022-04-19 RX ADMIN — Medication 1 SUPPOSITORY(S): at 21:52

## 2022-04-19 RX ADMIN — Medication 50 MILLILITER(S): at 14:30

## 2022-04-19 RX ADMIN — PANTOPRAZOLE SODIUM 40 MILLIGRAM(S): 20 TABLET, DELAYED RELEASE ORAL at 17:49

## 2022-04-19 RX ADMIN — CHLORHEXIDINE GLUCONATE 15 MILLILITER(S): 213 SOLUTION TOPICAL at 17:27

## 2022-04-19 RX ADMIN — LACTULOSE 20 GRAM(S): 10 SOLUTION ORAL at 17:26

## 2022-04-19 RX ADMIN — AMIODARONE HYDROCHLORIDE 200 MILLIGRAM(S): 400 TABLET ORAL at 05:06

## 2022-04-19 RX ADMIN — ALBUTEROL 10 MILLIGRAM(S): 90 AEROSOL, METERED ORAL at 16:35

## 2022-04-19 RX ADMIN — POLYETHYLENE GLYCOL 3350 17 GRAM(S): 17 POWDER, FOR SOLUTION ORAL at 15:46

## 2022-04-19 RX ADMIN — PANTOPRAZOLE SODIUM 40 MILLIGRAM(S): 20 TABLET, DELAYED RELEASE ORAL at 05:07

## 2022-04-19 RX ADMIN — SODIUM ZIRCONIUM CYCLOSILICATE 5 GRAM(S): 10 POWDER, FOR SUSPENSION ORAL at 14:31

## 2022-04-19 RX ADMIN — Medication 500 MILLIGRAM(S): at 15:47

## 2022-04-19 RX ADMIN — Medication 50 MICROGRAM(S): at 05:06

## 2022-04-19 RX ADMIN — Medication 2 MILLIGRAM(S): at 21:52

## 2022-04-19 RX ADMIN — LACTULOSE 20 GRAM(S): 10 SOLUTION ORAL at 05:06

## 2022-04-19 RX ADMIN — Medication 1 APPLICATION(S): at 12:20

## 2022-04-19 RX ADMIN — Medication 1 MILLIGRAM(S): at 15:47

## 2022-04-19 RX ADMIN — MODAFINIL 100 MILLIGRAM(S): 200 TABLET ORAL at 15:46

## 2022-04-19 RX ADMIN — CHLORHEXIDINE GLUCONATE 15 MILLILITER(S): 213 SOLUTION TOPICAL at 05:07

## 2022-04-19 RX ADMIN — INSULIN HUMAN 10 UNIT(S): 100 INJECTION, SOLUTION SUBCUTANEOUS at 19:54

## 2022-04-19 RX ADMIN — LEVETIRACETAM 1000 MILLIGRAM(S): 250 TABLET, FILM COATED ORAL at 05:06

## 2022-04-19 RX ADMIN — Medication 50 MILLILITER(S): at 19:55

## 2022-04-19 RX ADMIN — CHLORHEXIDINE GLUCONATE 1 APPLICATION(S): 213 SOLUTION TOPICAL at 12:20

## 2022-04-19 RX ADMIN — PANTOPRAZOLE SODIUM 10 MG/HR: 20 TABLET, DELAYED RELEASE ORAL at 09:24

## 2022-04-19 RX ADMIN — Medication 100 MILLIGRAM(S): at 05:06

## 2022-04-19 RX ADMIN — CARVEDILOL PHOSPHATE 6.25 MILLIGRAM(S): 80 CAPSULE, EXTENDED RELEASE ORAL at 05:06

## 2022-04-19 NOTE — PROVIDER CONTACT NOTE (CRITICAL VALUE NOTIFICATION) - BACKGROUND
76 yr M, adm resp failure, hx anemia afib subdural hematoma
Pt admitted due to acute renal failure
Acute renal failure
Pt admitted for sepsis

## 2022-04-19 NOTE — CHART NOTE - NSCHARTNOTEFT_GEN_A_CORE
[facility] MEDICINE NIGHT COVERAGE    Notified by primary RN that patient's hemoglobin remains 6.6 s/p 1U pRBC, repeat drawn and resulted at 7.1. However, patient noted to have black tarry stool which is likely UGIB contributing to anemia. Stool occult ordered, resulted as positive. VSS, hemodynamically stable. Protonix 40mg IV BID ordered. Repeat CBC in AM, if Hgb <7, will transfuse. GI consult in AM.  If profuse bleeding and/or hemodynamically unstable, will obtain CT angiogram to localize bleeding and IR consult for possible embolization. RN made aware of plan.    [facility] [id number]

## 2022-04-19 NOTE — PROGRESS NOTE ADULT - ASSESSMENT
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 Now DISPO planning.     # Neurology   - Currently A&Ox0, awake and alert, able to move RUE and nods/ mouths one or two words per RCU evaluation and prior documentation.   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   - Continue on Modafinil, Amantidine and Keppra.     # Cardiovascular   - Hx of HFpEF 55, mild MR and AR, severe LAD, normal LVRVSF (ECHO 3/7)  - Hx of Atrial Fibrillation s/p Watchman and currently rate controlled.   - Continue on Amiodarone 200mg QD and Coreg 6.25mg BID   - Was initially on Lasix, however held given FLORIAN. proBNP 12K and s/p Lasix 40mg dose (4/15). Continue per dose and monitor I&Os.     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   - Continued on AC vent and able to tolerate PS and TC (from 4/15 during the day).   - Continue on Proventil and Atrovent PRN and Chest PT Q6H     # GI  - Hx of SDH, Gastric Bypass and Dysphagia s/p PEG and continued on TF.   - CT AP with moderate stool burden with no signs of sterocolitis.   - Monitor BMs on Senna and Lactulose.     # / Renal  - Hx of Urinary Retention with Chronic Garcia and presented FLORIAN likely in setting of prerenal dehydration with fevers vs ATN with Sepsis (CRE high 2s and baseline 0.8-0.9).   - UA with hematuria and proteinuria and case discussed with Neprhology with concern for glomerulonephritis. ANCA, ISABELLE and GM ABs negative.   - Home Lasix held, IVF and PRBC given and CRE improving.   - ProBNP elevated and ECHO reviewed, s/p Lasix 40mg IVP (4/15) with good response and CRE down trending, however Na rising.   - Hold further Lasix doses and c/w  QID.   - Continue on Cardura.   - Monitor renal function and UOP.   - + Hyperkalemia - change to Nepro TF, Insulin /d50x 1, Lokelma FU BMP in am     # ID  - Recent admission for  Acinetobacter and Pseudomonas HCAP (3/2022)   - SCx (4/10) with  Acinetobacter and Pseudomonas.   - Completed Meropenem (4/8-4/14) and will monitor off ABX.     # Endocrine  - No hx of DM2 and A1C 5.9. Continue to monitor BG,    - Hx of Hypothyroidism and continued on Synthroid. TSH 47 with low T3/T4 and Free T4. TSH 80s (3/2022) and Synthroid increased at NH. Hypothyroidism could be in the setting of Amiodarone use and current Synthroid dose appears to be working.   - Next TFT to be done in June 2022.     # Hematology   - Anemia noted and i/s/o AOCD and s/p PRBC (4/9).   - No overt signs of bleeding and will monitor HH for now.   - DVT PPX with HSQ.     # Ethics   - FULL CODE   - Case discussed with Cousin/ HCP, Dr. Donna Hagen (Pediatric Neurologist, [phone]) and updated daily.     # DISPO - planning to go back to NH with improvement in renal function/ electrolytes. 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 Now DISPO planning.     # Neurology   - Currently A&Ox0, awake and alert, able to move RUE and nods/ mouths one or two words per RCU evaluation and prior documentation.   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   - Continue on Modafinil, Amantidine and Keppra.     # Cardiovascular   - Hx of HFpEF 55, mild MR and AR, severe LAD, normal LVRVSF (ECHO 3/7)  - Hx of Atrial Fibrillation s/p Watchman and currently rate controlled.   - Continue on Amiodarone 200mg QD and Coreg 6.25mg BID   - Was initially on Lasix, however held given FLORIAN. proBNP 12K and s/p Lasix 40mg dose (4/15). Continue per dose and monitor I&Os.     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   - Continued on AC vent and able to tolerate PS and TC (from 4/15 during the day).   - Continue on Proventil and Atrovent PRN and Chest PT Q6H     # GI  - Hx of SDH, Gastric Bypass and Dysphagia s/p PEG and continued on TF.   - CT AP with moderate stool burden with no signs of sterocolitis.   - Monitor BMs on Senna and Lactulose.   - Episode of melena with decreased h/h 6.6 hgb, PPI gtt, npo , GI called ? EGD orig scheduled then deferred s/p 2 U PRBC   - FU H/H, IV PPI q12, ok to resume feeds per GI, miralax and anusol   - FU post transfusion cbc, keep hgb >8    # / Renal  - Hx of Urinary Retention with Chronic Garcia and presented FLORIAN likely in setting of prerenal dehydration with fevers vs ATN with Sepsis (CRE high 2s and baseline 0.8-0.9).   - UA with hematuria and proteinuria and case discussed with Neprhology with concern for glomerulonephritis. ANCA, ISABELLE and GM ABs negative.   - Home Lasix held, IVF and PRBC given and CRE improving.   - ProBNP elevated and ECHO reviewed, s/p Lasix 40mg IVP (4/15) with good response and CRE down trending, however Na rising.   - Hold further Lasix doses and c/w  QID.   - Continue on Cardura.   - Monitor renal function and UOP.   - + Hyperkalemia - change to Nepro TF, Insulin /d50x 1, Lokelma FU BMP in am   - K elevated lokelma given, insulin D50. FU K+ 5.6 repeat lokelma     # ID  - Recent admission for  Acinetobacter and Pseudomonas HCAP (3/2022)   - SCx (4/10) with  Acinetobacter and Pseudomonas.   - Completed Meropenem (4/8-4/14) and will monitor off ABX.     # Endocrine  - No hx of DM2 and A1C 5.9. Continue to monitor BG,    - Hx of Hypothyroidism and continued on Synthroid. TSH 47 with low T3/T4 and Free T4. TSH 80s (3/2022) and Synthroid increased at NH. Hypothyroidism could be in the setting of Amiodarone use and current Synthroid dose appears to be working.   - Next TFT to be done in June 2022.     # Hematology   - Anemia noted and i/s/o AOCD and s/p PRBC (4/9).   - No overt signs of bleeding and will monitor HH for now.   - DVT PPX with HSQ. - on hold     # Ethics   - FULL CODE   - Case discussed with Cousin/ HCP, Dr. Donna Hagen (Pediatric Neurologist, [phone]) and updated daily.     # DISPO - planning to go back to NH with improvement in renal function/ electrolytes. Family requesting [facility] - SW aware

## 2022-04-19 NOTE — PROVIDER CONTACT NOTE (CRITICAL VALUE NOTIFICATION) - TEST AND RESULT REPORTED:
Pt H/H is 6.9/22.9
Hemoglobin 6.4
H&H Hemoglobin- 6.6. Hematocrit- 20.2
Hgb: 6.8. Hematocrit: 22.2
PO2- 39, HGB- 6.8, HCT- 20
HGB- 6.6, HCT- 21.1

## 2022-04-19 NOTE — CHART NOTE - NSCHARTNOTEFT_GEN_A_CORE
NUTRITION FOLLOW-UP  CONSULT: Tube Feeding change 2/2 Hyperkalemia      77yo M A&Ox0 at baseline.  Hx of L SDH s/p R hemicraniectomy in [place] while traveling after falling on marble floor, trach/vent dependent, dysphagia w PEG, Afib, gastric sleeve, & chronic HCAP infections.  Now with HCAP s/p Abx c/b FLORIAN with elevated K+ levels.  Also currently with concern for possible GI bleed-occult positive, hence TF on hold pending EGD/colonoscopy tomorrow, per discussion w NP.  Had constipation yesterday.  +BM 4/18 4/19.  On bowel regimen.    Previously was receiving Jevity via PEG @ 65mL/hr w 2 packets NoCarb Prosource per day without any noted/reported intolerance.    If/when appropriate to resume TF from a GI standpoint, would change enteral formula to Nepro w goal of 45mL/hr should K+ levels remain elevated.  NoCarb Prosource remains warranted (2 packets/d) to meet increased protein requirements 2/2 impaired skin integrity/pressure injury.  This regimen will provide 28 kcals/kg Ideal Body Weight & ~1.7g protein/kg Ideal Body Weight.   Weight without any significant changes since admission.         _________________Diet___________________  Diet, NPO (04-19-22 @ 10:03)  Diet, NPO after Midnight:      NPO Start Date: 19-Apr-2022,   NPO Start Time: 23:59 (04-19-22 @ 08:37)          Weight:                                Height: 68"                    Ideal Body Weight: 154lbs / 70kg   81.6kg (4/17)  81.4kg (4/9)    _________Estimated Energy Needs______  25-30 kcals/kg IBW= 5460-4253 kcals/day  1.4-1.8g protein/kg IBW= 98-126g protein/day        Edema:  1+ generalized     Skin:  Unstageable sacral pressure injury       ________________________Pertinent Medications____________   MEDICATIONS  (STANDING):  amantadine Syrup 100 milliGRAM(s) Oral two times a day  aMIOdarone    Tablet 200 milliGRAM(s) Oral daily  ascorbic acid 500 milliGRAM(s) Oral daily  carvedilol 6.25 milliGRAM(s) Oral every 12 hours  chlorhexidine 0.12% Liquid 15 milliLiter(s) Oral Mucosa every 12 hours  chlorhexidine 2% Cloths 1 Application(s) Topical daily  collagenase Ointment 1 Application(s) Topical daily  doxazosin 2 milliGRAM(s) Oral at bedtime  folic acid 1 milliGRAM(s) Oral daily  lactulose Syrup 20 Gram(s) Oral two times a day  levETIRAcetam  Solution 1000 milliGRAM(s) Oral two times a day  levothyroxine 50 MICROGram(s) Oral daily  modafinil 100 milliGRAM(s) Oral daily  pantoprazole Infusion 8 mG/Hr (10 mL/Hr) IV Continuous <Continuous>  polyethylene glycol 3350 17 Gram(s) Oral daily  senna Syrup 5 milliLiter(s) Oral at bedtime    MEDICATIONS  (PRN):  acetaminophen    Suspension .. 650 milliGRAM(s) Oral every 4 hours PRN Temp greater or equal to 38C (100.4F), Mild Pain (1 - 3)  ALBUTerol    90 MICROgram(s) HFA Inhaler 2 Puff(s) Inhalation every 6 hours PRN Shortness of Breath and/or Wheezing  ipratropium 17 MICROgram(s) HFA Inhaler 1 Puff(s) Inhalation every 6 hours PRN SOB and wheezing          _________________________Pertinent Labs____________________     04-19    146<H>  |  108<H>  |  75<H>  ----------------------------<  135<H>  5.6<H>   |  26  |  1.60<H>    Ca    8.0<L>      19 Apr 2022 07:47  Phos  4.3     04-19  Mg     2.60     04-19                                                                     6.4    11.89 )-----------( 185      ( 19 Apr 2022 07:47 )             21.1         CAPILLARY BLOOD GLUCOSE      POCT Blood Glucose.: 112 mg/dL (18 Apr 2022 15:04)    POCT Blood Glucose.: 112 mg/dL (04-18-22 @ 15:04)  POCT Blood Glucose.: 168 mg/dL (04-18-22 @ 11:24)              PLAN/RECOMMENDATIONS:    1) If/when appropriate to resume TF, initiate Nepro @ 25mL/hr then increase, as tolerated, by 10mL q4hrs to goal of 45mL/hr + NoCarb Prosource x 2 packets/day     TF Provides:    1080mL total volume    1944 kcals    87g protein (+30 g additional protein via NoCarb Prosource)= 117g total protein     785mL free water     2) Obtain weekly weights  3) Monitor GI status, electrolytes, glucose.      RDN remains available and will f/u PRN.          Sharmin Muhammad RDN, CDN pager [phone]

## 2022-04-19 NOTE — PROVIDER CONTACT NOTE (CRITICAL VALUE NOTIFICATION) - ASSESSMENT
Patient alert, no sign of bleeding noted,
HGB- 6.6, HCT- 21.1
Hgb: 6.8. Hematocrit: 22.2
Patient asymptomatic. No complaints of chest pain or SOB.
Pt VSS.
PO2- 39, HGB- 6.8, HCT- 20, no respiratory distress observed, in stable condition

## 2022-04-19 NOTE — CONSULT NOTE ADULT - ASSESSMENT
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 GI consulted for anemia     Anemia   PEG flushed/lavaged; feeds and bilious fluid aspirated, no overt blood products noted  will defer endoscopic eval given above; transfuse PRBC to keep hgb close to 8   Protonix 40mg IVP BID  dark stool and occult could be d/t constipated stool/irritated hemorrhoid  keep stools soft with senna syrup and miralax; enema as needed  anusol suppository qhs x 5 days   no objection to resuming peg feeds    HCAP   per RCU/ID       I reviewed the overnight course of events on the unit, re-confirming the patient history. I discussed the care with the patient and their family. The plan of care was discussed with the physician assistant and modifications were made to the notation where appropriate. Differential diagnosis and plan of care discussed with patient after the evaluation. Advanced care planning was discussed with patient and family.  Advanced care planning forms were reviewed and discussed.  Risks, benefits and alternatives of gastroenterologic procedures were discussed in detail and all questions were answered. 35 minutes spent on total encounter of which more than fifty percent of the encounter was spent counseling and/or coordinating care by the attending physician.

## 2022-04-19 NOTE — CONSULT NOTE ADULT - SUBJECTIVE AND OBJECTIVE BOX
Chief Complaint:  Patient is a 76y old  Male who presents with a chief complaint of Hypoxia (19 Apr 2022 08:45)    Essential hypertension    Primary osteoarthritis, unspecified site    Closed fracture of left radius, initial encounter    Morbid obesity, unspecified obesity type    KAREN (obstructive sleep apnea)    Respiratory failure    Anemia    Subdural hemorrhage    Epilepsy    H/O gastrostomy    History of BPH    Afib    S/P gastric bypass    Status post total replacement of left hip    S/P bunionectomy    S/P colonoscopy    History of abdominoplasty       HPI:  75 y/o M with afib s/p watchman, gastric sleeve, SDH, 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     Patient sent in from nursing home for increasing oxygen requirements and worsening mental status. Per report, patient on 30% FiO2 normally, however has required 40% at NH and noted to be tachypneic. Patient seen and examined at bedside. Currently on vent at 30% FiO2, appearing comfortable. Patient remains non-verbal however is awake and alert.     On arrival to ED, patient noted to be febrile to 101.9 however was able to be titrated back down to baseline of 30% FiO2. Given 1g tylenol, 1L NS, and vancomycin/meropenem in ED. Patient to be admitted to RCU for further management.     GI consulted for anemia with positive occult and questionable melena. The patient is unable to participate in ROS given mental status. He appears comfortable when seen. (+) PEG flushed and lavaged; feeds and bilious fluid aspirated, no overt blood products noted       No Known Allergies      acetaminophen    Suspension .. 650 milliGRAM(s) Oral every 4 hours PRN  ALBUTerol    90 MICROgram(s) HFA Inhaler 2 Puff(s) Inhalation every 6 hours PRN  amantadine Syrup 100 milliGRAM(s) Oral two times a day  aMIOdarone    Tablet 200 milliGRAM(s) Oral daily  ascorbic acid 500 milliGRAM(s) Oral daily  carvedilol 6.25 milliGRAM(s) Oral every 12 hours  chlorhexidine 0.12% Liquid 15 milliLiter(s) Oral Mucosa every 12 hours  chlorhexidine 2% Cloths 1 Application(s) Topical daily  collagenase Ointment 1 Application(s) Topical daily  doxazosin 2 milliGRAM(s) Oral at bedtime  folic acid 1 milliGRAM(s) Oral daily  ipratropium 17 MICROgram(s) HFA Inhaler 1 Puff(s) Inhalation every 6 hours PRN  lactulose Syrup 20 Gram(s) Oral two times a day  levETIRAcetam  Solution 1000 milliGRAM(s) Oral two times a day  levothyroxine 50 MICROGram(s) Oral daily  modafinil 100 milliGRAM(s) Oral daily  pantoprazole Infusion 8 mG/Hr IV Continuous <Continuous>  polyethylene glycol 3350 17 Gram(s) Oral daily  senna Syrup 5 milliLiter(s) Oral at bedtime        FAMILY HISTORY:  Family history of renal cell carcinoma (Mother)    Family history of malignant melanoma (Sibling)          Review of Systems:  *unobtainable         Relevant Family History:   n/c    Relevant Social History: n/c      Physical Exam:    Vital Signs:  Vital Signs Last 24 Hrs  T(C): 36.6 (19 Apr 2022 05:05), Max: 37.1 (18 Apr 2022 16:00)  T(F): 97.8 (19 Apr 2022 05:05), Max: 98.7 (18 Apr 2022 16:00)  HR: 62 (19 Apr 2022 10:44) (61 - 109)  BP: 114/76 (19 Apr 2022 05:05) (114/76 - 129/76)  BP(mean): --  RR: 16 (19 Apr 2022 05:05) (16 - 18)  SpO2: 100% (19 Apr 2022 10:44) (100% - 100%)  Daily     Daily     General:  Appears stated age, well-groomed, nad  HEENT:  NC/AT,  conjunctivae clear and pink, no thyromegaly, nodules, adenopathy, no JVD  Chest:  Full & symmetric excursion, no increased effort, breath sounds clear (+) Trach  Cardiovascular:  Regular rhythm, S1, S2, no murmur/rub/S3/S4, no abdominal bruit, no edema  Abdomen:  Soft, non-tender, non-distended, normoactive bowel sounds,  no masses ,no hepatosplenomeagaly, no signs of chronic liver disease  (+) PEG flushed and lavaged; feeds and bilious fluid aspirated, no overt blood products noted   Extremities:  no cyanosis,clubbing or edema  Skin:  No rash/erythema/ecchymoses/petechiae/wounds/abscess/warm/dry  Neuro/Psych:  A&Ox0  , no asterixis, no tremor, no encephalopathy    Laboratory:                            6.4    11.89 )-----------( 185      ( 19 Apr 2022 07:47 )             21.1     04-19    146<H>  |  108<H>  |  75<H>  ----------------------------<  135<H>  5.6<H>   |  26  |  1.60<H>    Ca    8.0<L>      19 Apr 2022 07:47  Phos  4.3     04-19  Mg     2.60     04-19              Imaging:    < from: CT Abdomen and Pelvis No Cont (04.08.22 @ 23:22) >    ACC: [id number] EXAM:  CT ABDOMEN AND PELVIS                          PROCEDURE DATE:  04/08/2022          INTERPRETATION:  CLINICAL INFORMATION: Abdominal distention and fever    COMPARISON: None.    CONTRAST/COMPLICATIONS:  IV Contrast: None  Oral Contrast: None  Complications: None    PROCEDURE:  CT of the Abdomen and Pelvis was performed.  Sagittal and coronal reformats were performed.    FINDINGS:  LOWER CHEST: Partially imaged bilateral dependent consolidative opacities   may represent atelectasis or infection. Small left pleural effusion with   adjacent compressive atelectasis. Small pericardial effusion. Coronary   artery calcification versus stenting.    Solid organ evaluation limited due to noncontrast technique.    LIVER: Liver size measures 20.9 cm in craniocaudal dimension.  BILE DUCTS: No biliary distention  GALLBLADDER: Unremarkable CT appearance  SPLEEN: Normal size  PANCREAS: No main ductal dilatation  ADRENALS: Slightly nodular left adrenal gland.  KIDNEYS/URETERS: No hydronephrosis.    BLADDER: Garcia catheter.  REPRODUCTIVE ORGANS: Prostate within normal limits.    BOWEL: Status post gastric bypass. JJ anastomosis of the left   hemiabdomen. Gastrostomy tube localized to excluded stomach along   pancreatic or biliarylimb. Trace amount of high density noted within the   partially distended excluded gastric fundus, could be from prior contrast   administration via PEG tube. No small bowel distention or evidence of   bowel obstruction. Appendix not visualized. No secondary sign of acute   appendicitis. Moderate stool burden of the colon limits evaluation of the   colonic mucosa. Colonic diverticulosis without diverticulitis.  PERITONEUM: Trace ascites.  VESSELS: No aneurysm of the abdominal aorta. Atheromatous change.  RETROPERITONEUM/LYMPH NODES: Small volume nodes.  ABDOMINAL WALL: Soft tissue edema. Small fat-containing inguinal hernias.  BONES: Left hip prosthesis. Degenerative changes and osseous   demineralization. Grade 1 anterolisthesis of L4 on L5.    IMPRESSION:    Dependent lung parenchymal opacities, left greater than right, may   represent atelectasis or infection in the appropriate clinical setting.   Small left pleural effusion with adjacent left basilar compressive   atelectasis. Small pericardial effusion.    Post gastric bypass with gastrostomy tube localized to the excluded   stomach of pancreaticobiliary limb. Slight distention of the excluded   gastric fundus with layering dense material, likely from prior contrast   administration. Correlate clinically. No bowel obstruction.    Moderate stool of the colon. Correlate for constipation.    Coronary artery calcification.    --- End of Report ---          AMARI HERNANDEZ MD; Resident Radiologist  This document has been electronically signed.  ZHANNA MORTENSEN M.D., ATTENDING RADIOLOGIST  This document has been electronically signed. Apr 9 2022 10:28AM    < end of copied text >

## 2022-04-19 NOTE — PROGRESS NOTE ADULT - SUBJECTIVE AND OBJECTIVE BOX
CHIEF COMPLAINT: Patient is a 76y old  Male who presents with a chief complaint of Hypoxia (18 Apr 2022 10:40)      Interval Events:    REVIEW OF SYSTEMS:  [ ] All other systems negative  [ ] Unable to assess ROS because ________    Mode: CPAP with PS, FiO2: 30, PEEP: 5, PS: 5, MAP: 7, PIP: 11      OBJECTIVE:  ICU Vital Signs Last 24 Hrs  T(C): 36.6 (19 Apr 2022 05:05), Max: 37.1 (18 Apr 2022 16:00)  T(F): 97.8 (19 Apr 2022 05:05), Max: 98.7 (18 Apr 2022 16:00)  HR: 61 (19 Apr 2022 06:48) (61 - 109)  BP: 114/76 (19 Apr 2022 05:05) (114/76 - 129/76)  BP(mean): --  ABP: --  ABP(mean): --  RR: 16 (19 Apr 2022 05:05) (16 - 18)  SpO2: 100% (19 Apr 2022 06:48) (100% - 100%)    Mode: CPAP with PS, FiO2: 30, PEEP: 5, PS: 5, MAP: 7, PIP: 11    04-18 @ 07:01  -  04-19 @ 07:00  --------------------------------------------------------  IN: 2180 mL / OUT: 1600 mL / NET: 580 mL      CAPILLARY BLOOD GLUCOSE      POCT Blood Glucose.: 112 mg/dL (18 Apr 2022 15:04)      HOSPITAL MEDICATIONS:  MEDICATIONS  (STANDING):  amantadine Syrup 100 milliGRAM(s) Oral two times a day  aMIOdarone    Tablet 200 milliGRAM(s) Oral daily  ascorbic acid 500 milliGRAM(s) Oral daily  carvedilol 6.25 milliGRAM(s) Oral every 12 hours  chlorhexidine 0.12% Liquid 15 milliLiter(s) Oral Mucosa every 12 hours  chlorhexidine 2% Cloths 1 Application(s) Topical daily  collagenase Ointment 1 Application(s) Topical daily  doxazosin 2 milliGRAM(s) Oral at bedtime  folic acid 1 milliGRAM(s) Oral daily  lactulose Syrup 20 Gram(s) Oral two times a day  levETIRAcetam  Solution 1000 milliGRAM(s) Oral two times a day  levothyroxine 50 MICROGram(s) Oral daily  modafinil 100 milliGRAM(s) Oral daily  pantoprazole Infusion 8 mG/Hr (10 mL/Hr) IV Continuous <Continuous>  polyethylene glycol 3350 17 Gram(s) Oral daily  senna Syrup 5 milliLiter(s) Oral at bedtime    MEDICATIONS  (PRN):  acetaminophen    Suspension .. 650 milliGRAM(s) Oral every 4 hours PRN Temp greater or equal to 38C (100.4F), Mild Pain (1 - 3)  ALBUTerol    90 MICROgram(s) HFA Inhaler 2 Puff(s) Inhalation every 6 hours PRN Shortness of Breath and/or Wheezing  ipratropium 17 MICROgram(s) HFA Inhaler 1 Puff(s) Inhalation every 6 hours PRN SOB and wheezing      LABS:                        6.4    11.89 )-----------( 185      ( 19 Apr 2022 07:47 )             21.1     04-19    146<H>  |  108<H>  |  75<H>  ----------------------------<  135<H>  5.6<H>   |  26  |  1.60<H>    Ca    8.0<L>      19 Apr 2022 07:47  Phos  4.3     04-19  Mg     2.60     04-19                PAST MEDICAL & SURGICAL HISTORY:  Essential hypertension    Primary osteoarthritis, unspecified site    Closed fracture of left radius, initial encounter    Morbid obesity, unspecified obesity type  h/o. - s/p gastric bypass- lost 113 lbs    KAREN (obstructive sleep apnea)  h/o - was on CPAP until gastric bypass surgery    Respiratory failure    Anemia    Subdural hemorrhage    Epilepsy    H/O gastrostomy    History of BPH    Afib    S/P gastric bypass  2008    Status post total replacement of left hip  2006    S/P bunionectomy  bilateral    S/P colonoscopy  approx 2012    History of abdominoplasty  and subsequent cosmetic surgery for removal of excess skin from face        FAMILY HISTORY:  Family history of renal cell carcinoma (Mother)    Family history of malignant melanoma (Sibling)        Social History:      RADIOLOGY:  [ ] Reviewed and interpreted by me    PULMONARY FUNCTION TESTS:    EKG: CHIEF COMPLAINT: Patient is a 76y old  Male who presents with a chief complaint of Hypoxia (18 Apr 2022 10:40)      Interval Events: Pt noted with melena this am, hgb decreased to 6.6 No active bleeding via PEG GI consult called     REVIEW OF SYSTEMS:  [ x] Unable to assess ROS because AMS    Mode: CPAP with PS, FiO2: 30, PEEP: 5, PS: 5, MAP: 7, PIP: 11      OBJECTIVE:  ICU Vital Signs Last 24 Hrs  T(C): 36.6 (19 Apr 2022 05:05), Max: 37.1 (18 Apr 2022 16:00)  T(F): 97.8 (19 Apr 2022 05:05), Max: 98.7 (18 Apr 2022 16:00)  HR: 61 (19 Apr 2022 06:48) (61 - 109)  BP: 114/76 (19 Apr 2022 05:05) (114/76 - 129/76)  BP(mean): --  ABP: --  ABP(mean): --  RR: 16 (19 Apr 2022 05:05) (16 - 18)  SpO2: 100% (19 Apr 2022 06:48) (100% - 100%)    Mode: CPAP with PS, FiO2: 30, PEEP: 5, PS: 5, MAP: 7, PIP: 11    04-18 @ 07:01  -  04-19 @ 07:00  --------------------------------------------------------  IN: 2180 mL / OUT: 1600 mL / NET: 580 mL      CAPILLARY BLOOD GLUCOSE      POCT Blood Glucose.: 112 mg/dL (18 Apr 2022 15:04)      HOSPITAL MEDICATIONS:  MEDICATIONS  (STANDING):  amantadine Syrup 100 milliGRAM(s) Oral two times a day  aMIOdarone    Tablet 200 milliGRAM(s) Oral daily  ascorbic acid 500 milliGRAM(s) Oral daily  carvedilol 6.25 milliGRAM(s) Oral every 12 hours  chlorhexidine 0.12% Liquid 15 milliLiter(s) Oral Mucosa every 12 hours  chlorhexidine 2% Cloths 1 Application(s) Topical daily  collagenase Ointment 1 Application(s) Topical daily  doxazosin 2 milliGRAM(s) Oral at bedtime  folic acid 1 milliGRAM(s) Oral daily  lactulose Syrup 20 Gram(s) Oral two times a day  levETIRAcetam  Solution 1000 milliGRAM(s) Oral two times a day  levothyroxine 50 MICROGram(s) Oral daily  modafinil 100 milliGRAM(s) Oral daily  pantoprazole Infusion 8 mG/Hr (10 mL/Hr) IV Continuous <Continuous>  polyethylene glycol 3350 17 Gram(s) Oral daily  senna Syrup 5 milliLiter(s) Oral at bedtime    MEDICATIONS  (PRN):  acetaminophen    Suspension .. 650 milliGRAM(s) Oral every 4 hours PRN Temp greater or equal to 38C (100.4F), Mild Pain (1 - 3)  ALBUTerol    90 MICROgram(s) HFA Inhaler 2 Puff(s) Inhalation every 6 hours PRN Shortness of Breath and/or Wheezing  ipratropium 17 MICROgram(s) HFA Inhaler 1 Puff(s) Inhalation every 6 hours PRN SOB and wheezing      LABS:                        6.4    11.89 )-----------( 185      ( 19 Apr 2022 07:47 )             21.1     04-19    146<H>  |  108<H>  |  75<H>  ----------------------------<  135<H>  5.6<H>   |  26  |  1.60<H>    Ca    8.0<L>      19 Apr 2022 07:47  Phos  4.3     04-19  Mg     2.60     04-19                PAST MEDICAL & SURGICAL HISTORY:  Essential hypertension    Primary osteoarthritis, unspecified site    Closed fracture of left radius, initial encounter    Morbid obesity, unspecified obesity type  h/o. - s/p gastric bypass- lost 113 lbs    KAREN (obstructive sleep apnea)  h/o - was on CPAP until gastric bypass surgery    Respiratory failure    Anemia    Subdural hemorrhage    Epilepsy    H/O gastrostomy    History of BPH    Afib    S/P gastric bypass  2008    Status post total replacement of left hip  2006    S/P bunionectomy  bilateral    S/P colonoscopy  approx 2012    History of abdominoplasty  and subsequent cosmetic surgery for removal of excess skin from face        FAMILY HISTORY:  Family history of renal cell carcinoma (Mother)    Family history of malignant melanoma (Sibling)        Social History:      RADIOLOGY:  [ ] Reviewed and interpreted by me    PULMONARY FUNCTION TESTS:    EKG:

## 2022-04-20 LAB
ALBUMIN SERPL ELPH-MCNC: 2.5 G/DL — LOW (ref 3.3–5)
ALP SERPL-CCNC: 101 U/L — SIGNIFICANT CHANGE UP (ref 40–120)
ALT FLD-CCNC: 36 U/L — SIGNIFICANT CHANGE UP (ref 4–41)
ANION GAP SERPL CALC-SCNC: 11 MMOL/L — SIGNIFICANT CHANGE UP (ref 7–14)
ANION GAP SERPL CALC-SCNC: 11 MMOL/L — SIGNIFICANT CHANGE UP (ref 7–14)
AST SERPL-CCNC: 33 U/L — SIGNIFICANT CHANGE UP (ref 4–40)
BILIRUB SERPL-MCNC: 0.3 MG/DL — SIGNIFICANT CHANGE UP (ref 0.2–1.2)
BUN SERPL-MCNC: 66 MG/DL — HIGH (ref 7–23)
BUN SERPL-MCNC: 69 MG/DL — HIGH (ref 7–23)
CALCIUM SERPL-MCNC: 8.1 MG/DL — LOW (ref 8.4–10.5)
CALCIUM SERPL-MCNC: 8.3 MG/DL — LOW (ref 8.4–10.5)
CHLORIDE SERPL-SCNC: 105 MMOL/L — SIGNIFICANT CHANGE UP (ref 98–107)
CHLORIDE SERPL-SCNC: 106 MMOL/L — SIGNIFICANT CHANGE UP (ref 98–107)
CO2 SERPL-SCNC: 24 MMOL/L — SIGNIFICANT CHANGE UP (ref 22–31)
CO2 SERPL-SCNC: 25 MMOL/L — SIGNIFICANT CHANGE UP (ref 22–31)
CREAT SERPL-MCNC: 1.52 MG/DL — HIGH (ref 0.5–1.3)
CREAT SERPL-MCNC: 1.55 MG/DL — HIGH (ref 0.5–1.3)
CYSTATIN C SERPL-MCNC: 2.68 MG/L — HIGH (ref 0.82–1.52)
EGFR: 46 ML/MIN/1.73M2 — LOW
EGFR: 47 ML/MIN/1.73M2 — LOW
GFR/BSA.PRED SERPLBLD CYS-BASED-ARV: 20 ML/MIN/1.73M2 — LOW
GLUCOSE SERPL-MCNC: 104 MG/DL — HIGH (ref 70–99)
GLUCOSE SERPL-MCNC: 87 MG/DL — SIGNIFICANT CHANGE UP (ref 70–99)
HCT VFR BLD CALC: 28 % — LOW (ref 39–50)
HGB BLD-MCNC: 8.6 G/DL — LOW (ref 13–17)
MAGNESIUM SERPL-MCNC: 2.5 MG/DL — SIGNIFICANT CHANGE UP (ref 1.6–2.6)
MAGNESIUM SERPL-MCNC: 2.6 MG/DL — SIGNIFICANT CHANGE UP (ref 1.6–2.6)
MCHC RBC-ENTMCNC: 30.7 GM/DL — LOW (ref 32–36)
MCHC RBC-ENTMCNC: 30.9 PG — SIGNIFICANT CHANGE UP (ref 27–34)
MCV RBC AUTO: 100.7 FL — HIGH (ref 80–100)
NRBC # BLD: 0 /100 WBCS — SIGNIFICANT CHANGE UP
NRBC # FLD: 0 K/UL — SIGNIFICANT CHANGE UP
OSMOLALITY SERPL: 324 MOSM/KG — HIGH (ref 275–295)
PHOSPHATE SERPL-MCNC: 4.2 MG/DL — SIGNIFICANT CHANGE UP (ref 2.5–4.5)
PHOSPHATE SERPL-MCNC: 4.2 MG/DL — SIGNIFICANT CHANGE UP (ref 2.5–4.5)
PLATELET # BLD AUTO: 159 K/UL — SIGNIFICANT CHANGE UP (ref 150–400)
POTASSIUM SERPL-MCNC: 5.3 MMOL/L — SIGNIFICANT CHANGE UP (ref 3.5–5.3)
POTASSIUM SERPL-MCNC: 5.3 MMOL/L — SIGNIFICANT CHANGE UP (ref 3.5–5.3)
POTASSIUM SERPL-SCNC: 5.3 MMOL/L — SIGNIFICANT CHANGE UP (ref 3.5–5.3)
POTASSIUM SERPL-SCNC: 5.3 MMOL/L — SIGNIFICANT CHANGE UP (ref 3.5–5.3)
PROT SERPL-MCNC: 6.7 G/DL — SIGNIFICANT CHANGE UP (ref 6–8.3)
RBC # BLD: 2.78 M/UL — LOW (ref 4.2–5.8)
RBC # FLD: 21.2 % — HIGH (ref 10.3–14.5)
SODIUM SERPL-SCNC: 141 MMOL/L — SIGNIFICANT CHANGE UP (ref 135–145)
SODIUM SERPL-SCNC: 141 MMOL/L — SIGNIFICANT CHANGE UP (ref 135–145)
WBC # BLD: 11.04 K/UL — HIGH (ref 3.8–10.5)
WBC # FLD AUTO: 11.04 K/UL — HIGH (ref 3.8–10.5)

## 2022-04-20 PROCEDURE — 99233 SBSQ HOSP IP/OBS HIGH 50: CPT | Mod: GC

## 2022-04-20 PROCEDURE — 99233 SBSQ HOSP IP/OBS HIGH 50: CPT

## 2022-04-20 PROCEDURE — 99358 PROLONG SERVICE W/O CONTACT: CPT | Mod: NC

## 2022-04-20 RX ORDER — FUROSEMIDE 40 MG
40 TABLET ORAL ONCE
Refills: 0 | Status: COMPLETED | OUTPATIENT
Start: 2022-04-20 | End: 2022-04-20

## 2022-04-20 RX ORDER — FUROSEMIDE 40 MG
40 TABLET ORAL DAILY
Refills: 0 | Status: DISCONTINUED | OUTPATIENT
Start: 2022-04-21 | End: 2022-05-05

## 2022-04-20 RX ORDER — HEPARIN SODIUM 5000 [USP'U]/ML
5000 INJECTION INTRAVENOUS; SUBCUTANEOUS EVERY 8 HOURS
Refills: 0 | Status: DISCONTINUED | OUTPATIENT
Start: 2022-04-20 | End: 2022-05-17

## 2022-04-20 RX ADMIN — LEVETIRACETAM 1000 MILLIGRAM(S): 250 TABLET, FILM COATED ORAL at 17:38

## 2022-04-20 RX ADMIN — Medication 50 MICROGRAM(S): at 05:04

## 2022-04-20 RX ADMIN — SENNA PLUS 10 MILLILITER(S): 8.6 TABLET ORAL at 22:00

## 2022-04-20 RX ADMIN — Medication 2 MILLIGRAM(S): at 22:00

## 2022-04-20 RX ADMIN — AMIODARONE HYDROCHLORIDE 200 MILLIGRAM(S): 400 TABLET ORAL at 05:04

## 2022-04-20 RX ADMIN — CHLORHEXIDINE GLUCONATE 15 MILLILITER(S): 213 SOLUTION TOPICAL at 17:39

## 2022-04-20 RX ADMIN — MODAFINIL 100 MILLIGRAM(S): 200 TABLET ORAL at 13:45

## 2022-04-20 RX ADMIN — LEVETIRACETAM 1000 MILLIGRAM(S): 250 TABLET, FILM COATED ORAL at 05:03

## 2022-04-20 RX ADMIN — Medication 100 MILLIGRAM(S): at 05:04

## 2022-04-20 RX ADMIN — HEPARIN SODIUM 5000 UNIT(S): 5000 INJECTION INTRAVENOUS; SUBCUTANEOUS at 22:05

## 2022-04-20 RX ADMIN — LACTULOSE 20 GRAM(S): 10 SOLUTION ORAL at 17:38

## 2022-04-20 RX ADMIN — Medication 40 MILLIGRAM(S): at 12:41

## 2022-04-20 RX ADMIN — PANTOPRAZOLE SODIUM 40 MILLIGRAM(S): 20 TABLET, DELAYED RELEASE ORAL at 17:39

## 2022-04-20 RX ADMIN — Medication 1 SUPPOSITORY(S): at 22:00

## 2022-04-20 RX ADMIN — Medication 100 MILLIGRAM(S): at 17:38

## 2022-04-20 RX ADMIN — CHLORHEXIDINE GLUCONATE 15 MILLILITER(S): 213 SOLUTION TOPICAL at 05:03

## 2022-04-20 RX ADMIN — Medication 500 MILLIGRAM(S): at 12:42

## 2022-04-20 RX ADMIN — Medication 1 MILLIGRAM(S): at 12:44

## 2022-04-20 RX ADMIN — LACTULOSE 20 GRAM(S): 10 SOLUTION ORAL at 05:03

## 2022-04-20 RX ADMIN — CHLORHEXIDINE GLUCONATE 1 APPLICATION(S): 213 SOLUTION TOPICAL at 12:44

## 2022-04-20 RX ADMIN — Medication 1 APPLICATION(S): at 12:42

## 2022-04-20 RX ADMIN — POLYETHYLENE GLYCOL 3350 17 GRAM(S): 17 POWDER, FOR SOLUTION ORAL at 12:43

## 2022-04-20 RX ADMIN — PANTOPRAZOLE SODIUM 40 MILLIGRAM(S): 20 TABLET, DELAYED RELEASE ORAL at 05:07

## 2022-04-20 NOTE — PROGRESS NOTE ADULT - SUBJECTIVE AND OBJECTIVE BOX
CHIEF COMPLAINT: Patient is a 76y old  Male who presents with a chief complaint of Hypoxia (19 Apr 2022 12:22)      Interval Events: Seen at bedside Transfused 2 u prbc for melena /anemia GI consulted , PPI IV, able to resume feedings per GI     REVIEW OF SYSTEMS:  [x ] Unable to assess ROS because AMS    Mode: CPAP with PS, FiO2: 30, PEEP: 5, PS: 5, MAP: 7, PIP: 11  Arterial Blood Gas:  04-19 @ 13:08  7.42/47/54/30/85.2/5.4  ABG lactate: --      OBJECTIVE:  ICU Vital Signs Last 24 Hrs  T(C): 36.6 (20 Apr 2022 05:00), Max: 37.1 (19 Apr 2022 09:20)  T(F): 97.8 (20 Apr 2022 05:00), Max: 98.7 (19 Apr 2022 09:20)  HR: 59 (20 Apr 2022 07:16) (58 - 65)  BP: 132/79 (20 Apr 2022 05:00) (118/70 - 136/85)  BP(mean): --  ABP: --  ABP(mean): --  RR: 14 (20 Apr 2022 05:00) (14 - 16)  SpO2: 100% (20 Apr 2022 07:16) (99% - 100%)    Mode: CPAP with PS, FiO2: 30, PEEP: 5, PS: 5, MAP: 7, PIP: 11    04-19 @ 07:01  -  04-20 @ 07:00  --------------------------------------------------------  IN: 600 mL / OUT: 1150 mL / NET: -550 mL      CAPILLARY BLOOD GLUCOSE      POCT Blood Glucose.: 132 mg/dL (19 Apr 2022 20:37)      HOSPITAL MEDICATIONS:  MEDICATIONS  (STANDING):  amantadine Syrup 100 milliGRAM(s) Oral two times a day  aMIOdarone    Tablet 200 milliGRAM(s) Oral daily  ascorbic acid 500 milliGRAM(s) Oral daily  carvedilol 6.25 milliGRAM(s) Oral every 12 hours  chlorhexidine 0.12% Liquid 15 milliLiter(s) Oral Mucosa every 12 hours  chlorhexidine 2% Cloths 1 Application(s) Topical daily  collagenase Ointment 1 Application(s) Topical daily  doxazosin 2 milliGRAM(s) Oral at bedtime  folic acid 1 milliGRAM(s) Oral daily  hydrocortisone hemorrhoidal Suppository 1 Suppository(s) Rectal at bedtime  lactulose Syrup 20 Gram(s) Oral two times a day  levETIRAcetam  Solution 1000 milliGRAM(s) Oral two times a day  levothyroxine 50 MICROGram(s) Oral daily  modafinil 100 milliGRAM(s) Oral daily  pantoprazole  Injectable 40 milliGRAM(s) IV Push two times a day  polyethylene glycol 3350 17 Gram(s) Oral daily  senna Syrup 10 milliLiter(s) Oral at bedtime    MEDICATIONS  (PRN):  acetaminophen    Suspension .. 650 milliGRAM(s) Oral every 4 hours PRN Temp greater or equal to 38C (100.4F), Mild Pain (1 - 3)  ALBUTerol    90 MICROgram(s) HFA Inhaler 2 Puff(s) Inhalation every 6 hours PRN Shortness of Breath and/or Wheezing  ipratropium 17 MICROgram(s) HFA Inhaler 1 Puff(s) Inhalation every 6 hours PRN SOB and wheezing      LABS:                        8.0    12.09 )-----------( 112      ( 19 Apr 2022 22:33 )             24.5     04-19    141  |  105  |  69<H>  ----------------------------<  87  5.3   |  25  |  1.52<H>    Ca    8.1<L>      19 Apr 2022 23:33  Phos  4.2     04-19  Mg     2.50     04-19          Arterial Blood Gas:  04-19 @ 13:08  7.42/47/54/30/85.2/5.4  ABG lactate: --        PAST MEDICAL & SURGICAL HISTORY:  Essential hypertension    Primary osteoarthritis, unspecified site    Closed fracture of left radius, initial encounter    Morbid obesity, unspecified obesity type  h/o. - s/p gastric bypass- lost 113 lbs    KAREN (obstructive sleep apnea)  h/o - was on CPAP until gastric bypass surgery    Respiratory failure    Anemia    Subdural hemorrhage    Epilepsy    H/O gastrostomy    History of BPH    Afib    S/P gastric bypass  2008    Status post total replacement of left hip  2006    S/P bunionectomy  bilateral    S/P colonoscopy  approx 2012    History of abdominoplasty  and subsequent cosmetic surgery for removal of excess skin from face        FAMILY HISTORY:  Family history of renal cell carcinoma (Mother)    Family history of malignant melanoma (Sibling)        Social History:      RADIOLOGY:  [ ] Reviewed and interpreted by me    PULMONARY FUNCTION TESTS:    EKG:

## 2022-04-20 NOTE — PROGRESS NOTE ADULT - PROBLEM SELECTOR PROBLEM 1
FLORIAN (acute kidney injury)

## 2022-04-20 NOTE — PROGRESS NOTE ADULT - ASSESSMENT
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 Now DISPO planning.     # Neurology   - Currently A&Ox0, awake and alert, able to move RUE and nods/ mouths one or two words per RCU evaluation and prior documentation.   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   - Continue on Modafinil, Amantidine and Keppra.     # Cardiovascular   - Hx of HFpEF 55, mild MR and AR, severe LAD, normal LVRVSF (ECHO 3/7)  - Hx of Atrial Fibrillation s/p Watchman and currently rate controlled.   - Continue on Amiodarone 200mg QD and Coreg 6.25mg BID   - Was initially on Lasix, however held given FLORIAN. proBNP 12K and s/p Lasix 40mg dose (4/15). Continue per dose and monitor I&Os.     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   - Continued on AC vent and able to tolerate PS and TC (from 4/15 during the day).   - Continue on Proventil and Atrovent PRN and Chest PT Q6H     # GI  - Hx of SDH, Gastric Bypass and Dysphagia s/p PEG and continued on TF.   - CT AP with moderate stool burden with no signs of sterocolitis.   - Monitor BMs on Senna and Lactulose.   - Episode of melena with decreased h/h 6.6 hgb, PPI gtt, npo , GI called ? EGD orig scheduled then deferred s/p 2 U PRBC   - FU H/H, IV PPI q12, ok to resume feeds per GI, miralax and anusol   - FU post transfusion cbc, keep hgb >8    # / Renal  - Hx of Urinary Retention with Chronic Garcia and presented FLORIAN likely in setting of prerenal dehydration with fevers vs ATN with Sepsis (CRE high 2s and baseline 0.8-0.9).   - UA with hematuria and proteinuria and case discussed with Neprhology with concern for glomerulonephritis. ANCA, ISABELLE and GM ABs negative.   - Home Lasix held, IVF and PRBC given and CRE improving.   - ProBNP elevated and ECHO reviewed, s/p Lasix 40mg IVP (4/15) with good response and CRE down trending, however Na rising.   - Hold further Lasix doses and c/w  QID.   - Continue on Cardura.   - Monitor renal function and UOP.   - + Hyperkalemia - change to Nepro TF, Insulin /d50x 1, Lokelma FU BMP in am   - K elevated lokelma given, insulin D50. FU K+ 5.6 repeat lokelma     # ID  - Recent admission for  Acinetobacter and Pseudomonas HCAP (3/2022)   - SCx (4/10) with  Acinetobacter and Pseudomonas.   - Completed Meropenem (4/8-4/14) and will monitor off ABX.     # Endocrine  - No hx of DM2 and A1C 5.9. Continue to monitor BG,    - Hx of Hypothyroidism and continued on Synthroid. TSH 47 with low T3/T4 and Free T4. TSH 80s (3/2022) and Synthroid increased at NH. Hypothyroidism could be in the setting of Amiodarone use and current Synthroid dose appears to be working.   - Next TFT to be done in June 2022.     # Hematology   - Anemia noted and i/s/o AOCD and s/p PRBC (4/9).   - No overt signs of bleeding and will monitor HH for now.   - DVT PPX with HSQ. - on hold     # Ethics   - FULL CODE   - Case discussed with Cousin/ HCP, Dr. Donna Hagen (Pediatric Neurologist, [phone]) and updated daily.     # DISPO - planning to go back to NH with improvement in renal function/ electrolytes. Family requesting 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 Now DISPO planning.     # Neurology   - Currently A&Ox0, awake and alert, able to move RUE and nods/ mouths one or two words per RCU evaluation and prior documentation.   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   - Continue on Modafinil, Amantidine and Keppra.     # Cardiovascular   - Hx of HFpEF 55, mild MR and AR, severe LAD, normal LVRVSF (ECHO 3/7)  - Hx of Atrial Fibrillation s/p Watchman and currently rate controlled.   - Continue on Amiodarone 200mg QD and Coreg 6.25mg BID   - Was initially on Lasix, however held given FLORIAN. proBNP 12K and s/p Lasix 40mg dose (4/15). Continue per dose and monitor I&Os.   - Restart lasix per nephrology     # Respiratory   - Hx of SDH and chronically trached and vented with recurrent HCAP infections.   - Continued on AC vent and able to tolerate PS and TC (from 4/15 during the day).   - Continue on Proventil and Atrovent PRN and Chest PT Q6H     # GI  - Hx of SDH, Gastric Bypass and Dysphagia s/p PEG and continued on TF.   - CT AP with moderate stool burden with no signs of sterocolitis.   - Monitor BMs on Senna and Lactulose.   - Episode of melena with decreased h/h 6.6 hgb, PPI gtt, npo , GI called ? EGD orig scheduled then deferred s/p 2 U PRBC   - FU H/H, IV PPI q12, ok to resume feeds per GI, miralax and anusol   - FU post transfusion cbc, keep hgb >8 ***4/20  hgb 8.6 s/p 2 units   - No overnight melena     # / Renal  - Hx of Urinary Retention with Chronic Garcia and presented FLORIAN likely in setting of prerenal dehydration with fevers vs ATN with Sepsis (CRE high 2s and baseline 0.8-0.9).   - UA with hematuria and proteinuria and case discussed with Neprhology with concern for glomerulonephritis. ANCA, ISABELLE and GM ABs negative.   - Home Lasix held, IVF and PRBC given and CRE improving.   - ProBNP elevated and ECHO reviewed, s/p Lasix 40mg IVP (4/15) with good response and CRE down trending, however Na rising.   - Hold further Lasix doses and c/w  QID.   - Continue on Cardura.   - Monitor renal function and UOP.   - + Hyperkalemia - change to Nepro TF, Insulin /d50x 1, Lokelma FU BMP in am   - K elevated lokelma given, insulin D50. FU K+ 5.6 repeat lokelma   - S/p Lasix follow bmp     # ID  - Recent admission for  Acinetobacter and Pseudomonas HCAP (3/2022)   - SCx (4/10) with  Acinetobacter and Pseudomonas.   - Completed Meropenem (4/8-4/14) and will monitor off ABX.     # Endocrine  - No hx of DM2 and A1C 5.9. Continue to monitor BG,    - Hx of Hypothyroidism and continued on Synthroid. TSH 47 with low T3/T4 and Free T4. TSH 80s (3/2022) and Synthroid increased at NH. Hypothyroidism could be in the setting of Amiodarone use and current Synthroid dose appears to be working.   - Next TFT to be done in June 2022.     # Hematology   - Anemia noted and i/s/o AOCD and s/p PRBC (4/9).   - No overt signs of bleeding and will monitor HH for now.   - DVT PPX with HSQ. - on hold Will FU with GI re restarting     # Ethics   - FULL CODE   - Case discussed with Cousin/ HCP, Dr. Donna Hagen (Pediatric Neurologist, [phone]) and updated daily.     # DISPO - planning to go back to NH with improvement in renal function/ electrolytes. Family requesting [facility] - SW aware

## 2022-04-20 NOTE — PROGRESS NOTE ADULT - PROBLEM SELECTOR PLAN 1
Pt. with FLORIAN in the setting of sepsis, anemia and hypotension. Pt. with likely ATN. Upon review of [facility], Scr was 0.8 on 3/16/2022. On admission, Scr was 2.3. Scr increased to 2.93 on 4/10, decreased to 1.85 today (4/15/22). Pt. with increased UOP of 2L over the past 24 hours. Pt. with chronic Garcia catheter that was replaced on day of admission. Non-contrast CT abdomen and pelvis negative for hydronephrosis or renal structural abnormalities. UA showed proteinuria and hematuria. C3 and C4 not low, HIV, ISABELLE P-ANCA and C-ANCA were all negative and no monoclonal band identified seen on serum ISIDRA. Monitor labs and urine output. Avoid NSAIDs, ACEI/ARBS, RCA and nephrotoxins. Dose medications as per eGFR.    4/20/22  - Cr 1.55, stable. Likely this is overestimating his true GFR  - Cystatin ordered  - No other critical electrolyte, acid-base, or volume derangements based on available labs  - No urgent HD indication

## 2022-04-20 NOTE — PROGRESS NOTE ADULT - ASSESSMENT
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 GI consulted for anemia     Anemia   PEG flushed/lavaged; feeds and bilious fluid aspirated, no overt blood products noted  defer endoscopic eval given above; transfuse PRBC to keep hgb close to 8   Protonix 40mg IVP BID; change to via peg daily on d/c  dark stool and occult could be d/t constipated stool/irritated hemorrhoid  keep stools soft with senna syrup and miralax; enema as needed  anusol suppository qhs x 5 days   no objection to peg feeds    HCAP   per RCU/ID       I reviewed the overnight course of events on the unit, re-confirming the patient history. I discussed the care with the patient and their family. The plan of care was discussed with the physician assistant and modifications were made to the notation where appropriate. Differential diagnosis and plan of care discussed with patient after the evaluation. Advanced care planning was discussed with patient and family.  Advanced care planning forms were reviewed and discussed.  Risks, benefits and alternatives of gastroenterologic procedures were discussed in detail and all questions were answered. 35 minutes spent on total encounter of which more than fifty percent of the encounter was spent counseling and/or coordinating care by the attending physician.

## 2022-04-20 NOTE — PROGRESS NOTE ADULT - SUBJECTIVE AND OBJECTIVE BOX
[facility] DIVISION OF KIDNEY DISEASES AND HYPERTENSION -- FOLLOW UP NOTE  --------------------------------------------------------------------------------  Chief Complaint: FLORIAN    HPI: Pt. with FLORIAN in the setting of sepsis, anemia and hypotension. Pt. with likely ATN. [facility].8 on [facility]/16/2022. On admission, Scr was 2.3. Scr increased to 2.93 on 4/10.    4/20  Pt. was seen and examined this morning. Pt. with tracheostomy, unable to provide ROS.   No purposeful mvoements      PAST HISTORY  --------------------------------------------------------------------------------  No significant changes to PMH, PSH, FHx, SHx, unless otherwise noted    ALLERGIES & MEDICATIONS  --------------------------------------------------------------------------------  Allergies    No Known Allergies    Intolerances    MEDICATIONS  (STANDING):  amantadine Syrup 100 milliGRAM(s) Oral two times a day  aMIOdarone    Tablet 200 milliGRAM(s) Oral daily  ascorbic acid 500 milliGRAM(s) Oral daily  carvedilol 6.25 milliGRAM(s) Oral every 12 hours  chlorhexidine 0.12% Liquid 15 milliLiter(s) Oral Mucosa every 12 hours  chlorhexidine 2% Cloths 1 Application(s) Topical daily  collagenase Ointment 1 Application(s) Topical daily  doxazosin 2 milliGRAM(s) Oral at bedtime  folic acid 1 milliGRAM(s) Oral daily  hydrocortisone hemorrhoidal Suppository 1 Suppository(s) Rectal at bedtime  lactulose Syrup 20 Gram(s) Oral two times a day  levETIRAcetam  Solution 1000 milliGRAM(s) Oral two times a day  levothyroxine 50 MICROGram(s) Oral daily  modafinil 100 milliGRAM(s) Oral daily  pantoprazole  Injectable 40 milliGRAM(s) IV Push two times a day  polyethylene glycol 3350 17 Gram(s) Oral daily  senna Syrup 10 milliLiter(s) Oral at bedtime    MEDICATIONS  (PRN):  acetaminophen    Suspension .. 650 milliGRAM(s) Oral every 4 hours PRN Temp greater or equal to 38C (100.4F), Mild Pain (1 - 3)  ALBUTerol    90 MICROgram(s) HFA Inhaler 2 Puff(s) Inhalation every 6 hours PRN Shortness of Breath and/or Wheezing  ipratropium 17 MICROgram(s) HFA Inhaler 1 Puff(s) Inhalation every 6 hours PRN SOB and wheezing      REVIEW OF SYSTEMS  Unable to obtain    VITALS/PHYSICAL EXAM  --------------------------------------------------------------------------------  Vital Signs Last 24 Hrs  T(C): 36.6 (20 Apr 2022 05:00), Max: 36.9 (19 Apr 2022 14:00)  T(F): 97.8 (20 Apr 2022 05:00), Max: 98.4 (19 Apr 2022 14:00)  HR: 59 (20 Apr 2022 07:16) (58 - 65)  BP: 132/79 (20 Apr 2022 05:00) (127/78 - 136/85)  BP(mean): --  RR: 14 (20 Apr 2022 05:00) (14 - 16)  SpO2: 100% (20 Apr 2022 07:16) (99% - 100%)    Physical Exam:  	Gen: resting, chronically ill appearing  	HEENT: Trach+   	Pulm: CTA B/L anteriorly  	CV: S1S2+  	Abd: Soft, PEG+, Garcia+  	Ext: No LE edema B/L  	Neuro: not awake or alert   	Skin: Warm and dry    LABS/STUDIES  --------------------------------------------------------------------------------           04-20    141  |  106  |  66<H>  ----------------------------<  104<H>  5.3   |  24  |  1.55<H>    Ca    8.3<L>      20 Apr 2022 09:22  Phos  4.2     04-20  Mg     2.60     04-20    TPro  6.7  /  Alb  2.5<L>  /  TBili  0.3  /  DBili  x   /  AST  33  /  ALT  36  /  AlkPhos  101  04-20   [facility] DIVISION OF KIDNEY DISEASES AND HYPERTENSION -- FOLLOW UP NOTE  --------------------------------------------------------------------------------  Chief Complaint: FLORIAN    HPI: Pt. with FLORIAN in the setting of sepsis, anemia and hypotension. Pt. with likely ATN. [facility] Scr was 0.8 on 3/16/2022. On admission, Scr was 2.3. Scr increased to 2.93 on 4/10.    4/20  Pt. was seen and examined this morning. Pt. with tracheostomy, unable to provide ROS.   No purposeful mvoements      PAST HISTORY  --------------------------------------------------------------------------------  No significant changes to PMH, PSH, FHx, SHx, unless otherwise noted    ALLERGIES & MEDICATIONS  --------------------------------------------------------------------------------  Allergies    No Known Allergies    Intolerances    MEDICATIONS  (STANDING):  amantadine Syrup 100 milliGRAM(s) Oral two times a day  aMIOdarone    Tablet 200 milliGRAM(s) Oral daily  ascorbic acid 500 milliGRAM(s) Oral daily  carvedilol 6.25 milliGRAM(s) Oral every 12 hours  chlorhexidine 0.12% Liquid 15 milliLiter(s) Oral Mucosa every 12 hours  chlorhexidine 2% Cloths 1 Application(s) Topical daily  collagenase Ointment 1 Application(s) Topical daily  doxazosin 2 milliGRAM(s) Oral at bedtime  folic acid 1 milliGRAM(s) Oral daily  hydrocortisone hemorrhoidal Suppository 1 Suppository(s) Rectal at bedtime  lactulose Syrup 20 Gram(s) Oral two times a day  levETIRAcetam  Solution 1000 milliGRAM(s) Oral two times a day  levothyroxine 50 MICROGram(s) Oral daily  modafinil 100 milliGRAM(s) Oral daily  pantoprazole  Injectable 40 milliGRAM(s) IV Push two times a day  polyethylene glycol 3350 17 Gram(s) Oral daily  senna Syrup 10 milliLiter(s) Oral at bedtime    MEDICATIONS  (PRN):  acetaminophen    Suspension .. 650 milliGRAM(s) Oral every 4 hours PRN Temp greater or equal to 38C (100.4F), Mild Pain (1 - 3)  ALBUTerol    90 MICROgram(s) HFA Inhaler 2 Puff(s) Inhalation every 6 hours PRN Shortness of Breath and/or Wheezing  ipratropium 17 MICROgram(s) HFA Inhaler 1 Puff(s) Inhalation every 6 hours PRN SOB and wheezing      REVIEW OF SYSTEMS  Unable to obtain    VITALS/PHYSICAL EXAM  --------------------------------------------------------------------------------  Vital Signs Last 24 Hrs  T(C): 36.6 (20 Apr 2022 05:00), Max: 36.9 (19 Apr 2022 14:00)  T(F): 97.8 (20 Apr 2022 05:00), Max: 98.4 (19 Apr 2022 14:00)  HR: 59 (20 Apr 2022 07:16) (58 - 65)  BP: 132/79 (20 Apr 2022 05:00) (127/78 - 136/85)  BP(mean): --  RR: 14 (20 Apr 2022 05:00) (14 - 16)  SpO2: 100% (20 Apr 2022 07:16) (99% - 100%)    Physical Exam:  	Gen: resting, chronically ill appearing  	HEENT: Trach+   	Pulm: CTA B/L anteriorly  	CV: S1S2+  	Abd: Soft, PEG+, Garcia+  	Ext: No LE edema B/L  	Neuro: not awake or alert   	Skin: Warm and dry    LABS/STUDIES  --------------------------------------------------------------------------------           04-20    141  |  106  |  66<H>  ----------------------------<  104<H>  5.3   |  24  |  1.55<H>    Ca    8.3<L>      20 Apr 2022 09:22  Phos  4.2     04-20  Mg     2.60     04-20    TPro  6.7  /  Alb  2.5<L>  /  TBili  0.3  /  DBili  x   /  AST  33  /  ALT  36  /  AlkPhos  101  04-20      --------------------------------------------------------------------------------------------------------------------  NEPHROLOGY CARE COORDINATION DOCUMENTATION  [x] Inpatient Consult  [ ] Other:  --------------------------------------------------------------------------------------------------------------------  ------------------------------------------------------------------------  MEDICATION REVIEW:  --- Known exposure to NSAIDS, contrast, other known nephrotoxins: [ ] YES  [ ] NO  ------------------------------------------------------------------------  DIAGNOSTIC REVIEW:  --- Evidence of obstruction: [ ] YES  [ ] NO  --- UA findings reviewed: [ ] YES  [ ] NO  --- Hemodynamics reviewed: [ X] YES  [ ] NO  ------------------------------------------------------------------------  COORDINATION OF CARE:  --- Nephrology consulted for: FLORIAN  --- Patient assessed: 4/20  --- Patient previously seen by Nephroogy: NO  ------------------------------------------------------------------------  CARE PROVIDER DOCUMENTATION:  --- TTKG, FEK: Calculations done  --- PULM: Continued on AC vent and able to tolerate PS and TC (from 4/15 during the day).   --- GI: PEG flushed/lavaged; feeds and bilious fluid aspirated, no overt blood products noted  will defer endoscopic eval given above; transfuse PRBC to keep hgb close to 8   ------------------------------------------------------------------------  --- Chart reviewed: 30 Minutes [including time used to gather, review and transfer data to this note]  --- Start: 1:00  --- End: 1:30  Prolonged services rendered, as part of this patient's care provided by Nephrology, include: i.chart review for provider and ancillary service documentation, ii.pertinent diagnostics including laboratory and imaging studies,iii. medication review including PRN use, iv. admission history including previous encounters and notes. Part of Nephrology extended evaluation and management also involves coordination of care with our IDT, the primary and consulting teams. Recommendations based on the information gathered and discussed are outlined in the A&P of our notes.    ************************************************************************

## 2022-04-20 NOTE — PROGRESS NOTE ADULT - PROBLEM SELECTOR PLAN 2
Pt. with mild hypernatremia in setting of increased UOP, likely from diuretic/recovery phase of ATN. Currently on free water flushes through feeding tube. Can increase to 300mL q6hrs. Monitor SNa daily.    4/20/22  - Improved

## 2022-04-20 NOTE — PROGRESS NOTE ADULT - NS ATTEND AMEND GEN_ALL_CORE FT
Trial of TC round the clock  Nephrology f/u appreciated  Lasix restarted  d/c planning, family interested in an LTAC in [place]

## 2022-04-20 NOTE — PROGRESS NOTE ADULT - PROBLEM SELECTOR PLAN 3
Pt. noted to be hyperkalemic with serum potassium of 5.4 this morning likely in setting of FLORIAN. Pt. is also on Jevity feeds which is high in potassium, consider switching to Nepro. Recommend one time dose of lokelma 5g. Monitor potassium.    If any questions, please feel free to contact me     Ritchie Estes  Nephrology Fellow  [facility] Pager: [phone]
.  TTKG 6.68  FeK 38%    4/20  - Seems like kidneys responsing well, trying to excrete excess K although at a lower rate than expected. May be due to kidney dysfunction that we may be overestimating w/ lower Cr levels  - Lasix also held just as K started to increase. He has signs of vol overload. Would restart Lasix 40 daily  - Agree with minimizing inputs. Jevity to Char    If any questions, please feel free to contact me     Ritchie Estes  Nephrology Fellow  [facility] Pager: [phone]

## 2022-04-20 NOTE — PROGRESS NOTE ADULT - SUBJECTIVE AND OBJECTIVE BOX
INTERVAL HPI/OVERNIGHT EVENTS:    no melena   tolerating peg feeds at 45cc/hr this am    MEDICATIONS  (STANDING):  amantadine Syrup 100 milliGRAM(s) Oral two times a day  aMIOdarone    Tablet 200 milliGRAM(s) Oral daily  ascorbic acid 500 milliGRAM(s) Oral daily  carvedilol 6.25 milliGRAM(s) Oral every 12 hours  chlorhexidine 0.12% Liquid 15 milliLiter(s) Oral Mucosa every 12 hours  chlorhexidine 2% Cloths 1 Application(s) Topical daily  collagenase Ointment 1 Application(s) Topical daily  doxazosin 2 milliGRAM(s) Oral at bedtime  folic acid 1 milliGRAM(s) Oral daily  hydrocortisone hemorrhoidal Suppository 1 Suppository(s) Rectal at bedtime  lactulose Syrup 20 Gram(s) Oral two times a day  levETIRAcetam  Solution 1000 milliGRAM(s) Oral two times a day  levothyroxine 50 MICROGram(s) Oral daily  modafinil 100 milliGRAM(s) Oral daily  pantoprazole  Injectable 40 milliGRAM(s) IV Push two times a day  polyethylene glycol 3350 17 Gram(s) Oral daily  senna Syrup 10 milliLiter(s) Oral at bedtime    MEDICATIONS  (PRN):  acetaminophen    Suspension .. 650 milliGRAM(s) Oral every 4 hours PRN Temp greater or equal to 38C (100.4F), Mild Pain (1 - 3)  ALBUTerol    90 MICROgram(s) HFA Inhaler 2 Puff(s) Inhalation every 6 hours PRN Shortness of Breath and/or Wheezing  ipratropium 17 MICROgram(s) HFA Inhaler 1 Puff(s) Inhalation every 6 hours PRN SOB and wheezing      Allergies    No Known Allergies    Intolerances        Review of Systems: *pt minimally verbal to nonverbal, unable to obtain ROS         Vital Signs Last 24 Hrs  T(C): 36.4 (20 Apr 2022 10:17), Max: 36.9 (19 Apr 2022 14:00)  T(F): 97.5 (20 Apr 2022 10:17), Max: 98.4 (19 Apr 2022 14:00)  HR: 62 (20 Apr 2022 11:01) (58 - 65)  BP: 144/87 (20 Apr 2022 10:17) (127/78 - 144/87)  BP(mean): --  RR: 18 (20 Apr 2022 11:00) (14 - 18)  SpO2: 100% (20 Apr 2022 11:01) (99% - 100%)    PHYSICAL EXAM:    Constitutional: NAD  HEENT: EOMI, throat clear  Neck: No LAD, supple  Respiratory: trach  Cardiovascular: S1 and S2, RRR, no M  Gastrointestinal: BS+, soft, NT/ND, neg HSM, +peg  Extremities: No peripheral edema, neg clubbing, cyanosis  Vascular: 2+ peripheral pulses  Neurological: A/O x0  Psychiatric: lethargic/nonverbal  Skin: No rashes      LABS:                        8.6    11.04 )-----------( 159      ( 20 Apr 2022 09:22 )             28.0     04-20    141  |  106  |  66<H>  ----------------------------<  104<H>  5.3   |  24  |  1.55<H>    Ca    8.3<L>      20 Apr 2022 09:22  Phos  4.2     04-20  Mg     2.60     04-20    TPro  6.7  /  Alb  2.5<L>  /  TBili  0.3  /  DBili  x   /  AST  33  /  ALT  36  /  AlkPhos  101  04-20          RADIOLOGY & ADDITIONAL TESTS:

## 2022-04-21 PROCEDURE — 99233 SBSQ HOSP IP/OBS HIGH 50: CPT | Mod: GC

## 2022-04-21 RX ORDER — MODAFINIL 200 MG/1
100 TABLET ORAL DAILY
Refills: 0 | Status: DISCONTINUED | OUTPATIENT
Start: 2022-04-21 | End: 2022-04-26

## 2022-04-21 RX ADMIN — CHLORHEXIDINE GLUCONATE 15 MILLILITER(S): 213 SOLUTION TOPICAL at 17:47

## 2022-04-21 RX ADMIN — Medication 100 MILLIGRAM(S): at 06:03

## 2022-04-21 RX ADMIN — LACTULOSE 20 GRAM(S): 10 SOLUTION ORAL at 17:48

## 2022-04-21 RX ADMIN — LEVETIRACETAM 1000 MILLIGRAM(S): 250 TABLET, FILM COATED ORAL at 17:47

## 2022-04-21 RX ADMIN — Medication 1 SUPPOSITORY(S): at 21:44

## 2022-04-21 RX ADMIN — HEPARIN SODIUM 5000 UNIT(S): 5000 INJECTION INTRAVENOUS; SUBCUTANEOUS at 21:44

## 2022-04-21 RX ADMIN — AMIODARONE HYDROCHLORIDE 200 MILLIGRAM(S): 400 TABLET ORAL at 05:00

## 2022-04-21 RX ADMIN — CHLORHEXIDINE GLUCONATE 15 MILLILITER(S): 213 SOLUTION TOPICAL at 05:00

## 2022-04-21 RX ADMIN — LACTULOSE 20 GRAM(S): 10 SOLUTION ORAL at 05:03

## 2022-04-21 RX ADMIN — SENNA PLUS 10 MILLILITER(S): 8.6 TABLET ORAL at 21:44

## 2022-04-21 RX ADMIN — HEPARIN SODIUM 5000 UNIT(S): 5000 INJECTION INTRAVENOUS; SUBCUTANEOUS at 14:13

## 2022-04-21 RX ADMIN — Medication 40 MILLIGRAM(S): at 06:04

## 2022-04-21 RX ADMIN — MODAFINIL 100 MILLIGRAM(S): 200 TABLET ORAL at 14:13

## 2022-04-21 RX ADMIN — PANTOPRAZOLE SODIUM 40 MILLIGRAM(S): 20 TABLET, DELAYED RELEASE ORAL at 05:01

## 2022-04-21 RX ADMIN — LEVETIRACETAM 1000 MILLIGRAM(S): 250 TABLET, FILM COATED ORAL at 05:01

## 2022-04-21 RX ADMIN — Medication 1 APPLICATION(S): at 12:02

## 2022-04-21 RX ADMIN — Medication 100 MILLIGRAM(S): at 17:48

## 2022-04-21 RX ADMIN — POLYETHYLENE GLYCOL 3350 17 GRAM(S): 17 POWDER, FOR SOLUTION ORAL at 12:06

## 2022-04-21 RX ADMIN — PANTOPRAZOLE SODIUM 40 MILLIGRAM(S): 20 TABLET, DELAYED RELEASE ORAL at 17:48

## 2022-04-21 RX ADMIN — Medication 500 MILLIGRAM(S): at 12:05

## 2022-04-21 RX ADMIN — Medication 50 MICROGRAM(S): at 05:00

## 2022-04-21 RX ADMIN — Medication 2 MILLIGRAM(S): at 21:44

## 2022-04-21 RX ADMIN — Medication 1 MILLIGRAM(S): at 12:05

## 2022-04-21 RX ADMIN — CHLORHEXIDINE GLUCONATE 1 APPLICATION(S): 213 SOLUTION TOPICAL at 12:02

## 2022-04-21 RX ADMIN — HEPARIN SODIUM 5000 UNIT(S): 5000 INJECTION INTRAVENOUS; SUBCUTANEOUS at 05:00

## 2022-04-21 NOTE — PROGRESS NOTE ADULT - NS ATTEND AMEND GEN_ALL_CORE FT
Trial of TC round the clock  Nephrology f/u appreciated  Lasix restarted  d/c planning, family interested in an LTAC in [place]. Continued PT.

## 2022-04-21 NOTE — PROGRESS NOTE ADULT - ASSESSMENT
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 Now DISPO planning.  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 Now DISPO planning.    # Neurology   - Currently A&Ox0, awake and alert, able to move RUE and nods/ mouths one or two words per RCU evaluation and prior documentation.   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   - Continue on Modafinil, Amantidine and Keppra.     # Cardiovascular   - Hx of HFpEF 55, mild MR and AR, severe LAD, normal LVRVSF (ECHO 3/7)  - Hx of Atrial Fibrillation s/p Watchman and currently rate controlled.   - Continue on Amiodarone 200mg QD and Coreg 6.25mg BID.   - Continue on Lasix 40mg QD and monitor tolerance.     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   - Continued on AC vent and able to tolerate PS and TC (from 4/15 during the day).   - Continue on Proventil and Atrovent PRN and Chest PT Q6H   - Plan to trial on TC x 24 hours     # GI  - Hx of SDH, Gastric Bypass and Dysphagia s/p PEG and continued on TF with course complicated by constipatio and melena  - Case discussed with GI and melena likely in setting of constipation, however no plan for scope currently and will medically treat with PPI.   - Monitor BMs on Miralax, Senna and Lactulose.   - Continue on Nepro in sight of hyperkalemia.     # / Renal  - Hx of Urinary Retention with Chronic Garcia and presented FLORIAN likely in setting of dehydration with fevers vs ATN with Sepsis (CRE high 2s and baseline 0.8-0.9).   - UA with hematuria and proteinuria and case discussed with Neprhology with concern for glomerulonephritis. ANCA, ISABELLE and GM ABs negative.   - Home Lasix held, IVF and PRBC given with CRE improving.   - Hypernatremia noted and continued on  Q6H as tolerated.   - Hyperkalemia and s/p cocktail with improvement (4/19). Now Lasix 40mg QD resumed and will monitor tolerance.     # ID  - Recent admission for  Acinetobacter and Pseudomonas HCAP (3/2022)   - SCx (4/10) with  Acinetobacter and Pseudomonas.   - Completed Meropenem (4/8-4/14) and will monitor off ABX.     # Endocrine  - No hx of DM2 and A1C 5.9. Continue to monitor BG,    - Hx of Hypothyroidism and continued on Synthroid. TSH 47 with low T3/T4 and Free T4. TSH 80s (3/2022) and Synthroid increased at NH. Hypothyroidism could be in the setting of Amiodarone use and current Synthroid dose appears to be working.   - Next TFT to be done in June 2022.     # Hematology   - Anemia i/s/o AOCD and s/p PRBC (4/9)   - Melena/ anemia noted and s/p 2 U PRBC (4/19)  with good response.   - DVT PPX with HSQ (cleared by GI to resume).     # Ethics   - FULL CODE   - Case discussed with Cousin/ HCP, Dr. Donna Hagen (Pediatric Neurologist, [phone]) and updated daily.     # DISPO - Planning to go back to NH with improvement in renal function/ electrolytes. Family requesting [facility] and SW aware

## 2022-04-21 NOTE — PROGRESS NOTE ADULT - SUBJECTIVE AND OBJECTIVE BOX
INTERVAL HPI/OVERNIGHT EVENTS:    brown stool   tolerating feeds  h/h stable    MEDICATIONS  (STANDING):  amantadine Syrup 100 milliGRAM(s) Oral two times a day  aMIOdarone    Tablet 200 milliGRAM(s) Oral daily  ascorbic acid 500 milliGRAM(s) Oral daily  carvedilol 6.25 milliGRAM(s) Oral every 12 hours  chlorhexidine 0.12% Liquid 15 milliLiter(s) Oral Mucosa every 12 hours  chlorhexidine 2% Cloths 1 Application(s) Topical daily  collagenase Ointment 1 Application(s) Topical daily  doxazosin 2 milliGRAM(s) Oral at bedtime  folic acid 1 milliGRAM(s) Oral daily  furosemide Solution 40 milliGRAM(s) Oral daily  heparin   Injectable 5000 Unit(s) SubCutaneous every 8 hours  hydrocortisone hemorrhoidal Suppository 1 Suppository(s) Rectal at bedtime  lactulose Syrup 20 Gram(s) Oral two times a day  levETIRAcetam  Solution 1000 milliGRAM(s) Oral two times a day  levothyroxine 50 MICROGram(s) Oral daily  modafinil 100 milliGRAM(s) Oral daily  pantoprazole  Injectable 40 milliGRAM(s) IV Push two times a day  polyethylene glycol 3350 17 Gram(s) Oral daily  senna Syrup 10 milliLiter(s) Oral at bedtime    MEDICATIONS  (PRN):  acetaminophen    Suspension .. 650 milliGRAM(s) Oral every 4 hours PRN Temp greater or equal to 38C (100.4F), Mild Pain (1 - 3)  ALBUTerol    90 MICROgram(s) HFA Inhaler 2 Puff(s) Inhalation every 6 hours PRN Shortness of Breath and/or Wheezing  ipratropium 17 MICROgram(s) HFA Inhaler 1 Puff(s) Inhalation every 6 hours PRN SOB and wheezing      Allergies    No Known Allergies    Intolerances        Review of Systems: *unobtainable        Vital Signs Last 24 Hrs  T(C): 36.3 (21 Apr 2022 09:41), Max: 36.6 (20 Apr 2022 18:46)  T(F): 97.3 (21 Apr 2022 09:41), Max: 97.8 (20 Apr 2022 18:46)  HR: 62 (21 Apr 2022 09:59) (53 - 62)  BP: 121/69 (21 Apr 2022 09:41) (121/69 - 147/82)  BP(mean): --  RR: 18 (21 Apr 2022 09:58) (13 - 18)  SpO2: 98% (21 Apr 2022 09:59) (98% - 100%)    PHYSICAL EXAM:    Constitutional: NAD  HEENT: EOMI, throat clear  Neck: No LAD, supple +trach  Respiratory: CTA and P  Cardiovascular: S1 and S2, RRR, no M   Gastrointestinal: BS+, soft, NT/ND, neg HSM, +peg  Extremities: No peripheral edema, neg clubbing, cyanosis  Vascular: 2+ peripheral pulses  Neurological: A/O x0  Psychiatric: lethargy  Skin: No rashes      LABS:                        8.6    11.04 )-----------( 159      ( 20 Apr 2022 09:22 )             28.0     04-20    141  |  106  |  66<H>  ----------------------------<  104<H>  5.3   |  24  |  1.55<H>    Ca    8.3<L>      20 Apr 2022 09:22  Phos  4.2     04-20  Mg     2.60     04-20    TPro  6.7  /  Alb  2.5<L>  /  TBili  0.3  /  DBili  x   /  AST  33  /  ALT  36  /  AlkPhos  101  04-20          RADIOLOGY & ADDITIONAL TESTS:

## 2022-04-21 NOTE — PROGRESS NOTE ADULT - SUBJECTIVE AND OBJECTIVE BOX
CHIEF COMPLAINT: Patient is a 76y old  Male who presents with a chief complaint of Hypoxia (20 Apr 2022 12:49)    INTERVAL EVENTS:  - No interval events overnight.     REVIEW OF SYSTEMS: Seen by bedside during AM rounds and     Mode: AC/ CMV (Assist Control/ Continuous Mandatory Ventilation), RR (machine): 12, TV (machine): 450, FiO2: 30, PEEP: 5, ITime: 0.74, MAP: 8, PIP: 24  Arterial Blood Gas:  04-19 @ 13:08  7.42/47/54/30/85.2/5.4  ABG lactate: --    OBJECTIVE:  ICU Vital Signs Last 24 Hrs  T(C): 36.6 (21 Apr 2022 06:00), Max: 36.6 (20 Apr 2022 18:46)  T(F): 97.8 (21 Apr 2022 06:00), Max: 97.8 (20 Apr 2022 18:46)  HR: 59 (21 Apr 2022 07:46) (53 - 62)  BP: 131/79 (21 Apr 2022 06:00) (131/79 - 147/82)  BP(mean): --  ABP: --  ABP(mean): --  RR: 13 (21 Apr 2022 06:00) (13 - 18)  SpO2: 98% (21 Apr 2022 07:46) (98% - 100%)    Mode: AC/ CMV (Assist Control/ Continuous Mandatory Ventilation), RR (machine): 12, TV (machine): 450, FiO2: 30, PEEP: 5, ITime: 0.74, MAP: 8, PIP: 24    04-20 @ 07:01  -  04-21 @ 07:00  --------------------------------------------------------  IN: 2280 mL / OUT: 2750 mL / NET: -470 mL    CAPILLARY BLOOD GLUCOSE  POCT Blood Glucose.: 132 mg/dL (19 Apr 2022 20:37)    HOSPITAL MEDICATIONS:  MEDICATIONS  (STANDING):  amantadine Syrup 100 milliGRAM(s) Oral two times a day  aMIOdarone    Tablet 200 milliGRAM(s) Oral daily  ascorbic acid 500 milliGRAM(s) Oral daily  carvedilol 6.25 milliGRAM(s) Oral every 12 hours  chlorhexidine 0.12% Liquid 15 milliLiter(s) Oral Mucosa every 12 hours  chlorhexidine 2% Cloths 1 Application(s) Topical daily  collagenase Ointment 1 Application(s) Topical daily  doxazosin 2 milliGRAM(s) Oral at bedtime  folic acid 1 milliGRAM(s) Oral daily  furosemide Solution 40 milliGRAM(s) Oral daily  heparin   Injectable 5000 Unit(s) SubCutaneous every 8 hours  hydrocortisone hemorrhoidal Suppository 1 Suppository(s) Rectal at bedtime  lactulose Syrup 20 Gram(s) Oral two times a day  levETIRAcetam  Solution 1000 milliGRAM(s) Oral two times a day  levothyroxine 50 MICROGram(s) Oral daily  modafinil 100 milliGRAM(s) Oral daily  pantoprazole  Injectable 40 milliGRAM(s) IV Push two times a day  polyethylene glycol 3350 17 Gram(s) Oral daily  senna Syrup 10 milliLiter(s) Oral at bedtime    MEDICATIONS  (PRN):  acetaminophen    Suspension .. 650 milliGRAM(s) Oral every 4 hours PRN Temp greater or equal to 38C (100.4F), Mild Pain (1 - 3)  ALBUTerol    90 MICROgram(s) HFA Inhaler 2 Puff(s) Inhalation every 6 hours PRN Shortness of Breath and/or Wheezing  ipratropium 17 MICROgram(s) HFA Inhaler 1 Puff(s) Inhalation every 6 hours PRN SOB and wheezing    PHYSICAL EXAMINATION    LABS:                        8.6    11.04 )-----------( 159      ( 20 Apr 2022 09:22 )             28.0     04-20    141  |  106  |  66<H>  ----------------------------<  104<H>  5.3   |  24  |  1.55<H>    Ca    8.3<L>      20 Apr 2022 09:22  Phos  4.2     04-20  Mg     2.60     04-20    TPro  6.7  /  Alb  2.5<L>  /  TBili  0.3  /  DBili  x   /  AST  33  /  ALT  36  /  AlkPhos  101  04-20    Arterial Blood Gas:  04-19 @ 13:08  7.42/47/54/30/85.2/5.4  ABG lactate: --    PAST MEDICAL & SURGICAL HISTORY:  Essential hypertension    Primary osteoarthritis, unspecified site    Closed fracture of left radius, initial encounter    Morbid obesity, unspecified obesity type  h/o. - s/p gastric bypass- lost 113 lbs    KAREN (obstructive sleep apnea)  h/o - was on CPAP until gastric bypass surgery    Respiratory failure    Anemia    Subdural hemorrhage    Epilepsy    H/O gastrostomy    History of BPH    Afib    S/P gastric bypass  2008    Status post total replacement of left hip  2006    S/P bunionectomy  bilateral    S/P colonoscopy  approx 2012    History of abdominoplasty  and subsequent cosmetic surgery for removal of excess skin from face    FAMILY HISTORY:  Family history of renal cell carcinoma (Mother)    Family history of malignant melanoma (Sibling)    Social History:    RADIOLOGY:  [ ] Reviewed and interpreted by me    PULMONARY FUNCTION TESTS:    EKG: CHIEF COMPLAINT: Patient is a 76y old  Male who presents with a chief complaint of Hypoxia (20 Apr 2022 12:49)    INTERVAL EVENTS:  - No interval events overnight.   - HH stable and no further episodes of melena.   - Able to tolerate TC well. PMV trial with strong voice, but noted with respiratory distress second to physical debilitation. TC x 24 hour trial today.     REVIEW OF SYSTEMS: Seen by bedside during AM rounds and unable to assess ROS second to poor PMV tolerance.     Mode: AC/ CMV (Assist Control/ Continuous Mandatory Ventilation), RR (machine): 12, TV (machine): 450, FiO2: 30, PEEP: 5, ITime: 0.74, MAP: 8, PIP: 24  Arterial Blood Gas:  04-19 @ 13:08  7.42/47/54/30/85.2/5.4  ABG lactate: --    OBJECTIVE:  ICU Vital Signs Last 24 Hrs  T(C): 36.6 (21 Apr 2022 06:00), Max: 36.6 (20 Apr 2022 18:46)  T(F): 97.8 (21 Apr 2022 06:00), Max: 97.8 (20 Apr 2022 18:46)  HR: 59 (21 Apr 2022 07:46) (53 - 62)  BP: 131/79 (21 Apr 2022 06:00) (131/79 - 147/82)  BP(mean): --  ABP: --  ABP(mean): --  RR: 13 (21 Apr 2022 06:00) (13 - 18)  SpO2: 98% (21 Apr 2022 07:46) (98% - 100%)    Mode: AC/ CMV (Assist Control/ Continuous Mandatory Ventilation), RR (machine): 12, TV (machine): 450, FiO2: 30, PEEP: 5, ITime: 0.74, MAP: 8, PIP: 24    04-20 @ 07:01  -  04-21 @ 07:00  --------------------------------------------------------  IN: 2280 mL / OUT: 2750 mL / NET: -470 mL    CAPILLARY BLOOD GLUCOSE  POCT Blood Glucose.: 132 mg/dL (19 Apr 2022 20:37)    HOSPITAL MEDICATIONS:  MEDICATIONS  (STANDING):  amantadine Syrup 100 milliGRAM(s) Oral two times a day  aMIOdarone    Tablet 200 milliGRAM(s) Oral daily  ascorbic acid 500 milliGRAM(s) Oral daily  carvedilol 6.25 milliGRAM(s) Oral every 12 hours  chlorhexidine 0.12% Liquid 15 milliLiter(s) Oral Mucosa every 12 hours  chlorhexidine 2% Cloths 1 Application(s) Topical daily  collagenase Ointment 1 Application(s) Topical daily  doxazosin 2 milliGRAM(s) Oral at bedtime  folic acid 1 milliGRAM(s) Oral daily  furosemide Solution 40 milliGRAM(s) Oral daily  heparin   Injectable 5000 Unit(s) SubCutaneous every 8 hours  hydrocortisone hemorrhoidal Suppository 1 Suppository(s) Rectal at bedtime  lactulose Syrup 20 Gram(s) Oral two times a day  levETIRAcetam  Solution 1000 milliGRAM(s) Oral two times a day  levothyroxine 50 MICROGram(s) Oral daily  modafinil 100 milliGRAM(s) Oral daily  pantoprazole  Injectable 40 milliGRAM(s) IV Push two times a day  polyethylene glycol 3350 17 Gram(s) Oral daily  senna Syrup 10 milliLiter(s) Oral at bedtime    MEDICATIONS  (PRN):  acetaminophen    Suspension .. 650 milliGRAM(s) Oral every 4 hours PRN Temp greater or equal to 38C (100.4F), Mild Pain (1 - 3)  ALBUTerol    90 MICROgram(s) HFA Inhaler 2 Puff(s) Inhalation every 6 hours PRN Shortness of Breath and/or Wheezing  ipratropium 17 MICROgram(s) HFA Inhaler 1 Puff(s) Inhalation every 6 hours PRN SOB and wheezing    PHYSICAL EXAMINATION  General: NAD   Neck: Trach (+)  Cards: S1/S2, no murmurs   Pulm: CTA bilaterally. No wheezes.   Abdomen: Soft, NTND. BS (+) PEG (+)   Extremities: No pedal edema. KIMBERLEY of RUE, no ROM other extremities.   Neurology: Awake and alert and able to follow some commands with no focal neurological deficits     LABS:                        8.6    11.04 )-----------( 159      ( 20 Apr 2022 09:22 )             28.0     04-20    141  |  106  |  66<H>  ----------------------------<  104<H>  5.3   |  24  |  1.55<H>    Ca    8.3<L>      20 Apr 2022 09:22  Phos  4.2     04-20  Mg     2.60     04-20    TPro  6.7  /  Alb  2.5<L>  /  TBili  0.3  /  DBili  x   /  AST  33  /  ALT  36  /  AlkPhos  101  04-20    Arterial Blood Gas:  04-19 @ 13:08  7.42/47/54/30/85.2/5.4  ABG lactate: --    PAST MEDICAL & SURGICAL HISTORY:  Essential hypertension    Primary osteoarthritis, unspecified site    Closed fracture of left radius, initial encounter    Morbid obesity, unspecified obesity type  h/o. - s/p gastric bypass- lost 113 lbs    KAREN (obstructive sleep apnea)  h/o - was on CPAP until gastric bypass surgery    Respiratory failure    Anemia    Subdural hemorrhage    Epilepsy    H/O gastrostomy    History of BPH    Afib    S/P gastric bypass  2008    Status post total replacement of left hip  2006    S/P bunionectomy  bilateral    S/P colonoscopy  approx 2012    History of abdominoplasty  and subsequent cosmetic surgery for removal of excess skin from face    FAMILY HISTORY:  Family history of renal cell carcinoma (Mother)    Family history of malignant melanoma (Sibling)    Social History:    RADIOLOGY:  [ ] Reviewed and interpreted by me    PULMONARY FUNCTION TESTS:    EKG:

## 2022-04-21 NOTE — PROGRESS NOTE ADULT - ASSESSMENT
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 GI consulted for anemia     Anemia   PEG flushed/lavaged; feeds and bilious fluid aspirated, no overt blood products noted  transfuse PRBC to keep hgb close to 8   Protonix 40mg IVP BID; change to via peg daily on d/c  occult likely d/t constipated stool/irritated hemorrhoid; stools brown  senna syrup and miralax; enema as needed  anusol suppository qhs x 5 days   no objection to peg feeds    HCAP   per RCU/ID       I reviewed the overnight course of events on the unit, re-confirming the patient history. I discussed the care with the patient and their family. The plan of care was discussed with the physician assistant and modifications were made to the notation where appropriate. Differential diagnosis and plan of care discussed with patient after the evaluation. Advanced care planning was discussed with patient and family.  Advanced care planning forms were reviewed and discussed.  Risks, benefits and alternatives of gastroenterologic procedures were discussed in detail and all questions were answered. 35 minutes spent on total encounter of which more than fifty percent of the encounter was spent counseling and/or coordinating care by the attending physician.

## 2022-04-22 LAB
ANION GAP SERPL CALC-SCNC: 11 MMOL/L — SIGNIFICANT CHANGE UP (ref 7–14)
BLOOD GAS ARTERIAL COMPREHENSIVE RESULT: SIGNIFICANT CHANGE UP
BUN SERPL-MCNC: 60 MG/DL — HIGH (ref 7–23)
CALCIUM SERPL-MCNC: 8.4 MG/DL — SIGNIFICANT CHANGE UP (ref 8.4–10.5)
CHLORIDE SERPL-SCNC: 103 MMOL/L — SIGNIFICANT CHANGE UP (ref 98–107)
CO2 SERPL-SCNC: 26 MMOL/L — SIGNIFICANT CHANGE UP (ref 22–31)
CREAT SERPL-MCNC: 1.38 MG/DL — HIGH (ref 0.5–1.3)
EGFR: 53 ML/MIN/1.73M2 — LOW
GLUCOSE SERPL-MCNC: 103 MG/DL — HIGH (ref 70–99)
HCT VFR BLD CALC: 25 % — LOW (ref 39–50)
HGB BLD-MCNC: 7.8 G/DL — LOW (ref 13–17)
MAGNESIUM SERPL-MCNC: 2.4 MG/DL — SIGNIFICANT CHANGE UP (ref 1.6–2.6)
MCHC RBC-ENTMCNC: 31.2 GM/DL — LOW (ref 32–36)
MCHC RBC-ENTMCNC: 31.5 PG — SIGNIFICANT CHANGE UP (ref 27–34)
MCV RBC AUTO: 100.8 FL — HIGH (ref 80–100)
NRBC # BLD: 0 /100 WBCS — SIGNIFICANT CHANGE UP
NRBC # FLD: 0 K/UL — SIGNIFICANT CHANGE UP
PHOSPHATE SERPL-MCNC: 4 MG/DL — SIGNIFICANT CHANGE UP (ref 2.5–4.5)
PLATELET # BLD AUTO: 159 K/UL — SIGNIFICANT CHANGE UP (ref 150–400)
POTASSIUM SERPL-MCNC: 4.2 MMOL/L — SIGNIFICANT CHANGE UP (ref 3.5–5.3)
POTASSIUM SERPL-SCNC: 4.2 MMOL/L — SIGNIFICANT CHANGE UP (ref 3.5–5.3)
RBC # BLD: 2.48 M/UL — LOW (ref 4.2–5.8)
RBC # FLD: 19.1 % — HIGH (ref 10.3–14.5)
SODIUM SERPL-SCNC: 140 MMOL/L — SIGNIFICANT CHANGE UP (ref 135–145)
WBC # BLD: 9.84 K/UL — SIGNIFICANT CHANGE UP (ref 3.8–10.5)
WBC # FLD AUTO: 9.84 K/UL — SIGNIFICANT CHANGE UP (ref 3.8–10.5)

## 2022-04-22 PROCEDURE — 99222 1ST HOSP IP/OBS MODERATE 55: CPT

## 2022-04-22 PROCEDURE — 99233 SBSQ HOSP IP/OBS HIGH 50: CPT | Mod: GC

## 2022-04-22 RX ORDER — ALBUTEROL 90 UG/1
2 AEROSOL, METERED ORAL EVERY 6 HOURS
Refills: 0 | Status: DISCONTINUED | OUTPATIENT
Start: 2022-04-22 | End: 2022-05-17

## 2022-04-22 RX ORDER — IPRATROPIUM BROMIDE 0.2 MG/ML
1 SOLUTION, NON-ORAL INHALATION EVERY 6 HOURS
Refills: 0 | Status: DISCONTINUED | OUTPATIENT
Start: 2022-04-22 | End: 2022-05-17

## 2022-04-22 RX ADMIN — HEPARIN SODIUM 5000 UNIT(S): 5000 INJECTION INTRAVENOUS; SUBCUTANEOUS at 21:21

## 2022-04-22 RX ADMIN — ALBUTEROL 2 PUFF(S): 90 AEROSOL, METERED ORAL at 22:45

## 2022-04-22 RX ADMIN — LEVETIRACETAM 1000 MILLIGRAM(S): 250 TABLET, FILM COATED ORAL at 17:45

## 2022-04-22 RX ADMIN — Medication 1 APPLICATION(S): at 11:24

## 2022-04-22 RX ADMIN — MODAFINIL 100 MILLIGRAM(S): 200 TABLET ORAL at 11:32

## 2022-04-22 RX ADMIN — Medication 1 SUPPOSITORY(S): at 21:21

## 2022-04-22 RX ADMIN — CARVEDILOL PHOSPHATE 6.25 MILLIGRAM(S): 80 CAPSULE, EXTENDED RELEASE ORAL at 17:46

## 2022-04-22 RX ADMIN — Medication 1 MILLIGRAM(S): at 11:22

## 2022-04-22 RX ADMIN — PANTOPRAZOLE SODIUM 40 MILLIGRAM(S): 20 TABLET, DELAYED RELEASE ORAL at 17:50

## 2022-04-22 RX ADMIN — Medication 500 MILLIGRAM(S): at 11:23

## 2022-04-22 RX ADMIN — PANTOPRAZOLE SODIUM 40 MILLIGRAM(S): 20 TABLET, DELAYED RELEASE ORAL at 05:02

## 2022-04-22 RX ADMIN — Medication 40 MILLIGRAM(S): at 06:13

## 2022-04-22 RX ADMIN — Medication 100 MILLIGRAM(S): at 05:02

## 2022-04-22 RX ADMIN — SENNA PLUS 10 MILLILITER(S): 8.6 TABLET ORAL at 21:21

## 2022-04-22 RX ADMIN — Medication 1 PUFF(S): at 10:05

## 2022-04-22 RX ADMIN — LACTULOSE 20 GRAM(S): 10 SOLUTION ORAL at 05:02

## 2022-04-22 RX ADMIN — HEPARIN SODIUM 5000 UNIT(S): 5000 INJECTION INTRAVENOUS; SUBCUTANEOUS at 13:40

## 2022-04-22 RX ADMIN — Medication 100 MILLIGRAM(S): at 17:48

## 2022-04-22 RX ADMIN — LACTULOSE 20 GRAM(S): 10 SOLUTION ORAL at 17:49

## 2022-04-22 RX ADMIN — POLYETHYLENE GLYCOL 3350 17 GRAM(S): 17 POWDER, FOR SOLUTION ORAL at 11:23

## 2022-04-22 RX ADMIN — CHLORHEXIDINE GLUCONATE 1 APPLICATION(S): 213 SOLUTION TOPICAL at 11:20

## 2022-04-22 RX ADMIN — Medication 50 MICROGRAM(S): at 05:03

## 2022-04-22 RX ADMIN — LEVETIRACETAM 1000 MILLIGRAM(S): 250 TABLET, FILM COATED ORAL at 05:02

## 2022-04-22 RX ADMIN — Medication 1 PUFF(S): at 15:36

## 2022-04-22 RX ADMIN — ALBUTEROL 2 PUFF(S): 90 AEROSOL, METERED ORAL at 10:05

## 2022-04-22 RX ADMIN — CHLORHEXIDINE GLUCONATE 15 MILLILITER(S): 213 SOLUTION TOPICAL at 17:45

## 2022-04-22 RX ADMIN — Medication 1 PUFF(S): at 22:45

## 2022-04-22 RX ADMIN — ALBUTEROL 2 PUFF(S): 90 AEROSOL, METERED ORAL at 15:36

## 2022-04-22 RX ADMIN — HEPARIN SODIUM 5000 UNIT(S): 5000 INJECTION INTRAVENOUS; SUBCUTANEOUS at 05:03

## 2022-04-22 RX ADMIN — AMIODARONE HYDROCHLORIDE 200 MILLIGRAM(S): 400 TABLET ORAL at 05:03

## 2022-04-22 RX ADMIN — CHLORHEXIDINE GLUCONATE 15 MILLILITER(S): 213 SOLUTION TOPICAL at 05:03

## 2022-04-22 RX ADMIN — Medication 2 MILLIGRAM(S): at 21:22

## 2022-04-22 NOTE — CONSULT NOTE ADULT - SUBJECTIVE AND OBJECTIVE BOX
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     Patient sent in from nursing home for increasing oxygen requirements and worsening mental status. Per report, patient on 30% FiO2 normally, however has required 40% at NH and noted to be tachypneic. Patient seen and examined at bedside. Currently on vent at 30% FiO2, appearing comfortable. Patient remains non-verbal however is awake and alert.     On arrival to ED, patient noted to be febrile to 101.9 however was able to be titrated back down to baseline of 30% FiO2. Given 1g tylenol, 1L NS, and vancomycin/meropenem in ED. Patient to be admitted to RCU for further management. (09 Apr 2022 00:40)      REVIEW OF SYSTEMS     PAST MEDICAL & SURGICAL HISTORY  Essential hypertension    Primary osteoarthritis, unspecified site    Closed fracture of left radius, initial encounter    Morbid obesity, unspecified obesity type    KAREN (obstructive sleep apnea)    Respiratory failure    Anemia    Subdural hemorrhage    Epilepsy    H/O gastrostomy    History of BPH    Afib    S/P gastric bypass    Status post total replacement of left hip    S/P bunionectomy    S/P colonoscopy    History of abdominoplasty       FUNCTIONAL HISTORY  Lives   Independent    CURRENT FUNCTIONAL STATUS  4/13: was unable to participate in bedside therapy due to poor command following    FAMILY HISTORY   Family history of renal cell carcinoma (Mother)    Family history of malignant melanoma (Sibling)        RECENT LABS/IMAGING  CBC Full  -  ( 22 Apr 2022 04:42 )  WBC Count : 9.84 K/uL  RBC Count : 2.48 M/uL  Hemoglobin : 7.8 g/dL  Hematocrit : 25.0 %  Platelet Count - Automated : 159 K/uL  Mean Cell Volume : 100.8 fL  Mean Cell Hemoglobin : 31.5 pg  Mean Cell Hemoglobin Concentration : 31.2 gm/dL  Auto Neutrophil # : x  Auto Lymphocyte # : x  Auto Monocyte # : x  Auto Eosinophil # : x  Auto Basophil # : x  Auto Neutrophil % : x  Auto Lymphocyte % : x  Auto Monocyte % : x  Auto Eosinophil % : x  Auto Basophil % : x    04-22    140  |  103  |  60<H>  ----------------------------<  103<H>  4.2   |  26  |  1.38<H>    Ca    8.4      22 Apr 2022 04:42  Phos  4.0     04-22  Mg     2.40     04-22          VITALS  T(C): 36.8 (04-22-22 @ 05:03), Max: 36.9 (04-22-22 @ 01:44)  HR: 60 (04-22-22 @ 10:46) (55 - 66)  BP: 105/64 (04-22-22 @ 05:03) (105/64 - 132/73)  RR: 19 (04-22-22 @ 10:46) (18 - 20)  SpO2: 98% (04-22-22 @ 10:46) (97% - 100%)  Wt(kg): --    ALLERGIES  No Known Allergies      MEDICATIONS   acetaminophen    Suspension .. 650 milliGRAM(s) Oral every 4 hours PRN  ALBUTerol    90 MICROgram(s) HFA Inhaler 2 Puff(s) Inhalation every 6 hours  amantadine Syrup 100 milliGRAM(s) Oral two times a day  aMIOdarone    Tablet 200 milliGRAM(s) Oral daily  ascorbic acid 500 milliGRAM(s) Oral daily  carvedilol 6.25 milliGRAM(s) Oral every 12 hours  chlorhexidine 0.12% Liquid 15 milliLiter(s) Oral Mucosa every 12 hours  chlorhexidine 2% Cloths 1 Application(s) Topical daily  collagenase Ointment 1 Application(s) Topical daily  doxazosin 2 milliGRAM(s) Oral at bedtime  folic acid 1 milliGRAM(s) Oral daily  furosemide Solution 40 milliGRAM(s) Oral daily  heparin   Injectable 5000 Unit(s) SubCutaneous every 8 hours  hydrocortisone hemorrhoidal Suppository 1 Suppository(s) Rectal at bedtime  ipratropium 17 MICROgram(s) HFA Inhaler 1 Puff(s) Inhalation every 6 hours  lactulose Syrup 20 Gram(s) Oral two times a day  levETIRAcetam  Solution 1000 milliGRAM(s) Oral two times a day  levothyroxine 50 MICROGram(s) Oral daily  modafinil 100 milliGRAM(s) Oral daily  pantoprazole  Injectable 40 milliGRAM(s) IV Push two times a day  polyethylene glycol 3350 17 Gram(s) Oral daily  senna Syrup 10 milliLiter(s) Oral at bedtime      ----------------------------------------------------------------------------------------  PHYSICAL EXAM  Constitutional - NAD, Comfortable  HEENT - NCAT, EOMI  Neck - Supple, No limited ROM  Chest - CTA bilaterally, No wheeze, No rhonchi, No crackles  Cardiovascular - RRR, S1S2, No murmurs  Abdomen - BS+, Soft, NTND  Extremities - No C/C/E, No calf tenderness   Neurologic Exam -                    Cognitive - Awake, Alert, AAO to self, place, date, year, situation     Communication - Fluent, No dysarthria     Cranial Nerves - CN 2-12 intact     Motor - No focal deficits                    LEFT    UE - ShAB 5/5, EF 5/5, EE 5/5, WE 5/5,  5/5                    RIGHT UE - ShAB 5/5, EF 5/5, EE 5/5, WE 5/5,  5/5                    LEFT    LE - HF 5/5, KE 5/5, DF 5/5, PF 5/5                    RIGHT LE - HF 5/5, KE 5/5, DF 5/5, PF 5/5        Sensory - Intact to LT     Reflexes - DTR Intact, No primitive reflexive     Coordination - FTN intact     OculoVestibular - No saccades, No nystagmus, VOR         Balance - WNL Static  Psychiatric - Mood stable, Affect WNL  ----------------------------------------------------------------------------------------  ASSESSMENT/PLAN  75 yo m pmh sdh, chronic trach/PEG p/w recurrent HCAP    Pain -  DVT PPX -   Rehab -       incomplete note Patient is a 76y old  Male who presents with a chief complaint of Hypoxia (22 Apr 2022 12:16)      HPI:  77 y/o M with afib s/p watchman, gastric sleeve, SDH, 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     Patient sent in from nursing home for increasing oxygen requirements and worsening mental status. Per report, patient on 30% FiO2 normally, however has required 40% at NH and noted to be tachypneic. Patient seen and examined at bedside. Currently on vent at 30% FiO2, appearing comfortable. Patient remains non-verbal however is awake and alert.     On arrival to ED, patient noted to be febrile to 101.9 however was able to be titrated back down to baseline of 30% FiO2. Given 1g tylenol, 1L NS, and vancomycin/meropenem in ED. Patient to be admitted to RCU for further management. (09 Apr 2022 00:40)      Patient fell in [place] on Thanksgiving while in [facility] (on his way to [place] cruise to see [place]- patient is a panoramic ), thought to hit his head during fall,  was found the next day by [employer] staff in his room and brought to the hospital, underwent crani for sdh, when back in [place] later had cranioplasty for sunken flap.  He attended acute rehab program in [place], later went to [facility] (for 4 days), later went to [facility].   Discussed with patient's family who hopes to have patient go to subacute     REVIEW OF SYSTEMS   limited participation in ROS    PAST MEDICAL & SURGICAL HISTORY  Essential hypertension    Primary osteoarthritis, unspecified site    Closed fracture of left radius, initial encounter    Morbid obesity, unspecified obesity type    KAREN (obstructive sleep apnea)    Respiratory failure    Anemia    Subdural hemorrhage    Epilepsy    H/O gastrostomy    History of BPH    Afib    S/P gastric bypass    Status post total replacement of left hip    S/P bunionectomy    S/P colonoscopy    History of abdominoplasty       FUNCTIONAL HISTORY  Lives alone, is not , no children  Independent, lived in apartment in [place], has elevator, on 1st floor, ramp to enter.    CURRENT FUNCTIONAL STATUS  4/13: was unable to participate in bedside therapy due to poor command following    FAMILY HISTORY   Family history of renal cell carcinoma (Mother)  Family history of malignant melanoma (Sibling)        RECENT LABS/IMAGING  CBC Full  -  ( 22 Apr 2022 04:42 )  WBC Count : 9.84 K/uL  RBC Count : 2.48 M/uL  Hemoglobin : 7.8 g/dL  Hematocrit : 25.0 %  Platelet Count - Automated : 159 K/uL  Mean Cell Volume : 100.8 fL  Mean Cell Hemoglobin : 31.5 pg  Mean Cell Hemoglobin Concentration : 31.2 gm/dL  Auto Neutrophil # : x  Auto Lymphocyte # : x  Auto Monocyte # : x  Auto Eosinophil # : x  Auto Basophil # : x  Auto Neutrophil % : x  Auto Lymphocyte % : x  Auto Monocyte % : x  Auto Eosinophil % : x  Auto Basophil % : x    04-22    140  |  103  |  60<H>  ----------------------------<  103<H>  4.2   |  26  |  1.38<H>    Ca    8.4      22 Apr 2022 04:42  Phos  4.0     04-22  Mg     2.40     04-22          VITALS  T(C): 36.8 (04-22-22 @ 05:03), Max: 36.9 (04-22-22 @ 01:44)  HR: 60 (04-22-22 @ 10:46) (55 - 66)  BP: 105/64 (04-22-22 @ 05:03) (105/64 - 132/73)  RR: 19 (04-22-22 @ 10:46) (18 - 20)  SpO2: 98% (04-22-22 @ 10:46) (97% - 100%)  Wt(kg): --    ALLERGIES  No Known Allergies      MEDICATIONS   acetaminophen    Suspension .. 650 milliGRAM(s) Oral every 4 hours PRN  ALBUTerol    90 MICROgram(s) HFA Inhaler 2 Puff(s) Inhalation every 6 hours  amantadine Syrup 100 milliGRAM(s) Oral two times a day  aMIOdarone    Tablet 200 milliGRAM(s) Oral daily  ascorbic acid 500 milliGRAM(s) Oral daily  carvedilol 6.25 milliGRAM(s) Oral every 12 hours  chlorhexidine 0.12% Liquid 15 milliLiter(s) Oral Mucosa every 12 hours  chlorhexidine 2% Cloths 1 Application(s) Topical daily  collagenase Ointment 1 Application(s) Topical daily  doxazosin 2 milliGRAM(s) Oral at bedtime  folic acid 1 milliGRAM(s) Oral daily  furosemide Solution 40 milliGRAM(s) Oral daily  heparin   Injectable 5000 Unit(s) SubCutaneous every 8 hours  hydrocortisone hemorrhoidal Suppository 1 Suppository(s) Rectal at bedtime  ipratropium 17 MICROgram(s) HFA Inhaler 1 Puff(s) Inhalation every 6 hours  lactulose Syrup 20 Gram(s) Oral two times a day  levETIRAcetam  Solution 1000 milliGRAM(s) Oral two times a day  levothyroxine 50 MICROGram(s) Oral daily  modafinil 100 milliGRAM(s) Oral daily  pantoprazole  Injectable 40 milliGRAM(s) IV Push two times a day  polyethylene glycol 3350 17 Gram(s) Oral daily  senna Syrup 10 milliLiter(s) Oral at bedtime      ----------------------------------------------------------------------------------------  PHYSICAL EXAM  Constitutional - NAD, eyes closed  HEENT - + trach   Chest - no respiratory distress  Cardiovascular - RRR, S1S2  Abdomen -+PEG   Extremities - increased tone, + skin discoloration LLE  Neurologic Exam -                    Cognitive - lethargic     Communication - limited verbal output     Cranial Nerves - no facial asymmetry, limited participation in cn exam     Motor - 0/5 LUE and LLE, moves RUE 1+/5 proximally, 2/5 distally,  RLE 1+/5 proximally, wiggles toes      Sensory - unable to assess     Balance - WNL Static  Psychiatric - Mood stable, Affect WNL  ----------------------------------------------------------------------------------------  ASSESSMENT/PLAN  75 yo m pmh sdh, chronic trach/PEG p/w recurrent HCAP  Keppra  DVT PPX - heparin  npo with tube feed  lethargy: on modafinil, amantadine  please request follow up PT session  Rehab -  recommend subacute rehab when medically cleared.   discussed with patient's cousin Cora by phone, who hopes patient can go to subacute rehab at [facility]

## 2022-04-22 NOTE — PROGRESS NOTE ADULT - SUBJECTIVE AND OBJECTIVE BOX
CHIEF COMPLAINT: Patient is a 76y old  Male who presents with a chief complaint of Hypoxia (21 Apr 2022 11:22)    INTERVAL EVENTS:  - No interval events overnight.   - TC x 24 hour trial and ABG this morning stable.     REVIEW OF SYSTEMS: Seen by bedside during AM rounds and     Mode: standby  Arterial Blood Gas:  04-22 @ 04:42  7.50/40/108/31/99.1/7.4  ABG lactate: --    OBJECTIVE:  ICU Vital Signs Last 24 Hrs  T(C): 36.8 (22 Apr 2022 05:03), Max: 36.9 (22 Apr 2022 01:44)  T(F): 98.2 (22 Apr 2022 05:03), Max: 98.4 (22 Apr 2022 01:44)  HR: 59 (22 Apr 2022 07:20) (55 - 66)  BP: 105/64 (22 Apr 2022 05:03) (105/64 - 132/73)  BP(mean): --  ABP: --  ABP(mean): --  RR: 18 (22 Apr 2022 07:20) (15 - 20)  SpO2: 99% (22 Apr 2022 07:20) (97% - 100%)    Mode: standby    04-21 @ 07:01  -  04-22 @ 07:00  --------------------------------------------------------  IN: 2340 mL / OUT: 1400 mL / NET: 940 mL    CAPILLARY BLOOD GLUCOSE    HOSPITAL MEDICATIONS:  MEDICATIONS  (STANDING):  amantadine Syrup 100 milliGRAM(s) Oral two times a day  aMIOdarone    Tablet 200 milliGRAM(s) Oral daily  ascorbic acid 500 milliGRAM(s) Oral daily  carvedilol 6.25 milliGRAM(s) Oral every 12 hours  chlorhexidine 0.12% Liquid 15 milliLiter(s) Oral Mucosa every 12 hours  chlorhexidine 2% Cloths 1 Application(s) Topical daily  collagenase Ointment 1 Application(s) Topical daily  doxazosin 2 milliGRAM(s) Oral at bedtime  folic acid 1 milliGRAM(s) Oral daily  furosemide Solution 40 milliGRAM(s) Oral daily  heparin   Injectable 5000 Unit(s) SubCutaneous every 8 hours  hydrocortisone hemorrhoidal Suppository 1 Suppository(s) Rectal at bedtime  lactulose Syrup 20 Gram(s) Oral two times a day  levETIRAcetam  Solution 1000 milliGRAM(s) Oral two times a day  levothyroxine 50 MICROGram(s) Oral daily  modafinil 100 milliGRAM(s) Oral daily  pantoprazole  Injectable 40 milliGRAM(s) IV Push two times a day  polyethylene glycol 3350 17 Gram(s) Oral daily  senna Syrup 10 milliLiter(s) Oral at bedtime    MEDICATIONS  (PRN):  acetaminophen    Suspension .. 650 milliGRAM(s) Oral every 4 hours PRN Temp greater or equal to 38C (100.4F), Mild Pain (1 - 3)  ALBUTerol    90 MICROgram(s) HFA Inhaler 2 Puff(s) Inhalation every 6 hours PRN Shortness of Breath and/or Wheezing  ipratropium 17 MICROgram(s) HFA Inhaler 1 Puff(s) Inhalation every 6 hours PRN SOB and wheezing    PHYSICAL EXAMINATION    LABS:                        7.8    9.84  )-----------( 159      ( 22 Apr 2022 04:42 )             25.0     04-22    140  |  103  |  60<H>  ----------------------------<  103<H>  4.2   |  26  |  1.38<H>    Ca    8.4      22 Apr 2022 04:42  Phos  4.0     04-22  Mg     2.40     04-22    TPro  6.7  /  Alb  2.5<L>  /  TBili  0.3  /  DBili  x   /  AST  33  /  ALT  36  /  AlkPhos  101  04-20    Arterial Blood Gas:  04-22 @ 04:42  7.50/40/108/31/99.1/7.4  ABG lactate: --    PAST MEDICAL & SURGICAL HISTORY:  Essential hypertension    Primary osteoarthritis, unspecified site    Closed fracture of left radius, initial encounter    Morbid obesity, unspecified obesity type  h/o. - s/p gastric bypass- lost 113 lbs    KAREN (obstructive sleep apnea)  h/o - was on CPAP until gastric bypass surgery    Respiratory failure    Anemia    Subdural hemorrhage    Epilepsy    H/O gastrostomy    History of BPH    Afib    S/P gastric bypass  2008    Status post total replacement of left hip  2006    S/P bunionectomy  bilateral    S/P colonoscopy  approx 2012    History of abdominoplasty  and subsequent cosmetic surgery for removal of excess skin from face    FAMILY HISTORY:  Family history of renal cell carcinoma (Mother)    Family history of malignant melanoma (Sibling)    Social History:    RADIOLOGY:  [ ] Reviewed and interpreted by me    PULMONARY FUNCTION TESTS:    EKG: CHIEF COMPLAINT: Patient is a 76y old  Male who presents with a chief complaint of Hypoxia (21 Apr 2022 11:22)    INTERVAL EVENTS:  - No interval events overnight.   - TC x 24 hour trial and ABG this morning stable.   - HH dropping and no signs of further melena or CGE aspirated from PEG. Case discussed with GI and no role for scope and no acute concern for GIB. Will monitor HH for now.     REVIEW OF SYSTEMS: Seen by bedside during AM rounds and unable to assess ROS second to poor phonation with Tracheostomy.     Mode: standby  Arterial Blood Gas:  04-22 @ 04:42  7.50/40/108/31/99.1/7.4  ABG lactate: --    OBJECTIVE:  ICU Vital Signs Last 24 Hrs  T(C): 36.8 (22 Apr 2022 05:03), Max: 36.9 (22 Apr 2022 01:44)  T(F): 98.2 (22 Apr 2022 05:03), Max: 98.4 (22 Apr 2022 01:44)  HR: 59 (22 Apr 2022 07:20) (55 - 66)  BP: 105/64 (22 Apr 2022 05:03) (105/64 - 132/73)  BP(mean): --  ABP: --  ABP(mean): --  RR: 18 (22 Apr 2022 07:20) (15 - 20)  SpO2: 99% (22 Apr 2022 07:20) (97% - 100%)    Mode: standby    04-21 @ 07:01  -  04-22 @ 07:00  --------------------------------------------------------  IN: 2340 mL / OUT: 1400 mL / NET: 940 mL    CAPILLARY BLOOD GLUCOSE    HOSPITAL MEDICATIONS:  MEDICATIONS  (STANDING):  amantadine Syrup 100 milliGRAM(s) Oral two times a day  aMIOdarone    Tablet 200 milliGRAM(s) Oral daily  ascorbic acid 500 milliGRAM(s) Oral daily  carvedilol 6.25 milliGRAM(s) Oral every 12 hours  chlorhexidine 0.12% Liquid 15 milliLiter(s) Oral Mucosa every 12 hours  chlorhexidine 2% Cloths 1 Application(s) Topical daily  collagenase Ointment 1 Application(s) Topical daily  doxazosin 2 milliGRAM(s) Oral at bedtime  folic acid 1 milliGRAM(s) Oral daily  furosemide Solution 40 milliGRAM(s) Oral daily  heparin   Injectable 5000 Unit(s) SubCutaneous every 8 hours  hydrocortisone hemorrhoidal Suppository 1 Suppository(s) Rectal at bedtime  lactulose Syrup 20 Gram(s) Oral two times a day  levETIRAcetam  Solution 1000 milliGRAM(s) Oral two times a day  levothyroxine 50 MICROGram(s) Oral daily  modafinil 100 milliGRAM(s) Oral daily  pantoprazole  Injectable 40 milliGRAM(s) IV Push two times a day  polyethylene glycol 3350 17 Gram(s) Oral daily  senna Syrup 10 milliLiter(s) Oral at bedtime    MEDICATIONS  (PRN):  acetaminophen    Suspension .. 650 milliGRAM(s) Oral every 4 hours PRN Temp greater or equal to 38C (100.4F), Mild Pain (1 - 3)  ALBUTerol    90 MICROgram(s) HFA Inhaler 2 Puff(s) Inhalation every 6 hours PRN Shortness of Breath and/or Wheezing  ipratropium 17 MICROgram(s) HFA Inhaler 1 Puff(s) Inhalation every 6 hours PRN SOB and wheezing    PHYSICAL EXAMINATION  General: NAD   Neck: Trach (+)  Cards: S1/S2, no murmurs   Pulm: CTA bilaterally. No wheezes.   Abdomen: Soft, NTND. BS (+) PEG (+)   Extremities: No pedal edema. KIMBERLEY of RUE, no ROM other extremities.   Neurology: Awake and alert and able to follow some commands with no focal neurological deficits     LABS:                        7.8    9.84  )-----------( 159      ( 22 Apr 2022 04:42 )             25.0     04-22    140  |  103  |  60<H>  ----------------------------<  103<H>  4.2   |  26  |  1.38<H>    Ca    8.4      22 Apr 2022 04:42  Phos  4.0     04-22  Mg     2.40     04-22    TPro  6.7  /  Alb  2.5<L>  /  TBili  0.3  /  DBili  x   /  AST  33  /  ALT  36  /  AlkPhos  101  04-20    Arterial Blood Gas:  04-22 @ 04:42  7.50/40/108/31/99.1/7.4  ABG lactate: --    PAST MEDICAL & SURGICAL HISTORY:  Essential hypertension    Primary osteoarthritis, unspecified site    Closed fracture of left radius, initial encounter    Morbid obesity, unspecified obesity type  h/o. - s/p gastric bypass- lost 113 lbs    KAREN (obstructive sleep apnea)  h/o - was on CPAP until gastric bypass surgery    Respiratory failure    Anemia    Subdural hemorrhage    Epilepsy    H/O gastrostomy    History of BPH    Afib    S/P gastric bypass  2008    Status post total replacement of left hip  2006    S/P bunionectomy  bilateral    S/P colonoscopy  approx 2012    History of abdominoplasty  and subsequent cosmetic surgery for removal of excess skin from face    FAMILY HISTORY:  Family history of renal cell carcinoma (Mother)    Family history of malignant melanoma (Sibling)    Social History:    RADIOLOGY:  [ ] Reviewed and interpreted by me    PULMONARY FUNCTION TESTS:    EKG:

## 2022-04-22 NOTE — PROGRESS NOTE ADULT - SUBJECTIVE AND OBJECTIVE BOX
INTERVAL HPI/OVERNIGHT EVENTS:    peg lavaged bedside this am, no blood appreciated   no reports of rectal bleeding ovennight/am  pt comfortable/ros unobtainable    MEDICATIONS  (STANDING):  ALBUTerol    90 MICROgram(s) HFA Inhaler 2 Puff(s) Inhalation every 6 hours  amantadine Syrup 100 milliGRAM(s) Oral two times a day  aMIOdarone    Tablet 200 milliGRAM(s) Oral daily  ascorbic acid 500 milliGRAM(s) Oral daily  carvedilol 6.25 milliGRAM(s) Oral every 12 hours  chlorhexidine 0.12% Liquid 15 milliLiter(s) Oral Mucosa every 12 hours  chlorhexidine 2% Cloths 1 Application(s) Topical daily  collagenase Ointment 1 Application(s) Topical daily  doxazosin 2 milliGRAM(s) Oral at bedtime  folic acid 1 milliGRAM(s) Oral daily  furosemide Solution 40 milliGRAM(s) Oral daily  heparin   Injectable 5000 Unit(s) SubCutaneous every 8 hours  hydrocortisone hemorrhoidal Suppository 1 Suppository(s) Rectal at bedtime  ipratropium 17 MICROgram(s) HFA Inhaler 1 Puff(s) Inhalation every 6 hours  lactulose Syrup 20 Gram(s) Oral two times a day  levETIRAcetam  Solution 1000 milliGRAM(s) Oral two times a day  levothyroxine 50 MICROGram(s) Oral daily  modafinil 100 milliGRAM(s) Oral daily  pantoprazole  Injectable 40 milliGRAM(s) IV Push two times a day  polyethylene glycol 3350 17 Gram(s) Oral daily  senna Syrup 10 milliLiter(s) Oral at bedtime    MEDICATIONS  (PRN):  acetaminophen    Suspension .. 650 milliGRAM(s) Oral every 4 hours PRN Temp greater or equal to 38C (100.4F), Mild Pain (1 - 3)      Allergies    No Known Allergies    Intolerances        Review of Systems: *pt minimally verbal to nonverbal, unable to obtain ROS           Vital Signs Last 24 Hrs  T(C): 36.8 (22 Apr 2022 05:03), Max: 36.9 (22 Apr 2022 01:44)  T(F): 98.2 (22 Apr 2022 05:03), Max: 98.4 (22 Apr 2022 01:44)  HR: 60 (22 Apr 2022 10:46) (55 - 66)  BP: 105/64 (22 Apr 2022 05:03) (105/64 - 132/73)  BP(mean): --  RR: 19 (22 Apr 2022 10:46) (18 - 20)  SpO2: 98% (22 Apr 2022 10:46) (97% - 100%)    PHYSICAL EXAM:    Constitutional: NAD  HEENT: EOMI, throat clear  Neck: No LAD, supple +trach  Respiratory: CTA and P  Cardiovascular: S1 and S2, RRR, no M  Gastrointestinal: BS+, soft, NT/ND, neg HSM, +peg  Extremities: No peripheral edema, neg clubbing, cyanosis  Vascular: 2+ peripheral pulses  Neurological: A/O x 0  Psychiatric: lethargy  Skin: No rashes      LABS:                        7.8    9.84  )-----------( 159      ( 22 Apr 2022 04:42 )             25.0     04-22    140  |  103  |  60<H>  ----------------------------<  103<H>  4.2   |  26  |  1.38<H>    Ca    8.4      22 Apr 2022 04:42  Phos  4.0     04-22  Mg     2.40     04-22            RADIOLOGY & ADDITIONAL TESTS:

## 2022-04-22 NOTE — PROGRESS NOTE ADULT - ASSESSMENT
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 Now DISPO planning. 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 Now DISPO planning.    # Neurology   - Currently A&Ox0, awake and alert, able to move RUE and nods/ mouths one or two words per RCU evaluation and prior documentation.   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   - Continue on Modafinil, Amantidine and Keppra.     # Cardiovascular   - Hx of HFpEF 55, mild MR and AR, severe LAD, normal LVRVSF (ECHO 3/7)  - Hx of Atrial Fibrillation s/p Watchman and currently rate controlled.   - Continue on Amiodarone 200mg QD and Coreg 6.25mg BID.   - Continue on Lasix 40mg QD and monitor tolerance.     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   - Able to tolerate TC (from 4/15 during the day) and now ATC (from 4/21).   - Continue on Proventil and Atrovent PRN and Chest PT Q6H     # GI  - Hx of SDH, Gastric Bypass and Dysphagia s/p PEG and continued on TF with course complicated by constipatio and melena  - Case discussed with GI and melena likely in setting of constipation, however no plan for scope currently and will medically treat with PPI.   - Monitor BMs on Miralax, Senna and Lactulose.   - Continue on Nepro in sight of hyperkalemia.     # / Renal  - Hx of Urinary Retention with Chronic Garcia and presented FLORIAN likely in setting of dehydration with fevers vs ATN with Sepsis (CRE high 2s and baseline 0.8-0.9).   - UA with hematuria and proteinuria and case discussed with Neprhology with concern for glomerulonephritis. ANCA, ISABELLE and GM ABs negative.   - Hypernatremia noted and continued on  Q6H as tolerated.   - Hyperkalemia and s/p cocktail with improvement (4/19).   - IVF and PRBC given with CRE improving and now resumed on Lasix with improvement.     # ID  - Recent admission for  Acinetobacter and Pseudomonas HCAP (3/2022)   - SCx (4/10) with  Acinetobacter and Pseudomonas.   - Completed Meropenem (4/8-4/14) and will monitor off ABX.     # Endocrine  - No hx of DM2 and A1C 5.9. Continue to monitor BG,    - Hx of Hypothyroidism and continued on Synthroid. TSH 47 with low T3/T4 and Free T4. TSH 80s (3/2022) and Synthroid increased at NH. Hypothyroidism could be in the setting of Amiodarone use and current Synthroid dose appears to be working.   - Next TFT to be done in June 2022.     # Hematology   - Anemia i/s/o AOCD and s/p PRBC (4/9)   - Melena/ anemia noted and s/p 2 U PRBC (4/19)  with good response, however HH waxes and wanes with no obvious bleed (no further melena or CGE from PEG aspiration)  - DVT PPX with HSQ (cleared by GI to resume).     # Ethics   - FULL CODE   - Case discussed with Cousin/ HCP, Dr. Donna Hagen (Pediatric Neurologist, [phone]) and updated daily.     # DISPO - Planning to go back to NH with improvement in renal function/ electrolytes. 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 Now DISPO planning.    # Neurology   - Currently A&Ox0, awake and alert, able to move RUE and nods/ mouths one or two words per RCU evaluation and prior documentation.   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   - Continue on Modafinil, Amantidine and Keppra.     # Cardiovascular   - Hx of HFpEF 55, mild MR and AR, severe LAD, normal LVRVSF (ECHO 3/7)  - Hx of Atrial Fibrillation s/p Watchman and currently rate controlled.   - Continue on Amiodarone 200mg QD and Coreg 6.25mg BID.   - Continue on Lasix 40mg QD and monitor tolerance.     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   - Able to tolerate TC (from 4/15 during the day) and now ATC (from 4/21).   - Continue on Proventil, Atrovent and Chest PT Q6H     # GI  - Hx of SDH, Gastric Bypass and Dysphagia s/p PEG and continued on TF with course complicated by constipatio and melena  - Case discussed with GI and melena likely in setting of constipation, however no plan for scope currently and will medically treat with PPI.   - Monitor BMs on Miralax, Senna and Lactulose.   - Continue on Nepro in sight of hyperkalemia.     # / Renal  - Hx of Urinary Retention with Chronic Garcia and presented FLORIAN likely in setting of dehydration with fevers vs ATN with Sepsis (CRE high 2s and baseline 0.8-0.9).   - UA with hematuria and proteinuria and case discussed with Neprhology with concern for glomerulonephritis. ANCA, ISABELLE and GM ABs negative.   - Hypernatremia noted and continued on  Q6H as tolerated.   - Hyperkalemia and s/p cocktail with improvement (4/19).   - IVF and PRBC given with CRE improving and now resumed on Lasix with improvement.     # ID  - Recent admission for  Acinetobacter and Pseudomonas HCAP (3/2022)   - SCx (4/10) with  Acinetobacter and Pseudomonas.   - Completed Meropenem (4/8-4/14) and will monitor off ABX.     # Endocrine  - No hx of DM2 and A1C 5.9. Continue to monitor BG,    - Hx of Hypothyroidism and continued on Synthroid. TSH 47 with low T3/T4 and Free T4. TSH 80s (3/2022) and Synthroid increased at NH. Hypothyroidism could be in the setting of Amiodarone use and current Synthroid dose appears to be working.   - Next TFT to be done in June 2022.     # Hematology   - Anemia i/s/o AOCD and s/p PRBC (4/9)   - Melena/ anemia noted and s/p 2 U PRBC (4/19)  with good response, however HH waxes and wanes with no obvious bleed (no further melena or CGE from PEG aspiration)  - DVT PPX with HSQ (cleared by GI to resume).     # Ethics   - FULL CODE   - Case discussed with Cousin/ HCP, Dr. Donna Hagen (Pediatric Neurologist, [phone]) and updated daily.     # DISPO - Planning to go back to NH with improvement in renal function/ electrolytes. Family requesting [place] and SW aware

## 2022-04-22 NOTE — PROGRESS NOTE ADULT - ASSESSMENT
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 GI consulted for anemia     Anemia   PEG flushed/lavaged x2; feeds and bilious fluid aspirated, No blood products noted  transfuse PRBC to keep hgb close to 7.5 or better  Protonix 40mg via peg  occult likely d/t constipated stool/irritated hemorrhoid; stools brown  senna syrup and miralax; enema as needed  anusol suppository qhs x 5 days   no objection to peg feeds    HCAP   per RCU/ID       I reviewed the overnight course of events on the unit, re-confirming the patient history. I discussed the care with the patient and their family. The plan of care was discussed with the physician assistant and modifications were made to the notation where appropriate. Differential diagnosis and plan of care discussed with patient after the evaluation. Advanced care planning was discussed with patient and family.  Advanced care planning forms were reviewed and discussed.  Risks, benefits and alternatives of gastroenterologic procedures were discussed in detail and all questions were answered. 35 minutes spent on total encounter of which more than fifty percent of the encounter was spent counseling and/or coordinating care by the attending physician.

## 2022-04-22 NOTE — PROGRESS NOTE ADULT - NS ATTEND AMEND GEN_ALL_CORE FT
Shelia round the clock  Nephrology f/u appreciated  Lasix restarted  d/c planning, family interested in an LTAC in [place]. Continued PT.

## 2022-04-23 LAB
ANION GAP SERPL CALC-SCNC: 13 MMOL/L — SIGNIFICANT CHANGE UP (ref 7–14)
BLD GP AB SCN SERPL QL: NEGATIVE — SIGNIFICANT CHANGE UP
BUN SERPL-MCNC: 56 MG/DL — HIGH (ref 7–23)
CALCIUM SERPL-MCNC: 8.8 MG/DL — SIGNIFICANT CHANGE UP (ref 8.4–10.5)
CHLORIDE SERPL-SCNC: 103 MMOL/L — SIGNIFICANT CHANGE UP (ref 98–107)
CO2 SERPL-SCNC: 26 MMOL/L — SIGNIFICANT CHANGE UP (ref 22–31)
CREAT SERPL-MCNC: 1.22 MG/DL — SIGNIFICANT CHANGE UP (ref 0.5–1.3)
EGFR: 62 ML/MIN/1.73M2 — SIGNIFICANT CHANGE UP
GLUCOSE SERPL-MCNC: 98 MG/DL — SIGNIFICANT CHANGE UP (ref 70–99)
HCT VFR BLD CALC: 26.9 % — LOW (ref 39–50)
HGB BLD-MCNC: 8.2 G/DL — LOW (ref 13–17)
MAGNESIUM SERPL-MCNC: 2.3 MG/DL — SIGNIFICANT CHANGE UP (ref 1.6–2.6)
MCHC RBC-ENTMCNC: 30.5 GM/DL — LOW (ref 32–36)
MCHC RBC-ENTMCNC: 31.2 PG — SIGNIFICANT CHANGE UP (ref 27–34)
MCV RBC AUTO: 102.3 FL — HIGH (ref 80–100)
NRBC # BLD: 0 /100 WBCS — SIGNIFICANT CHANGE UP
NRBC # FLD: 0 K/UL — SIGNIFICANT CHANGE UP
PHOSPHATE SERPL-MCNC: 3.6 MG/DL — SIGNIFICANT CHANGE UP (ref 2.5–4.5)
PLATELET # BLD AUTO: 169 K/UL — SIGNIFICANT CHANGE UP (ref 150–400)
POTASSIUM SERPL-MCNC: 4.1 MMOL/L — SIGNIFICANT CHANGE UP (ref 3.5–5.3)
POTASSIUM SERPL-SCNC: 4.1 MMOL/L — SIGNIFICANT CHANGE UP (ref 3.5–5.3)
RBC # BLD: 2.63 M/UL — LOW (ref 4.2–5.8)
RBC # FLD: 18.6 % — HIGH (ref 10.3–14.5)
RH IG SCN BLD-IMP: POSITIVE — SIGNIFICANT CHANGE UP
SODIUM SERPL-SCNC: 142 MMOL/L — SIGNIFICANT CHANGE UP (ref 135–145)
WBC # BLD: 9.08 K/UL — SIGNIFICANT CHANGE UP (ref 3.8–10.5)
WBC # FLD AUTO: 9.08 K/UL — SIGNIFICANT CHANGE UP (ref 3.8–10.5)

## 2022-04-23 PROCEDURE — 74018 RADEX ABDOMEN 1 VIEW: CPT | Mod: 26

## 2022-04-23 PROCEDURE — 99232 SBSQ HOSP IP/OBS MODERATE 35: CPT

## 2022-04-23 RX ADMIN — PANTOPRAZOLE SODIUM 40 MILLIGRAM(S): 20 TABLET, DELAYED RELEASE ORAL at 17:07

## 2022-04-23 RX ADMIN — Medication 40 MILLIGRAM(S): at 05:44

## 2022-04-23 RX ADMIN — AMIODARONE HYDROCHLORIDE 200 MILLIGRAM(S): 400 TABLET ORAL at 05:42

## 2022-04-23 RX ADMIN — ALBUTEROL 2 PUFF(S): 90 AEROSOL, METERED ORAL at 04:00

## 2022-04-23 RX ADMIN — Medication 1 SUPPOSITORY(S): at 21:16

## 2022-04-23 RX ADMIN — Medication 1 MILLIGRAM(S): at 11:36

## 2022-04-23 RX ADMIN — Medication 1 APPLICATION(S): at 11:41

## 2022-04-23 RX ADMIN — Medication 100 MILLIGRAM(S): at 05:43

## 2022-04-23 RX ADMIN — HEPARIN SODIUM 5000 UNIT(S): 5000 INJECTION INTRAVENOUS; SUBCUTANEOUS at 05:44

## 2022-04-23 RX ADMIN — Medication 500 MILLIGRAM(S): at 11:40

## 2022-04-23 RX ADMIN — Medication 1 PUFF(S): at 04:00

## 2022-04-23 RX ADMIN — CHLORHEXIDINE GLUCONATE 15 MILLILITER(S): 213 SOLUTION TOPICAL at 05:40

## 2022-04-23 RX ADMIN — Medication 1 PUFF(S): at 22:52

## 2022-04-23 RX ADMIN — ALBUTEROL 2 PUFF(S): 90 AEROSOL, METERED ORAL at 15:03

## 2022-04-23 RX ADMIN — CHLORHEXIDINE GLUCONATE 15 MILLILITER(S): 213 SOLUTION TOPICAL at 17:04

## 2022-04-23 RX ADMIN — Medication 50 MICROGRAM(S): at 05:43

## 2022-04-23 RX ADMIN — ALBUTEROL 2 PUFF(S): 90 AEROSOL, METERED ORAL at 09:45

## 2022-04-23 RX ADMIN — ALBUTEROL 2 PUFF(S): 90 AEROSOL, METERED ORAL at 22:53

## 2022-04-23 RX ADMIN — LEVETIRACETAM 1000 MILLIGRAM(S): 250 TABLET, FILM COATED ORAL at 05:42

## 2022-04-23 RX ADMIN — LACTULOSE 20 GRAM(S): 10 SOLUTION ORAL at 17:08

## 2022-04-23 RX ADMIN — HEPARIN SODIUM 5000 UNIT(S): 5000 INJECTION INTRAVENOUS; SUBCUTANEOUS at 21:16

## 2022-04-23 RX ADMIN — HEPARIN SODIUM 5000 UNIT(S): 5000 INJECTION INTRAVENOUS; SUBCUTANEOUS at 15:17

## 2022-04-23 RX ADMIN — Medication 2 MILLIGRAM(S): at 21:16

## 2022-04-23 RX ADMIN — CHLORHEXIDINE GLUCONATE 1 APPLICATION(S): 213 SOLUTION TOPICAL at 11:32

## 2022-04-23 RX ADMIN — Medication 1 PUFF(S): at 15:03

## 2022-04-23 RX ADMIN — PANTOPRAZOLE SODIUM 40 MILLIGRAM(S): 20 TABLET, DELAYED RELEASE ORAL at 05:44

## 2022-04-23 RX ADMIN — SENNA PLUS 10 MILLILITER(S): 8.6 TABLET ORAL at 21:15

## 2022-04-23 RX ADMIN — Medication 100 MILLIGRAM(S): at 17:07

## 2022-04-23 RX ADMIN — LACTULOSE 20 GRAM(S): 10 SOLUTION ORAL at 05:44

## 2022-04-23 RX ADMIN — Medication 1 PUFF(S): at 09:45

## 2022-04-23 RX ADMIN — MODAFINIL 100 MILLIGRAM(S): 200 TABLET ORAL at 11:48

## 2022-04-23 RX ADMIN — POLYETHYLENE GLYCOL 3350 17 GRAM(S): 17 POWDER, FOR SOLUTION ORAL at 11:36

## 2022-04-23 RX ADMIN — LEVETIRACETAM 1000 MILLIGRAM(S): 250 TABLET, FILM COATED ORAL at 17:04

## 2022-04-23 NOTE — PROGRESS NOTE ADULT - SUBJECTIVE AND OBJECTIVE BOX
CHIEF COMPLAINT:Patient is a 76y old  Male who presents with a chief complaint of Hypoxia (22 Apr 2022 13:38)      INTERVAL EVENTS:     ROS: Seen by bedside during AM rounds     OBJECTIVE:  ICU Vital Signs Last 24 Hrs  T(C): 37 (23 Apr 2022 06:08), Max: 37 (23 Apr 2022 06:08)  T(F): 98.6 (23 Apr 2022 06:08), Max: 98.6 (23 Apr 2022 06:08)  HR: 64 (23 Apr 2022 06:42) (55 - 66)  BP: 126/68 (23 Apr 2022 06:08) (115/77 - 129/75)  BP(mean): --  ABP: --  ABP(mean): --  RR: 17 (23 Apr 2022 06:42) (17 - 20)  SpO2: 99% (23 Apr 2022 06:42) (98% - 100%)    Mode: standby,pt on [address] [place] [address]    04-21 @ 07:01  -  04-22 @ 07:00  --------------------------------------------------------  IN: 2340 mL / OUT: 1400 mL / NET: 940 mL    04-22 @ 07:01  -  04-23 @ 06:58  --------------------------------------------------------  IN: 840 mL / OUT: 2300 mL / NET: -1460 mL      CAPILLARY BLOOD GLUCOSE          PHYSICAL EXAM:  General:   HEENT:   Lymph Nodes:  Neck:   Respiratory:   Cardiovascular:   Abdomen:   Extremities:   Skin:   Neurological:  Psychiatry:    Mode: standby,pt on [address] [place] [address] MEDICATIONS:  MEDICATIONS  (STANDING):  ALBUTerol    90 MICROgram(s) HFA Inhaler 2 Puff(s) Inhalation every 6 hours  amantadine Syrup 100 milliGRAM(s) Oral two times a day  aMIOdarone    Tablet 200 milliGRAM(s) Oral daily  ascorbic acid 500 milliGRAM(s) Oral daily  carvedilol 6.25 milliGRAM(s) Oral every 12 hours  chlorhexidine 0.12% Liquid 15 milliLiter(s) Oral Mucosa every 12 hours  chlorhexidine 2% Cloths 1 Application(s) Topical daily  collagenase Ointment 1 Application(s) Topical daily  doxazosin 2 milliGRAM(s) Oral at bedtime  folic acid 1 milliGRAM(s) Oral daily  furosemide Solution 40 milliGRAM(s) Oral daily  heparin   Injectable 5000 Unit(s) SubCutaneous every 8 hours  hydrocortisone hemorrhoidal Suppository 1 Suppository(s) Rectal at bedtime  ipratropium 17 MICROgram(s) HFA Inhaler 1 Puff(s) Inhalation every 6 hours  lactulose Syrup 20 Gram(s) Oral two times a day  levETIRAcetam  Solution 1000 milliGRAM(s) Oral two times a day  levothyroxine 50 MICROGram(s) Oral daily  modafinil 100 milliGRAM(s) Oral daily  pantoprazole  Injectable 40 milliGRAM(s) IV Push two times a day  polyethylene glycol 3350 17 Gram(s) Oral daily  senna Syrup 10 milliLiter(s) Oral at bedtime    MEDICATIONS  (PRN):  acetaminophen    Suspension .. 650 milliGRAM(s) Oral every 4 hours PRN Temp greater or equal to 38C (100.4F), Mild Pain (1 - 3)      LABS:                        7.8    9.84  )-----------( 159      ( 22 Apr 2022 04:42 )             25.0     04-22    140  |  103  |  60<H>  ----------------------------<  103<H>  4.2   |  26  |  1.38<H>    Ca    8.4      22 Apr 2022 04:42  Phos  4.0     04-22  Mg     2.40     04-22          Arterial Blood Gas:  04-22 @ 04:42  7.50/40/108/31/99.1/7.4  ABG lactate: --     CHIEF COMPLAINT: Patient is a 76y old  Male who presents with a chief complaint of Hypoxia (22 Apr 2022 13:38). F/U for ACHRF in s/o  Acinetobacter and PSA PNA.     INTERVAL EVENTS:  no overnight events reported.     ROS: Seen by bedside during AM rounds     OBJECTIVE:  ICU Vital Signs Last 24 Hrs  T(C): 37 (23 Apr 2022 06:08), Max: 37 (23 Apr 2022 06:08)  T(F): 98.6 (23 Apr 2022 06:08), Max: 98.6 (23 Apr 2022 06:08)  HR: 64 (23 Apr 2022 06:42) (55 - 66)  BP: 126/68 (23 Apr 2022 06:08) (115/77 - 129/75)  BP(mean): --  ABP: --  ABP(mean): --  RR: 17 (23 Apr 2022 06:42) (17 - 20)  SpO2: 99% (23 Apr 2022 06:42) (98% - 100%)    Mode: standby,pt on 40% trach collar    04-21 @ 07:01  -  04-22 @ 07:00  --------------------------------------------------------  IN: 2340 mL / OUT: 1400 mL / NET: 940 mL    04-22 @ 07:01  -  04-23 @ 06:58  --------------------------------------------------------  IN: 840 mL / OUT: 2300 mL / NET: -1460 mL      CAPILLARY BLOOD GLUCOSE          PHYSICAL EXAM:  General: NAD   Neck: (+) Trach tube noted, site c/d/i.  Cards: S1/S2, no murmurs   Pulm: CTA bilaterally. No wheezes.   Abdomen: Soft, NTND. (+) PEG noted, site c/d/i.   Extremities: No pedal edema. Extremities warm to touch.  Neurology: Somnolent but opens eyes to verbal stimuli. Nonverbal. Not following commands.   Skin: refer to RN assessment.     Mode: standby,pt on 40% trach [facility] MEDICATIONS:  MEDICATIONS  (STANDING):  ALBUTerol    90 MICROgram(s) HFA Inhaler 2 Puff(s) Inhalation every 6 hours  amantadine Syrup 100 milliGRAM(s) Oral two times a day  aMIOdarone    Tablet 200 milliGRAM(s) Oral daily  ascorbic acid 500 milliGRAM(s) Oral daily  carvedilol 6.25 milliGRAM(s) Oral every 12 hours  chlorhexidine 0.12% Liquid 15 milliLiter(s) Oral Mucosa every 12 hours  chlorhexidine 2% Cloths 1 Application(s) Topical daily  collagenase Ointment 1 Application(s) Topical daily  doxazosin 2 milliGRAM(s) Oral at bedtime  folic acid 1 milliGRAM(s) Oral daily  furosemide Solution 40 milliGRAM(s) Oral daily  heparin   Injectable 5000 Unit(s) SubCutaneous every 8 hours  hydrocortisone hemorrhoidal Suppository 1 Suppository(s) Rectal at bedtime  ipratropium 17 MICROgram(s) HFA Inhaler 1 Puff(s) Inhalation every 6 hours  lactulose Syrup 20 Gram(s) Oral two times a day  levETIRAcetam  Solution 1000 milliGRAM(s) Oral two times a day  levothyroxine 50 MICROGram(s) Oral daily  modafinil 100 milliGRAM(s) Oral daily  pantoprazole  Injectable 40 milliGRAM(s) IV Push two times a day  polyethylene glycol 3350 17 Gram(s) Oral daily  senna Syrup 10 milliLiter(s) Oral at bedtime    MEDICATIONS  (PRN):  acetaminophen    Suspension .. 650 milliGRAM(s) Oral every 4 hours PRN Temp greater or equal to 38C (100.4F), Mild Pain (1 - 3)      LABS:                        7.8    9.84  )-----------( 159      ( 22 Apr 2022 04:42 )             25.0     04-22    140  |  103  |  60<H>  ----------------------------<  103<H>  4.2   |  26  |  1.38<H>    Ca    8.4      22 Apr 2022 04:42  Phos  4.0     04-22  Mg     2.40     04-22          Arterial Blood Gas:  04-22 @ 04:42  7.50/40/108/31/99.1/7.4  ABG lactate: --

## 2022-04-23 NOTE — PROGRESS NOTE ADULT - NS ATTEND AMEND GEN_ALL_CORE FT
Shelia round the clock  Nephrology f/u appreciated  Lasix restarted  d/c planning, family interested in an LTAC in [place]. Continued PT. as above:  s/p resp failure s/p  acinetobacter and pseudomonas PNA--on TC (vent prn-none since 4/21)  ID-off ABX  Renal s/p FLORIAN--Nephrology f/u appreciated  Lasix restarted-to continue  GI-TF continue, prophylaxis  DVT prophylaxis   -chronic benson  heme-f/up H/H--prior PRBC  d/c planning, family interested in an LTAC in [place]. Continued PT.    Fletcher Jorge MD-Pulmonary   [phone]

## 2022-04-23 NOTE — PROGRESS NOTE ADULT - ASSESSMENT
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 Now DISPO planning.    # Neurology   - Currently A&Ox0, awake and alert, able to move RUE and nods/ mouths one or two words per RCU evaluation and prior documentation.   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   - Continue on Modafinil, Amantidine and Keppra.     # Cardiovascular   - Hx of HFpEF 55, mild MR and AR, severe LAD, normal LVRVSF (ECHO 3/7)  - Hx of Atrial Fibrillation s/p Watchman and currently rate controlled.   - Continue on Amiodarone 200mg QD and Coreg 6.25mg BID.   - Continue on Lasix 40mg QD and monitor tolerance.     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   - Able to tolerate TC (from 4/15 during the day) and now ATC (from 4/21).   - Continue on Proventil, Atrovent and Chest PT Q6H     # GI  - Hx of SDH, Gastric Bypass and Dysphagia s/p PEG and continued on TF with course complicated by constipatio and melena  - Case discussed with GI and melena likely in setting of constipation, however no plan for scope currently and will medically treat with PPI.   - Monitor BMs on Miralax, Senna and Lactulose.   - Continue on Nepro in sight of hyperkalemia.     # / Renal  - Hx of Urinary Retention with Chronic Garcia and presented FLORIAN likely in setting of dehydration with fevers vs ATN with Sepsis (CRE high 2s and baseline 0.8-0.9).   - UA with hematuria and proteinuria and case discussed with Neprhology with concern for glomerulonephritis. ANCA, ISABELLE and GM ABs negative.   - Hypernatremia noted and continued on  Q6H as tolerated.   - Hyperkalemia and s/p cocktail with improvement (4/19).   - IVF and PRBC given with CRE improving and now resumed on Lasix with improvement.     # ID  - Recent admission for  Acinetobacter and Pseudomonas HCAP (3/2022)   - SCx (4/10) with  Acinetobacter and Pseudomonas.   - Completed Meropenem (4/8-4/14) and will monitor off ABX.     # Endocrine  - No hx of DM2 and A1C 5.9. Continue to monitor BG,    - Hx of Hypothyroidism and continued on Synthroid. TSH 47 with low T3/T4 and Free T4. TSH 80s (3/2022) and Synthroid increased at NH. Hypothyroidism could be in the setting of Amiodarone use and current Synthroid dose appears to be working.   - Next TFT to be done in June 2022.     # Hematology   - Anemia i/s/o AOCD and s/p PRBC (4/9)   - Melena/ anemia noted and s/p 2 U PRBC (4/19)  with good response, however HH waxes and wanes with no obvious bleed (no further melena or CGE from PEG aspiration)  - DVT PPX with HSQ (cleared by GI to resume).     # Ethics   - FULL CODE   - Case discussed with Cousin/ HCP, Dr. Donna Hagen (Pediatric Neurologist, [phone]) and updated daily.     # DISPO - Planning to go back to NH with improvement in renal function/ electrolytes. 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 Now DISPO planning.    # Neurology   - Currently A&Ox0, awake and alert, able to move RUE and nods/ mouths one or two words per RCU evaluation and prior documentation.   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   - Continue on Modafinil, Amantidine and Keppra.     # Cardiovascular   - Hx of HFpEF 55, mild MR and AR, severe LAD, normal LVRVSF (ECHO 3/7)  - Hx of Atrial Fibrillation s/p Watchman and currently rate controlled.   - Continue on Amiodarone 200mg QD and Coreg 6.25mg BID.   - Continue on Lasix 40mg QD and monitor tolerance.     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   - Able to tolerate TC (from 4/15 during the day) and now ATC (from 4/21).   - Continue on Proventil, Atrovent and Chest PT Q6H     # GI  - Hx of SDH, Gastric Bypass and Dysphagia s/p PEG and continued on TF with course complicated by constipatio and melena  - Case discussed with GI and melena likely in setting of constipation, however no plan for scope currently and will medically treat with PPI.   - Monitor BMs on Miralax, Senna and Lactulose.   - Continue on Nepro in sight of hyperkalemia.     # / Renal  - Hx of Urinary Retention with Chronic Garcia and presented FLORIAN likely in setting of dehydration with fevers vs ATN with Sepsis (CRE high 2s and baseline 0.8-0.9).   - UA with hematuria and proteinuria and case discussed with Neprhology with concern for glomerulonephritis. ANCA, ISABELLE and GM ABs negative.   - Hypernatremia noted and continued on  Q6H as tolerated.   - Hyperkalemia and s/p cocktail with improvement (4/19).   - IVF and PRBC given with CRE improving and now resumed on Lasix with improvement.     # ID  - Recent admission for  Acinetobacter and Pseudomonas HCAP (3/2022)   - SCx (4/10) with  Acinetobacter and Pseudomonas.   - Completed Meropenem (4/8-4/14) and will monitor off ABX.     # Endocrine  - No hx of DM2 and A1C 5.9. Continue to monitor BG,    - Hx of Hypothyroidism and continued on Synthroid. TSH 47 with low T3/T4 and Free T4. TSH 80s (3/2022) and Synthroid increased at NH. Hypothyroidism could be in the setting of Amiodarone use and current Synthroid dose appears to be working.   - Next TFT to be done in June 2022.     # Hematology   - Anemia i/s/o AOCD and s/p PRBC (4/9)   - Melena/ anemia noted and s/p 2 U PRBC (4/19)  with good response, however HH waxes and wanes with no obvious bleed (no further melena or CGE from PEG aspiration)  - DVT PPX with HSQ (cleared by GI to resume).     # Ethics   - FULL CODE   - Case discussed with Cousin/ HCP, Dr. Donna Hagen (Pediatric Neurologist, [phone]) and updated daily.     # DISPO - Planning to go back to NH with improvement in renal function/ electrolytes. Family requesting [place] NH and  aware   ******************  4/23-no events o/n

## 2022-04-24 LAB
ANION GAP SERPL CALC-SCNC: 11 MMOL/L — SIGNIFICANT CHANGE UP (ref 7–14)
BASOPHILS # BLD AUTO: 0.06 K/UL — SIGNIFICANT CHANGE UP (ref 0–0.2)
BASOPHILS NFR BLD AUTO: 0.7 % — SIGNIFICANT CHANGE UP (ref 0–2)
BUN SERPL-MCNC: 48 MG/DL — HIGH (ref 7–23)
CALCIUM SERPL-MCNC: 8.9 MG/DL — SIGNIFICANT CHANGE UP (ref 8.4–10.5)
CHLORIDE SERPL-SCNC: 100 MMOL/L — SIGNIFICANT CHANGE UP (ref 98–107)
CO2 SERPL-SCNC: 29 MMOL/L — SIGNIFICANT CHANGE UP (ref 22–31)
CREAT SERPL-MCNC: 1.15 MG/DL — SIGNIFICANT CHANGE UP (ref 0.5–1.3)
EGFR: 66 ML/MIN/1.73M2 — SIGNIFICANT CHANGE UP
EOSINOPHIL # BLD AUTO: 0.29 K/UL — SIGNIFICANT CHANGE UP (ref 0–0.5)
EOSINOPHIL NFR BLD AUTO: 3.6 % — SIGNIFICANT CHANGE UP (ref 0–6)
GLUCOSE SERPL-MCNC: 112 MG/DL — HIGH (ref 70–99)
HCT VFR BLD CALC: 26.5 % — LOW (ref 39–50)
HGB BLD-MCNC: 8.3 G/DL — LOW (ref 13–17)
IANC: 5.72 K/UL — SIGNIFICANT CHANGE UP (ref 1.8–7.4)
IMM GRANULOCYTES NFR BLD AUTO: 0.5 % — SIGNIFICANT CHANGE UP (ref 0–1.5)
LYMPHOCYTES # BLD AUTO: 1.3 K/UL — SIGNIFICANT CHANGE UP (ref 1–3.3)
LYMPHOCYTES # BLD AUTO: 16.2 % — SIGNIFICANT CHANGE UP (ref 13–44)
MAGNESIUM SERPL-MCNC: 2.2 MG/DL — SIGNIFICANT CHANGE UP (ref 1.6–2.6)
MCHC RBC-ENTMCNC: 31.3 GM/DL — LOW (ref 32–36)
MCHC RBC-ENTMCNC: 31.8 PG — SIGNIFICANT CHANGE UP (ref 27–34)
MCV RBC AUTO: 101.5 FL — HIGH (ref 80–100)
MONOCYTES # BLD AUTO: 0.6 K/UL — SIGNIFICANT CHANGE UP (ref 0–0.9)
MONOCYTES NFR BLD AUTO: 7.5 % — SIGNIFICANT CHANGE UP (ref 2–14)
NEUTROPHILS # BLD AUTO: 5.72 K/UL — SIGNIFICANT CHANGE UP (ref 1.8–7.4)
NEUTROPHILS NFR BLD AUTO: 71.5 % — SIGNIFICANT CHANGE UP (ref 43–77)
NRBC # BLD: 0 /100 WBCS — SIGNIFICANT CHANGE UP
NRBC # FLD: 0 K/UL — SIGNIFICANT CHANGE UP
PHOSPHATE SERPL-MCNC: 3.2 MG/DL — SIGNIFICANT CHANGE UP (ref 2.5–4.5)
PLATELET # BLD AUTO: 164 K/UL — SIGNIFICANT CHANGE UP (ref 150–400)
POTASSIUM SERPL-MCNC: 3.8 MMOL/L — SIGNIFICANT CHANGE UP (ref 3.5–5.3)
POTASSIUM SERPL-SCNC: 3.8 MMOL/L — SIGNIFICANT CHANGE UP (ref 3.5–5.3)
RBC # BLD: 2.61 M/UL — LOW (ref 4.2–5.8)
RBC # FLD: 18.6 % — HIGH (ref 10.3–14.5)
SODIUM SERPL-SCNC: 140 MMOL/L — SIGNIFICANT CHANGE UP (ref 135–145)
WBC # BLD: 8.01 K/UL — SIGNIFICANT CHANGE UP (ref 3.8–10.5)
WBC # FLD AUTO: 8.01 K/UL — SIGNIFICANT CHANGE UP (ref 3.8–10.5)

## 2022-04-24 PROCEDURE — 93010 ELECTROCARDIOGRAM REPORT: CPT

## 2022-04-24 PROCEDURE — 99232 SBSQ HOSP IP/OBS MODERATE 35: CPT

## 2022-04-24 RX ORDER — METOCLOPRAMIDE HCL 10 MG
5 TABLET ORAL EVERY 8 HOURS
Refills: 0 | Status: DISCONTINUED | OUTPATIENT
Start: 2022-04-24 | End: 2022-04-24

## 2022-04-24 RX ORDER — METOCLOPRAMIDE HCL 10 MG
10 TABLET ORAL EVERY 8 HOURS
Refills: 0 | Status: DISCONTINUED | OUTPATIENT
Start: 2022-04-24 | End: 2022-04-24

## 2022-04-24 RX ORDER — METOCLOPRAMIDE HCL 10 MG
10 TABLET ORAL EVERY 8 HOURS
Refills: 0 | Status: COMPLETED | OUTPATIENT
Start: 2022-04-24 | End: 2022-04-27

## 2022-04-24 RX ORDER — HYDROCORTISONE 1 %
1 OINTMENT (GRAM) TOPICAL AT BEDTIME
Refills: 0 | Status: COMPLETED | OUTPATIENT
Start: 2022-04-24 | End: 2022-04-29

## 2022-04-24 RX ADMIN — Medication 100 MILLIGRAM(S): at 17:07

## 2022-04-24 RX ADMIN — LEVETIRACETAM 1000 MILLIGRAM(S): 250 TABLET, FILM COATED ORAL at 17:07

## 2022-04-24 RX ADMIN — ALBUTEROL 2 PUFF(S): 90 AEROSOL, METERED ORAL at 16:14

## 2022-04-24 RX ADMIN — HEPARIN SODIUM 5000 UNIT(S): 5000 INJECTION INTRAVENOUS; SUBCUTANEOUS at 21:42

## 2022-04-24 RX ADMIN — Medication 1 PUFF(S): at 10:38

## 2022-04-24 RX ADMIN — Medication 1 PUFF(S): at 03:53

## 2022-04-24 RX ADMIN — Medication 1 APPLICATION(S): at 13:03

## 2022-04-24 RX ADMIN — HEPARIN SODIUM 5000 UNIT(S): 5000 INJECTION INTRAVENOUS; SUBCUTANEOUS at 05:12

## 2022-04-24 RX ADMIN — Medication 10 MILLIGRAM(S): at 21:49

## 2022-04-24 RX ADMIN — PANTOPRAZOLE SODIUM 40 MILLIGRAM(S): 20 TABLET, DELAYED RELEASE ORAL at 05:13

## 2022-04-24 RX ADMIN — PANTOPRAZOLE SODIUM 40 MILLIGRAM(S): 20 TABLET, DELAYED RELEASE ORAL at 17:08

## 2022-04-24 RX ADMIN — ALBUTEROL 2 PUFF(S): 90 AEROSOL, METERED ORAL at 03:53

## 2022-04-24 RX ADMIN — CHLORHEXIDINE GLUCONATE 1 APPLICATION(S): 213 SOLUTION TOPICAL at 12:35

## 2022-04-24 RX ADMIN — Medication 1 MILLIGRAM(S): at 12:35

## 2022-04-24 RX ADMIN — Medication 100 MILLIGRAM(S): at 05:12

## 2022-04-24 RX ADMIN — LACTULOSE 20 GRAM(S): 10 SOLUTION ORAL at 17:09

## 2022-04-24 RX ADMIN — POLYETHYLENE GLYCOL 3350 17 GRAM(S): 17 POWDER, FOR SOLUTION ORAL at 12:36

## 2022-04-24 RX ADMIN — CHLORHEXIDINE GLUCONATE 15 MILLILITER(S): 213 SOLUTION TOPICAL at 05:12

## 2022-04-24 RX ADMIN — CHLORHEXIDINE GLUCONATE 15 MILLILITER(S): 213 SOLUTION TOPICAL at 17:07

## 2022-04-24 RX ADMIN — Medication 1 PUFF(S): at 16:16

## 2022-04-24 RX ADMIN — LACTULOSE 20 GRAM(S): 10 SOLUTION ORAL at 05:11

## 2022-04-24 RX ADMIN — Medication 2 MILLIGRAM(S): at 21:42

## 2022-04-24 RX ADMIN — AMIODARONE HYDROCHLORIDE 200 MILLIGRAM(S): 400 TABLET ORAL at 05:12

## 2022-04-24 RX ADMIN — Medication 40 MILLIGRAM(S): at 05:11

## 2022-04-24 RX ADMIN — HEPARIN SODIUM 5000 UNIT(S): 5000 INJECTION INTRAVENOUS; SUBCUTANEOUS at 14:24

## 2022-04-24 RX ADMIN — Medication 500 MILLIGRAM(S): at 12:36

## 2022-04-24 RX ADMIN — Medication 1 PUFF(S): at 23:02

## 2022-04-24 RX ADMIN — ALBUTEROL 2 PUFF(S): 90 AEROSOL, METERED ORAL at 23:02

## 2022-04-24 RX ADMIN — ALBUTEROL 2 PUFF(S): 90 AEROSOL, METERED ORAL at 10:38

## 2022-04-24 RX ADMIN — Medication 50 MICROGRAM(S): at 05:49

## 2022-04-24 RX ADMIN — Medication 10 MILLIGRAM(S): at 14:24

## 2022-04-24 RX ADMIN — MODAFINIL 100 MILLIGRAM(S): 200 TABLET ORAL at 12:36

## 2022-04-24 RX ADMIN — LEVETIRACETAM 1000 MILLIGRAM(S): 250 TABLET, FILM COATED ORAL at 05:11

## 2022-04-24 RX ADMIN — SENNA PLUS 10 MILLILITER(S): 8.6 TABLET ORAL at 21:43

## 2022-04-24 NOTE — PROGRESS NOTE ADULT - SUBJECTIVE AND OBJECTIVE BOX
INTERVAL HPI/OVERNIGHT EVENTS:  No new overnight event.  No N/V/D.  Tolerating diet.     MEDICATIONS  (STANDING):  ALBUTerol    90 MICROgram(s) HFA Inhaler 2 Puff(s) Inhalation every 6 hours  amantadine Syrup 100 milliGRAM(s) Oral two times a day  aMIOdarone    Tablet 200 milliGRAM(s) Oral daily  ascorbic acid 500 milliGRAM(s) Oral daily  carvedilol 6.25 milliGRAM(s) Oral every 12 hours  chlorhexidine 0.12% Liquid 15 milliLiter(s) Oral Mucosa every 12 hours  chlorhexidine 2% Cloths 1 Application(s) Topical daily  collagenase Ointment 1 Application(s) Topical daily  doxazosin 2 milliGRAM(s) Oral at bedtime  folic acid 1 milliGRAM(s) Oral daily  furosemide Solution 40 milliGRAM(s) Oral daily  heparin   Injectable 5000 Unit(s) SubCutaneous every 8 hours  ipratropium 17 MICROgram(s) HFA Inhaler 1 Puff(s) Inhalation every 6 hours  lactulose Syrup 20 Gram(s) Oral two times a day  levETIRAcetam  Solution 1000 milliGRAM(s) Oral two times a day  levothyroxine 50 MICROGram(s) Oral daily  metoclopramide 10 milliGRAM(s) Oral every 8 hours  modafinil 100 milliGRAM(s) Oral daily  pantoprazole  Injectable 40 milliGRAM(s) IV Push two times a day  polyethylene glycol 3350 17 Gram(s) Oral daily  senna Syrup 10 milliLiter(s) Oral at bedtime    MEDICATIONS  (PRN):  acetaminophen    Suspension .. 650 milliGRAM(s) Oral every 4 hours PRN Temp greater or equal to 38C (100.4F), Mild Pain (1 - 3)      Allergies    No Known Allergies    Intolerances        Review of Systems:    General:  No wt loss, fevers, chills, night sweats,fatigue,   Eyes:  Good vision, no reported pain  ENT:  No sore throat, pain, runny nose, dysphagia  CV:  No pain, palpitatioins, hypo/hypertension  Resp:  No dyspnea, cough, tachypnea, wheezing  GI:  No pain, No nausea, No vomiting, No diarrhea, No constipatiion, No weight loss, No fever, No pruritis, No rectal bleeding, No tarry stools, No dysphagia,  :  No pain, bleeding, incontinence, nocturia  Muscle:  No pain, weakness  Neuro:  No weakness, tingling, memory problems  Psych:  No fatigue, insomnia, mood problems, depression  Endocrine:  No polyuria, polydypsia, cold/heat intolerance  Heme:  No petechiae, ecchymosis, easy bruisability  Skin:  No rash, tattoos, scars, edema      Vital Signs Last 24 Hrs  T(C): 37.1 (24 Apr 2022 18:40), Max: 37.3 (24 Apr 2022 13:36)  T(F): 98.7 (24 Apr 2022 18:40), Max: 99.1 (24 Apr 2022 13:36)  HR: 57 (24 Apr 2022 19:12) (57 - 66)  BP: 115/99 (24 Apr 2022 18:40) (102/61 - 134/91)  BP(mean): --  RR: 18 (24 Apr 2022 19:12) (18 - 20)  SpO2: 99% (24 Apr 2022 19:12) (95% - 99%)    PHYSICAL EXAM:    Constitutional: NAD, well-developed  HEENT: EOMI, throat clear  Neck: No LAD, supple  Respiratory: CTA and P  Cardiovascular: S1 and S2, RRR, no M  Gastrointestinal: BS+, soft, NT/ND, neg HSM,  Extremities: No peripheral edema, neg clubing, cyanosis  Vascular: 2+ peripheral pulses  Neurological: A/O x 3, no focal deficits  Psychiatric: Normal mood, normal affect  Skin: No rashes      LABS:                        8.3    8.01  )-----------( 164      ( 24 Apr 2022 06:42 )             26.5     04-24    140  |  100  |  48<H>  ----------------------------<  112<H>  3.8   |  29  |  1.15    Ca    8.9      24 Apr 2022 06:42  Phos  3.2     04-24  Mg     2.20     04-24            RADIOLOGY & ADDITIONAL TESTS:

## 2022-04-24 NOTE — PROGRESS NOTE ADULT - NS ATTEND AMEND GEN_ALL_CORE FT
as above:  s/p resp failure s/p  acinetobacter and pseudomonas PNA--on TC (vent prn-none since 4/21)  ID-off ABX  Renal s/p FLORIAN--Nephrology f/u appreciated  Lasix restarted-to continue  GI-TF continue, prophylaxis  DVT prophylaxis   -chronic benson  heme-f/up H/H--prior PRBC  d/c planning, family interested in an LTAC in [place]. Continued PT.    Fletcher Jorge MD-Pulmonary   [phone] as above: GI distension--abdom films pend  s/p resp failure s/p  acinetobacter and pseudomonas PNA--on TC (vent prn-none since 4/21)  ID-off ABX  Renal s/p FLORIAN--Nephrology f/u appreciated  Lasix restarted-to continue  GI-TF continue, prophylaxis; initiate bowel regimen including reglan 5 q6-8 hrs  DVT prophylaxis   -chronic benson  heme-f/up H/H--prior PRBC  d/c planning, family interested in an LTAC in [place]. Continued PT.    Fletcher Jorge MD-Pulmonary   [phone]

## 2022-04-24 NOTE — PROGRESS NOTE ADULT - ASSESSMENT
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 GI consulted for anemia     Anemia   PEG flushed/lavaged x2; feeds and bilious fluid aspirated, No blood products noted  transfuse PRBC to keep hgb close to 7.5 or better  Protonix 40mg via peg  occult likely d/t constipated stool/irritated hemorrhoid; stools brown  senna syrup and miralax; enema as needed  anusol suppository qhs x 5 days   no objection to peg feeds    HCAP   per RCU/ID       I reviewed the overnight course of events on the unit, re-confirming the patient history. I discussed the care with the patient and their family. The plan of care was discussed with the physician assistant and modifications were made to the notation where appropriate. Differential diagnosis and plan of care discussed with patient after the evaluation. Advanced care planning was discussed with patient and family.  Advanced care planning forms were reviewed and discussed.  Risks, benefits and alternatives of gastroenterologic procedures were discussed in detail and all questions were answered. 35 minutes spent on total encounter of which more than fifty percent of the encounter was spent counseling and/or coordinating care by the attending physician.

## 2022-04-24 NOTE — PROGRESS NOTE ADULT - SUBJECTIVE AND OBJECTIVE BOX
CHIEF COMPLAINT: Patient is a 76y old  Male who presents with a chief complaint of Hypoxia (23 Apr 2022 06:58)      INTERVAL EVENTS:     ROS: Seen by bedside during AM rounds     OBJECTIVE:  ICU Vital Signs Last 24 Hrs  T(C): 36.4 (24 Apr 2022 05:45), Max: 37 (23 Apr 2022 14:00)  T(F): 97.5 (24 Apr 2022 05:45), Max: 98.6 (23 Apr 2022 14:00)  HR: 59 (24 Apr 2022 05:45) (56 - 86)  BP: 134/91 (24 Apr 2022 05:45) (119/64 - 134/91)  BP(mean): --  ABP: --  ABP(mean): --  RR: 19 (24 Apr 2022 05:45) (19 - 20)  SpO2: 97% (24 Apr 2022 05:45) (96% - 99%)    Mode: [facility]    04-23 @ 07:01  -  04-24 @ 07:00  --------------------------------------------------------  IN: 1612 mL / OUT: 2000 mL / NET: -388 mL      CAPILLARY BLOOD GLUCOSE          PHYSICAL EXAM:  General:   HEENT:   Lymph Nodes:  Neck:   Respiratory:   Cardiovascular:   Abdomen:   Extremities:   Skin:   Neurological:  Psychiatry:    Mode: [facility] MEDICATIONS:  MEDICATIONS  (STANDING):  ALBUTerol    90 MICROgram(s) HFA Inhaler 2 Puff(s) Inhalation every 6 hours  amantadine Syrup 100 milliGRAM(s) Oral two times a day  aMIOdarone    Tablet 200 milliGRAM(s) Oral daily  ascorbic acid 500 milliGRAM(s) Oral daily  carvedilol 6.25 milliGRAM(s) Oral every 12 hours  chlorhexidine 0.12% Liquid 15 milliLiter(s) Oral Mucosa every 12 hours  chlorhexidine 2% Cloths 1 Application(s) Topical daily  collagenase Ointment 1 Application(s) Topical daily  doxazosin 2 milliGRAM(s) Oral at bedtime  folic acid 1 milliGRAM(s) Oral daily  furosemide Solution 40 milliGRAM(s) Oral daily  heparin   Injectable 5000 Unit(s) SubCutaneous every 8 hours  ipratropium 17 MICROgram(s) HFA Inhaler 1 Puff(s) Inhalation every 6 hours  lactulose Syrup 20 Gram(s) Oral two times a day  levETIRAcetam  Solution 1000 milliGRAM(s) Oral two times a day  levothyroxine 50 MICROGram(s) Oral daily  modafinil 100 milliGRAM(s) Oral daily  pantoprazole  Injectable 40 milliGRAM(s) IV Push two times a day  polyethylene glycol 3350 17 Gram(s) Oral daily  senna Syrup 10 milliLiter(s) Oral at bedtime    MEDICATIONS  (PRN):  acetaminophen    Suspension .. 650 milliGRAM(s) Oral every 4 hours PRN Temp greater or equal to 38C (100.4F), Mild Pain (1 - 3)      LABS:                        8.3    8.01  )-----------( 164      ( 24 Apr 2022 06:42 )             26.5     04-24    140  |  100  |  48<H>  ----------------------------<  112<H>  3.8   |  29  |  1.15    Ca    8.9      24 Apr 2022 06:42  Phos  3.2     04-24  Mg     2.20     04-24             CHIEF COMPLAINT: Patient is a 76y old  Male who presents with a chief complaint of Hypoxia (23 Apr 2022 06:58). F/U for ACHRF in s/o  Acinetobacter and PSA PNA.    INTERVAL EVENTS: last evening noted with abd distention and lack of BM x3d- TF held x4h and PEG residuals checked without e/o retained feeds. AXR with nonobstructive bowel gas pattern and mod stool burden. Continued on laxatives. Start Reglan to assist with motility.     ROS: Unable to obtain ROS given patient is nonverbal.     OBJECTIVE:  ICU Vital Signs Last 24 Hrs  T(C): 36.4 (24 Apr 2022 05:45), Max: 37 (23 Apr 2022 14:00)  T(F): 97.5 (24 Apr 2022 05:45), Max: 98.6 (23 Apr 2022 14:00)  HR: 59 (24 Apr 2022 05:45) (56 - 86)  BP: 134/91 (24 Apr 2022 05:45) (119/64 - 134/91)  BP(mean): --  ABP: --  ABP(mean): --  RR: 19 (24 Apr 2022 05:45) (19 - 20)  SpO2: 97% (24 Apr 2022 05:45) (96% - 99%)    Mode: [facility]    04-23 @ 07:01  -  04-24 @ 07:00  --------------------------------------------------------  IN: 1612 mL / OUT: 2000 mL / NET: -388 mL      CAPILLARY BLOOD GLUCOSE          PHYSICAL EXAM:  General: NAD   Neck: (+) Trach tube noted, site c/d/i.  Cards: S1/S2, no murmurs   Pulm: Diminished b/l. No resp disterss.    Abdomen: Soft, NTND. (+) PEG.   Extremities: No pedal edema. Extremities warm to touch.  Neurology: Rouses to verbal stimuli. Nonverbal. Not following commands.   Skin: refer to RN assessment.   Mode: [facility] MEDICATIONS:  MEDICATIONS  (STANDING):  ALBUTerol    90 MICROgram(s) HFA Inhaler 2 Puff(s) Inhalation every 6 hours  amantadine Syrup 100 milliGRAM(s) Oral two times a day  aMIOdarone    Tablet 200 milliGRAM(s) Oral daily  ascorbic acid 500 milliGRAM(s) Oral daily  carvedilol 6.25 milliGRAM(s) Oral every 12 hours  chlorhexidine 0.12% Liquid 15 milliLiter(s) Oral Mucosa every 12 hours  chlorhexidine 2% Cloths 1 Application(s) Topical daily  collagenase Ointment 1 Application(s) Topical daily  doxazosin 2 milliGRAM(s) Oral at bedtime  folic acid 1 milliGRAM(s) Oral daily  furosemide Solution 40 milliGRAM(s) Oral daily  heparin   Injectable 5000 Unit(s) SubCutaneous every 8 hours  ipratropium 17 MICROgram(s) HFA Inhaler 1 Puff(s) Inhalation every 6 hours  lactulose Syrup 20 Gram(s) Oral two times a day  levETIRAcetam  Solution 1000 milliGRAM(s) Oral two times a day  levothyroxine 50 MICROGram(s) Oral daily  modafinil 100 milliGRAM(s) Oral daily  pantoprazole  Injectable 40 milliGRAM(s) IV Push two times a day  polyethylene glycol 3350 17 Gram(s) Oral daily  senna Syrup 10 milliLiter(s) Oral at bedtime    MEDICATIONS  (PRN):  acetaminophen    Suspension .. 650 milliGRAM(s) Oral every 4 hours PRN Temp greater or equal to 38C (100.4F), Mild Pain (1 - 3)      LABS:                        8.3    8.01  )-----------( 164      ( 24 Apr 2022 06:42 )             26.5     04-24    140  |  100  |  48<H>  ----------------------------<  112<H>  3.8   |  29  |  1.15    Ca    8.9      24 Apr 2022 06:42  Phos  3.2     04-24  Mg     2.20     04-24

## 2022-04-25 LAB
ANION GAP SERPL CALC-SCNC: 11 MMOL/L — SIGNIFICANT CHANGE UP (ref 7–14)
BASOPHILS # BLD AUTO: 0.06 K/UL — SIGNIFICANT CHANGE UP (ref 0–0.2)
BASOPHILS NFR BLD AUTO: 0.8 % — SIGNIFICANT CHANGE UP (ref 0–2)
BUN SERPL-MCNC: 53 MG/DL — HIGH (ref 7–23)
CALCIUM SERPL-MCNC: 9.1 MG/DL — SIGNIFICANT CHANGE UP (ref 8.4–10.5)
CHLORIDE SERPL-SCNC: 102 MMOL/L — SIGNIFICANT CHANGE UP (ref 98–107)
CO2 SERPL-SCNC: 29 MMOL/L — SIGNIFICANT CHANGE UP (ref 22–31)
CREAT SERPL-MCNC: 1.13 MG/DL — SIGNIFICANT CHANGE UP (ref 0.5–1.3)
EGFR: 68 ML/MIN/1.73M2 — SIGNIFICANT CHANGE UP
EOSINOPHIL # BLD AUTO: 0.17 K/UL — SIGNIFICANT CHANGE UP (ref 0–0.5)
EOSINOPHIL NFR BLD AUTO: 2.2 % — SIGNIFICANT CHANGE UP (ref 0–6)
GLUCOSE SERPL-MCNC: 110 MG/DL — HIGH (ref 70–99)
HCT VFR BLD CALC: 29.1 % — LOW (ref 39–50)
HGB BLD-MCNC: 9 G/DL — LOW (ref 13–17)
IANC: 5.58 K/UL — SIGNIFICANT CHANGE UP (ref 1.8–7.4)
IMM GRANULOCYTES NFR BLD AUTO: 0.4 % — SIGNIFICANT CHANGE UP (ref 0–1.5)
LYMPHOCYTES # BLD AUTO: 1.33 K/UL — SIGNIFICANT CHANGE UP (ref 1–3.3)
LYMPHOCYTES # BLD AUTO: 17.4 % — SIGNIFICANT CHANGE UP (ref 13–44)
MAGNESIUM SERPL-MCNC: 2.2 MG/DL — SIGNIFICANT CHANGE UP (ref 1.6–2.6)
MCHC RBC-ENTMCNC: 30.9 GM/DL — LOW (ref 32–36)
MCHC RBC-ENTMCNC: 32 PG — SIGNIFICANT CHANGE UP (ref 27–34)
MCV RBC AUTO: 103.6 FL — HIGH (ref 80–100)
MONOCYTES # BLD AUTO: 0.48 K/UL — SIGNIFICANT CHANGE UP (ref 0–0.9)
MONOCYTES NFR BLD AUTO: 6.3 % — SIGNIFICANT CHANGE UP (ref 2–14)
NEUTROPHILS # BLD AUTO: 5.58 K/UL — SIGNIFICANT CHANGE UP (ref 1.8–7.4)
NEUTROPHILS NFR BLD AUTO: 72.9 % — SIGNIFICANT CHANGE UP (ref 43–77)
NRBC # BLD: 0 /100 WBCS — SIGNIFICANT CHANGE UP
NRBC # FLD: 0 K/UL — SIGNIFICANT CHANGE UP
PHOSPHATE SERPL-MCNC: 3.3 MG/DL — SIGNIFICANT CHANGE UP (ref 2.5–4.5)
PLATELET # BLD AUTO: 192 K/UL — SIGNIFICANT CHANGE UP (ref 150–400)
POTASSIUM SERPL-MCNC: 4.4 MMOL/L — SIGNIFICANT CHANGE UP (ref 3.5–5.3)
POTASSIUM SERPL-SCNC: 4.4 MMOL/L — SIGNIFICANT CHANGE UP (ref 3.5–5.3)
RBC # BLD: 2.81 M/UL — LOW (ref 4.2–5.8)
RBC # FLD: 18.5 % — HIGH (ref 10.3–14.5)
SODIUM SERPL-SCNC: 142 MMOL/L — SIGNIFICANT CHANGE UP (ref 135–145)
WBC # BLD: 7.65 K/UL — SIGNIFICANT CHANGE UP (ref 3.8–10.5)
WBC # FLD AUTO: 7.65 K/UL — SIGNIFICANT CHANGE UP (ref 3.8–10.5)

## 2022-04-25 PROCEDURE — 99358 PROLONG SERVICE W/O CONTACT: CPT | Mod: NC

## 2022-04-25 PROCEDURE — 99233 SBSQ HOSP IP/OBS HIGH 50: CPT | Mod: GC

## 2022-04-25 RX ORDER — SODIUM CHLORIDE 9 MG/ML
4 INJECTION INTRAMUSCULAR; INTRAVENOUS; SUBCUTANEOUS EVERY 6 HOURS
Refills: 0 | Status: DISCONTINUED | OUTPATIENT
Start: 2022-04-25 | End: 2022-05-16

## 2022-04-25 RX ORDER — SODIUM CHLORIDE 9 MG/ML
3 INJECTION INTRAMUSCULAR; INTRAVENOUS; SUBCUTANEOUS EVERY 6 HOURS
Refills: 0 | Status: DISCONTINUED | OUTPATIENT
Start: 2022-04-25 | End: 2022-04-25

## 2022-04-25 RX ADMIN — POLYETHYLENE GLYCOL 3350 17 GRAM(S): 17 POWDER, FOR SOLUTION ORAL at 11:27

## 2022-04-25 RX ADMIN — ALBUTEROL 2 PUFF(S): 90 AEROSOL, METERED ORAL at 23:21

## 2022-04-25 RX ADMIN — Medication 1 SUPPOSITORY(S): at 01:55

## 2022-04-25 RX ADMIN — ALBUTEROL 2 PUFF(S): 90 AEROSOL, METERED ORAL at 03:38

## 2022-04-25 RX ADMIN — HEPARIN SODIUM 5000 UNIT(S): 5000 INJECTION INTRAVENOUS; SUBCUTANEOUS at 21:18

## 2022-04-25 RX ADMIN — Medication 1 MILLIGRAM(S): at 11:29

## 2022-04-25 RX ADMIN — CHLORHEXIDINE GLUCONATE 1 APPLICATION(S): 213 SOLUTION TOPICAL at 11:42

## 2022-04-25 RX ADMIN — Medication 40 MILLIGRAM(S): at 05:25

## 2022-04-25 RX ADMIN — HEPARIN SODIUM 5000 UNIT(S): 5000 INJECTION INTRAVENOUS; SUBCUTANEOUS at 05:24

## 2022-04-25 RX ADMIN — LEVETIRACETAM 1000 MILLIGRAM(S): 250 TABLET, FILM COATED ORAL at 17:47

## 2022-04-25 RX ADMIN — SENNA PLUS 10 MILLILITER(S): 8.6 TABLET ORAL at 21:18

## 2022-04-25 RX ADMIN — Medication 2 MILLIGRAM(S): at 21:18

## 2022-04-25 RX ADMIN — Medication 1 PUFF(S): at 03:38

## 2022-04-25 RX ADMIN — CHLORHEXIDINE GLUCONATE 15 MILLILITER(S): 213 SOLUTION TOPICAL at 17:59

## 2022-04-25 RX ADMIN — Medication 1 PUFF(S): at 16:18

## 2022-04-25 RX ADMIN — Medication 1 APPLICATION(S): at 11:27

## 2022-04-25 RX ADMIN — Medication 1 PUFF(S): at 09:37

## 2022-04-25 RX ADMIN — LACTULOSE 20 GRAM(S): 10 SOLUTION ORAL at 05:23

## 2022-04-25 RX ADMIN — SODIUM CHLORIDE 4 MILLILITER(S): 9 INJECTION INTRAMUSCULAR; INTRAVENOUS; SUBCUTANEOUS at 23:23

## 2022-04-25 RX ADMIN — Medication 100 MILLIGRAM(S): at 05:23

## 2022-04-25 RX ADMIN — Medication 10 MILLIGRAM(S): at 13:32

## 2022-04-25 RX ADMIN — PANTOPRAZOLE SODIUM 40 MILLIGRAM(S): 20 TABLET, DELAYED RELEASE ORAL at 17:48

## 2022-04-25 RX ADMIN — HEPARIN SODIUM 5000 UNIT(S): 5000 INJECTION INTRAVENOUS; SUBCUTANEOUS at 13:30

## 2022-04-25 RX ADMIN — Medication 1 PUFF(S): at 23:21

## 2022-04-25 RX ADMIN — PANTOPRAZOLE SODIUM 40 MILLIGRAM(S): 20 TABLET, DELAYED RELEASE ORAL at 05:25

## 2022-04-25 RX ADMIN — Medication 50 MICROGRAM(S): at 05:24

## 2022-04-25 RX ADMIN — Medication 100 MILLIGRAM(S): at 17:48

## 2022-04-25 RX ADMIN — ALBUTEROL 2 PUFF(S): 90 AEROSOL, METERED ORAL at 09:36

## 2022-04-25 RX ADMIN — Medication 500 MILLIGRAM(S): at 11:28

## 2022-04-25 RX ADMIN — Medication 1 SUPPOSITORY(S): at 21:26

## 2022-04-25 RX ADMIN — Medication 10 MILLIGRAM(S): at 21:18

## 2022-04-25 RX ADMIN — MODAFINIL 100 MILLIGRAM(S): 200 TABLET ORAL at 11:34

## 2022-04-25 RX ADMIN — LEVETIRACETAM 1000 MILLIGRAM(S): 250 TABLET, FILM COATED ORAL at 05:23

## 2022-04-25 RX ADMIN — ALBUTEROL 2 PUFF(S): 90 AEROSOL, METERED ORAL at 16:18

## 2022-04-25 RX ADMIN — CHLORHEXIDINE GLUCONATE 15 MILLILITER(S): 213 SOLUTION TOPICAL at 05:25

## 2022-04-25 RX ADMIN — AMIODARONE HYDROCHLORIDE 200 MILLIGRAM(S): 400 TABLET ORAL at 05:24

## 2022-04-25 RX ADMIN — LACTULOSE 20 GRAM(S): 10 SOLUTION ORAL at 17:58

## 2022-04-25 RX ADMIN — Medication 10 MILLIGRAM(S): at 05:24

## 2022-04-25 NOTE — PROGRESS NOTE ADULT - SUBJECTIVE AND OBJECTIVE BOX
INTERVAL HPI/OVERNIGHT EVENTS:    tolerating feeds  no rectal bleeding   cbc stable    MEDICATIONS  (STANDING):  ALBUTerol    90 MICROgram(s) HFA Inhaler 2 Puff(s) Inhalation every 6 hours  amantadine Syrup 100 milliGRAM(s) Oral two times a day  aMIOdarone    Tablet 200 milliGRAM(s) Oral daily  ascorbic acid 500 milliGRAM(s) Oral daily  carvedilol 6.25 milliGRAM(s) Oral every 12 hours  chlorhexidine 0.12% Liquid 15 milliLiter(s) Oral Mucosa every 12 hours  chlorhexidine 2% Cloths 1 Application(s) Topical daily  collagenase Ointment 1 Application(s) Topical daily  doxazosin 2 milliGRAM(s) Oral at bedtime  folic acid 1 milliGRAM(s) Oral daily  furosemide Solution 40 milliGRAM(s) Oral daily  heparin   Injectable 5000 Unit(s) SubCutaneous every 8 hours  hydrocortisone hemorrhoidal Suppository 1 Suppository(s) Rectal at bedtime  ipratropium 17 MICROgram(s) HFA Inhaler 1 Puff(s) Inhalation every 6 hours  lactulose Syrup 20 Gram(s) Oral two times a day  levETIRAcetam  Solution 1000 milliGRAM(s) Oral two times a day  levothyroxine 50 MICROGram(s) Oral daily  metoclopramide 10 milliGRAM(s) Oral every 8 hours  modafinil 100 milliGRAM(s) Oral daily  pantoprazole  Injectable 40 milliGRAM(s) IV Push two times a day  polyethylene glycol 3350 17 Gram(s) Oral daily  senna Syrup 10 milliLiter(s) Oral at bedtime  sodium chloride 0.9% for Nebulization 3 milliLiter(s) Nebulizer every 6 hours    MEDICATIONS  (PRN):  acetaminophen    Suspension .. 650 milliGRAM(s) Oral every 4 hours PRN Temp greater or equal to 38C (100.4F), Mild Pain (1 - 3)      Allergies    No Known Allergies    Intolerances        Review of Systems: *pt minimally verbal to nonverbal, unable to obtain ROS         Vital Signs Last 24 Hrs  T(C): 37.1 (25 Apr 2022 05:00), Max: 37.3 (24 Apr 2022 13:36)  T(F): 98.8 (25 Apr 2022 05:00), Max: 99.1 (24 Apr 2022 13:36)  HR: 63 (25 Apr 2022 09:40) (54 - 64)  BP: 122/66 (25 Apr 2022 05:00) (102/61 - 122/66)  BP(mean): --  RR: 18 (25 Apr 2022 07:25) (18 - 18)  SpO2: 98% (25 Apr 2022 09:40) (95% - 100%)    PHYSICAL EXAM:    Constitutional: NAD  HEENT: EOMI, throat clear  Neck: No LAD, supple  Respiratory: CTA and P  Cardiovascular: S1 and S2, RRR, no M  Gastrointestinal: BS+, soft, NT/ND, neg HSM, +peg c/d/i  Extremities: No peripheral edema, neg clubbing, cyanosis  Vascular: 2+ peripheral pulses  Neurological: A/O x 0  Psychiatric: Normal mood, normal affect  Skin: No rashes      LABS:                        9.0    7.65  )-----------( 192      ( 25 Apr 2022 06:40 )             29.1     04-25    142  |  102  |  53<H>  ----------------------------<  110<H>  4.4   |  29  |  1.13    Ca    9.1      25 Apr 2022 06:40  Phos  3.3     04-25  Mg     2.20     04-25            RADIOLOGY & ADDITIONAL TESTS:

## 2022-04-25 NOTE — PROGRESS NOTE ADULT - ASSESSMENT
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 Now DISPO planning.    # Neurology   - Currently A&Ox0, awake and alert, able to move RUE and nods/ mouths one or two words per RCU evaluation and prior documentation.   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   - Continue on Modafinil, Amantidine and Keppra.     # Cardiovascular   - Hx of HFpEF 55, mild MR and AR, severe LAD, normal LVRVSF (ECHO 3/7)  - Hx of Atrial Fibrillation s/p Watchman and currently rate controlled.   - Continue on Amiodarone 200mg QD and Coreg 6.25mg BID.   - Continue on Lasix 40mg QD and monitor tolerance.     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   - Able to tolerate TC (from 4/15 during the day) and now ATC (from 4/21).   - Continue on Proventil, Atrovent and Chest PT Q6H     # GI  - Hx of SDH, Gastric Bypass and Dysphagia s/p PEG and continued on TF with course complicated by constipatio and melena  - Case discussed with GI and melena likely in setting of constipation, however no plan for scope currently and will medically treat with PPI.   - Monitor BMs on Miralax, Senna and Lactulose.   - Continue on Nepro in sight of hyperkalemia.   - No BM x3d and noted with abd distention with no obstruction on AXR 4/23.  - Start Reglan 10mg TID (4/24 - )    # / Renal  - Hx of Urinary Retention with Chronic Garcia and presented FLORIAN likely in setting of dehydration with fevers vs ATN with Sepsis (CRE high 2s and baseline 0.8-0.9).   - UA with hematuria and proteinuria and case discussed with Neprhology with concern for glomerulonephritis. ANCA, ISABELLE and GM ABs negative.   - Hypernatremia noted and continued on  Q6H as tolerated.   - Hyperkalemia and s/p cocktail with improvement (4/19).   - IVF and PRBC given with CRE improving and now resumed on Lasix with improvement.     # ID  - Recent admission for  Acinetobacter and Pseudomonas HCAP (3/2022)   - SCx (4/10) with  Acinetobacter and Pseudomonas.   - Completed Meropenem (4/8-4/14) and will monitor off ABX.     # Endocrine  - No hx of DM2 and A1C 5.9. Continue to monitor BG,    - Hx of Hypothyroidism and continued on Synthroid. TSH 47 with low T3/T4 and Free T4. TSH 80s (3/2022) and Synthroid increased at NH. Hypothyroidism could be in the setting of Amiodarone use and current Synthroid dose appears to be working.   - Next TFT to be done in June 2022.     # Hematology   - Anemia i/s/o AOCD and s/p PRBC (4/9)   - Melena/ anemia noted and s/p 2 U PRBC (4/19)  with good response, however HH waxes and wanes with no obvious bleed (no further melena or CGE from PEG aspiration)  - DVT PPX with HSQ (cleared by GI to resume).     # Ethics   - FULL CODE   - Case discussed with Cousin/ HCP, Dr. Donna Hagen (Pediatric Neurologist, [phone]) and updated daily.     # DISPO - Planning to go back to NH with improvement in renal function/ electrolytes. 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 Now DISPO planning.    # Neurology   - Currently A&Ox0, awake and alert, able to move RUE and nods/ mouths one or two words per RCU evaluation and prior documentation.   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   - Continue on Modafinil, Amantidine and Keppra.     # Cardiovascular   - Hx of HFpEF 55, mild MR and AR, severe LAD, normal LVRVSF (ECHO 3/7)  - Hx of Atrial Fibrillation s/p Watchman and currently rate controlled.   - Continue on Amiodarone 200mg QD and Coreg 6.25mg BID.   - Continue on Lasix 40mg QD and monitor tolerance.     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   - Able to tolerate TC (from 4/15 during the day) and now ATC (from 4/21).   - Continue on Proventil, Atrovent and Chest PT Q6H, and now added Hypersal due to frequent need to suction    # GI  - Hx of SDH, Gastric Bypass and Dysphagia s/p PEG and continued on TF with course complicated by constipation and melena  - Case discussed with GI and melena likely in setting of constipation, however no plan for scope currently and will medically treat with PPI.   - Monitor BMs on Miralax, Senna and Lactulose.   - Continue on Nepro in sight of hyperkalemia.   - No BM x3d and noted with abd distention with no obstruction on AXR 4/23.  - c/w Reglan 10mg TID (4/24 - )    # / Renal  - Hx of Urinary Retention with Chronic Garcia and presented FLORIAN likely in setting of dehydration with fevers vs ATN with Sepsis (CRE high 2s and baseline 0.8-0.9).   - UA with hematuria and proteinuria and case discussed with Neprhology with concern for glomerulonephritis. ANCA, ISABELLE and GM ABs negative.   - Hypernatremia noted and continued on  Q6H as tolerated.   - Hyperkalemia and s/p cocktail with improvement (4/19).   - IVF and PRBC given with CRE improving and now resumed on Lasix with improvement.     # ID  - Recent admission for  Acinetobacter and Pseudomonas HCAP (3/2022)   - SCx (4/10) with  Acinetobacter and Pseudomonas.   - s/p completed Meropenem (4/8-4/14) and will monitor off ABX.     # Endocrine  - No hx of DM2 and A1C 5.9. Continue to monitor BG,    - Hx of Hypothyroidism and continued on Synthroid. TSH 47 with low T3/T4 and Free T4. TSH 80s (3/2022) and Synthroid increased at NH. Hypothyroidism could be in the setting of Amiodarone use and current Synthroid dose appears to be working.   - Next TFT to be done in June 2022.     # Hematology   - Anemia i/s/o AOCD and s/p PRBC (4/9)   - Melena/ anemia noted and s/p 2 U PRBC (4/19)  with good response, however HH waxes and wanes with no obvious bleed (no further melena or CGE from PEG aspiration)  - DVT PPX with HSQ (cleared by GI to resume).     # Ethics   - FULL CODE   - Case discussed with Cousin/ HCP, Dr. Donna Hagen (Pediatric Neurologist, [phone]) and updated daily.     # DISPO - Planning to go back to NH with improvement in renal function/ electrolytes. Family requesting [facility] and SW aware      75 YO M, A&Ox0 at baseline with PMHx of L SDH c/b L Subfalcine Herniation s/p Emergent R Hemicraniectomy in [place] while traveling fell on marble floor and now chronic with tracheostomy and vent dependant, Dysphagia with PEG, AFIB s/p watchman, Gastric Sleeve, Urinary Retention with Chornic Garcia, and recent ICU stay for HCAP with MDR Pseudomonas now presenting with ACHRF i/s/o  Actineobacter and Pseudomonas HCAP s/p ABX c/b FLORIAN and melena/ anemia. Now DISPO planning.    # Neurology   - Currently A&Ox0, awake and alert, able to move RUE and nods/ mouths one or two words per RCU evaluation and prior documentation.   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   - Continue on Modafinil, Amantidine and Keppra.     # Cardiovascular   - Hx of HFpEF 55, mild MR and AR, severe LAD, normal LVRVSF (ECHO 3/7)  - Hx of Atrial Fibrillation s/p Watchman and currently rate controlled.   - Continue on Amiodarone 200mg QD and Coreg 6.25mg BID.   - Continue on Lasix 40mg QD and monitor tolerance.     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   - Able to tolerate TC (from 4/15 during the day) and now ATC (from 4/21).   - Continue on Proventil, Atrovent and Chest PT Q6H, and now added Hypersal due to frequent need to suction    # GI  - Hx of SDH, Gastric Bypass and Dysphagia s/p PEG and continued on TF with course complicated by constipation and melena  - Case discussed with GI and melena likely in setting of constipation, however no plan for scope currently and will medically treat with PPI.   - Monitor BMs on Miralax, Senna and Lactulose.   - Continue on Nepro in sight of hyperkalemia.   - s/p large BM on 4/25  - c/w Reglan 10mg TID (4/24 - )    # / Renal  - Hx of Urinary Retention with Chronic Garcia and presented FLORIAN likely in setting of dehydration with fevers vs ATN with Sepsis (CRE high 2s and baseline 0.8-0.9).   - UA with hematuria and proteinuria and case discussed with Neprhology with concern for glomerulonephritis. ANCA, ISABELLE and GM ABs negative.   - Hypernatremia noted and continued on  Q6H as tolerated.   - Hyperkalemia and s/p cocktail with improvement (4/19).   - IVF and PRBC given with CRE improving and now resumed on Lasix with improvement.     # ID  - Recent admission for  Acinetobacter and Pseudomonas HCAP (3/2022)   - SCx (4/10) with  Acinetobacter and Pseudomonas.   - s/p completed Meropenem (4/8-4/14) and will monitor off ABX.     # Endocrine  - No hx of DM2 and A1C 5.9. Continue to monitor BG,    - Hx of Hypothyroidism and continued on Synthroid. TSH 47 with low T3/T4 and Free T4. TSH 80s (3/2022) and Synthroid increased at NH. Hypothyroidism could be in the setting of Amiodarone use and current Synthroid dose appears to be working.   - Next TFT to be done in June 2022.     # Hematology   - Anemia i/s/o AOCD and s/p PRBC (4/9)   - Melena/ anemia noted and s/p 2 U PRBC (4/19)  with good response, however HH waxes and wanes with no obvious bleed (no further melena or CGE from PEG aspiration)  - DVT PPX with HSQ (cleared by GI to resume).     # Ethics   - FULL CODE   - Case discussed with Cousin/ HCP, Dr. Donna Hagen (Pediatric Neurologist, [phone]) and updated daily.     # DISPO - Planning to go back to NH with improvement in renal function/ electrolytes. Family requesting [place] [facility] and SW aware

## 2022-04-25 NOTE — PROGRESS NOTE ADULT - SUBJECTIVE AND OBJECTIVE BOX
CHIEF COMPLAINT: Patient is a 76y old  Male who presents with a chief complaint of Hypoxia (24 Apr 2022 20:36)    Interval Events:    REVIEW OF SYSTEMS:  [ ] All other systems negative  [ ] Unable to assess ROS because ________    Mode: standby      OBJECTIVE:  ICU Vital Signs Last 24 Hrs  T(C): 37.1 (25 Apr 2022 05:00), Max: 37.3 (24 Apr 2022 13:36)  T(F): 98.8 (25 Apr 2022 05:00), Max: 99.1 (24 Apr 2022 13:36)  HR: 58 (25 Apr 2022 07:25) (54 - 66)  BP: 122/66 (25 Apr 2022 05:00) (102/61 - 122/66)  BP(mean): --  ABP: --  ABP(mean): --  RR: 18 (25 Apr 2022 07:25) (18 - 18)  SpO2: 99% (25 Apr 2022 07:25) (95% - 100%)    Mode: standby    04-24 @ 07:01  -  04-25 @ 07:00  --------------------------------------------------------  IN: 1740 mL / OUT: 1450 mL / NET: 290 mL    CAPILLARY BLOOD GLUCOSE    HOSPITAL MEDICATIONS:  MEDICATIONS  (STANDING):  ALBUTerol    90 MICROgram(s) HFA Inhaler 2 Puff(s) Inhalation every 6 hours  amantadine Syrup 100 milliGRAM(s) Oral two times a day  aMIOdarone    Tablet 200 milliGRAM(s) Oral daily  ascorbic acid 500 milliGRAM(s) Oral daily  carvedilol 6.25 milliGRAM(s) Oral every 12 hours  chlorhexidine 0.12% Liquid 15 milliLiter(s) Oral Mucosa every 12 hours  chlorhexidine 2% Cloths 1 Application(s) Topical daily  collagenase Ointment 1 Application(s) Topical daily  doxazosin 2 milliGRAM(s) Oral at bedtime  folic acid 1 milliGRAM(s) Oral daily  furosemide Solution 40 milliGRAM(s) Oral daily  heparin   Injectable 5000 Unit(s) SubCutaneous every 8 hours  hydrocortisone hemorrhoidal Suppository 1 Suppository(s) Rectal at bedtime  ipratropium 17 MICROgram(s) HFA Inhaler 1 Puff(s) Inhalation every 6 hours  lactulose Syrup 20 Gram(s) Oral two times a day  levETIRAcetam  Solution 1000 milliGRAM(s) Oral two times a day  levothyroxine 50 MICROGram(s) Oral daily  metoclopramide 10 milliGRAM(s) Oral every 8 hours  modafinil 100 milliGRAM(s) Oral daily  pantoprazole  Injectable 40 milliGRAM(s) IV Push two times a day  polyethylene glycol 3350 17 Gram(s) Oral daily  senna Syrup 10 milliLiter(s) Oral at bedtime    MEDICATIONS  (PRN):  acetaminophen    Suspension .. 650 milliGRAM(s) Oral every 4 hours PRN Temp greater or equal to 38C (100.4F), Mild Pain (1 - 3)      LABS:                        9.0    7.65  )-----------( 192      ( 25 Apr 2022 06:40 )             29.1     04-25    142  |  102  |  53<H>  ----------------------------<  110<H>  4.4   |  29  |  1.13    Ca    9.1      25 Apr 2022 06:40  Phos  3.3     04-25  Mg     2.20     04-25      PAST MEDICAL & SURGICAL HISTORY:  Essential hypertension    Primary osteoarthritis, unspecified site    Closed fracture of left radius, initial encounter    Morbid obesity, unspecified obesity type  h/o. - s/p gastric bypass- lost 113 lbs    KAREN (obstructive sleep apnea)  h/o - was on CPAP until gastric bypass surgery    Respiratory failure    Anemia    Subdural hemorrhage    Epilepsy    H/O gastrostomy    History of BPH    Afib    S/P gastric bypass  2008    Status post total replacement of left hip  2006    S/P bunionectomy  bilateral    S/P colonoscopy  approx 2012    History of abdominoplasty  and subsequent cosmetic surgery for removal of excess skin from face      FAMILY HISTORY:  Family history of renal cell carcinoma (Mother)    Family history of malignant melanoma (Sibling)      Social History:    RADIOLOGY:  [ ] Reviewed and interpreted by me    PULMONARY FUNCTION TESTS:    EKG: CHIEF COMPLAINT: Patient is a 76y old  Male who presents with a chief complaint of Hypoxia (24 Apr 2022 20:36)    Interval Events: None reported overnight. Pt requires frequent secretions, however able to cough. Hypersal added. Will c/w airway clearance. VSS.    REVIEW OF SYSTEMS:  See above  [x] All other systems negative  [x] Unable to fully assess ROS because poor phonation    Mode: standby    OBJECTIVE:  ICU Vital Signs Last 24 Hrs  T(C): 37.1 (25 Apr 2022 05:00), Max: 37.3 (24 Apr 2022 13:36)  T(F): 98.8 (25 Apr 2022 05:00), Max: 99.1 (24 Apr 2022 13:36)  HR: 58 (25 Apr 2022 07:25) (54 - 66)  BP: 122/66 (25 Apr 2022 05:00) (102/61 - 122/66)  BP(mean): --  ABP: --  ABP(mean): --  RR: 18 (25 Apr 2022 07:25) (18 - 18)  SpO2: 99% (25 Apr 2022 07:25) (95% - 100%)    Mode: standby    04-24 @ 07:01  -  04-25 @ 07:00  --------------------------------------------------------  IN: 1740 mL / OUT: 1450 mL / NET: 290 mL    CAPILLARY BLOOD GLUCOSE    HOSPITAL MEDICATIONS:  MEDICATIONS  (STANDING):  ALBUTerol    90 MICROgram(s) HFA Inhaler 2 Puff(s) Inhalation every 6 hours  amantadine Syrup 100 milliGRAM(s) Oral two times a day  aMIOdarone    Tablet 200 milliGRAM(s) Oral daily  ascorbic acid 500 milliGRAM(s) Oral daily  carvedilol 6.25 milliGRAM(s) Oral every 12 hours  chlorhexidine 0.12% Liquid 15 milliLiter(s) Oral Mucosa every 12 hours  chlorhexidine 2% Cloths 1 Application(s) Topical daily  collagenase Ointment 1 Application(s) Topical daily  doxazosin 2 milliGRAM(s) Oral at bedtime  folic acid 1 milliGRAM(s) Oral daily  furosemide Solution 40 milliGRAM(s) Oral daily  heparin   Injectable 5000 Unit(s) SubCutaneous every 8 hours  hydrocortisone hemorrhoidal Suppository 1 Suppository(s) Rectal at bedtime  ipratropium 17 MICROgram(s) HFA Inhaler 1 Puff(s) Inhalation every 6 hours  lactulose Syrup 20 Gram(s) Oral two times a day  levETIRAcetam  Solution 1000 milliGRAM(s) Oral two times a day  levothyroxine 50 MICROGram(s) Oral daily  metoclopramide 10 milliGRAM(s) Oral every 8 hours  modafinil 100 milliGRAM(s) Oral daily  pantoprazole  Injectable 40 milliGRAM(s) IV Push two times a day  polyethylene glycol 3350 17 Gram(s) Oral daily  senna Syrup 10 milliLiter(s) Oral at bedtime    MEDICATIONS  (PRN):  acetaminophen    Suspension .. 650 milliGRAM(s) Oral every 4 hours PRN Temp greater or equal to 38C (100.4F), Mild Pain (1 - 3)    PHYSICAL EXAM  GENERAL: Laying in bed, NAD  HEENT: +Trach, area c/d/i  Card: +s1, s2  Pulm: + coarse breath sound b/l  GI: +PEG, area c/d/i, obese abd, soft, nt/nd, no peritoneal signs noted  Ext: no asymmetry, no swelling, no calf tenderness  Neuro: alert and awake, following simple commands such as squeezing hands     LABS:                        9.0    7.65  )-----------( 192      ( 25 Apr 2022 06:40 )             29.1     04-25    142  |  102  |  53<H>  ----------------------------<  110<H>  4.4   |  29  |  1.13    Ca    9.1      25 Apr 2022 06:40  Phos  3.3     04-25  Mg     2.20     04-25      PAST MEDICAL & SURGICAL HISTORY:  Essential hypertension    Primary osteoarthritis, unspecified site    Closed fracture of left radius, initial encounter    Morbid obesity, unspecified obesity type  h/o. - s/p gastric bypass- lost 113 lbs    KAREN (obstructive sleep apnea)  h/o - was on CPAP until gastric bypass surgery    Respiratory failure    Anemia    Subdural hemorrhage    Epilepsy    H/O gastrostomy    History of BPH    Afib    S/P gastric bypass  2008    Status post total replacement of left hip  2006    S/P bunionectomy  bilateral    S/P colonoscopy  approx 2012    History of abdominoplasty  and subsequent cosmetic surgery for removal of excess skin from face      FAMILY HISTORY:  Family history of renal cell carcinoma (Mother)    Family history of malignant melanoma (Sibling)    Social History:    RADIOLOGY:  [ ] Reviewed and interpreted by me    PULMONARY FUNCTION TESTS:    EKG:

## 2022-04-25 NOTE — CHART NOTE - NSCHARTNOTEFT_GEN_A_CORE
--------------------------------------------------------------------------------------------------------------------  NEPHROLOGY CARE COORDINATION DOCUMENTATION  [x] Inpatient Consult  [ ] Other:  --------------------------------------------------------------------------------------------------------------------  ------------------------------------------------------------------------  SUMMARY OF RECS:    - Chart reviewed. Events noted.  - Cr improved 1.13.  - K now 4.4  - Continue diuresis w/ lasix 40mg daily  - TF: Nepro over Jevity    Signing off! Pls reconsult for any q's or c's!        ==========================================  HPI:  Pt. with FLORIAN in the setting of sepsis, anemia and hypotension. Pt. with likely ATN. Upon review of [facility] HIE/[facility], Scr was 0.8 on 3/16/2022. On admission, Scr was 2.3. Scr increased to 2.93 on 4/10.    MEDICATIONS  (STANDING):  ALBUTerol    90 MICROgram(s) HFA Inhaler 2 Puff(s) Inhalation every 6 hours  amantadine Syrup 100 milliGRAM(s) Oral two times a day  aMIOdarone    Tablet 200 milliGRAM(s) Oral daily  ascorbic acid 500 milliGRAM(s) Oral daily  carvedilol 6.25 milliGRAM(s) Oral every 12 hours  chlorhexidine 0.12% Liquid 15 milliLiter(s) Oral Mucosa every 12 hours  chlorhexidine 2% Cloths 1 Application(s) Topical daily  collagenase Ointment 1 Application(s) Topical daily  doxazosin 2 milliGRAM(s) Oral at bedtime  folic acid 1 milliGRAM(s) Oral daily  furosemide Solution 40 milliGRAM(s) Oral daily  heparin   Injectable 5000 Unit(s) SubCutaneous every 8 hours  hydrocortisone hemorrhoidal Suppository 1 Suppository(s) Rectal at bedtime  ipratropium 17 MICROgram(s) HFA Inhaler 1 Puff(s) Inhalation every 6 hours  lactulose Syrup 20 Gram(s) Oral two times a day  levETIRAcetam  Solution 1000 milliGRAM(s) Oral two times a day  levothyroxine 50 MICROGram(s) Oral daily  metoclopramide 10 milliGRAM(s) Oral every 8 hours  modafinil 100 milliGRAM(s) Oral daily  pantoprazole  Injectable 40 milliGRAM(s) IV Push two times a day  polyethylene glycol 3350 17 Gram(s) Oral daily  senna Syrup 10 milliLiter(s) Oral at bedtime  sodium chloride 0.9% for Nebulization 3 milliLiter(s) Nebulizer every 6 hours    MEDICATIONS  (PRN):  acetaminophen    Suspension .. 650 milliGRAM(s) Oral every 4 hours PRN Temp greater or equal to 38C (100.4F), Mild Pain (1 - 3)    MEDICATION REVIEW:  --- Known exposure to NSAIDS, contrast, other known nephrotoxins: [ ] YES  [ ] NO  ------------------------------------------------------------------------  DIAGNOSTIC REVIEW:  --- Evidence of obstruction: [ ] YES  [ ] NO  --- UA findings reviewed: [ ] YES  [ ] NO  --- Hemodynamics reviewed: [ X] YES  [ ] NO  ------------------------------------------------------------------------  COORDINATION OF CARE:  --- Nephrology consulted for: FLORIAN  --- Patient assessed: 4/20  --- Patient previously seen by Nephroogy: NO  ------------------------------------------------------------------------  CARE PROVIDER DOCUMENTATION:  --- PULM:  Able to tolerate TC (from 4/15 during the day) and now ATC (from 4/21).   --- GI: transfuse PRBC to keep hgb close to 7.5 or better  --- REHAB: Rehab -  recommend subacute rehab when medically cleared.   ------------------------------------------------------------------------  --- Chart reviewed: 30 Minutes [including time used to gather, review and transfer data to this note]  --- Start: 9:00  --- End: 9:30  Prolonged services rendered, as part of this patient's care provided by Nephrology, include: i.chart review for provider and ancillary service documentation, ii.pertinent diagnostics including laboratory and imaging studies,iii. medication review including PRN use, iv. admission history including previous encounters and notes. Part of Nephrology extended evaluation and management also involves coordination of care with our IDT, the primary and consulting teams. Recommendations based on the information gathered and discussed are outlined in the A&P of our notes.    ************************************************************************

## 2022-04-25 NOTE — PROGRESS NOTE ADULT - ASSESSMENT
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 GI consulted for anemia     Anemia   PEG flushed/lavaged x2; feeds and bilious fluid aspirated, No blood products noted  transfuse PRBC to keep hgb close to 7.5 or better  Protonix 40mg via peg  occult likely d/t constipated stool/irritated hemorrhoid; stools brown  senna syrup and miralax; enema as needed  anusol suppository qhs as needed  no objection to peg feeds    HCAP   per RCU/ID       I reviewed the overnight course of events on the unit, re-confirming the patient history. I discussed the care with the patient and their family. The plan of care was discussed with the physician assistant and modifications were made to the notation where appropriate. Differential diagnosis and plan of care discussed with patient after the evaluation. Advanced care planning was discussed with patient and family.  Advanced care planning forms were reviewed and discussed.  Risks, benefits and alternatives of gastroenterologic procedures were discussed in detail and all questions were answered. 35 minutes spent on total encounter of which more than fifty percent of the encounter was spent counseling and/or coordinating care by the attending physician.

## 2022-04-26 PROCEDURE — 99233 SBSQ HOSP IP/OBS HIGH 50: CPT | Mod: GC

## 2022-04-26 RX ORDER — MODAFINIL 200 MG/1
100 TABLET ORAL
Refills: 0 | Status: COMPLETED | OUTPATIENT
Start: 2022-04-26 | End: 2022-05-03

## 2022-04-26 RX ORDER — PANTOPRAZOLE SODIUM 20 MG/1
40 TABLET, DELAYED RELEASE ORAL DAILY
Refills: 0 | Status: DISCONTINUED | OUTPATIENT
Start: 2022-04-26 | End: 2022-05-17

## 2022-04-26 RX ADMIN — CHLORHEXIDINE GLUCONATE 1 APPLICATION(S): 213 SOLUTION TOPICAL at 11:55

## 2022-04-26 RX ADMIN — LACTULOSE 20 GRAM(S): 10 SOLUTION ORAL at 17:33

## 2022-04-26 RX ADMIN — HEPARIN SODIUM 5000 UNIT(S): 5000 INJECTION INTRAVENOUS; SUBCUTANEOUS at 05:19

## 2022-04-26 RX ADMIN — PANTOPRAZOLE SODIUM 40 MILLIGRAM(S): 20 TABLET, DELAYED RELEASE ORAL at 05:20

## 2022-04-26 RX ADMIN — Medication 1 PUFF(S): at 21:14

## 2022-04-26 RX ADMIN — ALBUTEROL 2 PUFF(S): 90 AEROSOL, METERED ORAL at 15:54

## 2022-04-26 RX ADMIN — PANTOPRAZOLE SODIUM 40 MILLIGRAM(S): 20 TABLET, DELAYED RELEASE ORAL at 17:56

## 2022-04-26 RX ADMIN — Medication 500 MILLIGRAM(S): at 11:44

## 2022-04-26 RX ADMIN — Medication 10 MILLIGRAM(S): at 05:19

## 2022-04-26 RX ADMIN — Medication 1 APPLICATION(S): at 11:45

## 2022-04-26 RX ADMIN — CHLORHEXIDINE GLUCONATE 15 MILLILITER(S): 213 SOLUTION TOPICAL at 17:31

## 2022-04-26 RX ADMIN — Medication 1 PUFF(S): at 15:53

## 2022-04-26 RX ADMIN — SODIUM CHLORIDE 4 MILLILITER(S): 9 INJECTION INTRAMUSCULAR; INTRAVENOUS; SUBCUTANEOUS at 21:42

## 2022-04-26 RX ADMIN — Medication 1 PUFF(S): at 10:07

## 2022-04-26 RX ADMIN — Medication 100 MILLIGRAM(S): at 05:18

## 2022-04-26 RX ADMIN — Medication 100 MILLIGRAM(S): at 17:32

## 2022-04-26 RX ADMIN — HEPARIN SODIUM 5000 UNIT(S): 5000 INJECTION INTRAVENOUS; SUBCUTANEOUS at 13:35

## 2022-04-26 RX ADMIN — LEVETIRACETAM 1000 MILLIGRAM(S): 250 TABLET, FILM COATED ORAL at 17:30

## 2022-04-26 RX ADMIN — Medication 50 MICROGRAM(S): at 05:19

## 2022-04-26 RX ADMIN — Medication 1 SUPPOSITORY(S): at 21:39

## 2022-04-26 RX ADMIN — Medication 1 MILLIGRAM(S): at 11:44

## 2022-04-26 RX ADMIN — SODIUM CHLORIDE 4 MILLILITER(S): 9 INJECTION INTRAMUSCULAR; INTRAVENOUS; SUBCUTANEOUS at 10:06

## 2022-04-26 RX ADMIN — CHLORHEXIDINE GLUCONATE 15 MILLILITER(S): 213 SOLUTION TOPICAL at 05:24

## 2022-04-26 RX ADMIN — SENNA PLUS 10 MILLILITER(S): 8.6 TABLET ORAL at 21:38

## 2022-04-26 RX ADMIN — POLYETHYLENE GLYCOL 3350 17 GRAM(S): 17 POWDER, FOR SOLUTION ORAL at 11:44

## 2022-04-26 RX ADMIN — Medication 10 MILLIGRAM(S): at 13:33

## 2022-04-26 RX ADMIN — AMIODARONE HYDROCHLORIDE 200 MILLIGRAM(S): 400 TABLET ORAL at 05:23

## 2022-04-26 RX ADMIN — ALBUTEROL 2 PUFF(S): 90 AEROSOL, METERED ORAL at 21:15

## 2022-04-26 RX ADMIN — Medication 10 MILLIGRAM(S): at 21:38

## 2022-04-26 RX ADMIN — LEVETIRACETAM 1000 MILLIGRAM(S): 250 TABLET, FILM COATED ORAL at 05:19

## 2022-04-26 RX ADMIN — Medication 2 MILLIGRAM(S): at 21:38

## 2022-04-26 RX ADMIN — HEPARIN SODIUM 5000 UNIT(S): 5000 INJECTION INTRAVENOUS; SUBCUTANEOUS at 21:51

## 2022-04-26 RX ADMIN — LACTULOSE 20 GRAM(S): 10 SOLUTION ORAL at 05:24

## 2022-04-26 RX ADMIN — MODAFINIL 100 MILLIGRAM(S): 200 TABLET ORAL at 11:43

## 2022-04-26 RX ADMIN — ALBUTEROL 2 PUFF(S): 90 AEROSOL, METERED ORAL at 10:07

## 2022-04-26 RX ADMIN — Medication 40 MILLIGRAM(S): at 05:18

## 2022-04-26 RX ADMIN — SODIUM CHLORIDE 4 MILLILITER(S): 9 INJECTION INTRAMUSCULAR; INTRAVENOUS; SUBCUTANEOUS at 15:54

## 2022-04-26 NOTE — PROGRESS NOTE ADULT - SUBJECTIVE AND OBJECTIVE BOX
[facility]-- WOUND TEAM -- FOLLOW UP NOTE  --------------------------------------------------------------------------------    Wound f/u for sacrum unstageable pressure injury    subjective: Patient was seen and examined at bedside. Patient nonverbal, opens eyes, appropriately nods head/gives thumbs up to simple yes/no questions.   Denies pain/tenderness, n/v, fever, chills.  Patient's cousin at bedside. Per cousin at bedside patient was a , was scheduled for a cruise ship with [employer], sustained a mechanical fall at hotel prior to cruise causing TBI. Was hospitalized at outside subsequenstly transferred to NH. Per cousins patient developed pressure injury during hospitalization. Dressing previously used include Hydrogel and Santyl.     Interval HPI/24 hour events: no acute events overnight    Chart reviewed including labs and relevant images      Diet:  Diet, NPO with Tube Feed:   Tube Feeding Modality: Gastrostomy  Nepro with Carb Steady (NEPRORTH)  Total Volume for 24 Hours (mL): 1080  Continuous  Starting Tube Feed Rate mL per Hour: 25  Increase Tube Feed Rate by (mL): 10     Every 4 hours  Until Goal Tube Feed Rate (mL per Hour): 45  Tube Feed Duration (in Hours): 24  Tube Feed Start Time: 15:00  No Carb Prosource (1pkg = 15gms Protein)     Qty per Day:  2 (04-19-22 @ 14:40)      ROS: General see HPI  all other systems negative      ALLERGIES & MEDICATIONS  --------------------------------------------------------------------------------  Allergies    No Known Allergies    Intolerances          STANDING INPATIENT MEDICATIONS    ALBUTerol    90 MICROgram(s) HFA Inhaler 2 Puff(s) Inhalation every 6 hours  amantadine Syrup 100 milliGRAM(s) Oral two times a day  aMIOdarone    Tablet 200 milliGRAM(s) Oral daily  ascorbic acid 500 milliGRAM(s) Oral daily  carvedilol 6.25 milliGRAM(s) Oral every 12 hours  chlorhexidine 0.12% Liquid 15 milliLiter(s) Oral Mucosa every 12 hours  chlorhexidine 2% Cloths 1 Application(s) Topical daily  collagenase Ointment 1 Application(s) Topical daily  doxazosin 2 milliGRAM(s) Oral at bedtime  folic acid 1 milliGRAM(s) Oral daily  furosemide Solution 40 milliGRAM(s) Oral daily  heparin   Injectable 5000 Unit(s) SubCutaneous every 8 hours  hydrocortisone hemorrhoidal Suppository 1 Suppository(s) Rectal at bedtime  ipratropium 17 MICROgram(s) HFA Inhaler 1 Puff(s) Inhalation every 6 hours  lactulose Syrup 20 Gram(s) Oral two times a day  levETIRAcetam  Solution 1000 milliGRAM(s) Oral two times a day  levothyroxine 50 MICROGram(s) Oral daily  modafinil 100 milliGRAM(s) Oral <User Schedule>  pantoprazole   Suspension 40 milliGRAM(s) Oral daily  polyethylene glycol 3350 17 Gram(s) Oral daily  senna Syrup 10 milliLiter(s) Oral at bedtime  sodium chloride 3%  Inhalation 4 milliLiter(s) Inhalation every 6 hours      PRN INPATIENT MEDICATION  acetaminophen    Suspension .. 650 milliGRAM(s) Oral every 4 hours PRN        Vital signs:  T(C): 37 (04-27-22 @ 05:00), Max: 37 (04-27-22 @ 05:00)  HR: 62 (04-27-22 @ 03:07) (58 - 63)  BP: 146/79 (04-26-22 @ 21:46) (134/75 - 146/79)  RR: 20 (04-26-22 @ 21:46) (20 - 20)  SpO2: 100% (04-27-22 @ 03:07) (98% - 100%)  Wt(kg): --        04-25-22 @ 07:01  -  04-26-22 @ 07:00  --------------------------------------------------------  IN: 1740 mL / OUT: 1800 mL / NET: -60 mL    04-26-22 @ 07:01  -  04-27-22 @ 05:18  --------------------------------------------------------  IN: 1845 mL / OUT: 700 mL / NET: 1145 mL      Constitutional: Seen on contact precautions.  NAD, non verbal, a&o x 1, awake, eyes open spontaneously  Well groomed.  (+) low airloss support surface, (+) fluidized positioning devices, (+) complete cair boots  HEENT:  NC/AT, PERRL, EOMI, mucosa moist, trach in place peristomal skin intact  Cardiovascular: rate regular  Respiratory: supplemental oxygen via trach collar, non labored, equal chest rise  Gastrointestinal: soft NT/ND, PEG peritubular skin intact, enteral feeds, incontinent stool  : indwelling benson catheter  Neurology:  weakened strength & sensation, able to squeeze with right hand subtly.  Musculoskeletal:  limited, functional quadriplegic b/l upper and lower extremities rigid in extension.  Vascular: BLE equally warm, no overt ischemia noted  Skin:  moist w/ good turgor  Sacrum- unstageable pressure injury, turned to right side. 6.4bll1nwv8.3cm- 60% firmly attached, moist slough, 40%. agranular with scattered purple-maroon discoloration, no fluctuance, no induration, no drainable collection, no crepitus. scant serofibrinous drainage. Periwound skin no edema, no erythema, no increased warmth noted. No indication for sharp debridement. No odor, erythema, increased warmth, tenderness, induration, fluctuance. collagenase, liquid barrier film, silicone foam applied.        LABS/ CULTURES/ RADIOLOGY:              9.0    7.65  >-----------<  192      [04-25-22 @ 06:40]              29.1     142  |  102  |  53  ----------------------------<  110      [04-25-22 @ 06:40]  4.4   |  29  |  1.13        Ca     9.1     [04-25-22 @ 06:40]      Mg     2.20     [04-25-22 @ 06:40]      Phos  3.3     [04-25-22 @ 06:40]                A1C with Estimated Average Glucose Result: 5.9 % (04-09-22 @ 06:43)

## 2022-04-26 NOTE — PROGRESS NOTE ADULT - ASSESSMENT
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   Wound consulted requested to assist with management of sacral unstageable pressure injury      Sacrum unstageable pressure injury  -6.1zvb4xol5.3cm  -40% moist firmly attached slough, 60% agranular with scattered purple-maroon discoloration  -no fluctuance, no induration, no drainable collection.  -no s/s of acute skin infection/cellulitis  -no sharp debridement indicated; will enzymatically debride.  -wound dimensions and tissue type improving.  -CT abdomen/pelvis, findings without mention of osteomyelitis, defer remaining findings to primary team.  -Topical recommendations: cleanse with ns. collagenase rama thickness, silicone foam with border, change daily.  -offload pressure; low airloss support surface, t&p per protocol with use of fluidized positioning devices.  -perineal care per protocol, single breathable incontinence pad.  -Nutrition recommendations appreciated for optimization as tolerated, enteral feeds via PEG, sacral unstageable pressure injury; receiving no carb prosource 2 packets/day         consider MVI & Vit C to promote wound healing        encourage high quality protein when appropriate    Dysphagia  -Peritubular skin of PEG- Cleanse q shift with NS, apply liquid barrier film beneath kishor disc.  If redness noted under kishor disc bumper apply thin foam  dressing without border (Mepilex Lite)- cut to mid dressing with a 'Y' shape.   Secondary securement to abdomen taping in 'H' fashion with Steri-strips.   -enteral feeds per primary team.    TBI now with Trach on trach collar  - Tracheostomy- Cleanse around trach site with NS. Pat dry. Apply Silicone foam dressing without border beneath trach collar, cut mid dressing to "Y" shape. Change foam dressing every shift and prn. Change trach ties every 3 days.       Remainder of care per primary team  Will follow periodically throughout hospitalization. Please reconsult earlier if needed.    Upon discharge f/u as outpatient at [facility] [address] [phone]  Findings and plan discussed in detail with primary team    Thank you for this consult  DEBBIE Bryant, [employer] (pager #[phone])    If after 4PM or before 7:30AM on Mon-Friday or weekend/holiday please contact general surgery for urgent matters.   Team A- [id number]   Team B- [id number]  For non-urgent matters e-mail [email or web address]    I spent 35 minutes face to face with this patient of which more than 50% of the time was spent counseling & coordinating care of this pt

## 2022-04-26 NOTE — PROGRESS NOTE ADULT - SUBJECTIVE AND OBJECTIVE BOX
CHIEF COMPLAINT: Patient is a 76y old  Male who presents with a chief complaint of Hypoxia (26 Apr 2022 11:24)      Interval Events:    REVIEW OF SYSTEMS:  [ ] All other systems negative  [ ] Unable to assess ROS because ________    Mode: standby      OBJECTIVE:  ICU Vital Signs Last 24 Hrs  T(C): 36.7 (26 Apr 2022 05:15), Max: 36.7 (26 Apr 2022 05:15)  T(F): 98 (26 Apr 2022 05:15), Max: 98 (26 Apr 2022 05:15)  HR: 62 (26 Apr 2022 10:13) (57 - 63)  BP: 133/89 (26 Apr 2022 05:15) (133/89 - 134/75)  BP(mean): --  ABP: --  ABP(mean): --  RR: 20 (26 Apr 2022 07:49) (18 - 20)  SpO2: 100% (26 Apr 2022 10:13) (97% - 100%)    Mode: standby    04-25 @ 07:01  -  04-26 @ 07:00  --------------------------------------------------------  IN: 1740 mL / OUT: 1800 mL / NET: -60 mL      CAPILLARY BLOOD GLUCOSE          HOSPITAL MEDICATIONS:  MEDICATIONS  (STANDING):  ALBUTerol    90 MICROgram(s) HFA Inhaler 2 Puff(s) Inhalation every 6 hours  amantadine Syrup 100 milliGRAM(s) Oral two times a day  aMIOdarone    Tablet 200 milliGRAM(s) Oral daily  ascorbic acid 500 milliGRAM(s) Oral daily  carvedilol 6.25 milliGRAM(s) Oral every 12 hours  chlorhexidine 0.12% Liquid 15 milliLiter(s) Oral Mucosa every 12 hours  chlorhexidine 2% Cloths 1 Application(s) Topical daily  collagenase Ointment 1 Application(s) Topical daily  doxazosin 2 milliGRAM(s) Oral at bedtime  folic acid 1 milliGRAM(s) Oral daily  furosemide Solution 40 milliGRAM(s) Oral daily  heparin   Injectable 5000 Unit(s) SubCutaneous every 8 hours  hydrocortisone hemorrhoidal Suppository 1 Suppository(s) Rectal at bedtime  ipratropium 17 MICROgram(s) HFA Inhaler 1 Puff(s) Inhalation every 6 hours  lactulose Syrup 20 Gram(s) Oral two times a day  levETIRAcetam  Solution 1000 milliGRAM(s) Oral two times a day  levothyroxine 50 MICROGram(s) Oral daily  metoclopramide 10 milliGRAM(s) Oral every 8 hours  modafinil 100 milliGRAM(s) Oral <User Schedule>  pantoprazole  Injectable 40 milliGRAM(s) IV Push two times a day  polyethylene glycol 3350 17 Gram(s) Oral daily  senna Syrup 10 milliLiter(s) Oral at bedtime  sodium chloride 3%  Inhalation 4 milliLiter(s) Inhalation every 6 hours    MEDICATIONS  (PRN):  acetaminophen    Suspension .. 650 milliGRAM(s) Oral every 4 hours PRN Temp greater or equal to 38C (100.4F), Mild Pain (1 - 3)      LABS:                        9.0    7.65  )-----------( 192      ( 25 Apr 2022 06:40 )             29.1     04-25    142  |  102  |  53<H>  ----------------------------<  110<H>  4.4   |  29  |  1.13    Ca    9.1      25 Apr 2022 06:40  Phos  3.3     04-25  Mg     2.20     04-25                PAST MEDICAL & SURGICAL HISTORY:  Essential hypertension    Primary osteoarthritis, unspecified site    Closed fracture of left radius, initial encounter    Morbid obesity, unspecified obesity type  h/o. - s/p gastric bypass- lost 113 lbs    KAREN (obstructive sleep apnea)  h/o - was on CPAP until gastric bypass surgery    Respiratory failure    Anemia    Subdural hemorrhage    Epilepsy    H/O gastrostomy    History of BPH    Afib    S/P gastric bypass  2008    Status post total replacement of left hip  2006    S/P bunionectomy  bilateral    S/P colonoscopy  approx 2012    History of abdominoplasty  and subsequent cosmetic surgery for removal of excess skin from face        FAMILY HISTORY:  Family history of renal cell carcinoma (Mother)    Family history of malignant melanoma (Sibling)        Social History:      RADIOLOGY:  [ ] Reviewed and interpreted by me    PULMONARY FUNCTION TESTS:    EKG: CHIEF COMPLAINT: Patient is a 76y old  Male who presents with a chief complaint of Hypoxia (26 Apr 2022 11:24)    Interval Events: None reported overnight. Clinically unchanged. VSS, and medications reviewed.     REVIEW OF SYSTEMS:  See above   [x] All other systems negative  [x] Unable to fully assess ROS because poor phonation     Mode: standby      OBJECTIVE:  ICU Vital Signs Last 24 Hrs  T(C): 36.7 (26 Apr 2022 05:15), Max: 36.7 (26 Apr 2022 05:15)  T(F): 98 (26 Apr 2022 05:15), Max: 98 (26 Apr 2022 05:15)  HR: 62 (26 Apr 2022 10:13) (57 - 63)  BP: 133/89 (26 Apr 2022 05:15) (133/89 - 134/75)  BP(mean): --  ABP: --  ABP(mean): --  RR: 20 (26 Apr 2022 07:49) (18 - 20)  SpO2: 100% (26 Apr 2022 10:13) (97% - 100%)    Mode: standby    04-25 @ 07:01  -  04-26 @ 07:00  --------------------------------------------------------  IN: 1740 mL / OUT: 1800 mL / NET: -60 mL      CAPILLARY BLOOD GLUCOSE    HOSPITAL MEDICATIONS:  MEDICATIONS  (STANDING):  ALBUTerol    90 MICROgram(s) HFA Inhaler 2 Puff(s) Inhalation every 6 hours  amantadine Syrup 100 milliGRAM(s) Oral two times a day  aMIOdarone    Tablet 200 milliGRAM(s) Oral daily  ascorbic acid 500 milliGRAM(s) Oral daily  carvedilol 6.25 milliGRAM(s) Oral every 12 hours  chlorhexidine 0.12% Liquid 15 milliLiter(s) Oral Mucosa every 12 hours  chlorhexidine 2% Cloths 1 Application(s) Topical daily  collagenase Ointment 1 Application(s) Topical daily  doxazosin 2 milliGRAM(s) Oral at bedtime  folic acid 1 milliGRAM(s) Oral daily  furosemide Solution 40 milliGRAM(s) Oral daily  heparin   Injectable 5000 Unit(s) SubCutaneous every 8 hours  hydrocortisone hemorrhoidal Suppository 1 Suppository(s) Rectal at bedtime  ipratropium 17 MICROgram(s) HFA Inhaler 1 Puff(s) Inhalation every 6 hours  lactulose Syrup 20 Gram(s) Oral two times a day  levETIRAcetam  Solution 1000 milliGRAM(s) Oral two times a day  levothyroxine 50 MICROGram(s) Oral daily  metoclopramide 10 milliGRAM(s) Oral every 8 hours  modafinil 100 milliGRAM(s) Oral <User Schedule>  pantoprazole  Injectable 40 milliGRAM(s) IV Push two times a day  polyethylene glycol 3350 17 Gram(s) Oral daily  senna Syrup 10 milliLiter(s) Oral at bedtime  sodium chloride 3%  Inhalation 4 milliLiter(s) Inhalation every 6 hours    MEDICATIONS  (PRN):  acetaminophen    Suspension .. 650 milliGRAM(s) Oral every 4 hours PRN Temp greater or equal to 38C (100.4F), Mild Pain (1 - 3)    PHYSICAL EXAM  GENERAL: Laying in bed, NAD  HEENT: +Trach, area c/d/i  Card: +s1, s2  Pulm: + coarse breath sound b/l  GI: +PEG, area c/d/i, obese abd, soft, nt/nd, no peritoneal signs noted  Ext: no asymmetry, no swelling, no calf tenderness  Neuro: alert and awake, following simple commands such as squeezing hands    LABS:                        9.0    7.65  )-----------( 192      ( 25 Apr 2022 06:40 )             29.1     04-25    142  |  102  |  53<H>  ----------------------------<  110<H>  4.4   |  29  |  1.13    Ca    9.1      25 Apr 2022 06:40  Phos  3.3     04-25  Mg     2.20     04-25    PAST MEDICAL & SURGICAL HISTORY:  Essential hypertension    Primary osteoarthritis, unspecified site    Closed fracture of left radius, initial encounter    Morbid obesity, unspecified obesity type  h/o. - s/p gastric bypass- lost 113 lbs    KAREN (obstructive sleep apnea)  h/o - was on CPAP until gastric bypass surgery    Respiratory failure    Anemia    Subdural hemorrhage    Epilepsy    H/O gastrostomy    History of BPH    Afib    S/P gastric bypass  2008    Status post total replacement of left hip  2006    S/P bunionectomy  bilateral    S/P colonoscopy  approx 2012    History of abdominoplasty  and subsequent cosmetic surgery for removal of excess skin from face    FAMILY HISTORY:  Family history of renal cell carcinoma (Mother)    Family history of malignant melanoma (Sibling)    Social History:    RADIOLOGY:  [ ] Reviewed and interpreted by me    PULMONARY FUNCTION TESTS:    EKG:

## 2022-04-26 NOTE — PROGRESS NOTE ADULT - ASSESSMENT
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 Now DISPO planning.    # Neurology   - Currently A&Ox0, awake and alert, able to move RUE and nods/ mouths one or two words per RCU evaluation and prior documentation.   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   - Continue on Modafinil, Amantidine and Keppra.     # Cardiovascular   - Hx of HFpEF 55, mild MR and AR, severe LAD, normal LVRVSF (ECHO 3/7)  - Hx of Atrial Fibrillation s/p Watchman and currently rate controlled.   - Continue on Amiodarone 200mg QD and Coreg 6.25mg BID.   - Continue on Lasix 40mg QD and monitor tolerance.     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   - Able to tolerate TC (from 4/15 during the day) and now ATC (from 4/21).   - Continue on Proventil, Atrovent and Chest PT Q6H, and now added Hypersal due to frequent need to suction    # GI  - Hx of SDH, Gastric Bypass and Dysphagia s/p PEG and continued on TF with course complicated by constipation and melena  - Case discussed with GI and melena likely in setting of constipation, however no plan for scope currently and will medically treat with PPI.   - Monitor BMs on Miralax, Senna and Lactulose.   - Continue on Nepro in sight of hyperkalemia.   - s/p large BM on 4/25  - c/w Reglan 10mg TID (4/24 - )    # / Renal  - Hx of Urinary Retention with Chronic Garcia and presented FLORIAN likely in setting of dehydration with fevers vs ATN with Sepsis (CRE high 2s and baseline 0.8-0.9).   - UA with hematuria and proteinuria and case discussed with Neprhology with concern for glomerulonephritis. ANCA, ISABELLE and GM ABs negative.   - Hypernatremia noted and continued on  Q6H as tolerated.   - Hyperkalemia and s/p cocktail with improvement (4/19).   - IVF and PRBC given with CRE improving and now resumed on Lasix with improvement.     # ID  - Recent admission for  Acinetobacter and Pseudomonas HCAP (3/2022)   - SCx (4/10) with  Acinetobacter and Pseudomonas.   - s/p completed Meropenem (4/8-4/14) and will monitor off ABX.     # Endocrine  - No hx of DM2 and A1C 5.9. Continue to monitor BG,    - Hx of Hypothyroidism and continued on Synthroid. TSH 47 with low T3/T4 and Free T4. TSH 80s (3/2022) and Synthroid increased at NH. Hypothyroidism could be in the setting of Amiodarone use and current Synthroid dose appears to be working.   - Next TFT to be done in June 2022.     # Hematology   - Anemia i/s/o AOCD and s/p PRBC (4/9)   - Melena/ anemia noted and s/p 2 U PRBC (4/19)  with good response, however HH waxes and wanes with no obvious bleed (no further melena or CGE from PEG aspiration)  - DVT PPX with HSQ (cleared by GI to resume).     # Ethics   - FULL CODE   - Case discussed with Cousin/ HCP, Dr. Donna Hagen (Pediatric Neurologist, [phone]) and updated daily.     # DISPO - Planning to go back to NH with improvement in renal function/ electrolytes. Family requesting [place] [facility] and SW aware      75 YO M, A&Ox0 at baseline with PMHx of L SDH c/b L Subfalcine Herniation s/p Emergent R Hemicraniectomy in [place] while traveling fell on marble floor and now chronic with tracheostomy and vent dependant, Dysphagia with PEG, AFIB s/p watchman, Gastric Sleeve, Urinary Retention with Chornic Garcia, and recent ICU stay for HCAP with MDR Pseudomonas now presenting with ACHRF i/s/o  Actineobacter and Pseudomonas HCAP s/p ABX c/b FLORIAN and melena/ anemia. Now DISPO planning.    # Neurology   - Currently A&Ox0, awake and alert, able to move RUE and nods/ mouths one or two words per RCU evaluation and prior documentation.   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   - Continue on Modafinil, Amantidine and Keppra.     # Cardiovascular   - Hx of HFpEF 55, mild MR and AR, severe LAD, normal LVRVSF (ECHO 3/7)  - Hx of Atrial Fibrillation s/p Watchman and currently rate controlled.   - Continue on Amiodarone 200mg QD and Coreg 6.25mg BID.   - Continue on Lasix 40mg QD and monitor tolerance.     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   - Able to tolerate TC (from 4/15 during the day) and now ATC (from 4/21).   - Continue on Proventil, Atrovent and Chest PT Q6H, and c/w Hypersal to loosen secretions     # GI  - Hx of SDH, Gastric Bypass and Dysphagia s/p PEG and continued on TF with course complicated by constipation and melena  - Case discussed with GI and melena likely in setting of constipation, however no plan for scope currently and will medically treat with PPI.   - Monitor BMs on Miralax, Senna and Lactulose.   - Continue on Nepro in sight of hyperkalemia.   - s/p large BM on 4/25  - c/w Reglan 10mg TID (4/24 - until 4/27)    # / Renal  - Hx of Urinary Retention with Chronic Garcia and presented FLORIAN likely in setting of dehydration with fevers vs ATN with Sepsis (CRE high 2s and baseline 0.8-0.9).   - UA with hematuria and proteinuria and case discussed with Neprhology with concern for glomerulonephritis. ANCA, ISABELLE and GM ABs negative.   - Hypernatremia noted and continued on  Q6H as tolerated.   - Hyperkalemia and s/p cocktail with improvement (4/19).   - IVF and PRBC given with CRE improving and now resumed on Lasix with improvement.     # ID  - Recent admission for  Acinetobacter and Pseudomonas HCAP (3/2022)   - SCx (4/10) with  Acinetobacter and Pseudomonas.   - s/p completed Meropenem (4/8-4/14) and will monitor off ABX.     # Endocrine  - No hx of DM2 and A1C 5.9. Continue to monitor BG,    - Hx of Hypothyroidism and continued on Synthroid. TSH 47 with low T3/T4 and Free T4. TSH 80s (3/2022) and Synthroid increased at NH. Hypothyroidism could be in the setting of Amiodarone use and current Synthroid dose appears to be working.   - Next TFT to be done in June 2022.     # Hematology   - Anemia i/s/o AOCD and s/p PRBC (4/9)   - Melena/ anemia noted and s/p 2 U PRBC (4/19)  with good response, however HH waxes and wanes with no obvious bleed (no further melena or CGE from PEG aspiration)  - DVT PPX with HSQ (cleared by GI to resume).     # Ethics   - FULL CODE   - Case discussed with Cousin/ HCP, Dr. Donna Hagen (Pediatric Neurologist, [phone]) and updated daily. PM&R reeval pending.     # DISPO - Planning to go back to NH with improvement in renal function/ electrolytes. Family requesting [place] [facility] and SW aware

## 2022-04-27 LAB
ANION GAP SERPL CALC-SCNC: 13 MMOL/L — SIGNIFICANT CHANGE UP (ref 7–14)
BLD GP AB SCN SERPL QL: NEGATIVE — SIGNIFICANT CHANGE UP
BUN SERPL-MCNC: 43 MG/DL — HIGH (ref 7–23)
CALCIUM SERPL-MCNC: 9.1 MG/DL — SIGNIFICANT CHANGE UP (ref 8.4–10.5)
CHLORIDE SERPL-SCNC: 100 MMOL/L — SIGNIFICANT CHANGE UP (ref 98–107)
CO2 SERPL-SCNC: 28 MMOL/L — SIGNIFICANT CHANGE UP (ref 22–31)
CREAT SERPL-MCNC: 1.07 MG/DL — SIGNIFICANT CHANGE UP (ref 0.5–1.3)
EGFR: 72 ML/MIN/1.73M2 — SIGNIFICANT CHANGE UP
GLUCOSE SERPL-MCNC: 111 MG/DL — HIGH (ref 70–99)
HCT VFR BLD CALC: 27.2 % — LOW (ref 39–50)
HGB BLD-MCNC: 8.4 G/DL — LOW (ref 13–17)
MAGNESIUM SERPL-MCNC: 2.3 MG/DL — SIGNIFICANT CHANGE UP (ref 1.6–2.6)
MCHC RBC-ENTMCNC: 30.9 GM/DL — LOW (ref 32–36)
MCHC RBC-ENTMCNC: 31.3 PG — SIGNIFICANT CHANGE UP (ref 27–34)
MCV RBC AUTO: 101.5 FL — HIGH (ref 80–100)
NRBC # BLD: 0 /100 WBCS — SIGNIFICANT CHANGE UP
NRBC # FLD: 0 K/UL — SIGNIFICANT CHANGE UP
PHOSPHATE SERPL-MCNC: 3.5 MG/DL — SIGNIFICANT CHANGE UP (ref 2.5–4.5)
PLATELET # BLD AUTO: 189 K/UL — SIGNIFICANT CHANGE UP (ref 150–400)
POTASSIUM SERPL-MCNC: 4 MMOL/L — SIGNIFICANT CHANGE UP (ref 3.5–5.3)
POTASSIUM SERPL-SCNC: 4 MMOL/L — SIGNIFICANT CHANGE UP (ref 3.5–5.3)
RBC # BLD: 2.68 M/UL — LOW (ref 4.2–5.8)
RBC # FLD: 17.9 % — HIGH (ref 10.3–14.5)
RH IG SCN BLD-IMP: POSITIVE — SIGNIFICANT CHANGE UP
SARS-COV-2 RNA SPEC QL NAA+PROBE: SIGNIFICANT CHANGE UP
SODIUM SERPL-SCNC: 141 MMOL/L — SIGNIFICANT CHANGE UP (ref 135–145)
WBC # BLD: 6.73 K/UL — SIGNIFICANT CHANGE UP (ref 3.8–10.5)
WBC # FLD AUTO: 6.73 K/UL — SIGNIFICANT CHANGE UP (ref 3.8–10.5)

## 2022-04-27 PROCEDURE — 99232 SBSQ HOSP IP/OBS MODERATE 35: CPT

## 2022-04-27 PROCEDURE — 99233 SBSQ HOSP IP/OBS HIGH 50: CPT

## 2022-04-27 PROCEDURE — 99233 SBSQ HOSP IP/OBS HIGH 50: CPT | Mod: GC

## 2022-04-27 RX ADMIN — Medication 1 PUFF(S): at 21:34

## 2022-04-27 RX ADMIN — CHLORHEXIDINE GLUCONATE 1 APPLICATION(S): 213 SOLUTION TOPICAL at 12:54

## 2022-04-27 RX ADMIN — ALBUTEROL 2 PUFF(S): 90 AEROSOL, METERED ORAL at 15:16

## 2022-04-27 RX ADMIN — Medication 100 MILLIGRAM(S): at 17:11

## 2022-04-27 RX ADMIN — LACTULOSE 20 GRAM(S): 10 SOLUTION ORAL at 17:11

## 2022-04-27 RX ADMIN — SODIUM CHLORIDE 4 MILLILITER(S): 9 INJECTION INTRAMUSCULAR; INTRAVENOUS; SUBCUTANEOUS at 21:35

## 2022-04-27 RX ADMIN — LEVETIRACETAM 1000 MILLIGRAM(S): 250 TABLET, FILM COATED ORAL at 17:10

## 2022-04-27 RX ADMIN — Medication 1 PUFF(S): at 10:28

## 2022-04-27 RX ADMIN — CHLORHEXIDINE GLUCONATE 15 MILLILITER(S): 213 SOLUTION TOPICAL at 17:10

## 2022-04-27 RX ADMIN — SODIUM CHLORIDE 4 MILLILITER(S): 9 INJECTION INTRAMUSCULAR; INTRAVENOUS; SUBCUTANEOUS at 04:10

## 2022-04-27 RX ADMIN — Medication 100 MILLIGRAM(S): at 05:01

## 2022-04-27 RX ADMIN — Medication 1 SUPPOSITORY(S): at 21:24

## 2022-04-27 RX ADMIN — SENNA PLUS 10 MILLILITER(S): 8.6 TABLET ORAL at 21:24

## 2022-04-27 RX ADMIN — Medication 500 MILLIGRAM(S): at 12:55

## 2022-04-27 RX ADMIN — LEVETIRACETAM 1000 MILLIGRAM(S): 250 TABLET, FILM COATED ORAL at 05:00

## 2022-04-27 RX ADMIN — LACTULOSE 20 GRAM(S): 10 SOLUTION ORAL at 05:01

## 2022-04-27 RX ADMIN — PANTOPRAZOLE SODIUM 40 MILLIGRAM(S): 20 TABLET, DELAYED RELEASE ORAL at 12:55

## 2022-04-27 RX ADMIN — AMIODARONE HYDROCHLORIDE 200 MILLIGRAM(S): 400 TABLET ORAL at 05:00

## 2022-04-27 RX ADMIN — Medication 1 PUFF(S): at 15:16

## 2022-04-27 RX ADMIN — HEPARIN SODIUM 5000 UNIT(S): 5000 INJECTION INTRAVENOUS; SUBCUTANEOUS at 14:33

## 2022-04-27 RX ADMIN — Medication 40 MILLIGRAM(S): at 05:01

## 2022-04-27 RX ADMIN — MODAFINIL 100 MILLIGRAM(S): 200 TABLET ORAL at 05:00

## 2022-04-27 RX ADMIN — POLYETHYLENE GLYCOL 3350 17 GRAM(S): 17 POWDER, FOR SOLUTION ORAL at 12:55

## 2022-04-27 RX ADMIN — ALBUTEROL 2 PUFF(S): 90 AEROSOL, METERED ORAL at 10:28

## 2022-04-27 RX ADMIN — Medication 1 APPLICATION(S): at 12:56

## 2022-04-27 RX ADMIN — Medication 1 MILLIGRAM(S): at 12:56

## 2022-04-27 RX ADMIN — ALBUTEROL 2 PUFF(S): 90 AEROSOL, METERED ORAL at 04:10

## 2022-04-27 RX ADMIN — SODIUM CHLORIDE 4 MILLILITER(S): 9 INJECTION INTRAMUSCULAR; INTRAVENOUS; SUBCUTANEOUS at 10:28

## 2022-04-27 RX ADMIN — HEPARIN SODIUM 5000 UNIT(S): 5000 INJECTION INTRAVENOUS; SUBCUTANEOUS at 21:23

## 2022-04-27 RX ADMIN — ALBUTEROL 2 PUFF(S): 90 AEROSOL, METERED ORAL at 21:35

## 2022-04-27 RX ADMIN — Medication 50 MICROGRAM(S): at 05:00

## 2022-04-27 RX ADMIN — CHLORHEXIDINE GLUCONATE 15 MILLILITER(S): 213 SOLUTION TOPICAL at 05:01

## 2022-04-27 RX ADMIN — Medication 1 PUFF(S): at 04:10

## 2022-04-27 RX ADMIN — SODIUM CHLORIDE 4 MILLILITER(S): 9 INJECTION INTRAMUSCULAR; INTRAVENOUS; SUBCUTANEOUS at 15:16

## 2022-04-27 RX ADMIN — Medication 2 MILLIGRAM(S): at 21:24

## 2022-04-27 RX ADMIN — Medication 10 MILLIGRAM(S): at 05:00

## 2022-04-27 NOTE — PROGRESS NOTE ADULT - ASSESSMENT
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 GI consulted for anemia     Anemia   PEG flushed/lavaged x2; feeds and bilious fluid aspirated, No blood products noted  cbc stable  Protonix 40mg via peg  occult likely d/t constipated stool/irritated hemorrhoid; stools brown  bowel regimen and give anusol suppository qhs as needed    Constipation, Slow Transit   senna syrup and miralax  milk of mg and enemas as needed  if abd distention, would rec to avoid lactulose     HCAP   per RCU/ID       I reviewed the overnight course of events on the unit, re-confirming the patient history. I discussed the care with the patient and their family. The plan of care was discussed with the physician assistant and modifications were made to the notation where appropriate. Differential diagnosis and plan of care discussed with patient after the evaluation. Advanced care planning was discussed with patient and family.  Advanced care planning forms were reviewed and discussed.  Risks, benefits and alternatives of gastroenterologic procedures were discussed in detail and all questions were answered. 35 minutes spent on total encounter of which more than fifty percent of the encounter was spent counseling and/or coordinating care by the attending physician.

## 2022-04-27 NOTE — PROGRESS NOTE ADULT - SUBJECTIVE AND OBJECTIVE BOX
CHIEF COMPLAINT: Patient is a 76y old  Male who presents with a chief complaint of Hypoxia (26 Apr 2022 14:26)      Interval Events:    REVIEW OF SYSTEMS:  [ ] All other systems negative  [ ] Unable to assess ROS because ________    Mode: standby      OBJECTIVE:  ICU Vital Signs Last 24 Hrs  T(C): 37 (27 Apr 2022 05:00), Max: 37 (27 Apr 2022 05:00)  T(F): 98.6 (27 Apr 2022 05:00), Max: 98.6 (27 Apr 2022 05:00)  HR: 63 (27 Apr 2022 05:00) (58 - 63)  BP: 130/78 (27 Apr 2022 05:00) (130/78 - 146/79)  RR: 18 (27 Apr 2022 05:00) (18 - 20)  SpO2: 100% (27 Apr 2022 05:00) (98% - 100%)    Mode: standby    04-26 @ 07:01  -  04-27 @ 07:00  --------------------------------------------------------  IN: 2280 mL / OUT: 1300 mL / NET: 980 mL      CAPILLARY BLOOD GLUCOSE      HOSPITAL MEDICATIONS:  MEDICATIONS  (STANDING):  ALBUTerol    90 MICROgram(s) HFA Inhaler 2 Puff(s) Inhalation every 6 hours  amantadine Syrup 100 milliGRAM(s) Oral two times a day  aMIOdarone    Tablet 200 milliGRAM(s) Oral daily  ascorbic acid 500 milliGRAM(s) Oral daily  carvedilol 6.25 milliGRAM(s) Oral every 12 hours  chlorhexidine 0.12% Liquid 15 milliLiter(s) Oral Mucosa every 12 hours  chlorhexidine 2% Cloths 1 Application(s) Topical daily  collagenase Ointment 1 Application(s) Topical daily  doxazosin 2 milliGRAM(s) Oral at bedtime  folic acid 1 milliGRAM(s) Oral daily  furosemide Solution 40 milliGRAM(s) Oral daily  heparin   Injectable 5000 Unit(s) SubCutaneous every 8 hours  hydrocortisone hemorrhoidal Suppository 1 Suppository(s) Rectal at bedtime  ipratropium 17 MICROgram(s) HFA Inhaler 1 Puff(s) Inhalation every 6 hours  lactulose Syrup 20 Gram(s) Oral two times a day  levETIRAcetam  Solution 1000 milliGRAM(s) Oral two times a day  levothyroxine 50 MICROGram(s) Oral daily  modafinil 100 milliGRAM(s) Oral <User Schedule>  pantoprazole   Suspension 40 milliGRAM(s) Oral daily  polyethylene glycol 3350 17 Gram(s) Oral daily  senna Syrup 10 milliLiter(s) Oral at bedtime  sodium chloride 3%  Inhalation 4 milliLiter(s) Inhalation every 6 hours    MEDICATIONS  (PRN):  acetaminophen    Suspension .. 650 milliGRAM(s) Oral every 4 hours PRN Temp greater or equal to 38C (100.4F), Mild Pain (1 - 3)      LABS:                        8.4    6.73  )-----------( 189      ( 27 Apr 2022 05:37 )             27.2     04-27    141  |  100  |  43<H>  ----------------------------<  111<H>  4.0   |  28  |  1.07    Ca    9.1      27 Apr 2022 05:37  Phos  3.5     04-27  Mg     2.30     04-27      PAST MEDICAL & SURGICAL HISTORY:  Essential hypertension    Primary osteoarthritis, unspecified site    Closed fracture of left radius, initial encounter    Morbid obesity, unspecified obesity type  h/o. - s/p gastric bypass- lost 113 lbs    KAREN (obstructive sleep apnea)  h/o - was on CPAP until gastric bypass surgery    Respiratory failure    Anemia    Subdural hemorrhage    Epilepsy    H/O gastrostomy    History of BPH    Afib    S/P gastric bypass  2008    Status post total replacement of left hip  2006    S/P bunionectomy  bilateral    S/P colonoscopy  approx 2012    History of abdominoplasty  and subsequent cosmetic surgery for removal of excess skin from face        FAMILY HISTORY:  Family history of renal cell carcinoma (Mother)    Family history of malignant melanoma (Sibling)        Social History:      RADIOLOGY:  [ ] Reviewed and interpreted by me    PULMONARY FUNCTION TESTS:    EKG: CHIEF COMPLAINT: Patient is a 76y old  Male who presents with a chief complaint of Hypoxia.    Interval Events: No interval events noted overnight.    REVIEW OF SYSTEMS:  [x] Unable to assess ROS because A&Ox0 at baseline.    Mode: standby    OBJECTIVE:  ICU Vital Signs Last 24 Hrs  T(C): 37 (27 Apr 2022 05:00), Max: 37 (27 Apr 2022 05:00)  T(F): 98.6 (27 Apr 2022 05:00), Max: 98.6 (27 Apr 2022 05:00)  HR: 63 (27 Apr 2022 05:00) (58 - 63)  BP: 130/78 (27 Apr 2022 05:00) (130/78 - 146/79)  RR: 18 (27 Apr 2022 05:00) (18 - 20)  SpO2: 100% (27 Apr 2022 05:00) (98% - 100%)    Mode: standby    04-26 @ 07:01  -  04-27 @ 07:00  --------------------------------------------------------  IN: 2280 mL / OUT: 1300 mL / NET: 980 mL      CAPILLARY BLOOD GLUCOSE      HOSPITAL MEDICATIONS:  MEDICATIONS  (STANDING):  ALBUTerol    90 MICROgram(s) HFA Inhaler 2 Puff(s) Inhalation every 6 hours  amantadine Syrup 100 milliGRAM(s) Oral two times a day  aMIOdarone    Tablet 200 milliGRAM(s) Oral daily  ascorbic acid 500 milliGRAM(s) Oral daily  carvedilol 6.25 milliGRAM(s) Oral every 12 hours  chlorhexidine 0.12% Liquid 15 milliLiter(s) Oral Mucosa every 12 hours  chlorhexidine 2% Cloths 1 Application(s) Topical daily  collagenase Ointment 1 Application(s) Topical daily  doxazosin 2 milliGRAM(s) Oral at bedtime  folic acid 1 milliGRAM(s) Oral daily  furosemide Solution 40 milliGRAM(s) Oral daily  heparin   Injectable 5000 Unit(s) SubCutaneous every 8 hours  hydrocortisone hemorrhoidal Suppository 1 Suppository(s) Rectal at bedtime  ipratropium 17 MICROgram(s) HFA Inhaler 1 Puff(s) Inhalation every 6 hours  lactulose Syrup 20 Gram(s) Oral two times a day  levETIRAcetam  Solution 1000 milliGRAM(s) Oral two times a day  levothyroxine 50 MICROGram(s) Oral daily  modafinil 100 milliGRAM(s) Oral <User Schedule>  pantoprazole   Suspension 40 milliGRAM(s) Oral daily  polyethylene glycol 3350 17 Gram(s) Oral daily  senna Syrup 10 milliLiter(s) Oral at bedtime  sodium chloride 3%  Inhalation 4 milliLiter(s) Inhalation every 6 hours    MEDICATIONS  (PRN):  acetaminophen    Suspension .. 650 milliGRAM(s) Oral every 4 hours PRN Temp greater or equal to 38C (100.4F), Mild Pain (1 - 3)      LABS:                        8.4    6.73  )-----------( 189      ( 27 Apr 2022 05:37 )             27.2     04-27    141  |  100  |  43<H>  ----------------------------<  111<H>  4.0   |  28  |  1.07    Ca    9.1      27 Apr 2022 05:37  Phos  3.5     04-27  Mg     2.30     04-27      PAST MEDICAL & SURGICAL HISTORY:  Essential hypertension    Primary osteoarthritis, unspecified site    Closed fracture of left radius, initial encounter    Morbid obesity, unspecified obesity type  h/o. - s/p gastric bypass- lost 113 lbs    KAREN (obstructive sleep apnea)  h/o - was on CPAP until gastric bypass surgery    Respiratory failure    Anemia    Subdural hemorrhage    Epilepsy    H/O gastrostomy    History of BPH    Afib    S/P gastric bypass  2008    Status post total replacement of left hip  2006    S/P bunionectomy  bilateral    S/P colonoscopy  approx 2012    History of abdominoplasty  and subsequent cosmetic surgery for removal of excess skin from face        FAMILY HISTORY:  Family history of renal cell carcinoma (Mother)    Family history of malignant melanoma (Sibling)        Social History:      RADIOLOGY:  [ ] Reviewed and interpreted by me    PULMONARY FUNCTION TESTS:    EKG:

## 2022-04-27 NOTE — PROGRESS NOTE ADULT - NS ATTEND AMEND GEN_ALL_CORE FT
stable TC round the clock  but still unable to manage own secretions, not a candidate currently for decannulation  PMR f/u appreciated, currently not a candidate for acute rehab  d/c planning to [facility]

## 2022-04-27 NOTE — PROGRESS NOTE ADULT - SUBJECTIVE AND OBJECTIVE BOX
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     Patient sent in from nursing home for increasing oxygen requirements and worsening mental status. Per report, patient on 30% FiO2 normally, however has required 40% at NH and noted to be tachypneic. Patient seen and examined at bedside. Currently on vent at 30% FiO2, appearing comfortable. Patient remains non-verbal however is awake and alert.     On arrival to ED, patient noted to be febrile to 101.9 however was able to be titrated back down to baseline of 30% FiO2. Given 1g tylenol, 1L NS, and vancomycin/meropenem in ED. Patient to be admitted to RCU for further management. (09 Apr 2022 00:40)    patient awakens to voice, able to squeeze R hand, however limited exam due to command following and mental status.     REVIEW OF SYSTEMS  patient unable to participate in ROS    PAST MEDICAL & SURGICAL HISTORY  Essential hypertension    Primary osteoarthritis, unspecified site    Closed fracture of left radius, initial encounter    Morbid obesity, unspecified obesity type    KAREN (obstructive sleep apnea)    Respiratory failure    Anemia    Subdural hemorrhage    Epilepsy    H/O gastrostomy    History of BPH    Afib    S/P gastric bypass    Status post total replacement of left hip    S/P bunionectomy    S/P colonoscopy    History of abdominoplasty      CURRENT FUNCTIONAL STATUS  was unable to follow commands with PT on 4/26    FAMILY HISTORY   Family history of renal cell carcinoma (Mother)  Family history of malignant melanoma (Sibling)      RECENT LABS/IMAGING  CBC Full  -  ( 27 Apr 2022 05:37 )  WBC Count : 6.73 K/uL  RBC Count : 2.68 M/uL  Hemoglobin : 8.4 g/dL  Hematocrit : 27.2 %  Platelet Count - Automated : 189 K/uL  Mean Cell Volume : 101.5 fL  Mean Cell Hemoglobin : 31.3 pg  Mean Cell Hemoglobin Concentration : 30.9 gm/dL  Auto Neutrophil # : x  Auto Lymphocyte # : x  Auto Monocyte # : x  Auto Eosinophil # : x  Auto Basophil # : x  Auto Neutrophil % : x  Auto Lymphocyte % : x  Auto Monocyte % : x  Auto Eosinophil % : x  Auto Basophil % : x    04-27    141  |  100  |  43<H>  ----------------------------<  111<H>  4.0   |  28  |  1.07    Ca    9.1      27 Apr 2022 05:37  Phos  3.5     04-27  Mg     2.30     04-27          VITALS  T(C): 37 (04-27-22 @ 05:00), Max: 37 (04-27-22 @ 05:00)  HR: 68 (04-27-22 @ 07:36) (58 - 68)  BP: 130/78 (04-27-22 @ 05:00) (130/78 - 146/79)  RR: 20 (04-27-22 @ 07:36) (18 - 20)  SpO2: 96% (04-27-22 @ 07:36) (96% - 100%)  Wt(kg): --    ALLERGIES  No Known Allergies      MEDICATIONS   acetaminophen    Suspension .. 650 milliGRAM(s) Oral every 4 hours PRN  ALBUTerol    90 MICROgram(s) HFA Inhaler 2 Puff(s) Inhalation every 6 hours  amantadine Syrup 100 milliGRAM(s) Oral two times a day  aMIOdarone    Tablet 200 milliGRAM(s) Oral daily  ascorbic acid 500 milliGRAM(s) Oral daily  carvedilol 6.25 milliGRAM(s) Oral every 12 hours  chlorhexidine 0.12% Liquid 15 milliLiter(s) Oral Mucosa every 12 hours  chlorhexidine 2% Cloths 1 Application(s) Topical daily  collagenase Ointment 1 Application(s) Topical daily  doxazosin 2 milliGRAM(s) Oral at bedtime  folic acid 1 milliGRAM(s) Oral daily  furosemide Solution 40 milliGRAM(s) Oral daily  heparin   Injectable 5000 Unit(s) SubCutaneous every 8 hours  hydrocortisone hemorrhoidal Suppository 1 Suppository(s) Rectal at bedtime  ipratropium 17 MICROgram(s) HFA Inhaler 1 Puff(s) Inhalation every 6 hours  lactulose Syrup 20 Gram(s) Oral two times a day  levETIRAcetam  Solution 1000 milliGRAM(s) Oral two times a day  levothyroxine 50 MICROGram(s) Oral daily  modafinil 100 milliGRAM(s) Oral <User Schedule>  pantoprazole   Suspension 40 milliGRAM(s) Oral daily  polyethylene glycol 3350 17 Gram(s) Oral daily  senna Syrup 10 milliLiter(s) Oral at bedtime  sodium chloride 3%  Inhalation 4 milliLiter(s) Inhalation every 6 hours      ----------------------------------------------------------------------------------------     PHYSICAL EXAM  Constitutional - NAD   HEENT - + trach   Chest - no respiratory distress  Cardiovascular - RRR, S1S2  Abdomen -+PEG  + benson  Extremities - increased tone, + skin discoloration LLE, + UE edema   Neurologic Exam -                    Cognitive - lethargic     Communication - limited verbal output     Cranial Nerves - no facial asymmetry, limited participation in cn exam     Motor -exam limited by mental status, smiles to command, squeezes R hand 2/5     Sensory - unable to assess     Balance - WNL Static  Psychiatric - calm  ----------------------------------------------------------------------------------------  ASSESSMENT/PLAN  77 yo m pmh sdh, chronic trach/PEG p/w recurrent HCAP  Keppra  DVT PPX - heparin  npo with tube feed  lethargy: on modafinil, amantadine  please request follow up PT session  Rehab -  goal is for subacute rehab when medically cleared if able to follow commands.    decreased alertness today, unclear cause possibly medication (keppra? vs neurological cause, also on precautions for  in sputum, recurrent hcap.

## 2022-04-27 NOTE — PROGRESS NOTE ADULT - SUBJECTIVE AND OBJECTIVE BOX
INTERVAL HPI/OVERNIGHT EVENTS:    nonverbal   tolerating feeds   peg assessed, old crusted blood on skin    MEDICATIONS  (STANDING):  ALBUTerol    90 MICROgram(s) HFA Inhaler 2 Puff(s) Inhalation every 6 hours  amantadine Syrup 100 milliGRAM(s) Oral two times a day  aMIOdarone    Tablet 200 milliGRAM(s) Oral daily  ascorbic acid 500 milliGRAM(s) Oral daily  carvedilol 6.25 milliGRAM(s) Oral every 12 hours  chlorhexidine 0.12% Liquid 15 milliLiter(s) Oral Mucosa every 12 hours  chlorhexidine 2% Cloths 1 Application(s) Topical daily  collagenase Ointment 1 Application(s) Topical daily  doxazosin 2 milliGRAM(s) Oral at bedtime  folic acid 1 milliGRAM(s) Oral daily  furosemide Solution 40 milliGRAM(s) Oral daily  heparin   Injectable 5000 Unit(s) SubCutaneous every 8 hours  hydrocortisone hemorrhoidal Suppository 1 Suppository(s) Rectal at bedtime  ipratropium 17 MICROgram(s) HFA Inhaler 1 Puff(s) Inhalation every 6 hours  lactulose Syrup 20 Gram(s) Oral two times a day  levETIRAcetam  Solution 1000 milliGRAM(s) Oral two times a day  levothyroxine 50 MICROGram(s) Oral daily  modafinil 100 milliGRAM(s) Oral <User Schedule>  pantoprazole   Suspension 40 milliGRAM(s) Oral daily  polyethylene glycol 3350 17 Gram(s) Oral daily  senna Syrup 10 milliLiter(s) Oral at bedtime  sodium chloride 3%  Inhalation 4 milliLiter(s) Inhalation every 6 hours    MEDICATIONS  (PRN):  acetaminophen    Suspension .. 650 milliGRAM(s) Oral every 4 hours PRN Temp greater or equal to 38C (100.4F), Mild Pain (1 - 3)      Allergies    No Known Allergies    Intolerances        Review of Systems: *pt minimally verbal to nonverbal, unable to obtain ROS         Vital Signs Last 24 Hrs  T(C): 37 (27 Apr 2022 05:00), Max: 37 (27 Apr 2022 05:00)  T(F): 98.6 (27 Apr 2022 05:00), Max: 98.6 (27 Apr 2022 05:00)  HR: 60 (27 Apr 2022 10:39) (58 - 68)  BP: 130/78 (27 Apr 2022 05:00) (130/78 - 146/79)  BP(mean): --  RR: 20 (27 Apr 2022 07:36) (18 - 20)  SpO2: 98% (27 Apr 2022 10:39) (96% - 100%)    PHYSICAL EXAM:    Constitutional: NAD  HEENT: EOMI, throat clear  Neck: No LAD, supple  Respiratory: CTA and P  Cardiovascular: S1 and S2, RRR, no M  Gastrointestinal: BS+, soft, NT/ND, neg HSM, +peg c/d/i, crusted blood on skin  Extremities: No peripheral edema, neg clubbing, cyanosis  Vascular: 2+ peripheral pulses  Neurological: A/O x 0-1  Psychiatric: Normal mood, normal affect  Skin: No rashes      LABS:                        8.4    6.73  )-----------( 189      ( 27 Apr 2022 05:37 )             27.2     04-27    141  |  100  |  43<H>  ----------------------------<  111<H>  4.0   |  28  |  1.07    Ca    9.1      27 Apr 2022 05:37  Phos  3.5     04-27  Mg     2.30     04-27            RADIOLOGY & ADDITIONAL TESTS:

## 2022-04-27 NOTE — PROGRESS NOTE ADULT - ASSESSMENT
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 Now DISPO planning.    # Neurology   - Currently A&Ox0, awake and alert, able to move RUE and nods/ mouths one or two words per RCU evaluation and prior documentation.   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   - Continue on Modafinil, Amantidine and Keppra.     # Cardiovascular   - Hx of HFpEF 55, mild MR and AR, severe LAD, normal LVRVSF (ECHO 3/7)  - Hx of Atrial Fibrillation s/p Watchman and currently rate controlled.   - Continue on Amiodarone 200mg QD and Coreg 6.25mg BID.   - Continue on Lasix 40mg QD and monitor tolerance.     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   - Able to tolerate TC (from 4/15 during the day) and now ATC (from 4/21).   - Continue on Proventil, Atrovent and Chest PT Q6H, and c/w Hypersal to loosen secretions     # GI  - Hx of SDH, Gastric Bypass and Dysphagia s/p PEG and continued on TF with course complicated by constipation and melena  - Case discussed with GI and melena likely in setting of constipation, however no plan for scope currently and will medically treat with PPI.   - Monitor BMs on Miralax, Senna and Lactulose.   - Continue on Nepro in sight of hyperkalemia.   - s/p large BM on 4/25  - c/w Reglan 10mg TID (4/24 - until 4/27)    # / Renal  - Hx of Urinary Retention with Chronic Garcia and presented FLORIAN likely in setting of dehydration with fevers vs ATN with Sepsis (CRE high 2s and baseline 0.8-0.9).   - UA with hematuria and proteinuria and case discussed with Neprhology with concern for glomerulonephritis. ANCA, ISABELLE and GM ABs negative.   - Hypernatremia noted and continued on  Q6H as tolerated.   - Hyperkalemia and s/p cocktail with improvement (4/19).   - IVF and PRBC given with CRE improving and now resumed on Lasix with improvement.     # ID  - Recent admission for  Acinetobacter and Pseudomonas HCAP (3/2022)   - SCx (4/10) with  Acinetobacter and Pseudomonas.   - s/p completed Meropenem (4/8-4/14) and will monitor off ABX.     # Endocrine  - No hx of DM2 and A1C 5.9. Continue to monitor BG,    - Hx of Hypothyroidism and continued on Synthroid. TSH 47 with low T3/T4 and Free T4. TSH 80s (3/2022) and Synthroid increased at NH. Hypothyroidism could be in the setting of Amiodarone use and current Synthroid dose appears to be working.   - Next TFT to be done in June 2022.     # Hematology   - Anemia i/s/o AOCD and s/p PRBC (4/9)   - Melena/ anemia noted and s/p 2 U PRBC (4/19)  with good response, however HH waxes and wanes with no obvious bleed (no further melena or CGE from PEG aspiration)  - DVT PPX with HSQ (cleared by GI to resume).     # Ethics   - FULL CODE   - Case discussed with Cousin/ HCP, Dr. Donna Hagen (Pediatric Neurologist, [phone]) and updated daily. PM&R reeval pending.     # DISPO - Planning to go back to NH with improvement in renal function/ electrolytes. Family requesting [facility] and SW aware

## 2022-04-28 LAB
ANION GAP SERPL CALC-SCNC: 10 MMOL/L — SIGNIFICANT CHANGE UP (ref 7–14)
BLOOD GAS VENOUS COMPREHENSIVE RESULT: SIGNIFICANT CHANGE UP
BUN SERPL-MCNC: 38 MG/DL — HIGH (ref 7–23)
CALCIUM SERPL-MCNC: 9.2 MG/DL — SIGNIFICANT CHANGE UP (ref 8.4–10.5)
CHLORIDE SERPL-SCNC: 99 MMOL/L — SIGNIFICANT CHANGE UP (ref 98–107)
CO2 SERPL-SCNC: 29 MMOL/L — SIGNIFICANT CHANGE UP (ref 22–31)
CREAT SERPL-MCNC: 0.97 MG/DL — SIGNIFICANT CHANGE UP (ref 0.5–1.3)
EGFR: 81 ML/MIN/1.73M2 — SIGNIFICANT CHANGE UP
GLUCOSE SERPL-MCNC: 115 MG/DL — HIGH (ref 70–99)
GRAM STN FLD: SIGNIFICANT CHANGE UP
HCT VFR BLD CALC: 27.8 % — LOW (ref 39–50)
HGB BLD-MCNC: 8.7 G/DL — LOW (ref 13–17)
MAGNESIUM SERPL-MCNC: 2.2 MG/DL — SIGNIFICANT CHANGE UP (ref 1.6–2.6)
MCHC RBC-ENTMCNC: 31.3 GM/DL — LOW (ref 32–36)
MCHC RBC-ENTMCNC: 31.8 PG — SIGNIFICANT CHANGE UP (ref 27–34)
MCV RBC AUTO: 101.5 FL — HIGH (ref 80–100)
NRBC # BLD: 0 /100 WBCS — SIGNIFICANT CHANGE UP
NRBC # FLD: 0 K/UL — SIGNIFICANT CHANGE UP
PHOSPHATE SERPL-MCNC: 3.2 MG/DL — SIGNIFICANT CHANGE UP (ref 2.5–4.5)
PLATELET # BLD AUTO: 203 K/UL — SIGNIFICANT CHANGE UP (ref 150–400)
POTASSIUM SERPL-MCNC: 4 MMOL/L — SIGNIFICANT CHANGE UP (ref 3.5–5.3)
POTASSIUM SERPL-SCNC: 4 MMOL/L — SIGNIFICANT CHANGE UP (ref 3.5–5.3)
RBC # BLD: 2.74 M/UL — LOW (ref 4.2–5.8)
RBC # FLD: 17.7 % — HIGH (ref 10.3–14.5)
SODIUM SERPL-SCNC: 138 MMOL/L — SIGNIFICANT CHANGE UP (ref 135–145)
SPECIMEN SOURCE: SIGNIFICANT CHANGE UP
WBC # BLD: 7.62 K/UL — SIGNIFICANT CHANGE UP (ref 3.8–10.5)
WBC # FLD AUTO: 7.62 K/UL — SIGNIFICANT CHANGE UP (ref 3.8–10.5)

## 2022-04-28 PROCEDURE — 99233 SBSQ HOSP IP/OBS HIGH 50: CPT | Mod: GC

## 2022-04-28 RX ADMIN — Medication 1 PUFF(S): at 04:06

## 2022-04-28 RX ADMIN — Medication 1 PUFF(S): at 17:03

## 2022-04-28 RX ADMIN — Medication 100 MILLIGRAM(S): at 17:31

## 2022-04-28 RX ADMIN — CHLORHEXIDINE GLUCONATE 1 APPLICATION(S): 213 SOLUTION TOPICAL at 11:27

## 2022-04-28 RX ADMIN — CARVEDILOL PHOSPHATE 6.25 MILLIGRAM(S): 80 CAPSULE, EXTENDED RELEASE ORAL at 17:31

## 2022-04-28 RX ADMIN — SODIUM CHLORIDE 4 MILLILITER(S): 9 INJECTION INTRAMUSCULAR; INTRAVENOUS; SUBCUTANEOUS at 04:06

## 2022-04-28 RX ADMIN — SODIUM CHLORIDE 4 MILLILITER(S): 9 INJECTION INTRAMUSCULAR; INTRAVENOUS; SUBCUTANEOUS at 09:29

## 2022-04-28 RX ADMIN — HEPARIN SODIUM 5000 UNIT(S): 5000 INJECTION INTRAVENOUS; SUBCUTANEOUS at 14:13

## 2022-04-28 RX ADMIN — ALBUTEROL 2 PUFF(S): 90 AEROSOL, METERED ORAL at 09:28

## 2022-04-28 RX ADMIN — ALBUTEROL 2 PUFF(S): 90 AEROSOL, METERED ORAL at 04:06

## 2022-04-28 RX ADMIN — HEPARIN SODIUM 5000 UNIT(S): 5000 INJECTION INTRAVENOUS; SUBCUTANEOUS at 21:54

## 2022-04-28 RX ADMIN — Medication 1 PUFF(S): at 21:52

## 2022-04-28 RX ADMIN — Medication 1 SUPPOSITORY(S): at 21:55

## 2022-04-28 RX ADMIN — CHLORHEXIDINE GLUCONATE 15 MILLILITER(S): 213 SOLUTION TOPICAL at 17:31

## 2022-04-28 RX ADMIN — Medication 40 MILLIGRAM(S): at 06:02

## 2022-04-28 RX ADMIN — LEVETIRACETAM 1000 MILLIGRAM(S): 250 TABLET, FILM COATED ORAL at 17:31

## 2022-04-28 RX ADMIN — Medication 50 MICROGRAM(S): at 06:02

## 2022-04-28 RX ADMIN — CARVEDILOL PHOSPHATE 6.25 MILLIGRAM(S): 80 CAPSULE, EXTENDED RELEASE ORAL at 06:02

## 2022-04-28 RX ADMIN — Medication 500 MILLIGRAM(S): at 11:27

## 2022-04-28 RX ADMIN — AMIODARONE HYDROCHLORIDE 200 MILLIGRAM(S): 400 TABLET ORAL at 06:02

## 2022-04-28 RX ADMIN — ALBUTEROL 2 PUFF(S): 90 AEROSOL, METERED ORAL at 17:03

## 2022-04-28 RX ADMIN — SENNA PLUS 10 MILLILITER(S): 8.6 TABLET ORAL at 21:56

## 2022-04-28 RX ADMIN — LACTULOSE 20 GRAM(S): 10 SOLUTION ORAL at 17:31

## 2022-04-28 RX ADMIN — HEPARIN SODIUM 5000 UNIT(S): 5000 INJECTION INTRAVENOUS; SUBCUTANEOUS at 06:02

## 2022-04-28 RX ADMIN — LEVETIRACETAM 1000 MILLIGRAM(S): 250 TABLET, FILM COATED ORAL at 06:03

## 2022-04-28 RX ADMIN — ALBUTEROL 2 PUFF(S): 90 AEROSOL, METERED ORAL at 21:52

## 2022-04-28 RX ADMIN — Medication 1 PUFF(S): at 09:28

## 2022-04-28 RX ADMIN — MODAFINIL 100 MILLIGRAM(S): 200 TABLET ORAL at 06:02

## 2022-04-28 RX ADMIN — Medication 1 MILLIGRAM(S): at 11:27

## 2022-04-28 RX ADMIN — PANTOPRAZOLE SODIUM 40 MILLIGRAM(S): 20 TABLET, DELAYED RELEASE ORAL at 11:28

## 2022-04-28 RX ADMIN — Medication 1 APPLICATION(S): at 11:27

## 2022-04-28 RX ADMIN — SODIUM CHLORIDE 4 MILLILITER(S): 9 INJECTION INTRAMUSCULAR; INTRAVENOUS; SUBCUTANEOUS at 21:52

## 2022-04-28 RX ADMIN — LACTULOSE 20 GRAM(S): 10 SOLUTION ORAL at 06:02

## 2022-04-28 RX ADMIN — Medication 2 MILLIGRAM(S): at 21:55

## 2022-04-28 RX ADMIN — SODIUM CHLORIDE 4 MILLILITER(S): 9 INJECTION INTRAMUSCULAR; INTRAVENOUS; SUBCUTANEOUS at 17:05

## 2022-04-28 RX ADMIN — Medication 100 MILLIGRAM(S): at 06:02

## 2022-04-28 RX ADMIN — CHLORHEXIDINE GLUCONATE 15 MILLILITER(S): 213 SOLUTION TOPICAL at 06:01

## 2022-04-28 RX ADMIN — POLYETHYLENE GLYCOL 3350 17 GRAM(S): 17 POWDER, FOR SOLUTION ORAL at 11:27

## 2022-04-28 NOTE — PHYSICAL THERAPY INITIAL EVALUATION ADULT - LEVEL OF INDEPENDENCE: SIT/STAND, REHAB EVAL
patient unable to follow commands to meaningfully participate/unable to perform
Unable to follow commands/unable to perform

## 2022-04-28 NOTE — PROGRESS NOTE ADULT - SUBJECTIVE AND OBJECTIVE BOX
CHIEF COMPLAINT:Patient is a 76y old  Male who presents with a chief complaint of Hypoxia (27 Apr 2022 12:25)      INTERVAL EVENTS:     ROS: Seen by bedside during AM rounds     OBJECTIVE:  ICU Vital Signs Last 24 Hrs  T(C): 37.2 (28 Apr 2022 04:00), Max: 37.2 (28 Apr 2022 04:00)  T(F): 99 (28 Apr 2022 04:00), Max: 99 (28 Apr 2022 04:00)  HR: 66 (28 Apr 2022 07:17) (55 - 66)  BP: 135/81 (28 Apr 2022 04:00) (106/64 - 137/85)  BP(mean): 99 (28 Apr 2022 04:00) (93 - 99)  ABP: --  ABP(mean): --  RR: 19 (28 Apr 2022 07:17) (18 - 20)  SpO2: 97% (28 Apr 2022 07:17) (97% - 100%)    Mode: [facility]    04-27 @ 07:01  -  04-28 @ 07:00  --------------------------------------------------------  IN: 1140 mL / OUT: 1000 mL / NET: 140 mL      CAPILLARY BLOOD GLUCOSE          PHYSICAL EXAM:  General:   HEENT:   Lymph Nodes:  Neck:   Respiratory:   Cardiovascular:   Abdomen:   Extremities:   Skin:   Neurological:  Psychiatry:    Mode: [facility] MEDICATIONS:  MEDICATIONS  (STANDING):  ALBUTerol    90 MICROgram(s) HFA Inhaler 2 Puff(s) Inhalation every 6 hours  amantadine Syrup 100 milliGRAM(s) Oral two times a day  aMIOdarone    Tablet 200 milliGRAM(s) Oral daily  ascorbic acid 500 milliGRAM(s) Oral daily  carvedilol 6.25 milliGRAM(s) Oral every 12 hours  chlorhexidine 0.12% Liquid 15 milliLiter(s) Oral Mucosa every 12 hours  chlorhexidine 2% Cloths 1 Application(s) Topical daily  collagenase Ointment 1 Application(s) Topical daily  doxazosin 2 milliGRAM(s) Oral at bedtime  folic acid 1 milliGRAM(s) Oral daily  furosemide Solution 40 milliGRAM(s) Oral daily  heparin   Injectable 5000 Unit(s) SubCutaneous every 8 hours  hydrocortisone hemorrhoidal Suppository 1 Suppository(s) Rectal at bedtime  ipratropium 17 MICROgram(s) HFA Inhaler 1 Puff(s) Inhalation every 6 hours  lactulose Syrup 20 Gram(s) Oral two times a day  levETIRAcetam  Solution 1000 milliGRAM(s) Oral two times a day  levothyroxine 50 MICROGram(s) Oral daily  modafinil 100 milliGRAM(s) Oral <User Schedule>  pantoprazole   Suspension 40 milliGRAM(s) Oral daily  polyethylene glycol 3350 17 Gram(s) Oral daily  senna Syrup 10 milliLiter(s) Oral at bedtime  sodium chloride 3%  Inhalation 4 milliLiter(s) Inhalation every 6 hours    MEDICATIONS  (PRN):  acetaminophen    Suspension .. 650 milliGRAM(s) Oral every 4 hours PRN Temp greater or equal to 38C (100.4F), Mild Pain (1 - 3)      LABS:                        8.7    7.62  )-----------( 203      ( 28 Apr 2022 05:40 )             27.8     04-28    138  |  99  |  38<H>  ----------------------------<  115<H>  4.0   |  29  |  0.97    Ca    9.2      28 Apr 2022 05:40  Phos  3.2     04-28  Mg     2.20     04-28            Venous Blood Gas:  04-28 @ 07:35  7.45/49/104/34/99.0  VBG Lactate: 1.7   CHIEF COMPLAINT:Patient is a 76y old  Male who presents with a chief complaint of Hypoxia (27 Apr 2022 12:25)    INTERVAL EVENTS: no overnight events reported. FiO2 lowered to 35% togday.    ROS: Seen by bedside during AM rounds     OBJECTIVE:  ICU Vital Signs Last 24 Hrs  T(C): 37.2 (28 Apr 2022 04:00), Max: 37.2 (28 Apr 2022 04:00)  T(F): 99 (28 Apr 2022 04:00), Max: 99 (28 Apr 2022 04:00)  HR: 66 (28 Apr 2022 07:17) (55 - 66)  BP: 135/81 (28 Apr 2022 04:00) (106/64 - 137/85)  BP(mean): 99 (28 Apr 2022 04:00) (93 - 99)  ABP: --  ABP(mean): --  RR: 19 (28 Apr 2022 07:17) (18 - 20)  SpO2: 97% (28 Apr 2022 07:17) (97% - 100%)    Mode: [facility]    04-27 @ 07:01  -  04-28 @ 07:00  --------------------------------------------------------  IN: 1140 mL / OUT: 1000 mL / NET: 140 mL      CAPILLARY BLOOD GLUCOSE          PHYSICAL EXAM:  General: NAD   Neck: (+) Trach tube noted, site c/d/i.  Cards: S1/S2, no murmurs   Pulm: CTA bilaterally. No wheezes.   Abdomen: Soft, NTND. (+) PEG noted, site c/d/i and no overt bleeding.   Extremities: No pedal edema. Extremities warm to touch.  Neurology: Awake/alert. Nonverbal. Not following commands.   Skin: refer to RN assessment.     Mode: [facility] MEDICATIONS:  MEDICATIONS  (STANDING):  ALBUTerol    90 MICROgram(s) HFA Inhaler 2 Puff(s) Inhalation every 6 hours  amantadine Syrup 100 milliGRAM(s) Oral two times a day  aMIOdarone    Tablet 200 milliGRAM(s) Oral daily  ascorbic acid 500 milliGRAM(s) Oral daily  carvedilol 6.25 milliGRAM(s) Oral every 12 hours  chlorhexidine 0.12% Liquid 15 milliLiter(s) Oral Mucosa every 12 hours  chlorhexidine 2% Cloths 1 Application(s) Topical daily  collagenase Ointment 1 Application(s) Topical daily  doxazosin 2 milliGRAM(s) Oral at bedtime  folic acid 1 milliGRAM(s) Oral daily  furosemide Solution 40 milliGRAM(s) Oral daily  heparin   Injectable 5000 Unit(s) SubCutaneous every 8 hours  hydrocortisone hemorrhoidal Suppository 1 Suppository(s) Rectal at bedtime  ipratropium 17 MICROgram(s) HFA Inhaler 1 Puff(s) Inhalation every 6 hours  lactulose Syrup 20 Gram(s) Oral two times a day  levETIRAcetam  Solution 1000 milliGRAM(s) Oral two times a day  levothyroxine 50 MICROGram(s) Oral daily  modafinil 100 milliGRAM(s) Oral <User Schedule>  pantoprazole   Suspension 40 milliGRAM(s) Oral daily  polyethylene glycol 3350 17 Gram(s) Oral daily  senna Syrup 10 milliLiter(s) Oral at bedtime  sodium chloride 3%  Inhalation 4 milliLiter(s) Inhalation every 6 hours    MEDICATIONS  (PRN):  acetaminophen    Suspension .. 650 milliGRAM(s) Oral every 4 hours PRN Temp greater or equal to 38C (100.4F), Mild Pain (1 - 3)      LABS:                        8.7    7.62  )-----------( 203      ( 28 Apr 2022 05:40 )             27.8     04-28    138  |  99  |  38<H>  ----------------------------<  115<H>  4.0   |  29  |  0.97    Ca    9.2      28 Apr 2022 05:40  Phos  3.2     04-28  Mg     2.20     04-28            Venous Blood Gas:  04-28 @ 07:35  7.45/49/104/34/99.0  VBG Lactate: 1.7

## 2022-04-28 NOTE — PROGRESS NOTE ADULT - ASSESSMENT
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 Now DISPO planning.    # Neurology   - Currently A&Ox0, awake and alert, able to move RUE and nods/ mouths one or two words per RCU evaluation and prior documentation.   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   - Continue on Modafinil, Amantidine and Keppra.     # Cardiovascular   - Hx of HFpEF 55, mild MR and AR, severe LAD, normal LVRVSF (ECHO 3/7)  - Hx of Atrial Fibrillation s/p Watchman and currently rate controlled.   - Continue on Amiodarone 200mg QD and Coreg 6.25mg BID.   - Continue on Lasix 40mg QD and monitor tolerance.     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   - Able to tolerate TC (from 4/15 during the day) and now ATC (from 4/21).   - Continue on Proventil, Atrovent and Chest PT Q6H, and c/w Hypersal to loosen secretions     # GI  - Hx of SDH, Gastric Bypass and Dysphagia s/p PEG and continued on TF with course complicated by constipation and melena  - Case discussed with GI and melena likely in setting of constipation, however no plan for scope currently and will medically treat with PPI.   - Monitor BMs on Miralax, Senna and Lactulose.   - Continue on Nepro in sight of hyperkalemia.   - s/p large BM on 4/25  - c/w Reglan 10mg TID (4/24 - until 4/27)    # / Renal  - Hx of Urinary Retention with Chronic Garcia and presented FLORIAN likely in setting of dehydration with fevers vs ATN with Sepsis (CRE high 2s and baseline 0.8-0.9).   - UA with hematuria and proteinuria and case discussed with Neprhology with concern for glomerulonephritis. ANCA, ISABELLE and GM ABs negative.   - Hypernatremia noted and continued on  Q6H as tolerated.   - Hyperkalemia and s/p cocktail with improvement (4/19).   - IVF and PRBC given with CRE improving and now resumed on Lasix with improvement.     # ID  - Recent admission for  Acinetobacter and Pseudomonas HCAP (3/2022)   - SCx (4/10) with  Acinetobacter and Pseudomonas.   - s/p completed Meropenem (4/8-4/14) and will monitor off ABX.     # Endocrine  - No hx of DM2 and A1C 5.9. Continue to monitor BG,    - Hx of Hypothyroidism and continued on Synthroid. TSH 47 with low T3/T4 and Free T4. TSH 80s (3/2022) and Synthroid increased at NH. Hypothyroidism could be in the setting of Amiodarone use and current Synthroid dose appears to be working.   - Next TFT to be done in June 2022.     # Hematology   - Anemia i/s/o AOCD and s/p PRBC (4/9)   - Melena/ anemia noted and s/p 2 U PRBC (4/19)  with good response, however HH waxes and wanes with no obvious bleed (no further melena or CGE from PEG aspiration)  - DVT PPX with HSQ (cleared by GI to resume).     # Ethics   - FULL CODE   - Case discussed with Cousin/ HCP, Dr. Donna Hagen (Pediatric Neurologist, [phone]) and updated daily. PM&R reeval pending.     # DISPO - Planning to go back to NH with improvement in renal function/ electrolytes. 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 Now DISPO planning.    # Neurology   - Currently A&Ox0, awake and alert, able to move RUE and nods/ mouths one or two words per RCU evaluation and prior documentation.   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   - Continue on Modafinil, Amantidine and Keppra.       #HEENT  - Large firm mass along left neck noted 4/28  - CT Neck w/IV contrast to better evaluate    # Cardiovascular   - Hx of HFpEF 55, mild MR and AR, severe LAD, normal LVRVSF (ECHO 3/7)  - Hx of Atrial Fibrillation s/p Watchman and currently rate controlled.   - Continue on Amiodarone 200mg QD and Coreg 6.25mg BID.   - Continue on Lasix 40mg QD and monitor tolerance.     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   - Able to tolerate TC (from 4/15 during the day) and now ATC (from 4/21).   - Continue on Proventil, Atrovent and Chest PT Q6H, and c/w Hypersal to loosen secretions     # GI  - Hx of SDH, Gastric Bypass and Dysphagia s/p PEG and continued on TF with course complicated by constipation and melena  - Case discussed with GI and melena likely in setting of constipation, however no plan for scope currently and will medically treat with PPI.   - Monitor BMs on Miralax, Senna and Lactulose.   - Continue on Nepro in sight of hyperkalemia.   - s/p large BM on 4/25  - s/p trial of Reglan 10mg TID (4/24 - until 4/27)    # / Renal  - Hx of Urinary Retention with Chronic Garcia and presented FLORIAN likely in setting of dehydration with fevers vs ATN with Sepsis (CRE high 2s and baseline 0.8-0.9).   - UA with hematuria and proteinuria and case discussed with Neprhology with concern for glomerulonephritis. ANCA, ISABELLE and GM ABs negative.   - Hypernatremia noted and continued on  Q6H as tolerated.   - Hyperkalemia and s/p cocktail with improvement (4/19).   - IVF and PRBC given with CRE improving and now resumed on Lasix with improvement.     # ID  - Recent admission for  Acinetobacter and Pseudomonas HCAP (3/2022)   - SCx (4/10) with  Acinetobacter and Pseudomonas.   - s/p completed Meropenem (4/8-4/14) and will monitor off ABX.     # Endocrine  - No hx of DM2 and A1C 5.9. Continue to monitor BG,    - Hx of Hypothyroidism and continued on Synthroid. TSH 47 with low T3/T4 and Free T4. TSH 80s (3/2022) and Synthroid increased at NH. Hypothyroidism could be in the setting of Amiodarone use and current Synthroid dose appears to be working.   - Next TFT to be done in June 2022.     # Hematology   - Anemia i/s/o AOCD and s/p PRBC (4/9)   - Melena/ anemia noted and s/p 2 U PRBC (4/19)  with good response, however HH waxes and wanes with no obvious bleed (no further melena or CGE from PEG aspiration)  - DVT PPX with HSQ (cleared by GI to resume).     # Ethics   - FULL CODE   - Case discussed with Cousin/ HCP, Dr. Donna Hagen (Pediatric Neurologist, [phone]) and updated daily. PM&R reeval pending.     # DISPO - Planning to go back to NH with improvement in renal function/ electrolytes. Family requesting [facility] and SW aware

## 2022-04-28 NOTE — PROGRESS NOTE ADULT - SUBJECTIVE AND OBJECTIVE BOX
INTERVAL HPI/OVERNIGHT EVENTS:    no rectal bleeding   brown stool this am     MEDICATIONS  (STANDING):  ALBUTerol    90 MICROgram(s) HFA Inhaler 2 Puff(s) Inhalation every 6 hours  amantadine Syrup 100 milliGRAM(s) Oral two times a day  aMIOdarone    Tablet 200 milliGRAM(s) Oral daily  ascorbic acid 500 milliGRAM(s) Oral daily  carvedilol 6.25 milliGRAM(s) Oral every 12 hours  chlorhexidine 0.12% Liquid 15 milliLiter(s) Oral Mucosa every 12 hours  chlorhexidine 2% Cloths 1 Application(s) Topical daily  collagenase Ointment 1 Application(s) Topical daily  doxazosin 2 milliGRAM(s) Oral at bedtime  folic acid 1 milliGRAM(s) Oral daily  furosemide Solution 40 milliGRAM(s) Oral daily  heparin   Injectable 5000 Unit(s) SubCutaneous every 8 hours  hydrocortisone hemorrhoidal Suppository 1 Suppository(s) Rectal at bedtime  ipratropium 17 MICROgram(s) HFA Inhaler 1 Puff(s) Inhalation every 6 hours  lactulose Syrup 20 Gram(s) Oral two times a day  levETIRAcetam  Solution 1000 milliGRAM(s) Oral two times a day  levothyroxine 50 MICROGram(s) Oral daily  modafinil 100 milliGRAM(s) Oral <User Schedule>  pantoprazole   Suspension 40 milliGRAM(s) Oral daily  polyethylene glycol 3350 17 Gram(s) Oral daily  senna Syrup 10 milliLiter(s) Oral at bedtime  sodium chloride 3%  Inhalation 4 milliLiter(s) Inhalation every 6 hours    MEDICATIONS  (PRN):  acetaminophen    Suspension .. 650 milliGRAM(s) Oral every 4 hours PRN Temp greater or equal to 38C (100.4F), Mild Pain (1 - 3)      Allergies    No Known Allergies    Intolerances        Review of Systems: *pt minimally verbal to nonverbal, unable to obtain ROS           Vital Signs Last 24 Hrs  T(C): 37.2 (28 Apr 2022 04:00), Max: 37.2 (28 Apr 2022 04:00)  T(F): 99 (28 Apr 2022 04:00), Max: 99 (28 Apr 2022 04:00)  HR: 60 (28 Apr 2022 09:30) (55 - 66)  BP: 135/81 (28 Apr 2022 04:00) (106/64 - 137/85)  BP(mean): 99 (28 Apr 2022 04:00) (93 - 99)  RR: 19 (28 Apr 2022 07:17) (18 - 20)  SpO2: 98% (28 Apr 2022 09:30) (97% - 100%)    PHYSICAL EXAM:    Constitutional: NAD  HEENT: EOMI, throat clear  Neck: No LAD, supple +trach  Respiratory: CTA and P  Cardiovascular: S1 and S2, RRR, no M  Gastrointestinal: BS+, soft, NT/ND, neg HSM, +peg  Extremities: No peripheral edema, neg clubbing, cyanosis  Vascular: 2+ peripheral pulses  Neurological: A/O x 1  Psychiatric: lethargic  Skin: No rashes      LABS:                        8.7    7.62  )-----------( 203      ( 28 Apr 2022 05:40 )             27.8     04-28    138  |  99  |  38<H>  ----------------------------<  115<H>  4.0   |  29  |  0.97    Ca    9.2      28 Apr 2022 05:40  Phos  3.2     04-28  Mg     2.20     04-28            RADIOLOGY & ADDITIONAL TESTS:

## 2022-04-28 NOTE — CHART NOTE - NSCHARTNOTEFT_GEN_A_CORE
Patient last seen by RDN on 4/19, now for nutrition follow up. Spoke with RN and obtained subjective information from extensive chart review.     Current Diet : Diet, NPO with Tube Feed:   Tube Feeding Modality: Gastrostomy  Nepro with Carb Steady (NEPRORTH)  Total Volume for 24 Hours (mL): 1080  Continuous  Starting Tube Feed Rate {mL per Hour}: 25  Increase Tube Feed Rate by (mL): 10     Every 4 hours  Until Goal Tube Feed Rate (mL per Hour): 45  Tube Feed Duration (in Hours): 24  Tube Feed Start Time: 15:00  No Carb Prosource (1pkg = 15gms Protein)     Qty per Day:  2 (04-19-22 @ 14:40)     TF Provides:    1080mL total volume    1944 kcals    87g protein (+30 g additional protein via NoCarb Prosource)= 117g total protein     785mL free water     Weight:                                Height: 68"                    Ideal Body Weight: 154lbs / 70kg   74.9kg (4/24)  81.6kg (4/17)  81.4kg (4/9)    Nutrition Interval Events: Pt continues on TF as ordered. Per GI, PEG was lavaged with TF + bilious fluid aspirated which is related to episodes of constipation. Specific bowel regimen ordered for management and prevention of constipation. Pt continues with unstageable sacral pressure injury which remains stable. Enteral regimen provides 26 kcals/kg and 1.4 gms protein/kg of most current weight (74.9 kg). Weight trend likely reflective of resolving edema now 2+ L/R hands. Continue with TF as ordered. RDN services to remain available as needed.       __________________ Pertinent Medications__________________   MEDICATIONS  (STANDING):  ALBUTerol    90 MICROgram(s) HFA Inhaler 2 Puff(s) Inhalation every 6 hours  amantadine Syrup 100 milliGRAM(s) Oral two times a day  aMIOdarone    Tablet 200 milliGRAM(s) Oral daily  ascorbic acid 500 milliGRAM(s) Oral daily  carvedilol 6.25 milliGRAM(s) Oral every 12 hours  chlorhexidine 0.12% Liquid 15 milliLiter(s) Oral Mucosa every 12 hours  chlorhexidine 2% Cloths 1 Application(s) Topical daily  collagenase Ointment 1 Application(s) Topical daily  doxazosin 2 milliGRAM(s) Oral at bedtime  folic acid 1 milliGRAM(s) Oral daily  furosemide Solution 40 milliGRAM(s) Oral daily  heparin   Injectable 5000 Unit(s) SubCutaneous every 8 hours  hydrocortisone hemorrhoidal Suppository 1 Suppository(s) Rectal at bedtime  ipratropium 17 MICROgram(s) HFA Inhaler 1 Puff(s) Inhalation every 6 hours  lactulose Syrup 20 Gram(s) Oral two times a day  levETIRAcetam  Solution 1000 milliGRAM(s) Oral two times a day  levothyroxine 50 MICROGram(s) Oral daily  modafinil 100 milliGRAM(s) Oral <User Schedule>  pantoprazole   Suspension 40 milliGRAM(s) Oral daily  polyethylene glycol 3350 17 Gram(s) Oral daily  senna Syrup 10 milliLiter(s) Oral at bedtime  sodium chloride 3%  Inhalation 4 milliLiter(s) Inhalation every 6 hours    MEDICATIONS  (PRN):  acetaminophen    Suspension .. 650 milliGRAM(s) Oral every 4 hours PRN Temp greater or equal to 38C (100.4F), Mild Pain (1 - 3)      __________________ Pertinent Labs__________________   04-28 Na138 mmol/L Glu 115 mg/dL<H> K+ 4.0 mmol/L Cr  0.97 mg/dL BUN 38 mg/dL<H> 04-28 Phos 3.2 mg/dL      Estimated Needs:     25-30 kcals/kg IBW= 3616-5059 kcals/day  1.4-1.8g protein/kg IBW= 98-126g protein/day      Previous Nutrition Diagnosis:     Increased Nutrient Needs    Nutrition Diagnosis is [x] ongoing          Goal(s):  1. Patient to meet > 75% estimated energy needs    Recommendations:   1. Continue nutrition plan of care as ordered.    Monitoring and Evaluation:   1. Monitor weights, labs, BMs, skin integrity, enteral tolerance and edema.  2. RD services to remain available. Request obtaining new weight to assess trend and determine adequacy of TF.

## 2022-04-28 NOTE — PROGRESS NOTE ADULT - ASSESSMENT
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 GI consulted for anemia     Anemia   PEG flushed/lavaged x2; feeds and bilious fluid aspirated, No blood products noted  cbc stable  Protonix 40mg via peg  occult likely d/t constipated stool/irritated hemorrhoid; stools brown  bowel regimen and give anusol suppository qhs as needed    Constipation, Slow Transit   senna syrup and miralax  milk of mg and enemas as needed  if abd distention, would rec to avoid lactulose     HCAP   per RCU/ID       I reviewed the overnight course of events on the unit, re-confirming the patient history. I discussed the care with the patient and their family. The plan of care was discussed with the physician assistant and modifications were made to the notation where appropriate. Differential diagnosis and plan of care discussed with patient after the evaluation. Advanced care planning was discussed with patient and family.  Advanced care planning forms were reviewed and discussed.  Risks, benefits and alternatives of gastroenterologic procedures were discussed in detail and all questions were answered. 35 minutes spent on total encounter of which more than fifty percent of the encounter was spent counseling and/or coordinating care by the attending physician.

## 2022-04-28 NOTE — PHYSICAL THERAPY INITIAL EVALUATION ADULT - LEVEL OF INDEPENDENCE, REHAB EVAL
dependent (less than 25% patients effort)
dependent (less than 25% patients effort)
Unable to follow commands/unable to perform

## 2022-04-28 NOTE — PROGRESS NOTE ADULT - NS ATTEND AMEND GEN_ALL_CORE FT
Stable on Trach Collar  Secretions less today  CT neck ordered for evaluation of new neck swelling  d/c planning - ELIZABETH

## 2022-04-29 LAB
-  AMIKACIN: SIGNIFICANT CHANGE UP
-  AMIKACIN: SIGNIFICANT CHANGE UP
-  AZTREONAM: SIGNIFICANT CHANGE UP
-  AZTREONAM: SIGNIFICANT CHANGE UP
-  CEFEPIME: SIGNIFICANT CHANGE UP
-  CEFEPIME: SIGNIFICANT CHANGE UP
-  CEFTAZIDIME: SIGNIFICANT CHANGE UP
-  CEFTAZIDIME: SIGNIFICANT CHANGE UP
-  CIPROFLOXACIN: SIGNIFICANT CHANGE UP
-  CIPROFLOXACIN: SIGNIFICANT CHANGE UP
-  GENTAMICIN: SIGNIFICANT CHANGE UP
-  GENTAMICIN: SIGNIFICANT CHANGE UP
-  IMIPENEM: SIGNIFICANT CHANGE UP
-  IMIPENEM: SIGNIFICANT CHANGE UP
-  LEVOFLOXACIN: SIGNIFICANT CHANGE UP
-  LEVOFLOXACIN: SIGNIFICANT CHANGE UP
-  MEROPENEM: SIGNIFICANT CHANGE UP
-  MEROPENEM: SIGNIFICANT CHANGE UP
-  PIPERACILLIN/TAZOBACTAM: SIGNIFICANT CHANGE UP
-  PIPERACILLIN/TAZOBACTAM: SIGNIFICANT CHANGE UP
-  TOBRAMYCIN: SIGNIFICANT CHANGE UP
-  TOBRAMYCIN: SIGNIFICANT CHANGE UP
ANION GAP SERPL CALC-SCNC: 13 MMOL/L — SIGNIFICANT CHANGE UP (ref 7–14)
BASOPHILS # BLD AUTO: 0.05 K/UL — SIGNIFICANT CHANGE UP (ref 0–0.2)
BASOPHILS NFR BLD AUTO: 0.6 % — SIGNIFICANT CHANGE UP (ref 0–2)
BUN SERPL-MCNC: 39 MG/DL — HIGH (ref 7–23)
CALCIUM SERPL-MCNC: 9.4 MG/DL — SIGNIFICANT CHANGE UP (ref 8.4–10.5)
CHLORIDE SERPL-SCNC: 96 MMOL/L — LOW (ref 98–107)
CO2 SERPL-SCNC: 28 MMOL/L — SIGNIFICANT CHANGE UP (ref 22–31)
CREAT SERPL-MCNC: 0.93 MG/DL — SIGNIFICANT CHANGE UP (ref 0.5–1.3)
CULTURE RESULTS: SIGNIFICANT CHANGE UP
EGFR: 86 ML/MIN/1.73M2 — SIGNIFICANT CHANGE UP
EOSINOPHIL # BLD AUTO: 0.11 K/UL — SIGNIFICANT CHANGE UP (ref 0–0.5)
EOSINOPHIL NFR BLD AUTO: 1.4 % — SIGNIFICANT CHANGE UP (ref 0–6)
GLUCOSE SERPL-MCNC: 112 MG/DL — HIGH (ref 70–99)
HCT VFR BLD CALC: 31.5 % — LOW (ref 39–50)
HGB BLD-MCNC: 9.8 G/DL — LOW (ref 13–17)
IANC: 5.8 K/UL — SIGNIFICANT CHANGE UP (ref 1.8–7.4)
IMM GRANULOCYTES NFR BLD AUTO: 0.3 % — SIGNIFICANT CHANGE UP (ref 0–1.5)
LYMPHOCYTES # BLD AUTO: 1.37 K/UL — SIGNIFICANT CHANGE UP (ref 1–3.3)
LYMPHOCYTES # BLD AUTO: 17.7 % — SIGNIFICANT CHANGE UP (ref 13–44)
MAGNESIUM SERPL-MCNC: 2.2 MG/DL — SIGNIFICANT CHANGE UP (ref 1.6–2.6)
MCHC RBC-ENTMCNC: 31.1 GM/DL — LOW (ref 32–36)
MCHC RBC-ENTMCNC: 31.5 PG — SIGNIFICANT CHANGE UP (ref 27–34)
MCV RBC AUTO: 101.3 FL — HIGH (ref 80–100)
METHOD TYPE: SIGNIFICANT CHANGE UP
METHOD TYPE: SIGNIFICANT CHANGE UP
MONOCYTES # BLD AUTO: 0.37 K/UL — SIGNIFICANT CHANGE UP (ref 0–0.9)
MONOCYTES NFR BLD AUTO: 4.8 % — SIGNIFICANT CHANGE UP (ref 2–14)
NEUTROPHILS # BLD AUTO: 5.8 K/UL — SIGNIFICANT CHANGE UP (ref 1.8–7.4)
NEUTROPHILS NFR BLD AUTO: 75.2 % — SIGNIFICANT CHANGE UP (ref 43–77)
NRBC # BLD: 0 /100 WBCS — SIGNIFICANT CHANGE UP
NRBC # FLD: 0 K/UL — SIGNIFICANT CHANGE UP
ORGANISM # SPEC MICROSCOPIC CNT: SIGNIFICANT CHANGE UP
PHOSPHATE SERPL-MCNC: 3.1 MG/DL — SIGNIFICANT CHANGE UP (ref 2.5–4.5)
PLATELET # BLD AUTO: 234 K/UL — SIGNIFICANT CHANGE UP (ref 150–400)
POTASSIUM SERPL-MCNC: 3.9 MMOL/L — SIGNIFICANT CHANGE UP (ref 3.5–5.3)
POTASSIUM SERPL-SCNC: 3.9 MMOL/L — SIGNIFICANT CHANGE UP (ref 3.5–5.3)
RBC # BLD: 3.11 M/UL — LOW (ref 4.2–5.8)
RBC # FLD: 17.9 % — HIGH (ref 10.3–14.5)
SODIUM SERPL-SCNC: 137 MMOL/L — SIGNIFICANT CHANGE UP (ref 135–145)
SPECIMEN SOURCE: SIGNIFICANT CHANGE UP
WBC # BLD: 7.72 K/UL — SIGNIFICANT CHANGE UP (ref 3.8–10.5)
WBC # FLD AUTO: 7.72 K/UL — SIGNIFICANT CHANGE UP (ref 3.8–10.5)

## 2022-04-29 PROCEDURE — 99233 SBSQ HOSP IP/OBS HIGH 50: CPT | Mod: GC

## 2022-04-29 RX ADMIN — CHLORHEXIDINE GLUCONATE 15 MILLILITER(S): 213 SOLUTION TOPICAL at 05:38

## 2022-04-29 RX ADMIN — SENNA PLUS 10 MILLILITER(S): 8.6 TABLET ORAL at 22:17

## 2022-04-29 RX ADMIN — ALBUTEROL 2 PUFF(S): 90 AEROSOL, METERED ORAL at 09:52

## 2022-04-29 RX ADMIN — LEVETIRACETAM 1000 MILLIGRAM(S): 250 TABLET, FILM COATED ORAL at 18:03

## 2022-04-29 RX ADMIN — HEPARIN SODIUM 5000 UNIT(S): 5000 INJECTION INTRAVENOUS; SUBCUTANEOUS at 22:06

## 2022-04-29 RX ADMIN — MODAFINIL 100 MILLIGRAM(S): 200 TABLET ORAL at 05:36

## 2022-04-29 RX ADMIN — ALBUTEROL 2 PUFF(S): 90 AEROSOL, METERED ORAL at 23:18

## 2022-04-29 RX ADMIN — CHLORHEXIDINE GLUCONATE 15 MILLILITER(S): 213 SOLUTION TOPICAL at 18:03

## 2022-04-29 RX ADMIN — Medication 50 MICROGRAM(S): at 05:37

## 2022-04-29 RX ADMIN — POLYETHYLENE GLYCOL 3350 17 GRAM(S): 17 POWDER, FOR SOLUTION ORAL at 12:01

## 2022-04-29 RX ADMIN — HEPARIN SODIUM 5000 UNIT(S): 5000 INJECTION INTRAVENOUS; SUBCUTANEOUS at 05:37

## 2022-04-29 RX ADMIN — SODIUM CHLORIDE 4 MILLILITER(S): 9 INJECTION INTRAMUSCULAR; INTRAVENOUS; SUBCUTANEOUS at 23:17

## 2022-04-29 RX ADMIN — Medication 1 PUFF(S): at 09:52

## 2022-04-29 RX ADMIN — Medication 2 MILLIGRAM(S): at 22:06

## 2022-04-29 RX ADMIN — SODIUM CHLORIDE 4 MILLILITER(S): 9 INJECTION INTRAMUSCULAR; INTRAVENOUS; SUBCUTANEOUS at 16:12

## 2022-04-29 RX ADMIN — CARVEDILOL PHOSPHATE 6.25 MILLIGRAM(S): 80 CAPSULE, EXTENDED RELEASE ORAL at 05:37

## 2022-04-29 RX ADMIN — Medication 1 SUPPOSITORY(S): at 22:07

## 2022-04-29 RX ADMIN — Medication 1 PUFF(S): at 04:29

## 2022-04-29 RX ADMIN — Medication 100 MILLIGRAM(S): at 05:38

## 2022-04-29 RX ADMIN — Medication 1 PUFF(S): at 16:12

## 2022-04-29 RX ADMIN — Medication 1 MILLIGRAM(S): at 12:01

## 2022-04-29 RX ADMIN — ALBUTEROL 2 PUFF(S): 90 AEROSOL, METERED ORAL at 16:12

## 2022-04-29 RX ADMIN — Medication 1 PUFF(S): at 23:18

## 2022-04-29 RX ADMIN — Medication 40 MILLIGRAM(S): at 05:37

## 2022-04-29 RX ADMIN — Medication 1 APPLICATION(S): at 12:01

## 2022-04-29 RX ADMIN — CHLORHEXIDINE GLUCONATE 1 APPLICATION(S): 213 SOLUTION TOPICAL at 11:58

## 2022-04-29 RX ADMIN — PANTOPRAZOLE SODIUM 40 MILLIGRAM(S): 20 TABLET, DELAYED RELEASE ORAL at 12:01

## 2022-04-29 RX ADMIN — HEPARIN SODIUM 5000 UNIT(S): 5000 INJECTION INTRAVENOUS; SUBCUTANEOUS at 13:20

## 2022-04-29 RX ADMIN — SODIUM CHLORIDE 4 MILLILITER(S): 9 INJECTION INTRAMUSCULAR; INTRAVENOUS; SUBCUTANEOUS at 09:51

## 2022-04-29 RX ADMIN — Medication 100 MILLIGRAM(S): at 18:05

## 2022-04-29 RX ADMIN — LACTULOSE 20 GRAM(S): 10 SOLUTION ORAL at 05:36

## 2022-04-29 RX ADMIN — AMIODARONE HYDROCHLORIDE 200 MILLIGRAM(S): 400 TABLET ORAL at 05:38

## 2022-04-29 RX ADMIN — LEVETIRACETAM 1000 MILLIGRAM(S): 250 TABLET, FILM COATED ORAL at 05:37

## 2022-04-29 RX ADMIN — SODIUM CHLORIDE 4 MILLILITER(S): 9 INJECTION INTRAMUSCULAR; INTRAVENOUS; SUBCUTANEOUS at 04:32

## 2022-04-29 RX ADMIN — ALBUTEROL 2 PUFF(S): 90 AEROSOL, METERED ORAL at 04:29

## 2022-04-29 RX ADMIN — Medication 500 MILLIGRAM(S): at 12:01

## 2022-04-29 RX ADMIN — CARVEDILOL PHOSPHATE 6.25 MILLIGRAM(S): 80 CAPSULE, EXTENDED RELEASE ORAL at 18:04

## 2022-04-29 NOTE — PROGRESS NOTE ADULT - SUBJECTIVE AND OBJECTIVE BOX
INTERVAL HPI/OVERNIGHT EVENTS:    no rectal bleeding   brown stool this am     MEDICATIONS  (STANDING):  ALBUTerol    90 MICROgram(s) HFA Inhaler 2 Puff(s) Inhalation every 6 hours  amantadine Syrup 100 milliGRAM(s) Oral two times a day  aMIOdarone    Tablet 200 milliGRAM(s) Oral daily  ascorbic acid 500 milliGRAM(s) Oral daily  carvedilol 6.25 milliGRAM(s) Oral every 12 hours  chlorhexidine 0.12% Liquid 15 milliLiter(s) Oral Mucosa every 12 hours  chlorhexidine 2% Cloths 1 Application(s) Topical daily  collagenase Ointment 1 Application(s) Topical daily  doxazosin 2 milliGRAM(s) Oral at bedtime  folic acid 1 milliGRAM(s) Oral daily  furosemide Solution 40 milliGRAM(s) Oral daily  heparin   Injectable 5000 Unit(s) SubCutaneous every 8 hours  hydrocortisone hemorrhoidal Suppository 1 Suppository(s) Rectal at bedtime  ipratropium 17 MICROgram(s) HFA Inhaler 1 Puff(s) Inhalation every 6 hours  lactulose Syrup 20 Gram(s) Oral two times a day  levETIRAcetam  Solution 1000 milliGRAM(s) Oral two times a day  levothyroxine 50 MICROGram(s) Oral daily  modafinil 100 milliGRAM(s) Oral <User Schedule>  pantoprazole   Suspension 40 milliGRAM(s) Oral daily  polyethylene glycol 3350 17 Gram(s) Oral daily  senna Syrup 10 milliLiter(s) Oral at bedtime  sodium chloride 3%  Inhalation 4 milliLiter(s) Inhalation every 6 hours    MEDICATIONS  (PRN):  acetaminophen    Suspension .. 650 milliGRAM(s) Oral every 4 hours PRN Temp greater or equal to 38C (100.4F), Mild Pain (1 - 3)      Allergies    No Known Allergies    Intolerances        Review of Systems: *pt minimally verbal to nonverbal, unable to obtain ROS     Vital Signs:   Vital Signs Last 24 Hrs  T(C): 36.6 (28 Apr 2022 22:42), Max: 36.8 (28 Apr 2022 13:00)  T(F): 97.9 (28 Apr 2022 22:42), Max: 98.3 (28 Apr 2022 13:00)  HR: 61 (29 Apr 2022 11:04) (59 - 82)  BP: 137/73 (28 Apr 2022 22:42) (119/81 - 151/84)  BP(mean): --  RR: 18 (28 Apr 2022 22:42) (18 - 18)  SpO2: 96% (29 Apr 2022 11:04) (96% - 100%)  Daily     Daily         PHYSICAL EXAM:    Constitutional: NAD  HEENT: EOMI, throat clear  Neck: No LAD, supple +trach  Respiratory: CTA and P  Cardiovascular: S1 and S2, RRR, no M  Gastrointestinal: BS+, soft, NT/ND, neg HSM, +peg  Extremities: No peripheral edema, neg clubbing, cyanosis  Vascular: 2+ peripheral pulses  Neurological: A/O x 1  Psychiatric: lethargic  Skin: No rashes      LABS:                        9.8    7.72  )-----------( 234      ( 29 Apr 2022 07:38 )             31.5     04-29    137  |  96<L>  |  39<H>  ----------------------------<  112<H>  3.9   |  28  |  0.93    Ca    9.4      29 Apr 2022 07:38  Phos  3.1     04-29  Mg     2.20     04-29                  RADIOLOGY & ADDITIONAL TESTS:

## 2022-04-29 NOTE — PROGRESS NOTE ADULT - NS ATTEND AMEND GEN_ALL_CORE FT
hemodyn stable, afebrile  stable on TC  suction about every 4 hours  firm left mandib, nontender. awaiting CT neck --   If negative, can be d/c today

## 2022-04-29 NOTE — PROGRESS NOTE ADULT - SUBJECTIVE AND OBJECTIVE BOX
CHIEF COMPLAINT: Patient is a 76y old  Male who presents with a chief complaint of Hypoxia (28 Apr 2022 12:57)      INTERVAL EVENTS:     ROS: Seen by bedside during AM rounds     OBJECTIVE:  ICU Vital Signs Last 24 Hrs  T(C): 36.6 (28 Apr 2022 22:42), Max: 36.8 (28 Apr 2022 13:00)  T(F): 97.9 (28 Apr 2022 22:42), Max: 98.3 (28 Apr 2022 13:00)  HR: 63 (29 Apr 2022 03:27) (60 - 82)  BP: 137/73 (28 Apr 2022 22:42) (119/81 - 151/84)  BP(mean): --  ABP: --  ABP(mean): --  RR: 18 (28 Apr 2022 22:42) (18 - 19)  SpO2: 97% (29 Apr 2022 03:27) (97% - 99%)    Mode: [facility]    04-27 @ 07:01 - 04-28 @ 07:00  --------------------------------------------------------  IN: 1140 mL / OUT: 1000 mL / NET: 140 mL    04-28 @ 07:01 - 04-29 @ 06:57  --------------------------------------------------------  IN: 1140 mL / OUT: 2650 mL / NET: -1510 mL      CAPILLARY BLOOD GLUCOSE          PHYSICAL EXAM:  General:   HEENT:   Lymph Nodes:  Neck:   Respiratory:   Cardiovascular:   Abdomen:   Extremities:   Skin:   Neurological:  Psychiatry:    Mode: [facility] MEDICATIONS:  MEDICATIONS  (STANDING):  ALBUTerol    90 MICROgram(s) HFA Inhaler 2 Puff(s) Inhalation every 6 hours  amantadine Syrup 100 milliGRAM(s) Oral two times a day  aMIOdarone    Tablet 200 milliGRAM(s) Oral daily  ascorbic acid 500 milliGRAM(s) Oral daily  carvedilol 6.25 milliGRAM(s) Oral every 12 hours  chlorhexidine 0.12% Liquid 15 milliLiter(s) Oral Mucosa every 12 hours  chlorhexidine 2% Cloths 1 Application(s) Topical daily  collagenase Ointment 1 Application(s) Topical daily  doxazosin 2 milliGRAM(s) Oral at bedtime  folic acid 1 milliGRAM(s) Oral daily  furosemide Solution 40 milliGRAM(s) Oral daily  heparin   Injectable 5000 Unit(s) SubCutaneous every 8 hours  hydrocortisone hemorrhoidal Suppository 1 Suppository(s) Rectal at bedtime  ipratropium 17 MICROgram(s) HFA Inhaler 1 Puff(s) Inhalation every 6 hours  lactulose Syrup 20 Gram(s) Oral two times a day  levETIRAcetam  Solution 1000 milliGRAM(s) Oral two times a day  levothyroxine 50 MICROGram(s) Oral daily  modafinil 100 milliGRAM(s) Oral <User Schedule>  pantoprazole   Suspension 40 milliGRAM(s) Oral daily  polyethylene glycol 3350 17 Gram(s) Oral daily  senna Syrup 10 milliLiter(s) Oral at bedtime  sodium chloride 3%  Inhalation 4 milliLiter(s) Inhalation every 6 hours    MEDICATIONS  (PRN):  acetaminophen    Suspension .. 650 milliGRAM(s) Oral every 4 hours PRN Temp greater or equal to 38C (100.4F), Mild Pain (1 - 3)      LABS:                        8.7    7.62  )-----------( 203      ( 28 Apr 2022 05:40 )             27.8     04-28    138  |  99  |  38<H>  ----------------------------<  115<H>  4.0   |  29  |  0.97    Ca    9.2      28 Apr 2022 05:40  Phos  3.2     04-28  Mg     2.20     04-28            Venous Blood Gas:  04-28 @ 07:35  7.45/49/104/34/99.0  VBG Lactate: 1.7   CHIEF COMPLAINT: Patient is a 76y old  Male who presents with a chief complaint of Hypoxia (28 Apr 2022 12:57)      INTERVAL EVENTS:     ROS: Seen by bedside during AM rounds     OBJECTIVE:  ICU Vital Signs Last 24 Hrs  T(C): 36.6 (28 Apr 2022 22:42), Max: 36.8 (28 Apr 2022 13:00)  T(F): 97.9 (28 Apr 2022 22:42), Max: 98.3 (28 Apr 2022 13:00)  HR: 63 (29 Apr 2022 03:27) (60 - 82)  BP: 137/73 (28 Apr 2022 22:42) (119/81 - 151/84)  BP(mean): --  ABP: --  ABP(mean): --  RR: 18 (28 Apr 2022 22:42) (18 - 19)  SpO2: 97% (29 Apr 2022 03:27) (97% - 99%)    Mode: [facility]    04-27 @ 07:01 - 04-28 @ 07:00  --------------------------------------------------------  IN: 1140 mL / OUT: 1000 mL / NET: 140 mL    04-28 @ 07:01 - 04-29 @ 06:57  --------------------------------------------------------  IN: 1140 mL / OUT: 2650 mL / NET: -1510 mL      CAPILLARY BLOOD GLUCOSE          PHYSICAL EXAM:  General: NAD   Neck: (+) Trach tube noted, site c/d/i. + Firm L-neck mass at the mandibular angle.  Cards: S1/S2, no murmurs   Pulm: mildly diminished bilaterally. No wheezes.   Abdomen: Soft, NTND. (+) PEG noted, site c/d/i and no overt bleeding.   Extremities: No pedal edema. Extremities warm to touch.  Neurology: Awake/alert. Nonverbal. Not following commands.   Skin: refer to RN assessment.     Mode: [facility] MEDICATIONS:  MEDICATIONS  (STANDING):  ALBUTerol    90 MICROgram(s) HFA Inhaler 2 Puff(s) Inhalation every 6 hours  amantadine Syrup 100 milliGRAM(s) Oral two times a day  aMIOdarone    Tablet 200 milliGRAM(s) Oral daily  ascorbic acid 500 milliGRAM(s) Oral daily  carvedilol 6.25 milliGRAM(s) Oral every 12 hours  chlorhexidine 0.12% Liquid 15 milliLiter(s) Oral Mucosa every 12 hours  chlorhexidine 2% Cloths 1 Application(s) Topical daily  collagenase Ointment 1 Application(s) Topical daily  doxazosin 2 milliGRAM(s) Oral at bedtime  folic acid 1 milliGRAM(s) Oral daily  furosemide Solution 40 milliGRAM(s) Oral daily  heparin   Injectable 5000 Unit(s) SubCutaneous every 8 hours  hydrocortisone hemorrhoidal Suppository 1 Suppository(s) Rectal at bedtime  ipratropium 17 MICROgram(s) HFA Inhaler 1 Puff(s) Inhalation every 6 hours  lactulose Syrup 20 Gram(s) Oral two times a day  levETIRAcetam  Solution 1000 milliGRAM(s) Oral two times a day  levothyroxine 50 MICROGram(s) Oral daily  modafinil 100 milliGRAM(s) Oral <User Schedule>  pantoprazole   Suspension 40 milliGRAM(s) Oral daily  polyethylene glycol 3350 17 Gram(s) Oral daily  senna Syrup 10 milliLiter(s) Oral at bedtime  sodium chloride 3%  Inhalation 4 milliLiter(s) Inhalation every 6 hours    MEDICATIONS  (PRN):  acetaminophen    Suspension .. 650 milliGRAM(s) Oral every 4 hours PRN Temp greater or equal to 38C (100.4F), Mild Pain (1 - 3)      LABS:                        8.7    7.62  )-----------( 203      ( 28 Apr 2022 05:40 )             27.8     04-28    138  |  99  |  38<H>  ----------------------------<  115<H>  4.0   |  29  |  0.97    Ca    9.2      28 Apr 2022 05:40  Phos  3.2     04-28  Mg     2.20     04-28            Venous Blood Gas:  04-28 @ 07:35  7.45/49/104/34/99.0  VBG Lactate: 1.7

## 2022-04-29 NOTE — PROGRESS NOTE ADULT - ASSESSMENT
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 Now DISPO planning.    # Neurology   - Currently A&Ox0, awake and alert, able to move RUE and nods/ mouths one or two words per RCU evaluation and prior documentation.   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   - Continue on Modafinil, Amantidine and Keppra.       #HEENT  - Large firm mass along left neck noted 4/28  - CT Neck w/IV contrast to better evaluate    # Cardiovascular   - Hx of HFpEF 55, mild MR and AR, severe LAD, normal LVRVSF (ECHO 3/7)  - Hx of Atrial Fibrillation s/p Watchman and currently rate controlled.   - Continue on Amiodarone 200mg QD and Coreg 6.25mg BID.   - Continue on Lasix 40mg QD and monitor tolerance.     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   - Able to tolerate TC (from 4/15 during the day) and now ATC (from 4/21).   - Continue on Proventil, Atrovent and Chest PT Q6H, and c/w Hypersal to loosen secretions     # GI  - Hx of SDH, Gastric Bypass and Dysphagia s/p PEG and continued on TF with course complicated by constipation and melena  - Case discussed with GI and melena likely in setting of constipation, however no plan for scope currently and will medically treat with PPI.   - Monitor BMs on Miralax, Senna and Lactulose.   - Continue on Nepro in sight of hyperkalemia.   - s/p large BM on 4/25  - s/p trial of Reglan 10mg TID (4/24 - until 4/27)    # / Renal  - Hx of Urinary Retention with Chronic Garcia and presented FLORIAN likely in setting of dehydration with fevers vs ATN with Sepsis (CRE high 2s and baseline 0.8-0.9).   - UA with hematuria and proteinuria and case discussed with Neprhology with concern for glomerulonephritis. ANCA, ISABELLE and GM ABs negative.   - Hypernatremia noted and continued on  Q6H as tolerated.   - Hyperkalemia and s/p cocktail with improvement (4/19).   - IVF and PRBC given with CRE improving and now resumed on Lasix with improvement.     # ID  - Recent admission for  Acinetobacter and Pseudomonas HCAP (3/2022)   - SCx (4/10) with  Acinetobacter and Pseudomonas.   - s/p completed Meropenem (4/8-4/14) and will monitor off ABX.     # Endocrine  - No hx of DM2 and A1C 5.9. Continue to monitor BG,    - Hx of Hypothyroidism and continued on Synthroid. TSH 47 with low T3/T4 and Free T4. TSH 80s (3/2022) and Synthroid increased at NH. Hypothyroidism could be in the setting of Amiodarone use and current Synthroid dose appears to be working.   - Next TFT to be done in June 2022.     # Hematology   - Anemia i/s/o AOCD and s/p PRBC (4/9)   - Melena/ anemia noted and s/p 2 U PRBC (4/19)  with good response, however HH waxes and wanes with no obvious bleed (no further melena or CGE from PEG aspiration)  - DVT PPX with HSQ (cleared by GI to resume).     # Ethics   - FULL CODE   - Case discussed with Cousin/ HCP, Dr. Donna Hagen (Pediatric Neurologist, [phone]) and updated daily. PM&R reeval pending.     # DISPO - Planning to go back to NH with improvement in renal function/ electrolytes. Family requesting [facility] and SW aware      75 YO M, A&Ox0 at baseline with PMHx of L SDH c/b L Subfalcine Herniation s/p Emergent R Hemicraniectomy in [place] while traveling fell on marble floor and now chronic with tracheostomy and vent dependant, Dysphagia with PEG, AFIB s/p watchman, Gastric Sleeve, Urinary Retention with Chornic Garcia, and recent ICU stay for HCAP with MDR Pseudomonas now presenting with ACHRF i/s/o  Actineobacter and Pseudomonas HCAP s/p ABX c/b FLORIAN and melena/ anemia. Now DISPO planning.    # Neurology   - Currently A&Ox0, awake and alert, able to move RUE and nods/ mouths one or two words per RCU evaluation and prior documentation.   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   - Continue on Modafinil, Amantidine and Keppra.     #HEENT  - Large firm mass along left neck noted 4/28  - CT Neck w/IV contrast to better evaluate    # Cardiovascular   - Hx of HFpEF 55, mild MR and AR, severe LAD, normal LVRVSF (ECHO 3/7)  - Hx of Atrial Fibrillation s/p Watchman and currently rate controlled.   - Continue on Amiodarone 200mg QD and Coreg 6.25mg BID.   - Continue on Lasix 40mg QD and monitor tolerance.     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   - Able to tolerate TC (from 4/15 during the day) and now ATC (from 4/21).   - Continue on Proventil, Atrovent and Chest PT Q6H, and c/w Hypersal to loosen secretions     # GI  - Hx of SDH, Gastric Bypass and Dysphagia s/p PEG and continued on TF with course complicated by constipation and melena  - Case discussed with GI and melena likely in setting of constipation, however no plan for scope currently and will medically treat with PPI.   - Monitor BMs on Miralax, Senna and Lactulose.   - Continue on Nepro in sight of hyperkalemia.   - s/p large BM on 4/25  - s/p trial of Reglan 10mg TID (4/24 - until 4/27)    # / Renal  - Hx of Urinary Retention with Chronic Garcia and presented FLORIAN likely in setting of dehydration with fevers vs ATN with Sepsis (CRE high 2s and baseline 0.8-0.9).   - UA with hematuria and proteinuria and case discussed with Neprhology with concern for glomerulonephritis. ANCA, ISABELLE and GM ABs negative.   - Hypernatremia noted and continued on  Q6H as tolerated.   - Hyperkalemia and s/p cocktail with improvement (4/19).   - IVF and PRBC given with CRE improving and now resumed on Lasix with improvement.     # ID  - Recent admission for  Acinetobacter and Pseudomonas HCAP (3/2022)   - SCx (4/10) with  Acinetobacter and Pseudomonas.   - s/p completed Meropenem (4/8-4/14) and will monitor off ABX.     # Endocrine  - No hx of DM2 and A1C 5.9. Continue to monitor BG,    - Hx of Hypothyroidism and continued on Synthroid. TSH 47 with low T3/T4 and Free T4. TSH 80s (3/2022) and Synthroid increased at NH. Hypothyroidism could be in the setting of Amiodarone use and current Synthroid dose appears to be working.   - Next TFT to be done in June 2022.     # Hematology   - Anemia i/s/o AOCD and s/p PRBC (4/9)   - Melena/ anemia noted and s/p 2 U PRBC (4/19)  with good response, however HH waxes and wanes with no obvious bleed (no further melena or CGE from PEG aspiration)  - DVT PPX with HSQ (cleared by GI to resume).     # Ethics   - FULL CODE   - Case discussed with Cousin/ HCP, Dr. Donna Hagen (Pediatric Neurologist, [phone]) and updated daily. PM&R eval appreciated.     # DISPO - Planning to go back to NH with Neck CT results. Family requesting [place] [facility] and SW aware

## 2022-04-30 PROCEDURE — 70491 CT SOFT TISSUE NECK W/DYE: CPT | Mod: 26

## 2022-04-30 PROCEDURE — 99233 SBSQ HOSP IP/OBS HIGH 50: CPT

## 2022-04-30 RX ADMIN — LEVETIRACETAM 1000 MILLIGRAM(S): 250 TABLET, FILM COATED ORAL at 17:29

## 2022-04-30 RX ADMIN — Medication 100 MILLIGRAM(S): at 05:32

## 2022-04-30 RX ADMIN — PANTOPRAZOLE SODIUM 40 MILLIGRAM(S): 20 TABLET, DELAYED RELEASE ORAL at 12:52

## 2022-04-30 RX ADMIN — SODIUM CHLORIDE 4 MILLILITER(S): 9 INJECTION INTRAMUSCULAR; INTRAVENOUS; SUBCUTANEOUS at 15:44

## 2022-04-30 RX ADMIN — AMIODARONE HYDROCHLORIDE 200 MILLIGRAM(S): 400 TABLET ORAL at 05:26

## 2022-04-30 RX ADMIN — ALBUTEROL 2 PUFF(S): 90 AEROSOL, METERED ORAL at 15:44

## 2022-04-30 RX ADMIN — Medication 1 MILLIGRAM(S): at 12:51

## 2022-04-30 RX ADMIN — CARVEDILOL PHOSPHATE 6.25 MILLIGRAM(S): 80 CAPSULE, EXTENDED RELEASE ORAL at 05:25

## 2022-04-30 RX ADMIN — Medication 1 PUFF(S): at 09:42

## 2022-04-30 RX ADMIN — LEVETIRACETAM 1000 MILLIGRAM(S): 250 TABLET, FILM COATED ORAL at 05:25

## 2022-04-30 RX ADMIN — Medication 2 MILLIGRAM(S): at 21:42

## 2022-04-30 RX ADMIN — Medication 50 MICROGRAM(S): at 05:25

## 2022-04-30 RX ADMIN — CHLORHEXIDINE GLUCONATE 1 APPLICATION(S): 213 SOLUTION TOPICAL at 12:51

## 2022-04-30 RX ADMIN — SODIUM CHLORIDE 4 MILLILITER(S): 9 INJECTION INTRAMUSCULAR; INTRAVENOUS; SUBCUTANEOUS at 04:35

## 2022-04-30 RX ADMIN — ALBUTEROL 2 PUFF(S): 90 AEROSOL, METERED ORAL at 21:48

## 2022-04-30 RX ADMIN — SODIUM CHLORIDE 4 MILLILITER(S): 9 INJECTION INTRAMUSCULAR; INTRAVENOUS; SUBCUTANEOUS at 09:43

## 2022-04-30 RX ADMIN — CHLORHEXIDINE GLUCONATE 15 MILLILITER(S): 213 SOLUTION TOPICAL at 17:32

## 2022-04-30 RX ADMIN — HEPARIN SODIUM 5000 UNIT(S): 5000 INJECTION INTRAVENOUS; SUBCUTANEOUS at 13:10

## 2022-04-30 RX ADMIN — ALBUTEROL 2 PUFF(S): 90 AEROSOL, METERED ORAL at 04:35

## 2022-04-30 RX ADMIN — SODIUM CHLORIDE 4 MILLILITER(S): 9 INJECTION INTRAMUSCULAR; INTRAVENOUS; SUBCUTANEOUS at 21:49

## 2022-04-30 RX ADMIN — Medication 40 MILLIGRAM(S): at 05:25

## 2022-04-30 RX ADMIN — Medication 1 PUFF(S): at 15:44

## 2022-04-30 RX ADMIN — Medication 1 APPLICATION(S): at 12:51

## 2022-04-30 RX ADMIN — SENNA PLUS 10 MILLILITER(S): 8.6 TABLET ORAL at 21:42

## 2022-04-30 RX ADMIN — HEPARIN SODIUM 5000 UNIT(S): 5000 INJECTION INTRAVENOUS; SUBCUTANEOUS at 21:41

## 2022-04-30 RX ADMIN — Medication 1 PUFF(S): at 04:35

## 2022-04-30 RX ADMIN — Medication 500 MILLIGRAM(S): at 12:51

## 2022-04-30 RX ADMIN — CHLORHEXIDINE GLUCONATE 15 MILLILITER(S): 213 SOLUTION TOPICAL at 05:26

## 2022-04-30 RX ADMIN — POLYETHYLENE GLYCOL 3350 17 GRAM(S): 17 POWDER, FOR SOLUTION ORAL at 12:52

## 2022-04-30 RX ADMIN — MODAFINIL 100 MILLIGRAM(S): 200 TABLET ORAL at 05:25

## 2022-04-30 RX ADMIN — Medication 100 MILLIGRAM(S): at 17:30

## 2022-04-30 RX ADMIN — HEPARIN SODIUM 5000 UNIT(S): 5000 INJECTION INTRAVENOUS; SUBCUTANEOUS at 05:25

## 2022-04-30 RX ADMIN — CARVEDILOL PHOSPHATE 6.25 MILLIGRAM(S): 80 CAPSULE, EXTENDED RELEASE ORAL at 17:29

## 2022-04-30 RX ADMIN — ALBUTEROL 2 PUFF(S): 90 AEROSOL, METERED ORAL at 09:42

## 2022-04-30 RX ADMIN — Medication 1 PUFF(S): at 21:49

## 2022-04-30 NOTE — PROGRESS NOTE ADULT - NS ATTEND AMEND GEN_ALL_CORE FT
77 yo M wih a h/o lef SDH c/b subfalcine herniation and right hemicraniectomy, s/p trach and PEG, urinary retention with chronic benson, dysphagia, a/w  Acinetobacter and pseudomonas HCAP, FLORIAN, melena./anemia  Tolerating TC ATC, c/w trach care and suctioning  Firm left mandible, awaiting CT neck to evaluate   Remainder of plan as per above.

## 2022-04-30 NOTE — PROGRESS NOTE ADULT - ASSESSMENT
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 Now DISPO planning.    # Neurology   - Currently A&Ox0, awake and alert, able to move RUE and nods/ mouths one or two words per RCU evaluation and prior documentation.   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   - Continue on Modafinil, Amantidine and Keppra.     #HEENT  - Large firm mass along left neck noted 4/28  - CT Neck w/IV contrast to better evaluate    # Cardiovascular   - Hx of HFpEF 55, mild MR and AR, severe LAD, normal LVRVSF (ECHO 3/7)  - Hx of Atrial Fibrillation s/p Watchman and currently rate controlled.   - Continue on Amiodarone 200mg QD and Coreg 6.25mg BID.   - Continue on Lasix 40mg QD and monitor tolerance.     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   - Able to tolerate TC (from 4/15 during the day) and now ATC (from 4/21).   - Continue on Proventil, Atrovent and Chest PT Q6H, and c/w Hypersal to loosen secretions     # GI  - Hx of SDH, Gastric Bypass and Dysphagia s/p PEG and continued on TF with course complicated by constipation and melena  - Case discussed with GI and melena likely in setting of constipation, however no plan for scope currently and will medically treat with PPI.   - Monitor BMs on Miralax, Senna and Lactulose.   - Continue on Nepro in sight of hyperkalemia.   - s/p large BM on 4/25  - s/p trial of Reglan 10mg TID (4/24 - until 4/27)    # / Renal  - Hx of Urinary Retention with Chronic Garcia and presented FLORIAN likely in setting of dehydration with fevers vs ATN with Sepsis (CRE high 2s and baseline 0.8-0.9).   - UA with hematuria and proteinuria and case discussed with Neprhology with concern for glomerulonephritis. ANCA, ISABELLE and GM ABs negative.   - Hypernatremia noted and continued on  Q6H as tolerated.   - Hyperkalemia and s/p cocktail with improvement (4/19).   - IVF and PRBC given with CRE improving and now resumed on Lasix with improvement.     # ID  - Recent admission for  Acinetobacter and Pseudomonas HCAP (3/2022)   - SCx (4/10) with  Acinetobacter and Pseudomonas.   - s/p completed Meropenem (4/8-4/14) and will monitor off ABX.     # Endocrine  - No hx of DM2 and A1C 5.9. Continue to monitor BG,    - Hx of Hypothyroidism and continued on Synthroid. TSH 47 with low T3/T4 and Free T4. TSH 80s (3/2022) and Synthroid increased at NH. Hypothyroidism could be in the setting of Amiodarone use and current Synthroid dose appears to be working.   - Next TFT to be done in June 2022.     # Hematology   - Anemia i/s/o AOCD and s/p PRBC (4/9)   - Melena/ anemia noted and s/p 2 U PRBC (4/19)  with good response, however HH waxes and wanes with no obvious bleed (no further melena or CGE from PEG aspiration)  - DVT PPX with HSQ (cleared by GI to resume).     # Ethics   - FULL CODE   - Case discussed with Cousin/ HCP, Dr. Donna Hagen (Pediatric Neurologist, [phone]) and updated daily. PM&R eval appreciated.     # DISPO - Planning to go back to NH with Neck CT results. Family requesting [place] [facility] and SW aware

## 2022-04-30 NOTE — PROGRESS NOTE ADULT - SUBJECTIVE AND OBJECTIVE BOX
CHIEF COMPLAINT: Patient is a 76y old  Male who presents with a chief complaint of Hypoxia.    Interval Events:    REVIEW OF SYSTEMS:  [ ] All other systems negative  [ ] Unable to assess ROS because ________    Mode: standby, FiO2: 28    OBJECTIVE:  ICU Vital Signs Last 24 Hrs  T(C): 37.1 (30 Apr 2022 06:10), Max: 37.2 (30 Apr 2022 04:32)  T(F): 98.7 (30 Apr 2022 06:10), Max: 98.9 (30 Apr 2022 04:32)  HR: 61 (30 Apr 2022 07:19) (61 - 113)  BP: 111/64 (30 Apr 2022 06:10) (111/64 - 120/67)  RR: 18 (30 Apr 2022 07:19) (18 - 18)  SpO2: 99% (30 Apr 2022 07:19) (96% - 100%)    Mode: standby, FiO2: 28    CAPILLARY BLOOD GLUCOSE    HOSPITAL MEDICATIONS:  MEDICATIONS  (STANDING):  ALBUTerol    90 MICROgram(s) HFA Inhaler 2 Puff(s) Inhalation every 6 hours  amantadine Syrup 100 milliGRAM(s) Oral two times a day  aMIOdarone    Tablet 200 milliGRAM(s) Oral daily  ascorbic acid 500 milliGRAM(s) Oral daily  carvedilol 6.25 milliGRAM(s) Oral every 12 hours  chlorhexidine 0.12% Liquid 15 milliLiter(s) Oral Mucosa every 12 hours  chlorhexidine 2% Cloths 1 Application(s) Topical daily  collagenase Ointment 1 Application(s) Topical daily  doxazosin 2 milliGRAM(s) Oral at bedtime  folic acid 1 milliGRAM(s) Oral daily  furosemide Solution 40 milliGRAM(s) Oral daily  heparin   Injectable 5000 Unit(s) SubCutaneous every 8 hours  ipratropium 17 MICROgram(s) HFA Inhaler 1 Puff(s) Inhalation every 6 hours  levETIRAcetam  Solution 1000 milliGRAM(s) Oral two times a day  levothyroxine 50 MICROGram(s) Oral daily  modafinil 100 milliGRAM(s) Oral <User Schedule>  pantoprazole   Suspension 40 milliGRAM(s) Oral daily  polyethylene glycol 3350 17 Gram(s) Oral daily  senna Syrup 10 milliLiter(s) Oral at bedtime  sodium chloride 3%  Inhalation 4 milliLiter(s) Inhalation every 6 hours    MEDICATIONS  (PRN):  acetaminophen    Suspension .. 650 milliGRAM(s) Oral every 4 hours PRN Temp greater or equal to 38C (100.4F), Mild Pain (1 - 3)      LABS:                        PAST MEDICAL & SURGICAL HISTORY:  Essential hypertension    Primary osteoarthritis, unspecified site    Closed fracture of left radius, initial encounter    Morbid obesity, unspecified obesity type  h/o. - s/p gastric bypass- lost 113 lbs    KAREN (obstructive sleep apnea)  h/o - was on CPAP until gastric bypass surgery    Respiratory failure    Anemia    Subdural hemorrhage    Epilepsy    H/O gastrostomy    History of BPH    Afib    S/P gastric bypass  2008    Status post total replacement of left hip  2006    S/P bunionectomy  bilateral    S/P colonoscopy  approx 2012    History of abdominoplasty  and subsequent cosmetic surgery for removal of excess skin from face    FAMILY HISTORY:  Family history of renal cell carcinoma (Mother)    Family history of malignant melanoma (Sibling)    Social History:    RADIOLOGY:  [ ] Reviewed and interpreted by me    PULMONARY FUNCTION TESTS:    EKG: CHIEF COMPLAINT: Patient is a 76y old  Male who presents with a chief complaint of Hypoxia.    Interval Events: No interval events noted overnight.    REVIEW OF SYSTEMS:  [x] Unable to assess ROS because A&Ox0 at baseline.    Mode: standby, FiO2: 28    OBJECTIVE:  ICU Vital Signs Last 24 Hrs  T(C): 37.1 (30 Apr 2022 06:10), Max: 37.2 (30 Apr 2022 04:32)  T(F): 98.7 (30 Apr 2022 06:10), Max: 98.9 (30 Apr 2022 04:32)  HR: 61 (30 Apr 2022 07:19) (61 - 113)  BP: 111/64 (30 Apr 2022 06:10) (111/64 - 120/67)  RR: 18 (30 Apr 2022 07:19) (18 - 18)  SpO2: 99% (30 Apr 2022 07:19) (96% - 100%)    Mode: standby, FiO2: 28    CAPILLARY BLOOD GLUCOSE    HOSPITAL MEDICATIONS:  MEDICATIONS  (STANDING):  ALBUTerol    90 MICROgram(s) HFA Inhaler 2 Puff(s) Inhalation every 6 hours  amantadine Syrup 100 milliGRAM(s) Oral two times a day  aMIOdarone    Tablet 200 milliGRAM(s) Oral daily  ascorbic acid 500 milliGRAM(s) Oral daily  carvedilol 6.25 milliGRAM(s) Oral every 12 hours  chlorhexidine 0.12% Liquid 15 milliLiter(s) Oral Mucosa every 12 hours  chlorhexidine 2% Cloths 1 Application(s) Topical daily  collagenase Ointment 1 Application(s) Topical daily  doxazosin 2 milliGRAM(s) Oral at bedtime  folic acid 1 milliGRAM(s) Oral daily  furosemide Solution 40 milliGRAM(s) Oral daily  heparin   Injectable 5000 Unit(s) SubCutaneous every 8 hours  ipratropium 17 MICROgram(s) HFA Inhaler 1 Puff(s) Inhalation every 6 hours  levETIRAcetam  Solution 1000 milliGRAM(s) Oral two times a day  levothyroxine 50 MICROGram(s) Oral daily  modafinil 100 milliGRAM(s) Oral <User Schedule>  pantoprazole   Suspension 40 milliGRAM(s) Oral daily  polyethylene glycol 3350 17 Gram(s) Oral daily  senna Syrup 10 milliLiter(s) Oral at bedtime  sodium chloride 3%  Inhalation 4 milliLiter(s) Inhalation every 6 hours    MEDICATIONS  (PRN):  acetaminophen    Suspension .. 650 milliGRAM(s) Oral every 4 hours PRN Temp greater or equal to 38C (100.4F), Mild Pain (1 - 3)      LABS:                        PAST MEDICAL & SURGICAL HISTORY:  Essential hypertension    Primary osteoarthritis, unspecified site    Closed fracture of left radius, initial encounter    Morbid obesity, unspecified obesity type  h/o. - s/p gastric bypass- lost 113 lbs    KAREN (obstructive sleep apnea)  h/o - was on CPAP until gastric bypass surgery    Respiratory failure    Anemia    Subdural hemorrhage    Epilepsy    H/O gastrostomy    History of BPH    Afib    S/P gastric bypass  2008    Status post total replacement of left hip  2006    S/P bunionectomy  bilateral    S/P colonoscopy  approx 2012    History of abdominoplasty  and subsequent cosmetic surgery for removal of excess skin from face    FAMILY HISTORY:  Family history of renal cell carcinoma (Mother)    Family history of malignant melanoma (Sibling)    Social History:    RADIOLOGY:  [ ] Reviewed and interpreted by me    PULMONARY FUNCTION TESTS:    EKG:

## 2022-05-01 LAB
ANION GAP SERPL CALC-SCNC: 13 MMOL/L — SIGNIFICANT CHANGE UP (ref 7–14)
BUN SERPL-MCNC: 48 MG/DL — HIGH (ref 7–23)
CALCIUM SERPL-MCNC: 8.9 MG/DL — SIGNIFICANT CHANGE UP (ref 8.4–10.5)
CHLORIDE SERPL-SCNC: 96 MMOL/L — LOW (ref 98–107)
CO2 SERPL-SCNC: 28 MMOL/L — SIGNIFICANT CHANGE UP (ref 22–31)
CREAT SERPL-MCNC: 1.18 MG/DL — SIGNIFICANT CHANGE UP (ref 0.5–1.3)
EGFR: 64 ML/MIN/1.73M2 — SIGNIFICANT CHANGE UP
GLUCOSE SERPL-MCNC: 112 MG/DL — HIGH (ref 70–99)
HCT VFR BLD CALC: 32.1 % — LOW (ref 39–50)
HGB BLD-MCNC: 10 G/DL — LOW (ref 13–17)
MAGNESIUM SERPL-MCNC: 2.2 MG/DL — SIGNIFICANT CHANGE UP (ref 1.6–2.6)
MCHC RBC-ENTMCNC: 31.2 GM/DL — LOW (ref 32–36)
MCHC RBC-ENTMCNC: 31.4 PG — SIGNIFICANT CHANGE UP (ref 27–34)
MCV RBC AUTO: 100.9 FL — HIGH (ref 80–100)
NRBC # BLD: 0 /100 WBCS — SIGNIFICANT CHANGE UP
NRBC # FLD: 0 K/UL — SIGNIFICANT CHANGE UP
PHOSPHATE SERPL-MCNC: 3.6 MG/DL — SIGNIFICANT CHANGE UP (ref 2.5–4.5)
PLATELET # BLD AUTO: 256 K/UL — SIGNIFICANT CHANGE UP (ref 150–400)
POTASSIUM SERPL-MCNC: 4 MMOL/L — SIGNIFICANT CHANGE UP (ref 3.5–5.3)
POTASSIUM SERPL-SCNC: 4 MMOL/L — SIGNIFICANT CHANGE UP (ref 3.5–5.3)
RBC # BLD: 3.18 M/UL — LOW (ref 4.2–5.8)
RBC # FLD: 18.4 % — HIGH (ref 10.3–14.5)
SODIUM SERPL-SCNC: 137 MMOL/L — SIGNIFICANT CHANGE UP (ref 135–145)
WBC # BLD: 9.31 K/UL — SIGNIFICANT CHANGE UP (ref 3.8–10.5)
WBC # FLD AUTO: 9.31 K/UL — SIGNIFICANT CHANGE UP (ref 3.8–10.5)

## 2022-05-01 PROCEDURE — 99233 SBSQ HOSP IP/OBS HIGH 50: CPT

## 2022-05-01 RX ADMIN — Medication 1 PUFF(S): at 22:53

## 2022-05-01 RX ADMIN — Medication 1 MILLIGRAM(S): at 13:53

## 2022-05-01 RX ADMIN — Medication 1 PUFF(S): at 03:20

## 2022-05-01 RX ADMIN — SODIUM CHLORIDE 4 MILLILITER(S): 9 INJECTION INTRAMUSCULAR; INTRAVENOUS; SUBCUTANEOUS at 03:20

## 2022-05-01 RX ADMIN — HEPARIN SODIUM 5000 UNIT(S): 5000 INJECTION INTRAVENOUS; SUBCUTANEOUS at 13:54

## 2022-05-01 RX ADMIN — Medication 1 PUFF(S): at 11:01

## 2022-05-01 RX ADMIN — HEPARIN SODIUM 5000 UNIT(S): 5000 INJECTION INTRAVENOUS; SUBCUTANEOUS at 05:25

## 2022-05-01 RX ADMIN — CARVEDILOL PHOSPHATE 6.25 MILLIGRAM(S): 80 CAPSULE, EXTENDED RELEASE ORAL at 05:26

## 2022-05-01 RX ADMIN — LEVETIRACETAM 1000 MILLIGRAM(S): 250 TABLET, FILM COATED ORAL at 17:14

## 2022-05-01 RX ADMIN — SODIUM CHLORIDE 4 MILLILITER(S): 9 INJECTION INTRAMUSCULAR; INTRAVENOUS; SUBCUTANEOUS at 22:53

## 2022-05-01 RX ADMIN — POLYETHYLENE GLYCOL 3350 17 GRAM(S): 17 POWDER, FOR SOLUTION ORAL at 13:53

## 2022-05-01 RX ADMIN — AMIODARONE HYDROCHLORIDE 200 MILLIGRAM(S): 400 TABLET ORAL at 05:26

## 2022-05-01 RX ADMIN — Medication 1 PUFF(S): at 16:14

## 2022-05-01 RX ADMIN — ALBUTEROL 2 PUFF(S): 90 AEROSOL, METERED ORAL at 16:14

## 2022-05-01 RX ADMIN — SODIUM CHLORIDE 4 MILLILITER(S): 9 INJECTION INTRAMUSCULAR; INTRAVENOUS; SUBCUTANEOUS at 16:14

## 2022-05-01 RX ADMIN — Medication 100 MILLIGRAM(S): at 17:14

## 2022-05-01 RX ADMIN — CHLORHEXIDINE GLUCONATE 15 MILLILITER(S): 213 SOLUTION TOPICAL at 17:14

## 2022-05-01 RX ADMIN — ALBUTEROL 2 PUFF(S): 90 AEROSOL, METERED ORAL at 11:01

## 2022-05-01 RX ADMIN — CHLORHEXIDINE GLUCONATE 15 MILLILITER(S): 213 SOLUTION TOPICAL at 05:26

## 2022-05-01 RX ADMIN — ALBUTEROL 2 PUFF(S): 90 AEROSOL, METERED ORAL at 03:19

## 2022-05-01 RX ADMIN — HEPARIN SODIUM 5000 UNIT(S): 5000 INJECTION INTRAVENOUS; SUBCUTANEOUS at 21:25

## 2022-05-01 RX ADMIN — CHLORHEXIDINE GLUCONATE 1 APPLICATION(S): 213 SOLUTION TOPICAL at 13:52

## 2022-05-01 RX ADMIN — SODIUM CHLORIDE 4 MILLILITER(S): 9 INJECTION INTRAMUSCULAR; INTRAVENOUS; SUBCUTANEOUS at 11:02

## 2022-05-01 RX ADMIN — Medication 2 MILLIGRAM(S): at 21:25

## 2022-05-01 RX ADMIN — Medication 50 MICROGRAM(S): at 05:25

## 2022-05-01 RX ADMIN — CARVEDILOL PHOSPHATE 6.25 MILLIGRAM(S): 80 CAPSULE, EXTENDED RELEASE ORAL at 17:15

## 2022-05-01 RX ADMIN — ALBUTEROL 2 PUFF(S): 90 AEROSOL, METERED ORAL at 22:53

## 2022-05-01 RX ADMIN — LEVETIRACETAM 1000 MILLIGRAM(S): 250 TABLET, FILM COATED ORAL at 05:24

## 2022-05-01 RX ADMIN — Medication 40 MILLIGRAM(S): at 05:24

## 2022-05-01 RX ADMIN — SENNA PLUS 10 MILLILITER(S): 8.6 TABLET ORAL at 21:25

## 2022-05-01 RX ADMIN — Medication 100 MILLIGRAM(S): at 05:26

## 2022-05-01 RX ADMIN — Medication 500 MILLIGRAM(S): at 13:53

## 2022-05-01 RX ADMIN — MODAFINIL 100 MILLIGRAM(S): 200 TABLET ORAL at 05:25

## 2022-05-01 RX ADMIN — Medication 1 APPLICATION(S): at 13:52

## 2022-05-01 RX ADMIN — PANTOPRAZOLE SODIUM 40 MILLIGRAM(S): 20 TABLET, DELAYED RELEASE ORAL at 13:53

## 2022-05-01 NOTE — PROGRESS NOTE ADULT - ASSESSMENT
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 Now DISPO planning.    # Neurology   - Currently A&Ox0, awake and alert, able to move RUE and nods/ mouths one or two words per RCU evaluation and prior documentation.   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   - Continue on Modafinil, Amantidine and Keppra.     #HEENT  - Large firm mass along left neck noted 4/28  - CT Neck w/IV contrast to better evaluate    # Cardiovascular   - Hx of HFpEF 55, mild MR and AR, severe LAD, normal LVRVSF (ECHO 3/7)  - Hx of Atrial Fibrillation s/p Watchman and currently rate controlled.   - Continue on Amiodarone 200mg QD and Coreg 6.25mg BID.   - Continue on Lasix 40mg QD and monitor tolerance.     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   - Able to tolerate TC (from 4/15 during the day) and now ATC (from 4/21).   - Continue on Proventil, Atrovent and Chest PT Q6H, and c/w Hypersal to loosen secretions     # GI  - Hx of SDH, Gastric Bypass and Dysphagia s/p PEG and continued on TF with course complicated by constipation and melena  - Case discussed with GI and melena likely in setting of constipation, however no plan for scope currently and will medically treat with PPI.   - Monitor BMs on Miralax, Senna and Lactulose.   - Continue on Nepro in sight of hyperkalemia.   - s/p large BM on 4/25  - s/p trial of Reglan 10mg TID (4/24 - until 4/27)    # / Renal  - Hx of Urinary Retention with Chronic Garcia and presented FLORIAN likely in setting of dehydration with fevers vs ATN with Sepsis (CRE high 2s and baseline 0.8-0.9).   - UA with hematuria and proteinuria and case discussed with Neprhology with concern for glomerulonephritis. ANCA, ISABELLE and GM ABs negative.   - Hypernatremia noted and continued on  Q6H as tolerated.   - Hyperkalemia and s/p cocktail with improvement (4/19).   - IVF and PRBC given with CRE improving and now resumed on Lasix with improvement.     # ID  - Recent admission for  Acinetobacter and Pseudomonas HCAP (3/2022)   - SCx (4/10) with  Acinetobacter and Pseudomonas.   - s/p completed Meropenem (4/8-4/14) and will monitor off ABX.     # Endocrine  - No hx of DM2 and A1C 5.9. Continue to monitor BG,    - Hx of Hypothyroidism and continued on Synthroid. TSH 47 with low T3/T4 and Free T4. TSH 80s (3/2022) and Synthroid increased at NH. Hypothyroidism could be in the setting of Amiodarone use and current Synthroid dose appears to be working.   - Next TFT to be done in June 2022.     # Hematology   - Anemia i/s/o AOCD and s/p PRBC (4/9)   - Melena/ anemia noted and s/p 2 U PRBC (4/19)  with good response, however HH waxes and wanes with no obvious bleed (no further melena or CGE from PEG aspiration)  - DVT PPX with HSQ (cleared by GI to resume).     # Ethics   - FULL CODE   - Case discussed with Cousin/ HCP, Dr. Donna Hagen (Pediatric Neurologist, [phone]) and updated daily. PM&R eval appreciated.     # DISPO - Planning to go back to NH with Neck CT results. 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 Now DISPO planning.    # Neurology   - Currently A&Ox0, awake and alert, able to move RUE and nods/ mouths one or two words per RCU evaluation and prior documentation.   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   - Continue on Modafinil, Amantidine and Keppra.     #HEENT  - Large firm mass along left neck noted 4/28  - CT Neck w/IV contrast - CT findings most compatible with left-sided parotitis. There is a 2 mm   left intraparotid stone, although there is no parotid duct dilatation or obstructing stone.  - ENT to follow up for recs     # Cardiovascular   - Hx of HFpEF 55, mild MR and AR, severe LAD, normal LVRVSF (ECHO 3/7)  - Hx of Atrial Fibrillation s/p Watchman and currently rate controlled.   - Continue on Amiodarone 200mg QD and Coreg 6.25mg BID.   - Continue on Lasix 40mg QD and monitor tolerance.     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   - Able to tolerate TC (from 4/15 during the day) and now ATC (from 4/21).   - Continue on Proventil, Atrovent and Chest PT Q6H  - c/w Hypersal to loosen secretions - thin secretions for now noted     # GI  - Hx of SDH, Gastric Bypass and Dysphagia s/p PEG and continued on TF with course complicated by constipation and melena  - Case discussed with GI and melena likely in setting of constipation, however no plan for scope currently and will medically treat with PPI.   - Monitor BMs on Miralax, Senna and Lactulose.   - Continue on Nepro in sight of hyperkalemia.   - s/p large BM on 4/25  - s/p trial of Reglan 10mg TID (4/24 - until 4/27)    # / Renal  - Hx of Urinary Retention with Chronic Garcia and presented FLORIAN likely in setting of dehydration with fevers vs ATN with Sepsis (CRE high 2s and baseline 0.8-0.9).   - UA with hematuria and proteinuria and case discussed with Neprhology with concern for glomerulonephritis. ANCA, ISABELLE and GM ABs negative.   - Hypernatremia (resolved) - c/w free water 300 q6hr for now.   - Hyperkalemia and s/p cocktail with improvement (4/19).   - IVF and PRBC given with CRE improving and now resumed on Lasix with improvement.     # ID  - Recent admission for  Acinetobacter and Pseudomonas HCAP (3/2022)   - SCx (4/10) with  Acinetobacter and Pseudomonas.   - s/p completed Meropenem (4/8-4/14) and will monitor off ABX.     # Endocrine  - No hx of DM2 and A1C 5.9. Continue to monitor BG,    - Hx of Hypothyroidism and continued on Synthroid. TSH 47 with low T3/T4 and Free T4. TSH 80s (3/2022) and Synthroid increased at NH. Hypothyroidism could be in the setting of Amiodarone use and current Synthroid dose appears to be working.   - Next TFT to be done in June 2022.     # Hematology   - Anemia i/s/o AOCD and s/p PRBC (4/9)   - Melena/ anemia noted and s/p 2 U PRBC (4/19)  with good response, however HH waxes and wanes with no obvious bleed (no further melena or CGE from PEG aspiration)  - DVT PPX with HSQ (cleared by GI to resume).     # Ethics   - FULL CODE   - Case discussed with Cousin/ HCP, Dr. Donna Hagen (Pediatric Neurologist, [phone]) and updated daily. PM&R eval appreciated.     # DISPO - Planning to go back to NH with Neck CT results. Family requesting [facility] and SW aware

## 2022-05-01 NOTE — PROGRESS NOTE ADULT - NS ATTEND AMEND GEN_ALL_CORE FT
75 yo M wih a h/o left SDH c/b subfalcine herniation and right hemicraniectomy, s/p trach and PEG, urinary retention with chronic benson, dysphagia, a/w  Acinetobacter and pseudomonas HCAP, FLORIAN, melena./anemia  Tolerating TC ATC, c/w trach care and suctioning  Firm left mandible, CT neck with lef parotitis, stone present, though unlikely obstructing - will request ENT evaluation   Remainder of plan as per above.  d/w HCP and RCU team at bedside.

## 2022-05-01 NOTE — PROGRESS NOTE ADULT - SUBJECTIVE AND OBJECTIVE BOX
CHIEF COMPLAINT: Patient is a 76y old  Male who presents with a chief complaint of Hypoxia (30 Apr 2022 08:02)    Interval Events: None reported overnight. Clinically unchanged. Dispo planning.     REVIEW OF SYSTEMS:  See above  [x] Unable to assess ROS because poor phonation    Mode: standby    OBJECTIVE:  ICU Vital Signs Last 24 Hrs  T(C): 36.7 (01 May 2022 04:01), Max: 36.9 (30 Apr 2022 13:00)  T(F): 98.1 (01 May 2022 04:01), Max: 98.4 (30 Apr 2022 13:00)  HR: 63 (01 May 2022 07:49) (61 - 102)  BP: 128/97 (01 May 2022 04:01) (105/61 - 128/97)  BP(mean): --  ABP: --  ABP(mean): --  RR: 18 (01 May 2022 04:01) (18 - 20)  SpO2: 97% (01 May 2022 07:49) (94% - 99%)    Mode: standby    04-30 @ 07:01  -  05-01 @ 07:00  --------------------------------------------------------  IN: 0 mL / OUT: 450 mL / NET: -450 mL    CAPILLARY BLOOD GLUCOSE    HOSPITAL MEDICATIONS:  MEDICATIONS  (STANDING):  ALBUTerol    90 MICROgram(s) HFA Inhaler 2 Puff(s) Inhalation every 6 hours  amantadine Syrup 100 milliGRAM(s) Oral two times a day  aMIOdarone    Tablet 200 milliGRAM(s) Oral daily  ascorbic acid 500 milliGRAM(s) Oral daily  carvedilol 6.25 milliGRAM(s) Oral every 12 hours  chlorhexidine 0.12% Liquid 15 milliLiter(s) Oral Mucosa every 12 hours  chlorhexidine 2% Cloths 1 Application(s) Topical daily  collagenase Ointment 1 Application(s) Topical daily  doxazosin 2 milliGRAM(s) Oral at bedtime  folic acid 1 milliGRAM(s) Oral daily  furosemide Solution 40 milliGRAM(s) Oral daily  heparin   Injectable 5000 Unit(s) SubCutaneous every 8 hours  ipratropium 17 MICROgram(s) HFA Inhaler 1 Puff(s) Inhalation every 6 hours  levETIRAcetam  Solution 1000 milliGRAM(s) Oral two times a day  levothyroxine 50 MICROGram(s) Oral daily  modafinil 100 milliGRAM(s) Oral <User Schedule>  pantoprazole   Suspension 40 milliGRAM(s) Oral daily  polyethylene glycol 3350 17 Gram(s) Oral daily  senna Syrup 10 milliLiter(s) Oral at bedtime  sodium chloride 3%  Inhalation 4 milliLiter(s) Inhalation every 6 hours    MEDICATIONS  (PRN):  acetaminophen    Suspension .. 650 milliGRAM(s) Oral every 4 hours PRN Temp greater or equal to 38C (100.4F), Mild Pain (1 - 3)    PHYSICAL EXAM  GENERAL: Laying in bed, NAD  HEENT: +Trach, area c/d/i  Card: +s1, s2  Pulm: + coarse breath sound b/l  GI: +PEG, area c/d/i, obese abd, soft, nt/nd, no peritoneal signs noted  Ext: no asymmetry, no swelling, no calf tenderness  Neuro: alert and awake, following simple commands such as squeezing hands    LABS:                        10.0   9.31  )-----------( 256      ( 01 May 2022 06:39 )             32.1     05-01    137  |  96<L>  |  48<H>  ----------------------------<  112<H>  4.0   |  28  |  1.18    Ca    8.9      01 May 2022 06:39  Phos  3.6     05-01  Mg     2.20     05-01    PAST MEDICAL & SURGICAL HISTORY:  Essential hypertension    Primary osteoarthritis, unspecified site    Closed fracture of left radius, initial encounter    Morbid obesity, unspecified obesity type  h/o. - s/p gastric bypass- lost 113 lbs    KAREN (obstructive sleep apnea)  h/o - was on CPAP until gastric bypass surgery    Respiratory failure    Anemia    Subdural hemorrhage    Epilepsy    H/O gastrostomy    History of BPH    Afib    S/P gastric bypass  2008    Status post total replacement of left hip  2006    S/P bunionectomy  bilateral    S/P colonoscopy  approx 2012    History of abdominoplasty  and subsequent cosmetic surgery for removal of excess skin from face    FAMILY HISTORY:  Family history of renal cell carcinoma (Mother)    Family history of malignant melanoma (Sibling)    Social History:    RADIOLOGY:  [ ] Reviewed and interpreted by me    PULMONARY FUNCTION TESTS:    EKG: CHIEF COMPLAINT: Patient is a 76y old  Male who presents with a chief complaint of Hypoxia (30 Apr 2022 08:02)    Interval Events: None reported overnight. Clinically unchanged. Saturating well on TC 35%. CT neck result reviewed, will call ENT in the morning. Dispo planning.     REVIEW OF SYSTEMS:  See above  [x] Unable to assess ROS because poor phonation    Mode: standby    OBJECTIVE:  ICU Vital Signs Last 24 Hrs  T(C): 36.7 (01 May 2022 04:01), Max: 36.9 (30 Apr 2022 13:00)  T(F): 98.1 (01 May 2022 04:01), Max: 98.4 (30 Apr 2022 13:00)  HR: 63 (01 May 2022 07:49) (61 - 102)  BP: 128/97 (01 May 2022 04:01) (105/61 - 128/97)  BP(mean): --  ABP: --  ABP(mean): --  RR: 18 (01 May 2022 04:01) (18 - 20)  SpO2: 97% (01 May 2022 07:49) (94% - 99%)    Mode: standby    04-30 @ 07:01  -  05-01 @ 07:00  --------------------------------------------------------  IN: 0 mL / OUT: 450 mL / NET: -450 mL    CAPILLARY BLOOD GLUCOSE    HOSPITAL MEDICATIONS:  MEDICATIONS  (STANDING):  ALBUTerol    90 MICROgram(s) HFA Inhaler 2 Puff(s) Inhalation every 6 hours  amantadine Syrup 100 milliGRAM(s) Oral two times a day  aMIOdarone    Tablet 200 milliGRAM(s) Oral daily  ascorbic acid 500 milliGRAM(s) Oral daily  carvedilol 6.25 milliGRAM(s) Oral every 12 hours  chlorhexidine 0.12% Liquid 15 milliLiter(s) Oral Mucosa every 12 hours  chlorhexidine 2% Cloths 1 Application(s) Topical daily  collagenase Ointment 1 Application(s) Topical daily  doxazosin 2 milliGRAM(s) Oral at bedtime  folic acid 1 milliGRAM(s) Oral daily  furosemide Solution 40 milliGRAM(s) Oral daily  heparin   Injectable 5000 Unit(s) SubCutaneous every 8 hours  ipratropium 17 MICROgram(s) HFA Inhaler 1 Puff(s) Inhalation every 6 hours  levETIRAcetam  Solution 1000 milliGRAM(s) Oral two times a day  levothyroxine 50 MICROGram(s) Oral daily  modafinil 100 milliGRAM(s) Oral <User Schedule>  pantoprazole   Suspension 40 milliGRAM(s) Oral daily  polyethylene glycol 3350 17 Gram(s) Oral daily  senna Syrup 10 milliLiter(s) Oral at bedtime  sodium chloride 3%  Inhalation 4 milliLiter(s) Inhalation every 6 hours    MEDICATIONS  (PRN):  acetaminophen    Suspension .. 650 milliGRAM(s) Oral every 4 hours PRN Temp greater or equal to 38C (100.4F), Mild Pain (1 - 3)    PHYSICAL EXAM  GENERAL: Laying in bed, NAD  HEENT: +Trach, area c/d/i, +left sided hard mass noted, nttp   Card: +s1, s2  Pulm: + coarse breath sound b/l  GI: +PEG, area c/d/i, obese abd, soft, nt/nd, no peritoneal signs noted  Ext: no asymmetry, no swelling, no calf tenderness  Neuro: alert and awake, following simple commands such as squeezing hands    LABS:                        10.0   9.31  )-----------( 256      ( 01 May 2022 06:39 )             32.1     05-01    137  |  96<L>  |  48<H>  ----------------------------<  112<H>  4.0   |  28  |  1.18    Ca    8.9      01 May 2022 06:39  Phos  3.6     05-01  Mg     2.20     05-01    PAST MEDICAL & SURGICAL HISTORY:  Essential hypertension    Primary osteoarthritis, unspecified site    Closed fracture of left radius, initial encounter    Morbid obesity, unspecified obesity type  h/o. - s/p gastric bypass- lost 113 lbs    KAREN (obstructive sleep apnea)  h/o - was on CPAP until gastric bypass surgery    Respiratory failure    Anemia    Subdural hemorrhage    Epilepsy    H/O gastrostomy    History of BPH    Afib    S/P gastric bypass  2008    Status post total replacement of left hip  2006    S/P bunionectomy  bilateral    S/P colonoscopy  approx 2012    History of abdominoplasty  and subsequent cosmetic surgery for removal of excess skin from face    FAMILY HISTORY:  Family history of renal cell carcinoma (Mother)    Family history of malignant melanoma (Sibling)    Social History:    RADIOLOGY:  [ ] Reviewed and interpreted by me    PULMONARY FUNCTION TESTS:    EKG: CHIEF COMPLAINT: Patient is a 76y old  Male who presents with a chief complaint of Hypoxia (30 Apr 2022 08:02)    Interval Events: None reported overnight. Clinically unchanged. Saturating well on TC 35%. CT neck result reviewed, will call ENT in the morning. Dispo planning.     REVIEW OF SYSTEMS:  See above  [x] Unable to assess ROS because poor phonation    Mode: standby    OBJECTIVE:  ICU Vital Signs Last 24 Hrs  T(C): 36.7 (01 May 2022 04:01), Max: 36.9 (30 Apr 2022 13:00)  T(F): 98.1 (01 May 2022 04:01), Max: 98.4 (30 Apr 2022 13:00)  HR: 63 (01 May 2022 07:49) (61 - 102)  BP: 128/97 (01 May 2022 04:01) (105/61 - 128/97)  BP(mean): --  ABP: --  ABP(mean): --  RR: 18 (01 May 2022 04:01) (18 - 20)  SpO2: 97% (01 May 2022 07:49) (94% - 99%)    Mode: standby    04-30 @ 07:01  -  05-01 @ 07:00  --------------------------------------------------------  IN: 0 mL / OUT: 450 mL / NET: -450 mL    CAPILLARY BLOOD GLUCOSE    HOSPITAL MEDICATIONS:  MEDICATIONS  (STANDING):  ALBUTerol    90 MICROgram(s) HFA Inhaler 2 Puff(s) Inhalation every 6 hours  amantadine Syrup 100 milliGRAM(s) Oral two times a day  aMIOdarone    Tablet 200 milliGRAM(s) Oral daily  ascorbic acid 500 milliGRAM(s) Oral daily  carvedilol 6.25 milliGRAM(s) Oral every 12 hours  chlorhexidine 0.12% Liquid 15 milliLiter(s) Oral Mucosa every 12 hours  chlorhexidine 2% Cloths 1 Application(s) Topical daily  collagenase Ointment 1 Application(s) Topical daily  doxazosin 2 milliGRAM(s) Oral at bedtime  folic acid 1 milliGRAM(s) Oral daily  furosemide Solution 40 milliGRAM(s) Oral daily  heparin   Injectable 5000 Unit(s) SubCutaneous every 8 hours  ipratropium 17 MICROgram(s) HFA Inhaler 1 Puff(s) Inhalation every 6 hours  levETIRAcetam  Solution 1000 milliGRAM(s) Oral two times a day  levothyroxine 50 MICROGram(s) Oral daily  modafinil 100 milliGRAM(s) Oral <User Schedule>  pantoprazole   Suspension 40 milliGRAM(s) Oral daily  polyethylene glycol 3350 17 Gram(s) Oral daily  senna Syrup 10 milliLiter(s) Oral at bedtime  sodium chloride 3%  Inhalation 4 milliLiter(s) Inhalation every 6 hours    MEDICATIONS  (PRN):  acetaminophen    Suspension .. 650 milliGRAM(s) Oral every 4 hours PRN Temp greater or equal to 38C (100.4F), Mild Pain (1 - 3)    PHYSICAL EXAM  GENERAL: Laying in bed, NAD  HEENT: +Trach, area c/d/i, +left sided hard mass noted, nttp   Card: +s1, s2  Pulm: + coarse breath sound b/l  GI: +PEG, area c/d/i, obese abd, soft, nt/nd, no peritoneal signs noted  Ext: no asymmetry, no swelling, no calf tenderness  Neuro: alert and awake, following simple commands such as squeezing hands    LABS:                        10.0   9.31  )-----------( 256      ( 01 May 2022 06:39 )             32.1     05-01    137  |  96<L>  |  48<H>  ----------------------------<  112<H>  4.0   |  28  |  1.18    Ca    8.9      01 May 2022 06:39  Phos  3.6     05-01  Mg     2.20     05-01    PAST MEDICAL & SURGICAL HISTORY:  Essential hypertension    Primary osteoarthritis, unspecified site    Closed fracture of left radius, initial encounter    Morbid obesity, unspecified obesity type  h/o. - s/p gastric bypass- lost 113 lbs    KAREN (obstructive sleep apnea)  h/o - was on CPAP until gastric bypass surgery    Respiratory failure    Anemia    Subdural hemorrhage    Epilepsy    H/O gastrostomy    History of BPH    Afib    S/P gastric bypass  2008    Status post total replacement of left hip  2006    S/P bunionectomy  bilateral    S/P colonoscopy  approx 2012    History of abdominoplasty  and subsequent cosmetic surgery for removal of excess skin from face    FAMILY HISTORY:  Family history of renal cell carcinoma (Mother)    Family history of malignant melanoma (Sibling)    Social History:    RADIOLOGY:  [ x] Reviewed and interpreted by me  < from: CT Neck Soft Tissue w/ IV Cont (04.30.22 @ 17:43) >  ACC: [id number] EXAM:  CT NECK SOFT TISSUE IC                          PROCEDURE DATE:  04/30/2022          INTERPRETATION:  CLINICAL INFORMATION: Left neck firm mass. History of   subfalcine herniation status post emergent right hemicraniectomy with  complications now with tracheostomy and ventilator dependent Evaluate for   parotiditis versus abscess.    TECHNIQUE:  Contrast enhanced CT of the soft tissues of the neck was performed.  Sagittal and coronal reformations were created.  Intravenouscontrast: 90 cc Omnipaque 350 was administered, 10 cc was   discarded.    COMPARISON: MRI brain 4/12/2022.    FINDINGS:    NASAL CAVITIES: Within normal limits.  PHARYNX: Within normal limits.  LARYNX: Trace secretions in the larynx extending into the proximal   trachea.  ESOPHAGUS: Within normal limits.    PAROTID AND SUBMANDIBULAR GLANDS: Enlargement and heterogeneous   enhancement of the left parotid gland with adjacent edema and fat   stranding, including slight thickening of the left platysma and overlying   subcutaneous fat stranding. There is a punctate 2 mm left intraparotid   stone. There is no obstructing stone in Stensen's duct or ductal   dilatation.  THYROID: Within normal limits.    LYMPH NODES: No lymphadenopathy.  BONES: Degenerative changes.    VISUALIZED PORTIONS:  INTRACRANIAL STRUCTURES: Redemonstration of postoperative changes related   to previous right sided craniectomy and cranioplasty with gliosis and   encephalomalacia in the right temporal occipital lobes, including ex   vacuo ventricular dilatation, better evaluated on prior dedicated brain   imaging.  PARANASAL SINUSES: The visualized paranasal sinuses are well aerated.   There are partial left mastoid air cell effusions.  LUNGS: Tracheostomy device in satisfactory position. No focal   consolidation. Evaluation is limited by motion.    IMPRESSION:  CT findings most compatible with left-sided parotitis. There is a 2 mm   left intraparotid stone, although there is no parotid duct dilatation or   obstructing stone.    --- End of Report ---    < end of copied text >    PULMONARY FUNCTION TESTS:    EKG:

## 2022-05-02 DIAGNOSIS — Z87.19 PERSONAL HISTORY OF OTHER DISEASES OF THE DIGESTIVE SYSTEM: ICD-10-CM

## 2022-05-02 DIAGNOSIS — K11.20 SIALOADENITIS, UNSPECIFIED: ICD-10-CM

## 2022-05-02 LAB
ANION GAP SERPL CALC-SCNC: 14 MMOL/L — SIGNIFICANT CHANGE UP (ref 7–14)
BUN SERPL-MCNC: 52 MG/DL — HIGH (ref 7–23)
CALCIUM SERPL-MCNC: 8.7 MG/DL — SIGNIFICANT CHANGE UP (ref 8.4–10.5)
CHLORIDE SERPL-SCNC: 96 MMOL/L — LOW (ref 98–107)
CO2 SERPL-SCNC: 24 MMOL/L — SIGNIFICANT CHANGE UP (ref 22–31)
CREAT SERPL-MCNC: 1.35 MG/DL — HIGH (ref 0.5–1.3)
EGFR: 55 ML/MIN/1.73M2 — LOW
GLUCOSE SERPL-MCNC: 111 MG/DL — HIGH (ref 70–99)
GRAM STN FLD: SIGNIFICANT CHANGE UP
HCT VFR BLD CALC: 27.3 % — LOW (ref 39–50)
HGB BLD-MCNC: 8.6 G/DL — LOW (ref 13–17)
MAGNESIUM SERPL-MCNC: 2.3 MG/DL — SIGNIFICANT CHANGE UP (ref 1.6–2.6)
MCHC RBC-ENTMCNC: 31.5 GM/DL — LOW (ref 32–36)
MCHC RBC-ENTMCNC: 31.9 PG — SIGNIFICANT CHANGE UP (ref 27–34)
MCV RBC AUTO: 101.1 FL — HIGH (ref 80–100)
NRBC # BLD: 0 /100 WBCS — SIGNIFICANT CHANGE UP
NRBC # FLD: 0 K/UL — SIGNIFICANT CHANGE UP
PHOSPHATE SERPL-MCNC: 3.6 MG/DL — SIGNIFICANT CHANGE UP (ref 2.5–4.5)
PLATELET # BLD AUTO: 274 K/UL — SIGNIFICANT CHANGE UP (ref 150–400)
POTASSIUM SERPL-MCNC: 4.1 MMOL/L — SIGNIFICANT CHANGE UP (ref 3.5–5.3)
POTASSIUM SERPL-SCNC: 4.1 MMOL/L — SIGNIFICANT CHANGE UP (ref 3.5–5.3)
RBC # BLD: 2.7 M/UL — LOW (ref 4.2–5.8)
RBC # FLD: 18.4 % — HIGH (ref 10.3–14.5)
SODIUM SERPL-SCNC: 134 MMOL/L — LOW (ref 135–145)
SPECIMEN SOURCE: SIGNIFICANT CHANGE UP
WBC # BLD: 10.13 K/UL — SIGNIFICANT CHANGE UP (ref 3.8–10.5)
WBC # FLD AUTO: 10.13 K/UL — SIGNIFICANT CHANGE UP (ref 3.8–10.5)

## 2022-05-02 PROCEDURE — 71045 X-RAY EXAM CHEST 1 VIEW: CPT | Mod: 26

## 2022-05-02 PROCEDURE — 99233 SBSQ HOSP IP/OBS HIGH 50: CPT

## 2022-05-02 RX ADMIN — ALBUTEROL 2 PUFF(S): 90 AEROSOL, METERED ORAL at 22:55

## 2022-05-02 RX ADMIN — ALBUTEROL 2 PUFF(S): 90 AEROSOL, METERED ORAL at 16:22

## 2022-05-02 RX ADMIN — ALBUTEROL 2 PUFF(S): 90 AEROSOL, METERED ORAL at 03:57

## 2022-05-02 RX ADMIN — Medication 1 PUFF(S): at 10:59

## 2022-05-02 RX ADMIN — HEPARIN SODIUM 5000 UNIT(S): 5000 INJECTION INTRAVENOUS; SUBCUTANEOUS at 21:26

## 2022-05-02 RX ADMIN — Medication 1 PUFF(S): at 03:57

## 2022-05-02 RX ADMIN — Medication 50 MICROGRAM(S): at 06:12

## 2022-05-02 RX ADMIN — CHLORHEXIDINE GLUCONATE 15 MILLILITER(S): 213 SOLUTION TOPICAL at 06:11

## 2022-05-02 RX ADMIN — Medication 2 MILLIGRAM(S): at 21:27

## 2022-05-02 RX ADMIN — Medication 40 MILLIGRAM(S): at 06:12

## 2022-05-02 RX ADMIN — Medication 1 MILLIGRAM(S): at 11:41

## 2022-05-02 RX ADMIN — SODIUM CHLORIDE 4 MILLILITER(S): 9 INJECTION INTRAMUSCULAR; INTRAVENOUS; SUBCUTANEOUS at 16:21

## 2022-05-02 RX ADMIN — AMIODARONE HYDROCHLORIDE 200 MILLIGRAM(S): 400 TABLET ORAL at 06:11

## 2022-05-02 RX ADMIN — MODAFINIL 100 MILLIGRAM(S): 200 TABLET ORAL at 06:31

## 2022-05-02 RX ADMIN — ALBUTEROL 2 PUFF(S): 90 AEROSOL, METERED ORAL at 10:59

## 2022-05-02 RX ADMIN — CARVEDILOL PHOSPHATE 6.25 MILLIGRAM(S): 80 CAPSULE, EXTENDED RELEASE ORAL at 06:12

## 2022-05-02 RX ADMIN — Medication 500 MILLIGRAM(S): at 11:42

## 2022-05-02 RX ADMIN — HEPARIN SODIUM 5000 UNIT(S): 5000 INJECTION INTRAVENOUS; SUBCUTANEOUS at 15:16

## 2022-05-02 RX ADMIN — POLYETHYLENE GLYCOL 3350 17 GRAM(S): 17 POWDER, FOR SOLUTION ORAL at 11:40

## 2022-05-02 RX ADMIN — Medication 1 PUFF(S): at 16:22

## 2022-05-02 RX ADMIN — CHLORHEXIDINE GLUCONATE 1 APPLICATION(S): 213 SOLUTION TOPICAL at 11:41

## 2022-05-02 RX ADMIN — SODIUM CHLORIDE 4 MILLILITER(S): 9 INJECTION INTRAMUSCULAR; INTRAVENOUS; SUBCUTANEOUS at 10:58

## 2022-05-02 RX ADMIN — Medication 100 MILLIGRAM(S): at 17:35

## 2022-05-02 RX ADMIN — PANTOPRAZOLE SODIUM 40 MILLIGRAM(S): 20 TABLET, DELAYED RELEASE ORAL at 11:52

## 2022-05-02 RX ADMIN — Medication 1 PUFF(S): at 22:55

## 2022-05-02 RX ADMIN — SODIUM CHLORIDE 4 MILLILITER(S): 9 INJECTION INTRAMUSCULAR; INTRAVENOUS; SUBCUTANEOUS at 03:57

## 2022-05-02 RX ADMIN — LEVETIRACETAM 1000 MILLIGRAM(S): 250 TABLET, FILM COATED ORAL at 17:35

## 2022-05-02 RX ADMIN — LEVETIRACETAM 1000 MILLIGRAM(S): 250 TABLET, FILM COATED ORAL at 06:11

## 2022-05-02 RX ADMIN — SODIUM CHLORIDE 4 MILLILITER(S): 9 INJECTION INTRAMUSCULAR; INTRAVENOUS; SUBCUTANEOUS at 22:57

## 2022-05-02 RX ADMIN — Medication 1 APPLICATION(S): at 11:41

## 2022-05-02 RX ADMIN — HEPARIN SODIUM 5000 UNIT(S): 5000 INJECTION INTRAVENOUS; SUBCUTANEOUS at 06:12

## 2022-05-02 RX ADMIN — Medication 100 MILLIGRAM(S): at 06:24

## 2022-05-02 RX ADMIN — CHLORHEXIDINE GLUCONATE 15 MILLILITER(S): 213 SOLUTION TOPICAL at 17:34

## 2022-05-02 RX ADMIN — SENNA PLUS 10 MILLILITER(S): 8.6 TABLET ORAL at 21:29

## 2022-05-02 NOTE — PROGRESS NOTE ADULT - SUBJECTIVE AND OBJECTIVE BOX
INTERVAL HPI/OVERNIGHT EVENTS:    no new gi events; no rectal bleeding   tolerating feeds  resting comfortably    MEDICATIONS  (STANDING):  ALBUTerol    90 MICROgram(s) HFA Inhaler 2 Puff(s) Inhalation every 6 hours  amantadine Syrup 100 milliGRAM(s) Oral two times a day  aMIOdarone    Tablet 200 milliGRAM(s) Oral daily  ascorbic acid 500 milliGRAM(s) Oral daily  carvedilol 6.25 milliGRAM(s) Oral every 12 hours  chlorhexidine 0.12% Liquid 15 milliLiter(s) Oral Mucosa every 12 hours  chlorhexidine 2% Cloths 1 Application(s) Topical daily  collagenase Ointment 1 Application(s) Topical daily  doxazosin 2 milliGRAM(s) Oral at bedtime  folic acid 1 milliGRAM(s) Oral daily  furosemide Solution 40 milliGRAM(s) Oral daily  heparin   Injectable 5000 Unit(s) SubCutaneous every 8 hours  ipratropium 17 MICROgram(s) HFA Inhaler 1 Puff(s) Inhalation every 6 hours  levETIRAcetam  Solution 1000 milliGRAM(s) Oral two times a day  levothyroxine 50 MICROGram(s) Oral daily  modafinil 100 milliGRAM(s) Oral <User Schedule>  pantoprazole   Suspension 40 milliGRAM(s) Oral daily  polyethylene glycol 3350 17 Gram(s) Oral daily  senna Syrup 10 milliLiter(s) Oral at bedtime  sodium chloride 3%  Inhalation 4 milliLiter(s) Inhalation every 6 hours    MEDICATIONS  (PRN):  acetaminophen    Suspension .. 650 milliGRAM(s) Oral every 4 hours PRN Temp greater or equal to 38C (100.4F), Mild Pain (1 - 3)      Allergies    No Known Allergies    Intolerances        Review of Systems: *pt minimally verbal to nonverbal, unable to obtain ROS         Vital Signs Last 24 Hrs  T(C): 37 (02 May 2022 05:58), Max: 37 (02 May 2022 05:58)  T(F): 98.6 (02 May 2022 05:58), Max: 98.6 (02 May 2022 05:58)  HR: 63 (02 May 2022 11:08) (57 - 68)  BP: 115/63 (02 May 2022 05:58) (115/63 - 121/70)  BP(mean): --  RR: 18 (02 May 2022 05:58) (18 - 19)  SpO2: 99% (02 May 2022 11:08) (97% - 99%)    PHYSICAL EXAM:    Constitutional: NAD  HEENT: EOMI, throat clear  Neck: No LAD, supple  Respiratory: CTA and P  Cardiovascular: S1 and S2, RRR, no M  Gastrointestinal: BS+, soft, NT/ND, neg HSM, +peg  Extremities: No peripheral edema, neg clubbing, cyanosis  Vascular: 2+ peripheral pulses  Neurological: A/O x 1  Psychiatric: nonverbal/lethargy  Skin: No rashes      LABS:                        8.6    10.13 )-----------( 274      ( 02 May 2022 05:54 )             27.3     05-02    134<L>  |  96<L>  |  52<H>  ----------------------------<  111<H>  4.1   |  24  |  1.35<H>    Ca    8.7      02 May 2022 05:54  Phos  3.6     05-02  Mg     2.30     05-02            RADIOLOGY & ADDITIONAL TESTS:

## 2022-05-02 NOTE — PROGRESS NOTE ADULT - ASSESSMENT
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 Now DISPO planning.    # Neurology   - Currently A&Ox0, awake and alert, able to move RUE and nods/ mouths one or two words per RCU evaluation and prior documentation.   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   - Continue on Modafinil, Amantidine and Keppra.     #HEENT  - Large firm mass along left neck noted 4/28  - CT Neck w/IV contrast - CT findings most compatible with left-sided parotitis. There is a 2 mm   left intraparotid stone, although there is no parotid duct dilatation or obstructing stone.  - ENT to follow up for recs     # Cardiovascular   - Hx of HFpEF 55, mild MR and AR, severe LAD, normal LVRVSF (ECHO 3/7)  - Hx of Atrial Fibrillation s/p Watchman and currently rate controlled.   - Continue on Amiodarone 200mg QD and Coreg 6.25mg BID.   - Continue on Lasix 40mg QD and monitor tolerance.     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   - Able to tolerate TC (from 4/15 during the day) and now ATC (from 4/21).   - Continue on Proventil, Atrovent and Chest PT Q6H  - c/w Hypersal to loosen secretions - thin secretions for now noted     # GI  - Hx of SDH, Gastric Bypass and Dysphagia s/p PEG and continued on TF with course complicated by constipation and melena  - Case discussed with GI and melena likely in setting of constipation, however no plan for scope currently and will medically treat with PPI.   - Monitor BMs on Miralax, Senna and Lactulose.   - Continue on Nepro in sight of hyperkalemia.   - s/p large BM on 4/25  - s/p trial of Reglan 10mg TID (4/24 - 4/27)    # / Renal  - Hx of Urinary Retention with Chronic Garcia and presented FLORIAN likely in setting of dehydration with fevers vs ATN with Sepsis (CRE high 2s and baseline 0.8-0.9).   - UA with hematuria and proteinuria and case discussed with Neprhology with concern for glomerulonephritis. ANCA, ISABELLE and GM ABs negative.   - Hypernatremia (resolved) - c/w free water 300 q6hr for now.   - Hyperkalemia and s/p cocktail with improvement (4/19).   - IVF and PRBC given with CRE improving and now resumed on Lasix with improvement.     # ID  - Recent admission for  Acinetobacter and Pseudomonas HCAP (3/2022)   - SCx (4/10) with  Acinetobacter and Pseudomonas.   - s/p completed Meropenem (4/8-4/14) and will monitor off ABX.     # Endocrine  - No hx of DM2 and A1C 5.9. Continue to monitor BG,    - Hx of Hypothyroidism and continued on Synthroid. TSH 47 with low T3/T4 and Free T4. TSH 80s (3/2022) and Synthroid increased at NH. Hypothyroidism could be in the setting of Amiodarone use and current Synthroid dose appears to be working.   - Next TFT to be done in June 2022.     # Hematology   - Anemia i/s/o AOCD and s/p PRBC (4/9)   - Melena/ anemia noted and s/p 2 U PRBC (4/19)  with good response, however HH waxes and wanes with no obvious bleed (no further melena or CGE from PEG aspiration)  - DVT PPX with HSQ (cleared by GI to resume).     # Ethics   - FULL CODE   - Case discussed with Cousin/ HCP, Dr. Donna Hagen (Pediatric Neurologist, [phone]) and updated daily. PM&R eval appreciated.     # DISPO - Planning to go back to NH with Neck CT results. 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 Now DISPO planning.    # Neurology   - Currently A&Ox0, awake and alert, able to move RUE and nods/ mouths one or two words per RCU evaluation and prior documentation.   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   - Continue on Modafinil, Amantidine and Keppra.     #HEENT  - Large firm mass along left neck noted 4/28  - CT Neck w/IV contrast - CT findings most compatible with left-sided parotitis. There is a 2 mm   left intraparotid stone, although there is no parotid duct dilatation or obstructing stone.  - ENT recs appreciated - Warm compress and massage TID, monitor for any worsening signs     # Cardiovascular   - Hx of HFpEF 55, mild MR and AR, severe LAD, normal LVRVSF (ECHO 3/7)  - Hx of Atrial Fibrillation s/p Watchman and currently rate controlled.   - Continue on Amiodarone 200mg QD and Coreg 6.25mg BID.   - Continue on Lasix 40mg QD and monitor tolerance.     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   - Able to tolerate TC (from 4/15 during the day) and now ATC (from 4/21).   - Continue on Proventil, Atrovent and Chest PT Q6H  - c/w Hypersal to loosen secretions (worsening)  - Sputum culture sent today 5/2 - will follow up  - CXR - No focal consolidations to indicate pneumonia.Improved aeration of left lower lobe.  Trace bilateral pleural effusions and bilateral lower lobe linear atelectasis.    # GI  - Hx of SDH, Gastric Bypass and Dysphagia s/p PEG and continued on TF with course complicated by constipation and melena  - Case discussed with GI and melena likely in setting of constipation, however no plan for scope currently and will medically treat with PPI.   - Monitor BMs on Miralax, Senna and Lactulose.   - Continue on Nepro in sight of hyperkalemia.   - s/p large BM on 4/25  - s/p trial of Reglan 10mg TID (4/24 - 4/27)    # / Renal  - Hx of Urinary Retention with Chronic Garcia and presented FLORIAN likely in setting of dehydration with fevers vs ATN with Sepsis (CRE high 2s and baseline 0.8-0.9).   - UA with hematuria and proteinuria and case discussed with Neprhology with concern for glomerulonephritis. ANCA, ISABELLE and GM ABs negative.   - Hypernatremia (resolved) - c/w free water 300 q6hr for now.   - Hyperkalemia (Resolved) and s/p cocktail with improvement (4/19).   - IVF and PRBC given with CRE improving and now resumed on Lasix with improvement.     # ID  - Recent admission for  Acinetobacter and Pseudomonas HCAP (3/2022)   - SCx (4/10) with  Acinetobacter and Pseudomonas.   - s/p completed Meropenem (4/8-4/14) and will monitor off ABX.     # Endocrine  - No hx of DM2 and A1C 5.9. Continue to monitor BG,    - Hx of Hypothyroidism and continued on Synthroid. TSH 47 with low T3/T4 and Free T4. TSH 80s (3/2022) and Synthroid increased at NH. Hypothyroidism could be in the setting of Amiodarone use and current Synthroid dose appears to be working.   - Next TFT to be done in June 2022.     # Hematology   - Anemia i/s/o AOCD and s/p PRBC (4/9)   - Melena/ anemia noted and s/p 2 U PRBC (4/19)  with good response, however HH waxes and wanes with no obvious bleed (no further melena or CGE from PEG aspiration)  - DVT PPX with HSQ (cleared by GI to resume).     # Ethics   - FULL CODE   - Case discussed with Cousin/ HCP, Dr. Donna Hagen (Pediatric Neurologist, [phone]) and updated daily. PM&R eval appreciated.     # DISPO - Planning to go back to NH with Neck CT results. Family requesting [facility] and  aware

## 2022-05-02 NOTE — CONSULT NOTE ADULT - SUBJECTIVE AND OBJECTIVE BOX
CC: parotitis     HPI: patient trach/vent that had CT neck showing left  parotitis. Patient no verbal.       PAST MEDICAL & SURGICAL HISTORY:  Essential hypertension    Primary osteoarthritis, unspecified site    Closed fracture of left radius, initial encounter    Morbid obesity, unspecified obesity type  h/o. - s/p gastric bypass- lost 113 lbs    KAREN (obstructive sleep apnea)  h/o - was on CPAP until gastric bypass surgery    Respiratory failure    Anemia    Subdural hemorrhage    Epilepsy    H/O gastrostomy    History of BPH    Afib    S/P gastric bypass  2008    Status post total replacement of left hip  2006    S/P bunionectomy  bilateral    S/P colonoscopy  approx 2012    History of abdominoplasty  and subsequent cosmetic surgery for removal of excess skin from face      Allergies    No Known Allergies    Intolerances      MEDICATIONS  (STANDING):  ALBUTerol    90 MICROgram(s) HFA Inhaler 2 Puff(s) Inhalation every 6 hours  amantadine Syrup 100 milliGRAM(s) Oral two times a day  aMIOdarone    Tablet 200 milliGRAM(s) Oral daily  ascorbic acid 500 milliGRAM(s) Oral daily  carvedilol 6.25 milliGRAM(s) Oral every 12 hours  chlorhexidine 0.12% Liquid 15 milliLiter(s) Oral Mucosa every 12 hours  chlorhexidine 2% Cloths 1 Application(s) Topical daily  collagenase Ointment 1 Application(s) Topical daily  doxazosin 2 milliGRAM(s) Oral at bedtime  folic acid 1 milliGRAM(s) Oral daily  furosemide Solution 40 milliGRAM(s) Oral daily  heparin   Injectable 5000 Unit(s) SubCutaneous every 8 hours  ipratropium 17 MICROgram(s) HFA Inhaler 1 Puff(s) Inhalation every 6 hours  levETIRAcetam  Solution 1000 milliGRAM(s) Oral two times a day  levothyroxine 50 MICROGram(s) Oral daily  modafinil 100 milliGRAM(s) Oral <User Schedule>  pantoprazole   Suspension 40 milliGRAM(s) Oral daily  polyethylene glycol 3350 17 Gram(s) Oral daily  senna Syrup 10 milliLiter(s) Oral at bedtime  sodium chloride 3%  Inhalation 4 milliLiter(s) Inhalation every 6 hours    MEDICATIONS  (PRN):  acetaminophen    Suspension .. 650 milliGRAM(s) Oral every 4 hours PRN Temp greater or equal to 38C (100.4F), Mild Pain (1 - 3)        ROS:   ENT: all negative except as noted in HPI   CV: denies palpitations  Pulm: denies SOB, cough, hemoptysis  GI: denies change in apetite, indigestion, n/v  : denies pertinent urinary symptoms, urgency  Neuro: denies numbness/tingling, loss of sensation  Psych: denies anxiety  MS: denies muscle weakness, instability  Heme: denies easy bruising or bleeding  Endo: denies heat/cold intolerance, excessive sweating  Vascular: denies LE edema    Vital Signs Last 24 Hrs  T(C): 37 (02 May 2022 05:58), Max: 37 (02 May 2022 05:58)  T(F): 98.6 (02 May 2022 05:58), Max: 98.6 (02 May 2022 05:58)  HR: 61 (02 May 2022 15:25) (57 - 68)  BP: 115/63 (02 May 2022 05:58) (115/63 - 121/70)  BP(mean): --  RR: 18 (02 May 2022 05:58) (18 - 19)  SpO2: 95% (02 May 2022 15:25) (95% - 99%)                          8.6    10.13 )-----------( 274      ( 02 May 2022 05:54 )             27.3    05-02    134<L>  |  96<L>  |  52<H>  ----------------------------<  111<H>  4.1   |  24  |  1.35<H>    Ca    8.7      02 May 2022 05:54  Phos  3.6     05-02  Mg     2.30     05-02         PHYSICAL EXAM:  Gen: NAD  Skin: No rashes, bruises, or lesions  Head: Normocephalic, Atraumatic  Face: no edema, erythema, or fluctuance. Parotid glands soft without mass  Eyes: no scleral injection  Ears: Right - ear canal clear, TM intact without effusion or erythema. No evidence of any fluid drainage. No mastoid tenderness, erythema, or ear bulging            Left - ear canal clear, TM intact without effusion or erythema. No evidence of any fluid drainage. No mastoid tenderness, erythema, or ear bulging  Nose: Nares bilaterally patent, no discharge  Mouth: No Stridor / Drooling, patient non cooperative with exam, no opening mouth.   Neck: Flat, supple, no lymphadenopathy, trachea midline, no masses  Lymphatic: No lymphadenopathy  Resp: breathing easily, no stridor  CV: no peripheral edema/cyanosis  GI: nondistended   Peripheral vascular: no JVD or edema  Neuro: facial nerve intact, no facial droop        IMAGING/ADDITIONAL STUDIES:     IMPRESSION:  CT findings most compatible with left-sided parotitis. There is a 2 mm left intraparotid stone, although there is no parotid duct dilatation or obstructing stone.

## 2022-05-02 NOTE — PROGRESS NOTE ADULT - SUBJECTIVE AND OBJECTIVE BOX
CHIEF COMPLAINT: Patient is a 76y old  Male who presents with a chief complaint of Hypoxia (01 May 2022 10:16)    Interval Events: None reported overnight. Clinically stable.     REVIEW OF SYSTEMS:  See above  [ ] Unable to assess ROS because ________    Mode: standby    OBJECTIVE:  ICU Vital Signs Last 24 Hrs  T(C): 37 (02 May 2022 05:58), Max: 37.4 (01 May 2022 12:00)  T(F): 98.6 (02 May 2022 05:58), Max: 99.4 (01 May 2022 12:00)  HR: 64 (02 May 2022 07:40) (57 - 68)  BP: 115/63 (02 May 2022 05:58) (101/69 - 121/70)  BP(mean): --  ABP: --  ABP(mean): --  RR: 18 (02 May 2022 05:58) (18 - 19)  SpO2: 99% (02 May 2022 07:40) (97% - 99%)    Mode: standby    05-01 @ 07:01  -  05-02 @ 07:00  --------------------------------------------------------  IN: 1500 mL / OUT: 1350 mL / NET: 150 mL    CAPILLARY BLOOD GLUCOSE    HOSPITAL MEDICATIONS:  MEDICATIONS  (STANDING):  ALBUTerol    90 MICROgram(s) HFA Inhaler 2 Puff(s) Inhalation every 6 hours  amantadine Syrup 100 milliGRAM(s) Oral two times a day  aMIOdarone    Tablet 200 milliGRAM(s) Oral daily  ascorbic acid 500 milliGRAM(s) Oral daily  carvedilol 6.25 milliGRAM(s) Oral every 12 hours  chlorhexidine 0.12% Liquid 15 milliLiter(s) Oral Mucosa every 12 hours  chlorhexidine 2% Cloths 1 Application(s) Topical daily  collagenase Ointment 1 Application(s) Topical daily  doxazosin 2 milliGRAM(s) Oral at bedtime  folic acid 1 milliGRAM(s) Oral daily  furosemide Solution 40 milliGRAM(s) Oral daily  heparin   Injectable 5000 Unit(s) SubCutaneous every 8 hours  ipratropium 17 MICROgram(s) HFA Inhaler 1 Puff(s) Inhalation every 6 hours  levETIRAcetam  Solution 1000 milliGRAM(s) Oral two times a day  levothyroxine 50 MICROGram(s) Oral daily  modafinil 100 milliGRAM(s) Oral <User Schedule>  pantoprazole   Suspension 40 milliGRAM(s) Oral daily  polyethylene glycol 3350 17 Gram(s) Oral daily  senna Syrup 10 milliLiter(s) Oral at bedtime  sodium chloride 3%  Inhalation 4 milliLiter(s) Inhalation every 6 hours    MEDICATIONS  (PRN):  acetaminophen    Suspension .. 650 milliGRAM(s) Oral every 4 hours PRN Temp greater or equal to 38C (100.4F), Mild Pain (1 - 3)    PHYSICAL EXAM  GENERAL: Laying in bed, NAD  HEENT: +Trach, area c/d/i, +left sided hard mass noted, nttp   Card: +s1, s2  Pulm: + coarse breath sound b/l  GI: +PEG, area c/d/i, obese abd, soft, nt/nd, no peritoneal signs noted  Ext: no asymmetry, no swelling, no calf tenderness  Neuro: alert and awake, following simple commands such as squeezing hands    LABS:                        8.6    10.13 )-----------( 274      ( 02 May 2022 05:54 )             27.3     05-02    134<L>  |  96<L>  |  52<H>  ----------------------------<  111<H>  4.1   |  24  |  1.35<H>    Ca    8.7      02 May 2022 05:54  Phos  3.6     05-02  Mg     2.30     05-02      PAST MEDICAL & SURGICAL HISTORY:  Essential hypertension    Primary osteoarthritis, unspecified site    Closed fracture of left radius, initial encounter    Morbid obesity, unspecified obesity type  h/o. - s/p gastric bypass- lost 113 lbs    KAREN (obstructive sleep apnea)  h/o - was on CPAP until gastric bypass surgery    Respiratory failure    Anemia    Subdural hemorrhage    Epilepsy    H/O gastrostomy    History of BPH    Afib    S/P gastric bypass  2008    Status post total replacement of left hip  2006    S/P bunionectomy  bilateral    S/P colonoscopy  approx 2012    History of abdominoplasty  and subsequent cosmetic surgery for removal of excess skin from face    FAMILY HISTORY:  Family history of renal cell carcinoma (Mother)    Family history of malignant melanoma (Sibling)    Social History:    RADIOLOGY:  [ ] Reviewed and interpreted by me    PULMONARY FUNCTION TESTS:    EKG: CHIEF COMPLAINT: Patient is a 76y old  Male who presents with a chief complaint of Hypoxia (01 May 2022 10:16)    Interval Events: None reported overnight. Clinically stable. Unable to assess ROS due to poor phonation    REVIEW OF SYSTEMS:  See above  [x] Unable to assess ROS because poor phonation    Mode: standby    OBJECTIVE:  ICU Vital Signs Last 24 Hrs  T(C): 37 (02 May 2022 05:58), Max: 37.4 (01 May 2022 12:00)  T(F): 98.6 (02 May 2022 05:58), Max: 99.4 (01 May 2022 12:00)  HR: 64 (02 May 2022 07:40) (57 - 68)  BP: 115/63 (02 May 2022 05:58) (101/69 - 121/70)  BP(mean): --  ABP: --  ABP(mean): --  RR: 18 (02 May 2022 05:58) (18 - 19)  SpO2: 99% (02 May 2022 07:40) (97% - 99%)    Mode: standby    05-01 @ 07:01  -  05-02 @ 07:00  --------------------------------------------------------  IN: 1500 mL / OUT: 1350 mL / NET: 150 mL    CAPILLARY BLOOD GLUCOSE    HOSPITAL MEDICATIONS:  MEDICATIONS  (STANDING):  ALBUTerol    90 MICROgram(s) HFA Inhaler 2 Puff(s) Inhalation every 6 hours  amantadine Syrup 100 milliGRAM(s) Oral two times a day  aMIOdarone    Tablet 200 milliGRAM(s) Oral daily  ascorbic acid 500 milliGRAM(s) Oral daily  carvedilol 6.25 milliGRAM(s) Oral every 12 hours  chlorhexidine 0.12% Liquid 15 milliLiter(s) Oral Mucosa every 12 hours  chlorhexidine 2% Cloths 1 Application(s) Topical daily  collagenase Ointment 1 Application(s) Topical daily  doxazosin 2 milliGRAM(s) Oral at bedtime  folic acid 1 milliGRAM(s) Oral daily  furosemide Solution 40 milliGRAM(s) Oral daily  heparin   Injectable 5000 Unit(s) SubCutaneous every 8 hours  ipratropium 17 MICROgram(s) HFA Inhaler 1 Puff(s) Inhalation every 6 hours  levETIRAcetam  Solution 1000 milliGRAM(s) Oral two times a day  levothyroxine 50 MICROGram(s) Oral daily  modafinil 100 milliGRAM(s) Oral <User Schedule>  pantoprazole   Suspension 40 milliGRAM(s) Oral daily  polyethylene glycol 3350 17 Gram(s) Oral daily  senna Syrup 10 milliLiter(s) Oral at bedtime  sodium chloride 3%  Inhalation 4 milliLiter(s) Inhalation every 6 hours    MEDICATIONS  (PRN):  acetaminophen    Suspension .. 650 milliGRAM(s) Oral every 4 hours PRN Temp greater or equal to 38C (100.4F), Mild Pain (1 - 3)    PHYSICAL EXAM  GENERAL: Laying in bed, NAD  HEENT: +Trach, area c/d/i, +left sided hard mass noted, nttp   Card: +s1, s2  Pulm: + coarse breath sound b/l  GI: +PEG, area c/d/i, obese abd, soft, nt/nd, no peritoneal signs noted  Ext: no asymmetry, no swelling, no calf tenderness  Neuro: alert and awake, following simple commands such as squeezing hands    PHYSICAL EXAM  GENERAL: Laying in bed, NAD  HEENT: +Trach, area c/d/i, +left sided hard mass noted, nttp   Card: +s1, s2  Pulm: + coarse breath sound b/l  GI: +PEG, area c/d/i, obese abd, soft, nt/nd, no peritoneal signs noted  Ext: no asymmetry, no swelling, no calf tenderness  Neuro: alert and awake, following simple commands such as squeezing hands    LABS:                        8.6    10.13 )-----------( 274      ( 02 May 2022 05:54 )             27.3     05-02    134<L>  |  96<L>  |  52<H>  ----------------------------<  111<H>  4.1   |  24  |  1.35<H>    Ca    8.7      02 May 2022 05:54  Phos  3.6     05-02  Mg     2.30     05-02      PAST MEDICAL & SURGICAL HISTORY:  Essential hypertension    Primary osteoarthritis, unspecified site    Closed fracture of left radius, initial encounter    Morbid obesity, unspecified obesity type  h/o. - s/p gastric bypass- lost 113 lbs    KAREN (obstructive sleep apnea)  h/o - was on CPAP until gastric bypass surgery    Respiratory failure    Anemia    Subdural hemorrhage    Epilepsy    H/O gastrostomy    History of BPH    Afib    S/P gastric bypass  2008    Status post total replacement of left hip  2006    S/P bunionectomy  bilateral    S/P colonoscopy  approx 2012    History of abdominoplasty  and subsequent cosmetic surgery for removal of excess skin from face    FAMILY HISTORY:  Family history of renal cell carcinoma (Mother)    Family history of malignant melanoma (Sibling)    Social History:    RADIOLOGY:  [ ] Reviewed and interpreted by me    PULMONARY FUNCTION TESTS:    EKG:

## 2022-05-02 NOTE — CONSULT NOTE ADULT - CONSULT REASON
eval for rehab
Sacrum unstageable pressure injury POA
FLORIAN
hypothyroidism
abnormal CTH
Positive Occult
VAP
parotitis

## 2022-05-02 NOTE — CONSULT NOTE ADULT - PROBLEM SELECTOR RECOMMENDATION 9
1. No exam findings suggestive of parotitis. Can use warm compresses to left parotid gland tid, and gentle massage  tid otherwise monitoring for worsening of any swelling, erythema, pain.

## 2022-05-02 NOTE — CONSULT NOTE ADULT - CONSULT REQUESTED DATE/TIME
02-May-2022
15-Apr-2022 11:22
22-Apr-2022 13:38
09-Apr-2022 13:59
10-Apr-2022 13:36
19-Apr-2022 12:23
11-Apr-2022 16:57
10-Apr-2022 12:14

## 2022-05-02 NOTE — CONSULT NOTE ADULT - PROVIDER SPECIALTY LIST ADULT
Infectious Disease
ENT
Endocrinology
Gastroenterology
Neurology
Rehab Medicine
Wound Care
Nephrology

## 2022-05-03 LAB
ANION GAP SERPL CALC-SCNC: 14 MMOL/L — SIGNIFICANT CHANGE UP (ref 7–14)
B PERT DNA SPEC QL NAA+PROBE: SIGNIFICANT CHANGE UP
B PERT+PARAPERT DNA PNL SPEC NAA+PROBE: SIGNIFICANT CHANGE UP
BASE EXCESS BLDV CALC-SCNC: 5.3 MMOL/L — HIGH (ref -2–3)
BASE EXCESS BLDV CALC-SCNC: 7.8 MMOL/L — HIGH (ref -2–3)
BORDETELLA PARAPERTUSSIS (RAPRVP): SIGNIFICANT CHANGE UP
BUN SERPL-MCNC: 55 MG/DL — HIGH (ref 7–23)
C PNEUM DNA SPEC QL NAA+PROBE: SIGNIFICANT CHANGE UP
CA-I SERPL-SCNC: 1.14 MMOL/L — LOW (ref 1.15–1.33)
CA-I SERPL-SCNC: 1.15 MMOL/L — SIGNIFICANT CHANGE UP (ref 1.15–1.33)
CALCIUM SERPL-MCNC: 8.6 MG/DL — SIGNIFICANT CHANGE UP (ref 8.4–10.5)
CHLORIDE BLDV-SCNC: 98 MMOL/L — SIGNIFICANT CHANGE UP (ref 96–108)
CHLORIDE BLDV-SCNC: 99 MMOL/L — SIGNIFICANT CHANGE UP (ref 96–108)
CHLORIDE SERPL-SCNC: 94 MMOL/L — LOW (ref 98–107)
CO2 BLDV-SCNC: 31.9 MMOL/L — HIGH (ref 22–26)
CO2 BLDV-SCNC: 34.1 MMOL/L — HIGH (ref 22–26)
CO2 SERPL-SCNC: 26 MMOL/L — SIGNIFICANT CHANGE UP (ref 22–31)
CREAT SERPL-MCNC: 1.36 MG/DL — HIGH (ref 0.5–1.3)
EGFR: 54 ML/MIN/1.73M2 — LOW
FLUAV SUBTYP SPEC NAA+PROBE: SIGNIFICANT CHANGE UP
FLUBV RNA SPEC QL NAA+PROBE: SIGNIFICANT CHANGE UP
GAS PNL BLDV: 135 MMOL/L — LOW (ref 136–145)
GAS PNL BLDV: 135 MMOL/L — LOW (ref 136–145)
GAS PNL BLDV: SIGNIFICANT CHANGE UP
GLUCOSE BLDC GLUCOMTR-MCNC: 181 MG/DL — HIGH (ref 70–99)
GLUCOSE BLDV-MCNC: 119 MG/DL — HIGH (ref 70–99)
GLUCOSE BLDV-MCNC: 168 MG/DL — HIGH (ref 70–99)
GLUCOSE SERPL-MCNC: 119 MG/DL — HIGH (ref 70–99)
HADV DNA SPEC QL NAA+PROBE: SIGNIFICANT CHANGE UP
HCO3 BLDV-SCNC: 30 MMOL/L — HIGH (ref 22–29)
HCO3 BLDV-SCNC: 33 MMOL/L — HIGH (ref 22–29)
HCOV 229E RNA SPEC QL NAA+PROBE: SIGNIFICANT CHANGE UP
HCOV HKU1 RNA SPEC QL NAA+PROBE: SIGNIFICANT CHANGE UP
HCOV NL63 RNA SPEC QL NAA+PROBE: SIGNIFICANT CHANGE UP
HCOV OC43 RNA SPEC QL NAA+PROBE: SIGNIFICANT CHANGE UP
HCT VFR BLD CALC: 27.9 % — LOW (ref 39–50)
HCT VFR BLD CALC: 31.8 % — LOW (ref 39–50)
HCT VFR BLDA CALC: 27 % — LOW (ref 39–51)
HCT VFR BLDA CALC: 39 % — SIGNIFICANT CHANGE UP (ref 39–51)
HGB BLD CALC-MCNC: 13.1 G/DL — SIGNIFICANT CHANGE UP (ref 13–17)
HGB BLD CALC-MCNC: 9.1 G/DL — LOW (ref 13–17)
HGB BLD-MCNC: 10.1 G/DL — LOW (ref 13–17)
HGB BLD-MCNC: 8.8 G/DL — LOW (ref 13–17)
HMPV RNA SPEC QL NAA+PROBE: SIGNIFICANT CHANGE UP
HPIV1 RNA SPEC QL NAA+PROBE: SIGNIFICANT CHANGE UP
HPIV2 RNA SPEC QL NAA+PROBE: SIGNIFICANT CHANGE UP
HPIV3 RNA SPEC QL NAA+PROBE: SIGNIFICANT CHANGE UP
HPIV4 RNA SPEC QL NAA+PROBE: SIGNIFICANT CHANGE UP
LACTATE BLDV-MCNC: 1.8 MMOL/L — SIGNIFICANT CHANGE UP (ref 0.5–2)
LACTATE BLDV-MCNC: 2.4 MMOL/L — HIGH (ref 0.5–2)
LACTATE SERPL-SCNC: 2.3 MMOL/L — HIGH (ref 0.5–2)
M PNEUMO DNA SPEC QL NAA+PROBE: SIGNIFICANT CHANGE UP
MAGNESIUM SERPL-MCNC: 2.3 MG/DL — SIGNIFICANT CHANGE UP (ref 1.6–2.6)
MCHC RBC-ENTMCNC: 31.5 GM/DL — LOW (ref 32–36)
MCHC RBC-ENTMCNC: 31.8 GM/DL — LOW (ref 32–36)
MCHC RBC-ENTMCNC: 31.8 PG — SIGNIFICANT CHANGE UP (ref 27–34)
MCHC RBC-ENTMCNC: 31.8 PG — SIGNIFICANT CHANGE UP (ref 27–34)
MCV RBC AUTO: 100 FL — SIGNIFICANT CHANGE UP (ref 80–100)
MCV RBC AUTO: 100.7 FL — HIGH (ref 80–100)
NRBC # BLD: 0 /100 WBCS — SIGNIFICANT CHANGE UP
NRBC # BLD: 0 /100 WBCS — SIGNIFICANT CHANGE UP
NRBC # FLD: 0 K/UL — SIGNIFICANT CHANGE UP
NRBC # FLD: 0 K/UL — SIGNIFICANT CHANGE UP
PCO2 BLDV: 46 MMHG — SIGNIFICANT CHANGE UP (ref 42–55)
PCO2 BLDV: 47 MMHG — SIGNIFICANT CHANGE UP (ref 42–55)
PH BLDV: 7.43 — SIGNIFICANT CHANGE UP (ref 7.32–7.43)
PH BLDV: 7.45 — HIGH (ref 7.32–7.43)
PHOSPHATE SERPL-MCNC: 3.4 MG/DL — SIGNIFICANT CHANGE UP (ref 2.5–4.5)
PLATELET # BLD AUTO: 243 K/UL — SIGNIFICANT CHANGE UP (ref 150–400)
PLATELET # BLD AUTO: 261 K/UL — SIGNIFICANT CHANGE UP (ref 150–400)
PO2 BLDV: 34 MMHG — SIGNIFICANT CHANGE UP
PO2 BLDV: 47 MMHG — SIGNIFICANT CHANGE UP
POTASSIUM BLDV-SCNC: 3.7 MMOL/L — SIGNIFICANT CHANGE UP (ref 3.5–5.1)
POTASSIUM BLDV-SCNC: 3.8 MMOL/L — SIGNIFICANT CHANGE UP (ref 3.5–5.1)
POTASSIUM SERPL-MCNC: 3.7 MMOL/L — SIGNIFICANT CHANGE UP (ref 3.5–5.3)
POTASSIUM SERPL-SCNC: 3.7 MMOL/L — SIGNIFICANT CHANGE UP (ref 3.5–5.3)
PROCALCITONIN SERPL-MCNC: 0.17 NG/ML — HIGH (ref 0.02–0.1)
RAPID RVP RESULT: SIGNIFICANT CHANGE UP
RBC # BLD: 2.77 M/UL — LOW (ref 4.2–5.8)
RBC # BLD: 3.18 M/UL — LOW (ref 4.2–5.8)
RBC # FLD: 18.2 % — HIGH (ref 10.3–14.5)
RBC # FLD: 18.2 % — HIGH (ref 10.3–14.5)
RSV RNA SPEC QL NAA+PROBE: SIGNIFICANT CHANGE UP
RV+EV RNA SPEC QL NAA+PROBE: SIGNIFICANT CHANGE UP
SAO2 % BLDV: 56.7 % — SIGNIFICANT CHANGE UP
SAO2 % BLDV: 73.7 % — SIGNIFICANT CHANGE UP
SARS-COV-2 RNA SPEC QL NAA+PROBE: SIGNIFICANT CHANGE UP
SARS-COV-2 RNA SPEC QL NAA+PROBE: SIGNIFICANT CHANGE UP
SODIUM SERPL-SCNC: 134 MMOL/L — LOW (ref 135–145)
WBC # BLD: 10.68 K/UL — HIGH (ref 3.8–10.5)
WBC # BLD: 11.51 K/UL — HIGH (ref 3.8–10.5)
WBC # FLD AUTO: 10.68 K/UL — HIGH (ref 3.8–10.5)
WBC # FLD AUTO: 11.51 K/UL — HIGH (ref 3.8–10.5)

## 2022-05-03 PROCEDURE — 71045 X-RAY EXAM CHEST 1 VIEW: CPT | Mod: 26

## 2022-05-03 PROCEDURE — 99233 SBSQ HOSP IP/OBS HIGH 50: CPT

## 2022-05-03 RX ORDER — SODIUM CHLORIDE 9 MG/ML
250 INJECTION INTRAMUSCULAR; INTRAVENOUS; SUBCUTANEOUS ONCE
Refills: 0 | Status: COMPLETED | OUTPATIENT
Start: 2022-05-03 | End: 2022-05-03

## 2022-05-03 RX ORDER — PIPERACILLIN AND TAZOBACTAM 4; .5 G/20ML; G/20ML
3.38 INJECTION, POWDER, LYOPHILIZED, FOR SOLUTION INTRAVENOUS ONCE
Refills: 0 | Status: COMPLETED | OUTPATIENT
Start: 2022-05-03 | End: 2022-05-03

## 2022-05-03 RX ORDER — SODIUM CHLORIDE 9 MG/ML
500 INJECTION INTRAMUSCULAR; INTRAVENOUS; SUBCUTANEOUS ONCE
Refills: 0 | Status: COMPLETED | OUTPATIENT
Start: 2022-05-03 | End: 2022-05-03

## 2022-05-03 RX ORDER — MIDODRINE HYDROCHLORIDE 2.5 MG/1
10 TABLET ORAL ONCE
Refills: 0 | Status: COMPLETED | OUTPATIENT
Start: 2022-05-03 | End: 2022-05-03

## 2022-05-03 RX ORDER — VANCOMYCIN HCL 1 G
1000 VIAL (EA) INTRAVENOUS ONCE
Refills: 0 | Status: COMPLETED | OUTPATIENT
Start: 2022-05-03 | End: 2022-05-03

## 2022-05-03 RX ADMIN — Medication 500 MILLIGRAM(S): at 11:56

## 2022-05-03 RX ADMIN — Medication 1 PUFF(S): at 09:45

## 2022-05-03 RX ADMIN — PANTOPRAZOLE SODIUM 40 MILLIGRAM(S): 20 TABLET, DELAYED RELEASE ORAL at 11:57

## 2022-05-03 RX ADMIN — ALBUTEROL 2 PUFF(S): 90 AEROSOL, METERED ORAL at 09:45

## 2022-05-03 RX ADMIN — MODAFINIL 100 MILLIGRAM(S): 200 TABLET ORAL at 05:02

## 2022-05-03 RX ADMIN — Medication 100 MILLIGRAM(S): at 05:01

## 2022-05-03 RX ADMIN — ALBUTEROL 2 PUFF(S): 90 AEROSOL, METERED ORAL at 16:32

## 2022-05-03 RX ADMIN — SODIUM CHLORIDE 4 MILLILITER(S): 9 INJECTION INTRAMUSCULAR; INTRAVENOUS; SUBCUTANEOUS at 23:01

## 2022-05-03 RX ADMIN — HEPARIN SODIUM 5000 UNIT(S): 5000 INJECTION INTRAVENOUS; SUBCUTANEOUS at 21:05

## 2022-05-03 RX ADMIN — Medication 650 MILLIGRAM(S): at 18:13

## 2022-05-03 RX ADMIN — HEPARIN SODIUM 5000 UNIT(S): 5000 INJECTION INTRAVENOUS; SUBCUTANEOUS at 13:06

## 2022-05-03 RX ADMIN — AMIODARONE HYDROCHLORIDE 200 MILLIGRAM(S): 400 TABLET ORAL at 05:01

## 2022-05-03 RX ADMIN — Medication 2 MILLIGRAM(S): at 21:05

## 2022-05-03 RX ADMIN — Medication 1 PUFF(S): at 23:01

## 2022-05-03 RX ADMIN — POLYETHYLENE GLYCOL 3350 17 GRAM(S): 17 POWDER, FOR SOLUTION ORAL at 11:57

## 2022-05-03 RX ADMIN — Medication 40 MILLIGRAM(S): at 05:00

## 2022-05-03 RX ADMIN — SODIUM CHLORIDE 500 MILLILITER(S): 9 INJECTION INTRAMUSCULAR; INTRAVENOUS; SUBCUTANEOUS at 21:49

## 2022-05-03 RX ADMIN — Medication 1 PUFF(S): at 04:12

## 2022-05-03 RX ADMIN — Medication 1 PUFF(S): at 16:32

## 2022-05-03 RX ADMIN — CHLORHEXIDINE GLUCONATE 1 APPLICATION(S): 213 SOLUTION TOPICAL at 11:58

## 2022-05-03 RX ADMIN — PIPERACILLIN AND TAZOBACTAM 200 GRAM(S): 4; .5 INJECTION, POWDER, LYOPHILIZED, FOR SOLUTION INTRAVENOUS at 22:40

## 2022-05-03 RX ADMIN — SODIUM CHLORIDE 4 MILLILITER(S): 9 INJECTION INTRAMUSCULAR; INTRAVENOUS; SUBCUTANEOUS at 09:45

## 2022-05-03 RX ADMIN — SODIUM CHLORIDE 4 MILLILITER(S): 9 INJECTION INTRAMUSCULAR; INTRAVENOUS; SUBCUTANEOUS at 16:31

## 2022-05-03 RX ADMIN — Medication 650 MILLIGRAM(S): at 18:43

## 2022-05-03 RX ADMIN — Medication 250 MILLIGRAM(S): at 23:07

## 2022-05-03 RX ADMIN — Medication 1 APPLICATION(S): at 11:57

## 2022-05-03 RX ADMIN — LEVETIRACETAM 1000 MILLIGRAM(S): 250 TABLET, FILM COATED ORAL at 05:01

## 2022-05-03 RX ADMIN — SODIUM CHLORIDE 4 MILLILITER(S): 9 INJECTION INTRAMUSCULAR; INTRAVENOUS; SUBCUTANEOUS at 04:11

## 2022-05-03 RX ADMIN — CHLORHEXIDINE GLUCONATE 15 MILLILITER(S): 213 SOLUTION TOPICAL at 17:47

## 2022-05-03 RX ADMIN — Medication 50 MICROGRAM(S): at 05:02

## 2022-05-03 RX ADMIN — Medication 100 MILLIGRAM(S): at 17:44

## 2022-05-03 RX ADMIN — Medication 650 MILLIGRAM(S): at 23:52

## 2022-05-03 RX ADMIN — HEPARIN SODIUM 5000 UNIT(S): 5000 INJECTION INTRAVENOUS; SUBCUTANEOUS at 05:02

## 2022-05-03 RX ADMIN — Medication 1 MILLIGRAM(S): at 11:56

## 2022-05-03 RX ADMIN — MIDODRINE HYDROCHLORIDE 10 MILLIGRAM(S): 2.5 TABLET ORAL at 23:48

## 2022-05-03 RX ADMIN — SODIUM CHLORIDE 500 MILLILITER(S): 9 INJECTION INTRAMUSCULAR; INTRAVENOUS; SUBCUTANEOUS at 18:50

## 2022-05-03 RX ADMIN — ALBUTEROL 2 PUFF(S): 90 AEROSOL, METERED ORAL at 04:12

## 2022-05-03 RX ADMIN — CHLORHEXIDINE GLUCONATE 15 MILLILITER(S): 213 SOLUTION TOPICAL at 05:00

## 2022-05-03 RX ADMIN — ALBUTEROL 2 PUFF(S): 90 AEROSOL, METERED ORAL at 23:01

## 2022-05-03 RX ADMIN — LEVETIRACETAM 1000 MILLIGRAM(S): 250 TABLET, FILM COATED ORAL at 17:46

## 2022-05-03 NOTE — PROGRESS NOTE ADULT - SUBJECTIVE AND OBJECTIVE BOX
INTERVAL HPI/OVERNIGHT EVENTS:    no new gi events; no rectal bleeding   tolerating feeds  resting comfortably    MEDICATIONS  (STANDING):  ALBUTerol    90 MICROgram(s) HFA Inhaler 2 Puff(s) Inhalation every 6 hours  amantadine Syrup 100 milliGRAM(s) Oral two times a day  aMIOdarone    Tablet 200 milliGRAM(s) Oral daily  ascorbic acid 500 milliGRAM(s) Oral daily  carvedilol 6.25 milliGRAM(s) Oral every 12 hours  chlorhexidine 0.12% Liquid 15 milliLiter(s) Oral Mucosa every 12 hours  chlorhexidine 2% Cloths 1 Application(s) Topical daily  collagenase Ointment 1 Application(s) Topical daily  doxazosin 2 milliGRAM(s) Oral at bedtime  folic acid 1 milliGRAM(s) Oral daily  furosemide Solution 40 milliGRAM(s) Oral daily  heparin   Injectable 5000 Unit(s) SubCutaneous every 8 hours  ipratropium 17 MICROgram(s) HFA Inhaler 1 Puff(s) Inhalation every 6 hours  levETIRAcetam  Solution 1000 milliGRAM(s) Oral two times a day  levothyroxine 50 MICROGram(s) Oral daily  modafinil 100 milliGRAM(s) Oral <User Schedule>  pantoprazole   Suspension 40 milliGRAM(s) Oral daily  polyethylene glycol 3350 17 Gram(s) Oral daily  senna Syrup 10 milliLiter(s) Oral at bedtime  sodium chloride 3%  Inhalation 4 milliLiter(s) Inhalation every 6 hours    MEDICATIONS  (PRN):  acetaminophen    Suspension .. 650 milliGRAM(s) Oral every 4 hours PRN Temp greater or equal to 38C (100.4F), Mild Pain (1 - 3)      Allergies    No Known Allergies    Intolerances        Review of Systems: *pt minimally verbal to nonverbal, unable to obtain ROS       Vital Signs Last 24 Hrs  T(C): 36.2 (03 May 2022 05:08), Max: 37.1 (02 May 2022 14:30)  T(F): 97.1 (03 May 2022 05:08), Max: 98.8 (02 May 2022 14:30)  HR: 67 (03 May 2022 12:00) (61 - 98)  BP: 101/64 (03 May 2022 12:00) (101/64 - 124/69)  BP(mean): --  RR: 15 (03 May 2022 12:00) (15 - 19)  SpO2: 100% (03 May 2022 12:00) (92% - 100%)    PHYSICAL EXAM:    Constitutional: NAD  HEENT: EOMI, throat clear  Neck: No LAD, supple  Respiratory: CTA and P  Cardiovascular: S1 and S2, RRR, no M  Gastrointestinal: BS+, soft, NT/ND, neg HSM, +peg  Extremities: No peripheral edema, neg clubbing, cyanosis  Vascular: 2+ peripheral pulses  Neurological: A/O x 1  Psychiatric: nonverbal/lethargy  Skin: No rashes    LABS:                        8.8    10.68 )-----------( 243      ( 03 May 2022 03:08 )             27.9     05-03    134<L>  |  94<L>  |  55<H>  ----------------------------<  119<H>  3.7   |  26  |  1.36<H>    Ca    8.6      03 May 2022 03:08  Phos  3.4     05-03  Mg     2.30     05-03                  RADIOLOGY & ADDITIONAL TESTS:

## 2022-05-03 NOTE — PROGRESS NOTE ADULT - SUBJECTIVE AND OBJECTIVE BOX
CHIEF COMPLAINT: Patient is a 76y old  Male who presents with a chief complaint of Hypoxia (02 May 2022 15:24)    Interval Events:    REVIEW OF SYSTEMS:  [ ] All other systems negative  [ ] Unable to assess ROS because ________    Mode: standby    OBJECTIVE:  ICU Vital Signs Last 24 Hrs  T(C): 36.2 (03 May 2022 05:08), Max: 37.1 (02 May 2022 14:30)  T(F): 97.1 (03 May 2022 05:08), Max: 98.8 (02 May 2022 14:30)  HR: 65 (03 May 2022 07:14) (61 - 98)  BP: 118/65 (03 May 2022 05:08) (110/67 - 124/69)  BP(mean): --  ABP: --  ABP(mean): --  RR: 19 (03 May 2022 05:08) (18 - 19)  SpO2: 100% (03 May 2022 07:14) (95% - 100%)    Mode: standby    05-02 @ 07:01 - 05-03 @ 07:00  --------------------------------------------------------  IN: 1980 mL / OUT: 1100 mL / NET: 880 mL    05-03 @ 07:01  -  05-03 @ 09:34  --------------------------------------------------------  IN: 90 mL / OUT: 200 mL / NET: -110 mL    CAPILLARY BLOOD GLUCOSE    [facility] MEDICATIONS:  MEDICATIONS  (STANDING):  ALBUTerol    90 MICROgram(s) HFA Inhaler 2 Puff(s) Inhalation every 6 hours  amantadine Syrup 100 milliGRAM(s) Oral two times a day  aMIOdarone    Tablet 200 milliGRAM(s) Oral daily  ascorbic acid 500 milliGRAM(s) Oral daily  carvedilol 6.25 milliGRAM(s) Oral every 12 hours  chlorhexidine 0.12% Liquid 15 milliLiter(s) Oral Mucosa every 12 hours  chlorhexidine 2% Cloths 1 Application(s) Topical daily  collagenase Ointment 1 Application(s) Topical daily  doxazosin 2 milliGRAM(s) Oral at bedtime  folic acid 1 milliGRAM(s) Oral daily  furosemide Solution 40 milliGRAM(s) Oral daily  heparin   Injectable 5000 Unit(s) SubCutaneous every 8 hours  ipratropium 17 MICROgram(s) HFA Inhaler 1 Puff(s) Inhalation every 6 hours  levETIRAcetam  Solution 1000 milliGRAM(s) Oral two times a day  levothyroxine 50 MICROGram(s) Oral daily  modafinil 100 milliGRAM(s) Oral <User Schedule>  pantoprazole   Suspension 40 milliGRAM(s) Oral daily  polyethylene glycol 3350 17 Gram(s) Oral daily  senna Syrup 10 milliLiter(s) Oral at bedtime  sodium chloride 3%  Inhalation 4 milliLiter(s) Inhalation every 6 hours    MEDICATIONS  (PRN):  acetaminophen    Suspension .. 650 milliGRAM(s) Oral every 4 hours PRN Temp greater or equal to 38C (100.4F), Mild Pain (1 - 3)      LABS:                        8.8    10.68 )-----------( 243      ( 03 May 2022 03:08 )             27.9     05-03    134<L>  |  94<L>  |  55<H>  ----------------------------<  119<H>  3.7   |  26  |  1.36<H>    Ca    8.6      03 May 2022 03:08  Phos  3.4     05-03  Mg     2.30     05-03    PAST MEDICAL & SURGICAL HISTORY:  Essential hypertension    Primary osteoarthritis, unspecified site    Closed fracture of left radius, initial encounter    Morbid obesity, unspecified obesity type  h/o. - s/p gastric bypass- lost 113 lbs    KAREN (obstructive sleep apnea)  h/o - was on CPAP until gastric bypass surgery    Respiratory failure    Anemia    Subdural hemorrhage    Epilepsy    H/O gastrostomy    History of BPH    Afib    S/P gastric bypass  2008    Status post total replacement of left hip  2006    S/P bunionectomy  bilateral    S/P colonoscopy  approx 2012    History of abdominoplasty  and subsequent cosmetic surgery for removal of excess skin from face    FAMILY HISTORY:  Family history of renal cell carcinoma (Mother)    Family history of malignant melanoma (Sibling)    Social History:    RADIOLOGY:  [ ] Reviewed and interpreted by me    PULMONARY FUNCTION TESTS:    EKG: CHIEF COMPLAINT: Patient is a 76y old  Male who presents with a chief complaint of Hypoxia (02 May 2022 15:24)    Interval Events: None reported overnight. Still noted with worsening secretions. Pt had a temp in the evening 100.8 rectal, will send fever work up. He is back on the vent due to worsening secretions and overall appearance.     REVIEW OF SYSTEMS:  See above  [x] Unable to assess ROS because vented/poor phonation, and less alert/awake     Mode: standby    OBJECTIVE:  ICU Vital Signs Last 24 Hrs  T(C): 36.2 (03 May 2022 05:08), Max: 37.1 (02 May 2022 14:30)  T(F): 97.1 (03 May 2022 05:08), Max: 98.8 (02 May 2022 14:30)  HR: 65 (03 May 2022 07:14) (61 - 98)  BP: 118/65 (03 May 2022 05:08) (110/67 - 124/69)  BP(mean): --  ABP: --  ABP(mean): --  RR: 19 (03 May 2022 05:08) (18 - 19)  SpO2: 100% (03 May 2022 07:14) (95% - 100%)    Mode: standby    05-02 @ 07:01  -  05-03 @ 07:00  --------------------------------------------------------  IN: 1980 mL / OUT: 1100 mL / NET: 880 mL    05-03 @ 07:01  -  05-03 @ 09:34  --------------------------------------------------------  IN: 90 mL / OUT: 200 mL / NET: -110 mL    CAPILLARY BLOOD GLUCOSE    HOSPITAL MEDICATIONS:  MEDICATIONS  (STANDING):  ALBUTerol    90 MICROgram(s) HFA Inhaler 2 Puff(s) Inhalation every 6 hours  amantadine Syrup 100 milliGRAM(s) Oral two times a day  aMIOdarone    Tablet 200 milliGRAM(s) Oral daily  ascorbic acid 500 milliGRAM(s) Oral daily  carvedilol 6.25 milliGRAM(s) Oral every 12 hours  chlorhexidine 0.12% Liquid 15 milliLiter(s) Oral Mucosa every 12 hours  chlorhexidine 2% Cloths 1 Application(s) Topical daily  collagenase Ointment 1 Application(s) Topical daily  doxazosin 2 milliGRAM(s) Oral at bedtime  folic acid 1 milliGRAM(s) Oral daily  furosemide Solution 40 milliGRAM(s) Oral daily  heparin   Injectable 5000 Unit(s) SubCutaneous every 8 hours  ipratropium 17 MICROgram(s) HFA Inhaler 1 Puff(s) Inhalation every 6 hours  levETIRAcetam  Solution 1000 milliGRAM(s) Oral two times a day  levothyroxine 50 MICROGram(s) Oral daily  modafinil 100 milliGRAM(s) Oral <User Schedule>  pantoprazole   Suspension 40 milliGRAM(s) Oral daily  polyethylene glycol 3350 17 Gram(s) Oral daily  senna Syrup 10 milliLiter(s) Oral at bedtime  sodium chloride 3%  Inhalation 4 milliLiter(s) Inhalation every 6 hours    MEDICATIONS  (PRN):  acetaminophen    Suspension .. 650 milliGRAM(s) Oral every 4 hours PRN Temp greater or equal to 38C (100.4F), Mild Pain (1 - 3)    PHYSICAL EXAM  GENERAL: Laying in bed, NAD  HEENT: +Trach, area c/d/i, +left sided hard mass noted, nttp   Card: +s1, s2  Pulm: + coarse breath sound b/l  GI: +PEG, area c/d/i, obese abd, soft, nt/nd, no peritoneal signs noted  Ext: no asymmetry, no swelling, no calf tenderness  Neuro: less alert/awake, no new focal deficits    LABS:                        8.8    10.68 )-----------( 243      ( 03 May 2022 03:08 )             27.9     05-03    134<L>  |  94<L>  |  55<H>  ----------------------------<  119<H>  3.7   |  26  |  1.36<H>    Ca    8.6      03 May 2022 03:08  Phos  3.4     05-03  Mg     2.30     05-03    PAST MEDICAL & SURGICAL HISTORY:  Essential hypertension    Primary osteoarthritis, unspecified site    Closed fracture of left radius, initial encounter    Morbid obesity, unspecified obesity type  h/o. - s/p gastric bypass- lost 113 lbs    KAREN (obstructive sleep apnea)  h/o - was on CPAP until gastric bypass surgery    Respiratory failure    Anemia    Subdural hemorrhage    Epilepsy    H/O gastrostomy    History of BPH    Afib    S/P gastric bypass  2008    Status post total replacement of left hip  2006    S/P bunionectomy  bilateral    S/P colonoscopy  approx 2012    History of abdominoplasty  and subsequent cosmetic surgery for removal of excess skin from face    FAMILY HISTORY:  Family history of renal cell carcinoma (Mother)    Family history of malignant melanoma (Sibling)    Social History:    RADIOLOGY:  [ ] Reviewed and interpreted by me    PULMONARY FUNCTION TESTS:    EKG:

## 2022-05-03 NOTE — PROGRESS NOTE ADULT - ASSESSMENT
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 Now DISPO planning.    # Neurology   - Currently A&Ox0, awake and alert, able to move RUE and nods/ mouths one or two words per RCU evaluation and prior documentation.   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   - Continue on Modafinil, Amantidine and Keppra.     #HEENT  - Large firm mass along left neck noted 4/28  - CT Neck w/IV contrast - CT findings most compatible with left-sided parotitis. There is a 2 mm   left intraparotid stone, although there is no parotid duct dilatation or obstructing stone.  - ENT recs appreciated - Warm compress and massage TID, monitor for any worsening signs     # Cardiovascular   - Hx of HFpEF 55, mild MR and AR, severe LAD, normal LVRVSF (ECHO 3/7)  - Hx of Atrial Fibrillation s/p Watchman and currently rate controlled.   - Continue on Amiodarone 200mg QD and Coreg 6.25mg BID.   - Continue on Lasix 40mg QD and monitor tolerance.     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   - Able to tolerate TC (from 4/15 during the day) and now ATC (from 4/21).   - Continue on Proventil, Atrovent and Chest PT Q6H  - c/w Hypersal to loosen secretions (worsening)  - Sputum culture sent today 5/2 - will follow up  - CXR - No focal consolidations to indicate pneumonia.Improved aeration of left lower lobe.  Trace bilateral pleural effusions and bilateral lower lobe linear atelectasis.    # GI  - Hx of SDH, Gastric Bypass and Dysphagia s/p PEG and continued on TF with course complicated by constipation and melena  - Case discussed with GI and melena likely in setting of constipation, however no plan for scope currently and will medically treat with PPI.   - Monitor BMs on Miralax, Senna and Lactulose.   - Continue on Nepro in sight of hyperkalemia.   - s/p large BM on 4/25  - s/p trial of Reglan 10mg TID (4/24 - 4/27)    # / Renal  - Hx of Urinary Retention with Chronic Garcia and presented FLORIAN likely in setting of dehydration with fevers vs ATN with Sepsis (CRE high 2s and baseline 0.8-0.9).   - UA with hematuria and proteinuria and case discussed with Neprhology with concern for glomerulonephritis. ANCA, ISABELLE and GM ABs negative.   - Hypernatremia (resolved) - c/w free water 300 q6hr for now.   - Hyperkalemia (Resolved) and s/p cocktail with improvement (4/19).   - IVF and PRBC given with CRE improving and now resumed on Lasix with improvement.     # ID  - Recent admission for  Acinetobacter and Pseudomonas HCAP (3/2022)   - SCx (4/10) with  Acinetobacter and Pseudomonas.   - s/p completed Meropenem (4/8-4/14) and will monitor off ABX.     # Endocrine  - No hx of DM2 and A1C 5.9. Continue to monitor BG,    - Hx of Hypothyroidism and continued on Synthroid. TSH 47 with low T3/T4 and Free T4. TSH 80s (3/2022) and Synthroid increased at NH. Hypothyroidism could be in the setting of Amiodarone use and current Synthroid dose appears to be working.   - Next TFT to be done in June 2022.     # Hematology   - Anemia i/s/o AOCD and s/p PRBC (4/9)   - Melena/ anemia noted and s/p 2 U PRBC (4/19)  with good response, however HH waxes and wanes with no obvious bleed (no further melena or CGE from PEG aspiration)  - DVT PPX with HSQ (cleared by GI to resume).     # Ethics   - FULL CODE   - Case discussed with Cousin/ HCP, Dr. Donna Hagen (Pediatric Neurologist, [phone]) and updated daily. PM&R eval appreciated.     # DISPO - Planning to go back to NH with Neck CT results. Family requesting [facility] and  aware      75 YO M, A&Ox0 at baseline with PMHx of L SDH c/b L Subfalcine Herniation s/p Emergent R Hemicraniectomy in [place] while traveling fell on marble floor and now chronic with tracheostomy and vent dependant, Dysphagia with PEG, AFIB s/p watchman, Gastric Sleeve, Urinary Retention with Chornic Garcia, and recent ICU stay for HCAP with MDR Pseudomonas now presenting with ACHRF i/s/o  Actineobacter and Pseudomonas HCAP s/p ABX c/b FLORIAN and melena/ anemia. Now DISPO planning.    # Neurology   - Currently A&Ox0, awake and alert, able to move RUE and nods/ mouths one or two words per RCU evaluation and prior documentation.   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   - Continue on Modafinil, Amantidine and Keppra.     #HEENT  - Large firm mass along left neck noted 4/28  - CT Neck w/IV contrast - CT findings most compatible with left-sided parotitis. There is a 2 mm   left intraparotid stone, although there is no parotid duct dilatation or obstructing stone.  - ENT recs appreciated - Warm compress and massage TID, monitor for any worsening signs     # Cardiovascular   - Hx of HFpEF 55, mild MR and AR, severe LAD, normal LVRVSF (ECHO 3/7)  - Hx of Atrial Fibrillation s/p Watchman and currently rate controlled.   - Continue on Amiodarone 200mg QD and Coreg 6.25mg BID.   - Continue on Lasix 40mg QD and monitor tolerance.   (5/3) Hypotensive in the high 80s-low 90s systolic, s/p IVF  cc bolus  - Will continue to monitor closely     # Respiratory   - Hx of SDH and chronically trached and vented with recurrent HCAP infections.   - Continue on Proventil, Atrovent and Chest PT Q6H  - c/w Hypersal to loosen secretions (worsening)  - Sputum culture sent on 5/2 - Moderate GNR/GP rods, otherwise unremakable  - CXR - No focal consolidations to indicate pneumonia. Improved aeration of left lower lobe.  Trace bilateral pleural effusions and bilateral lower lobe linear atelectasis.  - Now back on the vent 2/2 increased secretions and overall appearance, (appears fatigued)    # GI  - Hx of SDH, Gastric Bypass and Dysphagia s/p PEG and continued on TF with course complicated by constipation and melena  - Case discussed with GI and melena likely in setting of constipation, however no plan for scope currently and will medically treat with PPI.   - Continue on Nepro in sight of hyperkalemia.   - s/p large BM on 4/25  - s/p trial of Reglan 10mg TID (4/24 - 4/27)  (5/3) Mild diarrhea noted, d'lisa Senna and Miralax - will monitor closely  - If persistent/worsens, will consider sending C-diff - for now low suspicion     # / Renal  - Hx of Urinary Retention with Chronic Garcia and presented FLORIAN likely in setting of dehydration with fevers vs ATN with Sepsis (CRE high 2s and baseline 0.8-0.9).   - UA with hematuria and proteinuria and case discussed with Neprhology with concern for glomerulonephritis. ANCA, ISABELLE and GM ABs negative.   - Hypernatremia (resolved) - c/w free water 300 q6hr for now.   - Hyperkalemia (Resolved) and s/p cocktail with improvement (4/19).   - IVF and PRBC given with CRE improving and now resumed on Lasix with improvement.     # ID  - Recent admission for  Acinetobacter and Pseudomonas HCAP (3/2022)   - SCx (4/10) with  Acinetobacter and Pseudomonas.   - s/p completed Meropenem (4/8-4/14) and will monitor off ABX.   - Rectal temp of 100.8 on 5/3 - Fever work up in progress  - Low threshold to start Abx unless conditions worsens or Hemodynamically unstable     # Endocrine  - No hx of DM2 and A1C 5.9. Continue to monitor BG,    - Hx of Hypothyroidism and continued on Synthroid. TSH 47 with low T3/T4 and Free T4. TSH 80s (3/2022) and Synthroid increased at NH. Hypothyroidism could be in the setting of Amiodarone use and current Synthroid dose appears to be working.   - Next TFT to be done in June 2022.     # Hematology   - Anemia i/s/o AOCD and s/p PRBC (4/9)   - Melena/ anemia noted and s/p 2 U PRBC (4/19)  with good response, however HH waxes and wanes with no obvious bleed (no further melena or CGE from PEG aspiration)  - DVT PPX with HSQ (cleared by GI to resume).     # Ethics   - FULL CODE   - Case discussed with Cousin/ HCP, Dr. Donna Hagen (Pediatric Neurologist, [phone]) and updated daily. PM&R eval appreciated.     # DISPO - Planning to go back to NH with Neck CT results. Family requesting [place] [facility] and SW aware

## 2022-05-03 NOTE — PROGRESS NOTE ADULT - NS ATTEND AMEND GEN_ALL_CORE FT
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     Pt's known baseline neurologic status prior to current hospitalization is that he is awake and can follow 1-step commands with a left facial droop and left spastic hemiparesis with LLE contraction, minimal RLE movement, but with normal use of RUE. Detailed neurologic exam further documented in paper chart from pt's prior d/c. Pt's initial lethargy resolved, now appears to be close to neurologic baseline. MRI Head performed, c/w multiple areas of encephalomalacia and gliosis in the setting of known prior traumatic injury. Neurology consult appreciated, no further w/u indicated while in hospital.  Will c/w amantadine and modafinil. c/w home keppra.     Pt with known atrial fibrillation currently rate/rhythm controlled with Watchman in place. Will c/w home amiodarone and carvedilol. Most recent TTE performed 3/2022 shows grossly normal LVSF with severe LAE in the setting of known AF. Pt hemodynamically stable, will c/w diuresis as needed for clinical euvolemia. Appears euvolemic today.    Pt initially p/w acute hypoxemic respiratory failure likely 2/2 PNA with possible mucous plug now resolved but with increasing deep tracheal secretions. At baseline prior to hospitalization pt required TC QD, MV qhs (30% FiO2.) Now TC ATC since 4/21 but with persistently increasing secretions. Will c/w aggressive airway clearance therapy, IPV in place. Sputum cx with low threshold for abx. CXR unchanged. Will restart qhs MV 2/2 inability to effectively clear secretions. Wean O2 supplementation for goal O2 saturation 90-95%. Trach care and suctioning as per RCU team.     Pt with known oropharyngeal dysphagia s/p PEG placement at OSH (1/13/22). Pt now tolerating feeds at goal. Bowel regimen in place. GI ppx with PPI. Pt initially p/w FLORIAN likely pre-renal in etiology now resolved. c/w free H20. Pt also with known chronic indwelling benson and failed TOVs. Pt with known newly dz'ed primary hypothyroidism at [facility] on 3/23, initiated on synthroid 50mcg (3/23 with TSH ~80). TSH on current admission 47.92, showing appropriate response to therapy. Will c/w current treatment.    Pt p/w RML opacity and new O2 requirements concerning for VAP on current hospitalization, now with completion of abx. Has known hx MDR Pseudomonas PNA, but respiratory cx at [facility] grew Achromobacter resistant to Meropenem. Pt with left sided parotitis with 2mm intraparotid stone without obstruction/duct dilation noted on imaging 4/28. ENT consulted recs appreciated, no indication for further tx. c/w warm compresses and gentle massage therapy.     Pt sees Dr. Haley ([place], neurology) and Dr. Rajiv Bucio ([place], EP) on an outpatient basis. Neurosx Dr. Chery. Dr. Jett ([place], general sx). Pt has a HCP form designating his cousin Dr. Cora Moran ([phone]) as his appointed health care agent.    Dispo pending medical optimization. Pt full code. PT/OT following. Dispo to [facility] ([place]). PM&R recs appreciated. Will continue to update pt's sister (Cora, HCP).

## 2022-05-03 NOTE — CHART NOTE - NSCHARTNOTEFT_GEN_A_CORE
Notified by day team that patient is febrile 100.8F (rectal temp), hypotensive 94/62, HR 98bpm. Pt seen at bedside, currently on ventilation assist control. Pt is nonverbal A&Ox0. Pt given Tylneol 650mg (18:43), 250cc NS bolus x 1, BCx ordered, CBC, CXR ordered, RVP w/ COVID ordered, VBG w/ lactate ordered, procalcitonin ordered, UA ordered. Notified by RN, s/p Tylenol and 250 cc NS bolus x 1, BP 77/56, HR 80, temp 100.4F. 500cc NS bolus x1 ordered, BCx sent, Vancomycin 1g IVPB and Zosyn 3.375g IVPB ordered. Pt s/p ABx and 500cc bolus, with rectal temp 100.4F, BP 73/54 . Midodrine 10mg PO x 1 ordered. Pt is noted to have indwelling urethral catheter unchanged since 04/09/22, with questionable sediment noted in tubing.    Physical examination:   GENERAL: A&Ox0.  CHEST/LUNG: CTAB; No wheezes, rales, or rhonchi  HEART: RRR; No murmurs, rubs, or gallops  ABDOMEN: Soft, non-tender, non-distended; normal bowel sounds, no organomegaly     Vital Signs Last 24 Hrs  T(C): 38.2 (03 May 2022 22:00), Max: 38.2 (03 May 2022 18:19)  T(F): 100.7 (03 May 2022 22:00), Max: 100.8 (03 May 2022 18:19)  HR: 71 (03 May 2022 23:04) (63 - 98)  BP: 77/56 (03 May 2022 21:15) (77/56 - 118/65)  RR: 16 (03 May 2022 21:15) (15 - 20)  SpO2: 100% (03 May 2022 23:04) (92% - 100%)                        10.1   11.51 )-----------( 261      ( 03 May 2022 19:47 )             31.8     Plan/Assesment:  ---Sepsis  - CBC resulted  - BCx (sent)  - CXR prelim - no acute pathology noted F/u official reading  - RVP w/ COVID PCR Negative   - VBG nml w/ Lactate 2.4 (from 1.8)  - UA pending  - Lactate 2.3  - Procalcitonin 0.17  - Vanc/Zosyn x 1   - 500cc bolus x 1-- without effect  - Midodrine 10mg PO x 1    Will continue to monitor. Notified by day team that patient is febrile 100.8F (rectal temp), hypotensive 94/62, HR 98bpm. Pt is nonverbal A&Ox0. Pt given Tylneol 650mg (18:43), 250cc NS bolus x 1, sepsis workup ordered (BCx, CBC, CXR, RVP w/ COVID, VBG w/ lactate, procalcitonin, UA). Notified by RN, s/p Tylenol and 250 cc NS bolus x 1, BP 77/56, HR 80, temp 100.4F.     500cc NS bolus x1 ordered, BCx sent, Vancomycin 1g IVPB and Zosyn 3.375g IVPB ordered. Pt s/p ABx and 500cc bolus, with rectal temp 100.4F, BP 73/54 . Midodrine 10mg PO x 1 ordered. Pt is noted to have indwelling urethral catheter unchanged since 04/09/22, with questionable sediment noted in tubing.    Physical examination:   GENERAL: A&Ox0.  CHEST/LUNG: CTAB; No wheezes, rales, or rhonchi  HEART: RRR; No murmurs, rubs, or gallops  ABDOMEN: Soft, non-tender, non-distended; normal bowel sounds, no organomegaly     Vital Signs Last 24 Hrs  T(C): 38.2 (03 May 2022 22:00), Max: 38.2 (03 May 2022 18:19)  T(F): 100.7 (03 May 2022 22:00), Max: 100.8 (03 May 2022 18:19)  HR: 71 (03 May 2022 23:04) (63 - 98)  BP: 77/56 (03 May 2022 21:15) (77/56 - 118/65)  RR: 16 (03 May 2022 21:15) (15 - 20)  SpO2: 100% (03 May 2022 23:04) (92% - 100%)                        10.1   11.51 )-----------( 261      ( 03 May 2022 19:47 )             31.8     Plan/Assesment:  ---Sepsis  - CBC resulted  - BCx (sent)  - CXR prelim - no acute pathology noted F/u official reading  - RVP w/ COVID PCR Negative   - VBG nml w/ Lactate 2.4 (from 1.8)  - UA pending  - Lactate 2.3  - Procalcitonin 0.17  - Vanc/Zosyn x 1   - 500cc bolus x 1-- without effect  - Midodrine 10mg PO x 1    Will continue to monitor. Notified by day team that patient is febrile 100.8F (rectal temp), hypotensive 94/62, HR 98bpm. Pt is nonverbal A&Ox0, currently on ventilation assist control. Pt given Tylneol 650mg (18:43), 250cc NS bolus x 1, sepsis workup ordered (BCx, CBC, CXR, RVP w/ COVID, VBG w/ lactate, procalcitonin, UA). Notified by RN, s/p Tylenol and 250 cc NS bolus x 1, BP 77/56, HR 80, rectal temp 100.4F. BCx sent, 500cc NS bolus x1, Vancomycin 1g IVPB and Zosyn 3.375g IVPB administered. Pt seen at bedside s/p ABx and 500cc bolus, with rectal temp 100.7F, BP 73/54 . Midodrine 10mg x 1 administered via PEG. Pt is noted to have indwelling urethral catheter unchanged since admission, with questionable sediment noted in tubing. UA ordered.    Physical examination:   GENERAL: A&Ox0, no acute distress  CHEST/LUNG: CTAB; No wheezes, rales, or rhonchi  HEART: RRR; No murmurs, rubs, or gallops  ABDOMEN: Soft, non-tender, non-distended; normal bowel sounds, no organomegaly   Extremities: Warm to palpation    Vital Signs Last 24 Hrs  T(C): 38.2 (03 May 2022 22:00), Max: 38.2 (03 May 2022 18:19)  T(F): 100.7 (03 May 2022 22:00), Max: 100.8 (03 May 2022 18:19)  HR: 71 (03 May 2022 23:04) (63 - 98)  BP: 77/56 (03 May 2022 21:15) (77/56 - 118/65)  RR: 16 (03 May 2022 21:15) (15 - 20)  SpO2: 100% (03 May 2022 23:04) (92% - 100%)                        10.1   11.51 )-----------( 261      ( 03 May 2022 19:47 )             31.8     Plan/Assesment:     ---Sepsis  - CBC, VBG, RVP w/ COVID resulted. CBC w/ leukocytosis, RVP negative, VBG w/ lactate elevated 2.4.  - Lactate 2.3  - Procalcitonin 0.17  - Consider continuing empiric ABx Vanc/Zosyn.  - BCx (sent)  - CXR prelim - no acute pathology noted F/u official reading   - Per chart review pt with benson since admission, will attempt trial of void, although has hx of chronic urinary retention.  - UA pending     Will continue to monitor.      -------ADENDUM  Pt s/p Midodrine 10mg, continues to be hypotensive. Continues to be febrile to 101.4, administered 400mg Ibuprofen, and ice packs ordered.     T(C): 38.4 (05-04-22 @ 00:59), Max: 38.4 (05-04-22 @ 00:59)  HR: 85 (05-04-22 @ 00:59) (63 - 98)  BP: 81/65 (05-04-22 @ 00:59) (77/56 - 118/65)  RR: 16 (05-04-22 @ 00:59) (15 - 20)  SpO2: 100% (05-04-22 @ 00:59) (92% - 100%)

## 2022-05-04 LAB
-  AMIKACIN: SIGNIFICANT CHANGE UP
-  AZTREONAM: SIGNIFICANT CHANGE UP
-  CEFEPIME: SIGNIFICANT CHANGE UP
-  CEFTAZIDIME: SIGNIFICANT CHANGE UP
-  CIPROFLOXACIN: SIGNIFICANT CHANGE UP
-  GENTAMICIN: SIGNIFICANT CHANGE UP
-  IMIPENEM: SIGNIFICANT CHANGE UP
-  LEVOFLOXACIN: SIGNIFICANT CHANGE UP
-  MEROPENEM: SIGNIFICANT CHANGE UP
-  PIPERACILLIN/TAZOBACTAM: SIGNIFICANT CHANGE UP
-  TOBRAMYCIN: SIGNIFICANT CHANGE UP
ALBUMIN SERPL ELPH-MCNC: 2.1 G/DL — LOW (ref 3.3–5)
ALP SERPL-CCNC: 87 U/L — SIGNIFICANT CHANGE UP (ref 40–120)
ALT FLD-CCNC: 17 U/L — SIGNIFICANT CHANGE UP (ref 4–41)
ANION GAP SERPL CALC-SCNC: 18 MMOL/L — HIGH (ref 7–14)
APPEARANCE UR: ABNORMAL
AST SERPL-CCNC: 22 U/L — SIGNIFICANT CHANGE UP (ref 4–40)
BACTERIA # UR AUTO: NEGATIVE — SIGNIFICANT CHANGE UP
BILIRUB SERPL-MCNC: 0.2 MG/DL — SIGNIFICANT CHANGE UP (ref 0.2–1.2)
BILIRUB UR-MCNC: NEGATIVE — SIGNIFICANT CHANGE UP
BLOOD GAS VENOUS COMPREHENSIVE RESULT: SIGNIFICANT CHANGE UP
BUN SERPL-MCNC: 66 MG/DL — HIGH (ref 7–23)
CALCIUM SERPL-MCNC: 8.2 MG/DL — LOW (ref 8.4–10.5)
CHLORIDE SERPL-SCNC: 96 MMOL/L — LOW (ref 98–107)
CO2 SERPL-SCNC: 19 MMOL/L — LOW (ref 22–31)
COLOR SPEC: YELLOW — SIGNIFICANT CHANGE UP
CREAT SERPL-MCNC: 1.56 MG/DL — HIGH (ref 0.5–1.3)
CULTURE RESULTS: SIGNIFICANT CHANGE UP
DIFF PNL FLD: NEGATIVE — SIGNIFICANT CHANGE UP
EGFR: 46 ML/MIN/1.73M2 — LOW
EPI CELLS # UR: 7 /HPF — HIGH (ref 0–5)
GLUCOSE SERPL-MCNC: 103 MG/DL — HIGH (ref 70–99)
GLUCOSE UR QL: NEGATIVE — SIGNIFICANT CHANGE UP
GRAM STN FLD: SIGNIFICANT CHANGE UP
HCT VFR BLD CALC: 33.1 % — LOW (ref 39–50)
HGB BLD-MCNC: 10.2 G/DL — LOW (ref 13–17)
HYALINE CASTS # UR AUTO: 9 /LPF — HIGH (ref 0–7)
KETONES UR-MCNC: NEGATIVE — SIGNIFICANT CHANGE UP
LEUKOCYTE ESTERASE UR-ACNC: ABNORMAL
MAGNESIUM SERPL-MCNC: 2.4 MG/DL — SIGNIFICANT CHANGE UP (ref 1.6–2.6)
MCHC RBC-ENTMCNC: 30.8 GM/DL — LOW (ref 32–36)
MCHC RBC-ENTMCNC: 32 PG — SIGNIFICANT CHANGE UP (ref 27–34)
MCV RBC AUTO: 103.8 FL — HIGH (ref 80–100)
METHOD TYPE: SIGNIFICANT CHANGE UP
NITRITE UR-MCNC: NEGATIVE — SIGNIFICANT CHANGE UP
NRBC # BLD: 0 /100 WBCS — SIGNIFICANT CHANGE UP
NRBC # FLD: 0 K/UL — SIGNIFICANT CHANGE UP
NT-PROBNP SERPL-SCNC: HIGH PG/ML
ORGANISM # SPEC MICROSCOPIC CNT: SIGNIFICANT CHANGE UP
ORGANISM # SPEC MICROSCOPIC CNT: SIGNIFICANT CHANGE UP
PH UR: 5.5 — SIGNIFICANT CHANGE UP (ref 5–8)
PHOSPHATE SERPL-MCNC: 3.7 MG/DL — SIGNIFICANT CHANGE UP (ref 2.5–4.5)
PLATELET # BLD AUTO: 221 K/UL — SIGNIFICANT CHANGE UP (ref 150–400)
POTASSIUM SERPL-MCNC: 3.9 MMOL/L — SIGNIFICANT CHANGE UP (ref 3.5–5.3)
POTASSIUM SERPL-SCNC: 3.9 MMOL/L — SIGNIFICANT CHANGE UP (ref 3.5–5.3)
PROCALCITONIN SERPL-MCNC: 0.18 NG/ML — HIGH (ref 0.02–0.1)
PROT SERPL-MCNC: 6.9 G/DL — SIGNIFICANT CHANGE UP (ref 6–8.3)
PROT UR-MCNC: ABNORMAL
RBC # BLD: 3.19 M/UL — LOW (ref 4.2–5.8)
RBC # FLD: 18.3 % — HIGH (ref 10.3–14.5)
RBC CASTS # UR COMP ASSIST: 5 /HPF — HIGH (ref 0–4)
SODIUM SERPL-SCNC: 133 MMOL/L — LOW (ref 135–145)
SP GR SPEC: 1.02 — SIGNIFICANT CHANGE UP (ref 1–1.05)
SPECIMEN SOURCE: SIGNIFICANT CHANGE UP
SPECIMEN SOURCE: SIGNIFICANT CHANGE UP
UROBILINOGEN FLD QL: SIGNIFICANT CHANGE UP
WBC # BLD: 13.81 K/UL — HIGH (ref 3.8–10.5)
WBC # FLD AUTO: 13.81 K/UL — HIGH (ref 3.8–10.5)
WBC UR QL: SIGNIFICANT CHANGE UP /HPF (ref 0–5)

## 2022-05-04 PROCEDURE — 93010 ELECTROCARDIOGRAM REPORT: CPT

## 2022-05-04 PROCEDURE — 99232 SBSQ HOSP IP/OBS MODERATE 35: CPT

## 2022-05-04 RX ORDER — PIPERACILLIN AND TAZOBACTAM 4; .5 G/20ML; G/20ML
4.5 INJECTION, POWDER, LYOPHILIZED, FOR SOLUTION INTRAVENOUS ONCE
Refills: 0 | Status: COMPLETED | OUTPATIENT
Start: 2022-05-04 | End: 2022-05-04

## 2022-05-04 RX ORDER — MIDODRINE HYDROCHLORIDE 2.5 MG/1
20 TABLET ORAL ONCE
Refills: 0 | Status: COMPLETED | OUTPATIENT
Start: 2022-05-04 | End: 2022-05-04

## 2022-05-04 RX ORDER — MIDODRINE HYDROCHLORIDE 2.5 MG/1
10 TABLET ORAL ONCE
Refills: 0 | Status: COMPLETED | OUTPATIENT
Start: 2022-05-04 | End: 2022-05-04

## 2022-05-04 RX ORDER — IBUPROFEN 200 MG
400 TABLET ORAL ONCE
Refills: 0 | Status: COMPLETED | OUTPATIENT
Start: 2022-05-04 | End: 2022-05-04

## 2022-05-04 RX ORDER — PIPERACILLIN AND TAZOBACTAM 4; .5 G/20ML; G/20ML
4.5 INJECTION, POWDER, LYOPHILIZED, FOR SOLUTION INTRAVENOUS EVERY 8 HOURS
Refills: 0 | Status: COMPLETED | OUTPATIENT
Start: 2022-05-05 | End: 2022-05-10

## 2022-05-04 RX ORDER — PIPERACILLIN AND TAZOBACTAM 4; .5 G/20ML; G/20ML
4.5 INJECTION, POWDER, LYOPHILIZED, FOR SOLUTION INTRAVENOUS EVERY 8 HOURS
Refills: 0 | Status: DISCONTINUED | OUTPATIENT
Start: 2022-05-04 | End: 2022-05-04

## 2022-05-04 RX ADMIN — ALBUTEROL 2 PUFF(S): 90 AEROSOL, METERED ORAL at 10:13

## 2022-05-04 RX ADMIN — Medication 1 PUFF(S): at 04:02

## 2022-05-04 RX ADMIN — Medication 400 MILLIGRAM(S): at 02:35

## 2022-05-04 RX ADMIN — Medication 50 MICROGRAM(S): at 05:11

## 2022-05-04 RX ADMIN — Medication 500 MILLIGRAM(S): at 12:04

## 2022-05-04 RX ADMIN — CARVEDILOL PHOSPHATE 6.25 MILLIGRAM(S): 80 CAPSULE, EXTENDED RELEASE ORAL at 09:54

## 2022-05-04 RX ADMIN — Medication 400 MILLIGRAM(S): at 01:35

## 2022-05-04 RX ADMIN — HEPARIN SODIUM 5000 UNIT(S): 5000 INJECTION INTRAVENOUS; SUBCUTANEOUS at 21:18

## 2022-05-04 RX ADMIN — Medication 100 MILLIGRAM(S): at 05:11

## 2022-05-04 RX ADMIN — Medication 1 MILLIGRAM(S): at 12:04

## 2022-05-04 RX ADMIN — PIPERACILLIN AND TAZOBACTAM 200 GRAM(S): 4; .5 INJECTION, POWDER, LYOPHILIZED, FOR SOLUTION INTRAVENOUS at 17:32

## 2022-05-04 RX ADMIN — MIDODRINE HYDROCHLORIDE 10 MILLIGRAM(S): 2.5 TABLET ORAL at 23:30

## 2022-05-04 RX ADMIN — SODIUM CHLORIDE 4 MILLILITER(S): 9 INJECTION INTRAMUSCULAR; INTRAVENOUS; SUBCUTANEOUS at 10:13

## 2022-05-04 RX ADMIN — CHLORHEXIDINE GLUCONATE 15 MILLILITER(S): 213 SOLUTION TOPICAL at 17:42

## 2022-05-04 RX ADMIN — Medication 650 MILLIGRAM(S): at 22:03

## 2022-05-04 RX ADMIN — HEPARIN SODIUM 5000 UNIT(S): 5000 INJECTION INTRAVENOUS; SUBCUTANEOUS at 05:12

## 2022-05-04 RX ADMIN — PANTOPRAZOLE SODIUM 40 MILLIGRAM(S): 20 TABLET, DELAYED RELEASE ORAL at 12:04

## 2022-05-04 RX ADMIN — CARVEDILOL PHOSPHATE 6.25 MILLIGRAM(S): 80 CAPSULE, EXTENDED RELEASE ORAL at 17:43

## 2022-05-04 RX ADMIN — AMIODARONE HYDROCHLORIDE 200 MILLIGRAM(S): 400 TABLET ORAL at 05:11

## 2022-05-04 RX ADMIN — LEVETIRACETAM 1000 MILLIGRAM(S): 250 TABLET, FILM COATED ORAL at 05:11

## 2022-05-04 RX ADMIN — CHLORHEXIDINE GLUCONATE 15 MILLILITER(S): 213 SOLUTION TOPICAL at 05:10

## 2022-05-04 RX ADMIN — SODIUM CHLORIDE 4 MILLILITER(S): 9 INJECTION INTRAMUSCULAR; INTRAVENOUS; SUBCUTANEOUS at 04:01

## 2022-05-04 RX ADMIN — Medication 1 PUFF(S): at 16:17

## 2022-05-04 RX ADMIN — LEVETIRACETAM 1000 MILLIGRAM(S): 250 TABLET, FILM COATED ORAL at 17:42

## 2022-05-04 RX ADMIN — CHLORHEXIDINE GLUCONATE 1 APPLICATION(S): 213 SOLUTION TOPICAL at 12:02

## 2022-05-04 RX ADMIN — HEPARIN SODIUM 5000 UNIT(S): 5000 INJECTION INTRAVENOUS; SUBCUTANEOUS at 14:30

## 2022-05-04 RX ADMIN — Medication 1 APPLICATION(S): at 12:04

## 2022-05-04 RX ADMIN — Medication 100 MILLIGRAM(S): at 17:42

## 2022-05-04 RX ADMIN — Medication 1 PUFF(S): at 10:13

## 2022-05-04 RX ADMIN — ALBUTEROL 2 PUFF(S): 90 AEROSOL, METERED ORAL at 16:16

## 2022-05-04 RX ADMIN — Medication 40 MILLIGRAM(S): at 05:12

## 2022-05-04 RX ADMIN — ALBUTEROL 2 PUFF(S): 90 AEROSOL, METERED ORAL at 04:02

## 2022-05-04 RX ADMIN — SODIUM CHLORIDE 4 MILLILITER(S): 9 INJECTION INTRAMUSCULAR; INTRAVENOUS; SUBCUTANEOUS at 16:16

## 2022-05-04 RX ADMIN — MIDODRINE HYDROCHLORIDE 20 MILLIGRAM(S): 2.5 TABLET ORAL at 01:44

## 2022-05-04 NOTE — PROVIDER CONTACT NOTE (OTHER) - REASON
bladder scan greather than 300
Pt. c/o feeling full. No BM for three days, Stomach distended
Black tarry Stool
temp 100.7
Pt has blood oozing from peg site

## 2022-05-04 NOTE — CHART NOTE - NSCHARTNOTEFT_GEN_A_CORE
Patient last seen by this RDN on 4/28, now for nutrition follow up. Spoke with RN and obtained subjective information from extensive chart review.     Current Diet : Diet, NPO with Tube Feed:   Tube Feeding Modality: Gastrostomy  Nepro with Carb Steady (NEPRORTH)  Total Volume for 24 Hours (mL): 1080  Continuous  Starting Tube Feed Rate {mL per Hour}: 25  Increase Tube Feed Rate by (mL): 10     Every 4 hours  Until Goal Tube Feed Rate (mL per Hour): 45  Tube Feed Duration (in Hours): 24  Tube Feed Start Time: 15:00  No Carb Prosource (1pkg = 15gms Protein)     Qty per Day:  2 (04-19-22 @ 14:40)     TF Provides:    1080mL total volume    1944 kcals    87g protein (+30 g additional protein via NoCarb Prosource)= 117g total protein     785mL free water     Weight:                                Height: 68"                    Ideal Body Weight: 154lbs / 70kg   No current weights (5/4)  74.9kg (4/24)  81.6kg (4/17)  81.4kg (4/9)    Nutrition Interval Events: Pt continues on TF as ordered. Per GI,  bowel regimen ordered for management and prevention of constipation. Pt continues with unstageable sacral pressure injury which remains stable. Enteral regimen provides 26 kcals/kg and 1.4 gms protein/kg of most current weight (74.9 kg) which is meeting pt's higher nutrition needs for advanced stage pressure injury. Request current weight to track trend and determine accuracy of TF rate. Continue with TF as ordered. RDN services to remain available as needed.       __________________ Pertinent Medications__________________   MEDICATIONS  (STANDING):  ALBUTerol    90 MICROgram(s) HFA Inhaler 2 Puff(s) Inhalation every 6 hours  amantadine Syrup 100 milliGRAM(s) Oral two times a day  aMIOdarone    Tablet 200 milliGRAM(s) Oral daily  ascorbic acid 500 milliGRAM(s) Oral daily  carvedilol 6.25 milliGRAM(s) Oral every 12 hours  chlorhexidine 0.12% Liquid 15 milliLiter(s) Oral Mucosa every 12 hours  chlorhexidine 2% Cloths 1 Application(s) Topical daily  collagenase Ointment 1 Application(s) Topical daily  doxazosin 2 milliGRAM(s) Oral at bedtime  folic acid 1 milliGRAM(s) Oral daily  furosemide Solution 40 milliGRAM(s) Oral daily  heparin   Injectable 5000 Unit(s) SubCutaneous every 8 hours  ipratropium 17 MICROgram(s) HFA Inhaler 1 Puff(s) Inhalation every 6 hours  levETIRAcetam  Solution 1000 milliGRAM(s) Oral two times a day  levoFLOXacin IVPB 750 milliGRAM(s) IV Intermittent every 48 hours  levothyroxine 50 MICROGram(s) Oral daily  pantoprazole   Suspension 40 milliGRAM(s) Oral daily  sodium chloride 3%  Inhalation 4 milliLiter(s) Inhalation every 6 hours      __________________ Pertinent Labs__________________   05-04 Na 133 mmol/L<L> Glu 103 mg/dL<H> K+ 3.9 mmol/L Cr 1.56 mg/dL<H> BUN 66 mg/dL<H> Phos 3.7 mg/dL  05-03 @ 19:25 POCT 181 mg/dL      Estimated Needs:     25-30 kcals/kg IBW= 6219-0712 kcals/day  1.4-1.8g protein/kg IBW= 98-126g protein/day      Previous Nutrition Diagnosis:     Increased Nutrient Needs    Nutrition Diagnosis is [x] ongoing          Goal(s):  1. Patient to meet > 75% estimated energy needs    Recommendations:   1. Continue nutrition plan of care as ordered.    Monitoring and Evaluation:   1. Monitor weights, labs, BMs, skin integrity, enteral tolerance and edema.  2. RD services to remain available. Request obtaining new weight to assess trend and determine adequacy of TF.

## 2022-05-04 NOTE — PROVIDER CONTACT NOTE (OTHER) - ASSESSMENT
Patient asymptomatic, no s/s of respiratory distress noted.
Pt lethgaric
Pt. c/o feeling full. No BM for three days, Stomach distended
Pt had blood oozing from peg site . No other signs of bleeding seen. Pt vital signs /78 Hr 63 O2 100 . Pt also receiving subcutaneous heparin every 8 hours, three times a day.
Bladder scan 376

## 2022-05-04 NOTE — PROGRESS NOTE ADULT - ASSESSMENT
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 Now DISPO planning.    # Neurology   - Currently A&Ox0, awake and alert, able to move RUE and nods/ mouths one or two words per RCU evaluation and prior documentation.   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   - Continue on Modafinil, Amantidine and Keppra.     #HEENT  - Large firm mass along left neck noted 4/28  - CT Neck w/IV contrast - CT findings most compatible with left-sided parotitis. There is a 2 mm   left intraparotid stone, although there is no parotid duct dilatation or obstructing stone.  - ENT recs appreciated - Warm compress and massage TID, monitor for any worsening signs     # Cardiovascular   - Hx of HFpEF 55, mild MR and AR, severe LAD, normal LVRVSF (ECHO 3/7)  - Hx of Atrial Fibrillation s/p Watchman and currently rate controlled.   - Continue on Amiodarone 200mg QD and Coreg 6.25mg BID.   - Continue on Lasix 40mg QD and monitor tolerance.   (5/3) Hypotensive in the high 80s-low 90s systolic, s/p IVF  cc bolus  - Will continue to monitor closely     # Respiratory   - Hx of SDH and chronically trached and vented with recurrent HCAP infections.   - Continue on Proventil, Atrovent and Chest PT Q6H  - c/w Hypersal to loosen secretions (worsening)  - Sputum culture sent on 5/2 - Moderate GNR/GP rods, otherwise unremakable  - CXR - No focal consolidations to indicate pneumonia. Improved aeration of left lower lobe.  Trace bilateral pleural effusions and bilateral lower lobe linear atelectasis.  - Now back on the vent 2/2 increased secretions and overall appearance, (appears fatigued)    # GI  - Hx of SDH, Gastric Bypass and Dysphagia s/p PEG and continued on TF with course complicated by constipation and melena  - Case discussed with GI and melena likely in setting of constipation, however no plan for scope currently and will medically treat with PPI.   - Continue on Nepro in sight of hyperkalemia.   - s/p large BM on 4/25  - s/p trial of Reglan 10mg TID (4/24 - 4/27)  (5/3) Mild diarrhea noted, d'lisa Senna and Miralax - will monitor closely  - If persistent/worsens, will consider sending C-diff - for now low suspicion     # / Renal  - Hx of Urinary Retention with Chronic Garcia and presented FLORIAN likely in setting of dehydration with fevers vs ATN with Sepsis (CRE high 2s and baseline 0.8-0.9).   - UA with hematuria and proteinuria and case discussed with Neprhology with concern for glomerulonephritis. ANCA, ISABELLE and GM ABs negative.   - Hypernatremia (resolved) - c/w free water 300 q6hr for now.   - Hyperkalemia (Resolved) and s/p cocktail with improvement (4/19).   - IVF and PRBC given with CRE improving and now resumed on Lasix with improvement.     # ID  - Recent admission for  Acinetobacter and Pseudomonas HCAP (3/2022)   - SCx (4/10) with  Acinetobacter and Pseudomonas.   - s/p completed Meropenem (4/8-4/14) and will monitor off ABX.   - Rectal temp of 100.8 on 5/3 - Fever work up in progress  - Low threshold to start Abx unless conditions worsens or Hemodynamically unstable     # Endocrine  - No hx of DM2 and A1C 5.9. Continue to monitor BG,    - Hx of Hypothyroidism and continued on Synthroid. TSH 47 with low T3/T4 and Free T4. TSH 80s (3/2022) and Synthroid increased at NH. Hypothyroidism could be in the setting of Amiodarone use and current Synthroid dose appears to be working.   - Next TFT to be done in June 2022.     # Hematology   - Anemia i/s/o AOCD and s/p PRBC (4/9)   - Melena/ anemia noted and s/p 2 U PRBC (4/19)  with good response, however HH waxes and wanes with no obvious bleed (no further melena or CGE from PEG aspiration)  - DVT PPX with HSQ (cleared by GI to resume).     # Ethics   - FULL CODE   - Case discussed with Cousin/ HCP, Dr. Donna Hagen (Pediatric Neurologist, [phone]) and updated daily. PM&R eval appreciated.     # DISPO - Planning to go back to NH with Neck CT results. 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 Now DISPO planning.    # Neurology   - Currently A&Ox0, awake and alert, able to move RUE and nods/ mouths one or two words per RCU evaluation and prior documentation.   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   - Continue on Modafinil, Amantidine and Keppra.     #HEENT  - Large firm mass along left neck noted 4/28  - CT Neck w/IV contrast - CT findings most compatible with left-sided parotitis. There is a 2 mm   left intraparotid stone, although there is no parotid duct dilatation or obstructing stone.  - ENT recs appreciated - Warm compress and massage TID, monitor for any worsening signs     # Cardiovascular   - Hx of HFpEF 55, mild MR and AR, severe LAD, normal LVRVSF (ECHO 3/7)  - Hx of Atrial Fibrillation s/p Watchman and currently rate controlled.   - Continue on Amiodarone 200mg QD and Coreg 6.25mg BID.   - Continue on Lasix 40mg QD and monitor tolerance.   (5/3) Hypotensive in the high 80s-low 90s systolic, s/p IVF  cc bolus  - Will continue to monitor closely     # Respiratory   - Hx of SDH and chronically trached and vented with recurrent HCAP infections.   - Continue on Proventil, Atrovent and Chest PT Q6H  - c/w Hypersal to loosen secretions (worsening)  - Sputum culture sent on 5/2 - Moderate GNR/GP rods, otherwise unremakable  - CXR - No focal consolidations to indicate pneumonia. Improved aeration of left lower lobe.  Trace bilateral pleural effusions and bilateral lower lobe linear atelectasis.  - Now back on the vent 2/2 increased secretions and overall appearance, (appears fatigued)    # GI  - Hx of SDH, Gastric Bypass and Dysphagia s/p PEG and continued on TF with course complicated by constipation and melena  - Case discussed with GI and melena likely in setting of constipation, however no plan for scope currently and will medically treat with PPI.   - Continue on Nepro in sight of hyperkalemia.   - s/p large BM on 4/25  - s/p trial of Reglan 10mg TID (4/24 - 4/27)  (5/3) Mild diarrhea noted, d'lisa Senna and Miralax - will monitor closely  - If persistent/worsens, will consider sending C-diff - for now low suspicion     # / Renal  - Hx of Urinary Retention with Chronic Garcia and presented FLORIAN likely in setting of dehydration with fevers vs ATN with Sepsis (CRE high 2s and baseline 0.8-0.9).   - UA with hematuria and proteinuria and case discussed with Neprhology with concern for glomerulonephritis. ANCA, ISABELLE and GM ABs negative.   - Hypernatremia (resolved) - c/w free water 300 q6hr for now.   - Hyperkalemia (Resolved) and s/p cocktail with improvement (4/19).   - IVF and PRBC given with CRE improving and now resumed on Lasix with improvement.     # ID  - Recent admission for  Acinetobacter and Pseudomonas HCAP (3/2022)   - SCx (4/10) with  Acinetobacter and Pseudomonas.   - s/p completed Meropenem (4/8-4/14) and will monitor off ABX.   - Rectal temp of 100.8 on 5/3 - Fever work up in progress  - Low threshold to start Abx unless conditions worsens or Hemodynamically unstable   - Abx restarted zosyn and vanco by level   Unable to do floroquinolone 2/2 prolonged QT-c 515    # Endocrine  - No hx of DM2 and A1C 5.9. Continue to monitor BG,    - Hx of Hypothyroidism and continued on Synthroid. TSH 47 with low T3/T4 and Free T4. TSH 80s (3/2022) and Synthroid increased at NH. Hypothyroidism could be in the setting of Amiodarone use and current Synthroid dose appears to be working.   - Next TFT to be done in June 2022.     # Hematology   - Anemia i/s/o AOCD and s/p PRBC (4/9)   - Melena/ anemia noted and s/p 2 U PRBC (4/19)  with good response, however HH waxes and wanes with no obvious bleed (no further melena or CGE from PEG aspiration)  - DVT PPX with HSQ (cleared by GI to resume).     # Ethics   - FULL CODE   - Case discussed with Cousin/ HCP, Dr. Donna Hagen (Pediatric Neurologist, [phone]) and updated daily. PM&R eval appreciated. 5/4 spoke with Dr Hagen     # DISPO - Planning to go back to NH with Neck CT results. Family requesting [place] [facility] and  aware

## 2022-05-04 NOTE — PROVIDER CONTACT NOTE (OTHER) - RECOMMENDATIONS
Provider notified.
provider notified
Contact Provider
HUSSEIN Harden made aware, fecal occult blood sample sent. Will continue to monitor.
provider contacted

## 2022-05-04 NOTE — PROGRESS NOTE ADULT - NS ATTEND AMEND GEN_ALL_CORE FT
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     Pt's known baseline neurologic status prior to current hospitalization is awake and can follow 1-step commands with a left facial droop and left spastic hemiparesis with LLE contraction, minimal RLE movement, but with normal use of RUE. Detailed neurologic exam further documented in paper chart from pt's prior d/c. Pt's initial lethargy resolved, now appears to be close to neurologic baseline. MRI Head performed, c/w multiple areas of encephalomalacia and gliosis in the setting of known prior traumatic injury. Neurology consult appreciated, no further w/u indicated while in hospital.  Will c/w amantadine and modafinil. c/w home keppra.     Pt with known atrial fibrillation currently rate/rhythm controlled with Watchman in place. Will c/w home amiodarone and carvedilol. Most recent TTE performed 3/2022 shows grossly normal LVSF with severe LAE in the setting of known AF. Pt hemodynamically stable, will c/w diuresis as needed for clinical euvolemia. Pt hypotensive overnight in the setting of new infectious process, responded to small fluid bolus.    Pt initially p/w acute hypoxemic respiratory failure likely 2/2 PNA with possible mucous plug now resolved but with increasing deep tracheal secretions. At baseline prior to hospitalization pt required TC QD, MV qhs (30% FiO2.) Now TC ATC since 4/21 but with persistently increasing secretions despite aggressive airway clearance therapy, Abx restarted. MV restarted with better secretion mobilization and respiratory stability. Wean O2 supplementation for goal O2 saturation 90-95%. Trach care and suctioning as per RCU team.     Pt with known oropharyngeal dysphagia s/p PEG placement at OSH (1/13/22). Pt now tolerating feeds at goal. Bowel regimen in place. GI ppx with PPI. Pt initially p/w FLORIAN likely pre-renal in etiology now resolved. c/w free H20. Pt also with known chronic indwelling benson and failed TOVs. Pt with known newly dz'ed primary hypothyroidism at [facility] on 3/23, initiated on synthroid 50mcg (3/23 with TSH ~80). TSH on current admission 47.92, showing appropriate response to therapy. Will c/w current treatment.    Pt p/w RML opacity and new O2 requirements concerning for VAP on current hospitalization, now with completion of abx. Has known hx MDR Pseudomonas PNA, but respiratory cx at [facility] grew Achromobacter resistant to Meropenem. Pt with left sided parotitis with 2mm intraparotid stone without obstruction/duct dilation noted on imaging 4/28. ENT consulted recs appreciated, no indication for further tx. c/w warm compresses and gentle massage therapy. Repeat cx growing  Pseudomonas, will treat for VAP.    Pt sees Dr. Haley ([place], neurology) and Dr. Rajiv Bucio ([place], EP) on an outpatient basis. Neurosx Dr. Chery. Dr. Jett (PEG, general sx). Pt has a HCP form designating his cousin Dr. Cora Moran ([phone]) as his appointed health care agent.    Dispo pending medical optimization. Pt full code. PT/OT following. Dispo to [facility]. PM&R recs appreciated. Will continue to update pt's sister (Cora, HCP).

## 2022-05-04 NOTE — PROVIDER CONTACT NOTE (OTHER) - BACKGROUND
BPH, morbid obesity, HTN,  FLORIAN
Pt admitted for renal failure
Hemicraniectomy, Acute renal failur
Pt admitted for acute renal failure

## 2022-05-04 NOTE — PROVIDER CONTACT NOTE (OTHER) - SITUATION
Pt noted to have blood oozing around peg site onto dressing. Pt currently receiving Subcu heparin
Patient noted with black tarry stool.
Temp rectal 100.7
Bladder scan 376
Pt. c/o feeling full.  No BM for three days, Stomach distended

## 2022-05-04 NOTE — PROGRESS NOTE ADULT - SUBJECTIVE AND OBJECTIVE BOX
Patient is a 76y old  Male who presents with a chief complaint of Hypoxia (04 May 2022 08:36)    Interval history:   patient febrile overnight. currently afebrile.  Does not open his eyes during exam but nods yes/no slightly in response to some questions.  Moves UE minimally to command  fever workup in progress per chart.  also seen by ENT for L sided parotitis.  intermittently hypotensive  back on vent due to secretions and fatigue  also mild diarrhea noted yesterday, senna and miralax discontinued.    participating in passive ROM in bed with PT, last seen 5/    REVIEW OF SYSTEMS  unable to participate in ROS    PAST MEDICAL & SURGICAL HISTORY  Essential hypertension    Primary osteoarthritis, unspecified site    Closed fracture of left radius, initial encounter    Morbid obesity, unspecified obesity type    KAREN (obstructive sleep apnea)    Respiratory failure    Anemia    Subdural hemorrhage    Epilepsy    H/O gastrostomy    History of BPH    Afib    S/P gastric bypass    Status post total replacement of left hip    S/P bunionectomy    S/P colonoscopy    History of abdominoplasty    CURRENT FUNCTIONAL STATUS  Cognitive/Neuro/Behavioral  Cognitive/Neuro/Behavioral [WDL Definition: Alert; opens eyes spontaneously; arouses to voice or touch; oriented x 4; follows commands; speech spontaneous, logical; purposeful motor response; behavior appropriate to situation]: WDL except  Level of Consciousness: confused  Arousal Level: arouses to voice;  arouses to touch/gentle shaking;  opens eyes spontaneously  Speech: trached  Mood/Behavior: calm    Therapeutic Interventions      Therapeutic Exercise  Therapeutic Exercise Detail: Pt. tolerated passivve ROM exercises throughout bilateral UE/LE 2 x10 throughout.       FAMILY HISTORY   Family history of renal cell carcinoma (Mother)  Family history of malignant melanoma (Sibling)        RECENT LABS/IMAGING  CBC Full  -  ( 04 May 2022 06:40 )  WBC Count : 13.81 K/uL  RBC Count : 3.19 M/uL  Hemoglobin : 10.2 g/dL  Hematocrit : 33.1 %  Platelet Count - Automated : 221 K/uL  Mean Cell Volume : 103.8 fL  Mean Cell Hemoglobin : 32.0 pg  Mean Cell Hemoglobin Concentration : 30.8 gm/dL  Auto Neutrophil # : x  Auto Lymphocyte # : x  Auto Monocyte # : x  Auto Eosinophil # : x  Auto Basophil # : x  Auto Neutrophil % : x  Auto Lymphocyte % : x  Auto Monocyte % : x  Auto Eosinophil % : x  Auto Basophil % : x    05-04    133<L>  |  96<L>  |  66<H>  ----------------------------<  103<H>  3.9   |  19<L>  |  1.56<H>    Ca    8.2<L>      04 May 2022 06:40  Phos  3.7       Mg     2.40         TPro  6.9  /  Alb  2.1<L>  /  TBili  0.2  /  DBili  x   /  AST  22  /  ALT  17  /  AlkPhos  87      Urinalysis Basic - ( 04 May 2022 06:38 )    Color: Yellow / Appearance: Slightly Turbid / S.020 / pH: x  Gluc: x / Ketone: Negative  / Bili: Negative / Urobili: <2 mg/dL   Blood: x / Protein: 30 mg/dL / Nitrite: Negative   Leuk Esterase: Large / RBC: 5 /HPF / WBC Many /HPF   Sq Epi: x / Non Sq Epi: 7 /HPF / Bacteria: Negative        VITALS  T(C): 36.7 (22 @ 09:53), Max: 38.4 (22 @ 00:59)  HR: 80 (22 @ 10:58) (66 - 98)  BP: 107/71 (22 @ 09:53) (77/56 - 107/71)  RR: 12 (22 @ 09:53) (12 - 20)  SpO2: 98% (22 @ 10:58) (97% - 100%)  Wt(kg): --    ALLERGIES  No Known Allergies      MEDICATIONS   acetaminophen    Suspension .. 650 milliGRAM(s) Oral every 4 hours PRN  ALBUTerol    90 MICROgram(s) HFA Inhaler 2 Puff(s) Inhalation every 6 hours  amantadine Syrup 100 milliGRAM(s) Oral two times a day  aMIOdarone    Tablet 200 milliGRAM(s) Oral daily  ascorbic acid 500 milliGRAM(s) Oral daily  carvedilol 6.25 milliGRAM(s) Oral every 12 hours  chlorhexidine 0.12% Liquid 15 milliLiter(s) Oral Mucosa every 12 hours  chlorhexidine 2% Cloths 1 Application(s) Topical daily  collagenase Ointment 1 Application(s) Topical daily  doxazosin 2 milliGRAM(s) Oral at bedtime  folic acid 1 milliGRAM(s) Oral daily  furosemide Solution 40 milliGRAM(s) Oral daily  heparin   Injectable 5000 Unit(s) SubCutaneous every 8 hours  ipratropium 17 MICROgram(s) HFA Inhaler 1 Puff(s) Inhalation every 6 hours  levETIRAcetam  Solution 1000 milliGRAM(s) Oral two times a day  levoFLOXacin IVPB 750 milliGRAM(s) IV Intermittent every 24 hours  levothyroxine 50 MICROGram(s) Oral daily  pantoprazole   Suspension 40 milliGRAM(s) Oral daily  sodium chloride 3%  Inhalation 4 milliLiter(s) Inhalation every 6 hours      ----------------------------------------------------------------------------------------   PHYSICAL EXAM  Constitutional - NAD   HEENT - + vent  Chest - no respiratory distress  Cardiovascular - RRR, S1S2  Abdomen -+PEG  + benson   Neurologic Exam -                    Cognitive - lethargic     Communication -nods slightly in response to some questions     Cranial Nerves - no facial asymmetry, limited participation in cn exam     Motor -exam limited by mental status, smiles to command, trace movement in b/l hands     Sensory - unable to assess     Balance - WNL Static  Psychiatric - calm  ----------------------------------------------------------------------------------------  ASSESSMENT/PLAN  77 yo m pmh sdh, chronic trach/PEG p/w recurrent HCAP  Keppra  DVT PPX - heparin  levofloxacin  npo with tube feed  lethargy: on modafinil, amantadine  please request follow up PT session  Rehab -  goal is for subacute rehab when medically cleared if able to follow commands, now with vent, will monitor.   recently febrile, workup in progress

## 2022-05-04 NOTE — PROVIDER CONTACT NOTE (OTHER) - ACTION/TREATMENT ORDERED:
HUSSEIN Harden made aware, fecal occult blood sample sent. Will continue to monitor.
Contact Provider/ Hold feed, abdominal x-ray
Tylenol given, will continue to monitor
Provider order straight cath
Provider ordered type and screen and to hold morning dose of heparin. Continue to monitor bleeding around peg site.

## 2022-05-04 NOTE — PROGRESS NOTE ADULT - SUBJECTIVE AND OBJECTIVE BOX
CHIEF COMPLAINT: Patient is a 76y old  Male who presents with a chief complaint of Hypoxia (03 May 2022 12:23)      Interval Events:    REVIEW OF SYSTEMS:  [ ] All other systems negative  [ ] Unable to assess ROS because ________    Mode: AC/ CMV (Assist Control/ Continuous Mandatory Ventilation), RR (machine): 12, TV (machine): 450, FiO2: 30, PEEP: 5, ITime: 0.8, MAP: 8, PIP: 20      OBJECTIVE:  ICU Vital Signs Last 24 Hrs  T(C): 37.2 (04 May 2022 05:16), Max: 38.4 (04 May 2022 00:59)  T(F): 98.9 (04 May 2022 05:16), Max: 101.1 (04 May 2022 00:59)  HR: 72 (04 May 2022 07:13) (63 - 98)  BP: 103/76 (04 May 2022 05:16) (77/56 - 103/76)  BP(mean): --  ABP: --  ABP(mean): --  RR: 15 (04 May 2022 05:16) (14 - 20)  SpO2: 99% (04 May 2022 07:13) (92% - 100%)    Mode: AC/ CMV (Assist Control/ Continuous Mandatory Ventilation), RR (machine): 12, TV (machine): 450, FiO2: 30, PEEP: 5, ITime: 0.8, MAP: 8, PIP: 20    05-03 @ 07:01  -  05-04 @ 07:00  --------------------------------------------------------  IN: 1885 mL / OUT: 1450 mL / NET: 435 mL      CAPILLARY BLOOD GLUCOSE      POCT Blood Glucose.: 181 mg/dL (03 May 2022 19:25)      HOSPITAL MEDICATIONS:  MEDICATIONS  (STANDING):  ALBUTerol    90 MICROgram(s) HFA Inhaler 2 Puff(s) Inhalation every 6 hours  amantadine Syrup 100 milliGRAM(s) Oral two times a day  aMIOdarone    Tablet 200 milliGRAM(s) Oral daily  ascorbic acid 500 milliGRAM(s) Oral daily  carvedilol 6.25 milliGRAM(s) Oral every 12 hours  chlorhexidine 0.12% Liquid 15 milliLiter(s) Oral Mucosa every 12 hours  chlorhexidine 2% Cloths 1 Application(s) Topical daily  collagenase Ointment 1 Application(s) Topical daily  doxazosin 2 milliGRAM(s) Oral at bedtime  folic acid 1 milliGRAM(s) Oral daily  furosemide Solution 40 milliGRAM(s) Oral daily  heparin   Injectable 5000 Unit(s) SubCutaneous every 8 hours  ipratropium 17 MICROgram(s) HFA Inhaler 1 Puff(s) Inhalation every 6 hours  levETIRAcetam  Solution 1000 milliGRAM(s) Oral two times a day  levothyroxine 50 MICROGram(s) Oral daily  pantoprazole   Suspension 40 milliGRAM(s) Oral daily  sodium chloride 3%  Inhalation 4 milliLiter(s) Inhalation every 6 hours    MEDICATIONS  (PRN):  acetaminophen    Suspension .. 650 milliGRAM(s) Oral every 4 hours PRN Temp greater or equal to 38C (100.4F), Mild Pain (1 - 3)      LABS:                        10.2   13.81 )-----------( 221      ( 04 May 2022 06:40 )             33.1     05-04    133<L>  |  96<L>  |  66<H>  ----------------------------<  103<H>  3.9   |  19<L>  |  1.56<H>    Ca    8.2<L>      04 May 2022 06:40  Phos  3.7     05-  Mg     2.40     05-    TPro  6.9  /  Alb  2.1<L>  /  TBili  0.2  /  DBili  x   /  AST  22  /  ALT  17  /  AlkPhos  87  05-04      Urinalysis Basic - ( 04 May 2022 06:38 )    Color: Yellow / Appearance: Slightly Turbid / S.020 / pH: x  Gluc: x / Ketone: Negative  / Bili: Negative / Urobili: <2 mg/dL   Blood: x / Protein: 30 mg/dL / Nitrite: Negative   Leuk Esterase: Large / RBC: 5 /HPF / WBC Many /HPF   Sq Epi: x / Non Sq Epi: 7 /HPF / Bacteria: Negative        Venous Blood Gas:   @ 06:40  7.39/48/39/29/61.2  VBG Lactate: 2.7  Venous Blood Gas:   @ 19:47  7.43/46/47/30/73.7  VBG Lactate: 2.4  Venous Blood Gas:   @ 13:24  7.45/47/34/33/56.7  VBG Lactate: 1.8      PAST MEDICAL & SURGICAL HISTORY:  Essential hypertension    Primary osteoarthritis, unspecified site    Closed fracture of left radius, initial encounter    Morbid obesity, unspecified obesity type  h/o. - s/p gastric bypass- lost 113 lbs    KAREN (obstructive sleep apnea)  h/o - was on CPAP until gastric bypass surgery    Respiratory failure    Anemia    Subdural hemorrhage    Epilepsy    H/O gastrostomy    History of BPH    Afib    S/P gastric bypass  2008    Status post total replacement of left hip  2006    S/P bunionectomy  bilateral    S/P colonoscopy  approx 2012    History of abdominoplasty  and subsequent cosmetic surgery for removal of excess skin from face        FAMILY HISTORY:  Family history of renal cell carcinoma (Mother)    Family history of malignant melanoma (Sibling)        Social History:      RADIOLOGY:  [ ] Reviewed and interpreted by me    PULMONARY FUNCTION TESTS:    EKG: CHIEF COMPLAINT: Patient is a 76y old  Male who presents with a chief complaint of Hypoxia (03 May 2022 12:23)      Interval Events: Pt febrile overnight - cx sent abx started     REVIEW OF SYSTEMS:  [x] Unable to assess ROS because vented     Mode: AC/ CMV (Assist Control/ Continuous Mandatory Ventilation), RR (machine): 12, TV (machine): 450, FiO2: 30, PEEP: 5, ITime: 0.8, MAP: 8, PIP: 20      OBJECTIVE:  ICU Vital Signs Last 24 Hrs  T(C): 37.2 (04 May 2022 05:16), Max: 38.4 (04 May 2022 00:59)  T(F): 98.9 (04 May 2022 05:16), Max: 101.1 (04 May 2022 00:59)  HR: 72 (04 May 2022 07:13) (63 - 98)  BP: 103/76 (04 May 2022 05:16) (77/56 - 103/76)  BP(mean): --  ABP: --  ABP(mean): --  RR: 15 (04 May 2022 05:16) (14 - 20)  SpO2: 99% (04 May 2022 07:13) (92% - 100%)    Mode: AC/ CMV (Assist Control/ Continuous Mandatory Ventilation), RR (machine): 12, TV (machine): 450, FiO2: 30, PEEP: 5, ITime: 0.8, MAP: 8, PIP: 20    05-03 @ 07:01  -  05-04 @ 07:00  --------------------------------------------------------  IN: 1885 mL / OUT: 1450 mL / NET: 435 mL      CAPILLARY BLOOD GLUCOSE      POCT Blood Glucose.: 181 mg/dL (03 May 2022 19:25)      HOSPITAL MEDICATIONS:  MEDICATIONS  (STANDING):  ALBUTerol    90 MICROgram(s) HFA Inhaler 2 Puff(s) Inhalation every 6 hours  amantadine Syrup 100 milliGRAM(s) Oral two times a day  aMIOdarone    Tablet 200 milliGRAM(s) Oral daily  ascorbic acid 500 milliGRAM(s) Oral daily  carvedilol 6.25 milliGRAM(s) Oral every 12 hours  chlorhexidine 0.12% Liquid 15 milliLiter(s) Oral Mucosa every 12 hours  chlorhexidine 2% Cloths 1 Application(s) Topical daily  collagenase Ointment 1 Application(s) Topical daily  doxazosin 2 milliGRAM(s) Oral at bedtime  folic acid 1 milliGRAM(s) Oral daily  furosemide Solution 40 milliGRAM(s) Oral daily  heparin   Injectable 5000 Unit(s) SubCutaneous every 8 hours  ipratropium 17 MICROgram(s) HFA Inhaler 1 Puff(s) Inhalation every 6 hours  levETIRAcetam  Solution 1000 milliGRAM(s) Oral two times a day  levothyroxine 50 MICROGram(s) Oral daily  pantoprazole   Suspension 40 milliGRAM(s) Oral daily  sodium chloride 3%  Inhalation 4 milliLiter(s) Inhalation every 6 hours    MEDICATIONS  (PRN):  acetaminophen    Suspension .. 650 milliGRAM(s) Oral every 4 hours PRN Temp greater or equal to 38C (100.4F), Mild Pain (1 - 3)      LABS:                        10.2   13.81 )-----------( 221      ( 04 May 2022 06:40 )             33.1     05-04    133<L>  |  96<L>  |  66<H>  ----------------------------<  103<H>  3.9   |  19<L>  |  1.56<H>    Ca    8.2<L>      04 May 2022 06:40  Phos  3.7     05-  Mg     2.40     -    TPro  6.9  /  Alb  2.1<L>  /  TBili  0.2  /  DBili  x   /  AST  22  /  ALT  17  /  AlkPhos  87  05-04      Urinalysis Basic - ( 04 May 2022 06:38 )    Color: Yellow / Appearance: Slightly Turbid / S.020 / pH: x  Gluc: x / Ketone: Negative  / Bili: Negative / Urobili: <2 mg/dL   Blood: x / Protein: 30 mg/dL / Nitrite: Negative   Leuk Esterase: Large / RBC: 5 /HPF / WBC Many /HPF   Sq Epi: x / Non Sq Epi: 7 /HPF / Bacteria: Negative        Venous Blood Gas:   @ 06:40  7.39/48/39/29/61.2  VBG Lactate: 2.7  Venous Blood Gas:   @ 19:47  7.43/46/47/30/73.7  VBG Lactate: 2.4  Venous Blood Gas:   @ 13:24  7.45/47/34/33/56.7  VBG Lactate: 1.8      PAST MEDICAL & SURGICAL HISTORY:  Essential hypertension    Primary osteoarthritis, unspecified site    Closed fracture of left radius, initial encounter    Morbid obesity, unspecified obesity type  h/o. - s/p gastric bypass- lost 113 lbs    KAREN (obstructive sleep apnea)  h/o - was on CPAP until gastric bypass surgery    Respiratory failure    Anemia    Subdural hemorrhage    Epilepsy    H/O gastrostomy    History of BPH    Afib    S/P gastric bypass  2008    Status post total replacement of left hip  2006    S/P bunionectomy  bilateral    S/P colonoscopy  approx 2012    History of abdominoplasty  and subsequent cosmetic surgery for removal of excess skin from face        FAMILY HISTORY:  Family history of renal cell carcinoma (Mother)    Family history of malignant melanoma (Sibling)        Social History:      RADIOLOGY:  [ ] Reviewed and interpreted by me    PULMONARY FUNCTION TESTS:    EKG:

## 2022-05-04 NOTE — PROGRESS NOTE ADULT - ASSESSMENT
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 GI consulted for anemia     Anemia   PEG flushed/lavaged x2; feeds and bilious fluid aspirated, No blood products noted  Protonix 40mg via peg  occult likely d/t constipated stool/irritated hemorrhoid; stools brown  bowel regimen and give anusol suppository qhs as needed  AVOID Hypotensive events to avoid ischemic bleeding events    Constipation, Slow Transit   senna syrup and miralax  milk of mg and enemas as needed  if abd distention, would rec to avoid lactulose     HCAP   per RCU/ID       I reviewed the overnight course of events on the unit, re-confirming the patient history. I discussed the care with the patient and their family. The plan of care was discussed with the physician assistant and modifications were made to the notation where appropriate. Differential diagnosis and plan of care discussed with patient after the evaluation. Advanced care planning was discussed with patient and family.  Advanced care planning forms were reviewed and discussed.  Risks, benefits and alternatives of gastroenterologic procedures were discussed in detail and all questions were answered. 35 minutes spent on total encounter of which more than fifty percent of the encounter was spent counseling and/or coordinating care by the attending physician.

## 2022-05-04 NOTE — PROGRESS NOTE ADULT - SUBJECTIVE AND OBJECTIVE BOX
INTERVAL HPI/OVERNIGHT EVENTS:    no rectal bleeding   having periodic hypotensive events     MEDICATIONS  (STANDING):  ALBUTerol    90 MICROgram(s) HFA Inhaler 2 Puff(s) Inhalation every 6 hours  amantadine Syrup 100 milliGRAM(s) Oral two times a day  aMIOdarone    Tablet 200 milliGRAM(s) Oral daily  ascorbic acid 500 milliGRAM(s) Oral daily  carvedilol 6.25 milliGRAM(s) Oral every 12 hours  chlorhexidine 0.12% Liquid 15 milliLiter(s) Oral Mucosa every 12 hours  chlorhexidine 2% Cloths 1 Application(s) Topical daily  collagenase Ointment 1 Application(s) Topical daily  doxazosin 2 milliGRAM(s) Oral at bedtime  folic acid 1 milliGRAM(s) Oral daily  furosemide Solution 40 milliGRAM(s) Oral daily  heparin   Injectable 5000 Unit(s) SubCutaneous every 8 hours  ipratropium 17 MICROgram(s) HFA Inhaler 1 Puff(s) Inhalation every 6 hours  levETIRAcetam  Solution 1000 milliGRAM(s) Oral two times a day  levoFLOXacin IVPB 750 milliGRAM(s) IV Intermittent every 48 hours  levothyroxine 50 MICROGram(s) Oral daily  pantoprazole   Suspension 40 milliGRAM(s) Oral daily  sodium chloride 3%  Inhalation 4 milliLiter(s) Inhalation every 6 hours        Allergies    No Known Allergies    Intolerances        Review of Systems: *pt minimally verbal to nonverbal, unable to obtain ROS       Vital Signs Last 24 Hrs  T(C): 36.7 (04 May 2022 09:53), Max: 38.4 (04 May 2022 00:59)  T(F): 98.1 (04 May 2022 09:53), Max: 101.1 (04 May 2022 00:59)  HR: 80 (04 May 2022 10:58) (71 - 98)  BP: 107/71 (04 May 2022 09:53) (77/56 - 107/71)  BP(mean): --  RR: 12 (04 May 2022 09:53) (12 - 20)  SpO2: 98% (04 May 2022 10:58) (97% - 100%)    PHYSICAL EXAM:    Constitutional: NAD  HEENT: EOMI, throat clear  Neck: No LAD, supple  Respiratory: CTA and P  Cardiovascular: S1 and S2, RRR, no M  Gastrointestinal: BS+, soft, NT/ND, neg HSM, +peg  Extremities: No peripheral edema, neg clubbing, cyanosis  Vascular: 2+ peripheral pulses  Neurological: A/O x 1  Psychiatric: nonverbal/lethargy  Skin: No rashes    LABS:                        10.2   13.81 )-----------( 221      ( 04 May 2022 06:40 )             33.1     05-04    133<L>  |  96<L>  |  66<H>  ----------------------------<  103<H>  3.9   |  19<L>  |  1.56<H>    Ca    8.2<L>      04 May 2022 06:40  Phos  3.7     05-04  Mg     2.40     05-04    TPro  6.9  /  Alb  2.1<L>  /  TBili  0.2  /  DBili  x   /  AST  22  /  ALT  17  /  AlkPhos  87  05-04      Urinalysis Basic - ( 04 May 2022 06:38 )    Color: Yellow / Appearance: Slightly Turbid / S.020 / pH: x  Gluc: x / Ketone: Negative  / Bili: Negative / Urobili: <2 mg/dL   Blood: x / Protein: 30 mg/dL / Nitrite: Negative   Leuk Esterase: Large / RBC: 5 /HPF / WBC Many /HPF   Sq Epi: x / Non Sq Epi: 7 /HPF / Bacteria: Negative                    RADIOLOGY & ADDITIONAL TESTS:

## 2022-05-05 LAB
ALBUMIN SERPL ELPH-MCNC: 2.1 G/DL — LOW (ref 3.3–5)
ALP SERPL-CCNC: 81 U/L — SIGNIFICANT CHANGE UP (ref 40–120)
ALT FLD-CCNC: 15 U/L — SIGNIFICANT CHANGE UP (ref 4–41)
ANION GAP SERPL CALC-SCNC: 13 MMOL/L — SIGNIFICANT CHANGE UP (ref 7–14)
ANION GAP SERPL CALC-SCNC: 15 MMOL/L — HIGH (ref 7–14)
AST SERPL-CCNC: 16 U/L — SIGNIFICANT CHANGE UP (ref 4–40)
BILIRUB SERPL-MCNC: <0.2 MG/DL — SIGNIFICANT CHANGE UP (ref 0.2–1.2)
BUN SERPL-MCNC: 73 MG/DL — HIGH (ref 7–23)
BUN SERPL-MCNC: 75 MG/DL — HIGH (ref 7–23)
CALCIUM SERPL-MCNC: 7.8 MG/DL — LOW (ref 8.4–10.5)
CALCIUM SERPL-MCNC: 8 MG/DL — LOW (ref 8.4–10.5)
CHLORIDE SERPL-SCNC: 93 MMOL/L — LOW (ref 98–107)
CHLORIDE SERPL-SCNC: 95 MMOL/L — LOW (ref 98–107)
CO2 SERPL-SCNC: 24 MMOL/L — SIGNIFICANT CHANGE UP (ref 22–31)
CO2 SERPL-SCNC: 24 MMOL/L — SIGNIFICANT CHANGE UP (ref 22–31)
CREAT SERPL-MCNC: 1.64 MG/DL — HIGH (ref 0.5–1.3)
CREAT SERPL-MCNC: 1.78 MG/DL — HIGH (ref 0.5–1.3)
EGFR: 39 ML/MIN/1.73M2 — LOW
EGFR: 43 ML/MIN/1.73M2 — LOW
GLUCOSE SERPL-MCNC: 118 MG/DL — HIGH (ref 70–99)
GLUCOSE SERPL-MCNC: 119 MG/DL — HIGH (ref 70–99)
HCT VFR BLD CALC: 24.7 % — LOW (ref 39–50)
HCT VFR BLD CALC: 25.1 % — LOW (ref 39–50)
HGB BLD-MCNC: 7.9 G/DL — LOW (ref 13–17)
HGB BLD-MCNC: 8 G/DL — LOW (ref 13–17)
LACTATE SERPL-SCNC: 1.3 MMOL/L — SIGNIFICANT CHANGE UP (ref 0.5–2)
MAGNESIUM SERPL-MCNC: 2.4 MG/DL — SIGNIFICANT CHANGE UP (ref 1.6–2.6)
MAGNESIUM SERPL-MCNC: 2.4 MG/DL — SIGNIFICANT CHANGE UP (ref 1.6–2.6)
MCHC RBC-ENTMCNC: 31.5 PG — SIGNIFICANT CHANGE UP (ref 27–34)
MCHC RBC-ENTMCNC: 31.6 PG — SIGNIFICANT CHANGE UP (ref 27–34)
MCHC RBC-ENTMCNC: 31.9 GM/DL — LOW (ref 32–36)
MCHC RBC-ENTMCNC: 32 GM/DL — SIGNIFICANT CHANGE UP (ref 32–36)
MCV RBC AUTO: 98.4 FL — SIGNIFICANT CHANGE UP (ref 80–100)
MCV RBC AUTO: 99.2 FL — SIGNIFICANT CHANGE UP (ref 80–100)
NRBC # BLD: 0 /100 WBCS — SIGNIFICANT CHANGE UP
NRBC # BLD: 0 /100 WBCS — SIGNIFICANT CHANGE UP
NRBC # FLD: 0 K/UL — SIGNIFICANT CHANGE UP
NRBC # FLD: 0 K/UL — SIGNIFICANT CHANGE UP
PHOSPHATE SERPL-MCNC: 3.3 MG/DL — SIGNIFICANT CHANGE UP (ref 2.5–4.5)
PHOSPHATE SERPL-MCNC: 3.4 MG/DL — SIGNIFICANT CHANGE UP (ref 2.5–4.5)
PLATELET # BLD AUTO: 258 K/UL — SIGNIFICANT CHANGE UP (ref 150–400)
PLATELET # BLD AUTO: 341 K/UL — SIGNIFICANT CHANGE UP (ref 150–400)
POTASSIUM SERPL-MCNC: 3.5 MMOL/L — SIGNIFICANT CHANGE UP (ref 3.5–5.3)
POTASSIUM SERPL-MCNC: 3.6 MMOL/L — SIGNIFICANT CHANGE UP (ref 3.5–5.3)
POTASSIUM SERPL-SCNC: 3.5 MMOL/L — SIGNIFICANT CHANGE UP (ref 3.5–5.3)
POTASSIUM SERPL-SCNC: 3.6 MMOL/L — SIGNIFICANT CHANGE UP (ref 3.5–5.3)
PROCALCITONIN SERPL-MCNC: 0.23 NG/ML — HIGH (ref 0.02–0.1)
PROT SERPL-MCNC: 6.7 G/DL — SIGNIFICANT CHANGE UP (ref 6–8.3)
RBC # BLD: 2.51 M/UL — LOW (ref 4.2–5.8)
RBC # BLD: 2.53 M/UL — LOW (ref 4.2–5.8)
RBC # FLD: 17.8 % — HIGH (ref 10.3–14.5)
RBC # FLD: 18.1 % — HIGH (ref 10.3–14.5)
SODIUM SERPL-SCNC: 130 MMOL/L — LOW (ref 135–145)
SODIUM SERPL-SCNC: 134 MMOL/L — LOW (ref 135–145)
VANCOMYCIN FLD-MCNC: 6.5 UG/ML — SIGNIFICANT CHANGE UP
WBC # BLD: 13.4 K/UL — HIGH (ref 3.8–10.5)
WBC # BLD: 14.61 K/UL — HIGH (ref 3.8–10.5)
WBC # FLD AUTO: 13.4 K/UL — HIGH (ref 3.8–10.5)
WBC # FLD AUTO: 14.61 K/UL — HIGH (ref 3.8–10.5)

## 2022-05-05 PROCEDURE — 99232 SBSQ HOSP IP/OBS MODERATE 35: CPT

## 2022-05-05 RX ORDER — MIDODRINE HYDROCHLORIDE 2.5 MG/1
20 TABLET ORAL ONCE
Refills: 0 | Status: COMPLETED | OUTPATIENT
Start: 2022-05-05 | End: 2022-05-05

## 2022-05-05 RX ORDER — SODIUM CHLORIDE 9 MG/ML
250 INJECTION INTRAMUSCULAR; INTRAVENOUS; SUBCUTANEOUS ONCE
Refills: 0 | Status: COMPLETED | OUTPATIENT
Start: 2022-05-05 | End: 2022-05-05

## 2022-05-05 RX ORDER — LEVETIRACETAM 250 MG/1
750 TABLET, FILM COATED ORAL
Refills: 0 | Status: DISCONTINUED | OUTPATIENT
Start: 2022-05-05 | End: 2022-05-10

## 2022-05-05 RX ORDER — SODIUM CHLORIDE 9 MG/ML
500 INJECTION INTRAMUSCULAR; INTRAVENOUS; SUBCUTANEOUS ONCE
Refills: 0 | Status: COMPLETED | OUTPATIENT
Start: 2022-05-05 | End: 2022-05-05

## 2022-05-05 RX ADMIN — Medication 1 PUFF(S): at 10:13

## 2022-05-05 RX ADMIN — PIPERACILLIN AND TAZOBACTAM 25 GRAM(S): 4; .5 INJECTION, POWDER, LYOPHILIZED, FOR SOLUTION INTRAVENOUS at 01:23

## 2022-05-05 RX ADMIN — CHLORHEXIDINE GLUCONATE 15 MILLILITER(S): 213 SOLUTION TOPICAL at 05:02

## 2022-05-05 RX ADMIN — AMIODARONE HYDROCHLORIDE 200 MILLIGRAM(S): 400 TABLET ORAL at 05:02

## 2022-05-05 RX ADMIN — Medication 650 MILLIGRAM(S): at 02:11

## 2022-05-05 RX ADMIN — Medication 500 MILLIGRAM(S): at 12:15

## 2022-05-05 RX ADMIN — SODIUM CHLORIDE 500 MILLILITER(S): 9 INJECTION INTRAMUSCULAR; INTRAVENOUS; SUBCUTANEOUS at 00:52

## 2022-05-05 RX ADMIN — ALBUTEROL 2 PUFF(S): 90 AEROSOL, METERED ORAL at 00:21

## 2022-05-05 RX ADMIN — SODIUM CHLORIDE 4 MILLILITER(S): 9 INJECTION INTRAMUSCULAR; INTRAVENOUS; SUBCUTANEOUS at 10:12

## 2022-05-05 RX ADMIN — LEVETIRACETAM 750 MILLIGRAM(S): 250 TABLET, FILM COATED ORAL at 17:55

## 2022-05-05 RX ADMIN — Medication 2 MILLIGRAM(S): at 21:32

## 2022-05-05 RX ADMIN — HEPARIN SODIUM 5000 UNIT(S): 5000 INJECTION INTRAVENOUS; SUBCUTANEOUS at 05:03

## 2022-05-05 RX ADMIN — PIPERACILLIN AND TAZOBACTAM 25 GRAM(S): 4; .5 INJECTION, POWDER, LYOPHILIZED, FOR SOLUTION INTRAVENOUS at 17:40

## 2022-05-05 RX ADMIN — Medication 650 MILLIGRAM(S): at 17:58

## 2022-05-05 RX ADMIN — Medication 100 MILLIGRAM(S): at 17:46

## 2022-05-05 RX ADMIN — PIPERACILLIN AND TAZOBACTAM 25 GRAM(S): 4; .5 INJECTION, POWDER, LYOPHILIZED, FOR SOLUTION INTRAVENOUS at 09:54

## 2022-05-05 RX ADMIN — Medication 1 PUFF(S): at 16:21

## 2022-05-05 RX ADMIN — ALBUTEROL 2 PUFF(S): 90 AEROSOL, METERED ORAL at 16:21

## 2022-05-05 RX ADMIN — SODIUM CHLORIDE 500 MILLILITER(S): 9 INJECTION INTRAMUSCULAR; INTRAVENOUS; SUBCUTANEOUS at 04:32

## 2022-05-05 RX ADMIN — MIDODRINE HYDROCHLORIDE 20 MILLIGRAM(S): 2.5 TABLET ORAL at 04:32

## 2022-05-05 RX ADMIN — PANTOPRAZOLE SODIUM 40 MILLIGRAM(S): 20 TABLET, DELAYED RELEASE ORAL at 12:15

## 2022-05-05 RX ADMIN — ALBUTEROL 2 PUFF(S): 90 AEROSOL, METERED ORAL at 22:56

## 2022-05-05 RX ADMIN — SODIUM CHLORIDE 4 MILLILITER(S): 9 INJECTION INTRAMUSCULAR; INTRAVENOUS; SUBCUTANEOUS at 00:21

## 2022-05-05 RX ADMIN — Medication 1 APPLICATION(S): at 12:13

## 2022-05-05 RX ADMIN — HEPARIN SODIUM 5000 UNIT(S): 5000 INJECTION INTRAVENOUS; SUBCUTANEOUS at 21:32

## 2022-05-05 RX ADMIN — Medication 650 MILLIGRAM(S): at 04:32

## 2022-05-05 RX ADMIN — SODIUM CHLORIDE 4 MILLILITER(S): 9 INJECTION INTRAMUSCULAR; INTRAVENOUS; SUBCUTANEOUS at 04:35

## 2022-05-05 RX ADMIN — CHLORHEXIDINE GLUCONATE 15 MILLILITER(S): 213 SOLUTION TOPICAL at 17:45

## 2022-05-05 RX ADMIN — Medication 650 MILLIGRAM(S): at 18:28

## 2022-05-05 RX ADMIN — Medication 1 MILLIGRAM(S): at 12:14

## 2022-05-05 RX ADMIN — Medication 1 PUFF(S): at 04:34

## 2022-05-05 RX ADMIN — LEVETIRACETAM 1000 MILLIGRAM(S): 250 TABLET, FILM COATED ORAL at 05:03

## 2022-05-05 RX ADMIN — ALBUTEROL 2 PUFF(S): 90 AEROSOL, METERED ORAL at 10:12

## 2022-05-05 RX ADMIN — Medication 1 PUFF(S): at 22:57

## 2022-05-05 RX ADMIN — ALBUTEROL 2 PUFF(S): 90 AEROSOL, METERED ORAL at 04:34

## 2022-05-05 RX ADMIN — SODIUM CHLORIDE 4 MILLILITER(S): 9 INJECTION INTRAMUSCULAR; INTRAVENOUS; SUBCUTANEOUS at 22:56

## 2022-05-05 RX ADMIN — CHLORHEXIDINE GLUCONATE 1 APPLICATION(S): 213 SOLUTION TOPICAL at 12:13

## 2022-05-05 RX ADMIN — Medication 50 MICROGRAM(S): at 05:02

## 2022-05-05 RX ADMIN — HEPARIN SODIUM 5000 UNIT(S): 5000 INJECTION INTRAVENOUS; SUBCUTANEOUS at 14:33

## 2022-05-05 RX ADMIN — Medication 100 MILLIGRAM(S): at 05:03

## 2022-05-05 RX ADMIN — SODIUM CHLORIDE 4 MILLILITER(S): 9 INJECTION INTRAMUSCULAR; INTRAVENOUS; SUBCUTANEOUS at 16:20

## 2022-05-05 RX ADMIN — Medication 1 PUFF(S): at 00:20
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
## 2022-05-05 NOTE — PHYSICAL THERAPY INITIAL EVALUATION ADULT - PRECAUTIONS/LIMITATIONS, REHAB EVAL
aspiration precautions/isolation precautions
cardiac precautions/fall precautions
NPO, non-verbal/aspiration precautions/fall precautions/hearing precautions/oxygen therapy device and L/min/seizure precautions
cardiac precautions/fall precautions/oxygen therapy device and L/min

## 2022-05-05 NOTE — PHYSICAL THERAPY INITIAL EVALUATION ADULT - IMPAIRMENTS FOUND, PT EVAL
aerobic capacity/endurance/arousal, attention, and cognition/gait, locomotion, and balance/muscle strength
gait, locomotion, and balance/muscle strength/posture

## 2022-05-05 NOTE — PHYSICAL THERAPY INITIAL EVALUATION ADULT - MANUAL MUSCLE TESTING RESULTS, REHAB EVAL
Bilateral ankle musculature 1/5 with remaining LE muscles grossly 0/5; Right UE at least 3/5 as pt able to actively lift arm to itch nose; left UE grossly 0/5
unable to assess secondary to inability to follow commands, + tone in right UE
unable to follow commands. patient can move right arm, but was unable to follow commands for arm movement/not tested due to
unable to formally assess secondary to inability in following commands.

## 2022-05-05 NOTE — PHYSICAL THERAPY INITIAL EVALUATION ADULT - PERTINENT HX OF CURRENT PROBLEM, REHAB EVAL
Pt is a 77 y/o male presenting from nursing home with increased oxygen requirements with concern for possible new pneumonia. 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
Pt is a 76 year old male presenting with ACHRF in setting of  Acinetobacter and Pseudomonas HCAP s/p antibiotics, complicated by FLORIAN and melena/ anemia. PMH listed below.
Pt is a 76 year old male presenting from LTAC for increasing oxygen requirements and worsening mental status. Important to note pt has significant PMH of L falcine herniation s/p R hemicraniectomy due to fall, trach/vent dependent, and seizure. CTH revealed R frontoparietal temporal craniectomy with extensive cerebral gliosis and encephalomalacia. Patient to be admitted to RCU for further management.
patient is a 76 year old male admitted fpr  ACHRF i/s/o  Actineobacter and Pseudomonas HCAP s/p ABX c/b FLORIAN and melena/ anemia

## 2022-05-05 NOTE — PHYSICAL THERAPY INITIAL EVALUATION ADULT - GENERAL OBSERVATIONS, REHAB EVAL
Patient was received semi-supine in bed, NAD. +trach, +IVs
Upon entry, pt semi-supine in bed in NAD with all lines/tubes intact. Pt left as received with all tubes/lines intact, bed alarm on, call bell in reach and in NAD.
Pt received semisupine in bed, +trach to vent, +peg tube, +pulse oximeter, in NAD. Pt left semisupine in bed as found, all lines intact and RN aware.
patient received semifowler in NAD +trach collar

## 2022-05-05 NOTE — PHYSICAL THERAPY INITIAL EVALUATION ADULT - FOLLOWS COMMANDS/ANSWERS QUESTIONS, REHAB EVAL
unable to follow commands/unable to answer questions
able to follow single-step instructions occasionally/unable to answer questions
unable to follow commands/unable to follow multi-step instructions/unable to answer questions
unable to follow commands

## 2022-05-05 NOTE — PHYSICAL THERAPY INITIAL EVALUATION ADULT - ADDITIONAL COMMENTS
Pt poor historian, A+Ox0-1, unable to formally attain social history.     Pt admitted from nursing home.     Pt left semi supine in bed, in NAD, lines/tubes intact, call bell within reach, RN aware.
as per chart patient is A&o x 0, vent dependent at baseline
Pt unable to provide information on prior level of function. Per care coordination assessment pt admitted from LTC facility.
Patient is non-verbal and unable to provide information regarding prior level of function. Per Neurology consultation, pt admitted from [facility] ([facility] where he is trach/vent dependent.

## 2022-05-05 NOTE — PHYSICAL THERAPY INITIAL EVALUATION ADULT - MD/RN NOTIFIED
no lesions , no deformities , no traumatic injuries , no significant scars are present , breathing is unlabored without accessory muscle use , normal breath sounds
yes

## 2022-05-05 NOTE — PROGRESS NOTE ADULT - ASSESSMENT
76M with Afib s/p watchman, gastric sleeve, SDH with L subfalcine herniation after fall (11/2021) s/p emergent R hemicraniectomy, trach/vent/PEG dependant, seizure d/o, and recent hospitalization at [facility] for pneumonia s/p zosyn.  Discharged to NH and admitted to [facility] with increasing oxygen requirements and worsening mental status.  Completed a course of meropenem.  Now with fever, leukocytosis.  Recent CT with left parotitis, however, per ENT clinically no parotitis.  WBC 13.  Tm yesterday 101.3.  Today, decreased to 100.  Increased secretions but no pneumonia.    Tracheitis/bronchitis  - zosyn okay for now  - f/u sensitivities from last sputum cultures    Parotitis  - for warm compresses  - if continues to have fever, would add vancomycin for mrsa coverage    I have discussed plan of care as detailed above with NP

## 2022-05-05 NOTE — PROGRESS NOTE ADULT - ASSESSMENT
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 Now DISPO planning.    # Neurology   - Currently A&Ox0, awake and alert, able to move RUE and nods/ mouths one or two words per RCU evaluation and prior documentation.   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   - Continue on Modafinil, Amantidine and Keppra.     #HEENT  - Large firm mass along left neck noted 4/28  - CT Neck w/IV contrast - CT findings most compatible with left-sided parotitis. There is a 2 mm   left intraparotid stone, although there is no parotid duct dilatation or obstructing stone.  - ENT recs appreciated - Warm compress and massage TID, monitor for any worsening signs     # Cardiovascular   - Hx of HFpEF 55, mild MR and AR, severe LAD, normal LVRVSF (ECHO 3/7)  - Hx of Atrial Fibrillation s/p Watchman and currently rate controlled.   - Continue on Amiodarone 200mg QD and Coreg 6.25mg BID.   - Continue on Lasix 40mg QD and monitor tolerance.   (5/3) Hypotensive in the high 80s-low 90s systolic, s/p IVF  cc bolus  - Will continue to monitor closely     # Respiratory   - Hx of SDH and chronically trached and vented with recurrent HCAP infections.   - Continue on Proventil, Atrovent and Chest PT Q6H  - c/w Hypersal to loosen secretions (worsening)  - Sputum culture sent on 5/2 - Moderate GNR/GP rods, otherwise unremakable  - CXR - No focal consolidations to indicate pneumonia. Improved aeration of left lower lobe.  Trace bilateral pleural effusions and bilateral lower lobe linear atelectasis.  - Now back on the vent 2/2 increased secretions and overall appearance, (appears fatigued)    # GI  - Hx of SDH, Gastric Bypass and Dysphagia s/p PEG and continued on TF with course complicated by constipation and melena  - Case discussed with GI and melena likely in setting of constipation, however no plan for scope currently and will medically treat with PPI.   - Continue on Nepro in sight of hyperkalemia.   - s/p large BM on 4/25  - s/p trial of Reglan 10mg TID (4/24 - 4/27)  (5/3) Mild diarrhea noted, d'lisa Senna and Miralax - will monitor closely  - If persistent/worsens, will consider sending C-diff - for now low suspicion     # / Renal  - Hx of Urinary Retention with Chronic Garcia and presented FLORIAN likely in setting of dehydration with fevers vs ATN with Sepsis (CRE high 2s and baseline 0.8-0.9).   - UA with hematuria and proteinuria and case discussed with Neprhology with concern for glomerulonephritis. ANCA, ISABELLE and GM ABs negative.   - Hypernatremia (resolved) - c/w free water 300 q6hr for now.   - Hyperkalemia (Resolved) and s/p cocktail with improvement (4/19).   - IVF and PRBC given with CRE improving and now resumed on Lasix with improvement.     # ID  - Recent admission for  Acinetobacter and Pseudomonas HCAP (3/2022)   - SCx (4/10) with  Acinetobacter and Pseudomonas.   - s/p completed Meropenem (4/8-4/14) and will monitor off ABX.   - Rectal temp of 100.8 on 5/3 - Fever work up in progress  - Low threshold to start Abx unless conditions worsens or Hemodynamically unstable   - Abx restarted zosyn and vanco by level   Unable to do floroquinolone 2/2 prolonged QT-c 515    # Endocrine  - No hx of DM2 and A1C 5.9. Continue to monitor BG,    - Hx of Hypothyroidism and continued on Synthroid. TSH 47 with low T3/T4 and Free T4. TSH 80s (3/2022) and Synthroid increased at NH. Hypothyroidism could be in the setting of Amiodarone use and current Synthroid dose appears to be working.   - Next TFT to be done in June 2022.     # Hematology   - Anemia i/s/o AOCD and s/p PRBC (4/9)   - Melena/ anemia noted and s/p 2 U PRBC (4/19)  with good response, however HH waxes and wanes with no obvious bleed (no further melena or CGE from PEG aspiration)  - DVT PPX with HSQ (cleared by GI to resume).     # Ethics   - FULL CODE   - Case discussed with Cousin/ HCP, Dr. Donna Hagen (Pediatric Neurologist, [phone]) and updated daily. PM&R eval appreciated. 5/4 spoke with Dr Hagen     # DISPO - Planning to go back to NH with Neck CT results. [place] requesting [place] [facility] and  [place]      77 YO M, A&Ox0 at baseline with PMHx of L SDH c/b L Subfalcine Herniation s/p Emergent R Hemicraniectomy in [place] while traveling fell on marble floor and now chronic with tracheostomy and vent dependant, Dysphagia with PEG, AFIB s/p watchman, Gastric Sleeve, Urinary Retention with Chornic Garcia, and recent ICU stay for HCAP with MDR Pseudomonas now presenting with ACHRF i/s/o  Actineobacter and Pseudomonas HCAP s/p ABX c/b FLORIAN and melena/ anemia. Now DISPO planning.    # Neurology   - Currently A&Ox0, awake and alert, able to move RUE and nods/ mouths one or two words per RCU evaluation and prior documentation.   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   - Continue on Modafinil, Amantidine and Keppra.   - Re-eval by PT - pt not a candidate at this time   - More awake today     #HEENT  - Large firm mass along left neck noted 4/28  - CT Neck w/IV contrast - CT findings most compatible with left-sided parotitis. There is a 2 mm   left intraparotid stone, although there is no parotid duct dilatation or obstructing stone.  - ENT recs appreciated - Warm compress and massage TID, monitor for any worsening signs     # Cardiovascular   - Hx of HFpEF 55, mild MR and AR, severe LAD, normal LVRVSF (ECHO 3/7)  - Hx of Atrial Fibrillation s/p Watchman and currently rate controlled.   - Continue on Amiodarone 200mg QD and Coreg 6.25mg BID.   - Continue on Lasix 40mg QD and monitor tolerance.   (5/3) Hypotensive in the high 80s-low 90s systolic, s/p IVF  cc bolus  - Will continue to monitor closely     # Respiratory   - Hx of SDH and chronically trached and vented with recurrent HCAP infections.   - Continue on Proventil, Atrovent and Chest PT Q6H  - c/w Hypersal to loosen secretions (worsening)  - Sputum culture sent on 5/2 - Moderate GNR/GP rods, otherwise unremakable  - CXR - No focal consolidations to indicate pneumonia. Improved aeration of left lower lobe.  Trace bilateral pleural effusions and bilateral lower lobe linear atelectasis.  - Now back on the vent 2/2 increased secretions and overall appearance, (appears fatigued)  - MOre awake today - tolerated PS for a period - still thick secretions     # GI  - Hx of SDH, Gastric Bypass and Dysphagia s/p PEG and continued on TF with course complicated by constipation and melena  - Case discussed with GI and melena likely in setting of constipation, however no plan for scope currently and will medically treat with PPI.   - Continue on Nepro in sight of hyperkalemia.   - s/p large BM on 4/25  - s/p trial of Reglan 10mg TID (4/24 - 4/27)  (5/3) Mild diarrhea noted, d'lisa Senna and Miralax - will monitor closely  - If persistent/worsens, will consider sending C-diff - for now low suspicion     # / Renal  - Hx of Urinary Retention with Chronic Garcia and presented FLORIAN likely in setting of dehydration with fevers vs ATN with Sepsis (CRE high 2s and baseline 0.8-0.9).   - UA with hematuria and proteinuria and case discussed with Neprhology with concern for glomerulonephritis. ANCA, ISABELLE and GM ABs negative.   - Hypernatremia (resolved) - c/w free water 300 q6hr for now.   - Hyperkalemia (Resolved) and s/p cocktail with improvement (4/19).   - IVF and PRBC given with CRE improving and now resumed on Lasix with improvement.   - Cr rising - IVF given for low bp - hold lasix , coreg for now     # ID  - Recent admission for  Acinetobacter and Pseudomonas HCAP (3/2022)   - SCx (4/10) with  Acinetobacter and Pseudomonas.   - s/p completed Meropenem (4/8-4/14) and will monitor off ABX.   - Rectal temp of 100.8 on 5/3 - Fever work up in progress  - Low threshold to start Abx unless conditions worsens or Hemodynamically unstable   - Abx restarted zosyn and vanco by level   Unable to do floroquinolone 2/2 prolonged QT-c 515  - seen by ID - keep current abx for now - fu cx's     # Endocrine  - No hx of DM2 and A1C 5.9. Continue to monitor BG,    - Hx of Hypothyroidism and continued on Synthroid. TSH 47 with low T3/T4 and Free T4. TSH 80s (3/2022) and Synthroid increased at NH. Hypothyroidism could be in the setting of Amiodarone use and current Synthroid dose appears to be working.   - Next TFT to be done in June 2022.     # Hematology   - Anemia i/s/o AOCD and s/p PRBC (4/9)   - Melena/ anemia noted and s/p 2 U PRBC (4/19)  with good response, however HH waxes and wanes with no obvious bleed (no further melena or CGE from PEG aspiration)  - DVT PPX with HSQ (cleared by GI to resume).     # Ethics   - FULL CODE   - Case discussed with Cousin/ HCP, Dr. Donna Hagen (Pediatric Neurologist, [phone]) and updated daily. PM&R eval appreciated. 5/4 and 5/5  spoke with Dr Hagen updates given     # DISPO - Planning to go back to NH with Neck CT results. Family requesting [place] [facility] and  [place]

## 2022-05-05 NOTE — PHYSICAL THERAPY INITIAL EVALUATION ADULT - PATIENT/FAMILY/SIGNIFICANT OTHER GOALS STATEMENT, PT EVAL
patient nonverbal
Pt non-verbal; unable to verbalize goals at this time
unable to ask pt secondary to being lethargic
None stated

## 2022-05-05 NOTE — PROGRESS NOTE ADULT - SUBJECTIVE AND OBJECTIVE BOX
INTERVAL HPI/OVERNIGHT EVENTS:    tolerating feeds   brown bm yesterday     MEDICATIONS  (STANDING):  ALBUTerol    90 MICROgram(s) HFA Inhaler 2 Puff(s) Inhalation every 6 hours  amantadine Syrup 100 milliGRAM(s) Oral two times a day  aMIOdarone    Tablet 200 milliGRAM(s) Oral daily  ascorbic acid 500 milliGRAM(s) Oral daily  chlorhexidine 0.12% Liquid 15 milliLiter(s) Oral Mucosa every 12 hours  chlorhexidine 2% Cloths 1 Application(s) Topical daily  collagenase Ointment 1 Application(s) Topical daily  doxazosin 2 milliGRAM(s) Oral at bedtime  folic acid 1 milliGRAM(s) Oral daily  heparin   Injectable 5000 Unit(s) SubCutaneous every 8 hours  ipratropium 17 MICROgram(s) HFA Inhaler 1 Puff(s) Inhalation every 6 hours  levETIRAcetam 750 milliGRAM(s) Oral two times a day  levothyroxine 50 MICROGram(s) Oral daily  pantoprazole   Suspension 40 milliGRAM(s) Oral daily  piperacillin/tazobactam IVPB.. 4.5 Gram(s) IV Intermittent every 8 hours  sodium chloride 3%  Inhalation 4 milliLiter(s) Inhalation every 6 hours    MEDICATIONS  (PRN):  acetaminophen    Suspension .. 650 milliGRAM(s) Oral every 4 hours PRN Temp greater or equal to 38C (100.4F), Mild Pain (1 - 3)      Allergies    No Known Allergies    Intolerances        Review of Systems: *pt minimally verbal to nonverbal, unable to obtain ROS         Vital Signs Last 24 Hrs  T(C): 37.6 (05 May 2022 05:37), Max: 38.5 (04 May 2022 21:48)  T(F): 99.6 (05 May 2022 05:37), Max: 101.3 (04 May 2022 21:48)  HR: 82 (05 May 2022 11:12) (65 - 95)  BP: 96/67 (05 May 2022 05:37) (76/65 - 111/74)  BP(mean): --  RR: 18 (05 May 2022 05:37) (12 - 18)  SpO2: 100% (05 May 2022 11:12) (100% - 100%)    PHYSICAL EXAM:    Constitutional: NAD  HEENT: EOMI, throat clear  Neck: No LAD, supple; +trach  Respiratory: CTA and P   Cardiovascular: S1 and S2, RRR, no M  Gastrointestinal: BS+, soft, NT/ND, neg HSM, +peg  Extremities: No peripheral edema, neg clubbing, cyanosis  Vascular: 2+ peripheral pulses  Neurological: A/O x 0-1, no focal deficits  Psychiatric: lethargic  Skin: No rashes      LABS:                        8.0    14.61 )-----------( 341      ( 05 May 2022 07:24 )             25.1     05-05    134<L>  |  95<L>  |  73<H>  ----------------------------<  119<H>  3.6   |  24  |  1.78<H>    Ca    7.8<L>      05 May 2022 07:24  Phos  3.4     05-05  Mg     2.40     05-05    TPro  6.7  /  Alb  2.1<L>  /  TBili  <0.2  /  DBili  x   /  AST  16  /  ALT  15  /  AlkPhos  81  05-05      Urinalysis Basic - ( 04 May 2022 06:38 )    Color: Yellow / Appearance: Slightly Turbid / S.020 / pH: x  Gluc: x / Ketone: Negative  / Bili: Negative / Urobili: <2 mg/dL   Blood: x / Protein: 30 mg/dL / Nitrite: Negative   Leuk Esterase: Large / RBC: 5 /HPF / WBC Many /HPF   Sq Epi: x / Non Sq Epi: 7 /HPF / Bacteria: Negative        RADIOLOGY & ADDITIONAL TESTS:

## 2022-05-05 NOTE — PROGRESS NOTE ADULT - SUBJECTIVE AND OBJECTIVE BOX
CHIEF COMPLAINT: Patient is a 76y old  Male who presents with a chief complaint of Hypoxia (04 May 2022 12:23)      Interval Events:    REVIEW OF SYSTEMS:  [ ] All other systems negative  [ ] Unable to assess ROS because ________    Mode: AC/ CMV (Assist Control/ Continuous Mandatory Ventilation), RR (machine): 12, TV (machine): 450, FiO2: 30, PEEP: 5, ITime: 0.8, MAP: 8, PIP: 20      OBJECTIVE:  ICU Vital Signs Last 24 Hrs  T(C): 37.6 (05 May 2022 05:37), Max: 38.5 (04 May 2022 21:48)  T(F): 99.6 (05 May 2022 05:37), Max: 101.3 (04 May 2022 21:48)  HR: 90 (05 May 2022 05:37) (65 - 95)  BP: 96/67 (05 May 2022 05:37) (76/65 - 111/74)  BP(mean): --  ABP: --  ABP(mean): --  RR: 18 (05 May 2022 05:37) (12 - 18)  SpO2: 100% (05 May 2022 05:37) (98% - 100%)    Mode: AC/ CMV (Assist Control/ Continuous Mandatory Ventilation), RR (machine): 12, TV (machine): 450, FiO2: 30, PEEP: 5, ITime: 0.8, MAP: 8, PIP: 20    05-04 @ 07:01  -  05-05 @ 07:00  --------------------------------------------------------  IN: 2280 mL / OUT: 500 mL / NET: 1780 mL      CAPILLARY BLOOD GLUCOSE      POCT Blood Glucose.: 181 mg/dL (03 May 2022 19:25)      HOSPITAL MEDICATIONS:  MEDICATIONS  (STANDING):  ALBUTerol    90 MICROgram(s) HFA Inhaler 2 Puff(s) Inhalation every 6 hours  amantadine Syrup 100 milliGRAM(s) Oral two times a day  aMIOdarone    Tablet 200 milliGRAM(s) Oral daily  ascorbic acid 500 milliGRAM(s) Oral daily  carvedilol 6.25 milliGRAM(s) Oral every 12 hours  chlorhexidine 0.12% Liquid 15 milliLiter(s) Oral Mucosa every 12 hours  chlorhexidine 2% Cloths 1 Application(s) Topical daily  collagenase Ointment 1 Application(s) Topical daily  doxazosin 2 milliGRAM(s) Oral at bedtime  folic acid 1 milliGRAM(s) Oral daily  furosemide Solution 40 milliGRAM(s) Oral daily  heparin   Injectable 5000 Unit(s) SubCutaneous every 8 hours  ipratropium 17 MICROgram(s) HFA Inhaler 1 Puff(s) Inhalation every 6 hours  levETIRAcetam  Solution 1000 milliGRAM(s) Oral two times a day  levothyroxine 50 MICROGram(s) Oral daily  pantoprazole   Suspension 40 milliGRAM(s) Oral daily  piperacillin/tazobactam IVPB.. 4.5 Gram(s) IV Intermittent every 8 hours  sodium chloride 3%  Inhalation 4 milliLiter(s) Inhalation every 6 hours    MEDICATIONS  (PRN):  acetaminophen    Suspension .. 650 milliGRAM(s) Oral every 4 hours PRN Temp greater or equal to 38C (100.4F), Mild Pain (1 - 3)      LABS:                        8.0    14.61 )-----------( 341      ( 05 May 2022 07:24 )             25.1     05-    133<L>  |  96<L>  |  66<H>  ----------------------------<  103<H>  3.9   |  19<L>  |  1.56<H>    Ca    8.2<L>      04 May 2022 06:40  Phos  3.7     05-  Mg     2.40     -    TPro  6.9  /  Alb  2.1<L>  /  TBili  0.2  /  DBili  x   /  AST  22  /  ALT  17  /  AlkPhos  87  -04      Urinalysis Basic - ( 04 May 2022 06:38 )    Color: Yellow / Appearance: Slightly Turbid / S.020 / pH: x  Gluc: x / Ketone: Negative  / Bili: Negative / Urobili: <2 mg/dL   Blood: x / Protein: 30 mg/dL / Nitrite: Negative   Leuk Esterase: Large / RBC: 5 /HPF / WBC Many /HPF   Sq Epi: x / Non Sq Epi: 7 /HPF / Bacteria: Negative        Venous Blood Gas:   @ 06:40  7.39/48/39/29/61.2  VBG Lactate: 2.7  Venous Blood Gas:   @ 19:47  7.43/46/47/30/73.7  VBG Lactate: 2.4  Venous Blood Gas:   @ 13:24  7.45/47/34/33/56.7  VBG Lactate: 1.8      PAST MEDICAL & SURGICAL HISTORY:  Essential hypertension    Primary osteoarthritis, unspecified site    Closed fracture of left radius, initial encounter    Morbid obesity, unspecified obesity type  h/o. - s/p gastric bypass- lost 113 lbs    KAREN (obstructive sleep apnea)  h/o - was on CPAP until gastric bypass surgery    Respiratory failure    Anemia    Subdural hemorrhage    Epilepsy    H/O gastrostomy    History of BPH    Afib    S/P gastric bypass  2008    Status post total replacement of left hip  2006    S/P bunionectomy  bilateral    S/P colonoscopy  approx 2012    History of abdominoplasty  and subsequent cosmetic surgery for removal of excess skin from face        FAMILY HISTORY:  Family history of renal cell carcinoma (Mother)    Family history of malignant melanoma (Sibling)        Social History:      RADIOLOGY:  [ ] Reviewed and interpreted by me    PULMONARY FUNCTION TESTS:    EKG: CHIEF COMPLAINT: Patient is a 76y old  Male who presents with a chief complaint of Hypoxia (04 May 2022 12:23)      Interval Events: Pt seen at bedside Hypotensive overnight TOV in progress     REVIEW OF SYSTEMS:  [x ] Unable to assess ROS because vented     Mode: AC/ CMV (Assist Control/ Continuous Mandatory Ventilation), RR (machine): 12, TV (machine): 450, FiO2: 30, PEEP: 5, ITime: 0.8, MAP: 8, PIP: 20      OBJECTIVE:  ICU Vital Signs Last 24 Hrs  T(C): 37.6 (05 May 2022 05:37), Max: 38.5 (04 May 2022 21:48)  T(F): 99.6 (05 May 2022 05:37), Max: 101.3 (04 May 2022 21:48)  HR: 90 (05 May 2022 05:37) (65 - 95)  BP: 96/67 (05 May 2022 05:37) (76/65 - 111/74)  BP(mean): --  ABP: --  ABP(mean): --  RR: 18 (05 May 2022 05:37) (12 - 18)  SpO2: 100% (05 May 2022 05:37) (98% - 100%)    Mode: AC/ CMV (Assist Control/ Continuous Mandatory Ventilation), RR (machine): 12, TV (machine): 450, FiO2: 30, PEEP: 5, ITime: 0.8, MAP: 8, PIP: 20    05-04 @ 07:01  -  05-05 @ 07:00  --------------------------------------------------------  IN: 2280 mL / OUT: 500 mL / NET: 1780 mL      CAPILLARY BLOOD GLUCOSE      POCT Blood Glucose.: 181 mg/dL (03 May 2022 19:25)      HOSPITAL MEDICATIONS:  MEDICATIONS  (STANDING):  ALBUTerol    90 MICROgram(s) HFA Inhaler 2 Puff(s) Inhalation every 6 hours  amantadine Syrup 100 milliGRAM(s) Oral two times a day  aMIOdarone    Tablet 200 milliGRAM(s) Oral daily  ascorbic acid 500 milliGRAM(s) Oral daily  carvedilol 6.25 milliGRAM(s) Oral every 12 hours  chlorhexidine 0.12% Liquid 15 milliLiter(s) Oral Mucosa every 12 hours  chlorhexidine 2% Cloths 1 Application(s) Topical daily  collagenase Ointment 1 Application(s) Topical daily  doxazosin 2 milliGRAM(s) Oral at bedtime  folic acid 1 milliGRAM(s) Oral daily  furosemide Solution 40 milliGRAM(s) Oral daily  heparin   Injectable 5000 Unit(s) SubCutaneous every 8 hours  ipratropium 17 MICROgram(s) HFA Inhaler 1 Puff(s) Inhalation every 6 hours  levETIRAcetam  Solution 1000 milliGRAM(s) Oral two times a day  levothyroxine 50 MICROGram(s) Oral daily  pantoprazole   Suspension 40 milliGRAM(s) Oral daily  piperacillin/tazobactam IVPB.. 4.5 Gram(s) IV Intermittent every 8 hours  sodium chloride 3%  Inhalation 4 milliLiter(s) Inhalation every 6 hours    MEDICATIONS  (PRN):  acetaminophen    Suspension .. 650 milliGRAM(s) Oral every 4 hours PRN Temp greater or equal to 38C (100.4F), Mild Pain (1 - 3)      LABS:                        8.0    14.61 )-----------( 341      ( 05 May 2022 07:24 )             25.1     05-    133<L>  |  96<L>  |  66<H>  ----------------------------<  103<H>  3.9   |  19<L>  |  1.56<H>    Ca    8.2<L>      04 May 2022 06:40  Phos  3.7     05-  Mg     2.40     -    TPro  6.9  /  Alb  2.1<L>  /  TBili  0.2  /  DBili  x   /  AST  22  /  ALT  17  /  AlkPhos  87  -04      Urinalysis Basic - ( 04 May 2022 06:38 )    Color: Yellow / Appearance: Slightly Turbid / S.020 / pH: x  Gluc: x / Ketone: Negative  / Bili: Negative / Urobili: <2 mg/dL   Blood: x / Protein: 30 mg/dL / Nitrite: Negative   Leuk Esterase: Large / RBC: 5 /HPF / WBC Many /HPF   Sq Epi: x / Non Sq Epi: 7 /HPF / Bacteria: Negative        Venous Blood Gas:   @ 06:40  7.39/48/39/29/61.2  VBG Lactate: 2.7  Venous Blood Gas:   @ 19:47  7.43/46/47/30/73.7  VBG Lactate: 2.4  Venous Blood Gas:   @ 13:24  7.45/47/34/33/56.7  VBG Lactate: 1.8      PAST MEDICAL & SURGICAL HISTORY:  Essential hypertension    Primary osteoarthritis, unspecified site    Closed fracture of left radius, initial encounter    Morbid obesity, unspecified obesity type  h/o. - s/p gastric bypass- lost 113 lbs    KAREN (obstructive sleep apnea)  h/o - was on CPAP until gastric bypass surgery    Respiratory failure    Anemia    Subdural hemorrhage    Epilepsy    H/O gastrostomy    History of BPH    Afib    S/P gastric bypass  2008    Status post total replacement of left hip  2006    S/P bunionectomy  bilateral    S/P colonoscopy  approx 2012    History of abdominoplasty  and subsequent cosmetic surgery for removal of excess skin from face        FAMILY HISTORY:  Family history of renal cell carcinoma (Mother)    Family history of malignant melanoma (Sibling)        Social History:      RADIOLOGY:  [ ] Reviewed and interpreted by me    PULMONARY FUNCTION TESTS:    EKG:

## 2022-05-05 NOTE — PHYSICAL THERAPY INITIAL EVALUATION ADULT - GROSSLY INTACT, SENSORY
Unable to formally assess as patient is non-verbal
unable to assess
unable to assess due to cognitive status

## 2022-05-05 NOTE — PROGRESS NOTE ADULT - ASSESSMENT
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 GI consulted for anemia     Anemia   PEG flushed/lavaged x2; feeds and bilious fluid aspirated, No blood products noted  Protonix 40mg via peg  occult likely d/t constipated stool/irritated hemorrhoid; stools brown  bowel regimen and give anusol suppository qhs as needed  AVOID Hypotensive events to avoid ischemic bleeding events    Constipation, Slow Transit   senna syrup and miralax  milk of mg and enemas as needed  if abd distention, would rec to avoid lactulose     HCAP   per RCU/ID       I reviewed the overnight course of events on the unit, re-confirming the patient history. I discussed the care with the patient and their family. The plan of care was discussed with the physician assistant and modifications were made to the notation where appropriate. Differential diagnosis and plan of care discussed with patient after the evaluation. Advanced care planning was discussed with patient and family.  Advanced care planning forms were reviewed and discussed.  Risks, benefits and alternatives of gastroenterologic procedures were discussed in detail and all questions were answered. 35 minutes spent on total encounter of which more than fifty percent of the encounter was spent counseling and/or coordinating care by the attending physician.

## 2022-05-05 NOTE — PHYSICAL THERAPY INITIAL EVALUATION ADULT - RANGE OF MOTION EXAMINATION, REHAB EVAL
PROM WFL/bilateral upper extremity ROM was WFL (within functional limits)/bilateral lower extremity ROM was WFL (within functional limits)
bilateral upper extremity ROM was WFL (within functional limits)/bilateral lower extremity ROM was WFL (within functional limits)

## 2022-05-05 NOTE — PROGRESS NOTE ADULT - SUBJECTIVE AND OBJECTIVE BOX
76M with Afib s/p watchman, gastric sleeve, SDH with L subfalcine herniation after fall (2021) s/p emergent R hemicraniectomy, trach/vent/PEG dependant, seizure d/o, and recent hospitalization at [facility] for pneumonia s/p zosyn.  Was at NH when he developed hyypoxia and was admitted to [facility] .  He was treated wt meropenem and stablized.  Developed issue with tolerating tube feeds.  Better now.  Yesterday, he had a fever.  Was started on zosyn yesterday.  Prior sputum with pan-S pseudomonas aeruginosa.  Unable to get levaquin due to prolonged QTc (also on amiodarone).  CXR negative for pneumonia.  Fever better today.  Increased secretions.  Was on CPAP, now on vent FiO2=30%    ID asked to f/u    PAST MEDICAL & SURGICAL HISTORY:  Essential hypertension  Primary osteoarthritis, unspecified site  Closed fracture of left radius, initial encounter  Morbid obesity, unspecified obesity type h/o. - s/p gastric bypass- lost 113 lbs  KAREN (obstructive sleep apnea) h/o - was on CPAP until gastric bypass surgery  Respiratory failure   Anemia  Subdural hemorrhage  Epilepsy  H/O gastrostomy  History of BPH  Afib  S/P gastric bypass 2008  Status post total replacement of left hip 2006  S/P bunionectomy bilateral  S/P colonoscopy approx   History of abdominoplasty and subsequent cosmetic surgery for removal of excess skin from face    Allergies  No Known Allergies    ANTIMICROBIALS:  vancomycin  IVPB (4/8 x1, 5/3 x1)  meropenem  IVPB 1000 every 12 hours (-)  active:  piperacillin/tazobactam IVPB.. 4.5 every 8 hours (-)    MEDICATIONS  (STANDING):  ALBUTerol    90 MICROgram(s) HFA Inhaler 2 every 6 hours  amantadine Syrup 100 two times a day  aMIOdarone    Tablet 200 daily  doxazosin 2 at bedtime  heparin   Injectable 5000 every 8 hours  ipratropium 17 MICROgram(s) HFA Inhaler 1 every 6 hours  levETIRAcetam 750 two times a day  levothyroxine 50 daily  pantoprazole   Suspension 40 daily  sodium chloride 3%  Inhalation 4 every 6 hours    Vital Signs Last 24 Hrs  T(F): 100 (22 @ 13:15), Max: 101.3 (22 @ 21:48)  HR: 87 (22 @ 15:11)  BP: 107/68 (22 @ 13:15)  RR: 12 (22 @ 13:15)  SpO2: 100% (22 @ 15:11) (100% - 100%)    PHYSICAL EXAMINATION:  Constitutional: non-toxic, lying in bed  HEAD/EYES: anicteric  ENT:  trache site okay, on vent  Cardiovascular:   normal S1, S2  Respiratory:  coarse b/l breath sounds  GI:  soft, non-tender, normal bowel sounds, PEG site okay  :  benson  Musculoskeletal:  no synovitis  Neurologic: awake  Skin:  no rash  Psychiatric:  awake, appropriate mood                                            7.9    13.40 )-----------( 258      ( 05 May 2022 13:50 )             24.7     130  |  93  |  75  ----------------------------<  118  3.5   |  24  |  1.64  Ca    8.0      05 May 2022 13:50Phos  3.3     05-05Mg     2.40       TPro  6.7  /  Alb  2.1  /  TBili  <0.2  /  DBili  x   /  AST  16  /  ALT  15  /  AlkPhos  81  -    Creatinine, Serum: 1.64 (-05)  Creatinine, Serum: 1.78 ()  Creatinine, Serum: 1.56 ()  Creatinine, Serum: 1.36 ()  Creatinine, Serum: 1.35 ()  Creatinine, Serum: 1.18 ()  Creatinine, Serum: 0.93 ()                        Urinalysis Basic - ( 2022 23:15 )  Color: Yellow / Appearance: Slightly Turbid / S.023 / pH: x  Gluc: x / Ketone: Trace  / Bili: Negative / Urobili: <2 mg/dL   Blood: x / Protein: 300 mg/dL / Nitrite: Negative   Leuk Esterase: Moderate / RBC: 75 /HPF / WBC 15 /HPF   Sq Epi: x / Non Sq Epi: x / Bacteria: Few    MICROBIOLOGY:  Culture - Sputum (collected 22 @ 06:16)  Source: .Sputum Sputum  Gram Stain (22 @ 12:04):    Rare polymorphonuclear leukocytes per low power field    Few Squamous epithelial cells per low power field    Numerous Gram Negative Rods per oil power field    Numerous Gram Positive Rods per oil power field  Preliminary Report (22 @ 11:32):    Numerous Mixed gram negative rods including    Numerous Pseudomonas aeruginosa Susceptibility to follow.    Normal Respiratory Cheryl present    Culture - Blood (collected 22 @ 23:05)  Source: .Blood Blood-Venous  Preliminary Report (22 @ 01:02):    No growth to date.    Culture - Blood (collected 22 @ 23:05)  Source: .Blood Blood-Peripheral  Preliminary Report (22 @ 01:02):    No growth to date.    Culture - Sputum (collected 22 @ 18:31)  Source: .Sputum Sputum  Gram Stain (22 @ 22:29):    Few polymorphonuclear leukocytes per low power field    Few Squamous epithelial cells per low power field    Moderate Gram Positive Rods per oil power field    Few Gram Negative Rods per oil power field  Final Report (22 @ 16:51):    Few Pseudomonas aeruginosa    Normal Respiratory Cheryl present  Organism: Pseudomonas aeruginosa (22 @ 16:51)  Organism: Pseudomonas aeruginosa (22 @ 16:51)      -  Amikacin: S <=16      -  Aztreonam: S <=4      -  Cefepime: S <=2      -  Ceftazidime: S <=1      -  Ciprofloxacin: S <=0.25      -  Gentamicin: S <=2      -  Imipenem: S <=1      -  Levofloxacin: S <=0.5      -  Meropenem: S <=1      -  Piperacillin/Tazobactam: S <=8      -  Tobramycin: S <=2      Method Type: IZZY    Culture - Sputum (collected 04-10-22 @ 14:32)  Source: .Sputum Sputum  Gram Stain (04-10-22 @ 23:12):    Numerous polymorphonuclear leukocytes per low power field    Rare Squamous epithelial cells per low power field    Moderate Gram Negative Rods per oil power field    Moderate Gram positive cocci in pairs per oil power field    Few Gram Positive Rods per oil power field  Final Report (22 @ 16:10):    Numerous Acinetobacter baumannii/nosocom group (Carbapenem Resistant)    Cefiderocol = Intermediate Interpretations based on FDA breakpoints    Few Pseudomonas aeruginosa    Normal Respiratory Cheryl absent  Organism: Acinetobacter baumannii/nosocom group (Carbapenem Resistant)  Pseudomonas aeruginosa (22 @ 16:10)  Organism: Acinetobacter baumannii/nosocom group (Carbapenem Resistant) (22 @ 16:03)      -  Polymyxin B: S 1      Method Type: ETEST  Organism: Pseudomonas aeruginosa (22 @ 16:00)      -  Amikacin: S <=16      -  Aztreonam: S <=4      -  Cefepime: S <=2      -  Ceftazidime: S <=1      -  Ciprofloxacin: S <=0.25      -  Gentamicin: S <=2      -  Imipenem: S <=1      -  Levofloxacin: S <=0.5      -  Meropenem: S <=1      -  Piperacillin/Tazobactam: S <=8      -  Tobramycin: S <=2      Method Type: IZZY  Organism: Acinetobacter baumannii/nosocom group (Carbapenem Resistant) (22 @ 17:43)      -  Imipenem: R      -  Piperacillin/Tazobactam: R      Method Type: KB  Organism: Acinetobacter baumannii/nosocom group (Carbapenem Resistant) (22 @ 17:43)      -  Amikacin: S <=16      -  Ampicillin/Sulbactam: I 16/8      -  Cefepime: R >16      -  Ceftazidime: R >16      -  Ciprofloxacin: R >2      -  Gentamicin: R >8      -  Levofloxacin: R >4      -  Meropenem: R >8      -  Minocycline: S <=4      -  Tobramycin: S <=2      -  Trimethoprim/Sulfamethoxazole: R >2/38      Method Type: IZZY    Culture - Urine (collected 22 @ 23:03)  Source: Catheterized Catheterized  Final Report (04-10-22 @ 05:16):    <10,000 CFU/mL Normal Urogenital Cheryl    Culture - Blood (collected 22 @ 18:00)  Source: .Blood Blood-Peripheral  Final Report (22 @ 22:00):    No Growth Final    Culture - Blood (collected 22 @ 18:00)  Source: .Blood Blood-Peripheral  Final Report (22 @ 22:00):    No Growth Final    Culture - Sputum (collected 08 Mar 2022 18:33)  Source: .Sputum Sputum  Final Report:    Numerous Acinetobacter baumannii/nosocom group (Carbapenem Resistant)    Numerous Pseudomonas aeruginosa    Normal Respiratory Cheryl absent  Organism: Acinetobacter baumannii/nosocom group (Carbapenem Resistant)  Pseudomonas aeruginosa  Organism: Pseudomonas aeruginosa    Sensitivities:      -  Amikacin: S <=16      -  Aztreonam: R >16      -  Cefepime: I 16      -  Ceftazidime: R >16      -  Ciprofloxacin: S <=0.25      -  Gentamicin: S <=2      -  Imipenem: S <=1      -  Levofloxacin: S <=0.5      -  Meropenem: S <=1      -  Piperacillin/Tazobactam: R >64      -  Tobramycin: S <=2      Method Type: IZZY  Organism: Acinetobacter baumannii/nosocom group (Carbapenem Resistant)    Sensitivities:      -  Imipenem: R      -  Piperacillin/Tazobactam: R      Method Type: KB  Organism: Acinetobacter baumannii/nosocom group (Carbapenem Resistant)    Sensitivities:      -  Amikacin: S <=16      -  Ampicillin/Sulbactam: I 16/8      -  Cefepime: R >16      -  Ceftazidime: R >16      -  Ciprofloxacin: R >2      -  Gentamicin: R >8      -  Levofloxacin: R >4      -  Meropenem: R >8      -  Tobramycin: S <=2      -  Trimethoprim/Sulfamethoxazole: R >2/38      Method Type: IZZY    Culture - Blood (collected 07 Mar 2022 10:18)  Source: .Blood Blood-Peripheral  Final Report:    No Growth Final    Culture - Blood (collected 07 Mar 2022 10:18)  Source: .Blood Blood-Peripheral  Final Report:    No Growth Final    Culture - Urine (collected 07 Mar 2022 08:41)  Source: Clean Catch Clean Catch (Midstream)  Final Report:    No growth    COVID-19 PCR: NotDetec (22 @ 18:44)  COVID-19 PCR: NotDetec (22 @ 07:15)  COVID-19 PCR: NotDetec (22 @ 02:23)    RADIOLOGY:  imaging below personally reviewed    Xray Chest 1 View- PORTABLE-Urgent (Xray Chest 1 View- PORTABLE-Urgent .) (22 @ 20:04) >  IMPRESSION:  No focal consolidation to account for fever.    CT Neck Soft Tissue w/ IV Cont (22 @ 17:43) >  IMPRESSION:  CT findings most compatible with left-sided parotitis. There is a 2 mm left intraparotid stone, although there is no parotid duct dilatation or obstructing stone.    Xray Chest 1 View- PORTABLE-Urgent (22 @ 17:36) >  Unchanged left retrocardiac opacity. Right basilar atelectasis.    CT Head No Cont (22 @ 23:22) >  Right frontoparietal temporal craniectomy with wire mesh cranioplasty.  Extensive right cerebral gliosis and areas of encephalomalacia with  volume loss either from prior infarct or trauma in the left frontal and  anterior right temporal lobes. Thin chronic subdural along the falx. Appearance of gyriform thickening in the right frontoparietal parenchyma  underneath the cranioplasty is indeterminate. Possibility of intracranial  infection is not excluded. Consider follow-up with contrast-enhanced   brain MRI for better evaluation.     CT Abdomen and Pelvis No Cont (22 @ 23:22) >  Dependent lung parenchymal opacities, left greater than right, may represent atelectasis or infection in the appropriate clinical setting.  Small left pleural effusion with adjacent left basilar compressive  atelectasis. Small pericardial effusion.  Post gastric bypass with gastrostomy tube localized to the excluded  stomach of pancreaticobiliary limb. Slight distention of the excluded  gastric fundus with layering dense material, likely from prior contrast administration. Correlate clinically. No bowel obstruction. Moderate stool of the colon. Correlate for constipation. Coronary artery calcification.

## 2022-05-05 NOTE — PHYSICAL THERAPY INITIAL EVALUATION ADULT - DISCHARGE DISPOSITION, PT EVAL
Unable to formally make recommendation secondary to inability to assess functional mobility. Pt unable to follow commands.
Anticipate return to nursing home. Recommend social work and case management consultation. Pt removed from PT program.
Return to LTC facility; Pt not an appropriate rehab candidate nor for skilled PT intervention at this time. Patient unable to follow commands-->will not be placed on PT program at this time.
return to previous facility. patient is unable to follow commands to participate with bedside therapy.

## 2022-05-05 NOTE — PHYSICAL THERAPY INITIAL EVALUATION ADULT - PATIENT PROFILE REVIEW, REHAB EVAL
yes
yes
PT initial evaluation received and chart review completed. Pt agreeable to participate in PT evaluation./yes
PT evaluate and treat orders received: Increase as tolerated. Consult with NONI CANTU, pt may participate in PT evaluation./yes

## 2022-05-06 LAB
-  AMIKACIN: SIGNIFICANT CHANGE UP
-  AZTREONAM: SIGNIFICANT CHANGE UP
-  CEFEPIME: SIGNIFICANT CHANGE UP
-  CEFTAZIDIME: SIGNIFICANT CHANGE UP
-  CIPROFLOXACIN: SIGNIFICANT CHANGE UP
-  GENTAMICIN: SIGNIFICANT CHANGE UP
-  IMIPENEM: SIGNIFICANT CHANGE UP
-  LEVOFLOXACIN: SIGNIFICANT CHANGE UP
-  MEROPENEM: SIGNIFICANT CHANGE UP
-  PIPERACILLIN/TAZOBACTAM: SIGNIFICANT CHANGE UP
-  TOBRAMYCIN: SIGNIFICANT CHANGE UP
ANION GAP SERPL CALC-SCNC: 16 MMOL/L — HIGH (ref 7–14)
BUN SERPL-MCNC: 68 MG/DL — HIGH (ref 7–23)
CALCIUM SERPL-MCNC: 8.2 MG/DL — LOW (ref 8.4–10.5)
CHLORIDE SERPL-SCNC: 94 MMOL/L — LOW (ref 98–107)
CO2 SERPL-SCNC: 22 MMOL/L — SIGNIFICANT CHANGE UP (ref 22–31)
CREAT SERPL-MCNC: 1.53 MG/DL — HIGH (ref 0.5–1.3)
CULTURE RESULTS: SIGNIFICANT CHANGE UP
EGFR: 47 ML/MIN/1.73M2 — LOW
GLUCOSE SERPL-MCNC: 111 MG/DL — HIGH (ref 70–99)
HCT VFR BLD CALC: 28.2 % — LOW (ref 39–50)
HGB BLD-MCNC: 9 G/DL — LOW (ref 13–17)
MAGNESIUM SERPL-MCNC: 2.6 MG/DL — SIGNIFICANT CHANGE UP (ref 1.6–2.6)
MCHC RBC-ENTMCNC: 31.9 GM/DL — LOW (ref 32–36)
MCHC RBC-ENTMCNC: 32.1 PG — SIGNIFICANT CHANGE UP (ref 27–34)
MCV RBC AUTO: 100.7 FL — HIGH (ref 80–100)
METHOD TYPE: SIGNIFICANT CHANGE UP
NRBC # BLD: 0 /100 WBCS — SIGNIFICANT CHANGE UP
NRBC # FLD: 0 K/UL — SIGNIFICANT CHANGE UP
ORGANISM # SPEC MICROSCOPIC CNT: SIGNIFICANT CHANGE UP
ORGANISM # SPEC MICROSCOPIC CNT: SIGNIFICANT CHANGE UP
PHOSPHATE SERPL-MCNC: 3.1 MG/DL — SIGNIFICANT CHANGE UP (ref 2.5–4.5)
PLATELET # BLD AUTO: 199 K/UL — SIGNIFICANT CHANGE UP (ref 150–400)
POTASSIUM SERPL-MCNC: 4 MMOL/L — SIGNIFICANT CHANGE UP (ref 3.5–5.3)
POTASSIUM SERPL-SCNC: 4 MMOL/L — SIGNIFICANT CHANGE UP (ref 3.5–5.3)
RBC # BLD: 2.8 M/UL — LOW (ref 4.2–5.8)
RBC # FLD: 18 % — HIGH (ref 10.3–14.5)
SODIUM SERPL-SCNC: 132 MMOL/L — LOW (ref 135–145)
SPECIMEN SOURCE: SIGNIFICANT CHANGE UP
WBC # BLD: 15.25 K/UL — HIGH (ref 3.8–10.5)
WBC # FLD AUTO: 15.25 K/UL — HIGH (ref 3.8–10.5)

## 2022-05-06 PROCEDURE — 99232 SBSQ HOSP IP/OBS MODERATE 35: CPT

## 2022-05-06 PROCEDURE — 99233 SBSQ HOSP IP/OBS HIGH 50: CPT

## 2022-05-06 RX ADMIN — Medication 50 MICROGRAM(S): at 05:01

## 2022-05-06 RX ADMIN — PIPERACILLIN AND TAZOBACTAM 25 GRAM(S): 4; .5 INJECTION, POWDER, LYOPHILIZED, FOR SOLUTION INTRAVENOUS at 00:01

## 2022-05-06 RX ADMIN — AMIODARONE HYDROCHLORIDE 200 MILLIGRAM(S): 400 TABLET ORAL at 05:01

## 2022-05-06 RX ADMIN — Medication 650 MILLIGRAM(S): at 23:31

## 2022-05-06 RX ADMIN — Medication 500 MILLIGRAM(S): at 12:04

## 2022-05-06 RX ADMIN — Medication 1 PUFF(S): at 16:07

## 2022-05-06 RX ADMIN — PIPERACILLIN AND TAZOBACTAM 25 GRAM(S): 4; .5 INJECTION, POWDER, LYOPHILIZED, FOR SOLUTION INTRAVENOUS at 10:59

## 2022-05-06 RX ADMIN — SODIUM CHLORIDE 4 MILLILITER(S): 9 INJECTION INTRAMUSCULAR; INTRAVENOUS; SUBCUTANEOUS at 03:58

## 2022-05-06 RX ADMIN — ALBUTEROL 2 PUFF(S): 90 AEROSOL, METERED ORAL at 10:19

## 2022-05-06 RX ADMIN — HEPARIN SODIUM 5000 UNIT(S): 5000 INJECTION INTRAVENOUS; SUBCUTANEOUS at 21:34

## 2022-05-06 RX ADMIN — Medication 1 PUFF(S): at 20:44

## 2022-05-06 RX ADMIN — LEVETIRACETAM 750 MILLIGRAM(S): 250 TABLET, FILM COATED ORAL at 05:01

## 2022-05-06 RX ADMIN — ALBUTEROL 2 PUFF(S): 90 AEROSOL, METERED ORAL at 20:44

## 2022-05-06 RX ADMIN — Medication 2 MILLIGRAM(S): at 21:35

## 2022-05-06 RX ADMIN — CHLORHEXIDINE GLUCONATE 15 MILLILITER(S): 213 SOLUTION TOPICAL at 05:01

## 2022-05-06 RX ADMIN — Medication 100 MILLIGRAM(S): at 05:01

## 2022-05-06 RX ADMIN — HEPARIN SODIUM 5000 UNIT(S): 5000 INJECTION INTRAVENOUS; SUBCUTANEOUS at 14:16

## 2022-05-06 RX ADMIN — ALBUTEROL 2 PUFF(S): 90 AEROSOL, METERED ORAL at 16:07

## 2022-05-06 RX ADMIN — SODIUM CHLORIDE 4 MILLILITER(S): 9 INJECTION INTRAMUSCULAR; INTRAVENOUS; SUBCUTANEOUS at 10:18

## 2022-05-06 RX ADMIN — SODIUM CHLORIDE 4 MILLILITER(S): 9 INJECTION INTRAMUSCULAR; INTRAVENOUS; SUBCUTANEOUS at 16:06

## 2022-05-06 RX ADMIN — PIPERACILLIN AND TAZOBACTAM 25 GRAM(S): 4; .5 INJECTION, POWDER, LYOPHILIZED, FOR SOLUTION INTRAVENOUS at 17:30

## 2022-05-06 RX ADMIN — Medication 1 PUFF(S): at 03:58

## 2022-05-06 RX ADMIN — Medication 1 APPLICATION(S): at 14:16

## 2022-05-06 RX ADMIN — SODIUM CHLORIDE 4 MILLILITER(S): 9 INJECTION INTRAMUSCULAR; INTRAVENOUS; SUBCUTANEOUS at 20:48

## 2022-05-06 RX ADMIN — Medication 1 MILLIGRAM(S): at 12:04

## 2022-05-06 RX ADMIN — HEPARIN SODIUM 5000 UNIT(S): 5000 INJECTION INTRAVENOUS; SUBCUTANEOUS at 05:01

## 2022-05-06 RX ADMIN — LEVETIRACETAM 750 MILLIGRAM(S): 250 TABLET, FILM COATED ORAL at 17:31

## 2022-05-06 RX ADMIN — CHLORHEXIDINE GLUCONATE 1 APPLICATION(S): 213 SOLUTION TOPICAL at 11:05

## 2022-05-06 RX ADMIN — Medication 100 MILLIGRAM(S): at 17:31

## 2022-05-06 RX ADMIN — CHLORHEXIDINE GLUCONATE 15 MILLILITER(S): 213 SOLUTION TOPICAL at 17:32

## 2022-05-06 RX ADMIN — PANTOPRAZOLE SODIUM 40 MILLIGRAM(S): 20 TABLET, DELAYED RELEASE ORAL at 12:03

## 2022-05-06 RX ADMIN — ALBUTEROL 2 PUFF(S): 90 AEROSOL, METERED ORAL at 03:59

## 2022-05-06 RX ADMIN — Medication 1 PUFF(S): at 10:18
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
## 2022-05-06 NOTE — PROGRESS NOTE ADULT - SUBJECTIVE AND OBJECTIVE BOX
CHIEF COMPLAINT: Patient is a 76y old  Male who presents with a chief complaint of Hypoxia (05 May 2022 16:14)      Interval Events:    REVIEW OF SYSTEMS:  [ ] All other systems negative  [ ] Unable to assess ROS because ________    Mode: AC/ CMV (Assist Control/ Continuous Mandatory Ventilation), RR (machine): 12, TV (machine): 450, FiO2: 30, PEEP: 5, ITime: 0.8, MAP: 9, PIP: 20      OBJECTIVE:  ICU Vital Signs Last 24 Hrs  T(C): 37.1 (06 May 2022 04:50), Max: 38 (05 May 2022 17:39)  T(F): 98.8 (06 May 2022 04:50), Max: 100.4 (05 May 2022 17:39)  HR: 93 (06 May 2022 07:42) (78 - 100)  BP: 100/70 (06 May 2022 04:50) (100/62 - 118/75)  BP(mean): --  ABP: --  ABP(mean): --  RR: 17 (06 May 2022 04:50) (12 - 17)  SpO2: 100% (06 May 2022 07:42) (99% - 100%)    Mode: AC/ CMV (Assist Control/ Continuous Mandatory Ventilation), RR (machine): 12, TV (machine): 450, FiO2: 30, PEEP: 5, ITime: 0.8, MAP: 9, PIP: 20    05-05 @ 07:01  -  05-06 @ 07:00  --------------------------------------------------------  IN: 2280 mL / OUT: 1600 mL / NET: 680 mL      CAPILLARY BLOOD GLUCOSE          HOSPITAL MEDICATIONS:  MEDICATIONS  (STANDING):  ALBUTerol    90 MICROgram(s) HFA Inhaler 2 Puff(s) Inhalation every 6 hours  amantadine Syrup 100 milliGRAM(s) Oral two times a day  aMIOdarone    Tablet 200 milliGRAM(s) Oral daily  ascorbic acid 500 milliGRAM(s) Oral daily  chlorhexidine 0.12% Liquid 15 milliLiter(s) Oral Mucosa every 12 hours  chlorhexidine 2% Cloths 1 Application(s) Topical daily  collagenase Ointment 1 Application(s) Topical daily  doxazosin 2 milliGRAM(s) Oral at bedtime  folic acid 1 milliGRAM(s) Oral daily  heparin   Injectable 5000 Unit(s) SubCutaneous every 8 hours  ipratropium 17 MICROgram(s) HFA Inhaler 1 Puff(s) Inhalation every 6 hours  levETIRAcetam 750 milliGRAM(s) Oral two times a day  levothyroxine 50 MICROGram(s) Oral daily  pantoprazole   Suspension 40 milliGRAM(s) Oral daily  piperacillin/tazobactam IVPB.. 4.5 Gram(s) IV Intermittent every 8 hours  sodium chloride 3%  Inhalation 4 milliLiter(s) Inhalation every 6 hours    MEDICATIONS  (PRN):  acetaminophen    Suspension .. 650 milliGRAM(s) Oral every 4 hours PRN Temp greater or equal to 38C (100.4F), Mild Pain (1 - 3)      LABS:                        9.0    15.25 )-----------( 199      ( 06 May 2022 06:42 )             28.2     05-06    132<L>  |  94<L>  |  68<H>  ----------------------------<  111<H>  4.0   |  22  |  1.53<H>    Ca    8.2<L>      06 May 2022 06:42  Phos  3.1     05-06  Mg     2.60     05-06    TPro  6.7  /  Alb  2.1<L>  /  TBili  <0.2  /  DBili  x   /  AST  16  /  ALT  15  /  AlkPhos  81  05-05              PAST MEDICAL & SURGICAL HISTORY:  Essential hypertension    Primary osteoarthritis, unspecified site    Closed fracture of left radius, initial encounter    Morbid obesity, unspecified obesity type  h/o. - s/p gastric bypass- lost 113 lbs    KAREN (obstructive sleep apnea)  h/o - was on CPAP until gastric bypass surgery    Respiratory failure    Anemia    Subdural hemorrhage    Epilepsy    H/O gastrostomy    History of BPH    Afib    S/P gastric bypass  2008    Status post total replacement of left hip  2006    S/P bunionectomy  bilateral    S/P colonoscopy  approx 2012    History of abdominoplasty  and subsequent cosmetic surgery for removal of excess skin from face        FAMILY HISTORY:  Family history of renal cell carcinoma (Mother)    Family history of malignant melanoma (Sibling)        Social History:      RADIOLOGY:  [ ] Reviewed and interpreted by me    PULMONARY FUNCTION TESTS:    EKG: CHIEF COMPLAINT: Patient is a 76y old  Male who presents with a chief complaint of Hypoxia (05 May 2022 16:14)      Interval Events: Pt seen at bedside no acute events     REVIEW OF SYSTEMS:  [x ] Unable to assess ROS because vented    Mode: AC/ CMV (Assist Control/ Continuous Mandatory Ventilation), RR (machine): 12, TV (machine): 450, FiO2: 30, PEEP: 5, ITime: 0.8, MAP: 9, PIP: 20      OBJECTIVE:  ICU Vital Signs Last 24 Hrs  T(C): 37.1 (06 May 2022 04:50), Max: 38 (05 May 2022 17:39)  T(F): 98.8 (06 May 2022 04:50), Max: 100.4 (05 May 2022 17:39)  HR: 93 (06 May 2022 07:42) (78 - 100)  BP: 100/70 (06 May 2022 04:50) (100/62 - 118/75)  BP(mean): --  ABP: --  ABP(mean): --  RR: 17 (06 May 2022 04:50) (12 - 17)  SpO2: 100% (06 May 2022 07:42) (99% - 100%)    Mode: AC/ CMV (Assist Control/ Continuous Mandatory Ventilation), RR (machine): 12, TV (machine): 450, FiO2: 30, PEEP: 5, ITime: 0.8, MAP: 9, PIP: 20    05-05 @ 07:01  -  05-06 @ 07:00  --------------------------------------------------------  IN: 2280 mL / OUT: 1600 mL / NET: 680 mL      CAPILLARY BLOOD GLUCOSE          HOSPITAL MEDICATIONS:  MEDICATIONS  (STANDING):  ALBUTerol    90 MICROgram(s) HFA Inhaler 2 Puff(s) Inhalation every 6 hours  amantadine Syrup 100 milliGRAM(s) Oral two times a day  aMIOdarone    Tablet 200 milliGRAM(s) Oral daily  ascorbic acid 500 milliGRAM(s) Oral daily  chlorhexidine 0.12% Liquid 15 milliLiter(s) Oral Mucosa every 12 hours  chlorhexidine 2% Cloths 1 Application(s) Topical daily  collagenase Ointment 1 Application(s) Topical daily  doxazosin 2 milliGRAM(s) Oral at bedtime  folic acid 1 milliGRAM(s) Oral daily  heparin   Injectable 5000 Unit(s) SubCutaneous every 8 hours  ipratropium 17 MICROgram(s) HFA Inhaler 1 Puff(s) Inhalation every 6 hours  levETIRAcetam 750 milliGRAM(s) Oral two times a day  levothyroxine 50 MICROGram(s) Oral daily  pantoprazole   Suspension 40 milliGRAM(s) Oral daily  piperacillin/tazobactam IVPB.. 4.5 Gram(s) IV Intermittent every 8 hours  sodium chloride 3%  Inhalation 4 milliLiter(s) Inhalation every 6 hours    MEDICATIONS  (PRN):  acetaminophen    Suspension .. 650 milliGRAM(s) Oral every 4 hours PRN Temp greater or equal to 38C (100.4F), Mild Pain (1 - 3)      LABS:                        9.0    15.25 )-----------( 199      ( 06 May 2022 06:42 )             28.2     05-06    132<L>  |  94<L>  |  68<H>  ----------------------------<  111<H>  4.0   |  22  |  1.53<H>    Ca    8.2<L>      06 May 2022 06:42  Phos  3.1     05-06  Mg     2.60     05-06    TPro  6.7  /  Alb  2.1<L>  /  TBili  <0.2  /  DBili  x   /  AST  16  /  ALT  15  /  AlkPhos  81  05-05              PAST MEDICAL & SURGICAL HISTORY:  Essential hypertension    Primary osteoarthritis, unspecified site    Closed fracture of left radius, initial encounter    Morbid obesity, unspecified obesity type  h/o. - s/p gastric bypass- lost 113 lbs    KAREN (obstructive sleep apnea)  h/o - was on CPAP until gastric bypass surgery    Respiratory failure    Anemia    Subdural hemorrhage    Epilepsy    H/O gastrostomy    History of BPH    Afib    S/P gastric bypass  2008    Status post total replacement of left hip  2006    S/P bunionectomy  bilateral    S/P colonoscopy  approx 2012    History of abdominoplasty  and subsequent cosmetic surgery for removal of excess skin from face        FAMILY HISTORY:  Family history of renal cell carcinoma (Mother)    Family history of malignant melanoma (Sibling)        Social History:      RADIOLOGY:  [ ] Reviewed and interpreted by me    PULMONARY FUNCTION TESTS:    EKG:

## 2022-05-06 NOTE — PROGRESS NOTE ADULT - SUBJECTIVE AND OBJECTIVE BOX
f/u leukocytosis/fever    Interval History/ROS:  no further fever.  minimal secretions.  Sputum cx with Pseudomonas aeruginosa S-pip/tazo.  awake, nonverbal.  ROS difficult    PAST MEDICAL & SURGICAL HISTORY:  Essential hypertension  Primary osteoarthritis, unspecified site  Closed fracture of left radius, initial encounter  Morbid obesity, unspecified obesity type h/o. - s/p gastric bypass- lost 113 lbs  KAREN (obstructive sleep apnea) h/o - was on CPAP until gastric bypass surgery  Respiratory failure   Anemia  Subdural hemorrhage  Epilepsy  H/O gastrostomy  History of BPH  Afib  S/P gastric bypass 2008  Status post total replacement of left hip   S/P bunionectomy bilateral  S/P colonoscopy approx 2012  History of abdominoplasty and subsequent cosmetic surgery for removal of excess skin from face    Allergies  No Known Allergies    ANTIMICROBIALS:  vancomycin  IVPB (4/8 x1, 5/3 x1)  meropenem  IVPB 1000 every 12 hours (-)  active:  piperacillin/tazobactam IVPB.. 4.5 every 8 hours (-)    MEDICATIONS  (STANDING):  ALBUTerol    90 MICROgram(s) HFA Inhaler 2 every 6 hours  amantadine Syrup 100 two times a day  aMIOdarone    Tablet 200 daily  doxazosin 2 at bedtime  heparin   Injectable 5000 every 8 hours  ipratropium 17 MICROgram(s) HFA Inhaler 1 every 6 hours  levETIRAcetam 750 two times a day  levothyroxine 50 daily  pantoprazole   Suspension 40 daily  sodium chloride 3%  Inhalation 4 every 6 hours    Vital Signs Last 24 Hrs  T(F): 98.8 (22 @ 04:50), Max: 100.4 (22 @ 17:39)  HR: 97 (22 @ 10:42)  BP: 100/70 (22 @ 04:50)  RR: 17 (22 @ 04:50)  SpO2: 98% (22 @ 10:42) (98% - 100%)    PHYSICAL EXAMINATION:  Constitutional: non-toxic, lying in bed  HEAD/EYES: anicteric  ENT:  trache site okay, on vent  Cardiovascular:   normal S1, S2  Respiratory:  coarse b/l breath sounds  GI:  soft, non-tender, normal bowel sounds, PEG site okay  :  ebnson  Musculoskeletal:  no synovitis  Neurologic: awake  Skin:  no rash  Psychiatric:  awake, appropriate mood                                 9.0    15.25 )-----------( 199      ( 06 May 2022 06:42 )             28.2     132  |  94  |  68  ----------------------------<  111  4.0   |  22  |  1.53  Ca    8.2      06 May 2022 06:42Phos  3.1     -Mg     2.60       TPro  6.7  /  Alb  2.1  /  TBili  <0.2  /  DBili  x   /  AST  16  /  ALT  15  /  AlkPhos  81      Creatinine, Serum: 1.53 ()  Creatinine, Serum: 1.64 ()  Creatinine, Serum: 1.78 ()  Creatinine, Serum: 1.56 ()  Creatinine, Serum: 1.36 ()  Creatinine, Serum: 1.35 ()  Creatinine, Serum: 1.18 ()    WBC Count: 15.25 (22 @ 06:42)  WBC Count: 13.40 (22 @ 13:50)  WBC Count: 14.61 (22 @ 07:24)  WBC Count: 13.81 (22 @ 06:40)  WBC Count: 11.51 (22 @ 19:47)  WBC Count: 10.68 (22 @ 03:08)                        Urinalysis Basic - ( 2022 23:15 )  Color: Yellow / Appearance: Slightly Turbid / S.023 / pH: x  Gluc: x / Ketone: Trace  / Bili: Negative / Urobili: <2 mg/dL   Blood: x / Protein: 300 mg/dL / Nitrite: Negative   Leuk Esterase: Moderate / RBC: 75 /HPF / WBC 15 /HPF   Sq Epi: x / Non Sq Epi: x / Bacteria: Few    MICROBIOLOGY:  Culture - Sputum (collected 22 @ 04:21)  Source: .Sputum Sputum  Gram Stain (22 @ 12:04):    Rare polymorphonuclear leukocytes per low power field    Few Squamous epithelial cells per low power field    Numerous Gram Negative Rods per oil power field    Numerous Gram Positive Rods per oil power field  Final Report (22 @ 09:41):    Numerous Mixed gram negative rods including    Numerous Pseudomonas aeruginosa    Normal Respiratory Cheryl present  Organism: Pseudomonas aeruginosa (22 @ 09:41)  Organism: Pseudomonas aeruginosa (22 @ 09:41)      -  Amikacin: S <=16      -  Aztreonam: S <=4      -  Cefepime: S <=2      -  Ceftazidime: S <=1      -  Ciprofloxacin: S 0.5      -  Gentamicin: S <=2      -  Imipenem: S <=1      -  Levofloxacin: S 1      -  Meropenem: S <=1      -  Piperacillin/Tazobactam: S <=8      -  Tobramycin: S <=2      Method Type: IZZY    Culture - Blood (collected 22 @ 23:05)  Source: .Blood Blood-Venous  Preliminary Report (22 @ 01:02):    No growth to date.    Culture - Blood (collected 22 @ 23:05)  Source: .Blood Blood-Peripheral  Preliminary Report (22 @ 01:02):    No growth to date.    Culture - Sputum (collected 22 @ 18:31)  Source: .Sputum Sputum  Gram Stain (22 @ 22:29):    Few polymorphonuclear leukocytes per low power field    Few Squamous epithelial cells per low power field    Moderate Gram Positive Rods per oil power field    Few Gram Negative Rods per oil power field  Final Report (22 @ 16:51):    Few Pseudomonas aeruginosa    Normal Respiratory Cheryl present  Organism: Pseudomonas aeruginosa (22 @ 16:51)  Organism: Pseudomonas aeruginosa (22 @ 16:51)      -  Amikacin: S <=16      -  Aztreonam: S <=4      -  Cefepime: S <=2      -  Ceftazidime: S <=1      -  Ciprofloxacin: S <=0.25      -  Gentamicin: S <=2      -  Imipenem: S <=1      -  Levofloxacin: S <=0.5      -  Meropenem: S <=1      -  Piperacillin/Tazobactam: S <=8      -  Tobramycin: S <=2      Method Type: IZZY    Culture - Sputum (collected 04-10-22 @ 14:32)  Source: .Sputum Sputum  Gram Stain (04-10-22 @ 23:12):    Numerous polymorphonuclear leukocytes per low power field    Rare Squamous epithelial cells per low power field    Moderate Gram Negative Rods per oil power field    Moderate Gram positive cocci in pairs per oil power field    Few Gram Positive Rods per oil power field  Final Report (22 @ 16:10):    Numerous Acinetobacter baumannii/nosocom group (Carbapenem Resistant)    Cefiderocol = Intermediate Interpretations based on FDA breakpoints    Few Pseudomonas aeruginosa    Normal Respiratory Cheryl absent  Organism: Acinetobacter baumannii/nosocom group (Carbapenem Resistant)  Pseudomonas aeruginosa (22 @ 16:10)  Organism: Acinetobacter baumannii/nosocom group (Carbapenem Resistant) (22 @ 16:03)      -  Polymyxin B: S 1      Method Type: ETEST  Organism: Pseudomonas aeruginosa (22 @ 16:00)      -  Amikacin: S <=16      -  Aztreonam: S <=4      -  Cefepime: S <=2      -  Ceftazidime: S <=1      -  Ciprofloxacin: S <=0.25      -  Gentamicin: S <=2      -  Imipenem: S <=1      -  Levofloxacin: S <=0.5      -  Meropenem: S <=1      -  Piperacillin/Tazobactam: S <=8      -  Tobramycin: S <=2      Method Type: IZZY  Organism: Acinetobacter baumannii/nosocom group (Carbapenem Resistant) (22 @ 17:43)      -  Imipenem: R      -  Piperacillin/Tazobactam: R      Method Type: KB  Organism: Acinetobacter baumannii/nosocom group (Carbapenem Resistant) (22 @ 17:43)      -  Amikacin: S <=16      -  Ampicillin/Sulbactam: I 16/8      -  Cefepime: R >16      -  Ceftazidime: R >16      -  Ciprofloxacin: R >2      -  Gentamicin: R >8      -  Levofloxacin: R >4      -  Meropenem: R >8      -  Minocycline: S <=4      -  Tobramycin: S <=2      -  Trimethoprim/Sulfamethoxazole: R >2/38      Method Type: IZZY    Culture - Urine (collected 22 @ 23:03)  Source: Catheterized Catheterized  Final Report (04-10-22 @ 05:16):    <10,000 CFU/mL Normal Urogenital Cheryl    Culture - Blood (collected 22 @ 18:00)  Source: .Blood Blood-Peripheral  Final Report (22 @ 22:00):    No Growth Final    Culture - Blood (collected 22 @ 18:00)  Source: .Blood Blood-Peripheral  Final Report (22 @ 22:00):    No Growth Final    Culture - Sputum (collected 08 Mar 2022 18:33)  Source: .Sputum Sputum  Final Report:    Numerous Acinetobacter baumannii/nosocom group (Carbapenem Resistant)    Numerous Pseudomonas aeruginosa    Normal Respiratory Cheryl absent  Organism: Acinetobacter baumannii/nosocom group (Carbapenem Resistant)  Pseudomonas aeruginosa  Organism: Pseudomonas aeruginosa    Sensitivities:      -  Amikacin: S <=16      -  Aztreonam: R >16      -  Cefepime: I 16      -  Ceftazidime: R >16      -  Ciprofloxacin: S <=0.25      -  Gentamicin: S <=2      -  Imipenem: S <=1      -  Levofloxacin: S <=0.5      -  Meropenem: S <=1      -  Piperacillin/Tazobactam: R >64      -  Tobramycin: S <=2      Method Type: IZZY  Organism: Acinetobacter baumannii/nosocom group (Carbapenem Resistant)    Sensitivities:      -  Imipenem: R      -  Piperacillin/Tazobactam: R      Method Type: KB  Organism: Acinetobacter baumannii/nosocom group (Carbapenem Resistant)    Sensitivities:      -  Amikacin: S <=16      -  Ampicillin/Sulbactam: I 16/8      -  Cefepime: R >16      -  Ceftazidime: R >16      -  Ciprofloxacin: R >2      -  Gentamicin: R >8      -  Levofloxacin: R >4      -  Meropenem: R >8      -  Tobramycin: S <=2      -  Trimethoprim/Sulfamethoxazole: R >2/38      Method Type: IZZY    Culture - Blood (collected 07 Mar 2022 10:18)  Source: .Blood Blood-Peripheral  Final Report:    No Growth Final    Culture - Blood (collected 07 Mar 2022 10:18)  Source: .Blood Blood-Peripheral  Final Report:    No Growth Final    Culture - Urine (collected 07 Mar 2022 08:41)  Source: Clean Catch Clean Catch (Midstream)  Final Report:    No growth    COVID-19 PCR: NotDetec (22 @ 18:44)  COVID-19 PCR: NotDetec (22 @ 07:15)  COVID-19 PCR: NotDetec (22 @ 02:23)    RADIOLOGY:  imaging below personally reviewed    Xray Chest 1 View- PORTABLE-Urgent (Xray Chest 1 View- PORTABLE-Urgent .) (22 @ 20:04) >  IMPRESSION:  No focal consolidation to account for fever.    CT Neck Soft Tissue w/ IV Cont (22 @ 17:43) >  IMPRESSION:  CT findings most compatible with left-sided parotitis. There is a 2 mm left intraparotid stone, although there is no parotid duct dilatation or obstructing stone.    Xray Chest 1 View- PORTABLE-Urgent (22 @ 17:36) >  Unchanged left retrocardiac opacity. Right basilar atelectasis.    CT Head No Cont (22 @ 23:22) >  Right frontoparietal temporal craniectomy with wire mesh cranioplasty.  Extensive right cerebral gliosis and areas of encephalomalacia with  volume loss either from prior infarct or trauma in the left frontal and  anterior right temporal lobes. Thin chronic subdural along the falx. Appearance of gyriform thickening in the right frontoparietal parenchyma  underneath the cranioplasty is indeterminate. Possibility of intracranial  infection is not excluded. Consider follow-up with contrast-enhanced   brain MRI for better evaluation.     CT Abdomen and Pelvis No Cont (22 @ 23:22) >  Dependent lung parenchymal opacities, left greater than right, may represent atelectasis or infection in the appropriate clinical setting.  Small left pleural effusion with adjacent left basilar compressive  atelectasis. Small pericardial effusion.  Post gastric bypass with gastrostomy tube localized to the excluded  stomach of pancreaticobiliary limb. Slight distention of the excluded  gastric fundus with layering dense material, likely from prior contrast administration. Correlate clinically. No bowel obstruction. Moderate stool of the colon. Correlate for constipation. Coronary artery calcification.

## 2022-05-06 NOTE — PROGRESS NOTE ADULT - MENTAL STATUS
Eyes open /tracking   Does not follow commands
Awake /tracking   Followed simple command
opens eyes to verbal stimuli /tracking   Does thumbs up when asked
baseline - opens eyes to verbal stimuli   Follows simple commands as physically able
Eyes open to verbal stimuli /turns head   Gives thumbs up
Eyes open more awake   tracking follows simple commands
opens eyes to verbal stim   Obeys simple commands - thumbs up/Kiss
Tracking when awake   follows simple commands

## 2022-05-06 NOTE — PROGRESS NOTE ADULT - ASSESSMENT
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  Discharged to NH and admitted to [facility] with increasing oxygen requirements and worsening mental status.  Completed a course of meropenem.  Now with fever, leukocytosis.  Recent CT with left parotitis, however, per ENT clinically no parotitis.  WBC 13.  Tm yesterday 101.3.  Today, decreased to 100.  Increased secretions but no pneumonia.    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  Discharged to NH and admitted to [facility] with increasing oxygen requirements and worsening mental status.  Completed a course of meropenem.  Now with fever, leukocytosis.  Recent CT with left parotitis, however, per ENT clinically no parotitis.  WBC 13.  Tm yesterday 101.3.  Today, decreased to 100.  Increased secretions but no pneumonia.    Tracheitis/bronchitis  - zosyn okay for now  - PA in sputum S to zosyn  - would like to limit to 7 days     FLORIAN  - better  - may need to dose adjust zosyn if worsens  - continue to trend     Parotitis  - for warm compresses  - if continues to have fever, would add vancomycin for mrsa coverage    Please call the ID service [phone] with questions or concerns over the weekend    I will be away, returning 5/10/2022.  My associates to cover.  Please call ID as needed [phone].

## 2022-05-06 NOTE — PROGRESS NOTE ADULT - ASSESSMENT
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 Now DISPO planning.    # Neurology   - Currently A&Ox0, awake and alert, able to move RUE and nods/ mouths one or two words per RCU evaluation and prior documentation.   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   - Continue on Modafinil, Amantidine and Keppra.   - Re-eval by PT - pt not a candidate at this time   - More awake today     #HEENT  - Large firm mass along left neck noted 4/28  - CT Neck w/IV contrast - CT findings most compatible with left-sided parotitis. There is a 2 mm   left intraparotid stone, although there is no parotid duct dilatation or obstructing stone.  - ENT recs appreciated - Warm compress and massage TID, monitor for any worsening signs     # Cardiovascular   - Hx of HFpEF 55, mild MR and AR, severe LAD, normal LVRVSF (ECHO 3/7)  - Hx of Atrial Fibrillation s/p Watchman and currently rate controlled.   - Continue on Amiodarone 200mg QD and Coreg 6.25mg BID.   - Continue on Lasix 40mg QD and monitor tolerance.   (5/3) Hypotensive in the high 80s-low 90s systolic, s/p IVF  cc bolus  - Will continue to monitor closely     # Respiratory   - Hx of SDH and chronically trached and vented with recurrent HCAP infections.   - Continue on Proventil, Atrovent and Chest PT Q6H  - c/w Hypersal to loosen secretions (worsening)  - Sputum culture sent on 5/2 - Moderate GNR/GP rods, otherwise unremakable  - CXR - No focal consolidations to indicate pneumonia. Improved aeration of left lower lobe.  Trace bilateral pleural effusions and bilateral lower lobe linear atelectasis.  - Now back on the vent 2/2 increased secretions and overall appearance, (appears fatigued)  - MOre awake today - tolerated PS for a period - still thick secretions     # GI  - Hx of SDH, Gastric Bypass and Dysphagia s/p PEG and continued on TF with course complicated by constipation and melena  - Case discussed with GI and melena likely in setting of constipation, however no plan for scope currently and will medically treat with PPI.   - Continue on Nepro in sight of hyperkalemia.   - s/p large BM on 4/25  - s/p trial of Reglan 10mg TID (4/24 - 4/27)  (5/3) Mild diarrhea noted, d'lisa Senna and Miralax - will monitor closely  - If persistent/worsens, will consider sending C-diff - for now low suspicion     # / Renal  - Hx of Urinary Retention with Chronic Garcia and presented FLORIAN likely in setting of dehydration with fevers vs ATN with Sepsis (CRE high 2s and baseline 0.8-0.9).   - UA with hematuria and proteinuria and case discussed with Neprhology with concern for glomerulonephritis. ANCA, IASBELLE and GM ABs negative.   - Hypernatremia (resolved) - c/w free water 300 q6hr for now.   - Hyperkalemia (Resolved) and s/p cocktail with improvement (4/19).   - IVF and PRBC given with CRE improving and now resumed on Lasix with improvement.   - Cr rising - IVF given for low bp - hold lasix , coreg for now     # ID  - Recent admission for  Acinetobacter and Pseudomonas HCAP (3/2022)   - SCx (4/10) with  Acinetobacter and Pseudomonas.   - s/p completed Meropenem (4/8-4/14) and will monitor off ABX.   - Rectal temp of 100.8 on 5/3 - Fever work up in progress  - Low threshold to start Abx unless conditions worsens or Hemodynamically unstable   - Abx restarted zosyn and vanco by level   Unable to do floroquinolone 2/2 prolonged QT-c 515  - seen by ID - keep current abx for now - fu cx's     # Endocrine  - No hx of DM2 and A1C 5.9. Continue to monitor BG,    - Hx of Hypothyroidism and continued on Synthroid. TSH 47 with low T3/T4 and Free T4. TSH 80s (3/2022) and Synthroid increased at NH. Hypothyroidism could be in the setting of Amiodarone use and current Synthroid dose appears to be working.   - Next TFT to be done in June 2022.     # Hematology   - Anemia i/s/o AOCD and s/p PRBC (4/9)   - Melena/ anemia noted and s/p 2 U PRBC (4/19)  with good response, however HH waxes and wanes with no obvious bleed (no further melena or CGE from PEG aspiration)  - DVT PPX with HSQ (cleared by GI to resume).     # Ethics   - FULL CODE   - Case discussed with Cousin/ HCP, Dr. Donna Hagen (Pediatric Neurologist, [phone]) and updated daily. PM&R eval appreciated. 5/4 and 5/5  spoke with Dr Hagen updates given     # DISPO - Planning to go back to NH with Neck CT results. [place] requesting [place] and  [place]      75 YO M, A&Ox0 at baseline with PMHx of L SDH c/b L Subfalcine Herniation s/p Emergent R Hemicraniectomy in [place] while traveling fell on marble floor and now chronic with tracheostomy and vent dependant, Dysphagia with PEG, AFIB s/p watchman, Gastric Sleeve, Urinary Retention with Chornic Garcia, and recent ICU stay for HCAP with MDR Pseudomonas now presenting with ACHRF i/s/o  Actineobacter and Pseudomonas HCAP s/p ABX c/b FLORIAN and melena/ anemia. Now DISPO planning.    # Neurology   - Currently A&Ox0, awake and alert, able to move RUE and nods/ mouths one or two words per RCU evaluation and prior documentation.   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   - Continue on Modafinil, Amantidine and Keppra.   - Re-eval by PT - pt not a candidate at this time   - More awake today     #HEENT  - Large firm mass along left neck noted 4/28  - CT Neck w/IV contrast - CT findings most compatible with left-sided parotitis. There is a 2 mm   left intraparotid stone, although there is no parotid duct dilatation or obstructing stone.  - ENT recs appreciated - Warm compress and massage TID, monitor for any worsening signs     # Cardiovascular   - Hx of HFpEF 55, mild MR and AR, severe LAD, normal LVRVSF (ECHO 3/7)  - Hx of Atrial Fibrillation s/p Watchman and currently rate controlled.   - Continue on Amiodarone 200mg QD and Coreg 6.25mg BID.   - Continue on Lasix 40mg QD and monitor tolerance.   (5/3) Hypotensive in the high 80s-low 90s systolic, s/p IVF  cc bolus  - Will continue to monitor closely     # Respiratory   - Hx of SDH and chronically trached and vented with recurrent HCAP infections.   - Continue on Proventil, Atrovent and Chest PT Q6H  - c/w Hypersal to loosen secretions (worsening)  - Sputum culture sent on 5/2 - Moderate GNR/GP rods, otherwise unremakable  - CXR - No focal consolidations to indicate pneumonia. Improved aeration of left lower lobe.  Trace bilateral pleural effusions and bilateral lower lobe linear atelectasis.  - Now back on the vent 2/2 increased secretions and overall appearance, (appears fatigued)  - MOre awake today - tolerated PS for a period - still thick secretions   - Secretions lessened - consider trial of TC if tolerating PS    # GI  - Hx of SDH, Gastric Bypass and Dysphagia s/p PEG and continued on TF with course complicated by constipation and melena  - Case discussed with GI and melena likely in setting of constipation, however no plan for scope currently and will medically treat with PPI.   - Continue on Nepro in sight of hyperkalemia.   - s/p large BM on 4/25  - s/p trial of Reglan 10mg TID (4/24 - 4/27)  (5/3) Mild diarrhea noted, d'lisa Senna and Miralax - will monitor closely  - If persistent/worsens, will consider sending C-diff - for now low suspicion     # / Renal  - Hx of Urinary Retention with Chronic Garcia and presented FLORIAN likely in setting of dehydration with fevers vs ATN with Sepsis (CRE high 2s and baseline 0.8-0.9).   - UA with hematuria and proteinuria and case discussed with Neprhology with concern for glomerulonephritis. ANCA, ISABELLE and GM ABs negative.   - Hypernatremia (resolved) - c/w free water 300 q6hr for now.   - Hyperkalemia (Resolved) and s/p cocktail with improvement (4/19).   - IVF and PRBC given with CRE improving and now resumed on Lasix with improvement.   - Cr rising - IVF given for low bp - hold lasix , coreg for now     # ID  - Recent admission for  Acinetobacter and Pseudomonas HCAP (3/2022)   - SCx (4/10) with  Acinetobacter and Pseudomonas.   - s/p completed Meropenem (4/8-4/14) and will monitor off ABX.   - Rectal temp of 100.8 on 5/3 - Fever work up in progress  - Low threshold to start Abx unless conditions worsens or Hemodynamically unstable   - Abx restarted zosyn and vanco by level   Unable to do floroquinolone 2/2 prolonged QT-c 515  - seen by ID - keep current abx for now - fu cx's   - Adjust dosing of zosyn if cr increases     # Endocrine  - No hx of DM2 and A1C 5.9. Continue to monitor BG,    - Hx of Hypothyroidism and continued on Synthroid. TSH 47 with low T3/T4 and Free T4. TSH 80s (3/2022) and Synthroid increased at NH. Hypothyroidism could be in the setting of Amiodarone use and current Synthroid dose appears to be working.   - Next TFT to be done in June 2022.     # Hematology   - Anemia i/s/o AOCD and s/p PRBC (4/9)   - Melena/ anemia noted and s/p 2 U PRBC (4/19)  with good response, however HH waxes and wanes with no obvious bleed (no further melena or CGE from PEG aspiration)  - DVT PPX with HSQ (cleared by GI to resume).     # Ethics   - FULL CODE   - Case discussed with Cousin/ HCP, Dr. Donna Hagen (Pediatric Neurologist, [phone]) and updated daily. PM&R eval appreciated. 5/4 and 5/5 5/6 spoke with Dr Hagen updates given     # DISPO - Planning to go back to NH with Neck CT results. Family requesting [place] and  [place]

## 2022-05-06 NOTE — PROGRESS NOTE ADULT - ASSESSMENT
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   Wound f/u to assist with management of sacral unstageable pressure injury      Sacrum unstageable pressure injury  -entire area [id number], well demarcated areas of firmly adherent slough measures 5.8eve6fpm6.4cm  -no fluctuance, no induration, no crepitus, no drainable collection. No edema, no erythema, no increased warmth  -no s/s of acute skin infection/cellulitis  -no sharp debridement indicated; will continue enzymatic debridement.  -CT abdomen/pelvis 4/8/22, findings without mention of osteomyelitis, defer remaining findings to primary team.  -Topical recommendations: cleanse with ns. collagenase rama thickness, silicone foam with border, change daily.  -of note patient incontinent loose stool; consider fecal pouch  -offload pressure; low airloss support surface, t&p per protocol with use of fluidized positioning devices.  -perineal care per protocol, single breathable incontinence pad.  -Nutrition recommendations appreciated for optimization as tolerated in patient with increased nutritional needs, on enteral feeds via PEG, sacral unstageable pressure injury; receiving no carb prosource 2 packets/day         consider MVI & Vit C to promote wound healing        encourage high quality protein when appropriate    Dysphagia  -Peritubular skin of PEG- Cleanse q shift with NS, apply liquid barrier film beneath kishor disc.  If redness noted under kishor disc bumper apply thin foam  dressing without border (Mepilex Lite)- cut to mid dressing with a 'Y' shape.   Secondary securement to abdomen taping in 'H' fashion with Steri-strips.   -enteral feeds per primary team.    TBI now with Trach   - Tracheostomy- Cleanse around trach site with NS. Pat dry. Apply Silicone foam dressing without border beneath trach collar, cut mid dressing to "Y" shape. Change foam dressing every shift and prn. Change trach ties every 3 days.       Remainder of care per primary team  Will follow periodically throughout hospitalization. Please reconsult earlier if needed.    Upon discharge f/u as outpatient at [facility] [place] [address] [facility] [phone]  Findings and plan discussed in detail with primary team    Thank you for this consult  DEBBIE Bryant, KEELYN (pager #[phone])    If after 4PM or before 7:30AM on Mon-Friday or weekend/holiday please contact general surgery for urgent matters.   Team A- [id number]   Team B- [id number]  For non-urgent matters e-mail [email or web address]    I spent 35 minutes face to face with this patient of which more than 50% of the time was spent counseling & coordinating care of this pt

## 2022-05-06 NOTE — PROGRESS NOTE ADULT - SKIN COMMENTS
AAi sheets for skin monitoring
AAI sheets for skin montioring by nursing
AAi sheets for monitoring by serafin
AAI sheets for skin monitoring
AAI sheets for wound monitoring by serafin
AAI sheets for skin monitoring by nsg
AAI sheets for skin monitoring by nursing

## 2022-05-06 NOTE — PROGRESS NOTE ADULT - NS MD HP SKIN WOUND STATUS
dry/intact
Seen by wound NP/dry/intact
sacrum/dry/intact
Wound care consult/dry/intact
dry/intact
dry/intact
Wound consult called for coccyx/dry/intact

## 2022-05-06 NOTE — PROGRESS NOTE ADULT - SUBJECTIVE AND OBJECTIVE BOX
[facility]-- WOUND TEAM -- FOLLOW UP NOTE  --------------------------------------------------------------------------------  Wound f/u sacral unstageable pressure injury.  subjective: Patient was seen and examined at bedside. Non-verbal, unable to provide information.    Interval HPI/24 hour events: no acute events overnight.  Chart reviewed including labs and relevant images      Diet:  Diet, NPO with Tube Feed:   Tube Feeding Modality: Gastrostomy  Nepro with Carb Steady (NEPRORTH)  Total Volume for 24 Hours (mL): 1080  Continuous  Starting Tube Feed Rate mL per Hour: 25  Increase Tube Feed Rate by (mL): 10     Every 4 hours  Until Goal Tube Feed Rate (mL per Hour): 45  Tube Feed Duration (in Hours): 24  Tube Feed Start Time: 15:00  No Carb Prosource (1pkg = 15gms Protein)     Qty per Day:  2 (04-19-22 @ 14:40)      ROS: pt unable to offer    ALLERGIES & MEDICATIONS  --------------------------------------------------------------------------------  Allergies    No Known Allergies    Intolerances          STANDING INPATIENT MEDICATIONS    ALBUTerol    90 MICROgram(s) HFA Inhaler 2 Puff(s) Inhalation every 6 hours  amantadine Syrup 100 milliGRAM(s) Oral two times a day  aMIOdarone    Tablet 200 milliGRAM(s) Oral daily  ascorbic acid 500 milliGRAM(s) Oral daily  chlorhexidine 0.12% Liquid 15 milliLiter(s) Oral Mucosa every 12 hours  chlorhexidine 2% Cloths 1 Application(s) Topical daily  collagenase Ointment 1 Application(s) Topical daily  doxazosin 2 milliGRAM(s) Oral at bedtime  folic acid 1 milliGRAM(s) Oral daily  heparin   Injectable 5000 Unit(s) SubCutaneous every 8 hours  ipratropium 17 MICROgram(s) HFA Inhaler 1 Puff(s) Inhalation every 6 hours  levETIRAcetam 750 milliGRAM(s) Oral two times a day  levothyroxine 50 MICROGram(s) Oral daily  pantoprazole   Suspension 40 milliGRAM(s) Oral daily  piperacillin/tazobactam IVPB.. 4.5 Gram(s) IV Intermittent every 8 hours  sodium chloride 3%  Inhalation 4 milliLiter(s) Inhalation every 6 hours      PRN INPATIENT MEDICATION  acetaminophen    Suspension .. 650 milliGRAM(s) Oral every 4 hours PRN        Vital signs:  T(C): 36.9 (05-07-22 @ 04:59), Max: 37.3 (05-06-22 @ 11:59)  HR: 84 (05-07-22 @ 07:55) (84 - 106)  BP: 115/82 (05-07-22 @ 04:59) (107/72 - 123/98)  RR: 16 (05-07-22 @ 04:59) (16 - 19)  SpO2: 100% (05-07-22 @ 07:55) (98% - 100%)  Wt(kg): 74.9 kg (04-24-22)        05-06-22 @ 07:01  -  05-07-22 @ 07:00  --------------------------------------------------------  IN: 1140 mL / OUT: 1700 mL / NET: -560 mL      Constitutional: Seen on contact precautions.  NAD, non verbal, a&o x 1, awake, eyes open spontaneously  Well groomed.  (+) low airloss support surface, (+) fluidized positioning devices, (+) complete cair boots  HEENT:  NC/AT, PERRL, EOMI, mucosa moist, trach in place peristomal skin intact  Cardiovascular: rate regular  Respiratory: supplemental oxygen via trach, non labored, equal chest rise  Gastrointestinal: soft NT/ND, PEG peritubular skin intact, enteral feeds, incontinent loose stool perineal care provided  : indwelling benson catheter  Neurology:  weakened strength & sensation, able to squeeze with right hand subtly.  Musculoskeletal:  limited, functional quadriplegic b/l upper and lower extremities rigid in extension.  Vascular: BLE equally warm, no overt ischemia noted  Skin:  moist w/ good turgor  Sacrum- unstageable pressure injury, turned to right side. entire area 1efq6zbt1.4cm, this includes well demarcated area of adherent slough measuring 5.5qj9jxj5.4cm, no fluctuance, no induration, no drainable collection, no crepitus. Surrounding the slough is pink-moist agranular base with irregular borders, re-epithelialization noted slightly along wound edges. Scant serofibrinous drainage. Periwound skin no edema, no erythema, no increased warmth noted. No indication for sharp debridement. liquid barrier film to periwound skin, collagenase to wound base, silicone foam applied.        LABS/ CULTURES/ RADIOLOGY:              8.5    14.47 >-----------<  250      [05-07-22 @ 04:48]              26.4     135  |  97  |  62  ----------------------------<  135      [05-07-22 @ 04:48]  3.5   |  25  |  1.35        Ca     8.5     [05-07-22 @ 04:48]      Mg     2.60     [05-07-22 @ 04:48]      Phos  2.6     [05-07-22 @ 04:48]      A1C with Estimated Average Glucose Result: 5.9 % (04-09-22 @ 06:43)    Culture - Blood (collected 05-03-22 @ 23:05)  Source: .Blood Blood-Venous  Preliminary Report (05-05-22 @ 01:02):    No growth to date.    Culture - Blood (collected 05-03-22 @ 23:05)  Source: .Blood Blood-Peripheral  Preliminary Report (05-05-22 @ 01:02):    No growth to date.

## 2022-05-06 NOTE — PROGRESS NOTE ADULT - SUBJECTIVE AND OBJECTIVE BOX
INTERVAL HPI/OVERNIGHT EVENTS:    tolerating feeds   no bloody stools     MEDICATIONS  (STANDING):  ALBUTerol    90 MICROgram(s) HFA Inhaler 2 Puff(s) Inhalation every 6 hours  amantadine Syrup 100 milliGRAM(s) Oral two times a day  aMIOdarone    Tablet 200 milliGRAM(s) Oral daily  ascorbic acid 500 milliGRAM(s) Oral daily  chlorhexidine 0.12% Liquid 15 milliLiter(s) Oral Mucosa every 12 hours  chlorhexidine 2% Cloths 1 Application(s) Topical daily  collagenase Ointment 1 Application(s) Topical daily  doxazosin 2 milliGRAM(s) Oral at bedtime  folic acid 1 milliGRAM(s) Oral daily  heparin   Injectable 5000 Unit(s) SubCutaneous every 8 hours  ipratropium 17 MICROgram(s) HFA Inhaler 1 Puff(s) Inhalation every 6 hours  levETIRAcetam 750 milliGRAM(s) Oral two times a day  levothyroxine 50 MICROGram(s) Oral daily  pantoprazole   Suspension 40 milliGRAM(s) Oral daily  piperacillin/tazobactam IVPB.. 4.5 Gram(s) IV Intermittent every 8 hours  sodium chloride 3%  Inhalation 4 milliLiter(s) Inhalation every 6 hours    MEDICATIONS  (PRN):  acetaminophen    Suspension .. 650 milliGRAM(s) Oral every 4 hours PRN Temp greater or equal to 38C (100.4F), Mild Pain (1 - 3)      Allergies    No Known Allergies    Intolerances        Review of Systems: *pt minimally verbal to nonverbal, unable to obtain ROS       Vital Signs Last 24 Hrs  T(C): 37.1 (06 May 2022 04:50), Max: 38 (05 May 2022 17:39)  T(F): 98.8 (06 May 2022 04:50), Max: 100.4 (05 May 2022 17:39)  HR: 97 (06 May 2022 10:42) (78 - 100)  BP: 100/70 (06 May 2022 04:50) (100/62 - 118/75)  BP(mean): --  RR: 17 (06 May 2022 04:50) (12 - 17)  SpO2: 98% (06 May 2022 10:42) (98% - 100%)    PHYSICAL EXAM:    Constitutional: NAD  HEENT: EOMI, throat clear  Neck: No LAD, supple  Respiratory: +trach   Cardiovascular: S1 and S2, RRR, no M  Gastrointestinal: BS+, soft, NT/ND, neg HSM, +peg  Extremities: No peripheral edema, neg clubbing, cyanosis  Vascular: 2+ peripheral pulses  Neurological: A/O x 0-1  Psychiatric: lethargic  Skin: No rashes      LABS:                        9.0    15.25 )-----------( 199      ( 06 May 2022 06:42 )             28.2     05-06    132<L>  |  94<L>  |  68<H>  ----------------------------<  111<H>  4.0   |  22  |  1.53<H>    Ca    8.2<L>      06 May 2022 06:42  Phos  3.1     05-06  Mg     2.60     05-06    TPro  6.7  /  Alb  2.1<L>  /  TBili  <0.2  /  DBili  x   /  AST  16  /  ALT  15  /  AlkPhos  81  05-05          RADIOLOGY & ADDITIONAL TESTS:

## 2022-05-07 LAB
ANION GAP SERPL CALC-SCNC: 13 MMOL/L — SIGNIFICANT CHANGE UP (ref 7–14)
BUN SERPL-MCNC: 62 MG/DL — HIGH (ref 7–23)
CALCIUM SERPL-MCNC: 8.5 MG/DL — SIGNIFICANT CHANGE UP (ref 8.4–10.5)
CHLORIDE SERPL-SCNC: 97 MMOL/L — LOW (ref 98–107)
CO2 SERPL-SCNC: 25 MMOL/L — SIGNIFICANT CHANGE UP (ref 22–31)
CREAT SERPL-MCNC: 1.35 MG/DL — HIGH (ref 0.5–1.3)
CULTURE RESULTS: SIGNIFICANT CHANGE UP
EGFR: 55 ML/MIN/1.73M2 — LOW
GLUCOSE SERPL-MCNC: 135 MG/DL — HIGH (ref 70–99)
HCT VFR BLD CALC: 26.4 % — LOW (ref 39–50)
HGB BLD-MCNC: 8.5 G/DL — LOW (ref 13–17)
MAGNESIUM SERPL-MCNC: 2.6 MG/DL — SIGNIFICANT CHANGE UP (ref 1.6–2.6)
MCHC RBC-ENTMCNC: 32 PG — SIGNIFICANT CHANGE UP (ref 27–34)
MCHC RBC-ENTMCNC: 32.2 GM/DL — SIGNIFICANT CHANGE UP (ref 32–36)
MCV RBC AUTO: 99.2 FL — SIGNIFICANT CHANGE UP (ref 80–100)
NRBC # BLD: 0 /100 WBCS — SIGNIFICANT CHANGE UP
NRBC # FLD: 0 K/UL — SIGNIFICANT CHANGE UP
PHOSPHATE SERPL-MCNC: 2.6 MG/DL — SIGNIFICANT CHANGE UP (ref 2.5–4.5)
PLATELET # BLD AUTO: 250 K/UL — SIGNIFICANT CHANGE UP (ref 150–400)
POTASSIUM SERPL-MCNC: 3.5 MMOL/L — SIGNIFICANT CHANGE UP (ref 3.5–5.3)
POTASSIUM SERPL-SCNC: 3.5 MMOL/L — SIGNIFICANT CHANGE UP (ref 3.5–5.3)
RBC # BLD: 2.66 M/UL — LOW (ref 4.2–5.8)
RBC # FLD: 18.1 % — HIGH (ref 10.3–14.5)
SODIUM SERPL-SCNC: 135 MMOL/L — SIGNIFICANT CHANGE UP (ref 135–145)
SPECIMEN SOURCE: SIGNIFICANT CHANGE UP
WBC # BLD: 14.47 K/UL — HIGH (ref 3.8–10.5)
WBC # FLD AUTO: 14.47 K/UL — HIGH (ref 3.8–10.5)

## 2022-05-07 PROCEDURE — 99233 SBSQ HOSP IP/OBS HIGH 50: CPT | Mod: GC

## 2022-05-07 RX ORDER — TOBRAMYCIN SULFATE 40 MG/ML
300 VIAL (ML) INJECTION EVERY 12 HOURS
Refills: 0 | Status: COMPLETED | OUTPATIENT
Start: 2022-05-07 | End: 2022-05-13

## 2022-05-07 RX ADMIN — LEVETIRACETAM 750 MILLIGRAM(S): 250 TABLET, FILM COATED ORAL at 05:06

## 2022-05-07 RX ADMIN — PIPERACILLIN AND TAZOBACTAM 25 GRAM(S): 4; .5 INJECTION, POWDER, LYOPHILIZED, FOR SOLUTION INTRAVENOUS at 10:06

## 2022-05-07 RX ADMIN — HEPARIN SODIUM 5000 UNIT(S): 5000 INJECTION INTRAVENOUS; SUBCUTANEOUS at 21:42

## 2022-05-07 RX ADMIN — PIPERACILLIN AND TAZOBACTAM 25 GRAM(S): 4; .5 INJECTION, POWDER, LYOPHILIZED, FOR SOLUTION INTRAVENOUS at 00:27

## 2022-05-07 RX ADMIN — SODIUM CHLORIDE 4 MILLILITER(S): 9 INJECTION INTRAMUSCULAR; INTRAVENOUS; SUBCUTANEOUS at 10:59

## 2022-05-07 RX ADMIN — SODIUM CHLORIDE 4 MILLILITER(S): 9 INJECTION INTRAMUSCULAR; INTRAVENOUS; SUBCUTANEOUS at 23:16

## 2022-05-07 RX ADMIN — Medication 100 MILLIGRAM(S): at 05:03

## 2022-05-07 RX ADMIN — ALBUTEROL 2 PUFF(S): 90 AEROSOL, METERED ORAL at 10:57

## 2022-05-07 RX ADMIN — ALBUTEROL 2 PUFF(S): 90 AEROSOL, METERED ORAL at 23:17

## 2022-05-07 RX ADMIN — Medication 300 MILLIGRAM(S): at 23:23

## 2022-05-07 RX ADMIN — HEPARIN SODIUM 5000 UNIT(S): 5000 INJECTION INTRAVENOUS; SUBCUTANEOUS at 15:32

## 2022-05-07 RX ADMIN — Medication 1 PUFF(S): at 10:57

## 2022-05-07 RX ADMIN — CHLORHEXIDINE GLUCONATE 1 APPLICATION(S): 213 SOLUTION TOPICAL at 11:45

## 2022-05-07 RX ADMIN — Medication 50 MICROGRAM(S): at 05:02

## 2022-05-07 RX ADMIN — Medication 2 MILLIGRAM(S): at 21:43

## 2022-05-07 RX ADMIN — PANTOPRAZOLE SODIUM 40 MILLIGRAM(S): 20 TABLET, DELAYED RELEASE ORAL at 11:44

## 2022-05-07 RX ADMIN — Medication 100 MILLIGRAM(S): at 17:38

## 2022-05-07 RX ADMIN — HEPARIN SODIUM 5000 UNIT(S): 5000 INJECTION INTRAVENOUS; SUBCUTANEOUS at 05:03

## 2022-05-07 RX ADMIN — Medication 500 MILLIGRAM(S): at 11:44

## 2022-05-07 RX ADMIN — Medication 1 PUFF(S): at 23:17

## 2022-05-07 RX ADMIN — Medication 1 MILLIGRAM(S): at 11:44

## 2022-05-07 RX ADMIN — SODIUM CHLORIDE 4 MILLILITER(S): 9 INJECTION INTRAMUSCULAR; INTRAVENOUS; SUBCUTANEOUS at 16:10

## 2022-05-07 RX ADMIN — Medication 1 PUFF(S): at 03:12

## 2022-05-07 RX ADMIN — AMIODARONE HYDROCHLORIDE 200 MILLIGRAM(S): 400 TABLET ORAL at 05:03

## 2022-05-07 RX ADMIN — Medication 650 MILLIGRAM(S): at 00:01

## 2022-05-07 RX ADMIN — SODIUM CHLORIDE 4 MILLILITER(S): 9 INJECTION INTRAMUSCULAR; INTRAVENOUS; SUBCUTANEOUS at 03:12

## 2022-05-07 RX ADMIN — Medication 1 PUFF(S): at 16:10

## 2022-05-07 RX ADMIN — LEVETIRACETAM 750 MILLIGRAM(S): 250 TABLET, FILM COATED ORAL at 17:39

## 2022-05-07 RX ADMIN — CHLORHEXIDINE GLUCONATE 15 MILLILITER(S): 213 SOLUTION TOPICAL at 05:02

## 2022-05-07 RX ADMIN — PIPERACILLIN AND TAZOBACTAM 25 GRAM(S): 4; .5 INJECTION, POWDER, LYOPHILIZED, FOR SOLUTION INTRAVENOUS at 17:35

## 2022-05-07 RX ADMIN — ALBUTEROL 2 PUFF(S): 90 AEROSOL, METERED ORAL at 03:12

## 2022-05-07 RX ADMIN — Medication 1 APPLICATION(S): at 11:45

## 2022-05-07 RX ADMIN — ALBUTEROL 2 PUFF(S): 90 AEROSOL, METERED ORAL at 16:09

## 2022-05-07 RX ADMIN — CHLORHEXIDINE GLUCONATE 15 MILLILITER(S): 213 SOLUTION TOPICAL at 17:38

## 2022-05-07 NOTE — PROGRESS NOTE ADULT - SUBJECTIVE AND OBJECTIVE BOX
INTERVAL HPI/OVERNIGHT EVENTS:    tolerating feeds   no bloody stools     MEDICATIONS  (STANDING):  ALBUTerol    90 MICROgram(s) HFA Inhaler 2 Puff(s) Inhalation every 6 hours  amantadine Syrup 100 milliGRAM(s) Oral two times a day  aMIOdarone    Tablet 200 milliGRAM(s) Oral daily  ascorbic acid 500 milliGRAM(s) Oral daily  chlorhexidine 0.12% Liquid 15 milliLiter(s) Oral Mucosa every 12 hours  chlorhexidine 2% Cloths 1 Application(s) Topical daily  collagenase Ointment 1 Application(s) Topical daily  doxazosin 2 milliGRAM(s) Oral at bedtime  folic acid 1 milliGRAM(s) Oral daily  heparin   Injectable 5000 Unit(s) SubCutaneous every 8 hours  ipratropium 17 MICROgram(s) HFA Inhaler 1 Puff(s) Inhalation every 6 hours  levETIRAcetam 750 milliGRAM(s) Oral two times a day  levothyroxine 50 MICROGram(s) Oral daily  pantoprazole   Suspension 40 milliGRAM(s) Oral daily  piperacillin/tazobactam IVPB.. 4.5 Gram(s) IV Intermittent every 8 hours  sodium chloride 3%  Inhalation 4 milliLiter(s) Inhalation every 6 hours  tobramycin for Nebulization 300 milliGRAM(s) Inhalation every 12 hours    MEDICATIONS  (PRN):  acetaminophen    Suspension .. 650 milliGRAM(s) Oral every 4 hours PRN Temp greater or equal to 38C (100.4F), Mild Pain (1 - 3)        Allergies    No Known Allergies    Intolerances        Review of Systems: *pt minimally verbal to nonverbal, unable to obtain ROS     Vital Signs Last 24 Hrs  T(C): 37.6 (07 May 2022 10:06), Max: 37.6 (07 May 2022 10:06)  T(F): 99.6 (07 May 2022 10:06), Max: 99.6 (07 May 2022 10:06)  HR: 89 (07 May 2022 10:57) (84 - 106)  BP: 111/65 (07 May 2022 10:06) (107/72 - 123/98)  BP(mean): --  RR: 16 (07 May 2022 10:06) (16 - 19)  SpO2: 100% (07 May 2022 10:57) (99% - 100%)    PHYSICAL EXAM:    Constitutional: NAD  HEENT: EOMI, throat clear  Neck: No LAD, supple  Respiratory: +trach   Cardiovascular: S1 and S2, RRR, no M  Gastrointestinal: BS+, soft, NT/ND, neg HSM, +peg  Extremities: No peripheral edema, neg clubbing, cyanosis  Vascular: 2+ peripheral pulses  Neurological: A/O x 0-1  Psychiatric: lethargic  Skin: No rashes      LABS:                        8.5    14.47 )-----------( 250      ( 07 May 2022 04:48 )             26.4     05-07    135  |  97<L>  |  62<H>  ----------------------------<  135<H>  3.5   |  25  |  1.35<H>    Ca    8.5      07 May 2022 04:48  Phos  2.6     05-07  Mg     2.60     05-07                  RADIOLOGY & ADDITIONAL TESTS:

## 2022-05-07 NOTE — PROGRESS NOTE ADULT - NS ATTEND AMEND GEN_ALL_CORE FT
agree with above  on Zosyn for pseud tracheitis. ID follow up appreciated  afebrile  still requiring frquent suctioning, thick secretions. will trial Thierno as well

## 2022-05-07 NOTE — PROGRESS NOTE ADULT - SUBJECTIVE AND OBJECTIVE BOX
CHIEF COMPLAINT: Patient is a 76y old  Male who presents with a chief complaint of Hypoxia (07 May 2022 11:43)      INTERVAL EVENTS:    REVIEW OF SYSTEMS: Seen by bedside during AM rounds and     Mode: AC/ CMV (Assist Control/ Continuous Mandatory Ventilation), RR (machine): 12, TV (machine): 450, FiO2: 30, PEEP: 5, MAP: 8, PIP: 19      OBJECTIVE:  ICU Vital Signs Last 24 Hrs  T(C): 37.6 (07 May 2022 10:06), Max: 37.6 (07 May 2022 10:06)  T(F): 99.6 (07 May 2022 10:06), Max: 99.6 (07 May 2022 10:06)  HR: 89 (07 May 2022 10:57) (84 - 106)  BP: 111/65 (07 May 2022 10:06) (107/72 - 123/98)  BP(mean): --  ABP: --  ABP(mean): --  RR: 16 (07 May 2022 10:06) (16 - 19)  SpO2: 100% (07 May 2022 10:57) (99% - 100%)    Mode: AC/ CMV (Assist Control/ Continuous Mandatory Ventilation), RR (machine): 12, TV (machine): 450, FiO2: 30, PEEP: 5, MAP: 8, PIP: 19    05-06 @ 07:01  -  05-07 @ 07:00  --------------------------------------------------------  IN: 1140 mL / OUT: 1700 mL / NET: -560 mL      CAPILLARY BLOOD GLUCOSE          hospitals MEDICATIONS:  MEDICATIONS  (STANDING):  ALBUTerol    90 MICROgram(s) HFA Inhaler 2 Puff(s) Inhalation every 6 hours  amantadine Syrup 100 milliGRAM(s) Oral two times a day  aMIOdarone    Tablet 200 milliGRAM(s) Oral daily  ascorbic acid 500 milliGRAM(s) Oral daily  chlorhexidine 0.12% Liquid 15 milliLiter(s) Oral Mucosa every 12 hours  chlorhexidine 2% Cloths 1 Application(s) Topical daily  collagenase Ointment 1 Application(s) Topical daily  doxazosin 2 milliGRAM(s) Oral at bedtime  folic acid 1 milliGRAM(s) Oral daily  heparin   Injectable 5000 Unit(s) SubCutaneous every 8 hours  ipratropium 17 MICROgram(s) HFA Inhaler 1 Puff(s) Inhalation every 6 hours  levETIRAcetam 750 milliGRAM(s) Oral two times a day  levothyroxine 50 MICROGram(s) Oral daily  pantoprazole   Suspension 40 milliGRAM(s) Oral daily  piperacillin/tazobactam IVPB.. 4.5 Gram(s) IV Intermittent every 8 hours  sodium chloride 3%  Inhalation 4 milliLiter(s) Inhalation every 6 hours  tobramycin for Nebulization 300 milliGRAM(s) Inhalation every 12 hours    MEDICATIONS  (PRN):  acetaminophen    Suspension .. 650 milliGRAM(s) Oral every 4 hours PRN Temp greater or equal to 38C (100.4F), Mild Pain (1 - 3)      PHYSICAL EXAMINATION    LABS:                        8.5    14.47 )-----------( 250      ( 07 May 2022 04:48 )             26.4     05-07    135  |  97<L>  |  62<H>  ----------------------------<  135<H>  3.5   |  25  |  1.35<H>    Ca    8.5      07 May 2022 04:48  Phos  2.6     05-07  Mg     2.60     05-07                PAST MEDICAL & SURGICAL HISTORY:  Essential hypertension    Primary osteoarthritis, unspecified site    Closed fracture of left radius, initial encounter    Morbid obesity, unspecified obesity type  h/o. - s/p gastric bypass- lost 113 lbs    KAREN (obstructive sleep apnea)  h/o - was on CPAP until gastric bypass surgery    Respiratory failure    Anemia    Subdural hemorrhage    Epilepsy    H/O gastrostomy    History of BPH    Afib    S/P gastric bypass  2008    Status post total replacement of left hip  2006    S/P bunionectomy  bilateral    S/P colonoscopy  approx 2012    History of abdominoplasty  and subsequent cosmetic surgery for removal of excess skin from face        FAMILY HISTORY:  Family history of renal cell carcinoma (Mother)    Family history of malignant melanoma (Sibling)        Social History:      RADIOLOGY:  [ ] Reviewed and interpreted by me    PULMONARY FUNCTION TESTS:    EKG: CHIEF COMPLAINT: Patient is a 76y old  Male who presents with a chief complaint of Hypoxia (07 May 2022 11:43)    INTERVAL EVENTS:  - No interval events overnight.   - Able to tolerate some PS today.     REVIEW OF SYSTEMS: Seen by bedside during AM rounds and unable to assess ROS as vented.     Mode: AC/ CMV (Assist Control/ Continuous Mandatory Ventilation), RR (machine): 12, TV (machine): 450, FiO2: 30, PEEP: 5, MAP: 8, PIP: 19    OBJECTIVE:  ICU Vital Signs Last 24 Hrs  T(C): 37.6 (07 May 2022 10:06), Max: 37.6 (07 May 2022 10:06)  T(F): 99.6 (07 May 2022 10:06), Max: 99.6 (07 May 2022 10:06)  HR: 89 (07 May 2022 10:57) (84 - 106)  BP: 111/65 (07 May 2022 10:06) (107/72 - 123/98)  BP(mean): --  ABP: --  ABP(mean): --  RR: 16 (07 May 2022 10:06) (16 - 19)  SpO2: 100% (07 May 2022 10:57) (99% - 100%)    Mode: AC/ CMV (Assist Control/ Continuous Mandatory Ventilation), RR (machine): 12, TV (machine): 450, FiO2: 30, PEEP: 5, MAP: 8, PIP: 19    05-06 @ 07:01  -  05-07 @ 07:00  --------------------------------------------------------  IN: 1140 mL / OUT: 1700 mL / NET: -560 mL    CAPILLARY BLOOD GLUCOSE    HOSPITAL MEDICATIONS:  MEDICATIONS  (STANDING):  ALBUTerol    90 MICROgram(s) HFA Inhaler 2 Puff(s) Inhalation every 6 hours  amantadine Syrup 100 milliGRAM(s) Oral two times a day  aMIOdarone    Tablet 200 milliGRAM(s) Oral daily  ascorbic acid 500 milliGRAM(s) Oral daily  chlorhexidine 0.12% Liquid 15 milliLiter(s) Oral Mucosa every 12 hours  chlorhexidine 2% Cloths 1 Application(s) Topical daily  collagenase Ointment 1 Application(s) Topical daily  doxazosin 2 milliGRAM(s) Oral at bedtime  folic acid 1 milliGRAM(s) Oral daily  heparin   Injectable 5000 Unit(s) SubCutaneous every 8 hours  ipratropium 17 MICROgram(s) HFA Inhaler 1 Puff(s) Inhalation every 6 hours  levETIRAcetam 750 milliGRAM(s) Oral two times a day  levothyroxine 50 MICROGram(s) Oral daily  pantoprazole   Suspension 40 milliGRAM(s) Oral daily  piperacillin/tazobactam IVPB.. 4.5 Gram(s) IV Intermittent every 8 hours  sodium chloride 3%  Inhalation 4 milliLiter(s) Inhalation every 6 hours  tobramycin for Nebulization 300 milliGRAM(s) Inhalation every 12 hours    MEDICATIONS  (PRN):  acetaminophen    Suspension .. 650 milliGRAM(s) Oral every 4 hours PRN Temp greater or equal to 38C (100.4F), Mild Pain (1 - 3)    PHYSICAL EXAMINATION:   General: NAD   Neck: Trach (+)   Cards: S1/S2, no murmurs   Pulm: Course vent sounds bilaterally. No wheezes.   Abdomen: Soft, NTND. BS (+) PEG (+)   Extremities: No pedal edema. KIMBERLEY of RUE and RLE but severely limited by weakness.   Neurology: Awakens and able to nod head intermittently. No focal neurological deficits     LABS:                        8.5    14.47 )-----------( 250      ( 07 May 2022 04:48 )             26.4     05-07    135  |  97<L>  |  62<H>  ----------------------------<  135<H>  3.5   |  25  |  1.35<H>    Ca    8.5      07 May 2022 04:48  Phos  2.6     05-07  Mg     2.60     05-07    PAST MEDICAL & SURGICAL HISTORY:  Essential hypertension    Primary osteoarthritis, unspecified site    Closed fracture of left radius, initial encounter    Morbid obesity, unspecified obesity type  h/o. - s/p gastric bypass- lost 113 lbs    KAREN (obstructive sleep apnea)  h/o - was on CPAP until gastric bypass surgery    Respiratory failure    Anemia    Subdural hemorrhage    Epilepsy    H/O gastrostomy    History of BPH    Afib    S/P gastric bypass  2008    Status post total replacement of left hip  2006    S/P bunionectomy  bilateral    S/P colonoscopy  approx 2012    History of abdominoplasty  and subsequent cosmetic surgery for removal of excess skin from face    FAMILY HISTORY:  Family history of renal cell carcinoma (Mother)    Family history of malignant melanoma (Sibling)    Social History:    RADIOLOGY:  [ ] Reviewed and interpreted by me    PULMONARY FUNCTION TESTS:    EKG:

## 2022-05-07 NOTE — PROGRESS NOTE ADULT - ASSESSMENT
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     # Neurology   - Currently A&Ox0, awake and alert, able to move RUE and nods/ mouths one or two words per RCU evaluation and prior documentation.   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   - Continue on Modafinil, Amantidine and Keppra.   - Re-eval by PT and patient not a candidate at this time     # Cardiovascular   - Hx of HFpEF 55, mild MR and AR, severe LAD, normal LVRVSF (ECHO 3/7)  - Hx of Atrial Fibrillation s/p Watchman and currently rate controlled.   - Continue on Amiodarone 200mg QD and Coreg 6.25mg BID.   - Lasix resumed complicated by hypotension, now held. Monitor BP.     # HEENT  - Large firm mass along left neck noted 4/28  - CT Neck (4/30) with left-sided parotitis and 2 mm left intraparotid stone, although there is no parotid duct dilatation or obstructing stone.  - ENT called and reccs appreciated. Continue with warm compress and massage TID, monitor for any worsening signs.      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   - Initially treated for HCAP and able to wean off vent to TC ATC (4/21-5/3) however sputum now thick and requiring vent support again.   - RPT SCX (5/2 and 5/4) with pansensitive pseudomonas and on ABX as below.   - Continue on Proventil, Atrovent , Hypersal, IPV and Chest PT Q6H  - Monitor secretions, PS as tolerated, suction PRN.     # GI  - Hx of SDH, Gastric Bypass and Dysphagia s/p PEG and continued on TF with course complicated by constipation and melena  - Case discussed with GI and melena likely in setting of constipation, no plan for scope   - Continue on Nepro in sight of hyperkalemia.   - s/p large BM (4/25) and trial of Reglan 10mg TID (4/24 - 4/27) c/b mild diarrhea (5/3). Senna and Miralax dc'ed and monitored off with improvement.     # / Renal  - Hx of Urinary Retention with Chronic Garcia and presented FLORIAN likely in setting of dehydration with fevers vs ATN with Sepsis (CRE high 2s and baseline 0.8-0.9).   - UA with hematuria and proteinuria and case discussed with Neprhology with concern for glomerulonephritis. ANCA, ISABELLE and GM ABs negative.   - Hypernatremia resolved with FWF and now with Hyponatremia and FWF dc'ed.   - Hyperkalemia s/p cocktail and switched to Nepro with improvement   - IVF and PRBC given with CRE improving and resumed on Lasix, however CRE now rising and Laisx held again.     # ID  - Recent admission for  Acinetobacter and Pseudomonas HCAP (3/2022)   - SCx (4/10) with  Acinetobacter and Pseudomonas s/p Meropenem (4/8-4/14)   - SCx (4/28) with  Pseudomonas x 2 species   - SCx (5/2 and 5/4) with pansensitive Pseudomonas. Given change in sputum, fevers and ventilatory requirments. c/w Zosyn (5/3 - ) and ALONDRA (5/7 - )     # Endocrine  - No hx of DM2 and A1C 5.9. Continue to monitor BG,    - Hx of Hypothyroidism and continued on Synthroid. TSH 47 with low T3/T4 and Free T4. TSH 80s (3/2022) and Synthroid increased at NH. Hypothyroidism could be in the setting of Amiodarone use and current Synthroid dose appears to be working.   - Next TFT to be done in June 2022.     # Hematology   - Anemia i/s/o AOCD and s/p PRBC (4/9)   - Melena/ anemia noted and s/p 2 U PRBC (4/19)  with good response, however HH waxes and wanes with no obvious bleed (no further melena or CGE from PEG aspiration)  - DVT PPX with HSQ (cleared by GI to resume).     # Ethics   - FULL CODE   - Case discussed with Cousin/ HCP, Dr. Donna Hagen (Pediatric Neurologist, [phone]) and updated daily.     # DISPO - Planning to go back to NH. PMR recc ELIZABETH. Family requesting [place] facility and SW aware.    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     # Neurology   - Currently A&Ox0, awake and alert, able to move RUE and nods/ mouths one or two words per RCU evaluation and prior documentation.   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   - Continue on Modafinil, Amantidine and Keppra.   - Re-eval by PT and patient not a candidate at this time     # Cardiovascular   - Hx of HFpEF 55, mild MR and AR, severe LAD, normal LVRVSF (ECHO 3/7)  - Hx of Atrial Fibrillation s/p Watchman and currently rate controlled.   - Continue on Amiodarone 200mg QD and Coreg 6.25mg BID.   - Lasix resumed complicated by hypotension, now held. Monitor BP.     # HEENT  - Large firm mass along left neck noted 4/28  - CT Neck (4/30) with left-sided parotitis and 2 mm left intraparotid stone, although there is no parotid duct dilatation or obstructing stone.  - ENT called and reccs appreciated. Continue with warm compress and massage TID, monitor for any worsening signs.      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   - Initially treated for HCAP and able to wean off vent to TC ATC (4/21-5/3) however sputum now thick and requiring vent support again.   - RPT SCX (5/2 and 5/4) with pansensitive pseudomonas and on ABX as below.   - Continue on Proventil, Atrovent , Hypersal, IPV and Chest PT Q6H  - Monitor secretions, PS as tolerated during the day, suction PRN.     # GI  - Hx of SDH, Gastric Bypass and Dysphagia s/p PEG and continued on TF with course complicated by constipation and melena  - Case discussed with GI and melena likely in setting of constipation, no plan for scope   - Continue on Nepro in sight of hyperkalemia.   - s/p large BM (4/25) and trial of Reglan 10mg TID (4/24 - 4/27) c/b mild diarrhea (5/3). Senna and Miralax dc'ed and monitored off with improvement.     # / Renal  - Hx of Urinary Retention with Chronic Garcia and presented FLORIAN likely in setting of dehydration with fevers vs ATN with Sepsis (CRE high 2s and baseline 0.8-0.9).   - UA with hematuria and proteinuria and case discussed with Neprhology with concern for glomerulonephritis. ANCA, ISABELLE and GM ABs negative.   - Hypernatremia resolved with FWF and now with Hyponatremia and FWF dc'ed.   - Hyperkalemia s/p cocktail and switched to Nepro with improvement   - IVF and PRBC given with CRE improving and resumed on Lasix, however CRE now rising and Lasix held again.     # ID  - Recent admission for  Acinetobacter and Pseudomonas HCAP (3/2022)   - SCx (4/10) with  Acinetobacter and Pseudomonas s/p Meropenem (4/8-4/14)   - SCx (4/28) with  Pseudomonas x 2 species   - SCx (5/2 and 5/4) with pansensitive Pseudomonas. Given change in sputum, fevers and ventilatory requirments. c/w Zosyn (5/3 - ) and ALONDRA (5/7 - )     # Endocrine  - No hx of DM2 and A1C 5.9. Continue to monitor BG,    - Hx of Hypothyroidism and continued on Synthroid. TSH 47 with low T3/T4 and Free T4. TSH 80s (3/2022) and Synthroid increased at NH. Hypothyroidism could be in the setting of Amiodarone use and current Synthroid dose appears to be working.   - Next TFT to be done in June 2022.     # Hematology   - Anemia i/s/o AOCD and s/p PRBC (4/9)   - Melena/ anemia noted and s/p 2 U PRBC (4/19)  with good response, however HH waxes and wanes with no obvious bleed (no further melena or CGE from PEG aspiration)  - DVT PPX with HSQ (cleared by GI to resume).     # Ethics   - FULL CODE   - Case discussed with Cousin/ HCP, Dr. Donna Hagen (Pediatric Neurologist, [phone]) and updated daily.     # DISPO - Planning to go back to NH. PMR recc ELIZABETH. Family requesting [place] facility and SW aware.

## 2022-05-08 LAB
ANION GAP SERPL CALC-SCNC: 12 MMOL/L — SIGNIFICANT CHANGE UP (ref 7–14)
BASOPHILS # BLD AUTO: 0.05 K/UL — SIGNIFICANT CHANGE UP (ref 0–0.2)
BASOPHILS NFR BLD AUTO: 0.4 % — SIGNIFICANT CHANGE UP (ref 0–2)
BUN SERPL-MCNC: 59 MG/DL — HIGH (ref 7–23)
CALCIUM SERPL-MCNC: 8.4 MG/DL — SIGNIFICANT CHANGE UP (ref 8.4–10.5)
CHLORIDE SERPL-SCNC: 98 MMOL/L — SIGNIFICANT CHANGE UP (ref 98–107)
CO2 SERPL-SCNC: 25 MMOL/L — SIGNIFICANT CHANGE UP (ref 22–31)
CREAT SERPL-MCNC: 1.23 MG/DL — SIGNIFICANT CHANGE UP (ref 0.5–1.3)
EGFR: 61 ML/MIN/1.73M2 — SIGNIFICANT CHANGE UP
EOSINOPHIL # BLD AUTO: 0.21 K/UL — SIGNIFICANT CHANGE UP (ref 0–0.5)
EOSINOPHIL NFR BLD AUTO: 1.7 % — SIGNIFICANT CHANGE UP (ref 0–6)
GLUCOSE SERPL-MCNC: 128 MG/DL — HIGH (ref 70–99)
HCT VFR BLD CALC: 25.9 % — LOW (ref 39–50)
HGB BLD-MCNC: 8.2 G/DL — LOW (ref 13–17)
IANC: 9.89 K/UL — HIGH (ref 1.8–7.4)
IMM GRANULOCYTES NFR BLD AUTO: 0.9 % — SIGNIFICANT CHANGE UP (ref 0–1.5)
LYMPHOCYTES # BLD AUTO: 1.35 K/UL — SIGNIFICANT CHANGE UP (ref 1–3.3)
LYMPHOCYTES # BLD AUTO: 11.1 % — LOW (ref 13–44)
MAGNESIUM SERPL-MCNC: 2.5 MG/DL — SIGNIFICANT CHANGE UP (ref 1.6–2.6)
MCHC RBC-ENTMCNC: 31.7 GM/DL — LOW (ref 32–36)
MCHC RBC-ENTMCNC: 31.7 PG — SIGNIFICANT CHANGE UP (ref 27–34)
MCV RBC AUTO: 100 FL — SIGNIFICANT CHANGE UP (ref 80–100)
MONOCYTES # BLD AUTO: 0.52 K/UL — SIGNIFICANT CHANGE UP (ref 0–0.9)
MONOCYTES NFR BLD AUTO: 4.3 % — SIGNIFICANT CHANGE UP (ref 2–14)
NEUTROPHILS # BLD AUTO: 9.89 K/UL — HIGH (ref 1.8–7.4)
NEUTROPHILS NFR BLD AUTO: 81.6 % — HIGH (ref 43–77)
NRBC # BLD: 0 /100 WBCS — SIGNIFICANT CHANGE UP
NRBC # FLD: 0 K/UL — SIGNIFICANT CHANGE UP
NT-PROBNP SERPL-SCNC: 5852 PG/ML — HIGH
PHOSPHATE SERPL-MCNC: 2.6 MG/DL — SIGNIFICANT CHANGE UP (ref 2.5–4.5)
PLATELET # BLD AUTO: 232 K/UL — SIGNIFICANT CHANGE UP (ref 150–400)
POTASSIUM SERPL-MCNC: 3.5 MMOL/L — SIGNIFICANT CHANGE UP (ref 3.5–5.3)
POTASSIUM SERPL-SCNC: 3.5 MMOL/L — SIGNIFICANT CHANGE UP (ref 3.5–5.3)
RBC # BLD: 2.59 M/UL — LOW (ref 4.2–5.8)
RBC # FLD: 17.9 % — HIGH (ref 10.3–14.5)
SODIUM SERPL-SCNC: 135 MMOL/L — SIGNIFICANT CHANGE UP (ref 135–145)
WBC # BLD: 12.13 K/UL — HIGH (ref 3.8–10.5)
WBC # FLD AUTO: 12.13 K/UL — HIGH (ref 3.8–10.5)

## 2022-05-08 PROCEDURE — 71045 X-RAY EXAM CHEST 1 VIEW: CPT | Mod: 26

## 2022-05-08 RX ADMIN — SODIUM CHLORIDE 4 MILLILITER(S): 9 INJECTION INTRAMUSCULAR; INTRAVENOUS; SUBCUTANEOUS at 23:34

## 2022-05-08 RX ADMIN — Medication 1 PUFF(S): at 23:34

## 2022-05-08 RX ADMIN — ALBUTEROL 2 PUFF(S): 90 AEROSOL, METERED ORAL at 03:46

## 2022-05-08 RX ADMIN — LEVETIRACETAM 750 MILLIGRAM(S): 250 TABLET, FILM COATED ORAL at 05:13

## 2022-05-08 RX ADMIN — ALBUTEROL 2 PUFF(S): 90 AEROSOL, METERED ORAL at 10:43

## 2022-05-08 RX ADMIN — Medication 50 MICROGRAM(S): at 05:13

## 2022-05-08 RX ADMIN — Medication 1 MILLIGRAM(S): at 12:14

## 2022-05-08 RX ADMIN — Medication 500 MILLIGRAM(S): at 12:14

## 2022-05-08 RX ADMIN — ALBUTEROL 2 PUFF(S): 90 AEROSOL, METERED ORAL at 23:34

## 2022-05-08 RX ADMIN — Medication 100 MILLIGRAM(S): at 17:03

## 2022-05-08 RX ADMIN — Medication 100 MILLIGRAM(S): at 05:14

## 2022-05-08 RX ADMIN — HEPARIN SODIUM 5000 UNIT(S): 5000 INJECTION INTRAVENOUS; SUBCUTANEOUS at 21:49

## 2022-05-08 RX ADMIN — PIPERACILLIN AND TAZOBACTAM 25 GRAM(S): 4; .5 INJECTION, POWDER, LYOPHILIZED, FOR SOLUTION INTRAVENOUS at 20:28

## 2022-05-08 RX ADMIN — Medication 300 MILLIGRAM(S): at 10:43

## 2022-05-08 RX ADMIN — Medication 1 PUFF(S): at 03:45

## 2022-05-08 RX ADMIN — SODIUM CHLORIDE 4 MILLILITER(S): 9 INJECTION INTRAMUSCULAR; INTRAVENOUS; SUBCUTANEOUS at 15:57

## 2022-05-08 RX ADMIN — SODIUM CHLORIDE 4 MILLILITER(S): 9 INJECTION INTRAMUSCULAR; INTRAVENOUS; SUBCUTANEOUS at 03:45

## 2022-05-08 RX ADMIN — Medication 1 APPLICATION(S): at 12:14

## 2022-05-08 RX ADMIN — SODIUM CHLORIDE 4 MILLILITER(S): 9 INJECTION INTRAMUSCULAR; INTRAVENOUS; SUBCUTANEOUS at 10:42

## 2022-05-08 RX ADMIN — PIPERACILLIN AND TAZOBACTAM 25 GRAM(S): 4; .5 INJECTION, POWDER, LYOPHILIZED, FOR SOLUTION INTRAVENOUS at 00:59

## 2022-05-08 RX ADMIN — ALBUTEROL 2 PUFF(S): 90 AEROSOL, METERED ORAL at 15:58

## 2022-05-08 RX ADMIN — HEPARIN SODIUM 5000 UNIT(S): 5000 INJECTION INTRAVENOUS; SUBCUTANEOUS at 15:01

## 2022-05-08 RX ADMIN — HEPARIN SODIUM 5000 UNIT(S): 5000 INJECTION INTRAVENOUS; SUBCUTANEOUS at 05:13

## 2022-05-08 RX ADMIN — LEVETIRACETAM 750 MILLIGRAM(S): 250 TABLET, FILM COATED ORAL at 17:03

## 2022-05-08 RX ADMIN — Medication 1 PUFF(S): at 15:58

## 2022-05-08 RX ADMIN — AMIODARONE HYDROCHLORIDE 200 MILLIGRAM(S): 400 TABLET ORAL at 05:14

## 2022-05-08 RX ADMIN — CHLORHEXIDINE GLUCONATE 15 MILLILITER(S): 213 SOLUTION TOPICAL at 05:13

## 2022-05-08 RX ADMIN — PIPERACILLIN AND TAZOBACTAM 25 GRAM(S): 4; .5 INJECTION, POWDER, LYOPHILIZED, FOR SOLUTION INTRAVENOUS at 12:42

## 2022-05-08 RX ADMIN — CHLORHEXIDINE GLUCONATE 1 APPLICATION(S): 213 SOLUTION TOPICAL at 12:13

## 2022-05-08 RX ADMIN — CHLORHEXIDINE GLUCONATE 15 MILLILITER(S): 213 SOLUTION TOPICAL at 17:18

## 2022-05-08 RX ADMIN — Medication 2 MILLIGRAM(S): at 21:49

## 2022-05-08 RX ADMIN — Medication 1 PUFF(S): at 10:43

## 2022-05-08 RX ADMIN — PANTOPRAZOLE SODIUM 40 MILLIGRAM(S): 20 TABLET, DELAYED RELEASE ORAL at 12:14

## 2022-05-08 NOTE — PROGRESS NOTE ADULT - ASSESSMENT
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     # Neurology   - Currently A&Ox0, awake and alert, able to move RUE and nods/ mouths one or two words per RCU evaluation and prior documentation.   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   - Continue on Modafinil, Amantidine and Keppra.   - Re-eval by PT and patient not a candidate at this time     # Cardiovascular   - Hx of HFpEF 55, mild MR and AR, severe LAD, normal LVRVSF (ECHO 3/7)  - Hx of Atrial Fibrillation s/p Watchman and currently rate controlled.   - Continue on Amiodarone 200mg QD and Coreg 6.25mg BID.   - Lasix resumed complicated by hypotension, now held. Monitor BP.     # HEENT  - Large firm mass along left neck noted 4/28  - CT Neck (4/30) with left-sided parotitis and 2 mm left intraparotid stone, although there is no parotid duct dilatation or obstructing stone.  - ENT called and reccs appreciated. Continue with warm compress and massage TID, monitor for any worsening signs.      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   - Initially treated for HCAP and able to wean off vent to TC ATC (4/21-5/3) however sputum now thick and requiring vent support again.   - RPT SCX (5/2 and 5/4) with pansensitive pseudomonas and on ABX as below.   - Continue on Proventil, Atrovent , Hypersal, IPV and Chest PT Q6H  - Monitor secretions, PS as tolerated during the day, suction PRN.     # GI  - Hx of SDH, Gastric Bypass and Dysphagia s/p PEG and continued on TF with course complicated by constipation and melena  - Case discussed with GI and melena likely in setting of constipation, no plan for scope   - Continue on Nepro in sight of hyperkalemia.   - s/p large BM (4/25) and trial of Reglan 10mg TID (4/24 - 4/27) c/b mild diarrhea (5/3). Senna and Miralax dc'ed and monitored off with improvement.     # / Renal  - Hx of Urinary Retention with Chronic Garcia and presented FLORIAN likely in setting of dehydration with fevers vs ATN with Sepsis (CRE high 2s and baseline 0.8-0.9).   - UA with hematuria and proteinuria and case discussed with Neprhology with concern for glomerulonephritis. ANCA, ISABELLE and GM ABs negative.   - Hypernatremia resolved with FWF and now with Hyponatremia and FWF dc'ed.   - Hyperkalemia s/p cocktail and switched to Nepro with improvement   - IVF and PRBC given with CRE improving and resumed on Lasix, however CRE now rising and Lasix held again.     # ID  - Recent admission for  Acinetobacter and Pseudomonas HCAP (3/2022)   - SCx (4/10) with  Acinetobacter and Pseudomonas s/p Meropenem (4/8-4/14)   - SCx (4/28) with  Pseudomonas x 2 species   - SCx (5/2 and 5/4) with pansensitive Pseudomonas. Given change in sputum, fevers and ventilatory requirments. c/w Zosyn (5/3 - ) and ALONDRA (5/7 - )     # Endocrine  - No hx of DM2 and A1C 5.9. Continue to monitor BG,    - Hx of Hypothyroidism and continued on Synthroid. TSH 47 with low T3/T4 and Free T4. TSH 80s (3/2022) and Synthroid increased at NH. Hypothyroidism could be in the setting of Amiodarone use and current Synthroid dose appears to be working.   - Next TFT to be done in June 2022.     # Hematology   - Anemia i/s/o AOCD and s/p PRBC (4/9)   - Melena/ anemia noted and s/p 2 U PRBC (4/19)  with good response, however HH waxes and wanes with no obvious bleed (no further melena or CGE from PEG aspiration)  - DVT PPX with HSQ (cleared by GI to resume).     # Ethics   - FULL CODE   - Case discussed with Cousin/ HCP, Dr. Donna Hagen (Pediatric Neurologist, [phone]) and updated daily.     # DISPO - Planning to go back to NH. PMR recc [facility]. [place] requesting [place] facility and SW aware.    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     # Neurology   - Currently A&Ox0, awake and alert, able to move RUE and nods/ mouths one or two words per RCU evaluation and prior documentation.   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   - Continue on Modafinil, Amantidine and Keppra.   - Re-eval by PT and patient not a candidate at this time     # Cardiovascular   - Hx of HFpEF 55, mild MR and AR, severe LAD, normal LVRVSF (ECHO 3/7)  - Hx of Atrial Fibrillation s/p Watchman and currently rate controlled.   - Continue on Amiodarone 200mg QD and Coreg 6.25mg BID.   - s/p Lasix resumed complicated by hypotension, now held  - Rpt Pro BNP today 5852, CXR done, grossly unchanged from previous (official read pending)    # HEENT  - Large firm mass along left neck noted 4/28  - CT Neck (4/30) with left-sided parotitis and 2 mm left intraparotid stone, although there is no parotid duct dilatation or obstructing stone.  - ENT called and reccs appreciated. Continue with warm compress and massage TID, monitor for any worsening signs.      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   - Initially treated for HCAP and able to wean off vent to TC ATC (4/21-5/3) however sputum now thick and requiring vent support again.   - RPT SCX (5/2 and 5/4) with pansensitive pseudomonas and on ABX as below.   - Continue on Proventil, Atrovent , Hypersal, IPV and Chest PT Q6H  - Monitor secretions, PS as tolerated during the day, suction PRN.     # GI  - Hx of SDH, Gastric Bypass and Dysphagia s/p PEG and continued on TF with course complicated by constipation and melena  - Case discussed with GI and melena likely in setting of constipation, no plan for scope   - Continue on Nepro in sight of hyperkalemia.   - s/p large BM (4/25) and trial of Reglan 10mg TID (4/24 - 4/27) c/b mild diarrhea (5/3).   - s/p Senna and Miralax dc'ed and monitored off with improvement.     # / Renal  - Hx of Urinary Retention with Chronic Garcia and presented FLORIAN likely in setting of dehydration with fevers vs ATN with Sepsis (CRE high 2s and baseline 0.8-0.9).   - UA with hematuria and proteinuria and case discussed with Neprhology with concern for glomerulonephritis. ANCA, ISABELLE and GM ABs negative.   - Hypernatremia resolved with FWF and now with Hyponatremia and FWF dc'ed.   - Hyperkalemia s/p cocktail and switched to Nepro with improvement   - IVF and PRBC given with CRE improving and resumed on Lasix, however CRE now rising and Lasix held again.   - proBNP improved from previous, CXR official reading pending, Pt is HDS     # ID  - Recent admission for  Acinetobacter and Pseudomonas HCAP (3/2022)   - SCx (4/10) with  Acinetobacter and Pseudomonas s/p Meropenem (4/8-4/14)   - SCx (4/28) with  Pseudomonas x 2 species   - SCx (5/2 and 5/4) with pansensitive Pseudomonas. Given change in sputum, fevers and ventilatory requirements  - c/w Zosyn (5/3 - ) and ALONDRA (5/7 - ) - Leukocytosis improving, and no fevers reported for now     # Endocrine  - No hx of DM2 and A1C 5.9. Continue to monitor BG,    - Hx of Hypothyroidism and continued on Synthroid. TSH 47 with low T3/T4 and Free T4. TSH 80s (3/2022) and Synthroid increased at NH. Hypothyroidism could be in the setting of Amiodarone use and current Synthroid dose appears to be working.   - Next TFT to be done in June 2022.     # Hematology   - Anemia i/s/o AOCD and s/p PRBC (4/9)   - Melena/ anemia noted and s/p 2 U PRBC (4/19)  with good response, however HH waxes and wanes with no obvious bleed (no further melena or CGE from PEG aspiration)  - DVT PPX with HSQ (cleared by GI to resume).     # Ethics   - FULL CODE   - Case discussed with Cousin/ HCP, Dr. Donna Hagen (Pediatric Neurologist, [phone]) and updated daily.     # DISPO - Planning to go back to NH. PMR recc [facility]. Family requesting [place] facility and SW aware.

## 2022-05-08 NOTE — PROGRESS NOTE ADULT - NS ATTEND AMEND GEN_ALL_CORE FT
agree with above  afebrile, wbc stable. on zosyn  devorah added - though may not likely be able to continue at [facility]  repeat pro-BNP and CXR as well

## 2022-05-08 NOTE — PROGRESS NOTE ADULT - SUBJECTIVE AND OBJECTIVE BOX
CHIEF COMPLAINT: Patient is a 76y old  Male who presents with a chief complaint of Hypoxia (07 May 2022 11:43)      Interval Events:    REVIEW OF SYSTEMS:  [ ] All other systems negative  [ ] Unable to assess ROS because ________    Mode: AC/ CMV (Assist Control/ Continuous Mandatory Ventilation), RR (machine): 12, TV (machine): 450, FiO2: 30, PEEP: 5, PS: 10, MAP: 10, PIP: 27      OBJECTIVE:  ICU Vital Signs Last 24 Hrs  T(C): 36.7 (08 May 2022 05:00), Max: 37.6 (07 May 2022 10:06)  T(F): 98.1 (08 May 2022 05:00), Max: 99.6 (07 May 2022 10:06)  HR: 87 (08 May 2022 07:53) (80 - 99)  BP: 100/66 (08 May 2022 05:00) (100/66 - 119/75)  BP(mean): --  ABP: --  ABP(mean): --  RR: 16 (08 May 2022 05:00) (14 - 24)  SpO2: 100% (08 May 2022 07:53) (99% - 100%)    Mode: AC/ CMV (Assist Control/ Continuous Mandatory Ventilation), RR (machine): 12, TV (machine): 450, FiO2: 30, PEEP: 5, PS: 10, MAP: 10, PIP: 27    05-07 @ 07:01  -  05-08 @ 07:00  --------------------------------------------------------  IN: 1935 mL / OUT: 1500 mL / NET: 435 mL      CAPILLARY BLOOD GLUCOSE          HOSPITAL MEDICATIONS:  MEDICATIONS  (STANDING):  ALBUTerol    90 MICROgram(s) HFA Inhaler 2 Puff(s) Inhalation every 6 hours  amantadine Syrup 100 milliGRAM(s) Oral two times a day  aMIOdarone    Tablet 200 milliGRAM(s) Oral daily  ascorbic acid 500 milliGRAM(s) Oral daily  chlorhexidine 0.12% Liquid 15 milliLiter(s) Oral Mucosa every 12 hours  chlorhexidine 2% Cloths 1 Application(s) Topical daily  collagenase Ointment 1 Application(s) Topical daily  doxazosin 2 milliGRAM(s) Oral at bedtime  folic acid 1 milliGRAM(s) Oral daily  heparin   Injectable 5000 Unit(s) SubCutaneous every 8 hours  ipratropium 17 MICROgram(s) HFA Inhaler 1 Puff(s) Inhalation every 6 hours  levETIRAcetam 750 milliGRAM(s) Oral two times a day  levothyroxine 50 MICROGram(s) Oral daily  pantoprazole   Suspension 40 milliGRAM(s) Oral daily  piperacillin/tazobactam IVPB.. 4.5 Gram(s) IV Intermittent every 8 hours  sodium chloride 3%  Inhalation 4 milliLiter(s) Inhalation every 6 hours  tobramycin for Nebulization 300 milliGRAM(s) Inhalation every 12 hours    MEDICATIONS  (PRN):  acetaminophen    Suspension .. 650 milliGRAM(s) Oral every 4 hours PRN Temp greater or equal to 38C (100.4F), Mild Pain (1 - 3)      LABS:                        8.2    12.13 )-----------( 232      ( 08 May 2022 06:10 )             25.9     05-08    135  |  98  |  59<H>  ----------------------------<  128<H>  3.5   |  25  |  1.23    Ca    8.4      08 May 2022 06:10  Phos  2.6     05-08  Mg     2.50     05-08                PAST MEDICAL & SURGICAL HISTORY:  Essential hypertension    Primary osteoarthritis, unspecified site    Closed fracture of left radius, initial encounter    Morbid obesity, unspecified obesity type  h/o. - s/p gastric bypass- lost 113 lbs    KRAEN (obstructive sleep apnea)  h/o - was on CPAP until gastric bypass surgery    Respiratory failure    Anemia    Subdural hemorrhage    Epilepsy    H/O gastrostomy    History of BPH    Afib    S/P gastric bypass  2008    Status post total replacement of left hip  2006    S/P bunionectomy  bilateral    S/P colonoscopy  approx 2012    History of abdominoplasty  and subsequent cosmetic surgery for removal of excess skin from face        FAMILY HISTORY:  Family history of renal cell carcinoma (Mother)    Family history of malignant melanoma (Sibling)        Social History:      RADIOLOGY:  [ ] Reviewed and interpreted by me    PULMONARY FUNCTION TESTS:    EKG: CHIEF COMPLAINT: Patient is a 76y old  Male who presents with a chief complaint of Hypoxia (07 May 2022 11:43)    Interval Events: None reported overnight. Clinically unchanged. Leukocytosis improving on current antibiotics, no fever reported. VSS, and medications reviewed.     REVIEW OF SYSTEMS:  See above  [x] Unable to fully assess ROS because poor phonation    Mode: AC/ CMV (Assist Control/ Continuous Mandatory Ventilation), RR (machine): 12, TV (machine): 450, FiO2: 30, PEEP: 5, PS: 10, MAP: 10, PIP: 27    OBJECTIVE:  ICU Vital Signs Last 24 Hrs  T(C): 36.7 (08 May 2022 05:00), Max: 37.6 (07 May 2022 10:06)  T(F): 98.1 (08 May 2022 05:00), Max: 99.6 (07 May 2022 10:06)  HR: 87 (08 May 2022 07:53) (80 - 99)  BP: 100/66 (08 May 2022 05:00) (100/66 - 119/75)  BP(mean): --  ABP: --  ABP(mean): --  RR: 16 (08 May 2022 05:00) (14 - 24)  SpO2: 100% (08 May 2022 07:53) (99% - 100%)    Mode: AC/ CMV (Assist Control/ Continuous Mandatory Ventilation), RR (machine): 12, TV (machine): 450, FiO2: 30, PEEP: 5, PS: 10, MAP: 10, PIP: 27    05-07 @ 07:01  -  05-08 @ 07:00  --------------------------------------------------------  IN: 1935 mL / OUT: 1500 mL / NET: 435 mL    CAPILLARY BLOOD GLUCOSE    HOSPITAL MEDICATIONS:  MEDICATIONS  (STANDING):  ALBUTerol    90 MICROgram(s) HFA Inhaler 2 Puff(s) Inhalation every 6 hours  amantadine Syrup 100 milliGRAM(s) Oral two times a day  aMIOdarone    Tablet 200 milliGRAM(s) Oral daily  ascorbic acid 500 milliGRAM(s) Oral daily  chlorhexidine 0.12% Liquid 15 milliLiter(s) Oral Mucosa every 12 hours  chlorhexidine 2% Cloths 1 Application(s) Topical daily  collagenase Ointment 1 Application(s) Topical daily  doxazosin 2 milliGRAM(s) Oral at bedtime  folic acid 1 milliGRAM(s) Oral daily  heparin   Injectable 5000 Unit(s) SubCutaneous every 8 hours  ipratropium 17 MICROgram(s) HFA Inhaler 1 Puff(s) Inhalation every 6 hours  levETIRAcetam 750 milliGRAM(s) Oral two times a day  levothyroxine 50 MICROGram(s) Oral daily  pantoprazole   Suspension 40 milliGRAM(s) Oral daily  piperacillin/tazobactam IVPB.. 4.5 Gram(s) IV Intermittent every 8 hours  sodium chloride 3%  Inhalation 4 milliLiter(s) Inhalation every 6 hours  tobramycin for Nebulization 300 milliGRAM(s) Inhalation every 12 hours    MEDICATIONS  (PRN):  acetaminophen    Suspension .. 650 milliGRAM(s) Oral every 4 hours PRN Temp greater or equal to 38C (100.4F), Mild Pain (1 - 3)      PHYSICAL EXAM  GENERAL: Laying in bed, NAD  HEENT: +Trach, area c/d/i, +left sided hard mass noted, nttp   Card: +s1, s2  Pulm: + coarse breath sound b/l  GI: +PEG, area c/d/i, obese abd, soft, nt/nd, no peritoneal signs noted  Ext: no asymmetry, no swelling, no calf tenderness  Neuro: less alert/awake, no new focal deficits    LABS:                        8.2    12.13 )-----------( 232      ( 08 May 2022 06:10 )             25.9     05-08    135  |  98  |  59<H>  ----------------------------<  128<H>  3.5   |  25  |  1.23    Ca    8.4      08 May 2022 06:10  Phos  2.6     05-08  Mg     2.50     05-08    PAST MEDICAL & SURGICAL HISTORY:  Essential hypertension    Primary osteoarthritis, unspecified site    Closed fracture of left radius, initial encounter    Morbid obesity, unspecified obesity type  h/o. - s/p gastric bypass- lost 113 lbs    KAREN (obstructive sleep apnea)  h/o - was on CPAP until gastric bypass surgery    Respiratory failure    Anemia    Subdural hemorrhage    Epilepsy    H/O gastrostomy    History of BPH    Afib    S/P gastric bypass  2008    Status post total replacement of left hip  2006    S/P bunionectomy  bilateral    S/P colonoscopy  approx 2012    History of abdominoplasty  and subsequent cosmetic surgery for removal of excess skin from face    FAMILY HISTORY:  Family history of renal cell carcinoma (Mother)    Family history of malignant melanoma (Sibling)    Social History:    RADIOLOGY:  [ ] Reviewed and interpreted by me    PULMONARY FUNCTION TESTS:    EKG:

## 2022-05-08 NOTE — PROGRESS NOTE ADULT - SUBJECTIVE AND OBJECTIVE BOX
INTERVAL HPI/OVERNIGHT EVENTS:    no rectal bleeding   tolerating feeds    MEDICATIONS  (STANDING):  ALBUTerol    90 MICROgram(s) HFA Inhaler 2 Puff(s) Inhalation every 6 hours  amantadine Syrup 100 milliGRAM(s) Oral two times a day  aMIOdarone    Tablet 200 milliGRAM(s) Oral daily  ascorbic acid 500 milliGRAM(s) Oral daily  chlorhexidine 0.12% Liquid 15 milliLiter(s) Oral Mucosa every 12 hours  chlorhexidine 2% Cloths 1 Application(s) Topical daily  collagenase Ointment 1 Application(s) Topical daily  doxazosin 2 milliGRAM(s) Oral at bedtime  folic acid 1 milliGRAM(s) Oral daily  heparin   Injectable 5000 Unit(s) SubCutaneous every 8 hours  ipratropium 17 MICROgram(s) HFA Inhaler 1 Puff(s) Inhalation every 6 hours  levETIRAcetam 750 milliGRAM(s) Oral two times a day  levothyroxine 50 MICROGram(s) Oral daily  pantoprazole   Suspension 40 milliGRAM(s) Oral daily  piperacillin/tazobactam IVPB.. 4.5 Gram(s) IV Intermittent every 8 hours  sodium chloride 3%  Inhalation 4 milliLiter(s) Inhalation every 6 hours  tobramycin for Nebulization 300 milliGRAM(s) Inhalation every 12 hours    MEDICATIONS  (PRN):  acetaminophen    Suspension .. 650 milliGRAM(s) Oral every 4 hours PRN Temp greater or equal to 38C (100.4F), Mild Pain (1 - 3)      Allergies    No Known Allergies    Intolerances        Review of Systems: *pt minimally verbal to nonverbal, unable to obtain ROS           Vital Signs Last 24 Hrs  T(C): 36.7 (08 May 2022 05:00), Max: 37.6 (07 May 2022 10:06)  T(F): 98.1 (08 May 2022 05:00), Max: 99.6 (07 May 2022 10:06)  HR: 87 (08 May 2022 07:53) (80 - 99)  BP: 100/66 (08 May 2022 05:00) (100/66 - 119/75)  BP(mean): --  RR: 16 (08 May 2022 05:00) (14 - 24)  SpO2: 100% (08 May 2022 07:53) (99% - 100%)    PHYSICAL EXAM:    Constitutional: NAD  HEENT: EOMI, throat clear  Neck: No LAD, supple +trach  Respiratory: CTA and P  Cardiovascular: S1 and S2, RRR, no M  Gastrointestinal: BS+, soft, NT/ND, neg HSM, +peg  Extremities: No peripheral edema, neg clubbing, cyanosis  Vascular: 2+ peripheral pulses  Neurological: A/O x 1, no focal deficits  Psychiatric: nonverbal/lethargy  Skin: No rashes      LABS:                        8.2    12.13 )-----------( 232      ( 08 May 2022 06:10 )             25.9     05-08    135  |  98  |  59<H>  ----------------------------<  128<H>  3.5   |  25  |  1.23    Ca    8.4      08 May 2022 06:10  Phos  2.6     05-08  Mg     2.50     05-08            RADIOLOGY & ADDITIONAL TESTS:

## 2022-05-09 LAB
CULTURE RESULTS: SIGNIFICANT CHANGE UP
CULTURE RESULTS: SIGNIFICANT CHANGE UP
SARS-COV-2 RNA SPEC QL NAA+PROBE: SIGNIFICANT CHANGE UP
SPECIMEN SOURCE: SIGNIFICANT CHANGE UP
SPECIMEN SOURCE: SIGNIFICANT CHANGE UP

## 2022-05-09 PROCEDURE — 99232 SBSQ HOSP IP/OBS MODERATE 35: CPT

## 2022-05-09 PROCEDURE — 99233 SBSQ HOSP IP/OBS HIGH 50: CPT | Mod: GC

## 2022-05-09 RX ADMIN — CHLORHEXIDINE GLUCONATE 15 MILLILITER(S): 213 SOLUTION TOPICAL at 05:00

## 2022-05-09 RX ADMIN — Medication 300 MILLIGRAM(S): at 23:00

## 2022-05-09 RX ADMIN — SODIUM CHLORIDE 4 MILLILITER(S): 9 INJECTION INTRAMUSCULAR; INTRAVENOUS; SUBCUTANEOUS at 10:20

## 2022-05-09 RX ADMIN — CHLORHEXIDINE GLUCONATE 1 APPLICATION(S): 213 SOLUTION TOPICAL at 11:37

## 2022-05-09 RX ADMIN — HEPARIN SODIUM 5000 UNIT(S): 5000 INJECTION INTRAVENOUS; SUBCUTANEOUS at 21:42

## 2022-05-09 RX ADMIN — Medication 650 MILLIGRAM(S): at 22:13

## 2022-05-09 RX ADMIN — ALBUTEROL 2 PUFF(S): 90 AEROSOL, METERED ORAL at 04:10

## 2022-05-09 RX ADMIN — Medication 1 PUFF(S): at 16:00

## 2022-05-09 RX ADMIN — Medication 50 MICROGRAM(S): at 05:01

## 2022-05-09 RX ADMIN — LEVETIRACETAM 750 MILLIGRAM(S): 250 TABLET, FILM COATED ORAL at 17:01

## 2022-05-09 RX ADMIN — PANTOPRAZOLE SODIUM 40 MILLIGRAM(S): 20 TABLET, DELAYED RELEASE ORAL at 11:38

## 2022-05-09 RX ADMIN — Medication 300 MILLIGRAM(S): at 10:19

## 2022-05-09 RX ADMIN — HEPARIN SODIUM 5000 UNIT(S): 5000 INJECTION INTRAVENOUS; SUBCUTANEOUS at 05:01

## 2022-05-09 RX ADMIN — Medication 650 MILLIGRAM(S): at 21:43

## 2022-05-09 RX ADMIN — CHLORHEXIDINE GLUCONATE 15 MILLILITER(S): 213 SOLUTION TOPICAL at 17:01

## 2022-05-09 RX ADMIN — Medication 2 MILLIGRAM(S): at 21:42

## 2022-05-09 RX ADMIN — PIPERACILLIN AND TAZOBACTAM 25 GRAM(S): 4; .5 INJECTION, POWDER, LYOPHILIZED, FOR SOLUTION INTRAVENOUS at 04:03

## 2022-05-09 RX ADMIN — LEVETIRACETAM 750 MILLIGRAM(S): 250 TABLET, FILM COATED ORAL at 05:01

## 2022-05-09 RX ADMIN — AMIODARONE HYDROCHLORIDE 200 MILLIGRAM(S): 400 TABLET ORAL at 05:00

## 2022-05-09 RX ADMIN — Medication 100 MILLIGRAM(S): at 05:00

## 2022-05-09 RX ADMIN — Medication 1 PUFF(S): at 04:11

## 2022-05-09 RX ADMIN — Medication 1 MILLIGRAM(S): at 11:38

## 2022-05-09 RX ADMIN — PIPERACILLIN AND TAZOBACTAM 25 GRAM(S): 4; .5 INJECTION, POWDER, LYOPHILIZED, FOR SOLUTION INTRAVENOUS at 19:57

## 2022-05-09 RX ADMIN — Medication 1 PUFF(S): at 23:00

## 2022-05-09 RX ADMIN — ALBUTEROL 2 PUFF(S): 90 AEROSOL, METERED ORAL at 10:21

## 2022-05-09 RX ADMIN — ALBUTEROL 2 PUFF(S): 90 AEROSOL, METERED ORAL at 23:00

## 2022-05-09 RX ADMIN — SODIUM CHLORIDE 4 MILLILITER(S): 9 INJECTION INTRAMUSCULAR; INTRAVENOUS; SUBCUTANEOUS at 04:10

## 2022-05-09 RX ADMIN — SODIUM CHLORIDE 4 MILLILITER(S): 9 INJECTION INTRAMUSCULAR; INTRAVENOUS; SUBCUTANEOUS at 22:59

## 2022-05-09 RX ADMIN — SODIUM CHLORIDE 4 MILLILITER(S): 9 INJECTION INTRAMUSCULAR; INTRAVENOUS; SUBCUTANEOUS at 16:01

## 2022-05-09 RX ADMIN — Medication 1 PUFF(S): at 10:20

## 2022-05-09 RX ADMIN — PIPERACILLIN AND TAZOBACTAM 25 GRAM(S): 4; .5 INJECTION, POWDER, LYOPHILIZED, FOR SOLUTION INTRAVENOUS at 11:40

## 2022-05-09 RX ADMIN — ALBUTEROL 2 PUFF(S): 90 AEROSOL, METERED ORAL at 16:01

## 2022-05-09 RX ADMIN — Medication 100 MILLIGRAM(S): at 17:01

## 2022-05-09 RX ADMIN — HEPARIN SODIUM 5000 UNIT(S): 5000 INJECTION INTRAVENOUS; SUBCUTANEOUS at 13:29

## 2022-05-09 RX ADMIN — Medication 1 APPLICATION(S): at 11:39

## 2022-05-09 RX ADMIN — Medication 500 MILLIGRAM(S): at 11:38

## 2022-05-09 NOTE — PROGRESS NOTE ADULT - ATTENDING COMMENTS
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  Discharged to NH and admitted to [facility] with increasing oxygen requirements and worsening mental status.  Fever, no leukocytosis.  Cultures sent.      Hypoxic Respiratory Failure  - agree with meropenem for now, though decrease dose as above given worsening FLORIAN  - mucous plugging versus pneumonia  - f/u all cultures  - please send sputum for culture    Fever  - continue meropenem  - f/u all cultures    FLORIAN  - renally dose meropenem  - decrease to 500mg q12    I have discussed plan of care as detailed above with RCU NP.
76 year old with chronic respiratory failure  with parotitis  PSeudomonal tracheitis    Finish 7 d of zosyn on 5/10  Warm compress for parotid  Wound care to Sacrum.
Pt. with FLORIAN, ? ATN. Pt. seen and examined today. Pt. with tracheostomy, unable to provide ROS. Labs reviewed. Scr increased to 2.93 on 4/10/22. Scr elevated/stable at 2.87 on 4/11/22. Monitor labs and urine output. Avoid any potential nephrotoxins. Dose medications as per eGFR.
Pt. with non-oliguric FLORIAN, resolving now. Scr decreased to 2 today. Assessment and plan as outlined above. Monitor labs and urine output. Avoid any potential nephrotoxins. Dose medications as per eGFR.
Pt. with non-oliguric FLORIAN, resolving now. Scr decreased to 1.85 today. Assessment and plan as outlined above. Monitor labs and urine output. Avoid any potential nephrotoxins. Dose medications as per eGFR.
- Chart reviewed. Events noted. Pt seen and examined. Agree with excellent note above.   - HYPERK: Agree with changing TF. Lokelma x 1. Repeat in AM  - FLORIAN: Improving. No other critical electrolyte, acid-base, or volume derangements based on available labs. No urgent indication for HD

## 2022-05-09 NOTE — PROGRESS NOTE ADULT - SUBJECTIVE AND OBJECTIVE BOX
Follow Up:      Interval History/ROS:  Unable to obtain accurate ROS due to non-verbal status.     Allergies  No Known Allergies    PAST MEDICAL & SURGICAL HISTORY:  Essential hypertension  Primary osteoarthritis, unspecified site  Closed fracture of left radius, initial encounter  Morbid obesity, unspecified obesity type h/o. - s/p gastric bypass- lost 113 lbs  KAREN (obstructive sleep apnea) h/o - was on CPAP until gastric bypass surgery  Respiratory failure   Anemia  Subdural hemorrhage  Epilepsy  H/O gastrostomy  History of BPH  Afib  S/P gastric bypass 2008  Status post total replacement of left hip 2006  S/P bunionectomy bilateral  S/P colonoscopy approx 2012  History of abdominoplasty and subsequent cosmetic surgery for removal of excess skin from face    ANTIMICROBIALS:  piperacillin/tazobactam IVPB.. 4.5 every 8 hours  tobramycin for Nebulization 300 every 12 hours      OTHER MEDS:  MEDICATIONS  (STANDING):  acetaminophen    Suspension .. 650 every 4 hours PRN  ALBUTerol    90 MICROgram(s) HFA Inhaler 2 every 6 hours  amantadine Syrup 100 two times a day  aMIOdarone    Tablet 200 daily  doxazosin 2 at bedtime  heparin   Injectable 5000 every 8 hours  ipratropium 17 MICROgram(s) HFA Inhaler 1 every 6 hours  levETIRAcetam 750 two times a day  levothyroxine 50 daily  pantoprazole   Suspension 40 daily  sodium chloride 3%  Inhalation 4 every 6 hours      Vital Signs Last 24 Hrs  T(C): 37.6 (09 May 2022 04:19), Max: 37.6 (08 May 2022 20:34)  T(F): 99.7 (09 May 2022 04:19), Max: 99.7 (09 May 2022 04:19)  HR: 86 (09 May 2022 07:33) (86 - 100)  BP: 124/81 (09 May 2022 04:19) (107/69 - 138/71)  BP(mean): 91 (09 May 2022 04:19) (80 - 98)  RR: 19 (09 May 2022 04:19) (15 - 22)  SpO2: 99% (09 May 2022 07:33) (94% - 100%)    PHYSICAL EXAM:  Constitutional: non-toxic, no distress  HEAD/EYES: anicteric, no conjunctival injection  ENT:  supple, no thrush  Cardiovascular:   normal S1, S2, no murmur, no edema  Respiratory:  clear BS bilaterally, no wheezes, no rales  GI:  soft, non-tender, normal bowel sounds  :  no benson, no CVA tenderness  Musculoskeletal:  no synovitis, normal ROM  Neurologic: awake and alert, normal strength, no focal findings  Skin:  no rash, no erythema, no phlebitis  Heme/Onc: no lymphadenopathy   Psychiatric:  awake, alert, appropriate mood                                8.2    12.13 )-----------( 232      ( 08 May 2022 06:10 )             25.9       05-08    135  |  98  |  59<H>  ----------------------------<  128<H>  3.5   |  25  |  1.23    Ca    8.4      08 May 2022 06:10  Phos  2.6     05-08  Mg     2.50     05-08            MICROBIOLOGY:  v    Culture - Nose (collected 05 May 2022 16:30)  Source: .Nose  Final Report (07 May 2022 12:58):    No staphylococcus aureus isolated.    "PCR is more Sensitive for identifying MRSA/MSSA."              Rapid RVP Result: [facility] (05-03 @ 19:21)        RADIOLOGY:   Follow Up:      Interval History/ROS:  Unable to obtain accurate ROS due to non-verbal status.     Allergies  No Known Allergies    PAST MEDICAL & SURGICAL HISTORY:  Essential hypertension  Primary osteoarthritis, unspecified site  Closed fracture of left radius, initial encounter  Morbid obesity, unspecified obesity type h/o. - s/p gastric bypass- lost 113 lbs  KAREN (obstructive sleep apnea) h/o - was on CPAP until gastric bypass surgery  Respiratory failure   Anemia  Subdural hemorrhage  Epilepsy  H/O gastrostomy  History of BPH  Afib  S/P gastric bypass 2008  Status post total replacement of left hip 2006  S/P bunionectomy bilateral  S/P colonoscopy approx 2012  History of abdominoplasty and subsequent cosmetic surgery for removal of excess skin from face    ANTIMICROBIALS:  piperacillin/tazobactam IVPB.. 4.5 every 8 hours  tobramycin for Nebulization 300 every 12 hours      OTHER MEDS:  MEDICATIONS  (STANDING):  acetaminophen    Suspension .. 650 every 4 hours PRN  ALBUTerol    90 MICROgram(s) HFA Inhaler 2 every 6 hours  amantadine Syrup 100 two times a day  aMIOdarone    Tablet 200 daily  doxazosin 2 at bedtime  heparin   Injectable 5000 every 8 hours  ipratropium 17 MICROgram(s) HFA Inhaler 1 every 6 hours  levETIRAcetam 750 two times a day  levothyroxine 50 daily  pantoprazole   Suspension 40 daily  sodium chloride 3%  Inhalation 4 every 6 hours      Vital Signs Last 24 Hrs  T(C): 37.6 (09 May 2022 04:19), Max: 37.6 (08 May 2022 20:34)  T(F): 99.7 (09 May 2022 04:19), Max: 99.7 (09 May 2022 04:19)  HR: 86 (09 May 2022 07:33) (86 - 100)  BP: 124/81 (09 May 2022 04:19) (107/69 - 138/71)  BP(mean): 91 (09 May 2022 04:19) (80 - 98)  RR: 19 (09 May 2022 04:19) (15 - 22)  SpO2: 99% (09 May 2022 07:33) (94% - 100%)    PHYSICAL EXAM:  Constitutional: non-toxic, no distress  HEAD/EYES: anicteric, no conjunctival injection  ENT:  supple, no thrush  Cardiovascular:   normal S1, S2, no murmur, no edema  Respiratory:  clear BS bilaterally, no wheezes, no rales  GI:  soft, non-tender, normal bowel sounds  :  no benson, no CVA tenderness  Musculoskeletal:  no synovitis, normal ROM  Neurologic: unable to obtain accurate mental status   Skin:  no rashes  Heme/Onc: no lymphadenopathy   Psychiatric:  awake, alert, appropriate mood                                8.2    12.13 )-----------( 232      ( 08 May 2022 06:10 )             25.9       05-08    135  |  98  |  59<H>  ----------------------------<  128<H>  3.5   |  25  |  1.23    Ca    8.4      08 May 2022 06:10  Phos  2.6     05-08  Mg     2.50     05-08            MICROBIOLOGY:  v    Culture - Nose (collected 05 May 2022 16:30)  Source: .Nose  Final Report (07 May 2022 12:58):    No staphylococcus aureus isolated.    "PCR is more Sensitive for identifying MRSA/MSSA."              Rapid RVP Result: [facility] (05-03 @ 19:21)        RADIOLOGY:   Follow Up:      Interval History/ROS:  Unable to obtain accurate ROS due to non-verbal status.     Allergies  No Known Allergies    PAST MEDICAL & SURGICAL HISTORY:  Essential hypertension  Primary osteoarthritis, unspecified site  Closed fracture of left radius, initial encounter  Morbid obesity, unspecified obesity type h/o. - s/p gastric bypass- lost 113 lbs  KAREN (obstructive sleep apnea) h/o - was on CPAP until gastric bypass surgery  Respiratory failure   Anemia  Subdural hemorrhage  Epilepsy  H/O gastrostomy  History of BPH  Afib  S/P gastric bypass 2008  Status post total replacement of left hip 2006  S/P bunionectomy bilateral  S/P colonoscopy approx 2012  History of abdominoplasty and subsequent cosmetic surgery for removal of excess skin from face    ANTIMICROBIALS:  piperacillin/tazobactam IVPB.. 4.5 every 8 hours  tobramycin for Nebulization 300 every 12 hours      OTHER MEDS:  MEDICATIONS  (STANDING):  acetaminophen    Suspension .. 650 every 4 hours PRN  ALBUTerol    90 MICROgram(s) HFA Inhaler 2 every 6 hours  amantadine Syrup 100 two times a day  aMIOdarone    Tablet 200 daily  doxazosin 2 at bedtime  heparin   Injectable 5000 every 8 hours  ipratropium 17 MICROgram(s) HFA Inhaler 1 every 6 hours  levETIRAcetam 750 two times a day  levothyroxine 50 daily  pantoprazole   Suspension 40 daily  sodium chloride 3%  Inhalation 4 every 6 hours      Vital Signs Last 24 Hrs  T(C): 37.6 (09 May 2022 04:19), Max: 37.6 (08 May 2022 20:34)  T(F): 99.7 (09 May 2022 04:19), Max: 99.7 (09 May 2022 04:19)  HR: 86 (09 May 2022 07:33) (86 - 100)  BP: 124/81 (09 May 2022 04:19) (107/69 - 138/71)  BP(mean): 91 (09 May 2022 04:19) (80 - 98)  RR: 19 (09 May 2022 04:19) (15 - 22)  SpO2: 99% (09 May 2022 07:33) (94% - 100%)    PHYSICAL EXAM:  Constitutional: non-toxic, no distress  HEAD/EYES: anicteric, no conjunctival injection  ENT:  supple, no thrush  Cardiovascular:   normal S1, S2, no murmur, no edema  Respiratory:  clear BS bilaterally, no wheezes, no rales  GI:  soft, non-tender, normal bowel sounds  :  no benson, no CVA tenderness  Musculoskeletal:  no synovitis, normal ROM  Neurologic: unable to obtain accurate mental status   Skin:  no rashes  Heme/Onc: no lymphadenopathy   Psychiatric:  awake, alert, appropriate mood                                8.2    12.13 )-----------( 232      ( 08 May 2022 06:10 )             25.9       05-08    135  |  98  |  59<H>  ----------------------------<  128<H>  3.5   |  25  |  1.23    Ca    8.4      08 May 2022 06:10  Phos  2.6     05-08  Mg     2.50     05-08            MICROBIOLOGY:  v    Culture - Nose (collected 05 May 2022 16:30)  Source: .Nose  Final Report (07 May 2022 12:58):    No staphylococcus aureus isolated.    "PCR is more Sensitive for identifying MRSA/MSSA."              Rapid RVP Result: [facility] (05-03 @ 19:21)        RADIOLOGY:    < from: Xray Chest 1 View- PORTABLE-Urgent (Xray Chest 1 View- PORTABLE-Urgent .) (05.03.22 @ 20:04) >  INTERPRETATION:    Tracheostomy tube is present. Lungs show no focal consolidations but   streaky opacity at the left lung base consistent with   atelectasis/fibrosis. The heart is not enlarged and there are no   effusions.      COMPARISON:  May 2      IMPRESSION:  No focal consolidation to account for fever.    --- End of Report ---    < end of copied text >     Follow Up:      Interval History/ROS:  Unable to obtain accurate ROS due to non-verbal status.     Allergies  No Known Allergies    PAST MEDICAL & SURGICAL HISTORY:  Essential hypertension  Primary osteoarthritis, unspecified site  Closed fracture of left radius, initial encounter  Morbid obesity, unspecified obesity type h/o. - s/p gastric bypass- lost 113 lbs  KAREN (obstructive sleep apnea) h/o - was on CPAP until gastric bypass surgery  Respiratory failure   Anemia  Subdural hemorrhage  Epilepsy  H/O gastrostomy  History of BPH  Afib  S/P gastric bypass 2008  Status post total replacement of left hip 2006  S/P bunionectomy bilateral  S/P colonoscopy approx 2012  History of abdominoplasty and subsequent cosmetic surgery for removal of excess skin from face    ANTIMICROBIALS:  piperacillin/tazobactam IVPB.. 4.5 every 8 hours  tobramycin for Nebulization 300 every 12 hours      OTHER MEDS:  MEDICATIONS  (STANDING):  acetaminophen    Suspension .. 650 every 4 hours PRN  ALBUTerol    90 MICROgram(s) HFA Inhaler 2 every 6 hours  amantadine Syrup 100 two times a day  aMIOdarone    Tablet 200 daily  doxazosin 2 at bedtime  heparin   Injectable 5000 every 8 hours  ipratropium 17 MICROgram(s) HFA Inhaler 1 every 6 hours  levETIRAcetam 750 two times a day  levothyroxine 50 daily  pantoprazole   Suspension 40 daily  sodium chloride 3%  Inhalation 4 every 6 hours      Vital Signs Last 24 Hrs  T(C): 37.6 (09 May 2022 04:19), Max: 37.6 (08 May 2022 20:34)  T(F): 99.7 (09 May 2022 04:19), Max: 99.7 (09 May 2022 04:19)  HR: 86 (09 May 2022 07:33) (86 - 100)  BP: 124/81 (09 May 2022 04:19) (107/69 - 138/71)  BP(mean): 91 (09 May 2022 04:19) (80 - 98)  RR: 19 (09 May 2022 04:19) (15 - 22)  SpO2: 99% (09 May 2022 07:33) (94% - 100%)    PHYSICAL EXAM:  Constitutional: non-toxic, no distress  HEAD/EYES: anicteric, no conjunctival injection  ENT:  supple, + tracheostomy site in place,  Cardiovascular:   normal S1, S2, no murmur, no edema  Respiratory:  clear BS bilaterally, no wheezes, no rales  GI:  soft, non-tender, normal bowel sounds ,+ PEG tube in place  :  + benson in place   Musculoskeletal: decreased ROM due to weakness   Neurologic: unable to obtain accurate mental status   Skin:  + unstageable sacral ulcers  Heme/Onc: no lymphadenopathy   Psychiatric:  awake, alert, appropriate mood                                8.2    12.13 )-----------( 232      ( 08 May 2022 06:10 )             25.9       05-08    135  |  98  |  59<H>  ----------------------------<  128<H>  3.5   |  25  |  1.23    Ca    8.4      08 May 2022 06:10  Phos  2.6     05-08  Mg     2.50     05-08            MICROBIOLOGY:  v    Culture - Nose (collected 05 May 2022 16:30)  Source: .Nose  Final Report (07 May 2022 12:58):    No staphylococcus aureus isolated.    "PCR is more Sensitive for identifying MRSA/MSSA."              Rapid RVP Result: [facility] (05-03 @ 19:21)        RADIOLOGY:    < from: Xray Chest 1 View- PORTABLE-Urgent (Xray Chest 1 View- PORTABLE-Urgent .) (05.03.22 @ 20:04) >  INTERPRETATION:    Tracheostomy tube is present. Lungs show no focal consolidations but   streaky opacity at the left lung base consistent with   atelectasis/fibrosis. The heart is not enlarged and there are no   effusions.      COMPARISON:  May 2      IMPRESSION:  No focal consolidation to account for fever.    --- End of Report ---    < end of copied text >

## 2022-05-09 NOTE — PROGRESS NOTE ADULT - ASSESSMENT
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 Discharged to NH and admitted to [facility] with increasing oxygen requirements and worsening mental status.  Completed a course of meropenem.  Now with fever and leukocytosis.  Recent CT with left parotitis, however, per ENT clinically no parotitis.  WBC 13.  Has been afebrile since 5/4/22 w/ increased secretions but no pneumonia, being treated for tracheitis/bronchitis. She is currently on IV zosyn found to have pseudomonas on sputum culture. She had a recent admission for  Acinetobacter and Pseudomonas HCAP (3/2022) w/ SCx (4/10) with  Acinetobacter and Pseudomonas s/p Meropenem (4/8-4/14), SCx (4/28) with  Pseudomonas x 2 species, SCx (5/2 and 5/4) with pansensitive Pseudomonas. Given change in sputum, fevers and ventilatory requirements. c/w Zosyn (5/3 - ) and ALONDRA (5/7 - ) - Leukocytosis improving, and no fevers reported for now       Plan:  # Tracheitis/bronchitis  - zosyn okay for now  - PA in sputum S to zosyn  - would like to limit to 7 days     # FLORIAN  - better  - may need to dose adjust zosyn if worsens  - continue to trend     # Parotitis  - for warm compresses  - if continues to have fever, would add vancomycin for MRSA coverage      Mark Singleton MD PGY-5  Fellow, Infectious Diseases   Pager: [phone]  If no response, after 5pm and on weekends: Call [phone]     Assessment:  Patient is a 76 year old male w/ PMHx of atrial fibrillation s/p watchman, gastric sleeve, 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 Discharged to NH and admitted to [facility] with increasing oxygen requirements and worsening mental status.  Completed a course of meropenem.  Now with fever and leukocytosis.  Recent CT with left parotitis, however, per ENT clinically no parotitis.  WBC 13.  Has been afebrile since 5/4/22 w/ increased secretions but no pneumonia, being treated for tracheitis/bronchitis. She is currently on IV zosyn found to have pseudomonas on sputum culture. She had a recent admission for  Acinetobacter and Pseudomonas HCAP (3/2022) w/ SCx (4/10) with  Acinetobacter and Pseudomonas s/p Meropenem (4/8-4/14), SCx (4/28) with  Pseudomonas x 2 species, SCx (5/2 and 5/4) with pansensitive Pseudomonas. Given change in sputum, fevers and ventilatory requirements. c/w Zosyn (5/3 - ) and ALONDRA (5/7 - ) - Leukocytosis improving, and no fevers reported for now       Plan:  # Tracheitis/bronchitis  - zosyn okay for now  - PA in sputum S to zosyn  - would like to limit to 7 days   - repeat COVID-19 PCR in process    # FLORIAN  - better  - may need to dose adjust zosyn if worsens  - continue to trend     # Parotitis  - for warm compresses  - if continues to have fever, would add vancomycin for MRSA coverage      Mark Singleton MD PGY-5  Fellow, Infectious Diseases   Pager: [phone]  If no response, after 5pm and on weekends: Call [phone]     Assessment:  Patient is a 76 year old male w/ PMHx of atrial fibrillation s/p watchman, gastric sleeve, 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 Discharged to NH and admitted to [facility] with increasing oxygen requirements and worsening mental status.  Completed a course of meropenem.  Now with fever and leukocytosis.  Recent CT with left parotitis, however, per ENT clinically no parotitis.  WBC 13.  Has been afebrile since 5/4/22 w/ increased secretions but no pneumonia, being treated for tracheitis/bronchitis. She is currently on IV zosyn found to have pseudomonas on sputum culture. She had a recent admission for  Acinetobacter and Pseudomonas HCAP (3/2022) w/ SCx (4/10) with  Acinetobacter and Pseudomonas s/p Meropenem (4/8-4/14), SCx (4/28) with  Pseudomonas x 2 species, SCx (5/2 and 5/4) with pansensitive Pseudomonas. Given change in sputum, fevers and ventilatory requirements. c/w Zosyn (5/3 - ) and ALONDRA (5/7 - ) - Leukocytosis improving, and no fevers reported for now       Plan:  # Tracheitis/bronchitis  - c/w IV zosyn for now, Pseudomonas in sputum is sensitive to zosyn for 7 day course   - repeat COVID-19 PCR in process    # FLORIAN  - improving   - may need to dose adjust zosyn if worsens  - monitor renal function daily     # Parotitis  - for  warm compresses  - if continues to have fever, would add vancomycin for MRSA coverage    # Unstageable sacral wound  - recommend wound care evaluation     Patient seen w/ attending MD Mark Bender MD PGY-5  Fellow, Infectious Diseases   Pager: [phone]  If no response, after 5pm and on weekends: Call [phone]

## 2022-05-09 NOTE — PROGRESS NOTE ADULT - SUBJECTIVE AND OBJECTIVE BOX
CHIEF COMPLAINT: Patient is a 76y old  Male who presents with a chief complaint of Hypoxia (09 May 2022 09:58)    Interval Events: None reported overnight. Clinically unchanged. No fever, leukocytosis improving. Vitals signs reviewed.     REVIEW OF SYSTEMS:  See above  [x] Unable to assess ROS because Poor phonation    Mode: AC/ CMV (Assist Control/ Continuous Mandatory Ventilation), RR (machine): 12, TV (machine): 450, FiO2: 30, PEEP: 5, ITime: 0.8, MAP: 8, PIP: 16      OBJECTIVE:  ICU Vital Signs Last 24 Hrs  T(C): 37.3 (09 May 2022 10:01), Max: 37.6 (08 May 2022 20:34)  T(F): 99.1 (09 May 2022 10:01), Max: 99.7 (09 May 2022 04:19)  HR: 90 (09 May 2022 10:25) (77 - 100)  BP: 105/66 (09 May 2022 10:01) (105/66 - 138/71)  BP(mean): 91 (09 May 2022 04:19) (80 - 98)  ABP: --  ABP(mean): --  RR: 17 (09 May 2022 10:01) (15 - 22)  SpO2: 100% (09 May 2022 10:25) (94% - 100%)    Mode: AC/ CMV (Assist Control/ Continuous Mandatory Ventilation), RR (machine): 12, TV (machine): 450, FiO2: 30, PEEP: 5, ITime: 0.8, MAP: 8, PIP: 16    05-08 @ 07:01  -  05-09 @ 07:00  --------------------------------------------------------  IN: 1080 mL / OUT: 1275 mL / NET: -195 mL      CAPILLARY BLOOD GLUCOSE      HOSPITAL MEDICATIONS:  MEDICATIONS  (STANDING):  ALBUTerol    90 MICROgram(s) HFA Inhaler 2 Puff(s) Inhalation every 6 hours  amantadine Syrup 100 milliGRAM(s) Oral two times a day  aMIOdarone    Tablet 200 milliGRAM(s) Oral daily  ascorbic acid 500 milliGRAM(s) Oral daily  chlorhexidine 0.12% Liquid 15 milliLiter(s) Oral Mucosa every 12 hours  chlorhexidine 2% Cloths 1 Application(s) Topical daily  collagenase Ointment 1 Application(s) Topical daily  doxazosin 2 milliGRAM(s) Oral at bedtime  folic acid 1 milliGRAM(s) Oral daily  heparin   Injectable 5000 Unit(s) SubCutaneous every 8 hours  ipratropium 17 MICROgram(s) HFA Inhaler 1 Puff(s) Inhalation every 6 hours  levETIRAcetam 750 milliGRAM(s) Oral two times a day  levothyroxine 50 MICROGram(s) Oral daily  pantoprazole   Suspension 40 milliGRAM(s) Oral daily  piperacillin/tazobactam IVPB.. 4.5 Gram(s) IV Intermittent every 8 hours  sodium chloride 3%  Inhalation 4 milliLiter(s) Inhalation every 6 hours  tobramycin for Nebulization 300 milliGRAM(s) Inhalation every 12 hours    MEDICATIONS  (PRN):  acetaminophen    Suspension .. 650 milliGRAM(s) Oral every 4 hours PRN Temp greater or equal to 38C (100.4F), Mild Pain (1 - 3)      PHYSICAL EXAM  GENERAL: Laying in bed, NAD  HEENT: +Trach, area c/d/i, +left sided hard mass noted, nttp   Card: +s1, s2  Pulm: + coarse breath sound b/l  GI: +PEG, area c/d/i, obese abd, soft, nt/nd, no peritoneal signs noted  Ext: no asymmetry, no swelling, no calf tenderness  Neuro: less alert/awake, no new focal deficits      LABS:                        8.2    12.13 )-----------( 232      ( 08 May 2022 06:10 )             25.9     05-08    135  |  98  |  59<H>  ----------------------------<  128<H>  3.5   |  25  |  1.23    Ca    8.4      08 May 2022 06:10  Phos  2.6     05-08  Mg     2.50     05-08    PAST MEDICAL & SURGICAL HISTORY:  Essential hypertension    Primary osteoarthritis, unspecified site    Closed fracture of left radius, initial encounter    Morbid obesity, unspecified obesity type  h/o. - s/p gastric bypass- lost 113 lbs    KAREN (obstructive sleep apnea)  h/o - was on CPAP until gastric bypass surgery    Respiratory failure    Anemia    Subdural hemorrhage    Epilepsy    H/O gastrostomy    History of BPH    Afib    S/P gastric bypass  2008    Status post total replacement of left hip  2006    S/P bunionectomy  bilateral    S/P colonoscopy  approx 2012    History of abdominoplasty  and subsequent cosmetic surgery for removal of excess skin from face    FAMILY HISTORY:  Family history of renal cell carcinoma (Mother)    Family history of malignant melanoma (Sibling)      Social History:      RADIOLOGY:  [ ] Reviewed and interpreted by me    PULMONARY FUNCTION TESTS:    EKG: CHIEF COMPLAINT: Patient is a 76y old  Male who presents with a chief complaint of Hypoxia (09 May 2022 09:58)    Interval Events: None reported overnight. Clinically unchanged. No fever, leukocytosis improving. Vitals signs reviewed. Trial of PS/TC as tolerated.    REVIEW OF SYSTEMS:  See above  [x] Unable to assess ROS because Poor phonation    Mode: AC/ CMV (Assist Control/ Continuous Mandatory Ventilation), RR (machine): 12, TV (machine): 450, FiO2: 30, PEEP: 5, ITime: 0.8, MAP: 8, PIP: 16    OBJECTIVE:  ICU Vital Signs Last 24 Hrs  T(C): 37.3 (09 May 2022 10:01), Max: 37.6 (08 May 2022 20:34)  T(F): 99.1 (09 May 2022 10:01), Max: 99.7 (09 May 2022 04:19)  HR: 90 (09 May 2022 10:25) (77 - 100)  BP: 105/66 (09 May 2022 10:01) (105/66 - 138/71)  BP(mean): 91 (09 May 2022 04:19) (80 - 98)  ABP: --  ABP(mean): --  RR: 17 (09 May 2022 10:01) (15 - 22)  SpO2: 100% (09 May 2022 10:25) (94% - 100%)    Mode: AC/ CMV (Assist Control/ Continuous Mandatory Ventilation), RR (machine): 12, TV (machine): 450, FiO2: 30, PEEP: 5, ITime: 0.8, MAP: 8, PIP: 16    05-08 @ 07:01  -  05-09 @ 07:00  --------------------------------------------------------  IN: 1080 mL / OUT: 1275 mL / NET: -195 mL      CAPILLARY BLOOD GLUCOSE      HOSPITAL MEDICATIONS:  MEDICATIONS  (STANDING):  ALBUTerol    90 MICROgram(s) HFA Inhaler 2 Puff(s) Inhalation every 6 hours  amantadine Syrup 100 milliGRAM(s) Oral two times a day  aMIOdarone    Tablet 200 milliGRAM(s) Oral daily  ascorbic acid 500 milliGRAM(s) Oral daily  chlorhexidine 0.12% Liquid 15 milliLiter(s) Oral Mucosa every 12 hours  chlorhexidine 2% Cloths 1 Application(s) Topical daily  collagenase Ointment 1 Application(s) Topical daily  doxazosin 2 milliGRAM(s) Oral at bedtime  folic acid 1 milliGRAM(s) Oral daily  heparin   Injectable 5000 Unit(s) SubCutaneous every 8 hours  ipratropium 17 MICROgram(s) HFA Inhaler 1 Puff(s) Inhalation every 6 hours  levETIRAcetam 750 milliGRAM(s) Oral two times a day  levothyroxine 50 MICROGram(s) Oral daily  pantoprazole   Suspension 40 milliGRAM(s) Oral daily  piperacillin/tazobactam IVPB.. 4.5 Gram(s) IV Intermittent every 8 hours  sodium chloride 3%  Inhalation 4 milliLiter(s) Inhalation every 6 hours  tobramycin for Nebulization 300 milliGRAM(s) Inhalation every 12 hours    MEDICATIONS  (PRN):  acetaminophen    Suspension .. 650 milliGRAM(s) Oral every 4 hours PRN Temp greater or equal to 38C (100.4F), Mild Pain (1 - 3)      PHYSICAL EXAM  GENERAL: Laying in bed, NAD  HEENT: +Trach, area c/d/i, +left sided hard mass noted, nttp   Card: +s1, s2  Pulm: + coarse breath sound b/l  GI: +PEG, area c/d/i, obese abd, soft, nt/nd, no peritoneal signs noted  Ext: no asymmetry, no swelling, no calf tenderness  Neuro: less alert/awake, no new focal deficits      LABS:                        8.2    12.13 )-----------( 232      ( 08 May 2022 06:10 )             25.9     05-08    135  |  98  |  59<H>  ----------------------------<  128<H>  3.5   |  25  |  1.23    Ca    8.4      08 May 2022 06:10  Phos  2.6     05-08  Mg     2.50     05-08    PAST MEDICAL & SURGICAL HISTORY:  Essential hypertension    Primary osteoarthritis, unspecified site    Closed fracture of left radius, initial encounter    Morbid obesity, unspecified obesity type  h/o. - s/p gastric bypass- lost 113 lbs    KAREN (obstructive sleep apnea)  h/o - was on CPAP until gastric bypass surgery    Respiratory failure    Anemia    Subdural hemorrhage    Epilepsy    H/O gastrostomy    History of BPH    Afib    S/P gastric bypass  2008    Status post total replacement of left hip  2006    S/P bunionectomy  bilateral    S/P colonoscopy  approx 2012    History of abdominoplasty  and subsequent cosmetic surgery for removal of excess skin from face    FAMILY HISTORY:  Family history of renal cell carcinoma (Mother)    Family history of malignant melanoma (Sibling)      Social History:      RADIOLOGY:  [ ] Reviewed and interpreted by me    PULMONARY FUNCTION TESTS:    EKG:

## 2022-05-09 NOTE — PROGRESS NOTE ADULT - ASSESSMENT
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     # Neurology   - Currently A&Ox0, awake and alert, able to move RUE and nods/ mouths one or two words per RCU evaluation and prior documentation.   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   - Continue on Modafinil, Amantidine and Keppra.   - Re-eval by PT and patient not a candidate at this time     # Cardiovascular   - Hx of HFpEF 55, mild MR and AR, severe LAD, normal LVRVSF (ECHO 3/7)  - Hx of Atrial Fibrillation s/p Watchman and currently rate controlled.   - Continue on Amiodarone 200mg QD and Coreg 6.25mg BID.   - s/p Lasix resumed complicated by hypotension, now held  - Rpt Pro BNP on (5/8) 5852, CXR done, grossly unchanged from previous imaging    # HEENT  - Large firm mass along left neck noted 4/28  - CT Neck (4/30) with left-sided parotitis and 2 mm left intraparotid stone, although there is no parotid duct dilatation or obstructing stone.  - ENT called and reccs appreciated. Continue with warm compress and massage TID, monitor for any worsening signs.      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   - Initially treated for HCAP and able to wean off vent to TC ATC (4/21-5/3) however sputum now thick and requiring vent support again.   - RPT SCX (5/2 and 5/4) with pansensitive pseudomonas and on ABX as below.   - Continue on Proventil, Atrovent , Hypersal, IPV and Chest PT Q6H  - Monitor secretions, PS as tolerated during the day, suction PRN.     # GI  - Hx of SDH, Gastric Bypass and Dysphagia s/p PEG and continued on TF with course complicated by constipation and melena  - Case discussed with GI and melena likely in setting of constipation, no plan for scope   - Continue on Nepro in sight of hyperkalemia.   - s/p large BM (4/25) and trial of Reglan 10mg TID (4/24 - 4/27) c/b mild diarrhea (5/3).   - s/p Senna and Miralax dc'ed and monitored off with improvement.     # / Renal  - Hx of Urinary Retention with Chronic Garcia and presented FLORIAN likely in setting of dehydration with fevers vs ATN with Sepsis (CRE high 2s and baseline 0.8-0.9).   - UA with hematuria and proteinuria and case discussed with Neprhology with concern for glomerulonephritis. ANCA, ISABELLE and GM ABs negative.   - Hypernatremia resolved with FWF and now with Hyponatremia and FWF dc'ed.   - Hyperkalemia s/p cocktail and switched to Nepro with improvement   - IVF and PRBC given with CRE improving and resumed on Lasix, however CRE now rising and Lasix held again.   - proBNP improved from previous, CXR official reading pending, Pt is HDS     # ID  - Recent admission for  Acinetobacter and Pseudomonas HCAP (3/2022)   - SCx (4/10) with  Acinetobacter and Pseudomonas s/p Meropenem (4/8-4/14)   - SCx (4/28) with  Pseudomonas x 2 species   - SCx (5/2 and 5/4) with pansensitive Pseudomonas. Given change in sputum, fevers and ventilatory requirements  - c/w Zosyn (5/3 - ) and ALONDRA (5/7 - ) - Leukocytosis improving, and no fevers reported for now (10 days treatment)    # Endocrine  - No hx of DM2 and A1C 5.9. Continue to monitor BG,    - Hx of Hypothyroidism and continued on Synthroid. TSH 47 with low T3/T4 and Free T4. TSH 80s (3/2022) and Synthroid increased at NH. Hypothyroidism could be in the setting of Amiodarone use and current Synthroid dose appears to be working.   - Next TFT to be done in June 2022.     # Hematology   - Anemia i/s/o AOCD and s/p PRBC (4/9)   - Melena/ anemia noted and s/p 2 U PRBC (4/19)  with good response, however HH waxes and wanes with no obvious bleed (no further melena or CGE from PEG aspiration)  - DVT PPX with HSQ (cleared by GI to resume).     # Ethics   - FULL CODE   - Case discussed with Cousin/ HCP, Dr. Donna Hagen (Pediatric Neurologist, [phone]) and updated daily.     # DISPO - Planning to go back to NH. PMR recc ELIZABETH. [place] facility and SW aware.    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     # Neurology   - Currently A&Ox0, awake and alert, able to move RUE and nods/ mouths one or two words per RCU evaluation and prior documentation.   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   - Continue on Modafinil, Amantidine and Keppra.   - Re-eval by PT and patient not a candidate at this time     # Cardiovascular   - Hx of HFpEF 55, mild MR and AR, severe LAD, normal LVRVSF (ECHO 3/7)  - Hx of Atrial Fibrillation s/p Watchman and currently rate controlled.   - Continue on Amiodarone 200mg QD and Coreg 6.25mg BID.   - s/p Lasix resumed complicated by hypotension, now held  - Rpt Pro BNP on (5/8) 5852, CXR done, grossly unchanged from previous imaging    # HEENT  - Large firm mass along left neck noted 4/28  - CT Neck (4/30) with left-sided parotitis and 2 mm left intraparotid stone, although there is no parotid duct dilatation or obstructing stone.  - ENT called and reccs appreciated. Continue with warm compress and massage TID, monitor for any worsening signs.      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   - Initially treated for HCAP and able to wean off vent to TC ATC (4/21-5/3) however sputum now thick and requiring vent support again.   - RPT SCX (5/2 and 5/4) with pansensitive pseudomonas and on ABX as below.   - Continue on Proventil, Atrovent , Hypersal, IPV and Chest PT Q6H  - Monitor secretions, PS/TC as tolerated in the day, suction PRN.     # GI  - Hx of SDH, Gastric Bypass and Dysphagia s/p PEG and continued on TF with course complicated by constipation and melena  - Case discussed with GI and melena likely in setting of constipation, no plan for scope   - Continue on Nepro in sight of hyperkalemia.   - s/p large BM (4/25) and trial of Reglan 10mg TID (4/24 - 4/27) c/b mild diarrhea (5/3).   - s/p Senna and Miralax dc'ed and monitored off with improvement.     # / Renal  - Hx of Urinary Retention with Chronic Garcia and presented FLORIAN likely in setting of dehydration with fevers vs ATN with Sepsis (CRE high 2s and baseline 0.8-0.9).   - UA with hematuria and proteinuria and case discussed with Neprhology with concern for glomerulonephritis. ANCA, ISABELLE and GM ABs negative.   - Hyperkalemia s/p cocktail and switched to Nepro with improvement   - Scr now improving, will continue to monitor off Lasix  - proBNP improved from previous, CXR Left basilar atelectasis/small pleural effusion. Right lung is clear. No focal consolidation bilaterally    # ID  - Recent admission for  Acinetobacter and Pseudomonas HCAP (3/2022)   - SCx (4/10) with  Acinetobacter and Pseudomonas s/p Meropenem (4/8-4/14)   - SCx (4/28) with  Pseudomonas x 2 species   - SCx (5/2 and 5/4) with pansensitive Pseudomonas. Given change in sputum, fevers and ventilatory requirements  - c/w Zosyn (5/3 - until 5/10) and ALONDRA (5/7 - ) - Leukocytosis improving, and no fevers reported for now (7 days treatment)    # Endocrine  - No hx of DM2 and A1C 5.9. Continue to monitor BG,    - Hx of Hypothyroidism and continued on Synthroid. TSH 47 with low T3/T4 and Free T4. TSH 80s (3/2022) and Synthroid increased at NH. Hypothyroidism could be in the setting of Amiodarone use and current Synthroid dose appears to be working.   - Next TFT to be done in June 2022.     # Hematology   - Anemia i/s/o AOCD and s/p PRBC (4/9)   - Melena/ anemia noted and s/p 2 U PRBC (4/19)  with good response, however HH waxes and wanes with no obvious bleed (no further melena or CGE from PEG aspiration)  - DVT PPX with HSQ (cleared by GI to resume).     # Ethics   - FULL CODE   - Case discussed with Cousin/ HCP, Dr. Donna Hagen (Pediatric Neurologist, [phone]) and updated daily.     # DISPO - Planning to go back to NH. PMR recc ELIZABETH. Family requesting [place] facility and SW aware.

## 2022-05-10 LAB
ANION GAP SERPL CALC-SCNC: 12 MMOL/L — SIGNIFICANT CHANGE UP (ref 7–14)
BUN SERPL-MCNC: 56 MG/DL — HIGH (ref 7–23)
CALCIUM SERPL-MCNC: 9.1 MG/DL — SIGNIFICANT CHANGE UP (ref 8.4–10.5)
CHLORIDE SERPL-SCNC: 100 MMOL/L — SIGNIFICANT CHANGE UP (ref 98–107)
CO2 SERPL-SCNC: 26 MMOL/L — SIGNIFICANT CHANGE UP (ref 22–31)
CREAT SERPL-MCNC: 1.11 MG/DL — SIGNIFICANT CHANGE UP (ref 0.5–1.3)
EGFR: 69 ML/MIN/1.73M2 — SIGNIFICANT CHANGE UP
GLUCOSE SERPL-MCNC: 133 MG/DL — HIGH (ref 70–99)
GRAM STN FLD: SIGNIFICANT CHANGE UP
HCT VFR BLD CALC: 25.7 % — LOW (ref 39–50)
HGB BLD-MCNC: 8 G/DL — LOW (ref 13–17)
MAGNESIUM SERPL-MCNC: 2.7 MG/DL — HIGH (ref 1.6–2.6)
MCHC RBC-ENTMCNC: 31.1 GM/DL — LOW (ref 32–36)
MCHC RBC-ENTMCNC: 31.9 PG — SIGNIFICANT CHANGE UP (ref 27–34)
MCV RBC AUTO: 102.4 FL — HIGH (ref 80–100)
NRBC # BLD: 0 /100 WBCS — SIGNIFICANT CHANGE UP
NRBC # FLD: 0 K/UL — SIGNIFICANT CHANGE UP
PHOSPHATE SERPL-MCNC: 3.3 MG/DL — SIGNIFICANT CHANGE UP (ref 2.5–4.5)
PLATELET # BLD AUTO: 259 K/UL — SIGNIFICANT CHANGE UP (ref 150–400)
POTASSIUM SERPL-MCNC: 3.7 MMOL/L — SIGNIFICANT CHANGE UP (ref 3.5–5.3)
POTASSIUM SERPL-SCNC: 3.7 MMOL/L — SIGNIFICANT CHANGE UP (ref 3.5–5.3)
RBC # BLD: 2.51 M/UL — LOW (ref 4.2–5.8)
RBC # FLD: 18.1 % — HIGH (ref 10.3–14.5)
SODIUM SERPL-SCNC: 138 MMOL/L — SIGNIFICANT CHANGE UP (ref 135–145)
SPECIMEN SOURCE: SIGNIFICANT CHANGE UP
WBC # BLD: 9.66 K/UL — SIGNIFICANT CHANGE UP (ref 3.8–10.5)
WBC # FLD AUTO: 9.66 K/UL — SIGNIFICANT CHANGE UP (ref 3.8–10.5)

## 2022-05-10 PROCEDURE — 99232 SBSQ HOSP IP/OBS MODERATE 35: CPT

## 2022-05-10 PROCEDURE — 99233 SBSQ HOSP IP/OBS HIGH 50: CPT

## 2022-05-10 RX ORDER — LEVETIRACETAM 250 MG/1
1000 TABLET, FILM COATED ORAL
Refills: 0 | Status: DISCONTINUED | OUTPATIENT
Start: 2022-05-10 | End: 2022-05-17

## 2022-05-10 RX ADMIN — SODIUM CHLORIDE 4 MILLILITER(S): 9 INJECTION INTRAMUSCULAR; INTRAVENOUS; SUBCUTANEOUS at 17:07

## 2022-05-10 RX ADMIN — HEPARIN SODIUM 5000 UNIT(S): 5000 INJECTION INTRAVENOUS; SUBCUTANEOUS at 05:01

## 2022-05-10 RX ADMIN — CHLORHEXIDINE GLUCONATE 15 MILLILITER(S): 213 SOLUTION TOPICAL at 05:00

## 2022-05-10 RX ADMIN — Medication 100 MILLIGRAM(S): at 17:24

## 2022-05-10 RX ADMIN — Medication 1 PUFF(S): at 10:28

## 2022-05-10 RX ADMIN — Medication 2 MILLIGRAM(S): at 21:04

## 2022-05-10 RX ADMIN — CHLORHEXIDINE GLUCONATE 15 MILLILITER(S): 213 SOLUTION TOPICAL at 17:24

## 2022-05-10 RX ADMIN — Medication 50 MICROGRAM(S): at 05:01

## 2022-05-10 RX ADMIN — Medication 1 PUFF(S): at 22:14

## 2022-05-10 RX ADMIN — Medication 1 MILLIGRAM(S): at 11:01

## 2022-05-10 RX ADMIN — PANTOPRAZOLE SODIUM 40 MILLIGRAM(S): 20 TABLET, DELAYED RELEASE ORAL at 11:01

## 2022-05-10 RX ADMIN — Medication 1 PUFF(S): at 17:08

## 2022-05-10 RX ADMIN — CHLORHEXIDINE GLUCONATE 1 APPLICATION(S): 213 SOLUTION TOPICAL at 11:01

## 2022-05-10 RX ADMIN — ALBUTEROL 2 PUFF(S): 90 AEROSOL, METERED ORAL at 17:08

## 2022-05-10 RX ADMIN — Medication 300 MILLIGRAM(S): at 10:29

## 2022-05-10 RX ADMIN — PIPERACILLIN AND TAZOBACTAM 25 GRAM(S): 4; .5 INJECTION, POWDER, LYOPHILIZED, FOR SOLUTION INTRAVENOUS at 11:00

## 2022-05-10 RX ADMIN — HEPARIN SODIUM 5000 UNIT(S): 5000 INJECTION INTRAVENOUS; SUBCUTANEOUS at 13:08

## 2022-05-10 RX ADMIN — ALBUTEROL 2 PUFF(S): 90 AEROSOL, METERED ORAL at 22:14

## 2022-05-10 RX ADMIN — HEPARIN SODIUM 5000 UNIT(S): 5000 INJECTION INTRAVENOUS; SUBCUTANEOUS at 21:04

## 2022-05-10 RX ADMIN — AMIODARONE HYDROCHLORIDE 200 MILLIGRAM(S): 400 TABLET ORAL at 05:00

## 2022-05-10 RX ADMIN — ALBUTEROL 2 PUFF(S): 90 AEROSOL, METERED ORAL at 10:28

## 2022-05-10 RX ADMIN — Medication 500 MILLIGRAM(S): at 11:00

## 2022-05-10 RX ADMIN — ALBUTEROL 2 PUFF(S): 90 AEROSOL, METERED ORAL at 04:21

## 2022-05-10 RX ADMIN — SODIUM CHLORIDE 4 MILLILITER(S): 9 INJECTION INTRAMUSCULAR; INTRAVENOUS; SUBCUTANEOUS at 22:14

## 2022-05-10 RX ADMIN — LEVETIRACETAM 750 MILLIGRAM(S): 250 TABLET, FILM COATED ORAL at 05:03

## 2022-05-10 RX ADMIN — Medication 100 MILLIGRAM(S): at 05:01

## 2022-05-10 RX ADMIN — SODIUM CHLORIDE 4 MILLILITER(S): 9 INJECTION INTRAMUSCULAR; INTRAVENOUS; SUBCUTANEOUS at 10:29

## 2022-05-10 RX ADMIN — Medication 1 PUFF(S): at 04:21

## 2022-05-10 RX ADMIN — SODIUM CHLORIDE 4 MILLILITER(S): 9 INJECTION INTRAMUSCULAR; INTRAVENOUS; SUBCUTANEOUS at 04:21

## 2022-05-10 RX ADMIN — PIPERACILLIN AND TAZOBACTAM 25 GRAM(S): 4; .5 INJECTION, POWDER, LYOPHILIZED, FOR SOLUTION INTRAVENOUS at 04:15

## 2022-05-10 RX ADMIN — Medication 1 APPLICATION(S): at 11:01

## 2022-05-10 RX ADMIN — LEVETIRACETAM 1000 MILLIGRAM(S): 250 TABLET, FILM COATED ORAL at 17:25

## 2022-05-10 RX ADMIN — PIPERACILLIN AND TAZOBACTAM 25 GRAM(S): 4; .5 INJECTION, POWDER, LYOPHILIZED, FOR SOLUTION INTRAVENOUS at 20:23

## 2022-05-10 NOTE — PROGRESS NOTE ADULT - SUBJECTIVE AND OBJECTIVE BOX
CHIEF COMPLAINT:Patient is a 76y old  Male who presents with a chief complaint of Hypoxia (09 May 2022 12:10)      INTERVAL EVENTS:     ROS: Seen by bedside during AM rounds     OBJECTIVE:  ICU Vital Signs Last 24 Hrs  T(C): 37.2 (10 May 2022 05:00), Max: 37.3 (09 May 2022 10:01)  T(F): 98.9 (10 May 2022 05:00), Max: 99.1 (09 May 2022 10:01)  HR: 80 (10 May 2022 07:38) (77 - 96)  BP: 107/80 (10 May 2022 05:00) (105/66 - 122/72)  BP(mean): --  ABP: --  ABP(mean): --  RR: 14 (10 May 2022 05:00) (14 - 20)  SpO2: 100% (10 May 2022 07:38) (94% - 100%)    Mode: AC/ CMV (Assist Control/ Continuous Mandatory Ventilation), RR (machine): 12, TV (machine): 450, FiO2: 100, PEEP: 5, ITime: 0.8, MAP: 8, PIP: 17    05-09 @ 07:01  -  05-10 @ 07:00  --------------------------------------------------------  IN: 1830 mL / OUT: 1450 mL / NET: 380 mL      CAPILLARY BLOOD GLUCOSE          PHYSICAL EXAM:  General:   HEENT:   Lymph Nodes:  Neck:   Respiratory:   Cardiovascular:   Abdomen:   Extremities:   Skin:   Neurological:  Psychiatry:    Mode: AC/ CMV (Assist Control/ Continuous Mandatory Ventilation)  RR (machine): 12  TV (machine): 450  FiO2: 100  PEEP: 5  ITime: 0.8  MAP: 8  PIP: 17      HOSPITAL MEDICATIONS:  MEDICATIONS  (STANDING):  ALBUTerol    90 MICROgram(s) HFA Inhaler 2 Puff(s) Inhalation every 6 hours  amantadine Syrup 100 milliGRAM(s) Oral two times a day  aMIOdarone    Tablet 200 milliGRAM(s) Oral daily  ascorbic acid 500 milliGRAM(s) Oral daily  chlorhexidine 0.12% Liquid 15 milliLiter(s) Oral Mucosa every 12 hours  chlorhexidine 2% Cloths 1 Application(s) Topical daily  collagenase Ointment 1 Application(s) Topical daily  doxazosin 2 milliGRAM(s) Oral at bedtime  folic acid 1 milliGRAM(s) Oral daily  heparin   Injectable 5000 Unit(s) SubCutaneous every 8 hours  ipratropium 17 MICROgram(s) HFA Inhaler 1 Puff(s) Inhalation every 6 hours  levETIRAcetam 750 milliGRAM(s) Oral two times a day  levothyroxine 50 MICROGram(s) Oral daily  pantoprazole   Suspension 40 milliGRAM(s) Oral daily  piperacillin/tazobactam IVPB.. 4.5 Gram(s) IV Intermittent every 8 hours  sodium chloride 3%  Inhalation 4 milliLiter(s) Inhalation every 6 hours  tobramycin for Nebulization 300 milliGRAM(s) Inhalation every 12 hours    MEDICATIONS  (PRN):  acetaminophen    Suspension .. 650 milliGRAM(s) Oral every 4 hours PRN Temp greater or equal to 38C (100.4F), Mild Pain (1 - 3)      LABS:                        8.0    9.66  )-----------( 259      ( 10 May 2022 05:10 )             25.7     05-10    138  |  100  |  56<H>  ----------------------------<  133<H>  3.7   |  26  |  1.11    Ca    9.1      10 May 2022 05:10  Phos  3.3     05-10  Mg     2.70     05-10             CHIEF COMPLAINT:Patient is a 76y old  Male who presents with a chief complaint of Hypoxia (09 May 2022 12:10)    INTERVAL EVENTS: no overnight evenets noted.     ROS: Unable to obtain ROS given patient is nonverbal.     OBJECTIVE:  ICU Vital Signs Last 24 Hrs  T(C): 37.2 (10 May 2022 05:00), Max: 37.3 (09 May 2022 10:01)  T(F): 98.9 (10 May 2022 05:00), Max: 99.1 (09 May 2022 10:01)  HR: 80 (10 May 2022 07:38) (77 - 96)  BP: 107/80 (10 May 2022 05:00) (105/66 - 122/72)  BP(mean): --  ABP: --  ABP(mean): --  RR: 14 (10 May 2022 05:00) (14 - 20)  SpO2: 100% (10 May 2022 07:38) (94% - 100%)    Mode: AC/ CMV (Assist Control/ Continuous Mandatory Ventilation), RR (machine): 12, TV (machine): 450, FiO2: 100, PEEP: 5, ITime: 0.8, MAP: 8, PIP: 17    05-09 @ 07:01  -  05-10 @ 07:00  --------------------------------------------------------  IN: 1830 mL / OUT: 1450 mL / NET: 380 mL      CAPILLARY BLOOD GLUCOSE          PHYSICAL EXAM:  General: NAD   Neck: (+) Trach tube noted, site c/d/i.  Cards: S1/S2, no murmurs   Pulm: CTA bilaterally. No wheezes.   Abdomen: Soft, NTND. (+) PEG noted, site c/d/i.   Extremities: No pedal edema. Extremities warm to touch.  Neurology: Arouses to verbal stimuli and tracks examiner. Not following commands. nonverbal.   Skin; refer to RN assessment.     Mode: AC/ CMV (Assist Control/ Continuous Mandatory Ventilation)  RR (machine): 12  TV (machine): 450  FiO2: 100  PEEP: 5  ITime: 0.8  MAP: 8  PIP: 17      HOSPITAL MEDICATIONS:  MEDICATIONS  (STANDING):  ALBUTerol    90 MICROgram(s) HFA Inhaler 2 Puff(s) Inhalation every 6 hours  amantadine Syrup 100 milliGRAM(s) Oral two times a day  aMIOdarone    Tablet 200 milliGRAM(s) Oral daily  ascorbic acid 500 milliGRAM(s) Oral daily  chlorhexidine 0.12% Liquid 15 milliLiter(s) Oral Mucosa every 12 hours  chlorhexidine 2% Cloths 1 Application(s) Topical daily  collagenase Ointment 1 Application(s) Topical daily  doxazosin 2 milliGRAM(s) Oral at bedtime  folic acid 1 milliGRAM(s) Oral daily  heparin   Injectable 5000 Unit(s) SubCutaneous every 8 hours  ipratropium 17 MICROgram(s) HFA Inhaler 1 Puff(s) Inhalation every 6 hours  levETIRAcetam 750 milliGRAM(s) Oral two times a day  levothyroxine 50 MICROGram(s) Oral daily  pantoprazole   Suspension 40 milliGRAM(s) Oral daily  piperacillin/tazobactam IVPB.. 4.5 Gram(s) IV Intermittent every 8 hours  sodium chloride 3%  Inhalation 4 milliLiter(s) Inhalation every 6 hours  tobramycin for Nebulization 300 milliGRAM(s) Inhalation every 12 hours    MEDICATIONS  (PRN):  acetaminophen    Suspension .. 650 milliGRAM(s) Oral every 4 hours PRN Temp greater or equal to 38C (100.4F), Mild Pain (1 - 3)      LABS:                        8.0    9.66  )-----------( 259      ( 10 May 2022 05:10 )             25.7     05-10    138  |  100  |  56<H>  ----------------------------<  133<H>  3.7   |  26  |  1.11    Ca    9.1      10 May 2022 05:10  Phos  3.3     05-10  Mg     2.70     05-10

## 2022-05-10 NOTE — PROGRESS NOTE ADULT - SUBJECTIVE AND OBJECTIVE BOX
INTERVAL HPI/OVERNIGHT EVENTS:    brown bm; No rectal bleeding     MEDICATIONS  (STANDING):  ALBUTerol    90 MICROgram(s) HFA Inhaler 2 Puff(s) Inhalation every 6 hours  amantadine Syrup 100 milliGRAM(s) Oral two times a day  aMIOdarone    Tablet 200 milliGRAM(s) Oral daily  ascorbic acid 500 milliGRAM(s) Oral daily  chlorhexidine 0.12% Liquid 15 milliLiter(s) Oral Mucosa every 12 hours  chlorhexidine 2% Cloths 1 Application(s) Topical daily  collagenase Ointment 1 Application(s) Topical daily  doxazosin 2 milliGRAM(s) Oral at bedtime  folic acid 1 milliGRAM(s) Oral daily  heparin   Injectable 5000 Unit(s) SubCutaneous every 8 hours  ipratropium 17 MICROgram(s) HFA Inhaler 1 Puff(s) Inhalation every 6 hours  levETIRAcetam 1000 milliGRAM(s) Oral two times a day  levothyroxine 50 MICROGram(s) Oral daily  pantoprazole   Suspension 40 milliGRAM(s) Oral daily  piperacillin/tazobactam IVPB.. 4.5 Gram(s) IV Intermittent every 8 hours  sodium chloride 3%  Inhalation 4 milliLiter(s) Inhalation every 6 hours  tobramycin for Nebulization 300 milliGRAM(s) Inhalation every 12 hours    MEDICATIONS  (PRN):  acetaminophen    Suspension .. 650 milliGRAM(s) Oral every 4 hours PRN Temp greater or equal to 38C (100.4F), Mild Pain (1 - 3)      Allergies    No Known Allergies    Intolerances        Review of Systems:  *pt minimally verbal to nonverbal, unable to obtain ROS           Vital Signs Last 24 Hrs  T(C): 37.1 (10 May 2022 10:06), Max: 37.3 (09 May 2022 21:35)  T(F): 98.8 (10 May 2022 10:06), Max: 99.1 (09 May 2022 21:35)  HR: 77 (10 May 2022 10:28) (77 - 96)  BP: 116/79 (10 May 2022 10:06) (107/80 - 122/72)  BP(mean): --  RR: 19 (10 May 2022 10:06) (14 - 20)  SpO2: 99% (10 May 2022 10:28) (94% - 100%)    PHYSICAL EXAM:    Constitutional: NAD  HEENT: EOMI, throat clear  Neck: No LAD, supple  +Trach  Respiratory: CTA and P  Cardiovascular: S1 and S2, RRR, no M  Gastrointestinal: BS+, soft, NT/ND, neg HSM, +peg  Extremities: No peripheral edema, neg clubbing, cyanosis  Vascular: 2+ peripheral pulses  Neurological: A/O x 0-1  Psychiatric: lethargic/nonverbal  Skin: No rashes      LABS:                        8.0    9.66  )-----------( 259      ( 10 May 2022 05:10 )             25.7     05-10    138  |  100  |  56<H>  ----------------------------<  133<H>  3.7   |  26  |  1.11    Ca    9.1      10 May 2022 05:10  Phos  3.3     05-10  Mg     2.70     05-10            RADIOLOGY & ADDITIONAL TESTS:

## 2022-05-10 NOTE — PROGRESS NOTE ADULT - NS ATTEND AMEND GEN_ALL_CORE FT
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 Completing Zosyn today and continues on Thierno. Afebrile. Monitor WBC. Wean FiO2 - on 100% with O2Sat 100%. If FiO2 <40% can assess for SBT in AM.

## 2022-05-10 NOTE — PROGRESS NOTE ADULT - ASSESSMENT
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     # Neurology   - Currently A&Ox0, awake and alert, able to move RUE and nods/ mouths one or two words per RCU evaluation and prior documentation.   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   - Continue on Modafinil, Amantidine and Keppra.   - Re-eval by PT and patient not a candidate at this time     # Cardiovascular   - Hx of HFpEF 55, mild MR and AR, severe LAD, normal LVRVSF (ECHO 3/7)  - Hx of Atrial Fibrillation s/p Watchman and currently rate controlled.   - Continue on Amiodarone 200mg QD and Coreg 6.25mg BID.   - s/p Lasix resumed complicated by hypotension, now held  - Rpt Pro BNP on (5/8) 5852, CXR done, grossly unchanged from previous imaging    # HEENT  - Large firm mass along left neck noted 4/28  - CT Neck (4/30) with left-sided parotitis and 2 mm left intraparotid stone, although there is no parotid duct dilatation or obstructing stone.  - ENT called and reccs appreciated. Continue with warm compress and massage TID, monitor for any worsening signs.      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   - Initially treated for HCAP and able to wean off vent to TC ATC (4/21-5/3) however sputum now thick and requiring vent support again.   - RPT SCX (5/2 and 5/4) with pansensitive pseudomonas and on ABX as below.   - Continue on Proventil, Atrovent , Hypersal, IPV and Chest PT Q6H  - Monitor secretions, PS/TC as tolerated in the day, suction PRN.     # GI  - Hx of SDH, Gastric Bypass and Dysphagia s/p PEG and continued on TF with course complicated by constipation and melena  - Case discussed with GI and melena likely in setting of constipation, no plan for scope   - Continue on Nepro in sight of hyperkalemia.   - s/p large BM (4/25) and trial of Reglan 10mg TID (4/24 - 4/27) c/b mild diarrhea (5/3).   - s/p Senna and Miralax dc'ed and monitored off with improvement.     # / Renal  - Hx of Urinary Retention with Chronic Garcia and presented FLORIAN likely in setting of dehydration with fevers vs ATN with Sepsis (CRE high 2s and baseline 0.8-0.9).   - UA with hematuria and proteinuria and case discussed with Neprhology with concern for glomerulonephritis. ANCA, ISABELLE and GM ABs negative.   - Hyperkalemia s/p cocktail and switched to Nepro with improvement   - Scr now improving, will continue to monitor off Lasix  - proBNP improved from previous, CXR Left basilar atelectasis/small pleural effusion. Right lung is clear. No focal consolidation bilaterally    # ID  - Recent admission for  Acinetobacter and Pseudomonas HCAP (3/2022)   - SCx (4/10) with  Acinetobacter and Pseudomonas s/p Meropenem (4/8-4/14)   - SCx (4/28) with  Pseudomonas x 2 species   - SCx (5/2 and 5/4) with pansensitive Pseudomonas. Given change in sputum, fevers and ventilatory requirements  - c/w Zosyn (5/3 - until 5/10) and ALONDRA (5/7 - ) - Leukocytosis improving, and no fevers reported for now (7 days treatment)    # Endocrine  - No hx of DM2 and A1C 5.9. Continue to monitor BG,    - Hx of Hypothyroidism and continued on Synthroid. TSH 47 with low T3/T4 and Free T4. TSH 80s (3/2022) and Synthroid increased at NH. Hypothyroidism could be in the setting of Amiodarone use and current Synthroid dose appears to be working.   - Next TFT to be done in June 2022.     # Hematology   - Anemia i/s/o AOCD and s/p PRBC (4/9)   - Melena/ anemia noted and s/p 2 U PRBC (4/19)  with good response, however HH waxes and wanes with no obvious bleed (no further melena or CGE from PEG aspiration)  - DVT PPX with HSQ (cleared by GI to resume).     # Ethics   - FULL CODE   - Case discussed with Cousin/ HCP, Dr. Donna Hagen (Pediatric Neurologist, [phone]) and updated daily.     # DISPO - Planning to go back to NH. PMR recc ELIZABEHT. Family requesting [place] facility and SW aware.

## 2022-05-10 NOTE — PROGRESS NOTE ADULT - ASSESSMENT
76M with Afib s/p watchman, gastric sleeve, SDH with L subfalcine herniation after fall (11/2021) s/p emergent R hemicraniectomy, trach/vent/PEG dependant, seizure d/o, and recent hospitalization at [facility] for pneumonia s/p zosyn.  Discharged to NH and admitted to [facility] with increasing oxygen requirements and worsening mental status.  Completed a course of meropenem.  Now with fever, leukocytosis.  Recent CT with left parotitis, however, per ENT clinically no parotitis.  WBC 13.  Tm yesterday 101.3.  Today, decreased to 100.  Increased secretions but no pneumonia.    Tracheitis/bronchitis  - zosyn D#7 today  - INH tobramycin added  - can d/c zosyn tomorrow    FLORIAN  - resolved now  - continue to trend creatinine    Leukocytosis  - resolved now    Please call Infectious Diseases if there is a change in status.  Thank you.  [phone].

## 2022-05-10 NOTE — PROGRESS NOTE ADULT - ASSESSMENT
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 GI consulted for anemia     Anemia   PEG flushed/lavaged w/feeds and bilious fluid aspirated, No blood products noted  Protonix 40mg via peg  occult likely d/t constipated stool/irritated hemorrhoid; stools brown  bowel regimen and give anusol suppository qhs as needed  AVOID Hypotensive events to avoid ischemic bleeding events    Constipation, Slow Transit   senna syrup and miralax  milk of mg and enemas as needed  if abd distention, would rec to avoid lactulose     HCAP   per RCU/ID       I reviewed the overnight course of events on the unit, re-confirming the patient history. I discussed the care with the patient and their family. The plan of care was discussed with the physician assistant and modifications were made to the notation where appropriate. Differential diagnosis and plan of care discussed with patient after the evaluation. Advanced care planning was discussed with patient and family.  Advanced care planning forms were reviewed and discussed.  Risks, benefits and alternatives of gastroenterologic procedures were discussed in detail and all questions were answered. 35 minutes spent on total encounter of which more than fifty percent of the encounter was spent counseling and/or coordinating care by the attending physician.

## 2022-05-10 NOTE — PROGRESS NOTE ADULT - ASSESSMENT
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     Wound f/u to assist with management of sacral unstageable pressure injury    Sacrum unstageable pressure injury s/p selective debulking debridement ([id number])    -no fluctuance, no induration, no crepitus, no drainable collection. No edema, no erythema, no increased warmth  -no s/s of acute skin infection/cellulitis  - will continue enzymatic debridement.  -CT abdomen/pelvis 4/8/22, findings without mention of osteomyelitis, defer remaining findings to primary team.  -Topical recommendations: cleanse with ns. collagenase rama thickness, lightly pack with moistened gauze, change daily.  -of note patient incontinent loose stool; continue with external fecal pouch  -offload pressure; low airloss support surface, t&p per protocol with use of fluidized positioning devices.  -perineal care per protocol, single breathable incontinence pad.  -Nutrition recommendations appreciated for optimization as tolerated in patient with increased nutritional needs, on enteral feeds via PEG, sacral unstageable pressure injury; receiving no carb prosource 2 packets/day         consider MVI promote wound healing, receiving vitamin c     Dysphagia  -Peritubular skin of PEG- Cleanse q shift with NS, apply liquid barrier film beneath kishor disc.  If redness noted under kishor disc bumper apply thin foam  dressing without border (Mepilex Lite)- cut to mid dressing with a 'Y' shape.   Secondary securement to abdomen taping in 'H' fashion with Steri-strips.   -enteral feeds per primary team.    Trach   - Tracheostomy- Cleanse around trach site with NS. Pat dry. Apply Silicone foam dressing without border beneath trach collar, cut mid dressing to "Y" shape. Change foam dressing every shift and prn. Change trach ties every 3 days.     Remainder of care per primary team  Will follow periodically throughout hospitalization. Please reconsult earlier if needed.    Upon discharge f/u as outpatient at [facility] [address] [phone]  Findings and plan discussed in detail with pulmonology team     Thank you for this consult  Joann LEWIS, KEELYN (pager #[phone])    If after 4PM or before 7:30AM on Mon-Friday or weekend/holiday please contact general surgery for urgent matters.   Team A- [id number]   Team B- [id number]  For non-urgent matters e-mail [email or web address]    We spent 35 minutes face to face with this patient of which more than 50% of the time was spent counseling & coordinating care of this pt             A/P: 77 y/o M with afib s/p watchman, gastric sleeve, SDH, 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     Wound f/u to assist with management of sacral unstageable pressure injury    Sacrum unstageable pressure injury s/p selective debulking debridement     -no fluctuance, no induration, no crepitus, no drainable collection. No edema, no erythema, no increased warmth  -no s/s of acute skin infection/cellulitis  - will continue enzymatic debridement.  -CT abdomen/pelvis 4/8/22, findings without mention of osteomyelitis, defer remaining findings to primary team.  -Topical recommendations: cleanse with ns. collagenase rama thickness, lightly pack with moistened gauze, change daily.  -of note patient incontinent loose stool; continue with external fecal pouch  -offload pressure; low airloss support surface, t&p per protocol with use of fluidized positioning devices.  -perineal care per protocol, single breathable incontinence pad.  -Nutrition recommendations appreciated for optimization as tolerated in patient with increased nutritional needs, on enteral feeds via PEG, sacral unstageable pressure injury; receiving no carb prosource 2 packets/day         consider MVI promote wound healing, receiving vitamin c     Dysphagia  -Peritubular skin of PEG- Cleanse q shift with NS, apply liquid barrier film beneath kishor disc.  If redness noted under kishor disc bumper apply thin foam  dressing without border (Mepilex Lite)- cut to mid dressing with a 'Y' shape.   Secondary securement to abdomen taping in 'H' fashion with Steri-strips.   -enteral feeds per primary team.    Trach   - Tracheostomy- Cleanse around trach site with NS. Pat dry. Apply Silicone foam dressing without border beneath trach collar, cut mid dressing to "Y" shape. Change foam dressing every shift and prn. Change trach ties every 3 days.     Remainder of care per primary team  Will follow periodically throughout hospitalization. Please reconsult earlier if needed.    Upon discharge f/u as outpatient at [facility] [address] [facility] [phone]      Seen with attg. Findings and plan discussed in detail with pulmonology team   Thank you for this consult  Joann LEWIS, CWOCN (pager #[phone])    If after 4PM or before 7:30AM on Mon-Friday or weekend/holiday please contact general surgery for urgent matters.   Team A- [id number]   Team B- [id number]  For non-urgent matters e-mail [email or web address]    We spent 35 minutes face to face with this patient of which more than 50% of the time was spent counseling & coordinating care of this pt

## 2022-05-10 NOTE — PROGRESS NOTE ADULT - SUBJECTIVE AND OBJECTIVE BOX
[facility]-- WOUND TEAM -- FOLLOW UP NOTE  --------------------------------------------------------------------------------  Follow up visit for Sacrum Unstageable pressure injury     subjective: seen and examined at the bedside with wound care attending     Interval HPI/24 hour events: No acute events overnight     Diet:  Diet, NPO with Tube Feed:   Tube Feeding Modality: Gastrostomy  Nepro with Carb Steady (NEPRORTH)  Total Volume for 24 Hours (mL): 1080  Continuous  Starting Tube Feed Rate mL per Hour: 25  Increase Tube Feed Rate by (mL): 10     Every 4 hours  Until Goal Tube Feed Rate (mL per Hour): 45  Tube Feed Duration (in Hours): 24  Tube Feed Start Time: 15:00  No Carb Prosource (1pkg = 15gms Protein)     Qty per Day:  2 (04-19-22 @ 14:40)    ROS: pt unable to offer    ALLERGIES & MEDICATIONS  --------------------------------------------------------------------------------  Allergies    No Known Allergies    Intolerances    STANDING INPATIENT MEDICATIONS  ALBUTerol    90 MICROgram(s) HFA Inhaler 2 Puff(s) Inhalation every 6 hours  amantadine Syrup 100 milliGRAM(s) Oral two times a day  aMIOdarone    Tablet 200 milliGRAM(s) Oral daily  ascorbic acid 500 milliGRAM(s) Oral daily  chlorhexidine 0.12% Liquid 15 milliLiter(s) Oral Mucosa every 12 hours  chlorhexidine 2% Cloths 1 Application(s) Topical daily  collagenase Ointment 1 Application(s) Topical daily  doxazosin 2 milliGRAM(s) Oral at bedtime  folic acid 1 milliGRAM(s) Oral daily  heparin   Injectable 5000 Unit(s) SubCutaneous every 8 hours  ipratropium 17 MICROgram(s) HFA Inhaler 1 Puff(s) Inhalation every 6 hours  levETIRAcetam 1000 milliGRAM(s) Oral two times a day  levothyroxine 50 MICROGram(s) Oral daily  pantoprazole   Suspension 40 milliGRAM(s) Oral daily  piperacillin/tazobactam IVPB.. 4.5 Gram(s) IV Intermittent every 8 hours  sodium chloride 3%  Inhalation 4 milliLiter(s) Inhalation every 6 hours  tobramycin for Nebulization 300 milliGRAM(s) Inhalation every 12 hours    PRN INPATIENT MEDICATION  acetaminophen    Suspension .. 650 milliGRAM(s) Oral every 4 hours PRN    Vital signs:  T(C): 37.1 (05-10-22 @ 10:06), Max: 37.3 (05-09-22 @ 21:35)  HR: 77 (05-10-22 @ 10:28) (77 - 96)  BP: 116/79 (05-10-22 @ 10:06) (107/80 - 122/72)  RR: 19 (05-10-22 @ 10:06) (14 - 20)  SpO2: 99% (05-10-22 @ 10:28) (96% - 100%)  Wt(kg): --    05-09-22 @ 07:01  -  05-10-22 @ 07:00  --------------------------------------------------------  IN: 1830 mL / OUT: 1450 mL / NET: 380 mL    Physical Exam   Constitutional: NAD, non verbal, a&o x0, non verbal, awake, eyes open spontaneously  Well groomed.  (+) low airloss support surface, (+) fluidized positioning devices, (+) complete cair boots  HT: NC/AT,  trach in place  Cardiovascular: rate regular  Respiratory: supplemental oxygen via trach, non labored, equal chest rise  Gastrointestinal: soft NT/ND, PEG peritubular skin intact, enteral feeds, incontinent loose stool perineal care provided, external fecal  placed.   : indwelling benson catheter  Neurology: unable to follow commands   Musculoskeletal:  limited, functional quadriplegic  Vascular: BLE equally warm, no overt ischemia noted, hemosiderin staining to left leg   Skin: moist   Sacrum- unstageable pressure injury, turned to right side. s/p - Sharp Selective Debridement performed by wound care attending:  Using aseptic technique wound was selectively debrided using a scissor and forceps through necrotic/ nonviable epidermis dermis .  Pt tolerated procedure well.  Hemostasis was maintained throughout.  Debulking debridement of Well demarcated area of loosely attach slough removed.   measurements post selective debridement [id number] cm. Undermining from 3-6 o'clock extends 4cm at 6 o'clock. No drainage express.  no fluctuance, no induration, no drainable collection, no crepitus. Tissue exposing 30% dermis, 20% granulation tissue and 50% yellow/gray/tan firmly attach moist slough. Scant serofibrinous drainage. Periwound skin no edema, no erythema, no increased warmth noted. Mild maceration circumferentially. Lliquid barrier film to periwound skin, collagenase to wound base, lightly pack with moistened saline gauze and dry gauze with Tegaderm.   Psych: calm.     LABS/ CULTURES/ RADIOLOGY:              8.0    9.66  >-----------<  259      [05-10-22 @ 05:10]              25.7     138  |  100  |  56  ----------------------------<  133      [05-10-22 @ 05:10]  3.7   |  26  |  1.11        Ca     9.1     [05-10-22 @ 05:10]      Mg     2.70     [05-10-22 @ 05:10]      Phos  3.3     [05-10-22 @ 05:10]      CAPILLARY BLOOD GLUCOSE    Culture - Blood (collected 05-03-22 @ 19:30)  Source: .Blood Blood-Venous  Final Report (05-09-22 @ 01:01):    No Growth Final    Culture - Blood (collected 05-03-22 @ 19:15)  Source: .Blood Blood-Peripheral  Final Report (05-09-22 @ 01:01):    No Growth Final    A1C with Estimated Average Glucose Result: 5.9 % (04-09-22 @ 06:43)         [facility]-- WOUND TEAM -- FOLLOW UP NOTE  --------------------------------------------------------------------------------  Follow up visit for Sacrum Unstageable pressure injury     subjective: seen and examined at the bedside with wound care attending     Interval HPI/24 hour events: No acute events overnight     Diet:  Diet, NPO with Tube Feed:   Tube Feeding Modality: Gastrostomy  Nepro with Carb Steady (NEPRORTH)  Total Volume for 24 Hours (mL): 1080  Continuous  Starting Tube Feed Rate mL per Hour: 25  Increase Tube Feed Rate by (mL): 10     Every 4 hours  Until Goal Tube Feed Rate (mL per Hour): 45  Tube Feed Duration (in Hours): 24  Tube Feed Start Time: 15:00  No Carb Prosource (1pkg = 15gms Protein)     Qty per Day:  2 (04-19-22 @ 14:40)    ROS: pt unable to offer    ALLERGIES & MEDICATIONS  --------------------------------------------------------------------------------  Allergies    No Known Allergies    Intolerances    STANDING INPATIENT MEDICATIONS  ALBUTerol    90 MICROgram(s) HFA Inhaler 2 Puff(s) Inhalation every 6 hours  amantadine Syrup 100 milliGRAM(s) Oral two times a day  aMIOdarone    Tablet 200 milliGRAM(s) Oral daily  ascorbic acid 500 milliGRAM(s) Oral daily  chlorhexidine 0.12% Liquid 15 milliLiter(s) Oral Mucosa every 12 hours  chlorhexidine 2% Cloths 1 Application(s) Topical daily  collagenase Ointment 1 Application(s) Topical daily  doxazosin 2 milliGRAM(s) Oral at bedtime  folic acid 1 milliGRAM(s) Oral daily  heparin   Injectable 5000 Unit(s) SubCutaneous every 8 hours  ipratropium 17 MICROgram(s) HFA Inhaler 1 Puff(s) Inhalation every 6 hours  levETIRAcetam 1000 milliGRAM(s) Oral two times a day  levothyroxine 50 MICROGram(s) Oral daily  pantoprazole   Suspension 40 milliGRAM(s) Oral daily  piperacillin/tazobactam IVPB.. 4.5 Gram(s) IV Intermittent every 8 hours  sodium chloride 3%  Inhalation 4 milliLiter(s) Inhalation every 6 hours  tobramycin for Nebulization 300 milliGRAM(s) Inhalation every 12 hours    PRN INPATIENT MEDICATION  acetaminophen    Suspension .. 650 milliGRAM(s) Oral every 4 hours PRN    Vital signs:  T(C): 37.1 (05-10-22 @ 10:06), Max: 37.3 (05-09-22 @ 21:35)  HR: 77 (05-10-22 @ 10:28) (77 - 96)  BP: 116/79 (05-10-22 @ 10:06) (107/80 - 122/72)  RR: 19 (05-10-22 @ 10:06) (14 - 20)  SpO2: 99% (05-10-22 @ 10:28) (96% - 100%)  Wt(kg): --    05-09-22 @ 07:01  -  05-10-22 @ 07:00  --------------------------------------------------------  IN: 1830 mL / OUT: 1450 mL / NET: 380 mL    Physical Exam   Constitutional: NAD, non verbal, a&o x0, non verbal, awake, eyes open spontaneously  Well groomed.  (+) low airloss support surface, (+) fluidized positioning devices, (+) complete cair boots  HT: NC/AT,  trach in place  Cardiovascular: rate regular  Respiratory: supplemental oxygen via trach, non labored, equal chest rise  Gastrointestinal: soft NT/ND, PEG peritubular skin intact, enteral feeds, incontinent loose stool perineal care provided, external fecal  placed.   : indwelling benson catheter  Neurology: unable to follow commands   Musculoskeletal:  limited, functional quadriplegic  Vascular: BLE equally warm, no overt ischemia noted, hemosiderin staining to left leg   Skin: moist   Sacrum- unstageable pressure injury, turned to right side.  0pmh7mhp1.5 cm. Undermining from 3-6 o'clock extends 4cm at 6 o'clock. No drainage express.  no fluctuance, no induration, no drainable collection, no crepitus. Tissue exposing 30% dermis, 20% granulation tissue and 50% yellow/gray/tan firmly attach moist slough. Scant serofibrinous drainage. Periwound skin no edema, no erythema, no increased warmth noted. Mild maceration circumferentially.   s/p - Sharp Selective Debridement performed by wound care attending:  Using aseptic technique wound was selectively debrided using a scissor and forceps through necrotic/ nonviable epidermis dermis .  Pt tolerated procedure well.  Hemostasis was maintained throughout.  Debulking debridement of Well demarcated area of loosely attach slough removed.  Post debridement measurements unchanged.   Dressing applied: Liquid barrier film to periwound skin, collagenase to wound base, lightly pack with moistened saline gauze and dry gauze with Tegaderm.   Psych: calm.     LABS/ CULTURES/ RADIOLOGY:              8.0    9.66  >-----------<  259      [05-10-22 @ 05:10]              25.7     138  |  100  |  56  ----------------------------<  133      [05-10-22 @ 05:10]  3.7   |  26  |  1.11        Ca     9.1     [05-10-22 @ 05:10]      Mg     2.70     [05-10-22 @ 05:10]      Phos  3.3     [05-10-22 @ 05:10]      CAPILLARY BLOOD GLUCOSE    Culture - Blood (collected 05-03-22 @ 19:30)  Source: .Blood Blood-Venous  Final Report (05-09-22 @ 01:01):    No Growth Final    Culture - Blood (collected 05-03-22 @ 19:15)  Source: .Blood Blood-Peripheral  Final Report (05-09-22 @ 01:01):    No Growth Final    A1C with Estimated Average Glucose Result: 5.9 % (04-09-22 @ 06:43)

## 2022-05-10 NOTE — PROGRESS NOTE ADULT - SUBJECTIVE AND OBJECTIVE BOX
f/u leukocytosis/fever    Interval History/ROS:  awake, no fever.  still with thick secretions.   Sputum cx with Pseudomonas aeruginosa S-pip/tazo.  nonverbal.  ROS difficult    PAST MEDICAL & SURGICAL HISTORY:  Essential hypertension  Primary osteoarthritis, unspecified site  Closed fracture of left radius, initial encounter  Morbid obesity, unspecified obesity type h/o. - s/p gastric bypass- lost 113 lbs  KAREN (obstructive sleep apnea) h/o - was on CPAP until gastric bypass surgery  Respiratory failure   Anemia  Subdural hemorrhage  Epilepsy  H/O gastrostomy  History of BPH  Afib  S/P gastric bypass 2008  Status post total replacement of left hip   S/P bunionectomy bilateral  S/P colonoscopy approx 2012  History of abdominoplasty and subsequent cosmetic surgery for removal of excess skin from face    Allergies  No Known Allergies    ANTIMICROBIALS:  vancomycin  IVPB (4/8 x1, 5/3 x1)  meropenem  IVPB 1000 every 12 hours (-)  active:  piperacillin/tazobactam IVPB.. 4.5 every 8 hours (-)  tobramycin for Nebulization 300 every 12 hours (-)    MEDICATIONS  (STANDING):  ALBUTerol    90 MICROgram(s) HFA Inhaler 2 every 6 hours  amantadine Syrup 100 two times a day  aMIOdarone    Tablet 200 daily  doxazosin 2 at bedtime  heparin   Injectable 5000 every 8 hours  ipratropium 17 MICROgram(s) HFA Inhaler 1 every 6 hours  levETIRAcetam 750 two times a day  levothyroxine 50 daily  pantoprazole   Suspension 40 daily  sodium chloride 3%  Inhalation 4 every 6 hours    Vital Signs Last 24 Hrs  T(F): 98.9 (05-10-22 @ 05:00), Max: 99.1 (22 @ 10:01)  HR: 80 (05-10-22 @ 07:38)  BP: 107/80 (05-10-22 @ 05:00)  RR: 14 (05-10-22 @ 05:00)  SpO2: 100% (05-10-22 @ 07:38) (94% - 100%)  Wt(kg): --    PHYSICAL EXAMINATION:  Constitutional: non-toxic, lying in bed  HEAD/EYES: anicteric  ENT:  trache site okay, on vent  Cardiovascular:   normal S1, S2  Respiratory:  grossly clear after suctioning  GI:  soft, non-tender, normal bowel sounds, PEG site okay  :  marcelino  Musculoskeletal:  no synovitis  Neurologic: awake, tracks with left eye  Skin:  no rash  Psychiatric:  awake, appropriate mood                               8.0    9.66  )-----------( 259      ( 10 May 2022 05:10 )             25.7 05-10    138  |  100  |  56  ----------------------------<  133  3.7   |  26  |  1.11  Ca    9.1      10 May 2022 05:10Phos  3.3     05-10Mg     2.70     05-10    WBC Count: 9.66 (05-10-22 @ 05:10)  WBC Count: 12.13 (22 @ 06:10)  WBC Count: 14.47 (22 @ 04:48)  WBC Count: 15.25 (22 @ 06:42)    Creatinine, Serum: 1.11 (-10)  Creatinine, Serum: 1.23 (-)  Creatinine, Serum: 1.35 (-07)  Creatinine, Serum: 1.53 (-06)  Creatinine, Serum: 1.64 (-05)  Creatinine, Serum: 1.78 (-05)                        Urinalysis Basic - ( 2022 23:15 )  Color: Yellow / Appearance: Slightly Turbid / S.023 / pH: x  Gluc: x / Ketone: Trace  / Bili: Negative / Urobili: <2 mg/dL   Blood: x / Protein: 300 mg/dL / Nitrite: Negative   Leuk Esterase: Moderate / RBC: 75 /HPF / WBC 15 /HPF   Sq Epi: x / Non Sq Epi: x / Bacteria: Few    MICROBIOLOGY:  Culture - Sputum (collected 22 @ 04:21)  Source: .Sputum Sputum  Gram Stain (22 @ 12:04):    Rare polymorphonuclear leukocytes per low power field    Few Squamous epithelial cells per low power field    Numerous Gram Negative Rods per oil power field    Numerous Gram Positive Rods per oil power field  Final Report (22 @ 09:41):    Numerous Mixed gram negative rods including    Numerous Pseudomonas aeruginosa    Normal Respiratory Cheryl present  Organism: Pseudomonas aeruginosa (22 @ 09:41)  Organism: Pseudomonas aeruginosa (22 @ 09:41)      -  Amikacin: S <=16      -  Aztreonam: S <=4      -  Cefepime: S <=2      -  Ceftazidime: S <=1      -  Ciprofloxacin: S 0.5      -  Gentamicin: S <=2      -  Imipenem: S <=1      -  Levofloxacin: S 1      -  Meropenem: S <=1      -  Piperacillin/Tazobactam: S <=8      -  Tobramycin: S <=2      Method Type: IZZY    Culture - Blood (collected 22 @ 23:05)  Source: .Blood Blood-Venous  Preliminary Report (22 @ 01:02):    No growth to date.    Culture - Blood (collected 22 @ 23:05)  Source: .Blood Blood-Peripheral  Preliminary Report (22 @ 01:02):    No growth to date.    Culture - Sputum (collected 22 @ 18:31)  Source: .Sputum Sputum  Gram Stain (22 @ 22:29):    Few polymorphonuclear leukocytes per low power field    Few Squamous epithelial cells per low power field    Moderate Gram Positive Rods per oil power field    Few Gram Negative Rods per oil power field  Final Report (22 @ 16:51):    Few Pseudomonas aeruginosa    Normal Respiratory Cheryl present  Organism: Pseudomonas aeruginosa (22 @ 16:51)  Organism: Pseudomonas aeruginosa (22 @ 16:51)      -  Amikacin: S <=16      -  Aztreonam: S <=4      -  Cefepime: S <=2      -  Ceftazidime: S <=1      -  Ciprofloxacin: S <=0.25      -  Gentamicin: S <=2      -  Imipenem: S <=1      -  Levofloxacin: S <=0.5      -  Meropenem: S <=1      -  Piperacillin/Tazobactam: S <=8      -  Tobramycin: S <=2      Method Type: IZZY    Culture - Sputum (collected 04-10-22 @ 14:32)  Source: .Sputum Sputum  Gram Stain (04-10-22 @ 23:12):    Numerous polymorphonuclear leukocytes per low power field    Rare Squamous epithelial cells per low power field    Moderate Gram Negative Rods per oil power field    Moderate Gram positive cocci in pairs per oil power field    Few Gram Positive Rods per oil power field  Final Report (22 @ 16:10):    Numerous Acinetobacter baumannii/nosocom group (Carbapenem Resistant)    Cefiderocol = Intermediate Interpretations based on FDA breakpoints    Few Pseudomonas aeruginosa    Normal Respiratory Cheryl absent  Organism: Acinetobacter baumannii/nosocom group (Carbapenem Resistant)  Pseudomonas aeruginosa (22 @ 16:10)  Organism: Acinetobacter baumannii/nosocom group (Carbapenem Resistant) (22 @ 16:03)      -  Polymyxin B: S 1      Method Type: ETEST  Organism: Pseudomonas aeruginosa (22 @ 16:00)      -  Amikacin: S <=16      -  Aztreonam: S <=4      -  Cefepime: S <=2      -  Ceftazidime: S <=1      -  Ciprofloxacin: S <=0.25      -  Gentamicin: S <=2      -  Imipenem: S <=1      -  Levofloxacin: S <=0.5      -  Meropenem: S <=1      -  Piperacillin/Tazobactam: S <=8      -  Tobramycin: S <=2      Method Type: IZZY  Organism: Acinetobacter baumannii/nosocom group (Carbapenem Resistant) (22 @ 17:43)      -  Imipenem: R      -  Piperacillin/Tazobactam: R      Method Type: KB  Organism: Acinetobacter baumannii/nosocom group (Carbapenem Resistant) (22 @ 17:43)      -  Amikacin: S <=16      -  Ampicillin/Sulbactam: I 16/8      -  Cefepime: R >16      -  Ceftazidime: R >16      -  Ciprofloxacin: R >2      -  Gentamicin: R >8      -  Levofloxacin: R >4      -  Meropenem: R >8      -  Minocycline: S <=4      -  Tobramycin: S <=2      -  Trimethoprim/Sulfamethoxazole: R >2/38      Method Type: IZZY    Culture - Urine (collected 22 @ 23:03)  Source: Catheterized Catheterized  Final Report (04-10-22 @ 05:16):    <10,000 CFU/mL Normal Urogenital Cheryl    Culture - Blood (collected 22 @ 18:00)  Source: .Blood Blood-Peripheral  Final Report (22 @ 22:00):    No Growth Final    Culture - Blood (collected 22 @ 18:00)  Source: .Blood Blood-Peripheral  Final Report (22 @ 22:00):    No Growth Final    Culture - Sputum (collected 08 Mar 2022 18:33)  Source: .Sputum Sputum  Final Report:    Numerous Acinetobacter baumannii/nosocom group (Carbapenem Resistant)    Numerous Pseudomonas aeruginosa    Normal Respiratory Cheryl absent  Organism: Acinetobacter baumannii/nosocom group (Carbapenem Resistant)  Pseudomonas aeruginosa  Organism: Pseudomonas aeruginosa    Sensitivities:      -  Amikacin: S <=16      -  Aztreonam: R >16      -  Cefepime: I 16      -  Ceftazidime: R >16      -  Ciprofloxacin: S <=0.25      -  Gentamicin: S <=2      -  Imipenem: S <=1      -  Levofloxacin: S <=0.5      -  Meropenem: S <=1      -  Piperacillin/Tazobactam: R >64      -  Tobramycin: S <=2      Method Type: IZZY  Organism: Acinetobacter baumannii/nosocom group (Carbapenem Resistant)    Sensitivities:      -  Imipenem: R      -  Piperacillin/Tazobactam: R      Method Type: KB  Organism: Acinetobacter baumannii/nosocom group (Carbapenem Resistant)    Sensitivities:      -  Amikacin: S <=16      -  Ampicillin/Sulbactam: I 16/8      -  Cefepime: R >16      -  Ceftazidime: R >16      -  Ciprofloxacin: R >2      -  Gentamicin: R >8      -  Levofloxacin: R >4      -  Meropenem: R >8      -  Tobramycin: S <=2      -  Trimethoprim/Sulfamethoxazole: R >2/38      Method Type: IZZY    Culture - Blood (collected 07 Mar 2022 10:18)  Source: .Blood Blood-Peripheral  Final Report:    No Growth Final    Culture - Blood (collected 07 Mar 2022 10:18)  Source: .Blood Blood-Peripheral  Final Report:    No Growth Final    Culture - Urine (collected 07 Mar 2022 08:41)  Source: Clean Catch Clean Catch (Midstream)  Final Report:    No growth    COVID-19 PCR: NotDetec (22 @ 18:44)  COVID-19 PCR: NotDetec (22 @ 07:15)  COVID-19 PCR: NotDetec (22 @ 02:23)    RADIOLOGY:  imaging below personally reviewed    Xray Chest 1 View- PORTABLE-Urgent (Xray Chest 1 View- PORTABLE-Urgent .) (22 @ 20:04) >  IMPRESSION:  No focal consolidation to account for fever.    CT Neck Soft Tissue w/ IV Cont (22 @ 17:43) >  IMPRESSION:  CT findings most compatible with left-sided parotitis. There is a 2 mm left intraparotid stone, although there is no parotid duct dilatation or obstructing stone.    Xray Chest 1 View- PORTABLE-Urgent (22 @ 17:36) >  Unchanged left retrocardiac opacity. Right basilar atelectasis.    CT Head No Cont (22 @ 23:22) >  Right frontoparietal temporal craniectomy with wire mesh cranioplasty.  Extensive right cerebral gliosis and areas of encephalomalacia with  volume loss either from prior infarct or trauma in the left frontal and  anterior right temporal lobes. Thin chronic subdural along the falx. Appearance of gyriform thickening in the right frontoparietal parenchyma  underneath the cranioplasty is indeterminate. Possibility of intracranial  infection is not excluded. Consider follow-up with contrast-enhanced   brain MRI for better evaluation.     CT Abdomen and Pelvis No Cont (22 @ 23:22) >  Dependent lung parenchymal opacities, left greater than right, may represent atelectasis or infection in the appropriate clinical setting.  Small left pleural effusion with adjacent left basilar compressive  atelectasis. Small pericardial effusion.  Post gastric bypass with gastrostomy tube localized to the excluded  stomach of pancreaticobiliary limb. Slight distention of the excluded  gastric fundus with layering dense material, likely from prior contrast administration. Correlate clinically. No bowel obstruction. Moderate stool of the colon. Correlate for constipation. Coronary artery calcification.

## 2022-05-11 LAB
ANION GAP SERPL CALC-SCNC: 13 MMOL/L — SIGNIFICANT CHANGE UP (ref 7–14)
BASOPHILS # BLD AUTO: 0.06 K/UL — SIGNIFICANT CHANGE UP (ref 0–0.2)
BASOPHILS NFR BLD AUTO: 0.6 % — SIGNIFICANT CHANGE UP (ref 0–2)
BUN SERPL-MCNC: 52 MG/DL — HIGH (ref 7–23)
CALCIUM SERPL-MCNC: 9.2 MG/DL — SIGNIFICANT CHANGE UP (ref 8.4–10.5)
CHLORIDE SERPL-SCNC: 100 MMOL/L — SIGNIFICANT CHANGE UP (ref 98–107)
CO2 SERPL-SCNC: 26 MMOL/L — SIGNIFICANT CHANGE UP (ref 22–31)
CREAT SERPL-MCNC: 1.04 MG/DL — SIGNIFICANT CHANGE UP (ref 0.5–1.3)
EGFR: 75 ML/MIN/1.73M2 — SIGNIFICANT CHANGE UP
EOSINOPHIL # BLD AUTO: 0.3 K/UL — SIGNIFICANT CHANGE UP (ref 0–0.5)
EOSINOPHIL NFR BLD AUTO: 2.9 % — SIGNIFICANT CHANGE UP (ref 0–6)
GLUCOSE SERPL-MCNC: 126 MG/DL — HIGH (ref 70–99)
HCT VFR BLD CALC: 26.4 % — LOW (ref 39–50)
HGB BLD-MCNC: 8.3 G/DL — LOW (ref 13–17)
IANC: 8.15 K/UL — HIGH (ref 1.8–7.4)
IMM GRANULOCYTES NFR BLD AUTO: 1.5 % — SIGNIFICANT CHANGE UP (ref 0–1.5)
LYMPHOCYTES # BLD AUTO: 1.3 K/UL — SIGNIFICANT CHANGE UP (ref 1–3.3)
LYMPHOCYTES # BLD AUTO: 12.5 % — LOW (ref 13–44)
MAGNESIUM SERPL-MCNC: 2.6 MG/DL — SIGNIFICANT CHANGE UP (ref 1.6–2.6)
MCHC RBC-ENTMCNC: 31.4 GM/DL — LOW (ref 32–36)
MCHC RBC-ENTMCNC: 31.4 PG — SIGNIFICANT CHANGE UP (ref 27–34)
MCV RBC AUTO: 100 FL — SIGNIFICANT CHANGE UP (ref 80–100)
MONOCYTES # BLD AUTO: 0.47 K/UL — SIGNIFICANT CHANGE UP (ref 0–0.9)
MONOCYTES NFR BLD AUTO: 4.5 % — SIGNIFICANT CHANGE UP (ref 2–14)
NEUTROPHILS # BLD AUTO: 8.15 K/UL — HIGH (ref 1.8–7.4)
NEUTROPHILS NFR BLD AUTO: 78 % — HIGH (ref 43–77)
NRBC # BLD: 0 /100 WBCS — SIGNIFICANT CHANGE UP
NRBC # FLD: 0 K/UL — SIGNIFICANT CHANGE UP
PHOSPHATE SERPL-MCNC: 3.6 MG/DL — SIGNIFICANT CHANGE UP (ref 2.5–4.5)
PLATELET # BLD AUTO: 287 K/UL — SIGNIFICANT CHANGE UP (ref 150–400)
POTASSIUM SERPL-MCNC: 4 MMOL/L — SIGNIFICANT CHANGE UP (ref 3.5–5.3)
POTASSIUM SERPL-SCNC: 4 MMOL/L — SIGNIFICANT CHANGE UP (ref 3.5–5.3)
PROCALCITONIN SERPL-MCNC: 0.09 NG/ML — SIGNIFICANT CHANGE UP (ref 0.02–0.1)
RBC # BLD: 2.64 M/UL — LOW (ref 4.2–5.8)
RBC # FLD: 18.1 % — HIGH (ref 10.3–14.5)
SODIUM SERPL-SCNC: 139 MMOL/L — SIGNIFICANT CHANGE UP (ref 135–145)
WBC # BLD: 10.44 K/UL — SIGNIFICANT CHANGE UP (ref 3.8–10.5)
WBC # FLD AUTO: 10.44 K/UL — SIGNIFICANT CHANGE UP (ref 3.8–10.5)

## 2022-05-11 PROCEDURE — 99233 SBSQ HOSP IP/OBS HIGH 50: CPT

## 2022-05-11 RX ORDER — SODIUM HYPOCHLORITE 0.125 %
1 SOLUTION, NON-ORAL MISCELLANEOUS DAILY
Refills: 0 | Status: DISCONTINUED | OUTPATIENT
Start: 2022-05-11 | End: 2022-05-17

## 2022-05-11 RX ADMIN — PANTOPRAZOLE SODIUM 40 MILLIGRAM(S): 20 TABLET, DELAYED RELEASE ORAL at 11:19

## 2022-05-11 RX ADMIN — AMIODARONE HYDROCHLORIDE 200 MILLIGRAM(S): 400 TABLET ORAL at 05:04

## 2022-05-11 RX ADMIN — HEPARIN SODIUM 5000 UNIT(S): 5000 INJECTION INTRAVENOUS; SUBCUTANEOUS at 13:50

## 2022-05-11 RX ADMIN — CHLORHEXIDINE GLUCONATE 15 MILLILITER(S): 213 SOLUTION TOPICAL at 05:04

## 2022-05-11 RX ADMIN — SODIUM CHLORIDE 4 MILLILITER(S): 9 INJECTION INTRAMUSCULAR; INTRAVENOUS; SUBCUTANEOUS at 22:26

## 2022-05-11 RX ADMIN — Medication 1 APPLICATION(S): at 20:55

## 2022-05-11 RX ADMIN — Medication 100 MILLIGRAM(S): at 05:04

## 2022-05-11 RX ADMIN — Medication 1 APPLICATION(S): at 11:16

## 2022-05-11 RX ADMIN — CHLORHEXIDINE GLUCONATE 1 APPLICATION(S): 213 SOLUTION TOPICAL at 12:04

## 2022-05-11 RX ADMIN — Medication 50 MICROGRAM(S): at 05:04

## 2022-05-11 RX ADMIN — CHLORHEXIDINE GLUCONATE 15 MILLILITER(S): 213 SOLUTION TOPICAL at 17:20

## 2022-05-11 RX ADMIN — SODIUM CHLORIDE 4 MILLILITER(S): 9 INJECTION INTRAMUSCULAR; INTRAVENOUS; SUBCUTANEOUS at 10:13

## 2022-05-11 RX ADMIN — Medication 500 MILLIGRAM(S): at 11:19

## 2022-05-11 RX ADMIN — Medication 2 MILLIGRAM(S): at 21:22

## 2022-05-11 RX ADMIN — SODIUM CHLORIDE 4 MILLILITER(S): 9 INJECTION INTRAMUSCULAR; INTRAVENOUS; SUBCUTANEOUS at 16:04

## 2022-05-11 RX ADMIN — Medication 100 MILLIGRAM(S): at 17:21

## 2022-05-11 RX ADMIN — ALBUTEROL 2 PUFF(S): 90 AEROSOL, METERED ORAL at 22:27

## 2022-05-11 RX ADMIN — ALBUTEROL 2 PUFF(S): 90 AEROSOL, METERED ORAL at 04:20

## 2022-05-11 RX ADMIN — ALBUTEROL 2 PUFF(S): 90 AEROSOL, METERED ORAL at 10:13

## 2022-05-11 RX ADMIN — LEVETIRACETAM 1000 MILLIGRAM(S): 250 TABLET, FILM COATED ORAL at 17:20

## 2022-05-11 RX ADMIN — Medication 300 MILLIGRAM(S): at 10:13

## 2022-05-11 RX ADMIN — Medication 1 PUFF(S): at 10:13

## 2022-05-11 RX ADMIN — Medication 1 PUFF(S): at 22:27

## 2022-05-11 RX ADMIN — SODIUM CHLORIDE 4 MILLILITER(S): 9 INJECTION INTRAMUSCULAR; INTRAVENOUS; SUBCUTANEOUS at 04:20

## 2022-05-11 RX ADMIN — HEPARIN SODIUM 5000 UNIT(S): 5000 INJECTION INTRAVENOUS; SUBCUTANEOUS at 21:22

## 2022-05-11 RX ADMIN — Medication 1 PUFF(S): at 04:20

## 2022-05-11 RX ADMIN — ALBUTEROL 2 PUFF(S): 90 AEROSOL, METERED ORAL at 16:04

## 2022-05-11 RX ADMIN — Medication 1 MILLIGRAM(S): at 11:18

## 2022-05-11 RX ADMIN — HEPARIN SODIUM 5000 UNIT(S): 5000 INJECTION INTRAVENOUS; SUBCUTANEOUS at 05:04

## 2022-05-11 RX ADMIN — LEVETIRACETAM 1000 MILLIGRAM(S): 250 TABLET, FILM COATED ORAL at 05:03

## 2022-05-11 RX ADMIN — Medication 1 PUFF(S): at 16:05

## 2022-05-11 NOTE — PROGRESS NOTE ADULT - ASSESSMENT
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 GI consulted for anemia     Anemia   PEG flushed/lavaged w/feeds and bilious fluid aspirated, No blood products noted  Protonix 40mg via peg  occult likely d/t constipated stool/irritated hemorrhoid; stools brown  bowel regimen and give anusol suppository qhs as needed  AVOID Hypotensive events to avoid ischemic bleeding events    Constipation, Slow Transit   senna syrup and miralax  milk of mg and enemas as needed  if abd distention, would rec to avoid lactulose     HCAP   per RCU/ID       I reviewed the overnight course of events on the unit, re-confirming the patient history. I discussed the care with the patient and their family. The plan of care was discussed with the physician assistant and modifications were made to the notation where appropriate. Differential diagnosis and plan of care discussed with patient after the evaluation. Advanced care planning was discussed with patient and family.  Advanced care planning forms were reviewed and discussed.  Risks, benefits and alternatives of gastroenterologic procedures were discussed in detail and all questions were answered. 35 minutes spent on total encounter of which more than fifty percent of the encounter was spent counseling and/or coordinating care by the attending physician.

## 2022-05-11 NOTE — CHART NOTE - NSCHARTNOTEFT_GEN_A_CORE
Patient last seen by this RDN on 5/4, now for nutrition follow up. Spoke with RN and obtained subjective information from extensive chart review.     Current Diet : Diet, NPO with Tube Feed:   Tube Feeding Modality: Gastrostomy  Nepro with Carb Steady (NEPRORTH)  Total Volume for 24 Hours (mL): 1080  Continuous  Starting Tube Feed Rate {mL per Hour}: 25  Increase Tube Feed Rate by (mL): 10     Every 4 hours  Until Goal Tube Feed Rate (mL per Hour): 45  Tube Feed Duration (in Hours): 24  Tube Feed Start Time: 15:00  No Carb Prosource (1pkg = 15gms Protein)     Qty per Day:  2 (04-19-22 @ 14:40)     TF Provides:    1080mL total volume    1944 kcals    87g protein (+30 g additional protein via NoCarb Prosource)= 117g total protein     785mL free water     Weight:                                Height: 68"                    Ideal Body Weight: 154lbs / 70kg   76.3kg (5/8)  74.9kg (4/24)  81.6kg (4/17)  81.4kg (4/9)    Nutrition Interval Events: Pt continues on TF as ordered. GI following, with bowel regimen ordered, for management and prevention of constipation; last BM 5/10. Pt s/p debridement of Stage IV sacral pressure injury on 5/9. Enteral regimen provides 26 kcals/kg and 1.4 gms protein/kg of most current weight (74.9 kg) which is meeting pt's higher nutrition needs for advanced stage pressure injury. Request weekly weights to track trend and determine accuracy of TF rate. Edema remains at 1+ generalized. Continue with TF as ordered. RDN services to remain available as needed.     __________________ Pertinent Medications__________________   MEDICATIONS  (STANDING):  ALBUTerol    90 MICROgram(s) HFA Inhaler 2 Puff(s) Inhalation every 6 hours  amantadine Syrup 100 milliGRAM(s) Oral two times a day  aMIOdarone    Tablet 200 milliGRAM(s) Oral daily  ascorbic acid 500 milliGRAM(s) Oral daily  chlorhexidine 0.12% Liquid 15 milliLiter(s) Oral Mucosa every 12 hours  chlorhexidine 2% Cloths 1 Application(s) Topical daily  collagenase Ointment 1 Application(s) Topical daily  Dakins Solution - 1/4 Strength 1 Application(s) Topical daily  doxazosin 2 milliGRAM(s) Oral at bedtime  folic acid 1 milliGRAM(s) Oral daily  heparin   Injectable 5000 Unit(s) SubCutaneous every 8 hours  ipratropium 17 MICROgram(s) HFA Inhaler 1 Puff(s) Inhalation every 6 hours  levETIRAcetam 1000 milliGRAM(s) Oral two times a day  levothyroxine 50 MICROGram(s) Oral daily  pantoprazole   Suspension 40 milliGRAM(s) Oral daily  sodium chloride 3%  Inhalation 4 milliLiter(s) Inhalation every 6 hours  tobramycin for Nebulization 300 milliGRAM(s) Inhalation every 12 hours      __________________ Pertinent Labs__________________   05-11 Na 139 mmol/L Glu 126 mg/dL<H> K+ 4.0 mmol/L Cr 1.04 mg/dL BUN 52 mg/dL<H> Phos 3.6 mg/dL        Estimated Needs:     25-30 kcals/kg IBW= 3618-2400 kcals/day  1.4-1.8g protein/kg IBW= 98-126g protein/day      Previous Nutrition Diagnosis:     Increased Nutrient Needs    Nutrition Diagnosis is [x] ongoing          Goal(s):  1. Patient to meet > 75% estimated energy needs    Recommendations:   1. Continue nutrition plan of care as ordered.    Monitoring and Evaluation:   1. Monitor weights, labs, BMs, skin integrity, enteral tolerance and edema.  2. RD services to remain available. Request obtaining new weight to assess trend and determine adequacy of TF.

## 2022-05-11 NOTE — CHART NOTE - NSCHARTNOTESELECT_GEN_ALL_CORE
Event Note
Nephrology/Off Service Note
ACP/Event Note
Endocrinology/Event Note
Follow Up/Nutrition Services
Neurology
Nutrition Follow Up/Nutrition Services

## 2022-05-11 NOTE — PROGRESS NOTE ADULT - SUBJECTIVE AND OBJECTIVE BOX
[facility]-- WOUND TEAM -- FOLLOW UP NOTE  --------------------------------------------------------------------------------    subjective: Patient was seen and examined at bedside. Uable to obtain subjective data due to mental status.     Interval HPI/24 hour events:   S/p selective debridement of sacral pressure injury 5/10/22    Chart reviewed including labs and relevant images      Diet:  Diet, NPO with Tube Feed:   Tube Feeding Modality: Gastrostomy  Nepro with Carb Steady (NEPRORTH)  Total Volume for 24 Hours (mL): 1080  Continuous  Starting Tube Feed Rate mL per Hour: 25  Increase Tube Feed Rate by (mL): 10     Every 4 hours  Until Goal Tube Feed Rate (mL per Hour): 45  Tube Feed Duration (in Hours): 24  Tube Feed Start Time: 15:00  No Carb Prosource (1pkg = 15gms Protein)     Qty per Day:  2 (04-19-22 @ 14:40)      ROS: pt unable to offer    ALLERGIES & MEDICATIONS  --------------------------------------------------------------------------------  Allergies    No Known Allergies    Intolerances          STANDING INPATIENT MEDICATIONS    ALBUTerol    90 MICROgram(s) HFA Inhaler 2 Puff(s) Inhalation every 6 hours  amantadine Syrup 100 milliGRAM(s) Oral two times a day  aMIOdarone    Tablet 200 milliGRAM(s) Oral daily  ascorbic acid 500 milliGRAM(s) Oral daily  chlorhexidine 0.12% Liquid 15 milliLiter(s) Oral Mucosa every 12 hours  chlorhexidine 2% Cloths 1 Application(s) Topical daily  collagenase Ointment 1 Application(s) Topical daily  doxazosin 2 milliGRAM(s) Oral at bedtime  folic acid 1 milliGRAM(s) Oral daily  heparin   Injectable 5000 Unit(s) SubCutaneous every 8 hours  ipratropium 17 MICROgram(s) HFA Inhaler 1 Puff(s) Inhalation every 6 hours  levETIRAcetam 1000 milliGRAM(s) Oral two times a day  levothyroxine 50 MICROGram(s) Oral daily  pantoprazole   Suspension 40 milliGRAM(s) Oral daily  sodium chloride 3%  Inhalation 4 milliLiter(s) Inhalation every 6 hours  tobramycin for Nebulization 300 milliGRAM(s) Inhalation every 12 hours      PRN INPATIENT MEDICATION  acetaminophen    Suspension .. 650 milliGRAM(s) Oral every 4 hours PRN        Vital signs:  T(C): 37.1 (05-11-22 @ 14:03), Max: 37.1 (05-11-22 @ 01:00)  HR: 95 (05-11-22 @ 14:03) (80 - 104)  BP: 133/90 (05-11-22 @ 14:03) (114/85 - 137/81)  RR: 23 (05-11-22 @ 14:03) (14 - 23)  SpO2: 100% (05-11-22 @ 14:03) (100% - 100%)  Wt(kg): --        05-10-22 @ 07:01  -  05-11-22 @ 07:00  --------------------------------------------------------  IN: 1830 mL / OUT: 1400 mL / NET: 430 mL    05-11-22 @ 07:01  -  05-11-22 @ 14:48  --------------------------------------------------------  IN: 660 mL / OUT: 0 mL / NET: 660 mL      Physical Exam   Constitutional: NAD, A&Ox0, non verbal, awake, eyes open spontaneously  Well groomed.  (+) low airloss support surface, (+) fluidized positioning devices, (+) complete cair boots  HT: NC/AT,  trach in place, peristomal skin intact  Cardiovascular: rate regular per monitor  Respiratory: supplemental oxygen via trach, non labored, equal chest rise  Gastrointestinal: soft NT/ND, PEG peritubular skin intact, enteral feeds, incontinent loose stool perineal care provided, external fecal  device in place   : indwelling benson catheter  Neurology: unable to follow commands   Musculoskeletal:  limited, functional quadriplegic  Vascular: BLE equally warm, no overt ischemia noted, hemosiderin staining to left leg   Skin: moist   Sacrum- unstageable pressure injury now stage 4, turned to right side.  [id number] (prev [id number] cm). Undermining from 3-6 o'clock extends 4cm at 6 o'clock. No fluctuance, no induration, no drainable collection, no crepitus. Tissue 20% granulation tissue and 50% yellow/tan  firmly attach moist slough. Bone in close proximity, no visbile. Extending from wound edge from 11- 6 o'clock is 30% red-pink dermis. Scant serofibrinous drainage, no odor. Periwound skin no edema, no erythema, no increased warmth noted. Mild maceration circumferentially.   Dressing applied: Liquid barrier film to periwound skin, collagenase to wound base, lightly pack with moistened saline gauze and dry gauze with Tegaderm.   Psych: calm.       LABS/ CULTURES/ RADIOLOGY:              8.3    10.44 >-----------<  287      [05-11-22 @ 04:15]              26.4     139  |  100  |  52  ----------------------------<  126      [05-11-22 @ 04:15]  4.0   |  26  |  1.04        Ca     9.2     [05-11-22 @ 04:15]      Mg     2.60     [05-11-22 @ 04:15]      Phos  3.6     [05-11-22 @ 04:15]        A1C with Estimated Average Glucose Result: 5.9 % (04-09-22 @ 06:43)

## 2022-05-11 NOTE — PROGRESS NOTE ADULT - NS ATTEND AMEND GEN_ALL_CORE FT
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 Completed Zosyn 5/10 and continues on Thierno (5 of 7). Afebrile. Monitor WBC. Placed on PS this AM. Working on tx to facility in [place].

## 2022-05-11 NOTE — PROGRESS NOTE ADULT - ASSESSMENT
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     # Neurology   - Currently A&Ox0, awake and alert, able to move RUE and nods/ mouths one or two words per RCU evaluation and prior documentation.   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   - Continue on Modafinil, Amantidine and Keppra.   - Re-eval by PT and patient not a candidate at this time     # Cardiovascular   - Hx of HFpEF 55, mild MR and AR, severe LAD, normal LVRVSF (ECHO 3/7)  - Hx of Atrial Fibrillation s/p Watchman and currently rate controlled.   - Continue on Amiodarone 200mg QD and Coreg 6.25mg BID.   - s/p Lasix resumed complicated by hypotension, now held  - Rpt Pro BNP on (5/8) 5852, CXR done, grossly unchanged from previous imaging    # HEENT  - Large firm mass along left neck noted 4/28  - CT Neck (4/30) with left-sided parotitis and 2 mm left intraparotid stone, although there is no parotid duct dilatation or obstructing stone.  - ENT called and reccs appreciated. Continue with warm compress and massage TID, monitor for any worsening signs.      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   - Initially treated for HCAP and able to wean off vent to TC ATC (4/21-5/3) however sputum now thick and requiring vent support again.   - RPT SCX (5/2 and 5/4) with pansensitive pseudomonas and on ABX as below.   - Continue on Proventil, Atrovent , Hypersal, IPV and Chest PT Q6H  - Monitor secretions, PS/TC as tolerated in the day, suction PRN.     # GI  - Hx of SDH, Gastric Bypass and Dysphagia s/p PEG and continued on TF with course complicated by constipation and melena  - Case discussed with GI and melena likely in setting of constipation, no plan for scope   - Continue on Nepro in sight of hyperkalemia.   - s/p large BM (4/25) and trial of Reglan 10mg TID (4/24 - 4/27) c/b mild diarrhea (5/3).   - s/p Senna and Miralax dc'ed and monitored off with improvement.     # / Renal  - Hx of Urinary Retention with Chronic Garcia and presented FLORIAN likely in setting of dehydration with fevers vs ATN with Sepsis (CRE high 2s and baseline 0.8-0.9).   - UA with hematuria and proteinuria and case discussed with Neprhology with concern for glomerulonephritis. ANCA, ISABELLE and GM ABs negative.   - Hyperkalemia s/p cocktail and switched to Nepro with improvement   - Scr now improving, will continue to monitor off Lasix  - proBNP improved from previous, CXR Left basilar atelectasis/small pleural effusion. Right lung is clear. No focal consolidation bilaterally    # ID  - Recent admission for  Acinetobacter and Pseudomonas HCAP (3/2022)   - SCx (4/10) with  Acinetobacter and Pseudomonas s/p Meropenem (4/8-4/14)   - SCx (4/28) with  Pseudomonas x 2 species   - SCx (5/2 and 5/4) with pansensitive Pseudomonas. Given change in sputum, fevers and ventilatory requirements  - c/w Zosyn (5/3 - until 5/10) and ALONDRA (5/7 - ) - Leukocytosis improving, and no fevers reported for now (7 days treatment)    # Endocrine  - No hx of DM2 and A1C 5.9. Continue to monitor BG,    - Hx of Hypothyroidism and continued on Synthroid. TSH 47 with low T3/T4 and Free T4. TSH 80s (3/2022) and Synthroid increased at NH. Hypothyroidism could be in the setting of Amiodarone use and current Synthroid dose appears to be working.   - Next TFT to be done in June 2022.     # Hematology   - Anemia i/s/o AOCD and s/p PRBC (4/9)   - Melena/ anemia noted and s/p 2 U PRBC (4/19)  with good response, however HH waxes and wanes with no obvious bleed (no further melena or CGE from PEG aspiration)  - DVT PPX with HSQ (cleared by GI to resume).     # Ethics   - FULL CODE   - Case discussed with Cousin/ HCP, Dr. Donna Hagen (Pediatric Neurologist, [phone]) and updated daily.     # DISPO - Planning to go back to NH. PMR recc ELIZABETH. Family requesting [place] facility and SW aware.    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     # Neurology   - Currently A&Ox0, awake and alert, able to move RUE and nods/ mouths one or two words per RCU evaluation and prior documentation.   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   - Continue on Modafinil, Amantidine and Keppra.   - Re-eval by PT and patient not a candidate at this time     # Cardiovascular   - Hx of HFpEF 55, mild MR and AR, severe LAD, normal LVRVSF (ECHO 3/7)  - Hx of Atrial Fibrillation s/p Watchman and currently rate controlled.   - Continue on Amiodarone 200mg QD and Coreg 6.25mg BID.   - s/p Lasix resumed complicated by hypotension, now held  - Rpt Pro BNP on (5/8) 5852, CXR done, grossly unchanged from previous imaging    # HEENT  - Large firm mass along left neck noted 4/28  - CT Neck (4/30) with left-sided parotitis and 2 mm left intraparotid stone, although there is no parotid duct dilatation or obstructing stone.  - ENT called and reccs appreciated. Continue with warm compress and massage TID, monitor for any worsening signs.      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   - Initially treated for HCAP and able to wean off vent to TC ATC (4/21-5/3) however sputum now thick and requiring vent support again.   - RPT SCX (5/2 and 5/4) with pansensitive pseudomonas and on ABX as below.   - Continue on Proventil, Atrovent , Hypersal, IPV and Chest PT Q6H  - Monitor secretions, PS/TC as tolerated in the day, suction PRN.     # GI  - Hx of SDH, Gastric Bypass and Dysphagia s/p PEG and continued on TF with course complicated by constipation and melena  - Case discussed with GI and melena likely in setting of constipation, no plan for scope   - Continue on Nepro in sight of hyperkalemia.   - s/p large BM (4/25) and trial of Reglan 10mg TID (4/24 - 4/27) c/b mild diarrhea (5/3).   - s/p Senna and Miralax dc'ed and monitored off with improvement.     # / Renal  - Hx of Urinary Retention with Chronic Garcia and presented FLORIAN likely in setting of dehydration with fevers vs ATN with Sepsis (CRE high 2s and baseline 0.8-0.9).   - UA with hematuria and proteinuria and case discussed with Neprhology with concern for glomerulonephritis. ANCA, ISABELLE and GM ABs negative.   - Hyperkalemia s/p cocktail and switched to Nepro with improvement   - Scr now improving, will continue to monitor off Lasix  - proBNP improved from previous, CXR Left basilar atelectasis/small pleural effusion. Right lung is clear. No focal consolidation bilaterally    # ID  - Recent admission for  Acinetobacter and Pseudomonas HCAP (3/2022)   - SCx (4/10) with  Acinetobacter and Pseudomonas s/p Meropenem (4/8-4/14)   - SCx (4/28) with  Pseudomonas x 2 species   - SCx (5/2 and 5/4) with pansensitive Pseudomonas. Given change in sputum, fevers and ventilatory requirements  - c/w Zosyn (5/3 - until 5/10) and ALONDRA (5/7 - 5/13) - Leukocytosis improving, and no fevers reported for now (7 days treatment)  - SCx 5/10 (+) few GNR, few GPC and GPR  - Repeat Procalc negative 5/11     # Endocrine  - No hx of DM2 and A1C 5.9. Continue to monitor BG,    - Hx of Hypothyroidism and continued on Synthroid. TSH 47 with low T3/T4 and Free T4. TSH 80s (3/2022) and Synthroid increased at NH. Hypothyroidism could be in the setting of Amiodarone use and current Synthroid dose appears to be working.   - Next TFT to be done in June 2022.     # Hematology   - Anemia i/s/o AOCD and s/p PRBC (4/9)   - Melena/ anemia noted and s/p 2 U PRBC (4/19)  with good response, however HH waxes and wanes with no obvious bleed (no further melena or CGE from PEG aspiration)  - DVT PPX with HSQ (cleared by GI to resume).     # Ethics   - FULL CODE   - Case discussed with Cousin/ HCP, Dr. Donna Hagen (Pediatric Neurologist, [phone]) and updated daily.     # DISPO - Planning to go back to NH. PMR recc ELIZABETH. Family requesting [place] facility and SW aware.

## 2022-05-11 NOTE — PROGRESS NOTE ADULT - SUBJECTIVE AND OBJECTIVE BOX
INTERVAL HPI/OVERNIGHT EVENTS:    tolerating feeds    MEDICATIONS  (STANDING):  ALBUTerol    90 MICROgram(s) HFA Inhaler 2 Puff(s) Inhalation every 6 hours  amantadine Syrup 100 milliGRAM(s) Oral two times a day  aMIOdarone    Tablet 200 milliGRAM(s) Oral daily  ascorbic acid 500 milliGRAM(s) Oral daily  chlorhexidine 0.12% Liquid 15 milliLiter(s) Oral Mucosa every 12 hours  chlorhexidine 2% Cloths 1 Application(s) Topical daily  collagenase Ointment 1 Application(s) Topical daily  doxazosin 2 milliGRAM(s) Oral at bedtime  folic acid 1 milliGRAM(s) Oral daily  heparin   Injectable 5000 Unit(s) SubCutaneous every 8 hours  ipratropium 17 MICROgram(s) HFA Inhaler 1 Puff(s) Inhalation every 6 hours  levETIRAcetam 1000 milliGRAM(s) Oral two times a day  levothyroxine 50 MICROGram(s) Oral daily  pantoprazole   Suspension 40 milliGRAM(s) Oral daily  sodium chloride 3%  Inhalation 4 milliLiter(s) Inhalation every 6 hours  tobramycin for Nebulization 300 milliGRAM(s) Inhalation every 12 hours    MEDICATIONS  (PRN):  acetaminophen    Suspension .. 650 milliGRAM(s) Oral every 4 hours PRN Temp greater or equal to 38C (100.4F), Mild Pain (1 - 3)      Allergies    No Known Allergies    Intolerances        Review of Systems: *pt minimally verbal to nonverbal, unable to obtain ROS         Vital Signs Last 24 Hrs  T(C): 36.8 (11 May 2022 09:51), Max: 37.1 (11 May 2022 01:00)  T(F): 98.3 (11 May 2022 09:51), Max: 98.8 (11 May 2022 01:00)  HR: 104 (11 May 2022 11:24) (80 - 104)  BP: 137/81 (11 May 2022 09:51) (113/86 - 137/81)  BP(mean): --  RR: 20 (11 May 2022 09:51) (14 - 20)  SpO2: 100% (11 May 2022 11:24) (100% - 100%)    PHYSICAL EXAM:    Constitutional: NAD  HEENT: EOMI, throat clear  Neck: No LAD, supple  Respiratory: CTA and P  Cardiovascular: S1 and S2, RRR, no M  Gastrointestinal: BS+, soft, NT/ND, neg HSM, +peg  Extremities: No peripheral edema, neg clubbing, cyanosis  Vascular: 2+ peripheral pulses  Neurological: A/O x0  Psychiatric: letahrgic  Skin: No rashes      LABS:                        8.3    10.44 )-----------( 287      ( 11 May 2022 04:15 )             26.4     05-11    139  |  100  |  52<H>  ----------------------------<  126<H>  4.0   |  26  |  1.04    Ca    9.2      11 May 2022 04:15  Phos  3.6     05-11  Mg     2.60     05-11            RADIOLOGY & ADDITIONAL TESTS:

## 2022-05-11 NOTE — PROGRESS NOTE ADULT - ASSESSMENT
A/P: 77 y/o M with afib s/p watchman, gastric sleeve, SDH, 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     Wound f/u to assist with management of sacral unstageable pressure injury, stage 4    Sacrum stage 4 pressure injury s/p selective debulking debridement   -s/p selective debulking debridement on 5/9/22  -Wound base remains with adherent slough, appears clean. Bone in close proximity, not visible  -no fluctuance, no induration, no crepitus, no drainable collection. No edema, no erythema, no increased warmth  -no s/s of acute skin infection/cellulitis  - will continue enzymatic debridement.  -CT abdomen/pelvis 4/8/22, findings without mention of osteomyelitis, defer remaining findings to primary team.  -Topical recommendations: cleanse with Dakins 1/4 strength. Rinse with NS. collagenase rama thickness, lightly pack with moistened gauze, change daily.  -of note patient incontinent loose stool; continue with external fecal pouch  -offload pressure; low airloss support surface, t&p per protocol with use of fluidized positioning devices.  -perineal care per protocol, single breathable incontinence pad.  -Nutrition recommendations appreciated for optimization as tolerated in patient with increased nutritional needs, on enteral feeds via PEG, sacrum stage 4 pressure injury; receiving no carb prosource 2 packets/day         consider MVI promote wound healing, receiving vitamin c     Dysphagia  -Peritubular skin of PEG- Cleanse q shift with NS, apply liquid barrier film beneath kishor disc.  If redness noted under kishor disc bumper apply thin foam  dressing without border (Mepilex Lite)- cut to mid dressing with a 'Y' shape.   Secondary securement to abdomen taping in 'H' fashion with Steri-strips.   -enteral feeds per primary team.    Trach   - Tracheostomy- Cleanse around trach site with NS. Pat dry. Apply Silicone foam dressing without border beneath trach collar, cut mid dressing to "Y" shape. Change foam dressing every shift and prn. Change trach ties every 3 days.     Remainder of care per primary team  Will follow periodically throughout hospitalization. Please reconsult earlier if needed.      Upon discharge follow up at outpatient [facility]. [address] [facility]. [phone].    Will continue to follow periodically while inpatient.  Thank you.    Findings and plan discussed with RN and with primary team.  DEBBIE Bryant, CWOC    pager #[phone]    If after 4PM or before 7:30AM on Mon-Friday or weekend/holiday please contact general surgery for urgent matters.   Team A- [id number]   Team B- [id number]  For non-urgent matters e-mail [email or web address]    I spent 35 minutes face-to-face with this patient of which more than 50% of the time was spent counseling/coordinating care of this patient.

## 2022-05-11 NOTE — PROGRESS NOTE ADULT - SUBJECTIVE AND OBJECTIVE BOX
CHIEF COMPLAINT: Patient is a 76y old  Male who presents with a chief complaint of Hypoxia (10 May 2022 12:33)      INTERVAL EVENTS:     ROS: Seen by bedside during AM rounds     OBJECTIVE:  ICU Vital Signs Last 24 Hrs  T(C): 36.9 (11 May 2022 05:00), Max: 37.1 (10 May 2022 10:06)  T(F): 98.4 (11 May 2022 05:00), Max: 98.8 (10 May 2022 10:06)  HR: 82 (11 May 2022 07:32) (77 - 96)  BP: 114/85 (11 May 2022 05:00) (113/86 - 132/83)  BP(mean): --  ABP: --  ABP(mean): --  RR: 16 (11 May 2022 05:00) (14 - 19)  SpO2: 100% (11 May 2022 07:32) (99% - 100%)    Mode: AC/ CMV (Assist Control/ Continuous Mandatory Ventilation), RR (machine): 12, TV (machine): 0.45, FiO2: 40, PEEP: 5, ITime: 0.8, MAP: 8, PIP: 19    05-10 @ 07:01  -  05-11 @ 07:00  --------------------------------------------------------  IN: 1830 mL / OUT: 1400 mL / NET: 430 mL    05-11 @ 07:01 - 05-11 @ 08:01  --------------------------------------------------------  IN: 45 mL / OUT: 0 mL / NET: 45 mL      CAPILLARY BLOOD GLUCOSE          PHYSICAL EXAM:  General:   HEENT:   Lymph Nodes:  Neck:   Respiratory:   Cardiovascular:   Abdomen:   Extremities:   Skin:   Neurological:  Psychiatry:    Mode: AC/ CMV (Assist Control/ Continuous Mandatory Ventilation)  RR (machine): 12  TV (machine): 0.45  FiO2: 40  PEEP: 5  ITime: 0.8  MAP: 8  PIP: 19      HOSPITAL MEDICATIONS:  MEDICATIONS  (STANDING):  ALBUTerol    90 MICROgram(s) HFA Inhaler 2 Puff(s) Inhalation every 6 hours  amantadine Syrup 100 milliGRAM(s) Oral two times a day  aMIOdarone    Tablet 200 milliGRAM(s) Oral daily  ascorbic acid 500 milliGRAM(s) Oral daily  chlorhexidine 0.12% Liquid 15 milliLiter(s) Oral Mucosa every 12 hours  chlorhexidine 2% Cloths 1 Application(s) Topical daily  collagenase Ointment 1 Application(s) Topical daily  doxazosin 2 milliGRAM(s) Oral at bedtime  folic acid 1 milliGRAM(s) Oral daily  heparin   Injectable 5000 Unit(s) SubCutaneous every 8 hours  ipratropium 17 MICROgram(s) HFA Inhaler 1 Puff(s) Inhalation every 6 hours  levETIRAcetam 1000 milliGRAM(s) Oral two times a day  levothyroxine 50 MICROGram(s) Oral daily  pantoprazole   Suspension 40 milliGRAM(s) Oral daily  sodium chloride 3%  Inhalation 4 milliLiter(s) Inhalation every 6 hours  tobramycin for Nebulization 300 milliGRAM(s) Inhalation every 12 hours    MEDICATIONS  (PRN):  acetaminophen    Suspension .. 650 milliGRAM(s) Oral every 4 hours PRN Temp greater or equal to 38C (100.4F), Mild Pain (1 - 3)      LABS:                        8.3    10.44 )-----------( 287      ( 11 May 2022 04:15 )             26.4     05-11    139  |  100  |  52<H>  ----------------------------<  126<H>  4.0   |  26  |  1.04    Ca    9.2      11 May 2022 04:15  Phos  3.6     05-11  Mg     2.60     05-11             CHIEF COMPLAINT: Patient is a 76y old  Male who presents with a chief complaint of Hypoxia (10 May 2022 12:33)      INTERVAL EVENTS: no overnight events reported. Clinically remains unchanged.    ROS: Seen by bedside during AM rounds     OBJECTIVE:  ICU Vital Signs Last 24 Hrs  T(C): 36.9 (11 May 2022 05:00), Max: 37.1 (10 May 2022 10:06)  T(F): 98.4 (11 May 2022 05:00), Max: 98.8 (10 May 2022 10:06)  HR: 82 (11 May 2022 07:32) (77 - 96)  BP: 114/85 (11 May 2022 05:00) (113/86 - 132/83)  BP(mean): --  ABP: --  ABP(mean): --  RR: 16 (11 May 2022 05:00) (14 - 19)  SpO2: 100% (11 May 2022 07:32) (99% - 100%)    Mode: AC/ CMV (Assist Control/ Continuous Mandatory Ventilation), RR (machine): 12, TV (machine): 0.45, FiO2: 40, PEEP: 5, ITime: 0.8, MAP: 8, PIP: 19    05-10 @ 07:01  -  05-11 @ 07:00  --------------------------------------------------------  IN: 1830 mL / OUT: 1400 mL / NET: 430 mL    05-11 @ 07:01 - 05-11 @ 08:01  --------------------------------------------------------  IN: 45 mL / OUT: 0 mL / NET: 45 mL      CAPILLARY BLOOD GLUCOSE          PHYSICAL EXAM:  General: NAD   Neck: (+) Trach tube noted, site c/d/i.  Cards: S1/S2, no murmurs   Pulm: CTA bilaterally. No wheezes.   Abdomen: Soft, NTND. (+) PEG.  Extremities: No pedal edema. Extremities warm to touch.  Neurology: Arouses to verbal stimuli and tracks examiner. Not following commands. nonverbal.   Skin; refer to RN assessment.     Mode: AC/ CMV (Assist Control/ Continuous Mandatory Ventilation)  RR (machine): 12  TV (machine): 0.45  FiO2: 40  PEEP: 5  ITime: 0.8  MAP: 8  PIP: 19      HOSPITAL MEDICATIONS:  MEDICATIONS  (STANDING):  ALBUTerol    90 MICROgram(s) HFA Inhaler 2 Puff(s) Inhalation every 6 hours  amantadine Syrup 100 milliGRAM(s) Oral two times a day  aMIOdarone    Tablet 200 milliGRAM(s) Oral daily  ascorbic acid 500 milliGRAM(s) Oral daily  chlorhexidine 0.12% Liquid 15 milliLiter(s) Oral Mucosa every 12 hours  chlorhexidine 2% Cloths 1 Application(s) Topical daily  collagenase Ointment 1 Application(s) Topical daily  doxazosin 2 milliGRAM(s) Oral at bedtime  folic acid 1 milliGRAM(s) Oral daily  heparin   Injectable 5000 Unit(s) SubCutaneous every 8 hours  ipratropium 17 MICROgram(s) HFA Inhaler 1 Puff(s) Inhalation every 6 hours  levETIRAcetam 1000 milliGRAM(s) Oral two times a day  levothyroxine 50 MICROGram(s) Oral daily  pantoprazole   Suspension 40 milliGRAM(s) Oral daily  sodium chloride 3%  Inhalation 4 milliLiter(s) Inhalation every 6 hours  tobramycin for Nebulization 300 milliGRAM(s) Inhalation every 12 hours    MEDICATIONS  (PRN):  acetaminophen    Suspension .. 650 milliGRAM(s) Oral every 4 hours PRN Temp greater or equal to 38C (100.4F), Mild Pain (1 - 3)      LABS:                        8.3    10.44 )-----------( 287      ( 11 May 2022 04:15 )             26.4     05-11    139  |  100  |  52<H>  ----------------------------<  126<H>  4.0   |  26  |  1.04    Ca    9.2      11 May 2022 04:15  Phos  3.6     05-11  Mg     2.60     05-11

## 2022-05-12 LAB
ANION GAP SERPL CALC-SCNC: 11 MMOL/L — SIGNIFICANT CHANGE UP (ref 7–14)
BASOPHILS # BLD AUTO: 0.07 K/UL — SIGNIFICANT CHANGE UP (ref 0–0.2)
BASOPHILS NFR BLD AUTO: 0.7 % — SIGNIFICANT CHANGE UP (ref 0–2)
BUN SERPL-MCNC: 49 MG/DL — HIGH (ref 7–23)
CALCIUM SERPL-MCNC: 8.9 MG/DL — SIGNIFICANT CHANGE UP (ref 8.4–10.5)
CHLORIDE SERPL-SCNC: 101 MMOL/L — SIGNIFICANT CHANGE UP (ref 98–107)
CO2 SERPL-SCNC: 27 MMOL/L — SIGNIFICANT CHANGE UP (ref 22–31)
CREAT SERPL-MCNC: 0.97 MG/DL — SIGNIFICANT CHANGE UP (ref 0.5–1.3)
CULTURE RESULTS: SIGNIFICANT CHANGE UP
EGFR: 81 ML/MIN/1.73M2 — SIGNIFICANT CHANGE UP
EOSINOPHIL # BLD AUTO: 0.17 K/UL — SIGNIFICANT CHANGE UP (ref 0–0.5)
EOSINOPHIL NFR BLD AUTO: 1.6 % — SIGNIFICANT CHANGE UP (ref 0–6)
GLUCOSE SERPL-MCNC: 117 MG/DL — HIGH (ref 70–99)
HCT VFR BLD CALC: 24.8 % — LOW (ref 39–50)
HGB BLD-MCNC: 7.7 G/DL — LOW (ref 13–17)
IANC: 8.11 K/UL — HIGH (ref 1.8–7.4)
IMM GRANULOCYTES NFR BLD AUTO: 2 % — HIGH (ref 0–1.5)
LYMPHOCYTES # BLD AUTO: 1.52 K/UL — SIGNIFICANT CHANGE UP (ref 1–3.3)
LYMPHOCYTES # BLD AUTO: 14.4 % — SIGNIFICANT CHANGE UP (ref 13–44)
MAGNESIUM SERPL-MCNC: 2.5 MG/DL — SIGNIFICANT CHANGE UP (ref 1.6–2.6)
MCHC RBC-ENTMCNC: 31 GM/DL — LOW (ref 32–36)
MCHC RBC-ENTMCNC: 31.8 PG — SIGNIFICANT CHANGE UP (ref 27–34)
MCV RBC AUTO: 102.5 FL — HIGH (ref 80–100)
MONOCYTES # BLD AUTO: 0.5 K/UL — SIGNIFICANT CHANGE UP (ref 0–0.9)
MONOCYTES NFR BLD AUTO: 4.7 % — SIGNIFICANT CHANGE UP (ref 2–14)
NEUTROPHILS # BLD AUTO: 8.11 K/UL — HIGH (ref 1.8–7.4)
NEUTROPHILS NFR BLD AUTO: 76.6 % — SIGNIFICANT CHANGE UP (ref 43–77)
NRBC # BLD: 0 /100 WBCS — SIGNIFICANT CHANGE UP
NRBC # FLD: 0 K/UL — SIGNIFICANT CHANGE UP
PHOSPHATE SERPL-MCNC: 3 MG/DL — SIGNIFICANT CHANGE UP (ref 2.5–4.5)
PLATELET # BLD AUTO: 300 K/UL — SIGNIFICANT CHANGE UP (ref 150–400)
POTASSIUM SERPL-MCNC: 3.9 MMOL/L — SIGNIFICANT CHANGE UP (ref 3.5–5.3)
POTASSIUM SERPL-SCNC: 3.9 MMOL/L — SIGNIFICANT CHANGE UP (ref 3.5–5.3)
RBC # BLD: 2.42 M/UL — LOW (ref 4.2–5.8)
RBC # FLD: 18.2 % — HIGH (ref 10.3–14.5)
SODIUM SERPL-SCNC: 139 MMOL/L — SIGNIFICANT CHANGE UP (ref 135–145)
SPECIMEN SOURCE: SIGNIFICANT CHANGE UP
WBC # BLD: 10.58 K/UL — HIGH (ref 3.8–10.5)
WBC # FLD AUTO: 10.58 K/UL — HIGH (ref 3.8–10.5)

## 2022-05-12 PROCEDURE — 99233 SBSQ HOSP IP/OBS HIGH 50: CPT

## 2022-05-12 RX ADMIN — Medication 1 PUFF(S): at 09:44

## 2022-05-12 RX ADMIN — ALBUTEROL 2 PUFF(S): 90 AEROSOL, METERED ORAL at 03:47

## 2022-05-12 RX ADMIN — Medication 2 MILLIGRAM(S): at 21:53

## 2022-05-12 RX ADMIN — CHLORHEXIDINE GLUCONATE 15 MILLILITER(S): 213 SOLUTION TOPICAL at 17:28

## 2022-05-12 RX ADMIN — CHLORHEXIDINE GLUCONATE 1 APPLICATION(S): 213 SOLUTION TOPICAL at 11:28

## 2022-05-12 RX ADMIN — HEPARIN SODIUM 5000 UNIT(S): 5000 INJECTION INTRAVENOUS; SUBCUTANEOUS at 13:08

## 2022-05-12 RX ADMIN — PANTOPRAZOLE SODIUM 40 MILLIGRAM(S): 20 TABLET, DELAYED RELEASE ORAL at 11:28

## 2022-05-12 RX ADMIN — SODIUM CHLORIDE 4 MILLILITER(S): 9 INJECTION INTRAMUSCULAR; INTRAVENOUS; SUBCUTANEOUS at 03:47

## 2022-05-12 RX ADMIN — Medication 1 PUFF(S): at 22:06

## 2022-05-12 RX ADMIN — ALBUTEROL 2 PUFF(S): 90 AEROSOL, METERED ORAL at 22:06

## 2022-05-12 RX ADMIN — Medication 50 MICROGRAM(S): at 05:18

## 2022-05-12 RX ADMIN — HEPARIN SODIUM 5000 UNIT(S): 5000 INJECTION INTRAVENOUS; SUBCUTANEOUS at 21:57

## 2022-05-12 RX ADMIN — HEPARIN SODIUM 5000 UNIT(S): 5000 INJECTION INTRAVENOUS; SUBCUTANEOUS at 05:23

## 2022-05-12 RX ADMIN — Medication 100 MILLIGRAM(S): at 17:28

## 2022-05-12 RX ADMIN — SODIUM CHLORIDE 4 MILLILITER(S): 9 INJECTION INTRAMUSCULAR; INTRAVENOUS; SUBCUTANEOUS at 09:43

## 2022-05-12 RX ADMIN — CHLORHEXIDINE GLUCONATE 15 MILLILITER(S): 213 SOLUTION TOPICAL at 05:16

## 2022-05-12 RX ADMIN — Medication 1 MILLIGRAM(S): at 11:28

## 2022-05-12 RX ADMIN — Medication 300 MILLIGRAM(S): at 09:43

## 2022-05-12 RX ADMIN — Medication 300 MILLIGRAM(S): at 22:05

## 2022-05-12 RX ADMIN — Medication 100 MILLIGRAM(S): at 05:17

## 2022-05-12 RX ADMIN — AMIODARONE HYDROCHLORIDE 200 MILLIGRAM(S): 400 TABLET ORAL at 05:17

## 2022-05-12 RX ADMIN — SODIUM CHLORIDE 4 MILLILITER(S): 9 INJECTION INTRAMUSCULAR; INTRAVENOUS; SUBCUTANEOUS at 15:59

## 2022-05-12 RX ADMIN — Medication 500 MILLIGRAM(S): at 11:28

## 2022-05-12 RX ADMIN — ALBUTEROL 2 PUFF(S): 90 AEROSOL, METERED ORAL at 09:44

## 2022-05-12 RX ADMIN — Medication 1 APPLICATION(S): at 11:27

## 2022-05-12 RX ADMIN — SODIUM CHLORIDE 4 MILLILITER(S): 9 INJECTION INTRAMUSCULAR; INTRAVENOUS; SUBCUTANEOUS at 22:29

## 2022-05-12 RX ADMIN — LEVETIRACETAM 1000 MILLIGRAM(S): 250 TABLET, FILM COATED ORAL at 05:19

## 2022-05-12 RX ADMIN — Medication 1 PUFF(S): at 15:59

## 2022-05-12 RX ADMIN — Medication 1 PUFF(S): at 03:47

## 2022-05-12 RX ADMIN — LEVETIRACETAM 1000 MILLIGRAM(S): 250 TABLET, FILM COATED ORAL at 17:28

## 2022-05-12 RX ADMIN — ALBUTEROL 2 PUFF(S): 90 AEROSOL, METERED ORAL at 16:00

## 2022-05-12 NOTE — PROGRESS NOTE ADULT - SUBJECTIVE AND OBJECTIVE BOX
INTERVAL HPI/OVERNIGHT EVENTS:    brown stool yesterday   tolerating feeds     MEDICATIONS  (STANDING):  ALBUTerol    90 MICROgram(s) HFA Inhaler 2 Puff(s) Inhalation every 6 hours  amantadine Syrup 100 milliGRAM(s) Oral two times a day  aMIOdarone    Tablet 200 milliGRAM(s) Oral daily  ascorbic acid 500 milliGRAM(s) Oral daily  chlorhexidine 0.12% Liquid 15 milliLiter(s) Oral Mucosa every 12 hours  chlorhexidine 2% Cloths 1 Application(s) Topical daily  collagenase Ointment 1 Application(s) Topical daily  Dakins Solution - 1/4 Strength 1 Application(s) Topical daily  doxazosin 2 milliGRAM(s) Oral at bedtime  folic acid 1 milliGRAM(s) Oral daily  heparin   Injectable 5000 Unit(s) SubCutaneous every 8 hours  ipratropium 17 MICROgram(s) HFA Inhaler 1 Puff(s) Inhalation every 6 hours  levETIRAcetam 1000 milliGRAM(s) Oral two times a day  levothyroxine 50 MICROGram(s) Oral daily  pantoprazole   Suspension 40 milliGRAM(s) Oral daily  sodium chloride 3%  Inhalation 4 milliLiter(s) Inhalation every 6 hours  tobramycin for Nebulization 300 milliGRAM(s) Inhalation every 12 hours    MEDICATIONS  (PRN):  acetaminophen    Suspension .. 650 milliGRAM(s) Oral every 4 hours PRN Temp greater or equal to 38C (100.4F), Mild Pain (1 - 3)      Allergies    No Known Allergies    Intolerances        Review of Systems: *pt minimally verbal to nonverbal, unable to obtain ROS     Vital Signs Last 24 Hrs  T(C): 36.8 (12 May 2022 09:15), Max: 37.1 (11 May 2022 14:03)  T(F): 98.2 (12 May 2022 09:15), Max: 98.8 (11 May 2022 14:03)  HR: 97 (12 May 2022 09:54) (82 - 104)  BP: 124/85 (12 May 2022 09:15) (111/75 - 133/90)  BP(mean): --  RR: 17 (12 May 2022 09:15) (12 - 23)  SpO2: 100% (12 May 2022 09:54) (97% - 100%)    PHYSICAL EXAM:    Constitutional: NAD  HEENT: EOMI, throat clear  Neck: No LAD, supple +trach  Respiratory: CTA and P  Cardiovascular: S1 and S2, RRR, no M  Gastrointestinal: BS+, soft, NT/ND, neg HSM, +peg  Extremities: No peripheral edema, neg clubbing, cyanosis  Vascular: 2+ peripheral pulses  Neurological: A/O x0  Psychiatric: nonverbal/lethargy  Skin: No rashes      LABS:                        7.7    10.58 )-----------( 300      ( 12 May 2022 04:45 )             24.8     05-12    139  |  101  |  49<H>  ----------------------------<  117<H>  3.9   |  27  |  0.97    Ca    8.9      12 May 2022 04:45  Phos  3.0     05-12  Mg     2.50     05-12            RADIOLOGY & ADDITIONAL TESTS:

## 2022-05-12 NOTE — PROGRESS NOTE ADULT - SUBJECTIVE AND OBJECTIVE BOX
CHIEF COMPLAINT: Patient is a 76y old  Male who presents with a chief complaint of Hypoxia (11 May 2022 14:47)      INTERVAL EVENTS:     ROS: Seen by bedside during AM rounds     OBJECTIVE:  ICU Vital Signs Last 24 Hrs  T(C): 36.7 (12 May 2022 06:00), Max: 37.1 (11 May 2022 14:03)  T(F): 98 (12 May 2022 06:00), Max: 98.8 (11 May 2022 14:03)  HR: 100 (12 May 2022 07:33) (83 - 104)  BP: 111/75 (12 May 2022 06:00) (111/75 - 137/81)  BP(mean): --  ABP: --  ABP(mean): --  RR: 12 (12 May 2022 06:00) (12 - 23)  SpO2: 100% (12 May 2022 07:33) (97% - 100%)    Mode: AC/ CMV (Assist Control/ Continuous Mandatory Ventilation), RR (machine): 12, TV (machine): 450, FiO2: 30, PEEP: 5, ITime: 0.6, MAP: 8, PIP: 23    05-11 @ 07:01  -  05-12 @ 07:00  --------------------------------------------------------  IN: 2245 mL / OUT: 1225 mL / NET: 1020 mL      CAPILLARY BLOOD GLUCOSE          PHYSICAL EXAM:  General:   HEENT:   Lymph Nodes:  Neck:   Respiratory:   Cardiovascular:   Abdomen:   Extremities:   Skin:   Neurological:  Psychiatry:    Mode: AC/ CMV (Assist Control/ Continuous Mandatory Ventilation)  RR (machine): 12  TV (machine): 450  FiO2: 30  PEEP: 5  ITime: 0.6  MAP: 8  PIP: 23      HOSPITAL MEDICATIONS:  MEDICATIONS  (STANDING):  ALBUTerol    90 MICROgram(s) HFA Inhaler 2 Puff(s) Inhalation every 6 hours  amantadine Syrup 100 milliGRAM(s) Oral two times a day  aMIOdarone    Tablet 200 milliGRAM(s) Oral daily  ascorbic acid 500 milliGRAM(s) Oral daily  chlorhexidine 0.12% Liquid 15 milliLiter(s) Oral Mucosa every 12 hours  chlorhexidine 2% Cloths 1 Application(s) Topical daily  collagenase Ointment 1 Application(s) Topical daily  Dakins Solution - 1/4 Strength 1 Application(s) Topical daily  doxazosin 2 milliGRAM(s) Oral at bedtime  folic acid 1 milliGRAM(s) Oral daily  heparin   Injectable 5000 Unit(s) SubCutaneous every 8 hours  ipratropium 17 MICROgram(s) HFA Inhaler 1 Puff(s) Inhalation every 6 hours  levETIRAcetam 1000 milliGRAM(s) Oral two times a day  levothyroxine 50 MICROGram(s) Oral daily  pantoprazole   Suspension 40 milliGRAM(s) Oral daily  sodium chloride 3%  Inhalation 4 milliLiter(s) Inhalation every 6 hours  tobramycin for Nebulization 300 milliGRAM(s) Inhalation every 12 hours    MEDICATIONS  (PRN):  acetaminophen    Suspension .. 650 milliGRAM(s) Oral every 4 hours PRN Temp greater or equal to 38C (100.4F), Mild Pain (1 - 3)      LABS:                        7.7    10.58 )-----------( 300      ( 12 May 2022 04:45 )             24.8     05-12    139  |  101  |  49<H>  ----------------------------<  117<H>  3.9   |  27  |  0.97    Ca    8.9      12 May 2022 04:45  Phos  3.0     05-12  Mg     2.50     05-12             CHIEF COMPLAINT: Patient is a 76y old  Male who presents with a chief complaint of Hypoxia (11 May 2022 14:47)    INTERVAL EVENTS: overnight no events. Blood tinged oral mucus secretions this morning- monitoring.     ROS: Unable to obtain ROS given patient is nonverbal.     OBJECTIVE:  ICU Vital Signs Last 24 Hrs  T(C): 36.7 (12 May 2022 06:00), Max: 37.1 (11 May 2022 14:03)  T(F): 98 (12 May 2022 06:00), Max: 98.8 (11 May 2022 14:03)  HR: 100 (12 May 2022 07:33) (83 - 104)  BP: 111/75 (12 May 2022 06:00) (111/75 - 137/81)  BP(mean): --  ABP: --  ABP(mean): --  RR: 12 (12 May 2022 06:00) (12 - 23)  SpO2: 100% (12 May 2022 07:33) (97% - 100%)    Mode: AC/ CMV (Assist Control/ Continuous Mandatory Ventilation), RR (machine): 12, TV (machine): 450, FiO2: 30, PEEP: 5, ITime: 0.6, MAP: 8, PIP: 23    05-11 @ 07:01  -  05-12 @ 07:00  --------------------------------------------------------  IN: 2245 mL / OUT: 1225 mL / NET: 1020 mL      CAPILLARY BLOOD GLUCOSE          PHYSICAL EXAM:  General: NAD   Neck: (+) Trach tube noted, site c/d/i.  Cards: S1/S2, no murmurs   Pulm: CTA bilaterally. No wheezes.   Abdomen: Soft, NTND. (+) PEG.  Extremities: No pedal edema. Extremities warm to touch.  Neurology: Arouses to verbal stimuli and tracks examiner. Not following commands. nonverbal.   Skin; refer to RN assessment.         Mode: AC/ CMV (Assist Control/ Continuous Mandatory Ventilation)  RR (machine): 12  TV (machine): 450  FiO2: 30  PEEP: 5  ITime: 0.6  MAP: 8  PIP: 23      HOSPITAL MEDICATIONS:  MEDICATIONS  (STANDING):  ALBUTerol    90 MICROgram(s) HFA Inhaler 2 Puff(s) Inhalation every 6 hours  amantadine Syrup 100 milliGRAM(s) Oral two times a day  aMIOdarone    Tablet 200 milliGRAM(s) Oral daily  ascorbic acid 500 milliGRAM(s) Oral daily  chlorhexidine 0.12% Liquid 15 milliLiter(s) Oral Mucosa every 12 hours  chlorhexidine 2% Cloths 1 Application(s) Topical daily  collagenase Ointment 1 Application(s) Topical daily  Dakins Solution - 1/4 Strength 1 Application(s) Topical daily  doxazosin 2 milliGRAM(s) Oral at bedtime  folic acid 1 milliGRAM(s) Oral daily  heparin   Injectable 5000 Unit(s) SubCutaneous every 8 hours  ipratropium 17 MICROgram(s) HFA Inhaler 1 Puff(s) Inhalation every 6 hours  levETIRAcetam 1000 milliGRAM(s) Oral two times a day  levothyroxine 50 MICROGram(s) Oral daily  pantoprazole   Suspension 40 milliGRAM(s) Oral daily  sodium chloride 3%  Inhalation 4 milliLiter(s) Inhalation every 6 hours  tobramycin for Nebulization 300 milliGRAM(s) Inhalation every 12 hours    MEDICATIONS  (PRN):  acetaminophen    Suspension .. 650 milliGRAM(s) Oral every 4 hours PRN Temp greater or equal to 38C (100.4F), Mild Pain (1 - 3)      LABS:                        7.7    10.58 )-----------( 300      ( 12 May 2022 04:45 )             24.8     05-12    139  |  101  |  49<H>  ----------------------------<  117<H>  3.9   |  27  |  0.97    Ca    8.9      12 May 2022 04:45  Phos  3.0     05-12  Mg     2.50     05-12

## 2022-05-12 NOTE — PROGRESS NOTE ADULT - NS ATTEND AMEND GEN_ALL_CORE FT
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 Completed Zosyn 5/10 and continues on Thierno (6 of 7). Afebrile. Monitor WBC. Placed on PS yesterday and tolerated for few hours - on again this AM. Working on tx to facility in [place].

## 2022-05-12 NOTE — PROGRESS NOTE ADULT - ASSESSMENT
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 GI consulted for anemia     Anemia   PEG flushed/lavaged w/No blood products noted  Protonix 40mg via peg  occult d/t constipated stool/irritated hemorrhoid; stools remain brown  bowel regimen and give anusol suppository qhs as needed  AVOID Hypotensive events to avoid ischemic bleeding events    Constipation, Slow Transit   senna syrup and miralax  milk of mg and enemas as needed  if abd distention, rec to avoid lactulose     HCAP   per RCU/ID       I reviewed the overnight course of events on the unit, re-confirming the patient history. I discussed the care with the patient and their family. The plan of care was discussed with the physician assistant and modifications were made to the notation where appropriate. Differential diagnosis and plan of care discussed with patient after the evaluation. Advanced care planning was discussed with patient and family.  Advanced care planning forms were reviewed and discussed.  Risks, benefits and alternatives of gastroenterologic procedures were discussed in detail and all questions were answered. 35 minutes spent on total encounter of which more than fifty percent of the encounter was spent counseling and/or coordinating care by the attending physician.

## 2022-05-12 NOTE — PROGRESS NOTE ADULT - ASSESSMENT
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     # Neurology   - Currently A&Ox0, awake and alert, able to move RUE and nods/ mouths one or two words per RCU evaluation and prior documentation.   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   - Continue on Modafinil, Amantidine and Keppra.   - Re-eval by PT and patient not a candidate at this time     # Cardiovascular   - Hx of HFpEF 55, mild MR and AR, severe LAD, normal LVRVSF (ECHO 3/7)  - Hx of Atrial Fibrillation s/p Watchman and currently rate controlled.   - Continue on Amiodarone 200mg QD and Coreg 6.25mg BID.   - s/p Lasix resumed complicated by hypotension, now held  - Rpt Pro BNP on (5/8) 5852, CXR done, grossly unchanged from previous imaging    # HEENT  - Large firm mass along left neck noted 4/28  - CT Neck (4/30) with left-sided parotitis and 2 mm left intraparotid stone, although there is no parotid duct dilatation or obstructing stone.  - ENT called and reccs appreciated. Continue with warm compress and massage TID, monitor for any worsening signs.      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   - Initially treated for HCAP and able to wean off vent to TC ATC (4/21-5/3) however sputum now thick and requiring vent support again.   - RPT SCX (5/2 and 5/4) with pansensitive pseudomonas and on ABX as below.   - Continue on Proventil, Atrovent , Hypersal, IPV and Chest PT Q6H  - Monitor secretions, PS/TC as tolerated in the day, suction PRN.     # GI  - Hx of SDH, Gastric Bypass and Dysphagia s/p PEG and continued on TF with course complicated by constipation and melena  - Case discussed with GI and melena likely in setting of constipation, no plan for scope   - Continue on Nepro in sight of hyperkalemia.   - s/p large BM (4/25) and trial of Reglan 10mg TID (4/24 - 4/27) c/b mild diarrhea (5/3).   - s/p Senna and Miralax dc'ed and monitored off with improvement.     # / Renal  - Hx of Urinary Retention with Chronic Garcia and presented FLORIAN likely in setting of dehydration with fevers vs ATN with Sepsis (CRE high 2s and baseline 0.8-0.9).   - UA with hematuria and proteinuria and case discussed with Neprhology with concern for glomerulonephritis. ANCA, ISABELLE and GM ABs negative.   - Hyperkalemia s/p cocktail and switched to Nepro with improvement   - Scr now improving, will continue to monitor off Lasix  - proBNP improved from previous, CXR Left basilar atelectasis/small pleural effusion. Right lung is clear. No focal consolidation bilaterally    # ID  - Recent admission for  Acinetobacter and Pseudomonas HCAP (3/2022)   - SCx (4/10) with  Acinetobacter and Pseudomonas s/p Meropenem (4/8-4/14)   - SCx (4/28) with  Pseudomonas x 2 species   - SCx (5/2 and 5/4) with pansensitive Pseudomonas. Given change in sputum, fevers and ventilatory requirements  - c/w Zosyn (5/3 - until 5/10) and ALONDRA (5/7 - 5/13) - Leukocytosis improving, and no fevers reported for now (7 days treatment)  - SCx 5/10 (+) few GNR, few GPC and GPR  - Repeat Procalc negative 5/11     # Endocrine  - No hx of DM2 and A1C 5.9. Continue to monitor BG,    - Hx of Hypothyroidism and continued on Synthroid. TSH 47 with low T3/T4 and Free T4. TSH 80s (3/2022) and Synthroid increased at NH. Hypothyroidism could be in the setting of Amiodarone use and current Synthroid dose appears to be working.   - Next TFT to be done in June 2022.     # Hematology   - Anemia i/s/o AOCD and s/p PRBC (4/9)   - Melena/ anemia noted and s/p 2 U PRBC (4/19)  with good response, however HH waxes and wanes with no obvious bleed (no further melena or CGE from PEG aspiration)  - DVT PPX with HSQ (cleared by GI to resume).     # Ethics   - FULL CODE   - Case discussed with Cousin/ HCP, Dr. Donna Hagen (Pediatric Neurologist, [phone]) and updated daily.     # DISPO - Planning to go back to [place]. PMR recc ELIZABETH. Family requesting [place] facility and SW aware.

## 2022-05-13 PROCEDURE — 70450 CT HEAD/BRAIN W/O DYE: CPT | Mod: 26

## 2022-05-13 PROCEDURE — 99233 SBSQ HOSP IP/OBS HIGH 50: CPT

## 2022-05-13 RX ADMIN — CHLORHEXIDINE GLUCONATE 1 APPLICATION(S): 213 SOLUTION TOPICAL at 11:36

## 2022-05-13 RX ADMIN — LEVETIRACETAM 1000 MILLIGRAM(S): 250 TABLET, FILM COATED ORAL at 17:23

## 2022-05-13 RX ADMIN — ALBUTEROL 2 PUFF(S): 90 AEROSOL, METERED ORAL at 10:58

## 2022-05-13 RX ADMIN — Medication 1 PUFF(S): at 04:12

## 2022-05-13 RX ADMIN — Medication 100 MILLIGRAM(S): at 17:23

## 2022-05-13 RX ADMIN — CHLORHEXIDINE GLUCONATE 15 MILLILITER(S): 213 SOLUTION TOPICAL at 05:08

## 2022-05-13 RX ADMIN — SODIUM CHLORIDE 4 MILLILITER(S): 9 INJECTION INTRAMUSCULAR; INTRAVENOUS; SUBCUTANEOUS at 10:57

## 2022-05-13 RX ADMIN — Medication 1 APPLICATION(S): at 11:36

## 2022-05-13 RX ADMIN — Medication 300 MILLIGRAM(S): at 10:58

## 2022-05-13 RX ADMIN — LEVETIRACETAM 1000 MILLIGRAM(S): 250 TABLET, FILM COATED ORAL at 05:07

## 2022-05-13 RX ADMIN — Medication 1 PUFF(S): at 16:16

## 2022-05-13 RX ADMIN — Medication 500 MILLIGRAM(S): at 11:36

## 2022-05-13 RX ADMIN — Medication 1 MILLIGRAM(S): at 11:35

## 2022-05-13 RX ADMIN — SODIUM CHLORIDE 4 MILLILITER(S): 9 INJECTION INTRAMUSCULAR; INTRAVENOUS; SUBCUTANEOUS at 22:54

## 2022-05-13 RX ADMIN — HEPARIN SODIUM 5000 UNIT(S): 5000 INJECTION INTRAVENOUS; SUBCUTANEOUS at 13:15

## 2022-05-13 RX ADMIN — HEPARIN SODIUM 5000 UNIT(S): 5000 INJECTION INTRAVENOUS; SUBCUTANEOUS at 05:17

## 2022-05-13 RX ADMIN — CHLORHEXIDINE GLUCONATE 15 MILLILITER(S): 213 SOLUTION TOPICAL at 17:24

## 2022-05-13 RX ADMIN — Medication 1 PUFF(S): at 10:58

## 2022-05-13 RX ADMIN — ALBUTEROL 2 PUFF(S): 90 AEROSOL, METERED ORAL at 04:12

## 2022-05-13 RX ADMIN — ALBUTEROL 2 PUFF(S): 90 AEROSOL, METERED ORAL at 22:54

## 2022-05-13 RX ADMIN — Medication 100 MILLIGRAM(S): at 05:07

## 2022-05-13 RX ADMIN — SODIUM CHLORIDE 4 MILLILITER(S): 9 INJECTION INTRAMUSCULAR; INTRAVENOUS; SUBCUTANEOUS at 16:16

## 2022-05-13 RX ADMIN — SODIUM CHLORIDE 4 MILLILITER(S): 9 INJECTION INTRAMUSCULAR; INTRAVENOUS; SUBCUTANEOUS at 04:12

## 2022-05-13 RX ADMIN — HEPARIN SODIUM 5000 UNIT(S): 5000 INJECTION INTRAVENOUS; SUBCUTANEOUS at 21:31

## 2022-05-13 RX ADMIN — AMIODARONE HYDROCHLORIDE 200 MILLIGRAM(S): 400 TABLET ORAL at 05:08

## 2022-05-13 RX ADMIN — PANTOPRAZOLE SODIUM 40 MILLIGRAM(S): 20 TABLET, DELAYED RELEASE ORAL at 11:36

## 2022-05-13 RX ADMIN — Medication 2 MILLIGRAM(S): at 21:31

## 2022-05-13 RX ADMIN — Medication 50 MICROGRAM(S): at 05:08

## 2022-05-13 RX ADMIN — ALBUTEROL 2 PUFF(S): 90 AEROSOL, METERED ORAL at 16:16

## 2022-05-13 RX ADMIN — Medication 1 PUFF(S): at 22:55

## 2022-05-13 NOTE — PROGRESS NOTE ADULT - ASSESSMENT
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     # Neurology   - Currently A&Ox0, awake and alert, able to move RUE and nods/ mouths one or two words per RCU evaluation and prior documentation.   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   - Continue on Modafinil, Amantidine and Keppra.   - Re-eval by PT and patient not a candidate at this time     # Cardiovascular   - Hx of HFpEF 55, mild MR and AR, severe LAD, normal LVRVSF (ECHO 3/7)  - Hx of Atrial Fibrillation s/p Watchman and currently rate controlled.   - Continue on Amiodarone 200mg QD and Coreg 6.25mg BID.   - s/p Lasix resumed complicated by hypotension, now held  - Rpt Pro BNP on (5/8) 5852, CXR done, grossly unchanged from previous imaging    # HEENT  - Large firm mass along left neck noted 4/28  - CT Neck (4/30) with left-sided parotitis and 2 mm left intraparotid stone, although there is no parotid duct dilatation or obstructing stone.  - ENT called and reccs appreciated. Continue with warm compress and massage TID, monitor for any worsening signs.      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   - Initially treated for HCAP and able to wean off vent to TC ATC (4/21-5/3) however sputum now thick and requiring vent support again.   - RPT SCX (5/2 and 5/4) with pansensitive pseudomonas and on ABX as below.   - Continue on Proventil, Atrovent , Hypersal, IPV and Chest PT Q6H  - Monitor secretions, PS/TC as tolerated in the day, suction PRN.     # GI  - Hx of SDH, Gastric Bypass and Dysphagia s/p PEG and continued on TF with course complicated by constipation and melena  - Case discussed with GI and melena likely in setting of constipation, no plan for scope   - Continue on Nepro in sight of hyperkalemia.   - s/p large BM (4/25) and trial of Reglan 10mg TID (4/24 - 4/27) c/b mild diarrhea (5/3).   - s/p Senna and Miralax dc'ed and monitored off with improvement.     # / Renal  - Hx of Urinary Retention with Chronic Garcia and presented FLORIAN likely in setting of dehydration with fevers vs ATN with Sepsis (CRE high 2s and baseline 0.8-0.9).   - UA with hematuria and proteinuria and case discussed with Neprhology with concern for glomerulonephritis. ANCA, ISABELLE and GM ABs negative.   - Hyperkalemia s/p cocktail and switched to Nepro with improvement   - Scr now improving, will continue to monitor off Lasix  - proBNP improved from previous, CXR Left basilar atelectasis/small pleural effusion. Right lung is clear. No focal consolidation bilaterally    # ID  - Recent admission for  Acinetobacter and Pseudomonas HCAP (3/2022)   - SCx (4/10) with  Acinetobacter and Pseudomonas s/p Meropenem (4/8-4/14)   - SCx (4/28) with  Pseudomonas x 2 species   - SCx (5/2 and 5/4) with pansensitive Pseudomonas. Given change in sputum, fevers and ventilatory requirements  - c/w Zosyn (5/3 - until 5/10) and ALONDRA (5/7 - 5/13) - Leukocytosis improving, and no fevers reported for now (7 days treatment)  - SCx 5/10 (+) few GNR, few GPC and GPR  - Repeat Procalc negative 5/11     # Endocrine  - No hx of DM2 and A1C 5.9. Continue to monitor BG,    - Hx of Hypothyroidism and continued on Synthroid. TSH 47 with low T3/T4 and Free T4. TSH 80s (3/2022) and Synthroid increased at NH. Hypothyroidism could be in the setting of Amiodarone use and current Synthroid dose appears to be working.   - Next TFT to be done in June 2022.     # Hematology   - Anemia i/s/o AOCD and s/p PRBC (4/9)   - Melena/ anemia noted and s/p 2 U PRBC (4/19)  with good response, however HH waxes and wanes with no obvious bleed (no further melena or CGE from PEG aspiration)  - DVT PPX with HSQ (cleared by GI to resume).     # Ethics   - FULL CODE   - Case discussed with Cousin/ HCP, Dr. Donna Hagen (Pediatric Neurologist, [phone]) and updated daily.     # DISPO - Planning to go back to NH. PMR recc EILZABETH. [place] facility and SW aware.    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     # Neurology   - Currently A&Ox0, awake and alert, able to move RUE and nods/ mouths one or two words per RCU evaluation and prior documentation.   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   - Continue on Modafinil, Amantidine and Keppra.   - Re-ordered PT/OT  - Per cousin/HCP (Dr. Hagen) pt is not at baseline. Pt is at baseline more interactive and able to participate more. Rpt CTH ordered 5/13    # Cardiovascular   - Hx of HFpEF 55, mild MR and AR, severe LAD, normal LVRVSF (ECHO 3/7)  - Hx of Atrial Fibrillation s/p Watchman and currently rate controlled.   - Continue on Amiodarone 200mg QD and Coreg 6.25mg BID.   - s/p Lasix resumed complicated by hypotension, now held  - Rpt Pro BNP on (5/8) 5852, CXR done, grossly unchanged from previous imaging    # HEENT  - Large firm mass along left neck noted 4/28  - CT Neck (4/30) with left-sided parotitis and 2 mm left intraparotid stone, although there is no parotid duct dilatation or obstructing stone.  - ENT called and reccs appreciated. Continue with warm compress and massage TID, monitor for any worsening signs.      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   - Initially treated for HCAP and able to wean off vent to TC ATC (4/21-5/3) however sputum now thick and requiring vent support again.   - RPT SCX (5/2 and 5/4) with pansensitive pseudomonas and on ABX as below.   - Continue on Proventil, Atrovent , Hypersal, IPV and Chest PT Q6H  - Monitor secretions, PS/TC as tolerated in the day, suction PRN.     # GI  - Hx of SDH, Gastric Bypass and Dysphagia s/p PEG and continued on TF with course complicated by constipation and melena  - Case discussed with GI and melena likely in setting of constipation, no plan for scope   - Continue on Nepro in sight of hyperkalemia.   - s/p large BM (4/25) and trial of Reglan 10mg TID (4/24 - 4/27) c/b mild diarrhea (5/3).   - s/p Senna and Miralax dc'ed and monitored off with improvement.     # / Renal  - Hx of Urinary Retention with Chronic Garcia and presented FLORIAN likely in setting of dehydration with fevers vs ATN with Sepsis (CRE high 2s and baseline 0.8-0.9).   - UA with hematuria and proteinuria and case discussed with Neprhology with concern for glomerulonephritis. ANCA, ISABELLE and GM ABs negative.   - Hyperkalemia s/p cocktail and switched to Nepro with improvement   - Scr now improving, will continue to monitor off Lasix  - proBNP improved from previous, CXR Left basilar atelectasis/small pleural effusion. Right lung is clear. No focal consolidation bilaterally    # ID  - Recent admission for  Acinetobacter and Pseudomonas HCAP (3/2022)   - SCx (4/10) with  Acinetobacter and Pseudomonas s/p Meropenem (4/8-4/14)   - SCx (4/28) with  Pseudomonas x 2 species   - SCx (5/2 and 5/4) with pansensitive Pseudomonas. Given change in sputum, fevers and ventilatory requirements  - c/w Zosyn (5/3 - until 5/10) and ALONDRA (5/7 - 5/13) - Leukocytosis improving, and no fevers reported for now (7 days treatment)  - SCx 5/10 (+) few GNR, few GPC and GPR  - Repeat Procal negative 5/11     # Endocrine  - No hx of DM2 and A1C 5.9. Continue to monitor BG,    - Hx of Hypothyroidism and continued on Synthroid. TSH 47 with low T3/T4 and Free T4. TSH 80s (3/2022) and Synthroid increased at NH. Hypothyroidism could be in the setting of Amiodarone use and current Synthroid dose appears to be working.   - Next TFT to be done in June 2022.     # Hematology   - Anemia i/s/o AOCD and s/p PRBC (4/9)   - Melena/ anemia noted and s/p 2 U PRBC (4/19)  with good response, however HH waxes and wanes with no obvious bleed (no further melena or CGE from PEG aspiration)  - DVT PPX with HSQ (cleared by GI to resume).     # Ethics   - FULL CODE   - Case discussed with Cousin/ HCP, Dr. Donna Hagen (Pediatric Neurologist, [phone]) and updated daily.     # DISPO - Planning to go back to NH. PMR recc ELIZABETH. [place] facility and SW aware. Cousin updated by fellow on 5/13. Per conversation with Pulm fellow, pt's cousin is requesting a letter regarding pt's mental status and ability to participate in interview as there is an ongoing criminal investigation going on in [place]. Fellow stated she can come in person for letter; likely she will come in on either Sunday or early next week.

## 2022-05-13 NOTE — PROGRESS NOTE ADULT - SUBJECTIVE AND OBJECTIVE BOX
CHIEF COMPLAINT: Patient is a 76y old  Male who presents with a chief complaint of Hypoxia.    Interval Events:    REVIEW OF SYSTEMS:  [ ] All other systems negative  [ ] Unable to assess ROS because ________    Mode: AC/ CMV (Assist Control/ Continuous Mandatory Ventilation), RR (machine): 12, TV (machine): 450, FiO2: 3, PEEP: 5, ITime: 0.6, MAP: 8, PIP: 21      OBJECTIVE:  ICU Vital Signs Last 24 Hrs  T(C): 36.6 (13 May 2022 05:21), Max: 37.1 (13 May 2022 02:26)  T(F): 97.9 (13 May 2022 05:21), Max: 98.7 (13 May 2022 02:26)  HR: 100 (13 May 2022 05:21) (82 - 102)  BP: 116/87 (13 May 2022 05:21) (109/75 - 134/93)  RR: 20 (13 May 2022 05:21) (16 - 21)  SpO2: 100% (13 May 2022 05:21) (97% - 100%)    Mode: AC/ CMV (Assist Control/ Continuous Mandatory Ventilation), RR (machine): 12, TV (machine): 450, FiO2: 3, PEEP: 5, ITime: 0.6, MAP: 8, PIP: 21    05-12 @ 07:01  -  05-13 @ 07:00  --------------------------------------------------------  IN: 1695 mL / OUT: 1150 mL / NET: 545 mL      CAPILLARY BLOOD GLUCOSE      [facility] MEDICATIONS:  MEDICATIONS  (STANDING):  ALBUTerol    90 MICROgram(s) HFA Inhaler 2 Puff(s) Inhalation every 6 hours  amantadine Syrup 100 milliGRAM(s) Oral two times a day  aMIOdarone    Tablet 200 milliGRAM(s) Oral daily  ascorbic acid 500 milliGRAM(s) Oral daily  chlorhexidine 0.12% Liquid 15 milliLiter(s) Oral Mucosa every 12 hours  chlorhexidine 2% Cloths 1 Application(s) Topical daily  collagenase Ointment 1 Application(s) Topical daily  Dakins Solution - 1/4 Strength 1 Application(s) Topical daily  doxazosin 2 milliGRAM(s) Oral at bedtime  folic acid 1 milliGRAM(s) Oral daily  heparin   Injectable 5000 Unit(s) SubCutaneous every 8 hours  ipratropium 17 MICROgram(s) HFA Inhaler 1 Puff(s) Inhalation every 6 hours  levETIRAcetam 1000 milliGRAM(s) Oral two times a day  levothyroxine 50 MICROGram(s) Oral daily  pantoprazole   Suspension 40 milliGRAM(s) Oral daily  sodium chloride 3%  Inhalation 4 milliLiter(s) Inhalation every 6 hours  tobramycin for Nebulization 300 milliGRAM(s) Inhalation every 12 hours    MEDICATIONS  (PRN):  acetaminophen    Suspension .. 650 milliGRAM(s) Oral every 4 hours PRN Temp greater or equal to 38C (100.4F), Mild Pain (1 - 3)      LABS:                        7.7    10.58 )-----------( 300      ( 12 May 2022 04:45 )             24.8     05-12    139  |  101  |  49<H>  ----------------------------<  117<H>  3.9   |  27  |  0.97    Ca    8.9      12 May 2022 04:45  Phos  3.0     05-12  Mg     2.50     05-12      PAST MEDICAL & SURGICAL HISTORY:  Essential hypertension    Primary osteoarthritis, unspecified site    Closed fracture of left radius, initial encounter    Morbid obesity, unspecified obesity type  h/o. - s/p gastric bypass- lost 113 lbs    KAREN (obstructive sleep apnea)  h/o - was on CPAP until gastric bypass surgery      Respiratory failure      Anemia      Subdural hemorrhage      Epilepsy      H/O gastrostomy      History of BPH      Afib      S/P gastric bypass  2008      Status post total replacement of left hip  2006    S/P bunionectomy  bilateral    S/P colonoscopy  approx 2012    History of abdominoplasty  and subsequent cosmetic surgery for removal of excess skin from face    FAMILY HISTORY:  Family history of renal cell carcinoma (Mother)    Family history of malignant melanoma (Sibling)    Social History:    RADIOLOGY:  [ ] Reviewed and interpreted by me    PULMONARY FUNCTION TESTS:    EKG: CHIEF COMPLAINT: Patient is a 76y old  Male who presents with a chief complaint of Hypoxia.    Interval Events: No interval events noted overnight    REVIEW OF SYSTEMS:  [x] Unable to assess ROS because nonverbal and vented.    Mode: AC/ CMV (Assist Control/ Continuous Mandatory Ventilation), RR (machine): 12, TV (machine): 450, FiO2: 3, PEEP: 5, ITime: 0.6, MAP: 8, PIP: 21    OBJECTIVE:  ICU Vital Signs Last 24 Hrs  T(C): 36.6 (13 May 2022 05:21), Max: 37.1 (13 May 2022 02:26)  T(F): 97.9 (13 May 2022 05:21), Max: 98.7 (13 May 2022 02:26)  HR: 100 (13 May 2022 05:21) (82 - 102)  BP: 116/87 (13 May 2022 05:21) (109/75 - 134/93)  RR: 20 (13 May 2022 05:21) (16 - 21)  SpO2: 100% (13 May 2022 05:21) (97% - 100%)    Mode: AC/ CMV (Assist Control/ Continuous Mandatory Ventilation), RR (machine): 12, TV (machine): 450, FiO2: 3, PEEP: 5, ITime: 0.6, MAP: 8, PIP: 21    05-12 @ 07:01  -  05-13 @ 07:00  --------------------------------------------------------  IN: 1695 mL / OUT: 1150 mL / NET: 545 mL      CAPILLARY BLOOD GLUCOSE      [facility] MEDICATIONS:  MEDICATIONS  (STANDING):  ALBUTerol    90 MICROgram(s) HFA Inhaler 2 Puff(s) Inhalation every 6 hours  amantadine Syrup 100 milliGRAM(s) Oral two times a day  aMIOdarone    Tablet 200 milliGRAM(s) Oral daily  ascorbic acid 500 milliGRAM(s) Oral daily  chlorhexidine 0.12% Liquid 15 milliLiter(s) Oral Mucosa every 12 hours  chlorhexidine 2% Cloths 1 Application(s) Topical daily  collagenase Ointment 1 Application(s) Topical daily  Dakins Solution - 1/4 Strength 1 Application(s) Topical daily  doxazosin 2 milliGRAM(s) Oral at bedtime  folic acid 1 milliGRAM(s) Oral daily  heparin   Injectable 5000 Unit(s) SubCutaneous every 8 hours  ipratropium 17 MICROgram(s) HFA Inhaler 1 Puff(s) Inhalation every 6 hours  levETIRAcetam 1000 milliGRAM(s) Oral two times a day  levothyroxine 50 MICROGram(s) Oral daily  pantoprazole   Suspension 40 milliGRAM(s) Oral daily  sodium chloride 3%  Inhalation 4 milliLiter(s) Inhalation every 6 hours  tobramycin for Nebulization 300 milliGRAM(s) Inhalation every 12 hours    MEDICATIONS  (PRN):  acetaminophen    Suspension .. 650 milliGRAM(s) Oral every 4 hours PRN Temp greater or equal to 38C (100.4F), Mild Pain (1 - 3)      LABS:             PAST MEDICAL & SURGICAL HISTORY:  Essential hypertension    Primary osteoarthritis, unspecified site    Closed fracture of left radius, initial encounter    Morbid obesity, unspecified obesity type  h/o. - s/p gastric bypass- lost 113 lbs    KAREN (obstructive sleep apnea)  h/o - was on CPAP until gastric bypass surgery      Respiratory failure      Anemia      Subdural hemorrhage      Epilepsy      H/O gastrostomy      History of BPH      Afib      S/P gastric bypass  2008      Status post total replacement of left hip  2006    S/P bunionectomy  bilateral    S/P colonoscopy  approx 2012    History of abdominoplasty  and subsequent cosmetic surgery for removal of excess skin from face    FAMILY HISTORY:  Family history of renal cell carcinoma (Mother)    Family history of malignant melanoma (Sibling)    Social History:    RADIOLOGY:  [ ] Reviewed and interpreted by me    PULMONARY FUNCTION TESTS:    EKG:

## 2022-05-13 NOTE — PROGRESS NOTE ADULT - NS ATTEND AMEND GEN_ALL_CORE FT
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 Completed Zosyn 5/10 and completing Thierno 5/13/22. Afebrile. Monitor WBC. Mental status waxes/wanes - opens eyes to voice today, appeared to follow simple commands moving thumb/light squeeze with right hand.  PT had signed off as patient was showing no signs of participation. Observed on PS for prolonged period and noted irregular pattern - appropriate TV/rate followed by 20-25sec pause in breathing before resuming regular TV/RR. May not be far off baseline per Dr. Conner's account/review of outside records but will check Head CT to r/o acute process given AMS and irregular breathing pattern.

## 2022-05-14 LAB
ANION GAP SERPL CALC-SCNC: 9 MMOL/L — SIGNIFICANT CHANGE UP (ref 7–14)
BASOPHILS # BLD AUTO: 0.06 K/UL — SIGNIFICANT CHANGE UP (ref 0–0.2)
BASOPHILS NFR BLD AUTO: 0.7 % — SIGNIFICANT CHANGE UP (ref 0–2)
BUN SERPL-MCNC: 54 MG/DL — HIGH (ref 7–23)
CALCIUM SERPL-MCNC: 9 MG/DL — SIGNIFICANT CHANGE UP (ref 8.4–10.5)
CHLORIDE SERPL-SCNC: 103 MMOL/L — SIGNIFICANT CHANGE UP (ref 98–107)
CO2 SERPL-SCNC: 27 MMOL/L — SIGNIFICANT CHANGE UP (ref 22–31)
CREAT SERPL-MCNC: 1.04 MG/DL — SIGNIFICANT CHANGE UP (ref 0.5–1.3)
EGFR: 75 ML/MIN/1.73M2 — SIGNIFICANT CHANGE UP
EOSINOPHIL # BLD AUTO: 0 K/UL — SIGNIFICANT CHANGE UP (ref 0–0.5)
EOSINOPHIL NFR BLD AUTO: 0 % — SIGNIFICANT CHANGE UP (ref 0–6)
GLUCOSE SERPL-MCNC: 128 MG/DL — HIGH (ref 70–99)
HCT VFR BLD CALC: 25.3 % — LOW (ref 39–50)
HGB BLD-MCNC: 8 G/DL — LOW (ref 13–17)
IANC: 6.96 K/UL — SIGNIFICANT CHANGE UP (ref 1.8–7.4)
IMM GRANULOCYTES NFR BLD AUTO: 2.1 % — HIGH (ref 0–1.5)
LYMPHOCYTES # BLD AUTO: 1.36 K/UL — SIGNIFICANT CHANGE UP (ref 1–3.3)
LYMPHOCYTES # BLD AUTO: 15.1 % — SIGNIFICANT CHANGE UP (ref 13–44)
MAGNESIUM SERPL-MCNC: 2.5 MG/DL — SIGNIFICANT CHANGE UP (ref 1.6–2.6)
MCHC RBC-ENTMCNC: 31.6 GM/DL — LOW (ref 32–36)
MCHC RBC-ENTMCNC: 31.6 PG — SIGNIFICANT CHANGE UP (ref 27–34)
MCV RBC AUTO: 100 FL — SIGNIFICANT CHANGE UP (ref 80–100)
MONOCYTES # BLD AUTO: 0.43 K/UL — SIGNIFICANT CHANGE UP (ref 0–0.9)
MONOCYTES NFR BLD AUTO: 4.8 % — SIGNIFICANT CHANGE UP (ref 2–14)
NEUTROPHILS # BLD AUTO: 6.96 K/UL — SIGNIFICANT CHANGE UP (ref 1.8–7.4)
NEUTROPHILS NFR BLD AUTO: 77.3 % — HIGH (ref 43–77)
NRBC # BLD: 0 /100 WBCS — SIGNIFICANT CHANGE UP
NRBC # FLD: 0 K/UL — SIGNIFICANT CHANGE UP
PHOSPHATE SERPL-MCNC: 3.6 MG/DL — SIGNIFICANT CHANGE UP (ref 2.5–4.5)
PLATELET # BLD AUTO: 316 K/UL — SIGNIFICANT CHANGE UP (ref 150–400)
POTASSIUM SERPL-MCNC: 4.2 MMOL/L — SIGNIFICANT CHANGE UP (ref 3.5–5.3)
POTASSIUM SERPL-SCNC: 4.2 MMOL/L — SIGNIFICANT CHANGE UP (ref 3.5–5.3)
RBC # BLD: 2.53 M/UL — LOW (ref 4.2–5.8)
RBC # FLD: 18.8 % — HIGH (ref 10.3–14.5)
SODIUM SERPL-SCNC: 139 MMOL/L — SIGNIFICANT CHANGE UP (ref 135–145)
WBC # BLD: 9 K/UL — SIGNIFICANT CHANGE UP (ref 3.8–10.5)
WBC # FLD AUTO: 9 K/UL — SIGNIFICANT CHANGE UP (ref 3.8–10.5)

## 2022-05-14 PROCEDURE — 99233 SBSQ HOSP IP/OBS HIGH 50: CPT

## 2022-05-14 RX ADMIN — SODIUM CHLORIDE 4 MILLILITER(S): 9 INJECTION INTRAMUSCULAR; INTRAVENOUS; SUBCUTANEOUS at 22:27

## 2022-05-14 RX ADMIN — ALBUTEROL 2 PUFF(S): 90 AEROSOL, METERED ORAL at 16:58

## 2022-05-14 RX ADMIN — HEPARIN SODIUM 5000 UNIT(S): 5000 INJECTION INTRAVENOUS; SUBCUTANEOUS at 05:29

## 2022-05-14 RX ADMIN — Medication 1 APPLICATION(S): at 11:19

## 2022-05-14 RX ADMIN — CHLORHEXIDINE GLUCONATE 1 APPLICATION(S): 213 SOLUTION TOPICAL at 11:19

## 2022-05-14 RX ADMIN — HEPARIN SODIUM 5000 UNIT(S): 5000 INJECTION INTRAVENOUS; SUBCUTANEOUS at 21:22

## 2022-05-14 RX ADMIN — Medication 100 MILLIGRAM(S): at 05:29

## 2022-05-14 RX ADMIN — AMIODARONE HYDROCHLORIDE 200 MILLIGRAM(S): 400 TABLET ORAL at 05:29

## 2022-05-14 RX ADMIN — LEVETIRACETAM 1000 MILLIGRAM(S): 250 TABLET, FILM COATED ORAL at 17:09

## 2022-05-14 RX ADMIN — ALBUTEROL 2 PUFF(S): 90 AEROSOL, METERED ORAL at 03:49

## 2022-05-14 RX ADMIN — CHLORHEXIDINE GLUCONATE 15 MILLILITER(S): 213 SOLUTION TOPICAL at 17:09

## 2022-05-14 RX ADMIN — Medication 500 MILLIGRAM(S): at 11:18

## 2022-05-14 RX ADMIN — ALBUTEROL 2 PUFF(S): 90 AEROSOL, METERED ORAL at 22:26

## 2022-05-14 RX ADMIN — HEPARIN SODIUM 5000 UNIT(S): 5000 INJECTION INTRAVENOUS; SUBCUTANEOUS at 13:35

## 2022-05-14 RX ADMIN — PANTOPRAZOLE SODIUM 40 MILLIGRAM(S): 20 TABLET, DELAYED RELEASE ORAL at 11:18

## 2022-05-14 RX ADMIN — Medication 1 MILLIGRAM(S): at 11:18

## 2022-05-14 RX ADMIN — Medication 1 PUFF(S): at 16:58

## 2022-05-14 RX ADMIN — CHLORHEXIDINE GLUCONATE 15 MILLILITER(S): 213 SOLUTION TOPICAL at 05:28

## 2022-05-14 RX ADMIN — LEVETIRACETAM 1000 MILLIGRAM(S): 250 TABLET, FILM COATED ORAL at 05:29

## 2022-05-14 RX ADMIN — Medication 50 MICROGRAM(S): at 05:29

## 2022-05-14 RX ADMIN — Medication 100 MILLIGRAM(S): at 17:09

## 2022-05-14 RX ADMIN — Medication 1 PUFF(S): at 08:57

## 2022-05-14 RX ADMIN — ALBUTEROL 2 PUFF(S): 90 AEROSOL, METERED ORAL at 08:57

## 2022-05-14 RX ADMIN — SODIUM CHLORIDE 4 MILLILITER(S): 9 INJECTION INTRAMUSCULAR; INTRAVENOUS; SUBCUTANEOUS at 08:56

## 2022-05-14 RX ADMIN — Medication 1 PUFF(S): at 22:27

## 2022-05-14 RX ADMIN — SODIUM CHLORIDE 4 MILLILITER(S): 9 INJECTION INTRAMUSCULAR; INTRAVENOUS; SUBCUTANEOUS at 03:48

## 2022-05-14 RX ADMIN — SODIUM CHLORIDE 4 MILLILITER(S): 9 INJECTION INTRAMUSCULAR; INTRAVENOUS; SUBCUTANEOUS at 16:57

## 2022-05-14 RX ADMIN — Medication 1 PUFF(S): at 03:49

## 2022-05-14 RX ADMIN — Medication 2 MILLIGRAM(S): at 21:22

## 2022-05-14 NOTE — PROGRESS NOTE ADULT - NECK DETAILS
Trach C/D/I
trach to vent
trach to vent
Trach C/D/I
trach with collar
Trach C/D/I
trach to vent
trach to vent
trach with collar
trach with collar
trach to vent

## 2022-05-14 NOTE — PROGRESS NOTE ADULT - ASSESSMENT
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     # Neurology   - Currently A&Ox0, awake and alert, able to move RUE and nods/ mouths one or two words per RCU evaluation and prior documentation.   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   - Continue on Modafinil, Amantidine and Keppra.   - Re-ordered PT/OT  - Per cousin/HCP (Dr. Hagen) pt is not at baseline. Pt is at baseline more interactive and able to participate more. Rpt CTH ordered 5/13    # Cardiovascular   - Hx of HFpEF 55, mild MR and AR, severe LAD, normal LVRVSF (ECHO 3/7)  - Hx of Atrial Fibrillation s/p Watchman and currently rate controlled.   - Continue on Amiodarone 200mg QD and Coreg 6.25mg BID.   - s/p Lasix resumed complicated by hypotension, now held  - Rpt Pro BNP on (5/8) 5852, CXR done, grossly unchanged from previous imaging    # HEENT  - Large firm mass along left neck noted 4/28  - CT Neck (4/30) with left-sided parotitis and 2 mm left intraparotid stone, although there is no parotid duct dilatation or obstructing stone.  - ENT called and reccs appreciated. Continue with warm compress and massage TID, monitor for any worsening signs.      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   - Initially treated for HCAP and able to wean off vent to TC ATC (4/21-5/3) however sputum now thick and requiring vent support again.   - RPT SCX (5/2 and 5/4) with pansensitive pseudomonas and on ABX as below.   - Continue on Proventil, Atrovent , Hypersal, IPV and Chest PT Q6H  - Monitor secretions, PS/TC as tolerated in the day, suction PRN.     # GI  - Hx of SDH, Gastric Bypass and Dysphagia s/p PEG and continued on TF with course complicated by constipation and melena  - Case discussed with GI and melena likely in setting of constipation, no plan for scope   - Continue on Nepro in sight of hyperkalemia.   - s/p large BM (4/25) and trial of Reglan 10mg TID (4/24 - 4/27) c/b mild diarrhea (5/3).   - s/p Senna and Miralax dc'ed and monitored off with improvement.     # / Renal  - Hx of Urinary Retention with Chronic Garcia and presented FLORIAN likely in setting of dehydration with fevers vs ATN with Sepsis (CRE high 2s and baseline 0.8-0.9).   - UA with hematuria and proteinuria and case discussed with Neprhology with concern for glomerulonephritis. ANCA, ISABELLE and GM ABs negative.   - Hyperkalemia s/p cocktail and switched to Nepro with improvement   - Scr now improving, will continue to monitor off Lasix  - proBNP improved from previous, CXR Left basilar atelectasis/small pleural effusion. Right lung is clear. No focal consolidation bilaterally    # ID  - Recent admission for  Acinetobacter and Pseudomonas HCAP (3/2022)   - SCx (4/10) with  Acinetobacter and Pseudomonas s/p Meropenem (4/8-4/14)   - SCx (4/28) with  Pseudomonas x 2 species   - SCx (5/2 and 5/4) with pansensitive Pseudomonas. Given change in sputum, fevers and ventilatory requirements  - c/w Zosyn (5/3 - until 5/10) and ALONDRA (5/7 - 5/13) - Leukocytosis improving, and no fevers reported for now (7 days treatment)  - SCx 5/10 (+) few GNR, few GPC and GPR  - Repeat Procal negative 5/11     # Endocrine  - No hx of DM2 and A1C 5.9. Continue to monitor BG,    - Hx of Hypothyroidism and continued on Synthroid. TSH 47 with low T3/T4 and Free T4. TSH 80s (3/2022) and Synthroid increased at NH. Hypothyroidism could be in the setting of Amiodarone use and current Synthroid dose appears to be working.   - Next TFT to be done in June 2022.     # Hematology   - Anemia i/s/o AOCD and s/p PRBC (4/9)   - Melena/ anemia noted and s/p 2 U PRBC (4/19)  with good response, however HH waxes and wanes with no obvious bleed (no further melena or CGE from PEG aspiration)  - DVT PPX with HSQ (cleared by GI to resume).     # Ethics   - FULL CODE   - Case discussed with Cousin/ HCP, Dr. Donna Hagen (Pediatric Neurologist, [phone]) and updated daily.     # DISPO - Planning to go back to NH. PMR recc ELIZABETH. [place] requesting [place] facility and SW aware. Cousin updated by fellow on 5/13. Per conversation with Pulm fellow, pt's cousin is requesting a letter regarding pt's mental status and ability to participate in interview as there is an ongoing criminal investigation going on in [place]. Fellow stated she can come in person for letter; likely she will come in on either Sunday or early next week.   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     # Neurology   - Currently A&Ox0, awake and alert, able to move RUE and nods/ mouths one or two words per RCU evaluation and prior documentation.   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   - Continue on Modafinil, Amantidine and Keppra.   - Per cousin/HCP (Dr. Hagen) pt is not at baseline. Pt is at baseline more interactive and able to participate more.   - Rpt CTH neg for any acute causes of AMS/difficulty following commands.    # Cardiovascular   - Hx of HFpEF 55, mild MR and AR, severe LAD, normal LVRVSF (ECHO 3/7)  - Hx of Atrial Fibrillation s/p Watchman and currently rate controlled.   - Continue on Amiodarone 200mg QD and Coreg 6.25mg BID.   - s/p Lasix resumed complicated by hypotension, now held  - Rpt Pro BNP on (5/8) 5852, CXR done, grossly unchanged from previous imaging    # HEENT  - Large firm mass along left neck noted 4/28  - CT Neck (4/30) with left-sided parotitis and 2 mm left intraparotid stone, although there is no parotid duct dilatation or obstructing stone.  - ENT called and reccs appreciated. Continue with warm compress and massage TID, monitor for any worsening signs.      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   - Initially treated for HCAP and able to wean off vent to TC ATC (4/21-5/3) however sputum now thick and requiring vent support again.   - RPT SCX (5/2 and 5/4) with pansensitive pseudomonas and on ABX as below.   - Continue on Proventil, Atrovent , Hypersal, IPV and Chest PT Q6H  - Monitor secretions, PS/TC as tolerated in the day, suction PRN.     # GI  - Hx of SDH, Gastric Bypass and Dysphagia s/p PEG and continued on TF with course complicated by constipation and melena  - Case discussed with GI and melena likely in setting of constipation, no plan for scope   - Continue on Nepro in sight of hyperkalemia.   - s/p large BM (4/25) and trial of Reglan 10mg TID (4/24 - 4/27) c/b mild diarrhea (5/3).   - s/p Senna and Miralax dc'ed and monitored off with improvement.     # / Renal  - Hx of Urinary Retention with Chronic Garcia and presented FLORIAN likely in setting of dehydration with fevers vs ATN with Sepsis (CRE high 2s and baseline 0.8-0.9).   - UA with hematuria and proteinuria and case discussed with Neprhology with concern for glomerulonephritis. ANCA, ISABELLE and GM ABs negative.   - Hyperkalemia s/p cocktail and switched to Nepro with improvement   - Scr now improving, will continue to monitor off Lasix  - proBNP improved from previous, CXR Left basilar atelectasis/small pleural effusion. Right lung is clear. No focal consolidation bilaterally    # ID  - Recent admission for  Acinetobacter and Pseudomonas HCAP (3/2022)   - SCx (4/10) with  Acinetobacter and Pseudomonas s/p Meropenem (4/8-4/14)   - SCx (4/28) with  Pseudomonas x 2 species   - SCx (5/2 and 5/4) with pansensitive Pseudomonas. Given change in sputum, fevers and ventilatory requirements  - c/w Zosyn (5/3 - until 5/10) and ALONDRA (5/7 - 5/13) - Leukocytosis improving, and no fevers reported for now (7 days treatment)  - SCx 5/10 (+) few GNR, few GPC and GPR  - Repeat Procal negative 5/11     # Endocrine  - No hx of DM2 and A1C 5.9. Continue to monitor BG,    - Hx of Hypothyroidism and continued on Synthroid. TSH 47 with low T3/T4 and Free T4. TSH 80s (3/2022) and Synthroid increased at NH. Hypothyroidism could be in the setting of Amiodarone use and current Synthroid dose appears to be working.   - Next TFT to be done in June 2022.     # Hematology   - Anemia i/s/o AOCD and s/p PRBC (4/9)   - Melena/ anemia noted and s/p 2 U PRBC (4/19)  with good response, however HH waxes and wanes with no obvious bleed (no further melena or CGE from PEG aspiration)  - DVT PPX with HSQ (cleared by GI to resume).     # Ethics   - FULL CODE     # DISPO - Planning to go back to [place]. PMR recc ELIZABETH. [place] facility and SW aware. Cousin updated by fellow on 5/13. Per conversation with Pulm fellow, pt's cousin is requesting a letter regarding pt's mental status and ability to participate in interview as there is an ongoing criminal investigation going on in [facility]. Fellow stated she can come in person for letter; likely she will come in on either Sunday or early next week.

## 2022-05-14 NOTE — PROGRESS NOTE ADULT - CONSTITUTIONAL DETAILS
well-groomed/no distress
well-groomed/no distress
no distress
well-groomed/no distress
well-groomed/no distress
no distress

## 2022-05-14 NOTE — OCCUPATIONAL THERAPY INITIAL EVALUATION ADULT - REHAB POTENTIAL, OT EVAL
[facility] Hospitalist Progress Note    Date of Service: 2018    Chief Complaint  65 y.o. male admitted 2018 with altered level of consciousness secondary to suspected narcotic overdose    Interval Problem Update    Feels better tolerating diet pain is controlled      Consultants/Specialty  Dr. Hernadez    Disposition  TBD pending PT/OT      Review of Systems   Constitutional: Negative for chills and fever.   HENT: Negative.    Eyes: Negative for blurred vision, discharge and redness.   Respiratory: Positive for cough and shortness of breath (improved). Negative for sputum production and wheezing.    Cardiovascular: Negative for chest pain, palpitations and leg swelling.   Gastrointestinal: Negative for abdominal pain, blood in stool, nausea and vomiting.   Genitourinary: Negative for flank pain and hematuria.   Musculoskeletal: Positive for back pain and joint pain (left shoulder). Negative for myalgias.   Skin: Negative.    Neurological: Negative for dizziness, tingling, speech change, focal weakness, seizures and headaches.   Psychiatric/Behavioral: Negative for hallucinations. The patient is not nervous/anxious.    All other systems reviewed and are negative.     Physical Exam  Laboratory/Imaging   Hemodynamics  Temp (24hrs), Av.5 °C (97.7 °F), Min:36 °C (96.8 °F), Max:36.9 °C (98.4 °F)   Temperature: 36.6 °C (97.9 °F)  Pulse  Av.6  Min: 80  Max: 120 Heart Rate (Monitored): 78  NIBP: 145/75      Respiratory      Respiration: 12, Pulse Oximetry: 93 %     Work Of Breathing / Effort: Mild;Shallow  RUL Breath Sounds: Clear, RML Breath Sounds: Clear, RLL Breath Sounds: Diminished, JAZIEL Breath Sounds: Clear, LLL Breath Sounds: Diminished    Fluids    Intake/Output Summary (Last 24 hours) at 18 1751  Last data filed at 18 1300   Gross per 24 hour   Intake             2170 ml   Output             1100 ml   Net             1070 ml       Nutrition  Orders Placed This Encounter   Procedures   • DIET  ORDER     Standing Status:   Standing     Number of Occurrences:   1     Order Specific Question:   Diet:     Answer:   Diabetic [3]     Physical Exam   Constitutional: He is oriented to person, place, and time. He appears well-developed and well-nourished.   HENT:   Head: Normocephalic and atraumatic.   Mouth/Throat: No oropharyngeal exudate.   Eyes: Right eye exhibits no discharge. Left eye exhibits no discharge. No scleral icterus.   Neck: Neck supple. No JVD present. No tracheal deviation present.   Cardiovascular: Normal rate and regular rhythm.  Exam reveals no gallop and no friction rub.    No murmur heard.  Pulmonary/Chest: Effort normal. No stridor. No respiratory distress. He has decreased breath sounds. He has no wheezes. He has rhonchi. He has no rales. He exhibits no tenderness.   Abdominal: Soft. Bowel sounds are normal. He exhibits no distension. There is no tenderness. There is no rebound.   Musculoskeletal: He exhibits no edema or tenderness.   Left shoulder immobilizer in place   Neurological: He is alert and oriented to person, place, and time. No cranial nerve deficit. He exhibits normal muscle tone.   Skin: Skin is warm and dry. He is not diaphoretic. No cyanosis. Nails show no clubbing.   Psychiatric: He has a normal mood and affect. His behavior is normal. Thought content normal.   Nursing note and vitals reviewed.      Recent Labs      01/26/18   0610  01/27/18 0625 01/28/18 0317   WBC  11.4*  6.3  8.9   RBC  4.82  5.06  5.41   HEMOGLOBIN  14.3  14.7  15.2   HEMATOCRIT  43.0  44.7  48.0   MCV  89.2  88.3  88.7   MCH  29.7  29.1  28.1   MCHC  33.3*  32.9*  31.7*   RDW  50.3*  49.7  49.7   PLATELETCT  182  159*  187   MPV  9.9  9.4  9.4     Recent Labs      01/26/18   0610  01/27/18   0625  01/28/18 0317   SODIUM  138  134*  138   POTASSIUM  4.0  3.6  3.7   CHLORIDE  101  98  99   CO2  30  27  31   GLUCOSE  110*  155*  127*   BUN  12  10  9   CREATININE  0.71  0.62  0.70   CALCIUM  8.6   8.8  9.2         Recent Labs      01/27/18   0625   BNPBTYPENAT  139*              Assessment/Plan     * Acute on chronic respiratory failure with hypoxia (CMS-HCC)   Assessment & Plan    Resolved   back to home baseline oxygen requirements        Chronic pain syndrome   Assessment & Plan    With narcotic dependence baseline regimen as 35 mg of methadone twice a day     Pain is controlled on methadone at 15 mg every 8 hours and when necessary oxycodone  Continue Lyrica        Aspiration pneumonia (CMS-HCC)   Assessment & Plan    Change to Augmentin complete 5 day course          Narcotic overdose   Assessment & Plan    Suspected narcotic overdose  Mr. Pollack was here with a suspected overdose of T40.3 - Methadone; and oxycodone he has no other known overdoses     resolved            Elevated troponin   Assessment & Plan    Likely demand ischemia    Echo:  Moderately reduced left ventricular systolic function.  Left   ventricular ejection fraction is visually estimated to be 45%.  No hemodynamically significant valvular abnormalities identified  Aspirin statin        Altered level of consciousness   Assessment & Plan    Likely secondary to narcotic overdose    resolved        HTN (hypertension)- (present on admission)   Assessment & Plan    Continue  Amlodipine add lisinopril   monitor blood pressure        DM (diabetes mellitus) (CMS-HCC)- (present on admission)   Assessment & Plan    Uncontrolled hyperglycemia   increase Lantus 20 units continue sliding scale insulin    Monitor blood sugars and adjust accordingly            Reviewed items::  Medications reviewed and Labs reviewed  Lutz catheter::  No Lutz  DVT prophylaxis pharmacological::  Enoxaparin (Lovenox)  Antibiotics:  Treating active infection/contamination beyond 24 hours perioperative coverage      PT/OT  D/c planning   OT evaluation only

## 2022-05-14 NOTE — PROGRESS NOTE ADULT - NS ATTEND AMEND GEN_ALL_CORE FT
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     Pt's known baseline neurologic status prior to current hospitalization is awake and can follow 1-step commands with a left facial droop and left spastic hemiparesis with LLE contraction, minimal RLE movement, but with normal use of RUE. Detailed neurologic exam further documented in paper chart from pt's prior d/c. Pt's initial lethargy resolving, now appears to be close to neurologic baseline. However, continues to have episodes of waxing-waning status. MRI Head performed, c/w multiple areas of encephalomalacia and gliosis in the setting of known prior traumatic injury. Neurology consult appreciated, no further w/u indicated while in hospital.  Will c/w amantadine and modafinil. c/w home keppra.     Pt with known atrial fibrillation currently rate/rhythm controlled with Watchman in place. Will c/w home amiodarone and carvedilol. Most recent TTE performed 3/2022 shows grossly normal LVSF with severe LAE in the setting of known AF. Pt hemodynamically stable, will c/w diuresis as needed for clinical euvolemia.    Pt initially p/w acute hypoxemic respiratory failure likely 2/2 PNA with possible mucous plugging found to have  Acinetobacter and Pseudomonas now s/p treatment with Zosyn+ Tobramycin through 5/13. At baseline prior to hospitalization pt required TC QD, MV qhs (30% FiO2.) Doing well with PSV today. Wean O2 supplementation for goal O2 saturation 90-95%. Trach care and suctioning as per RCU team.     Pt with known oropharyngeal dysphagia s/p PEG placement at OSH (1/13/22). Pt now tolerating feeds at goal. Bowel regimen in place. GI ppx with PPI. Pt initially p/w FLORIAN likely pre-renal in etiology now resolved. c/w free H20. Pt also with known chronic indwelling benson and failed TOVs. Pt with known newly dz'ed primary hypothyroidism at [facility] on 3/23, initiated on synthroid 50mcg (3/23 with TSH ~80). TSH on current admission 47.92, showing appropriate response to therapy. Will c/w current treatment.    Pt sees Dr. Haley ([facility], neurology) and Dr. Rajiv Bucio ([facility], EP) on an outpatient basis. Neurosx Dr. Chery. Dr. Jett (PEG, general sx). Pt has a HCP form designating his cousin Dr. Cora Moran ([phone]) as his appointed health care agent.    Dispo pending medical optimization. Pt full code. PT/OT following. Dispo to [facility]. PM&R recs appreciated. Will continue to update pt's sister (Cora, HCP). PT reconsulted.

## 2022-05-14 NOTE — PROGRESS NOTE ADULT - SUBJECTIVE AND OBJECTIVE BOX
CHIEF COMPLAINT: Patient is a 76y old  Male who presents with a chief complaint of Hypoxia.    Interval Events:    REVIEW OF SYSTEMS:  [ ] All other systems negative  [ ] Unable to assess ROS because ________    Mode: AC/ CMV (Assist Control/ Continuous Mandatory Ventilation), RR (machine): 12, TV (machine): 450, FiO2: 24, PEEP: 5, ITime: 0.7, MAP: 8, PIP: 19      OBJECTIVE:  ICU Vital Signs Last 24 Hrs  T(C): 36.6 (14 May 2022 05:29), Max: 36.7 (13 May 2022 17:39)  T(F): 97.8 (14 May 2022 05:29), Max: 98.1 (14 May 2022 01:38)  HR: 95 (14 May 2022 05:29) (81 - 102)  BP: 129/88 (14 May 2022 05:29) (119/82 - 134/85)  RR: 16 (14 May 2022 05:29) (12 - 21)  SpO2: 100% (14 May 2022 05:29) (97% - 100%)    Mode: AC/ CMV (Assist Control/ Continuous Mandatory Ventilation), RR (machine): 12, TV (machine): 450, FiO2: 24, PEEP: 5, ITime: 0.7, MAP: 8, PIP: 19    05-13 @ 07:01  -  05-14 @ 07:00  --------------------------------------------------------  IN: 1665 mL / OUT: 1100 mL / NET: 565 mL      CAPILLARY BLOOD GLUCOSE      HOSPITAL MEDICATIONS:  MEDICATIONS  (STANDING):  ALBUTerol    90 MICROgram(s) HFA Inhaler 2 Puff(s) Inhalation every 6 hours  amantadine Syrup 100 milliGRAM(s) Oral two times a day  aMIOdarone    Tablet 200 milliGRAM(s) Oral daily  ascorbic acid 500 milliGRAM(s) Oral daily  chlorhexidine 0.12% Liquid 15 milliLiter(s) Oral Mucosa every 12 hours  chlorhexidine 2% Cloths 1 Application(s) Topical daily  collagenase Ointment 1 Application(s) Topical daily  Dakins Solution - 1/4 Strength 1 Application(s) Topical daily  doxazosin 2 milliGRAM(s) Oral at bedtime  folic acid 1 milliGRAM(s) Oral daily  heparin   Injectable 5000 Unit(s) SubCutaneous every 8 hours  ipratropium 17 MICROgram(s) HFA Inhaler 1 Puff(s) Inhalation every 6 hours  levETIRAcetam 1000 milliGRAM(s) Oral two times a day  levothyroxine 50 MICROGram(s) Oral daily  pantoprazole   Suspension 40 milliGRAM(s) Oral daily  sodium chloride 3%  Inhalation 4 milliLiter(s) Inhalation every 6 hours    MEDICATIONS  (PRN):  acetaminophen    Suspension .. 650 milliGRAM(s) Oral every 4 hours PRN Temp greater or equal to 38C (100.4F), Mild Pain (1 - 3)      LABS:                        8.0    9.00  )-----------( 316      ( 14 May 2022 06:30 )             25.3     05-14    139  |  103  |  54<H>  ----------------------------<  128<H>  4.2   |  27  |  1.04    Ca    9.0      14 May 2022 06:30  Phos  3.6     05-14  Mg     2.50     05-14      PAST MEDICAL & SURGICAL HISTORY:  Essential hypertension      Primary osteoarthritis, unspecified site      Closed fracture of left radius, initial encounter      Morbid obesity, unspecified obesity type  h/o. - s/p gastric bypass- lost 113 lbs      KAREN (obstructive sleep apnea)  h/o - was on CPAP until gastric bypass surgery      Respiratory failure      Anemia      Subdural hemorrhage      Epilepsy      H/O gastrostomy      History of BPH      Afib      S/P gastric bypass  2008      Status post total replacement of left hip  2006      S/P bunionectomy  bilateral      S/P colonoscopy  approx 2012      History of abdominoplasty  and subsequent cosmetic surgery for removal of excess skin from face      FAMILY HISTORY:  Family history of renal cell carcinoma (Mother)    Family history of malignant melanoma (Sibling)        Social History:      RADIOLOGY:  [ ] Reviewed and interpreted by me    PULMONARY FUNCTION TESTS:    EKG: CHIEF COMPLAINT: Patient is a 76y old  Male who presents with a chief complaint of Hypoxia.    Interval Events: No interval events noted overnight.     REVIEW OF SYSTEMS:  [x] Unable to assess ROS because vented.    Mode: AC/ CMV (Assist Control/ Continuous Mandatory Ventilation), RR (machine): 12, TV (machine): 450, FiO2: 24, PEEP: 5, ITime: 0.7, MAP: 8, PIP: 19    OBJECTIVE:  ICU Vital Signs Last 24 Hrs  T(C): 36.6 (14 May 2022 05:29), Max: 36.7 (13 May 2022 17:39)  T(F): 97.8 (14 May 2022 05:29), Max: 98.1 (14 May 2022 01:38)  HR: 95 (14 May 2022 05:29) (81 - 102)  BP: 129/88 (14 May 2022 05:29) (119/82 - 134/85)  RR: 16 (14 May 2022 05:29) (12 - 21)  SpO2: 100% (14 May 2022 05:29) (97% - 100%)    Mode: AC/ CMV (Assist Control/ Continuous Mandatory Ventilation), RR (machine): 12, TV (machine): 450, FiO2: 24, PEEP: 5, ITime: 0.7, MAP: 8, PIP: 19    05-13 @ 07:01  -  05-14 @ 07:00  --------------------------------------------------------  IN: 1665 mL / OUT: 1100 mL / NET: 565 mL      CAPILLARY BLOOD GLUCOSE      HOSPITAL MEDICATIONS:  MEDICATIONS  (STANDING):  ALBUTerol    90 MICROgram(s) HFA Inhaler 2 Puff(s) Inhalation every 6 hours  amantadine Syrup 100 milliGRAM(s) Oral two times a day  aMIOdarone    Tablet 200 milliGRAM(s) Oral daily  ascorbic acid 500 milliGRAM(s) Oral daily  chlorhexidine 0.12% Liquid 15 milliLiter(s) Oral Mucosa every 12 hours  chlorhexidine 2% Cloths 1 Application(s) Topical daily  collagenase Ointment 1 Application(s) Topical daily  Dakins Solution - 1/4 Strength 1 Application(s) Topical daily  doxazosin 2 milliGRAM(s) Oral at bedtime  folic acid 1 milliGRAM(s) Oral daily  heparin   Injectable 5000 Unit(s) SubCutaneous every 8 hours  ipratropium 17 MICROgram(s) HFA Inhaler 1 Puff(s) Inhalation every 6 hours  levETIRAcetam 1000 milliGRAM(s) Oral two times a day  levothyroxine 50 MICROGram(s) Oral daily  pantoprazole   Suspension 40 milliGRAM(s) Oral daily  sodium chloride 3%  Inhalation 4 milliLiter(s) Inhalation every 6 hours    MEDICATIONS  (PRN):  acetaminophen    Suspension .. 650 milliGRAM(s) Oral every 4 hours PRN Temp greater or equal to 38C (100.4F), Mild Pain (1 - 3)      LABS:                        8.0    9.00  )-----------( 316      ( 14 May 2022 06:30 )             25.3     05-14    139  |  103  |  54<H>  ----------------------------<  128<H>  4.2   |  27  |  1.04    Ca    9.0      14 May 2022 06:30  Phos  3.6     05-14  Mg     2.50     05-14      PAST MEDICAL & SURGICAL HISTORY:  Essential hypertension      Primary osteoarthritis, unspecified site      Closed fracture of left radius, initial encounter      Morbid obesity, unspecified obesity type  h/o. - s/p gastric bypass- lost 113 lbs      KAREN (obstructive sleep apnea)  h/o - was on CPAP until gastric bypass surgery      Respiratory failure      Anemia      Subdural hemorrhage      Epilepsy      H/O gastrostomy      History of BPH      Afib      S/P gastric bypass  2008      Status post total replacement of left hip  2006      S/P bunionectomy  bilateral      S/P colonoscopy  approx 2012      History of abdominoplasty  and subsequent cosmetic surgery for removal of excess skin from face      FAMILY HISTORY:  Family history of renal cell carcinoma (Mother)    Family history of malignant melanoma (Sibling)        Social History:      RADIOLOGY:  [ ] Reviewed and interpreted by me    PULMONARY FUNCTION TESTS:    EKG:

## 2022-05-14 NOTE — PROGRESS NOTE ADULT - CVS HE PE MLT D E PC
regular rate and rhythm/no rub
regular rate and rhythm
regular rate and rhythm/no rub
regular rate and rhythm

## 2022-05-14 NOTE — OCCUPATIONAL THERAPY INITIAL EVALUATION ADULT - ANTICIPATED DISCHARGE DISPOSITION, OT EVAL
Patient unable to follow commands and actively participate in OT at this time, appears to be at baseline. No further OT needs. Please reconsult this discipline with any changes.

## 2022-05-14 NOTE — PROGRESS NOTE ADULT - ADDITIONAL PE
Opens eyes but doesn't track  Noted to follow commands at times moving RUE fingers  At times noted to move legs but nonpurposeful
Opens eyes to command  Able to follow simple commands w/ R hand but w/ poor ROM with hand and fingers.
Doesn't track or follow any commands
A&Ox0  Unable to follow commands

## 2022-05-14 NOTE — PROGRESS NOTE ADULT - SUBJECTIVE AND OBJECTIVE BOX
INTERVAL HPI/OVERNIGHT EVENTS:  No new overnight event.  No N/V/D.  Tolerating diet via peg    Allergies    No Known Allergies    Intolerances    General:  No wt loss, fevers, chills, night sweats, fatigue,   Eyes:  Good vision, no reported pain  ENT:  No sore throat, pain, runny nose, dysphagia  CV:  No pain, palpitations, hypo/hypertension  Resp:  No dyspnea, cough, tachypnea, wheezing  GI:  No pain, No nausea, No vomiting, No diarrhea, No constipation, No weight loss, No fever, No pruritis, No rectal bleeding, No tarry stools, No dysphagia,  :  No pain, bleeding, incontinence, nocturia  Muscle:  No pain, weakness  Neuro:  No weakness, tingling, memory problems  Psych:  No fatigue, insomnia, mood problems, depression  Endocrine:  No polyuria, polydipsia, cold/heat intolerance  Heme:  No petechiae, ecchymosis, easy bruisability  Skin:  No rash, tattoos, scars, edema      PHYSICAL EXAM:   Vital Signs:  Vital Signs Last 24 Hrs  T(C): 36.5 (14 May 2022 13:02), Max: 36.7 (14 May 2022 01:38)  T(F): 97.7 (14 May 2022 13:02), Max: 98.1 (14 May 2022 01:38)  HR: 107 (14 May 2022 16:58) (87 - 107)  BP: 132/86 (14 May 2022 13:02) (121/70 - 132/86)  BP(mean): --  RR: 14 (14 May 2022 13:02) (13 - 18)  SpO2: 100% (14 May 2022 16:58) (98% - 100%)  Daily     Daily I&O's Summary    13 May 2022 07:01  -  14 May 2022 07:00  --------------------------------------------------------  IN: 1665 mL / OUT: 1100 mL / NET: 565 mL    14 May 2022 07:01  -  14 May 2022 18:04  --------------------------------------------------------  IN: 450 mL / OUT: 600 mL / NET: -150 mL        GENERAL:  Appears stated age, well-groomed, well-nourished, no distress  HEENT:  NC/AT,  conjunctivae clear and pink, no thyromegaly, nodules, adenopathy, no JVD, sclera -anicteric  CHEST:  Full & symmetric excursion, no increased effort, breath sounds clear  HEART:  Regular rhythm, S1, S2, no murmur/rub/S3/S4, no abdominal bruit, no edema  ABDOMEN:  Soft, non-tender, non-distended, normoactive bowel sounds,  no masses ,no hepato-splenomegaly, no signs of chronic liver disease  EXTEREMITIES:  no cyanosis,clubbing or edema  SKIN:  No rash/erythema/ecchymoses/petechiae/wounds/abscess/warm/dry  NEURO:  Alert, oriented, no asterixis, no tremor, no encephalopathy      LABS:                        8.0    9.00  )-----------( 316      ( 14 May 2022 06:30 )             25.3     05-14    139  |  103  |  54<H>  ----------------------------<  128<H>  4.2   |  27  |  1.04    Ca    9.0      14 May 2022 06:30  Phos  3.6     05-14  Mg     2.50     05-14          amylase   lipase  RADIOLOGY & ADDITIONAL TESTS:

## 2022-05-14 NOTE — OCCUPATIONAL THERAPY INITIAL EVALUATION ADULT - ADDITIONAL COMMENTS
Social history obtained via chart review. Patient admitted from LTC facility, ventilator dependent. Dependent for all ADLs and mobility.

## 2022-05-14 NOTE — PROGRESS NOTE ADULT - GASTROINTESTINAL DETAILS
soft/nontender/no distention
+ PEG/soft/bowel sounds normal
PEG/soft/no distention
PEG/soft/no distention
soft/no distention
+ PEG/soft
PEG/soft/no distention
soft/nontender/no distention
+ PEG - lavage done with no blood products/soft/no distention
soft/no distention

## 2022-05-14 NOTE — PROGRESS NOTE ADULT - RS GEN PE MLT RESP DETAILS PC
airway patent/breath sounds equal/good air movement
bilat coarse bs/airway patent/breath sounds equal
few coarse bs/airway patent/breath sounds equal
airway patent/breath sounds equal
+ coarse bs  bilat/airway patent/breath sounds equal
airway patent/breath sounds equal/good air movement
airway patent/breath sounds equal/good air movement
airway patent/breath sounds equal
airway patent/breath sounds equal
bilat coarse bs /thick secretions/airway patent/breath sounds equal
airway patent/breath sounds equal
airway patent/breath sounds equal

## 2022-05-14 NOTE — OCCUPATIONAL THERAPY INITIAL EVALUATION ADULT - PERTINENT HX OF CURRENT PROBLEM, REHAB EVAL
76 year old male, A&Ox0 at baseline with history of L SDH c/b L Subfalcine Herniation s/p Emergent R Hemicraniectomy in [place] while traveling fell on marble floor and now chronic with tracheostomy and vent dependant, Dysphagia with PEG, AFIB s/p watchman, Gastric Sleeve, Urinary Retention with Chronic Garcia now presenting with ACHRF i/s/o  Actineobacter and Pseudomonas HCAP, FLORIAN, Melena and Parotitis.

## 2022-05-14 NOTE — PROGRESS NOTE ADULT - EXTREMITIES
detailed exam
detailed exam
No cyanosis, clubbing or edema
detailed exam

## 2022-05-15 LAB — SARS-COV-2 RNA SPEC QL NAA+PROBE: SIGNIFICANT CHANGE UP

## 2022-05-15 PROCEDURE — 99233 SBSQ HOSP IP/OBS HIGH 50: CPT

## 2022-05-15 RX ADMIN — SODIUM CHLORIDE 4 MILLILITER(S): 9 INJECTION INTRAMUSCULAR; INTRAVENOUS; SUBCUTANEOUS at 22:14

## 2022-05-15 RX ADMIN — Medication 50 MICROGRAM(S): at 05:30

## 2022-05-15 RX ADMIN — CHLORHEXIDINE GLUCONATE 15 MILLILITER(S): 213 SOLUTION TOPICAL at 17:17

## 2022-05-15 RX ADMIN — SODIUM CHLORIDE 4 MILLILITER(S): 9 INJECTION INTRAMUSCULAR; INTRAVENOUS; SUBCUTANEOUS at 15:33

## 2022-05-15 RX ADMIN — SODIUM CHLORIDE 4 MILLILITER(S): 9 INJECTION INTRAMUSCULAR; INTRAVENOUS; SUBCUTANEOUS at 04:27

## 2022-05-15 RX ADMIN — CHLORHEXIDINE GLUCONATE 15 MILLILITER(S): 213 SOLUTION TOPICAL at 05:30

## 2022-05-15 RX ADMIN — Medication 1 PUFF(S): at 10:03

## 2022-05-15 RX ADMIN — AMIODARONE HYDROCHLORIDE 200 MILLIGRAM(S): 400 TABLET ORAL at 05:31

## 2022-05-15 RX ADMIN — Medication 1 APPLICATION(S): at 11:01

## 2022-05-15 RX ADMIN — Medication 2 MILLIGRAM(S): at 21:12

## 2022-05-15 RX ADMIN — Medication 1 PUFF(S): at 22:06

## 2022-05-15 RX ADMIN — Medication 1 APPLICATION(S): at 11:02

## 2022-05-15 RX ADMIN — LEVETIRACETAM 1000 MILLIGRAM(S): 250 TABLET, FILM COATED ORAL at 05:30

## 2022-05-15 RX ADMIN — HEPARIN SODIUM 5000 UNIT(S): 5000 INJECTION INTRAVENOUS; SUBCUTANEOUS at 05:30

## 2022-05-15 RX ADMIN — SODIUM CHLORIDE 4 MILLILITER(S): 9 INJECTION INTRAMUSCULAR; INTRAVENOUS; SUBCUTANEOUS at 09:57

## 2022-05-15 RX ADMIN — Medication 1 MILLIGRAM(S): at 11:01

## 2022-05-15 RX ADMIN — HEPARIN SODIUM 5000 UNIT(S): 5000 INJECTION INTRAVENOUS; SUBCUTANEOUS at 21:12

## 2022-05-15 RX ADMIN — HEPARIN SODIUM 5000 UNIT(S): 5000 INJECTION INTRAVENOUS; SUBCUTANEOUS at 13:20

## 2022-05-15 RX ADMIN — LEVETIRACETAM 1000 MILLIGRAM(S): 250 TABLET, FILM COATED ORAL at 17:18

## 2022-05-15 RX ADMIN — ALBUTEROL 2 PUFF(S): 90 AEROSOL, METERED ORAL at 22:06

## 2022-05-15 RX ADMIN — ALBUTEROL 2 PUFF(S): 90 AEROSOL, METERED ORAL at 15:29

## 2022-05-15 RX ADMIN — Medication 100 MILLIGRAM(S): at 05:31

## 2022-05-15 RX ADMIN — ALBUTEROL 2 PUFF(S): 90 AEROSOL, METERED ORAL at 10:03

## 2022-05-15 RX ADMIN — Medication 1 PUFF(S): at 03:39

## 2022-05-15 RX ADMIN — PANTOPRAZOLE SODIUM 40 MILLIGRAM(S): 20 TABLET, DELAYED RELEASE ORAL at 11:01

## 2022-05-15 RX ADMIN — CHLORHEXIDINE GLUCONATE 1 APPLICATION(S): 213 SOLUTION TOPICAL at 11:03

## 2022-05-15 RX ADMIN — Medication 100 MILLIGRAM(S): at 17:16

## 2022-05-15 RX ADMIN — Medication 500 MILLIGRAM(S): at 11:01

## 2022-05-15 RX ADMIN — Medication 1 PUFF(S): at 15:29

## 2022-05-15 RX ADMIN — ALBUTEROL 2 PUFF(S): 90 AEROSOL, METERED ORAL at 03:40

## 2022-05-15 NOTE — PROGRESS NOTE ADULT - ASSESSMENT
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     # Neurology   - Currently A&Ox0, awake and alert, able to move RUE and nods/ mouths one or two words per RCU evaluation and prior documentation.   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   - Continue on Modafinil, Amantidine and Keppra.   - Per cousin/HCP (Dr. Hagen) pt is not at baseline. Pt is at baseline more interactive and able to participate more.   - Rpt CTH neg for any acute causes of AMS/difficulty following commands.    # Cardiovascular   - Hx of HFpEF 55, mild MR and AR, severe LAD, normal LVRVSF (ECHO 3/7)  - Hx of Atrial Fibrillation s/p Watchman and currently rate controlled.   - Continue on Amiodarone 200mg QD and Coreg 6.25mg BID.   - s/p Lasix resumed complicated by hypotension, now held  - Rpt Pro BNP on (5/8) 5852, CXR done, grossly unchanged from previous imaging    # HEENT  - Large firm mass along left neck noted 4/28  - CT Neck (4/30) with left-sided parotitis and 2 mm left intraparotid stone, although there is no parotid duct dilatation or obstructing stone.  - ENT called and reccs appreciated. Continue with warm compress and massage TID, monitor for any worsening signs.      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   - Initially treated for HCAP and able to wean off vent to TC ATC (4/21-5/3) however sputum now thick and requiring vent support again.   - RPT SCX (5/2 and 5/4) with pansensitive pseudomonas and on ABX as below.   - Continue on Proventil, Atrovent , Hypersal, IPV and Chest PT Q6H  - Monitor secretions, PS/TC as tolerated in the day, suction PRN.     # GI  - Hx of SDH, Gastric Bypass and Dysphagia s/p PEG and continued on TF with course complicated by constipation and melena  - Case discussed with GI and melena likely in setting of constipation, no plan for scope   - Continue on Nepro in sight of hyperkalemia.   - s/p large BM (4/25) and trial of Reglan 10mg TID (4/24 - 4/27) c/b mild diarrhea (5/3).   - s/p Senna and Miralax dc'ed and monitored off with improvement.     # / Renal  - Hx of Urinary Retention with Chronic Garcia and presented FLORIAN likely in setting of dehydration with fevers vs ATN with Sepsis (CRE high 2s and baseline 0.8-0.9).   - UA with hematuria and proteinuria and case discussed with Neprhology with concern for glomerulonephritis. ANCA, ISABELLE and GM ABs negative.   - Hyperkalemia s/p cocktail and switched to Nepro with improvement   - Scr now improving, will continue to monitor off Lasix  - proBNP improved from previous, CXR Left basilar atelectasis/small pleural effusion. Right lung is clear. No focal consolidation bilaterally    # ID  - Recent admission for  Acinetobacter and Pseudomonas HCAP (3/2022)   - SCx (4/10) with  Acinetobacter and Pseudomonas s/p Meropenem (4/8-4/14)   - SCx (4/28) with  Pseudomonas x 2 species   - SCx (5/2 and 5/4) with pansensitive Pseudomonas. Given change in sputum, fevers and ventilatory requirements  - c/w Zosyn (5/3 - until 5/10) and ALONDRA (5/7 - 5/13) - Leukocytosis improving, and no fevers reported for now (7 days treatment)  - SCx 5/10 (+) few GNR, few GPC and GPR  - Repeat Procal negative 5/11     # Endocrine  - No hx of DM2 and A1C 5.9. Continue to monitor BG,    - Hx of Hypothyroidism and continued on Synthroid. TSH 47 with low T3/T4 and Free T4. TSH 80s (3/2022) and Synthroid increased at NH. Hypothyroidism could be in the setting of Amiodarone use and current Synthroid dose appears to be working.   - Next TFT to be done in June 2022.     # Hematology   - Anemia i/s/o AOCD and s/p PRBC (4/9)   - Melena/ anemia noted and s/p 2 U PRBC (4/19)  with good response, however HH waxes and wanes with no obvious bleed (no further melena or CGE from PEG aspiration)  - DVT PPX with HSQ (cleared by GI to resume).     # Ethics   - FULL CODE     # DISPO - [place] back to [place]. PMR recc ELIZABETH. [place] requesting [place] facility and SW aware. Cousin updated by fellow on 5/13. Per conversation with Pulm fellow, pt's cousin is requesting a letter regarding pt's mental status and ability to participate in interview as there is an ongoing criminal investigation going on in [facility]. Fellow stated she can come in person for letter; likely she will come in on either Sunday or early next week.   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     # Neurology   - Currently A&Ox0, awake and alert, able to move RUE and nods/ mouths one or two words per RCU evaluation and prior documentation.   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   - Continue on Modafinil, Amantidine and Keppra.   - Per cousin/HCP (Dr. Hagen) pt is not at baseline. Pt is at baseline more interactive and able to participate more.   - Rpt CTH neg for any acute causes of AMS/difficulty following commands.    # Cardiovascular   - Hx of HFpEF 55, mild MR and AR, severe LAD, normal LVRVSF (ECHO 3/7)  - Hx of Atrial Fibrillation s/p Watchman and currently rate controlled.   - Continue on Amiodarone 200mg QD and Coreg 6.25mg BID.   - s/p Lasix resumed complicated by hypotension, now held  - Rpt Pro BNP on (5/8) 5852, CXR done, grossly unchanged from previous imaging    # HEENT  - Large firm mass along left neck noted 4/28  - CT Neck (4/30) with left-sided parotitis and 2 mm left intraparotid stone, although there is no parotid duct dilatation or obstructing stone.  - ENT called and reccs appreciated. Continue with warm compress and massage TID, monitor for any worsening signs.      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   - Initially treated for HCAP and able to wean off vent to TC ATC (4/21-5/3) however sputum now thick and requiring vent support again.   - RPT SCX (5/2 and 5/4) with pansensitive pseudomonas and on ABX as below.   - Continue on Proventil, Atrovent , Hypersal, IPV and Chest PT Q6H  - Monitor secretions, PS/TC as tolerated in the day, suction PRN.     # GI  - Hx of SDH, Gastric Bypass and Dysphagia s/p PEG and continued on TF with course complicated by constipation and melena  - Case discussed with GI and melena likely in setting of constipation, no plan for scope   - Continue on Nepro in sight of hyperkalemia.   - s/p large BM (4/25) and trial of Reglan 10mg TID (4/24 - 4/27) c/b mild diarrhea (5/3).   - s/p Senna and Miralax dc'ed and monitored off with improvement.     # / Renal  - Hx of Urinary Retention with Chronic Garcia and presented FLORIAN likely in setting of dehydration with fevers vs ATN with Sepsis (CRE high 2s and baseline 0.8-0.9).   - UA with hematuria and proteinuria and case discussed with Neprhology with concern for glomerulonephritis. ANCA, ISABELLE and GM ABs negative.   - Hyperkalemia s/p cocktail and switched to Nepro with improvement   - Scr now improving, will continue to monitor off Lasix  - proBNP improved from previous, CXR Left basilar atelectasis/small pleural effusion. Right lung is clear. No focal consolidation bilaterally    # ID  - Recent admission for  Acinetobacter and Pseudomonas HCAP (3/2022)   - SCx (4/10) with  Acinetobacter and Pseudomonas s/p Meropenem (4/8-4/14)   - SCx (4/28) with  Pseudomonas x 2 species   - SCx (5/2 and 5/4) with pansensitive Pseudomonas. Given change in sputum, fevers and ventilatory requirements  - s/p Zosyn (5/3 - 5/10) and ALONDRA (5/7 - 5/13) - Leukocytosis improving, and no fevers reported for now (7 days treatment)  - SCx 5/10 (+) few GNR, few GPC and GPR  - Repeat Procal negative 5/11     # Endocrine  - No hx of DM2 and A1C 5.9. Continue to monitor BG,    - Hx of Hypothyroidism and continued on Synthroid. TSH 47 with low T3/T4 and Free T4. TSH 80s (3/2022) and Synthroid increased at NH. Hypothyroidism could be in the setting of Amiodarone use and current Synthroid dose appears to be working.   - Next TFT to be done in June 2022.     # Hematology   - Anemia i/s/o AOCD and s/p PRBC (4/9)   - Melena/ anemia noted and s/p 2 U PRBC (4/19)  with good response, however HH waxes and wanes with no obvious bleed (no further melena or CGE from PEG aspiration)  - DVT PPX with HSQ (cleared by GI to resume).     # Ethics   - FULL CODE     # DISPO - [place] back [place]. PMR recc ELIZABETH. Family requesting [place] facility and SW aware. Cousin updated by fellow on 5/13. Per conversation with Pulm fellow, pt's cousin is requesting a letter regarding pt's mental status and ability to participate in interview as there is an ongoing criminal investigation going on in [facility]. Fellow stated she can come in person for letter; likely she will come in on either Sunday or early next week.
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
## 2022-05-15 NOTE — PROGRESS NOTE ADULT - SUBJECTIVE AND OBJECTIVE BOX
CHIEF COMPLAINT:Patient is a 76y old  Male who presents with a chief complaint of Hypoxia (14 May 2022 18:04)      INTERVAL EVENTS:     ROS: Seen by bedside during AM rounds     OBJECTIVE:  ICU Vital Signs Last 24 Hrs  T(C): 36.7 (15 May 2022 05:15), Max: 36.8 (14 May 2022 17:08)  T(F): 98 (15 May 2022 05:15), Max: 98.3 (14 May 2022 21:20)  HR: 95 (15 May 2022 07:34) (82 - 107)  BP: 130/83 (15 May 2022 05:15) (117/86 - 139/91)  BP(mean): --  ABP: --  ABP(mean): --  RR: 18 (15 May 2022 05:15) (13 - 18)  SpO2: 100% (15 May 2022 07:34) (98% - 100%)    Mode: AC/ CMV (Assist Control/ Continuous Mandatory Ventilation), RR (machine): 12, TV (machine): 450, FiO2: 24, PEEP: 5, ITime: 0.72, MAP: 9, PIP: 23    05-14 @ 07:01  -  05-15 @ 07:00  --------------------------------------------------------  IN: 1590 mL / OUT: 1450 mL / NET: 140 mL      CAPILLARY BLOOD GLUCOSE          PHYSICAL EXAM:  General:   HEENT:   Lymph Nodes:  Neck:   Respiratory:   Cardiovascular:   Abdomen:   Extremities:   Skin:   Neurological:  Psychiatry:    Mode: AC/ CMV (Assist Control/ Continuous Mandatory Ventilation)  RR (machine): 12  TV (machine): 450  FiO2: 24  PEEP: 5  ITime: 0.72  MAP: 9  PIP: 23      HOSPITAL MEDICATIONS:  MEDICATIONS  (STANDING):  ALBUTerol    90 MICROgram(s) HFA Inhaler 2 Puff(s) Inhalation every 6 hours  amantadine Syrup 100 milliGRAM(s) Oral two times a day  aMIOdarone    Tablet 200 milliGRAM(s) Oral daily  ascorbic acid 500 milliGRAM(s) Oral daily  chlorhexidine 0.12% Liquid 15 milliLiter(s) Oral Mucosa every 12 hours  chlorhexidine 2% Cloths 1 Application(s) Topical daily  collagenase Ointment 1 Application(s) Topical daily  Dakins Solution - 1/4 Strength 1 Application(s) Topical daily  doxazosin 2 milliGRAM(s) Oral at bedtime  folic acid 1 milliGRAM(s) Oral daily  heparin   Injectable 5000 Unit(s) SubCutaneous every 8 hours  ipratropium 17 MICROgram(s) HFA Inhaler 1 Puff(s) Inhalation every 6 hours  levETIRAcetam 1000 milliGRAM(s) Oral two times a day  levothyroxine 50 MICROGram(s) Oral daily  pantoprazole   Suspension 40 milliGRAM(s) Oral daily  sodium chloride 3%  Inhalation 4 milliLiter(s) Inhalation every 6 hours    MEDICATIONS  (PRN):  acetaminophen    Suspension .. 650 milliGRAM(s) Oral every 4 hours PRN Temp greater or equal to 38C (100.4F), Mild Pain (1 - 3)      LABS:                        8.0    9.00  )-----------( 316      ( 14 May 2022 06:30 )             25.3     05-14    139  |  103  |  54<H>  ----------------------------<  128<H>  4.2   |  27  |  1.04    Ca    9.0      14 May 2022 06:30  Phos  3.6     05-14  Mg     2.50     05-14             CHIEF COMPLAINT:Patient is a 76y old  Male who presents with a chief complaint of Hypoxia (14 May 2022 18:04)      INTERVAL EVENTS: no overnight events noted.     ROS: Unable to obtain ROS given patient is nonverbal in s/o trach.     OBJECTIVE:  ICU Vital Signs Last 24 Hrs  T(C): 36.7 (15 May 2022 05:15), Max: 36.8 (14 May 2022 17:08)  T(F): 98 (15 May 2022 05:15), Max: 98.3 (14 May 2022 21:20)  HR: 95 (15 May 2022 07:34) (82 - 107)  BP: 130/83 (15 May 2022 05:15) (117/86 - 139/91)  BP(mean): --  ABP: --  ABP(mean): --  RR: 18 (15 May 2022 05:15) (13 - 18)  SpO2: 100% (15 May 2022 07:34) (98% - 100%)    Mode: AC/ CMV (Assist Control/ Continuous Mandatory Ventilation), RR (machine): 12, TV (machine): 450, FiO2: 24, PEEP: 5, ITime: 0.72, MAP: 9, PIP: 23    05-14 @ 07:01  -  05-15 @ 07:00  --------------------------------------------------------  IN: 1590 mL / OUT: 1450 mL / NET: 140 mL      CAPILLARY BLOOD GLUCOSE          PHYSICAL EXAM:  General: NAD   Neck: (+) Trach tube noted, site c/d/i.  Cards: S1/S2, no murmurs   Pulm: CTA bilaterally. No wheezes.   Abdomen: Soft, NTND. (+) PEG noted, site c/d/i.   Extremities: No pedal edema. Extremities warm to touch. Intact distal pulses.   Neurology: AOx3 with no focal neurological deficits  Skin: refer to Rn assessment.     Mode: AC/ CMV (Assist Control/ Continuous Mandatory Ventilation)  RR (machine): 12  TV (machine): 450  FiO2: 24  PEEP: 5  ITime: 0.72  MAP: 9  PIP: 23      HOSPITAL MEDICATIONS:  MEDICATIONS  (STANDING):  ALBUTerol    90 MICROgram(s) HFA Inhaler 2 Puff(s) Inhalation every 6 hours  amantadine Syrup 100 milliGRAM(s) Oral two times a day  aMIOdarone    Tablet 200 milliGRAM(s) Oral daily  ascorbic acid 500 milliGRAM(s) Oral daily  chlorhexidine 0.12% Liquid 15 milliLiter(s) Oral Mucosa every 12 hours  chlorhexidine 2% Cloths 1 Application(s) Topical daily  collagenase Ointment 1 Application(s) Topical daily  Dakins Solution - 1/4 Strength 1 Application(s) Topical daily  doxazosin 2 milliGRAM(s) Oral at bedtime  folic acid 1 milliGRAM(s) Oral daily  heparin   Injectable 5000 Unit(s) SubCutaneous every 8 hours  ipratropium 17 MICROgram(s) HFA Inhaler 1 Puff(s) Inhalation every 6 hours  levETIRAcetam 1000 milliGRAM(s) Oral two times a day  levothyroxine 50 MICROGram(s) Oral daily  pantoprazole   Suspension 40 milliGRAM(s) Oral daily  sodium chloride 3%  Inhalation 4 milliLiter(s) Inhalation every 6 hours    MEDICATIONS  (PRN):  acetaminophen    Suspension .. 650 milliGRAM(s) Oral every 4 hours PRN Temp greater or equal to 38C (100.4F), Mild Pain (1 - 3)      LABS:                        8.0    9.00  )-----------( 316      ( 14 May 2022 06:30 )             25.3     05-14    139  |  103  |  54<H>  ----------------------------<  128<H>  4.2   |  27  |  1.04    Ca    9.0      14 May 2022 06:30  Phos  3.6     05-14  Mg     2.50     05-14

## 2022-05-15 NOTE — PROGRESS NOTE ADULT - SUBJECTIVE AND OBJECTIVE BOX
INTERVAL HPI/OVERNIGHT EVENTS:  No new overnight event.  No N/V/D.  Tolerating diet.    Allergies    No Known Allergies    Intolerance      General:  No wt loss, fevers, chills, night sweats, fatigue,   Eyes:  Good vision, no reported pain  ENT:  No sore throat, pain, runny nose, dysphagia  CV:  No pain, palpitations, hypo/hypertension  Resp:  No dyspnea, cough, tachypnea, wheezing  GI:  No pain, No nausea, No vomiting, No diarrhea, No constipation, No weight loss, No fever, No pruritis, No rectal bleeding, No tarry stools, No dysphagia,  :  No pain, bleeding, incontinence, nocturia  Muscle:  No pain, weakness  Neuro:  No weakness, tingling, memory problems  Psych:  No fatigue, insomnia, mood problems, depression  Endocrine:  No polyuria, polydipsia, cold/heat intolerance  Heme:  No petechiae, ecchymosis, easy bruisability  Skin:  No rash, tattoos, scars, edema      PHYSICAL EXAM:   Vital Signs:  Vital Signs Last 24 Hrs  T(C): 36.8 (15 May 2022 16:00), Max: 37.1 (15 May 2022 12:00)  T(F): 98.2 (15 May 2022 16:00), Max: 98.7 (15 May 2022 12:00)  HR: 92 (15 May 2022 16:00) (86 - 99)  BP: 133/94 (15 May 2022 16:00) (117/86 - 139/91)  BP(mean): 107 (15 May 2022 16:00) (107 - 109)  RR: 16 (15 May 2022 16:00) (12 - 18)  SpO2: 100% (15 May 2022 16:00) (97% - 100%)  Daily     Daily Weight in k.1 (15 May 2022 05:15)I&O's Summary    14 May 2022 07:01  -  15 May 2022 07:00  --------------------------------------------------------  IN: 1590 mL / OUT: 1450 mL / NET: 140 mL    15 May 2022 07:01  -  15 May 2022 19:10  --------------------------------------------------------  IN: 495 mL / OUT: 610 mL / NET: -115 mL        GENERAL:  Appears stated age, well-groomed, well-nourished, no distress  HEENT:  NC/AT,  conjunctivae clear and pink, no thyromegaly, nodules, adenopathy, no JVD, sclera -anicteric  CHEST:  Full & symmetric excursion, no increased effort, breath sounds clear  HEART:  Regular rhythm, S1, S2, no murmur/rub/S3/S4, no abdominal bruit, no edema  ABDOMEN:  Soft, non-tender, non-distended, normoactive bowel sounds,  no masses ,no hepato-splenomegaly, no signs of chronic liver disease  EXTEREMITIES:  no cyanosis,clubbing or edema  SKIN:  No rash/erythema/ecchymoses/petechiae/wounds/abscess/warm/dry  NEURO:  Alert, oriented, no asterixis, no tremor, no encephalopathy      LABS:                        8.0    9.00  )-----------( 316      ( 14 May 2022 06:30 )             25.3     05-14    139  |  103  |  54<H>  ----------------------------<  128<H>  4.2   |  27  |  1.04    Ca    9.0      14 May 2022 06:30  Phos  3.6     05-14  Mg     2.50     05-14          amylase   lipase  RADIOLOGY & ADDITIONAL TESTS:

## 2022-05-16 LAB
ANION GAP SERPL CALC-SCNC: 13 MMOL/L — SIGNIFICANT CHANGE UP (ref 7–14)
BASOPHILS # BLD AUTO: 0.09 K/UL — SIGNIFICANT CHANGE UP (ref 0–0.2)
BASOPHILS NFR BLD AUTO: 0.8 % — SIGNIFICANT CHANGE UP (ref 0–2)
BUN SERPL-MCNC: 51 MG/DL — HIGH (ref 7–23)
CALCIUM SERPL-MCNC: 8.9 MG/DL — SIGNIFICANT CHANGE UP (ref 8.4–10.5)
CHLORIDE SERPL-SCNC: 107 MMOL/L — SIGNIFICANT CHANGE UP (ref 98–107)
CO2 SERPL-SCNC: 25 MMOL/L — SIGNIFICANT CHANGE UP (ref 22–31)
CREAT SERPL-MCNC: 1.04 MG/DL — SIGNIFICANT CHANGE UP (ref 0.5–1.3)
EGFR: 75 ML/MIN/1.73M2 — SIGNIFICANT CHANGE UP
EOSINOPHIL # BLD AUTO: 0.01 K/UL — SIGNIFICANT CHANGE UP (ref 0–0.5)
EOSINOPHIL NFR BLD AUTO: 0.1 % — SIGNIFICANT CHANGE UP (ref 0–6)
GLUCOSE SERPL-MCNC: 127 MG/DL — HIGH (ref 70–99)
HCT VFR BLD CALC: 28.1 % — LOW (ref 39–50)
HGB BLD-MCNC: 8.7 G/DL — LOW (ref 13–17)
IANC: 9.45 K/UL — HIGH (ref 1.8–7.4)
IMM GRANULOCYTES NFR BLD AUTO: 1.2 % — SIGNIFICANT CHANGE UP (ref 0–1.5)
LYMPHOCYTES # BLD AUTO: 1.42 K/UL — SIGNIFICANT CHANGE UP (ref 1–3.3)
LYMPHOCYTES # BLD AUTO: 12.2 % — LOW (ref 13–44)
MAGNESIUM SERPL-MCNC: 2.5 MG/DL — SIGNIFICANT CHANGE UP (ref 1.6–2.6)
MCHC RBC-ENTMCNC: 31 GM/DL — LOW (ref 32–36)
MCHC RBC-ENTMCNC: 32.2 PG — SIGNIFICANT CHANGE UP (ref 27–34)
MCV RBC AUTO: 104.1 FL — HIGH (ref 80–100)
MONOCYTES # BLD AUTO: 0.57 K/UL — SIGNIFICANT CHANGE UP (ref 0–0.9)
MONOCYTES NFR BLD AUTO: 4.9 % — SIGNIFICANT CHANGE UP (ref 2–14)
NEUTROPHILS # BLD AUTO: 9.45 K/UL — HIGH (ref 1.8–7.4)
NEUTROPHILS NFR BLD AUTO: 80.8 % — HIGH (ref 43–77)
NRBC # BLD: 0 /100 WBCS — SIGNIFICANT CHANGE UP
NRBC # FLD: 0 K/UL — SIGNIFICANT CHANGE UP
PHOSPHATE SERPL-MCNC: 3.4 MG/DL — SIGNIFICANT CHANGE UP (ref 2.5–4.5)
PLATELET # BLD AUTO: 358 K/UL — SIGNIFICANT CHANGE UP (ref 150–400)
POTASSIUM SERPL-MCNC: 4.6 MMOL/L — SIGNIFICANT CHANGE UP (ref 3.5–5.3)
POTASSIUM SERPL-SCNC: 4.6 MMOL/L — SIGNIFICANT CHANGE UP (ref 3.5–5.3)
RBC # BLD: 2.7 M/UL — LOW (ref 4.2–5.8)
RBC # FLD: 19.5 % — HIGH (ref 10.3–14.5)
SODIUM SERPL-SCNC: 145 MMOL/L — SIGNIFICANT CHANGE UP (ref 135–145)
WBC # BLD: 11.68 K/UL — HIGH (ref 3.8–10.5)
WBC # FLD AUTO: 11.68 K/UL — HIGH (ref 3.8–10.5)

## 2022-05-16 PROCEDURE — 99233 SBSQ HOSP IP/OBS HIGH 50: CPT

## 2022-05-16 PROCEDURE — 99232 SBSQ HOSP IP/OBS MODERATE 35: CPT

## 2022-05-16 RX ORDER — SODIUM CHLORIDE 9 MG/ML
4 INJECTION INTRAMUSCULAR; INTRAVENOUS; SUBCUTANEOUS EVERY 12 HOURS
Refills: 0 | Status: DISCONTINUED | OUTPATIENT
Start: 2022-05-16 | End: 2022-05-17

## 2022-05-16 RX ADMIN — CHLORHEXIDINE GLUCONATE 1 APPLICATION(S): 213 SOLUTION TOPICAL at 11:40

## 2022-05-16 RX ADMIN — SODIUM CHLORIDE 4 MILLILITER(S): 9 INJECTION INTRAMUSCULAR; INTRAVENOUS; SUBCUTANEOUS at 03:42

## 2022-05-16 RX ADMIN — ALBUTEROL 2 PUFF(S): 90 AEROSOL, METERED ORAL at 22:10

## 2022-05-16 RX ADMIN — HEPARIN SODIUM 5000 UNIT(S): 5000 INJECTION INTRAVENOUS; SUBCUTANEOUS at 21:20

## 2022-05-16 RX ADMIN — HEPARIN SODIUM 5000 UNIT(S): 5000 INJECTION INTRAVENOUS; SUBCUTANEOUS at 13:19

## 2022-05-16 RX ADMIN — Medication 2 MILLIGRAM(S): at 21:20

## 2022-05-16 RX ADMIN — Medication 100 MILLIGRAM(S): at 17:11

## 2022-05-16 RX ADMIN — HEPARIN SODIUM 5000 UNIT(S): 5000 INJECTION INTRAVENOUS; SUBCUTANEOUS at 05:02

## 2022-05-16 RX ADMIN — Medication 1 PUFF(S): at 10:19

## 2022-05-16 RX ADMIN — SODIUM CHLORIDE 4 MILLILITER(S): 9 INJECTION INTRAMUSCULAR; INTRAVENOUS; SUBCUTANEOUS at 22:11

## 2022-05-16 RX ADMIN — Medication 1 APPLICATION(S): at 11:41

## 2022-05-16 RX ADMIN — Medication 1 PUFF(S): at 03:42

## 2022-05-16 RX ADMIN — PANTOPRAZOLE SODIUM 40 MILLIGRAM(S): 20 TABLET, DELAYED RELEASE ORAL at 11:41

## 2022-05-16 RX ADMIN — ALBUTEROL 2 PUFF(S): 90 AEROSOL, METERED ORAL at 03:42

## 2022-05-16 RX ADMIN — CHLORHEXIDINE GLUCONATE 15 MILLILITER(S): 213 SOLUTION TOPICAL at 05:03

## 2022-05-16 RX ADMIN — CHLORHEXIDINE GLUCONATE 15 MILLILITER(S): 213 SOLUTION TOPICAL at 17:11

## 2022-05-16 RX ADMIN — Medication 500 MILLIGRAM(S): at 11:40

## 2022-05-16 RX ADMIN — Medication 1 PUFF(S): at 15:33

## 2022-05-16 RX ADMIN — AMIODARONE HYDROCHLORIDE 200 MILLIGRAM(S): 400 TABLET ORAL at 05:03

## 2022-05-16 RX ADMIN — Medication 1 MILLIGRAM(S): at 11:41

## 2022-05-16 RX ADMIN — Medication 50 MICROGRAM(S): at 05:03

## 2022-05-16 RX ADMIN — ALBUTEROL 2 PUFF(S): 90 AEROSOL, METERED ORAL at 10:19

## 2022-05-16 RX ADMIN — Medication 100 MILLIGRAM(S): at 05:03

## 2022-05-16 RX ADMIN — LEVETIRACETAM 1000 MILLIGRAM(S): 250 TABLET, FILM COATED ORAL at 17:11

## 2022-05-16 RX ADMIN — LEVETIRACETAM 1000 MILLIGRAM(S): 250 TABLET, FILM COATED ORAL at 05:03

## 2022-05-16 RX ADMIN — Medication 1 PUFF(S): at 22:09

## 2022-05-16 RX ADMIN — ALBUTEROL 2 PUFF(S): 90 AEROSOL, METERED ORAL at 15:33

## 2022-05-16 RX ADMIN — SODIUM CHLORIDE 4 MILLILITER(S): 9 INJECTION INTRAMUSCULAR; INTRAVENOUS; SUBCUTANEOUS at 10:18

## 2022-05-16 NOTE — PROGRESS NOTE ADULT - NS ATTEND AMEND GEN_ALL_CORE FT
76 M with history of chronic atrial fibrillation, obesity, hypertension, hyperlipidemia, and traumatic brain injury c/b large hemispheric acute subdural hematoma s/p emergency hemicraniectomy and subdural evacuation followed by cranioplasty with prolonged hospitalization c/b pseudomonas pneumonia, c diff colitis, non-convulsive seizures, and chronic respiratory failure s/p tracheostomy now here with sepsis and acute on chronic hypoxemic / hypercapnic respiratory failure due to pneumonia. Course c/b acute kidney injury, left parotitis. Patient mental status is worse than his baseline. MRI Head shows multiple areas of encephalomalacia and gliosis in the setting of known prior traumatic injury. Neurology input noted. No further work up planned while in hospital. Continue amantadine, keppra. Continue amiodarone for AF. Completed course of Zosyn and Tobramycin for  acinetobacter and pseudomonas pneumonia. Continue ventilatory support, airway clearance, PST as tolerates. Continue enteral feeds, PPI. On levothyroxine for hypothyroidism. Discharge planning in process. Plan for discharge ot [facility]. PMR input noted. 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 Course c/b acute kidney injury, left parotitis. Patient mental status is worse than his baseline. MRI Head shows multiple areas of encephalomalacia and gliosis in the setting of known prior traumatic injury. Neurology input noted. No further work up planned while in hospital. Continue amantadine, keppra. Continue amiodarone for AF. Completed course of Zosyn and Tobramycin for  acinetobacter and pseudomonas pneumonia. Continue ventilatory support, airway clearance, PST as tolerates. Continue enteral feeds, PPI. On levothyroxine for hypothyroidism. Discharge planning in process. Plan for discharge ot [facility]. PMR input noted.

## 2022-05-16 NOTE — PROGRESS NOTE ADULT - SUBJECTIVE AND OBJECTIVE BOX
INTERVAL HPI/OVERNIGHT EVENTS:    no rectal bleed  tolerating feeds    MEDICATIONS  (STANDING):  ALBUTerol    90 MICROgram(s) HFA Inhaler 2 Puff(s) Inhalation every 6 hours  amantadine Syrup 100 milliGRAM(s) Oral two times a day  aMIOdarone    Tablet 200 milliGRAM(s) Oral daily  ascorbic acid 500 milliGRAM(s) Oral daily  chlorhexidine 0.12% Liquid 15 milliLiter(s) Oral Mucosa every 12 hours  chlorhexidine 2% Cloths 1 Application(s) Topical daily  collagenase Ointment 1 Application(s) Topical daily  Dakins Solution - 1/4 Strength 1 Application(s) Topical daily  doxazosin 2 milliGRAM(s) Oral at bedtime  folic acid 1 milliGRAM(s) Oral daily  heparin   Injectable 5000 Unit(s) SubCutaneous every 8 hours  ipratropium 17 MICROgram(s) HFA Inhaler 1 Puff(s) Inhalation every 6 hours  levETIRAcetam 1000 milliGRAM(s) Oral two times a day  levothyroxine 50 MICROGram(s) Oral daily  pantoprazole   Suspension 40 milliGRAM(s) Oral daily  sodium chloride 3%  Inhalation 4 milliLiter(s) Inhalation every 6 hours    MEDICATIONS  (PRN):  acetaminophen    Suspension .. 650 milliGRAM(s) Oral every 4 hours PRN Temp greater or equal to 38C (100.4F), Mild Pain (1 - 3)      Allergies    No Known Allergies    Intolerances        Review of Systems: *pt minimally verbal to nonverbal, unable to obtain ROS         Vital Signs Last 24 Hrs  T(C): 37.1 (16 May 2022 11:59), Max: 37.1 (15 May 2022 21:10)  T(F): 98.7 (16 May 2022 11:59), Max: 98.7 (15 May 2022 21:10)  HR: 107 (16 May 2022 11:59) (89 - 116)  BP: 126/91 (16 May 2022 11:59) (126/91 - 139/91)  BP(mean): 107 (15 May 2022 16:00) (107 - 107)  RR: 17 (16 May 2022 11:59) (16 - 18)  SpO2: 98% (16 May 2022 11:59) (97% - 100%)    PHYSICAL EXAM:    Constitutional: NAD  HEENT: EOMI, throat clear  Neck: No LAD, supple +trach  Respiratory: CTA and P  Cardiovascular: S1 and S2, RRR, no M  Gastrointestinal: BS+, soft, NT/ND, neg HSM, +peg  Extremities: No peripheral edema, neg clubbing, cyanosis  Vascular: 2+ peripheral pulses  Neurological: A/O x 0  Psychiatric: Normal mood, normal affect  Skin: No rashes      LABS:                        8.7    11.68 )-----------( 358      ( 16 May 2022 06:30 )             28.1     05-16    145  |  107  |  51<H>  ----------------------------<  127<H>  4.6   |  25  |  1.04    Ca    8.9      16 May 2022 06:30  Phos  3.4     05-16  Mg     2.50     05-16            RADIOLOGY & ADDITIONAL TESTS:

## 2022-05-16 NOTE — PROGRESS NOTE ADULT - SUBJECTIVE AND OBJECTIVE BOX
Patient is a 76y old  Male who presents with a chief complaint of Hypoxia (16 May 2022 08:04)      HPI:  77 y/o M with afib s/p watchman, gastric sleeve, SDH, 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     Patient sent in from nursing home for increasing oxygen requirements and worsening mental status. Per report, patient on 30% FiO2 normally, however has required 40% at NH and noted to be tachypneic. Patient seen and examined at bedside. Currently on vent at 30% FiO2, appearing comfortable. Patient remains non-verbal however is awake and alert.     On arrival to ED, patient noted to be febrile to 101.9 however was able to be titrated back down to baseline of 30% FiO2. Given 1g tylenol, 1L NS, and vancomycin/meropenem in ED. Patient to be admitted to RCU for further management. (09 Apr 2022 00:40)    PT attempted follow up on 5/14 however patient was not able to follow commands.    REVIEW OF SYSTEMS unable to participate in ROS    PAST MEDICAL & SURGICAL HISTORY  Essential hypertension    Primary osteoarthritis, unspecified site    Closed fracture of left radius, initial encounter    Morbid obesity, unspecified obesity type    AKREN (obstructive sleep apnea)    Respiratory failure    Anemia    Subdural hemorrhage    Epilepsy    H/O gastrostomy    History of BPH    Afib    S/P gastric bypass    Status post total replacement of left hip    S/P bunionectomy    S/P colonoscopy    History of abdominoplasty         CURRENT FUNCTIONAL STATUS  unable to participate in bedside therapy    FAMILY HISTORY   Family history of renal cell carcinoma (Mother)    Family history of malignant melanoma (Sibling)        RECENT LABS/IMAGING  CBC Full  -  ( 16 May 2022 06:30 )  WBC Count : 11.68 K/uL  RBC Count : 2.70 M/uL  Hemoglobin : 8.7 g/dL  Hematocrit : 28.1 %  Platelet Count - Automated : 358 K/uL  Mean Cell Volume : 104.1 fL  Mean Cell Hemoglobin : 32.2 pg  Mean Cell Hemoglobin Concentration : 31.0 gm/dL  Auto Neutrophil # : 9.45 K/uL  Auto Lymphocyte # : 1.42 K/uL  Auto Monocyte # : 0.57 K/uL  Auto Eosinophil # : 0.01 K/uL  Auto Basophil # : 0.09 K/uL  Auto Neutrophil % : 80.8 %  Auto Lymphocyte % : 12.2 %  Auto Monocyte % : 4.9 %  Auto Eosinophil % : 0.1 %  Auto Basophil % : 0.8 %    05-16    145  |  107  |  51<H>  ----------------------------<  127<H>  4.6   |  25  |  1.04    Ca    8.9      16 May 2022 06:30  Phos  3.4     05-16  Mg     2.50     05-16          VITALS  T(C): 36.7 (05-16-22 @ 05:00), Max: 37.1 (05-15-22 @ 12:00)  HR: 100 (05-16-22 @ 10:59) (89 - 116)  BP: 139/91 (05-16-22 @ 05:00) (130/79 - 139/91)  RR: 16 (05-16-22 @ 05:00) (12 - 18)  SpO2: 99% (05-16-22 @ 10:59) (97% - 100%)  Wt(kg): --    ALLERGIES  No Known Allergies      MEDICATIONS   acetaminophen    Suspension .. 650 milliGRAM(s) Oral every 4 hours PRN  ALBUTerol    90 MICROgram(s) HFA Inhaler 2 Puff(s) Inhalation every 6 hours  amantadine Syrup 100 milliGRAM(s) Oral two times a day  aMIOdarone    Tablet 200 milliGRAM(s) Oral daily  ascorbic acid 500 milliGRAM(s) Oral daily  chlorhexidine 0.12% Liquid 15 milliLiter(s) Oral Mucosa every 12 hours  chlorhexidine 2% Cloths 1 Application(s) Topical daily  collagenase Ointment 1 Application(s) Topical daily  Dakins Solution - 1/4 Strength 1 Application(s) Topical daily  doxazosin 2 milliGRAM(s) Oral at bedtime  folic acid 1 milliGRAM(s) Oral daily  heparin   Injectable 5000 Unit(s) SubCutaneous every 8 hours  ipratropium 17 MICROgram(s) HFA Inhaler 1 Puff(s) Inhalation every 6 hours  levETIRAcetam 1000 milliGRAM(s) Oral two times a day  levothyroxine 50 MICROGram(s) Oral daily  pantoprazole   Suspension 40 milliGRAM(s) Oral daily  sodium chloride 3%  Inhalation 4 milliLiter(s) Inhalation every 6 hours      ----------------------------------------------------------------------------------------  PHYSICAL EXAM  Constitutional - NAD   HEENT- vent  Chest - no respiratory distress  Cardiovascular - RRR, S1S2  Abdomen -+PEG  + benson   Neurologic Exam -                    Cognitive - awakens to voice, closes eyes to command however delayed       Communication -nods slightly in response to some questions     Cranial Nerves - no facial asymmetry, limited participation in cn exam     Motor -exam limited      Sensory - unable to assess     Balance - WNL Static  Psychiatric - calm  ----------------------------------------------------------------------------------------  ASSESSMENT/PLAN  75 yo m pmh sdh, chronic trach/PEG p/w recurrent HCAP  Keppra  DVT PPX - heparin  levofloxacin  npo with tube feed  lethargy: amantadine   bedside PT and OT reattempted however patient limited by lack of command following and lethargy  Rehab -  goal is for subacute rehab when medically cleared if able to follow commands    communicated with family last week who reports patient follows commands when family is present.  Recommend reattempt PT once following commands, or when family able to attend to assist with participation.  continue nursing ROM, exercise in bed, please recall bedside therapy if improving.

## 2022-05-16 NOTE — PROGRESS NOTE ADULT - ASSESSMENT
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     # Neurology   - Currently A&Ox0, awake and alert, able to move RUE and nods/ mouths one or two words per RCU evaluation and prior documentation.   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   - Continue on Modafinil, Amantidine and Keppra.   - Per cousin/HCP (Dr. Hagen) pt is not at baseline. Pt is at baseline more interactive and able to participate more.   - Rpt CTH neg for any acute causes of AMS/difficulty following commands.    # Cardiovascular   - Hx of HFpEF 55, mild MR and AR, severe LAD, normal LVRVSF (ECHO 3/7)  - Hx of Atrial Fibrillation s/p Watchman and currently rate controlled.   - Continue on Amiodarone 200mg QD and Coreg 6.25mg BID.   - s/p Lasix resumed complicated by hypotension, now held  - Rpt Pro BNP on (5/8) 5852, CXR done, grossly unchanged from previous imaging    # HEENT  - Large firm mass along left neck noted 4/28  - CT Neck (4/30) with left-sided parotitis and 2 mm left intraparotid stone, although there is no parotid duct dilatation or obstructing stone.  - ENT called and reccs appreciated. Continue with warm compress and massage TID, monitor for any worsening signs.      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   - Initially treated for HCAP and able to wean off vent to TC ATC (4/21-5/3) however sputum now thick and requiring vent support again.   - RPT SCX (5/2 and 5/4) with pansensitive pseudomonas and on ABX as below.   - Continue on Proventil, Atrovent , Hypersal, IPV and Chest PT Q6H  - Monitor secretions, PS/TC as tolerated in the day, suction PRN.     # GI  - Hx of SDH, Gastric Bypass and Dysphagia s/p PEG and continued on TF with course complicated by constipation and melena  - Case discussed with GI and melena likely in setting of constipation, no plan for scope   - Continue on Nepro in sight of hyperkalemia.   - s/p large BM (4/25) and trial of Reglan 10mg TID (4/24 - 4/27) c/b mild diarrhea (5/3).   - s/p Senna and Miralax dc'ed and monitored off with improvement.     # / Renal  - Hx of Urinary Retention with Chronic Garcia and presented FLORIAN likely in setting of dehydration with fevers vs ATN with Sepsis (CRE high 2s and baseline 0.8-0.9).   - UA with hematuria and proteinuria and case discussed with Neprhology with concern for glomerulonephritis. ANCA, ISABELLE and GM ABs negative.   - Hyperkalemia s/p cocktail and switched to Nepro with improvement   - Scr now improving, will continue to monitor off Lasix  - proBNP improved from previous, CXR Left basilar atelectasis/small pleural effusion. Right lung is clear. No focal consolidation bilaterally    # ID  - Recent admission for  Acinetobacter and Pseudomonas HCAP (3/2022)   - SCx (4/10) with  Acinetobacter and Pseudomonas s/p Meropenem (4/8-4/14)   - SCx (4/28) with  Pseudomonas x 2 species   - SCx (5/2 and 5/4) with pansensitive Pseudomonas. Given change in sputum, fevers and ventilatory requirements  - s/p Zosyn (5/3 - 5/10) and ALONDRA (5/7 - 5/13) - Leukocytosis improving, and no fevers reported for now (7 days treatment)  - SCx 5/10 (+) few GNR, few GPC and GPR  - Repeat Procal negative 5/11     # Endocrine  - No hx of DM2 and A1C 5.9. Continue to monitor BG,    - Hx of Hypothyroidism and continued on Synthroid. TSH 47 with low T3/T4 and Free T4. TSH 80s (3/2022) and Synthroid increased at NH. Hypothyroidism could be in the setting of Amiodarone use and current Synthroid dose appears to be working.   - Next TFT to be done in June 2022.     # Hematology   - Anemia i/s/o AOCD and s/p PRBC (4/9)   - Melena/ anemia noted and s/p 2 U PRBC (4/19)  with good response, however HH waxes and wanes with no obvious bleed (no further melena or CGE from PEG aspiration)  - DVT PPX with HSQ (cleared by GI to resume).     # Ethics   - FULL CODE     # DISPO - Planning to go back to [place]. PMR recc ELIZABETH. [place] requesting [place] facility and SW aware. Cousin updated by fellow on 5/13. Per conversation with Pulm fellow, pt's cousin is requesting a letter regarding pt's mental status and ability to participate in interview as there is an ongoing criminal investigation going on in [facility]. Fellow stated she can come in person for letter; likely she will come in on either Sunday or early next week.   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     # Neurology   - Currently A&Ox0, awake and alert, able to move RUE and nods/ mouths one or two words per RCU evaluation and prior documentation.   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   - Continue on Modafinil, Amantidine and Keppra.   - Per cousin/HCP (Dr. Hagen) pt is not at baseline. Pt is at baseline more interactive and able to participate more.   - Rpt CTH neg for any acute causes of AMS/difficulty following commands.    # Cardiovascular   - Hx of HFpEF 55, mild MR and AR, severe LAD, normal LVRVSF (ECHO 3/7)  - Hx of Atrial Fibrillation s/p Watchman and currently rate controlled.   - Continue on Amiodarone 200mg QD and Coreg 6.25mg BID.   - s/p Lasix resumed complicated by hypotension, now held  - Rpt Pro BNP on (5/8) 5852, CXR done, grossly unchanged from previous imaging    # HEENT  - Large firm mass along left neck noted 4/28  - CT Neck (4/30) with left-sided parotitis and 2 mm left intraparotid stone, although there is no parotid duct dilatation or obstructing stone.  - ENT called and reccs appreciated. Continue with warm compress and massage TID, monitor for any worsening signs.      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   - Initially treated for HCAP and able to wean off vent to TC ATC (4/21-5/3) however sputum now thick and requiring vent support again.   - RPT SCX (5/2 and 5/4) with pansensitive pseudomonas and on ABX as below.   - Continue on Proventil, Atrovent , Hypersal, IPV and Chest PT Q6H  - Monitor secretions, PS/TC as tolerated in the day, suction PRN.     # GI  - Hx of SDH, Gastric Bypass and Dysphagia s/p PEG and continued on TF with course complicated by constipation and melena  - Case discussed with GI and melena likely in setting of constipation, no plan for scope   - Continue on Nepro in sight of hyperkalemia.   - s/p large BM (4/25) and trial of Reglan 10mg TID (4/24 - 4/27) c/b mild diarrhea (5/3).   - s/p Senna and Miralax dc'ed and monitored off with improvement.     # / Renal  - Hx of Urinary Retention with Chronic Garcia and presented FLORIAN likely in setting of dehydration with fevers vs ATN with Sepsis (CRE high 2s and baseline 0.8-0.9).   - UA with hematuria and proteinuria and case discussed with Neprhology with concern for glomerulonephritis. ANCA, ISABELLE and GM ABs negative.   - Hyperkalemia s/p cocktail and switched to Nepro with improvement   - Scr now improving, will continue to monitor off Lasix  - proBNP improved from previous, CXR Left basilar atelectasis/small pleural effusion. Right lung is clear. No focal consolidation bilaterally    # ID  - Recent admission for  Acinetobacter and Pseudomonas HCAP (3/2022)   - SCx (4/10) with  Acinetobacter and Pseudomonas s/p Meropenem (4/8-4/14)   - SCx (4/28) with  Pseudomonas x 2 species   - SCx (5/2 and 5/4) with pansensitive Pseudomonas. Given change in sputum, fevers and ventilatory requirements  - s/p Zosyn (5/3 - 5/10) and ALONDRA (5/7 - 5/13) - Leukocytosis improving, and no fevers reported for now (7 days treatment)  - SCx 5/10 (+) few GNR, few GPC and GPR  - Repeat Procal negative 5/11     # Endocrine  - No hx of DM2 and A1C 5.9. Continue to monitor BG,    - Hx of Hypothyroidism and continued on Synthroid. TSH 47 with low T3/T4 and Free T4. TSH 80s (3/2022) and Synthroid increased at NH. Hypothyroidism could be in the setting of Amiodarone use and current Synthroid dose appears to be working.   - Next TFT to be done in June 2022.     # Hematology   - Anemia i/s/o AOCD and s/p PRBC (4/9)   - Melena/ anemia noted and s/p 2 U PRBC (4/19)  with good response, however HH waxes and wanes with no obvious bleed (no further melena or CGE from PEG aspiration)  - DVT PPX with HSQ (cleared by GI to resume).     # Ethics   - FULL CODE     # DISPO - Planning to go back to [place]. PMR recc ELIZABETH. [place] facility and SW aware. Cousin updated by fellow on 5/13. Per conversation with Pulm fellow, pt's cousin is requesting a letter regarding pt's mental status and ability to participate in interview as there is an ongoing criminal investigation going on in [facility]. Fellow stated she can come in person for letter; likely she will come in on either Sunday or early next week.

## 2022-05-16 NOTE — PROGRESS NOTE ADULT - SUBJECTIVE AND OBJECTIVE BOX
[facility]-- WOUND TEAM -- FOLLOW UP NOTE  --------------------------------------------------------------------------------    subjective: Patient was seen and examined at bedside.  Nonverbal, unable to provide subjective data due to mental status    Interval HPI/24 hour events: no acute events overnight.  Chart reviewed including labs and relevant images      Diet:  Diet, NPO with Tube Feed:   Tube Feeding Modality: Gastrostomy  Nepro with Carb Steady (NEPRORTH)  Total Volume for 24 Hours (mL): 1080  Continuous  Starting Tube Feed Rate mL per Hour: 25  Increase Tube Feed Rate by (mL): 10     Every 4 hours  Until Goal Tube Feed Rate (mL per Hour): 45  Tube Feed Duration (in Hours): 24  Tube Feed Start Time: 15:00  No Carb Prosource (1pkg = 15gms Protein)     Qty per Day:  2 (04-19-22 @ 14:40)      ROS: pt unable to offer    ALLERGIES & MEDICATIONS  --------------------------------------------------------------------------------  Allergies    No Known Allergies    Intolerances          STANDING INPATIENT MEDICATIONS    ALBUTerol    90 MICROgram(s) HFA Inhaler 2 Puff(s) Inhalation every 6 hours  amantadine Syrup 100 milliGRAM(s) Oral two times a day  aMIOdarone    Tablet 200 milliGRAM(s) Oral daily  ascorbic acid 500 milliGRAM(s) Oral daily  chlorhexidine 0.12% Liquid 15 milliLiter(s) Oral Mucosa every 12 hours  chlorhexidine 2% Cloths 1 Application(s) Topical daily  collagenase Ointment 1 Application(s) Topical daily  Dakins Solution - 1/4 Strength 1 Application(s) Topical daily  doxazosin 2 milliGRAM(s) Oral at bedtime  folic acid 1 milliGRAM(s) Oral daily  heparin   Injectable 5000 Unit(s) SubCutaneous every 8 hours  ipratropium 17 MICROgram(s) HFA Inhaler 1 Puff(s) Inhalation every 6 hours  levETIRAcetam 1000 milliGRAM(s) Oral two times a day  levothyroxine 50 MICROGram(s) Oral daily  pantoprazole   Suspension 40 milliGRAM(s) Oral daily  sodium chloride 3%  Inhalation 4 milliLiter(s) Inhalation every 12 hours      PRN INPATIENT MEDICATION  acetaminophen    Suspension .. 650 milliGRAM(s) Oral every 4 hours PRN        Vital signs:  T(C): 37.1 (05-16-22 @ 17:09), Max: 37.1 (05-15-22 @ 21:10)  HR: 95 (05-16-22 @ 17:09) (89 - 116)  BP: 133/93 (05-16-22 @ 17:09) (126/91 - 139/91)  RR: 17 (05-16-22 @ 17:09) (16 - 18)  SpO2: 98% (05-16-22 @ 17:09) (97% - 100%)  Wt(kg): --        05-15-22 @ 07:01  -  05-16-22 @ 07:00  --------------------------------------------------------  IN: 1635 mL / OUT: 1410 mL / NET: 225 mL    05-16-22 @ 07:01  -  05-16-22 @ 18:27  --------------------------------------------------------  IN: 540 mL / OUT: 550 mL / NET: -10 mL      Physical Exam   Constitutional: NAD, A&Ox0, non verbal, awake, eyes open spontaneously  Well groomed.  (+) low airloss support surface, (+) fluidized positioning devices, (+) complete cair boots  HT: NC/AT,  trach in place, peristomal skin intact  Cardiovascular: rate regular per monitor  Respiratory: vent dependent via trach, nonlabored, equal chest rise  Gastrointestinal: soft NT/ND, PEG peritubular skin intact, enteral feeds  : indwelling benson catheter  Neurology: unable to follow commands   Musculoskeletal:  limited, functional quadriplegic  Vascular: BLE equally warm, no overt ischemia noted, hemosiderin staining to left leg   Skin: moist   Sacrum-  stage 4 pressure injury, turned to right side.  [id number] (prev [id number]). Undermining from 3-6 o'clock extends 4cm at 6 o'clock remains stable. No fluctuance, no induration, no drainable collection, no crepitus. Tissue type with 40% granulation tissue and 60% yellow/tan  firmly attach moist slough. Bone in close proximity, not visible. Extending from wound edge from 11- 6 o'clock is darkened dermis. Scant serofibrinous drainage, no odor. Periwound skin no edema, no erythema, no increased warmth noted. Mild maceration circumferentially.   Dressing applied: Liquid barrier film to periwound skin, collagenase to wound base, lightly pack with moistened saline gauze and dry gauze with Tegaderm.   Psych: calm.         LABS/ CULTURES/ RADIOLOGY:              8.7    11.68 >-----------<  358      [05-16-22 @ 06:30]              28.1     145  |  107  |  51  ----------------------------<  127      [05-16-22 @ 06:30]  4.6   |  25  |  1.04        Ca     8.9     [05-16-22 @ 06:30]      Mg     2.50     [05-16-22 @ 06:30]      Phos  3.4     [05-16-22 @ 06:30]        A1C with Estimated Average Glucose Result: 5.9 % (04-09-22 @ 06:43)

## 2022-05-16 NOTE — PROGRESS NOTE ADULT - ASSESSMENT
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     Wound f/u to assist with management of sacral stage 4 pressure injury    Sacrum stage 4 pressure injury s/p selective debulking debridement   -s/p selective debulking debridement on 5/9/22  -Wound base remains with slightly less adherent slough, appears clean. Bone in close proximity, not visible  -no fluctuance, no induration, no crepitus, no drainable collection. No edema, no erythema, no increased warmth  -no s/s of acute skin infection/cellulitis  - will continue cleansing with dakins and enzymatic debridement with santyl (collagenase).  -no sharp debridement indicated today.  -CT abdomen/pelvis 4/8/22, findings without mention of osteomyelitis, defer remaining findings to primary team.  -Topical recommendations: cleanse with Dakins 1/4 strength. Rinse with NS. collagenase rama thickness, lightly pack with moistened gauze, change daily.  -of note patient previously incontinent loose stool; if continues or reoccurs  reapply  external fecal pouch  -offload pressure; low airloss support surface, t&p per protocol with use of fluidized positioning devices.  -perineal care per protocol, single breathable incontinence pad.  -Nutrition recommendations appreciated for optimization as tolerated in patient with increased nutritional needs, on enteral feeds via PEG, sacrum stage 4 pressure injury; receiving no carb prosource 2 packets/day         consider MVI promote wound healing, receiving vitamin c     Dysphagia  -Peritubular skin of PEG- Cleanse q shift with NS, apply liquid barrier film beneath kishor disc.  If redness noted under kishor disc bumper apply thin foam  dressing without border (Mepilex Lite)- cut to mid dressing with a 'Y' shape.   Secondary securement to abdomen taping in 'H' fashion with Steri-strips.   -enteral feeds per primary team.    Trach   - Tracheostomy- Cleanse around trach site with NS. Pat dry. Apply Silicone foam dressing without border beneath trach collar, cut mid dressing to "Y" shape. Change foam dressing every shift and prn. Change trach ties every 3 days.     Remainder of care per primary team  Will follow periodically throughout hospitalization. Please reconsult earlier if needed.      Upon discharge follow up at outpatient [facility]. [address] [place] [address]. [phone].    Will continue to follow periodically while inpatient.  Thank you.    Findings and plan discussed with RN and with primary team.  DEBBIE Bryant, CWOC    pager #[phone]    If after 4PM or before 7:30AM on Mon-Friday or weekend/holiday please contact general surgery for urgent matters.   Team A- [id number]   Team B- [id number]  For non-urgent matters e-mail [email or web address]    I spent 35 minutes face-to-face with this patient of which more than 50% of the time was spent counseling/coordinating care of this patient.

## 2022-05-16 NOTE — PROGRESS NOTE ADULT - SUBJECTIVE AND OBJECTIVE BOX
CHIEF COMPLAINT: Patient is a 76y old  Male who presents with a chief complaint of Hypoxia (15 May 2022 19:08)      INTERVAL EVENTS:     ROS: Seen by bedside during AM rounds     OBJECTIVE:  ICU Vital Signs Last 24 Hrs  T(C): 36.7 (16 May 2022 05:00), Max: 37.1 (15 May 2022 12:00)  T(F): 98 (16 May 2022 05:00), Max: 98.7 (15 May 2022 12:00)  HR: 100 (16 May 2022 07:18) (89 - 100)  BP: 139/91 (16 May 2022 05:00) (130/79 - 139/91)  BP(mean): 107 (15 May 2022 16:00) (107 - 109)  ABP: --  ABP(mean): --  RR: 16 (16 May 2022 05:00) (12 - 18)  SpO2: 100% (16 May 2022 07:18) (97% - 100%)    Mode: AC/ CMV (Assist Control/ Continuous Mandatory Ventilation), RR (machine): 12, TV (machine): 450, FiO2: 30, PEEP: 5, ITime: 0.72, MAP: 8, PIP: 19    05-15 @ 07:01  -  05-16 @ 07:00  --------------------------------------------------------  IN: 1635 mL / OUT: 1410 mL / NET: 225 mL      CAPILLARY BLOOD GLUCOSE          PHYSICAL EXAM:  General:   HEENT:   Lymph Nodes:  Neck:   Respiratory:   Cardiovascular:   Abdomen:   Extremities:   Skin:   Neurological:  Psychiatry:    Mode: AC/ CMV (Assist Control/ Continuous Mandatory Ventilation)  RR (machine): 12  TV (machine): 450  FiO2: 30  PEEP: 5  ITime: 0.72  MAP: 8  PIP: 19      HOSPITAL MEDICATIONS:  MEDICATIONS  (STANDING):  ALBUTerol    90 MICROgram(s) HFA Inhaler 2 Puff(s) Inhalation every 6 hours  amantadine Syrup 100 milliGRAM(s) Oral two times a day  aMIOdarone    Tablet 200 milliGRAM(s) Oral daily  ascorbic acid 500 milliGRAM(s) Oral daily  chlorhexidine 0.12% Liquid 15 milliLiter(s) Oral Mucosa every 12 hours  chlorhexidine 2% Cloths 1 Application(s) Topical daily  collagenase Ointment 1 Application(s) Topical daily  Dakins Solution - 1/4 Strength 1 Application(s) Topical daily  doxazosin 2 milliGRAM(s) Oral at bedtime  folic acid 1 milliGRAM(s) Oral daily  heparin   Injectable 5000 Unit(s) SubCutaneous every 8 hours  ipratropium 17 MICROgram(s) HFA Inhaler 1 Puff(s) Inhalation every 6 hours  levETIRAcetam 1000 milliGRAM(s) Oral two times a day  levothyroxine 50 MICROGram(s) Oral daily  pantoprazole   Suspension 40 milliGRAM(s) Oral daily  sodium chloride 3%  Inhalation 4 milliLiter(s) Inhalation every 6 hours    MEDICATIONS  (PRN):  acetaminophen    Suspension .. 650 milliGRAM(s) Oral every 4 hours PRN Temp greater or equal to 38C (100.4F), Mild Pain (1 - 3)      LABS:                        8.7    11.68 )-----------( 358      ( 16 May 2022 06:30 )             28.1                  CHIEF COMPLAINT: Patient is a 76y old  Male who presents with a chief complaint of Hypoxia (15 May 2022 19:08)    INTERVAL EVENTS: no acute events overnight. Clinically unchanged.     ROS: Unable to obtain ROS given patient is nonverbal.     OBJECTIVE:  ICU Vital Signs Last 24 Hrs  T(C): 36.7 (16 May 2022 05:00), Max: 37.1 (15 May 2022 12:00)  T(F): 98 (16 May 2022 05:00), Max: 98.7 (15 May 2022 12:00)  HR: 100 (16 May 2022 07:18) (89 - 100)  BP: 139/91 (16 May 2022 05:00) (130/79 - 139/91)  BP(mean): 107 (15 May 2022 16:00) (107 - 109)  ABP: --  ABP(mean): --  RR: 16 (16 May 2022 05:00) (12 - 18)  SpO2: 100% (16 May 2022 07:18) (97% - 100%)    Mode: AC/ CMV (Assist Control/ Continuous Mandatory Ventilation), RR (machine): 12, TV (machine): 450, FiO2: 30, PEEP: 5, ITime: 0.72, MAP: 8, PIP: 19    05-15 @ 07:01  -  05-16 @ 07:00  --------------------------------------------------------  IN: 1635 mL / OUT: 1410 mL / NET: 225 mL      CAPILLARY BLOOD GLUCOSE          PHYSICAL EXAM:  General: NAD   Neck: (+) Trach tube noted, site c/d/i.  Cards: S1/S2, no murmurs   Pulm: CTA bilaterally. No wheezes.   Abdomen: Soft, NTND. (+) PEG   Extremities: No pedal edema. Extremities warm to touch. Intact distal pulses.   Neurology: Awake/alert. Looks at examiner when spoken to. Nonverbal. Able to lift RUE on command. No movement LUE, B/L LE.   Skin: refer to Rn assessment.       Mode: AC/ CMV (Assist Control/ Continuous Mandatory Ventilation)  RR (machine): 12  TV (machine): 450  FiO2: 30  PEEP: 5  ITime: 0.72  MAP: 8  PIP: 19      HOSPITAL MEDICATIONS:  MEDICATIONS  (STANDING):  ALBUTerol    90 MICROgram(s) HFA Inhaler 2 Puff(s) Inhalation every 6 hours  amantadine Syrup 100 milliGRAM(s) Oral two times a day  aMIOdarone    Tablet 200 milliGRAM(s) Oral daily  ascorbic acid 500 milliGRAM(s) Oral daily  chlorhexidine 0.12% Liquid 15 milliLiter(s) Oral Mucosa every 12 hours  chlorhexidine 2% Cloths 1 Application(s) Topical daily  collagenase Ointment 1 Application(s) Topical daily  Dakins Solution - 1/4 Strength 1 Application(s) Topical daily  doxazosin 2 milliGRAM(s) Oral at bedtime  folic acid 1 milliGRAM(s) Oral daily  heparin   Injectable 5000 Unit(s) SubCutaneous every 8 hours  ipratropium 17 MICROgram(s) HFA Inhaler 1 Puff(s) Inhalation every 6 hours  levETIRAcetam 1000 milliGRAM(s) Oral two times a day  levothyroxine 50 MICROGram(s) Oral daily  pantoprazole   Suspension 40 milliGRAM(s) Oral daily  sodium chloride 3%  Inhalation 4 milliLiter(s) Inhalation every 6 hours    MEDICATIONS  (PRN):  acetaminophen    Suspension .. 650 milliGRAM(s) Oral every 4 hours PRN Temp greater or equal to 38C (100.4F), Mild Pain (1 - 3)      LABS:                        8.7    11.68 )-----------( 358      ( 16 May 2022 06:30 )             28.1

## 2022-05-17 ENCOUNTER — TRANSCRIPTION ENCOUNTER (OUTPATIENT)
Age: 77
End: 2022-05-17

## 2022-05-17 VITALS — HEART RATE: 98 BPM | OXYGEN SATURATION: 100 %

## 2022-05-17 LAB
ANION GAP SERPL CALC-SCNC: 12 MMOL/L — SIGNIFICANT CHANGE UP (ref 7–14)
BASOPHILS # BLD AUTO: 0.07 K/UL — SIGNIFICANT CHANGE UP (ref 0–0.2)
BASOPHILS NFR BLD AUTO: 0.6 % — SIGNIFICANT CHANGE UP (ref 0–2)
BUN SERPL-MCNC: 52 MG/DL — HIGH (ref 7–23)
CALCIUM SERPL-MCNC: 8.9 MG/DL — SIGNIFICANT CHANGE UP (ref 8.4–10.5)
CHLORIDE SERPL-SCNC: 103 MMOL/L — SIGNIFICANT CHANGE UP (ref 98–107)
CO2 SERPL-SCNC: 24 MMOL/L — SIGNIFICANT CHANGE UP (ref 22–31)
CREAT SERPL-MCNC: 1.05 MG/DL — SIGNIFICANT CHANGE UP (ref 0.5–1.3)
EGFR: 74 ML/MIN/1.73M2 — SIGNIFICANT CHANGE UP
EOSINOPHIL # BLD AUTO: 0.02 K/UL — SIGNIFICANT CHANGE UP (ref 0–0.5)
EOSINOPHIL NFR BLD AUTO: 0.2 % — SIGNIFICANT CHANGE UP (ref 0–6)
GLUCOSE SERPL-MCNC: 133 MG/DL — HIGH (ref 70–99)
HCT VFR BLD CALC: 28 % — LOW (ref 39–50)
HGB BLD-MCNC: 8.6 G/DL — LOW (ref 13–17)
IANC: 10.15 K/UL — HIGH (ref 1.8–7.4)
IMM GRANULOCYTES NFR BLD AUTO: 0.6 % — SIGNIFICANT CHANGE UP (ref 0–1.5)
LYMPHOCYTES # BLD AUTO: 1.46 K/UL — SIGNIFICANT CHANGE UP (ref 1–3.3)
LYMPHOCYTES # BLD AUTO: 11.8 % — LOW (ref 13–44)
MAGNESIUM SERPL-MCNC: 2.5 MG/DL — SIGNIFICANT CHANGE UP (ref 1.6–2.6)
MCHC RBC-ENTMCNC: 30.7 GM/DL — LOW (ref 32–36)
MCHC RBC-ENTMCNC: 32.1 PG — SIGNIFICANT CHANGE UP (ref 27–34)
MCV RBC AUTO: 104.5 FL — HIGH (ref 80–100)
MONOCYTES # BLD AUTO: 0.55 K/UL — SIGNIFICANT CHANGE UP (ref 0–0.9)
MONOCYTES NFR BLD AUTO: 4.5 % — SIGNIFICANT CHANGE UP (ref 2–14)
NEUTROPHILS # BLD AUTO: 10.15 K/UL — HIGH (ref 1.8–7.4)
NEUTROPHILS NFR BLD AUTO: 82.3 % — HIGH (ref 43–77)
NRBC # BLD: 0 /100 WBCS — SIGNIFICANT CHANGE UP
NRBC # FLD: 0.02 K/UL — HIGH
PHOSPHATE SERPL-MCNC: 3.4 MG/DL — SIGNIFICANT CHANGE UP (ref 2.5–4.5)
PLATELET # BLD AUTO: 336 K/UL — SIGNIFICANT CHANGE UP (ref 150–400)
POTASSIUM SERPL-MCNC: 4.6 MMOL/L — SIGNIFICANT CHANGE UP (ref 3.5–5.3)
POTASSIUM SERPL-SCNC: 4.6 MMOL/L — SIGNIFICANT CHANGE UP (ref 3.5–5.3)
RBC # BLD: 2.68 M/UL — LOW (ref 4.2–5.8)
RBC # FLD: 19.8 % — HIGH (ref 10.3–14.5)
SODIUM SERPL-SCNC: 139 MMOL/L — SIGNIFICANT CHANGE UP (ref 135–145)
WBC # BLD: 12.33 K/UL — HIGH (ref 3.8–10.5)
WBC # FLD AUTO: 12.33 K/UL — HIGH (ref 3.8–10.5)

## 2022-05-17 PROCEDURE — 99233 SBSQ HOSP IP/OBS HIGH 50: CPT

## 2022-05-17 RX ORDER — FAMOTIDINE 10 MG/ML
1 INJECTION INTRAVENOUS
Qty: 0 | Refills: 0 | DISCHARGE

## 2022-05-17 RX ORDER — LEVOTHYROXINE SODIUM 125 MCG
1 TABLET ORAL
Qty: 0 | Refills: 0 | DISCHARGE

## 2022-05-17 RX ORDER — DOXAZOSIN MESYLATE 4 MG
2 TABLET ORAL
Qty: 0 | Refills: 0 | DISCHARGE

## 2022-05-17 RX ORDER — ACETAMINOPHEN 500 MG
20.31 TABLET ORAL
Qty: 0 | Refills: 0 | DISCHARGE
Start: 2022-05-17

## 2022-05-17 RX ORDER — DOXAZOSIN MESYLATE 4 MG
1 TABLET ORAL
Qty: 0 | Refills: 0 | DISCHARGE
Start: 2022-05-17

## 2022-05-17 RX ORDER — IPRATROPIUM/ALBUTEROL SULFATE 18-103MCG
3 AEROSOL WITH ADAPTER (GRAM) INHALATION
Qty: 0 | Refills: 0 | DISCHARGE

## 2022-05-17 RX ORDER — ACETAMINOPHEN 500 MG
2 TABLET ORAL
Qty: 0 | Refills: 0 | DISCHARGE

## 2022-05-17 RX ORDER — SODIUM HYPOCHLORITE 0.125 %
1 SOLUTION, NON-ORAL MISCELLANEOUS
Qty: 0 | Refills: 0 | DISCHARGE
Start: 2022-05-17

## 2022-05-17 RX ORDER — SODIUM CHLORIDE 9 MG/ML
4 INJECTION INTRAMUSCULAR; INTRAVENOUS; SUBCUTANEOUS
Qty: 0 | Refills: 0 | DISCHARGE
Start: 2022-05-17

## 2022-05-17 RX ORDER — COLLAGENASE CLOSTRIDIUM HIST. 250 UNIT/G
1 OINTMENT (GRAM) TOPICAL
Qty: 0 | Refills: 0 | DISCHARGE
Start: 2022-05-17

## 2022-05-17 RX ORDER — AMANTADINE HCL 100 MG
10 CAPSULE ORAL
Qty: 0 | Refills: 0 | DISCHARGE
Start: 2022-05-17

## 2022-05-17 RX ORDER — LEVOTHYROXINE SODIUM 125 MCG
1 TABLET ORAL
Qty: 0 | Refills: 0 | DISCHARGE
Start: 2022-05-17

## 2022-05-17 RX ORDER — COLLAGENASE CLOSTRIDIUM HIST. 250 UNIT/G
1 OINTMENT (GRAM) TOPICAL
Qty: 0 | Refills: 0 | DISCHARGE

## 2022-05-17 RX ORDER — SODIUM CHLORIDE 9 MG/ML
4 INJECTION INTRAMUSCULAR; INTRAVENOUS; SUBCUTANEOUS EVERY 6 HOURS
Refills: 0 | Status: DISCONTINUED | OUTPATIENT
Start: 2022-05-17 | End: 2022-05-17

## 2022-05-17 RX ORDER — IPRATROPIUM BROMIDE 0.2 MG/ML
1 SOLUTION, NON-ORAL INHALATION
Qty: 0 | Refills: 0 | DISCHARGE
Start: 2022-05-17

## 2022-05-17 RX ORDER — PANTOPRAZOLE SODIUM 20 MG/1
1 TABLET, DELAYED RELEASE ORAL
Qty: 0 | Refills: 0 | DISCHARGE
Start: 2022-05-17

## 2022-05-17 RX ORDER — ALBUTEROL 90 UG/1
2 AEROSOL, METERED ORAL
Qty: 0 | Refills: 0 | DISCHARGE
Start: 2022-05-17

## 2022-05-17 RX ADMIN — Medication 50 MICROGRAM(S): at 05:16

## 2022-05-17 RX ADMIN — Medication 1 PUFF(S): at 09:23

## 2022-05-17 RX ADMIN — SODIUM CHLORIDE 4 MILLILITER(S): 9 INJECTION INTRAMUSCULAR; INTRAVENOUS; SUBCUTANEOUS at 15:45

## 2022-05-17 RX ADMIN — CHLORHEXIDINE GLUCONATE 15 MILLILITER(S): 213 SOLUTION TOPICAL at 05:08

## 2022-05-17 RX ADMIN — SODIUM CHLORIDE 4 MILLILITER(S): 9 INJECTION INTRAMUSCULAR; INTRAVENOUS; SUBCUTANEOUS at 09:22

## 2022-05-17 RX ADMIN — Medication 500 MILLIGRAM(S): at 11:11

## 2022-05-17 RX ADMIN — ALBUTEROL 2 PUFF(S): 90 AEROSOL, METERED ORAL at 15:45

## 2022-05-17 RX ADMIN — Medication 100 MILLIGRAM(S): at 05:15

## 2022-05-17 RX ADMIN — CHLORHEXIDINE GLUCONATE 1 APPLICATION(S): 213 SOLUTION TOPICAL at 11:18

## 2022-05-17 RX ADMIN — ALBUTEROL 2 PUFF(S): 90 AEROSOL, METERED ORAL at 03:53

## 2022-05-17 RX ADMIN — LEVETIRACETAM 1000 MILLIGRAM(S): 250 TABLET, FILM COATED ORAL at 05:16

## 2022-05-17 RX ADMIN — Medication 1 APPLICATION(S): at 11:10

## 2022-05-17 RX ADMIN — AMIODARONE HYDROCHLORIDE 200 MILLIGRAM(S): 400 TABLET ORAL at 05:16

## 2022-05-17 RX ADMIN — PANTOPRAZOLE SODIUM 40 MILLIGRAM(S): 20 TABLET, DELAYED RELEASE ORAL at 11:11

## 2022-05-17 RX ADMIN — Medication 100 MILLIGRAM(S): at 17:35

## 2022-05-17 RX ADMIN — HEPARIN SODIUM 5000 UNIT(S): 5000 INJECTION INTRAVENOUS; SUBCUTANEOUS at 14:13

## 2022-05-17 RX ADMIN — HEPARIN SODIUM 5000 UNIT(S): 5000 INJECTION INTRAVENOUS; SUBCUTANEOUS at 05:16

## 2022-05-17 RX ADMIN — LEVETIRACETAM 1000 MILLIGRAM(S): 250 TABLET, FILM COATED ORAL at 17:35

## 2022-05-17 RX ADMIN — Medication 1 PUFF(S): at 03:53

## 2022-05-17 RX ADMIN — ALBUTEROL 2 PUFF(S): 90 AEROSOL, METERED ORAL at 09:23

## 2022-05-17 RX ADMIN — Medication 1 MILLIGRAM(S): at 11:11

## 2022-05-17 RX ADMIN — Medication 1 PUFF(S): at 15:45

## 2022-05-17 RX ADMIN — CHLORHEXIDINE GLUCONATE 15 MILLILITER(S): 213 SOLUTION TOPICAL at 17:35

## 2022-05-17 NOTE — PROGRESS NOTE ADULT - REASON FOR ADMISSION
Hypoxia

## 2022-05-17 NOTE — PROGRESS NOTE ADULT - SUBJECTIVE AND OBJECTIVE BOX
INTERVAL HPI/OVERNIGHT EVENTS:    no rectal bleeding   tolerating feeds    MEDICATIONS  (STANDING):  ALBUTerol    90 MICROgram(s) HFA Inhaler 2 Puff(s) Inhalation every 6 hours  amantadine Syrup 100 milliGRAM(s) Oral two times a day  aMIOdarone    Tablet 200 milliGRAM(s) Oral daily  ascorbic acid 500 milliGRAM(s) Oral daily  chlorhexidine 0.12% Liquid 15 milliLiter(s) Oral Mucosa every 12 hours  chlorhexidine 2% Cloths 1 Application(s) Topical daily  collagenase Ointment 1 Application(s) Topical daily  Dakins Solution - 1/4 Strength 1 Application(s) Topical daily  doxazosin 2 milliGRAM(s) Oral at bedtime  folic acid 1 milliGRAM(s) Oral daily  heparin   Injectable 5000 Unit(s) SubCutaneous every 8 hours  ipratropium 17 MICROgram(s) HFA Inhaler 1 Puff(s) Inhalation every 6 hours  levETIRAcetam 1000 milliGRAM(s) Oral two times a day  levothyroxine 50 MICROGram(s) Oral daily  pantoprazole   Suspension 40 milliGRAM(s) Oral daily  sodium chloride 3%  Inhalation 4 milliLiter(s) Inhalation every 6 hours    MEDICATIONS  (PRN):  acetaminophen    Suspension .. 650 milliGRAM(s) Oral every 4 hours PRN Temp greater or equal to 38C (100.4F), Mild Pain (1 - 3)      Allergies    No Known Allergies    Intolerances        Review of Systems: *pt minimally verbal to nonverbal, unable to obtain ROS         Vital Signs Last 24 Hrs  T(C): 37.3 (17 May 2022 09:01), Max: 37.3 (17 May 2022 09:01)  T(F): 99.1 (17 May 2022 09:01), Max: 99.1 (17 May 2022 09:01)  HR: 96 (17 May 2022 10:17) (88 - 107)  BP: 128/80 (17 May 2022 09:01) (122/85 - 140/93)  BP(mean): --  RR: 23 (17 May 2022 09:01) (17 - 23)  SpO2: 100% (17 May 2022 10:17) (97% - 100%)    PHYSICAL EXAM:    Constitutional: NAD  HEENT: EOMI, throat clear  Neck: No LAD, supple +trach  Respiratory: CTA and P  Cardiovascular: S1 and S2, RRR, no M  Gastrointestinal: BS+, soft, NT/ND, neg HSM, +peg  Extremities: No peripheral edema, neg clubbing, cyanosis  Vascular: 2+ peripheral pulses  Neurological: A/O x 0  Psychiatric: lethargic  Skin: No rashes      LABS:                        8.7    11.68 )-----------( 358      ( 16 May 2022 06:30 )             28.1     05-17    139  |  103  |  52<H>  ----------------------------<  133<H>  4.6   |  24  |  1.05    Ca    8.9      17 May 2022 10:00  Phos  3.4     05-17  Mg     2.50     05-17            RADIOLOGY & ADDITIONAL TESTS:

## 2022-05-17 NOTE — DISCHARGE NOTE NURSING/CASE MANAGEMENT/SOCIAL WORK - NSDCPEFALRISK_GEN_ALL_CORE
For information on Fall & Injury Prevention, visit: [email or web address] OR  [email or web address] OR  https://www.cdc.gov/steadi/patient.html

## 2022-05-17 NOTE — PROGRESS NOTE ADULT - NS ATTEND AMEND GEN_ALL_CORE FT
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 Course c/b acute kidney injury, left parotitis. Patient mental status previously not at baseline but now improving. MRI Head shows multiple areas of encephalomalacia and gliosis in the setting of known prior traumatic injury. Neurology input noted. No further work up planned while in hospital. Continue amantadine, keppra. Continue amiodarone for AF. Completed course of Zosyn and Tobramycin for  acinetobacter and pseudomonas pneumonia. Continue ventilatory support, airway clearance, PST as tolerates. Continue enteral feeds, PPI. On levothyroxine for hypothyroidism. Discharge planning in process. Plan for discharge to [facility]. PMR input noted.

## 2022-05-17 NOTE — PROGRESS NOTE ADULT - TIME BILLING
Review of patient records, including history, laboratory data, and imaging. Patient assessment and evaluation. Coordination of care.
Review of patient records, including history, laboratory data, and imaging. Patient assessment and evaluation. Coordination of care.
chart and data review, clinical assessment, coordination of care
REVIEW OF RADIOLOGY   AND CARE  COORDINATIION
personal review of data, imaging and coordination of care.
personal review of data, imaging and coordination of care.
Review of patient records, including history, laboratory data, and imaging. Patient assessment and evaluation. Coordination of care.
Review of patient records, including history, laboratory data, and imaging. Patient assessment and evaluation. Coordination of care.
chart and data review, clinical assessment, coordination of care
Review of patient records, including history, laboratory data, and imaging. Patient assessment and evaluation. Coordination of care.
REVIEW OF RADIOLOGY   AND CARE  COORDINATION
Review of patient records, including history, laboratory data, and imaging. Patient assessment and evaluation. Coordination of care.

## 2022-05-17 NOTE — PROGRESS NOTE ADULT - SUBJECTIVE AND OBJECTIVE BOX
CHIEF COMPLAINT: Patient is a 76y old  Male who presents with a chief complaint of Hypoxia (16 May 2022 18:20)    INTERVAL EVENTS:  - No interval events overnight.     REVIEW OF SYSTEMS: Seen by bedside during AM rounds and     Mode: AC/ CMV (Assist Control/ Continuous Mandatory Ventilation), RR (machine): 12, TV (machine): 450, FiO2: 30, PEEP: 5, ITime: 0.72, MAP: 9, PIP: 18    OBJECTIVE:  ICU Vital Signs Last 24 Hrs  T(C): 37.3 (17 May 2022 09:01), Max: 37.3 (17 May 2022 09:01)  T(F): 99.1 (17 May 2022 09:01), Max: 99.1 (17 May 2022 09:01)  HR: 100 (17 May 2022 09:25) (88 - 116)  BP: 128/80 (17 May 2022 09:01) (122/85 - 140/93)  BP(mean): --  ABP: --  ABP(mean): --  RR: 23 (17 May 2022 09:01) (17 - 23)  SpO2: 100% (17 May 2022 09:25) (97% - 100%)    Mode: AC/ CMV (Assist Control/ Continuous Mandatory Ventilation), RR (machine): 12, TV (machine): 450, FiO2: 30, PEEP: 5, ITime: 0.72, MAP: 9, PIP: 18    05-16 @ 07:01  -  05-17 @ 07:00  --------------------------------------------------------  IN: 1665 mL / OUT: 1375 mL / NET: 290 mL    CAPILLARY BLOOD GLUCOSE    HOSPITAL MEDICATIONS:  MEDICATIONS  (STANDING):  ALBUTerol    90 MICROgram(s) HFA Inhaler 2 Puff(s) Inhalation every 6 hours  amantadine Syrup 100 milliGRAM(s) Oral two times a day  aMIOdarone    Tablet 200 milliGRAM(s) Oral daily  ascorbic acid 500 milliGRAM(s) Oral daily  chlorhexidine 0.12% Liquid 15 milliLiter(s) Oral Mucosa every 12 hours  chlorhexidine 2% Cloths 1 Application(s) Topical daily  collagenase Ointment 1 Application(s) Topical daily  Dakins Solution - 1/4 Strength 1 Application(s) Topical daily  doxazosin 2 milliGRAM(s) Oral at bedtime  folic acid 1 milliGRAM(s) Oral daily  heparin   Injectable 5000 Unit(s) SubCutaneous every 8 hours  ipratropium 17 MICROgram(s) HFA Inhaler 1 Puff(s) Inhalation every 6 hours  levETIRAcetam 1000 milliGRAM(s) Oral two times a day  levothyroxine 50 MICROGram(s) Oral daily  pantoprazole   Suspension 40 milliGRAM(s) Oral daily  sodium chloride 3%  Inhalation 4 milliLiter(s) Inhalation every 6 hours    MEDICATIONS  (PRN):  acetaminophen    Suspension .. 650 milliGRAM(s) Oral every 4 hours PRN Temp greater or equal to 38C (100.4F), Mild Pain (1 - 3)    PHYSICAL EXAMINATION    LABS:                        8.7    11.68 )-----------( 358      ( 16 May 2022 06:30 )             28.1     05-16    145  |  107  |  51<H>  ----------------------------<  127<H>  4.6   |  25  |  1.04    Ca    8.9      16 May 2022 06:30  Phos  3.4     05-16  Mg     2.50     05-16    PAST MEDICAL & SURGICAL HISTORY:  Essential hypertension    Primary osteoarthritis, unspecified site    Closed fracture of left radius, initial encounter    Morbid obesity, unspecified obesity type  h/o. - s/p gastric bypass- lost 113 lbs    KAREN (obstructive sleep apnea)  h/o - was on CPAP until gastric bypass surgery    Respiratory failure    Anemia    Subdural hemorrhage    Epilepsy    H/O gastrostomy    History of BPH    Afib    S/P gastric bypass  2008    Status post total replacement of left hip  2006    S/P bunionectomy  bilateral    S/P colonoscopy  approx 2012    History of abdominoplasty  and subsequent cosmetic surgery for removal of excess skin from face    FAMILY HISTORY:  Family history of renal cell carcinoma (Mother)    Family history of malignant melanoma (Sibling)    Social History:    RADIOLOGY:  [ ] Reviewed and interpreted by me    PULMONARY FUNCTION TESTS:    EKG: CHIEF COMPLAINT: Patient is a 76y old  Male who presents with a chief complaint of Hypoxia (16 May 2022 18:20)    INTERVAL EVENTS:  - No interval events overnight.   - DC plan today     REVIEW OF SYSTEMS: Seen by bedside during AM rounds and unable to assess ROS as vented.     Mode: AC/ CMV (Assist Control/ Continuous Mandatory Ventilation), RR (machine): 12, TV (machine): 450, FiO2: 30, PEEP: 5, ITime: 0.72, MAP: 9, PIP: 18    OBJECTIVE:  ICU Vital Signs Last 24 Hrs  T(C): 37.3 (17 May 2022 09:01), Max: 37.3 (17 May 2022 09:01)  T(F): 99.1 (17 May 2022 09:01), Max: 99.1 (17 May 2022 09:01)  HR: 100 (17 May 2022 09:25) (88 - 116)  BP: 128/80 (17 May 2022 09:01) (122/85 - 140/93)  BP(mean): --  ABP: --  ABP(mean): --  RR: 23 (17 May 2022 09:01) (17 - 23)  SpO2: 100% (17 May 2022 09:25) (97% - 100%)    Mode: AC/ CMV (Assist Control/ Continuous Mandatory Ventilation), RR (machine): 12, TV (machine): 450, FiO2: 30, PEEP: 5, ITime: 0.72, MAP: 9, PIP: 18    05-16 @ 07:01  -  05-17 @ 07:00  --------------------------------------------------------  IN: 1665 mL / OUT: 1375 mL / NET: 290 mL    CAPILLARY BLOOD GLUCOSE    HOSPITAL MEDICATIONS:  MEDICATIONS  (STANDING):  ALBUTerol    90 MICROgram(s) HFA Inhaler 2 Puff(s) Inhalation every 6 hours  amantadine Syrup 100 milliGRAM(s) Oral two times a day  aMIOdarone    Tablet 200 milliGRAM(s) Oral daily  ascorbic acid 500 milliGRAM(s) Oral daily  chlorhexidine 0.12% Liquid 15 milliLiter(s) Oral Mucosa every 12 hours  chlorhexidine 2% Cloths 1 Application(s) Topical daily  collagenase Ointment 1 Application(s) Topical daily  Dakins Solution - 1/4 Strength 1 Application(s) Topical daily  doxazosin 2 milliGRAM(s) Oral at bedtime  folic acid 1 milliGRAM(s) Oral daily  heparin   Injectable 5000 Unit(s) SubCutaneous every 8 hours  ipratropium 17 MICROgram(s) HFA Inhaler 1 Puff(s) Inhalation every 6 hours  levETIRAcetam 1000 milliGRAM(s) Oral two times a day  levothyroxine 50 MICROGram(s) Oral daily  pantoprazole   Suspension 40 milliGRAM(s) Oral daily  sodium chloride 3%  Inhalation 4 milliLiter(s) Inhalation every 6 hours    MEDICATIONS  (PRN):  acetaminophen    Suspension .. 650 milliGRAM(s) Oral every 4 hours PRN Temp greater or equal to 38C (100.4F), Mild Pain (1 - 3)    PHYSICAL EXAMINATION  General: NAD   Neck: Trach (+)   Cards: S1/S2, no murmurs   Pulm: Course vent sounds bilaterally. No wheezes.   Abdomen: Soft, NTND. BS (+) PEG (+)   Extremities: No pedal edema. KIMBERLEY of RUE and L shoulder noted.   Neurology: Awake and able to follow some intermittent commands with no focal neurological deficits     LABS:                        8.7    11.68 )-----------( 358      ( 16 May 2022 06:30 )             28.1     05-16    145  |  107  |  51<H>  ----------------------------<  127<H>  4.6   |  25  |  1.04    Ca    8.9      16 May 2022 06:30  Phos  3.4     05-16  Mg     2.50     05-16    PAST MEDICAL & SURGICAL HISTORY:  Essential hypertension    Primary osteoarthritis, unspecified site    Closed fracture of left radius, initial encounter    Morbid obesity, unspecified obesity type  h/o. - s/p gastric bypass- lost 113 lbs    KAREN (obstructive sleep apnea)  h/o - was on CPAP until gastric bypass surgery    Respiratory failure    Anemia    Subdural hemorrhage    Epilepsy    H/O gastrostomy    History of BPH    Afib    S/P gastric bypass  2008    Status post total replacement of left hip  2006    S/P bunionectomy  bilateral    S/P colonoscopy  approx 2012    History of abdominoplasty  and subsequent cosmetic surgery for removal of excess skin from face    FAMILY HISTORY:  Family history of renal cell carcinoma (Mother)    Family history of malignant melanoma (Sibling)    Social History:    RADIOLOGY:  [ ] Reviewed and interpreted by me    PULMONARY FUNCTION TESTS:    EKG:

## 2022-05-17 NOTE — PROGRESS NOTE ADULT - PROVIDER SPECIALTY LIST ADULT
Gastroenterology
Gastroenterology
Pulmonology
Rehab Medicine
Wound Care
Gastroenterology
Infectious Disease
Pulmonology
Rehab Medicine
Rehab Medicine
Wound Care
Gastroenterology
Infectious Disease
Pulmonology
Wound Care
Wound Care
Gastroenterology
Infectious Disease
Infectious Disease
Nephrology
Pulmonology
Wound Care
Nephrology
Nephrology
Pulmonology
Pulmonology
Nephrology
Nephrology

## 2022-05-17 NOTE — PROGRESS NOTE ADULT - NS ATTEND OPT1 GEN_ALL_CORE
I attest my time as attending is greater than 50% of the total combined time spent on qualifying patient care activities by the PA/NP and attending.

## 2022-05-17 NOTE — PROGRESS NOTE ADULT - ASSESSMENT
75 YO M, A&Ox0 at baseline with PMHx of L SDH c/b L Subfalcine Herniation s/p Emergent R Hemicraniectomy in [place] after fall with head trauma and now chronically trach/vent dependence, dysphagia with PEG, AFIB s/p watchman, Gastric Sleeve, Urinary Retention with Chronic Garcia, and recent ICU stay for HCAP with MDR Pseudomonas now presenting with ACHRF i/s/o  Actineobacter and Pseudomonas HCAP, FLORIAN, Melena, Parotitis and RPT HCAP with Pansensitive Pseudomonas.   77 YO M, A&Ox0 at baseline with PMHx of L SDH c/b L Subfalcine Herniation s/p Emergent R Hemicraniectomy in [place] after fall with head trauma and now chronically trach/vent dependence, dysphagia with PEG, AFIB s/p watchman, Gastric Sleeve, Urinary Retention with Chronic Garcia, and recent ICU stay for HCAP with MDR Pseudomonas now presenting with ACHRF i/s/o  Actineobacter and Pseudomonas HCAP, FLORIAN, Melena, Parotitis and RPT HCAP with Pansensitive Pseudomonas. NOW DISPO planning .     # Neurology   - Currently A&Ox0, awake and alert, able to move RUE and nods/ mouths one or two words per RCU evaluation and prior documentation.   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   - Continue on Modafinil, Amantidine and Keppra.     # Cardiovascular   - Hx of HFpEF 55, mild MR and AR, severe LAD, normal LVRVSF (ECHO 3/7)  - Hx of Atrial Fibrillation s/p Watchman and currently rate controlled on Amiodarone 200mg QD. Coreg 6.25mg BID held given SB.   - Lasix doses given, however complicated by dehydration and FLORIAN. Lasix now dc'ed.   - Monitor HR/ BP     # HEENT  - Large firm mass along left neck noted 4/28  - CT Neck (4/30) with left-sided parotitis and 2 mm left intraparotid stone, although there is no parotid duct dilatation or obstructing stone.  - ENT called and reccs appreciated. s/p warm compress and massage TID with improvement.     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   - Initially treated for HCAP and able to wean off vent to TC ATC (4/21-5/3) however recurrent HCAP PNA noted requiring vent again. s/p ABX with improvement in secretion but now with poor tolerance with PS second to physical debilitation.   - Continue on Proventil, Atrovent, Hypersal, and Chest PT Q6H    # GI  - Hx of SDH, Gastric Bypass and Dysphagia s/p PEG and continued on TF with course complicated by constipation s/p laxatives with large BM.   - s/p Senna and Miralax dc'ed and monitored off with improvement.     # / Renal  - Hx of Urinary Retention with Chronic Garcia and presented FLORIAN likely in setting of dehydration with fevers vs ATN with Sepsis (CRE high 2s and baseline 0.8-0.9).   - UA with hematuria and proteinuria and case discussed with Neprhology with concern for glomerulonephritis, however ANCA, ISABELLE and GM ABs negative with low likelihood.    - Renal function improved off laxatives and renal function monitored in house.   - Hyperkalemia s/p cocktail and switched to Nepro with improvement     # ID  - Recent admission for  Acinetobacter and pan-sensitive PSA HCAP (3/2022)   - SCx (4/10) with  Acinetobacter and pan-sensitive PSA s/p Meropenem (4/8-4/14)   - SCx (4/28) with  Pseudomonas x 2 species   - SCx (5/2 and 5/4) with pansensitive PSA s/p Zosyn (5/3 - 5/10) and ALONDRA (5/7 - 5/13)   - Leukocytosis waxes and wanes, but no fevers and monitored off ABX.     # Endocrine  - No hx of DM2 and A1C 5.9. Continue to monitor BG,    - Hx of Hypothyroidism and continued on Synthroid. TSH 47 with low T3/T4 and Free T4. TSH 80s (3/2022) and Synthroid increased at NH. Hypothyroidism could be in the setting of Amiodarone use and current Synthroid dose appears to be working.   - Next TFT to be done in June 2022.     # Hematology   - Anemia i/s/o AOCD and s/p PRBC (4/9)   - Melena/ anemia noted and s/p 2 U PRBC (4/19)  with good response, however HH waxes and wanes with no obvious bleed (no further melena or CGE from PEG aspiration)  - DVT PPX with HSQ (cleared by GI to resume).     # Ethics   - FULL CODE     # DISPO - DC to Facility.

## 2022-05-17 NOTE — PROGRESS NOTE ADULT - NS ATTEND BILL GEN_ALL_CORE
Attending to bill
PA/NP to bill
Attending to bill

## 2022-05-17 NOTE — DISCHARGE NOTE NURSING/CASE MANAGEMENT/SOCIAL WORK - PATIENT PORTAL LINK FT
You can access the FollowMyHealth Patient Portal offered by [facility] by registering at the following website: [email or web address]. By joining [facility]’s FollowMyHealth portal, you will also be able to view your health information using other applications (apps) compatible with our system.

## 2022-05-23 NOTE — ED PROVIDER NOTE - NS ED MD DISPO SPECIAL CONSIDERATION1
Finasteride refill:  Last filled: 11/22/2021  Last seen: 06/08/2021  Follow up appointment: patient is due for a follow-up. Patient scheduled 08/01/2022. Will give patient enough until appointment. None

## 2022-11-16 NOTE — PROGRESS NOTE ADULT - ASSESSMENT
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     Neuro  - Currently A&Ox0, awake and alert but non-verbal, not following commands  - based on prior documentation, appears to be at baseline mental status  - CT head obtained, pending read    CVS  #Atrial Fibrillation  - Currently rate controlled   - s/p watchman device placement  - c/w home amiodarone 200mg qd  - c/w home carvedilol 6.25mg BID  #HFpEF  - most recent echo on 3/2022 with EF 55% and grossly normal LV fxn with severely dilated LA  - will check pBNP  - Pt appearing euvolemic at this time  - will hold off on home lasix in setting of possible ongoing infection    RESP  #Acute hypoxic respiratory failure  - Patient with episode of desaturation and increased WOB prior to arrival   - At facility patient is on trach collar during the day and on vent at night with 30% fio2 requirement  - initially required 40% fiO2 in ED, now weaned back to 30% FiO2 and saturating at 100%  - concern for possible pneumonia with potential mucus plugging event  - c/w airway clearance measures, Chest PT, and suctioning for secretions  - s/p vanc/arnold in ED, will c/w arnold for now for possible pneumonia  - will obtain sputum culture  - check ABG to ensure adequate oxygenation on current vent settings    GI  - no active issues  - PEG tube present  - will plan to start trickle feeds  - pending CT A/P for possible abdominal pathology    Renal  #FLORIAN  - Noted with elevated serum Cr to 2.39, with previous baseline of 0.8-0.9  - possibly in setting of current infection  - s/p 1L NS in ED  - chronic benson exchanged in ED with drainage of clear urine  - monitor I&Os  - trend Cr    Endo  - no active issues  - no history of DM or thyroid disease  - Glucose WNL on BMPs  - can obtain A1C/TSH with AM labs    Heme  #Anemia  - noted with anemia to 7.4, decreased from 8-9 on previous admissions  - no evidence of overt bleeding at this time  - will continue to trend Hgb daily    ID  #c/f Pneumonia  - Patient with questionable retrocardiac opacity on CXR  - CT A/P showing small L pleural effusion  - UA pending, chronic benson catheter exchanged  - s/p vanc/arnold in ED  - will c/w meropenem in setting of recent MDR Pseudomonas infection (sensitive to arnold)  - check MRSA PCR  - check sputum/blood cultures    PPx  HSQ for DVT prophylaxis    Ethics  FULL CODE FAMILY HISTORY:  Family history of diabetes mellitus (DM)     75 y/o M with afib s/p watchman, gastric sleeve, SDH, 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     Neuro  - Currently A&Ox0, awake and alert but non-verbal, not following commands  - based on prior documentation, appears to be at baseline mental status  - CT head obtained- No acute intracranial hemorrhage, midline shift or acute extra-axial collection    CVS  #Atrial Fibrillation  - Currently rate controlled   - s/p watchman device placement  - c/w home amiodarone 200mg qd  - c/w home carvedilol 6.25mg BID  #HFpEF  - most recent echo on 3/2022 with EF 55% and grossly normal LV fxn with severely dilated LA  - will check pBNP  - Pt appearing euvolemic at this time  - will hold off on home lasix in setting of possible ongoing infection  - Tolerating NH vent settings     RESP  #Acute hypoxic respiratory failure  - Patient with episode of desaturation and increased WOB prior to arrival   - At facility patient is on trach collar during the day and on vent at night with 30% fio2 requirement  - initially required 40% fiO2 in ED, now weaned back to 30% FiO2 and saturating at 100%  - concern for possible pneumonia with potential mucus plugging event  - c/w airway clearance measures, Chest PT, and suctioning for secretions  - s/p vanc/arnold in ED, will c/w arnold for now for possible pneumonia  - will obtain sputum culture  - check ABG to ensure adequate oxygenation on current vent settings    GI  - no active issues  - PEG tube present  - will plan to start trickle feeds  - pending CT A/P for possible abdominal pathology  - + BM with CT showing mod. stool burden now having stools     Renal  #FLORIAN  - Noted with elevated serum Cr to 2.39, with previous baseline of 0.8-0.9  - possibly in setting of current infection  - s/p 1L NS in ED  - chronic benson exchanged in ED with drainage of clear urine  - monitor I&Os  - trend Cr FU BMP s/p PRBC / fluids in ED     Endo  - no active issues  - no history of DM or thyroid disease  - Glucose WNL on BMPs  - can obtain A1C/TSH with AM labs  - A!C 5.9 accuchecks ordered /nutrition consult   - TSh elevated continue synthroid - pt with hypothyroidims 2/2 amiodorone use per family     Heme  #Anemia  - noted with anemia to 7.4, decreased from 8-9 on previous admissions  - no evidence of overt bleeding at this time  - will continue to trend Hgb daily  - Hgb 6.6 after fluids in ED- fu 6.8 when repeated S/P one unit PRBC FU post cbc   - Stool for OB     ID  #c/f Pneumonia  - Patient with questionable retrocardiac opacity on CXR  - CT A/P showing small L pleural effusion  - UA pending, chronic benson catheter exchanged  - s/p vanc/arnold in ED  - will c/w meropenem in setting of recent MDR Pseudomonas infection (sensitive to arnold)  - check MRSA PCR  - check sputum/blood cultures  - Seen by ID - continue present abx arnold /vanco x1 for now  Await cx     PPx  HSQ for DVT prophylaxis    Ethics  FULL CODE

## 2022-11-17 NOTE — PROGRESS NOTE ADULT - ASSESSMENT
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 Now DISPO planning.    # Neurology   - Currently A&Ox0, awake and alert, able to move RUE and nods/ mouths one or two words per RCU evaluation and prior documentation.   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   - Continue on Modafinil, Amantidine and Keppra.     # Cardiovascular   - Hx of HFpEF 55, mild MR and AR, severe LAD, normal LVRVSF (ECHO 3/7)  - Hx of Atrial Fibrillation s/p Watchman and currently rate controlled.   - Continue on Amiodarone 200mg QD and Coreg 6.25mg BID.   - Continue on Lasix 40mg QD and monitor tolerance.     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   - Able to tolerate TC (from 4/15 during the day) and now ATC (from 4/21).   - Continue on Proventil, Atrovent and Chest PT Q6H     # GI  - Hx of SDH, Gastric Bypass and Dysphagia s/p PEG and continued on TF with course complicated by constipatio and melena  - Case discussed with GI and melena likely in setting of constipation, however no plan for scope currently and will medically treat with PPI.   - Monitor BMs on Miralax, Senna and Lactulose.   - Continue on Nepro in sight of hyperkalemia.     # / Renal  - Hx of Urinary Retention with Chronic Garcia and presented FLORIAN likely in setting of dehydration with fevers vs ATN with Sepsis (CRE high 2s and baseline 0.8-0.9).   - UA with hematuria and proteinuria and case discussed with Neprhology with concern for glomerulonephritis. ANCA, ISABELLE and GM ABs negative.   - Hypernatremia noted and continued on  Q6H as tolerated.   - Hyperkalemia and s/p cocktail with improvement (4/19).   - IVF and PRBC given with CRE improving and now resumed on Lasix with improvement.     # ID  - Recent admission for  Acinetobacter and Pseudomonas HCAP (3/2022)   - SCx (4/10) with  Acinetobacter and Pseudomonas.   - Completed Meropenem (4/8-4/14) and will monitor off ABX.     # Endocrine  - No hx of DM2 and A1C 5.9. Continue to monitor BG,    - Hx of Hypothyroidism and continued on Synthroid. TSH 47 with low T3/T4 and Free T4. TSH 80s (3/2022) and Synthroid increased at NH. Hypothyroidism could be in the setting of Amiodarone use and current Synthroid dose appears to be working.   - Next TFT to be done in June 2022.     # Hematology   - Anemia i/s/o AOCD and s/p PRBC (4/9)   - Melena/ anemia noted and s/p 2 U PRBC (4/19)  with good response, however HH waxes and wanes with no obvious bleed (no further melena or CGE from PEG aspiration)  - DVT PPX with HSQ (cleared by GI to resume).     # Ethics   - FULL CODE   - Case discussed with Cousin/ HCP, Dr. Donna Hagen (Pediatric Neurologist, [phone]) and updated daily.     # DISPO - Planning to go back to NH with improvement in renal function/ electrolytes. [place] requesting [place] [facility] and  aware   ******************  4/23-no events o/n   I called the patient and she confirmed her CT lung screening at [address] [place] [address] on 11/18/2022 at 2:30 pm.  I reminded the patient to arrive at 2:00 pm, enter through the main entrance, and register. Patient confirmed.         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 Now DISPO planning.    # Neurology   - Currently A&Ox0, awake and alert, able to move RUE and nods/ mouths one or two words per RCU evaluation and prior documentation.   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   - Continue on Modafinil, Amantidine and Keppra.     # Cardiovascular   - Hx of HFpEF 55, mild MR and AR, severe LAD, normal LVRVSF (ECHO 3/7)  - Hx of Atrial Fibrillation s/p Watchman and currently rate controlled.   - Continue on Amiodarone 200mg QD and Coreg 6.25mg BID.   - Continue on Lasix 40mg QD and monitor tolerance.     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   - Able to tolerate TC (from 4/15 during the day) and now ATC (from 4/21).   - Continue on Proventil, Atrovent and Chest PT Q6H     # GI  - Hx of SDH, Gastric Bypass and Dysphagia s/p PEG and continued on TF with course complicated by constipatio and melena  - Case discussed with GI and melena likely in setting of constipation, however no plan for scope currently and will medically treat with PPI.   - Monitor BMs on Miralax, Senna and Lactulose.   - Continue on Nepro in sight of hyperkalemia.     # / Renal  - Hx of Urinary Retention with Chronic Garcia and presented FLORIAN likely in setting of dehydration with fevers vs ATN with Sepsis (CRE high 2s and baseline 0.8-0.9).   - UA with hematuria and proteinuria and case discussed with Neprhology with concern for glomerulonephritis. ANCA, ISABELLE and GM ABs negative.   - Hypernatremia noted and continued on  Q6H as tolerated.   - Hyperkalemia and s/p cocktail with improvement (4/19).   - IVF and PRBC given with CRE improving and now resumed on Lasix with improvement.     # ID  - Recent admission for  Acinetobacter and Pseudomonas HCAP (3/2022)   - SCx (4/10) with  Acinetobacter and Pseudomonas.   - Completed Meropenem (4/8-4/14) and will monitor off ABX.     # Endocrine  - No hx of DM2 and A1C 5.9. Continue to monitor BG,    - Hx of Hypothyroidism and continued on Synthroid. TSH 47 with low T3/T4 and Free T4. TSH 80s (3/2022) and Synthroid increased at NH. Hypothyroidism could be in the setting of Amiodarone use and current Synthroid dose appears to be working.   - Next TFT to be done in June 2022.     # Hematology   - Anemia i/s/o AOCD and s/p PRBC (4/9)   - Melena/ anemia noted and s/p 2 U PRBC (4/19)  with good response, however HH waxes and wanes with no obvious bleed (no further melena or CGE from PEG aspiration)  - DVT PPX with HSQ (cleared by GI to resume).     # Ethics   - FULL CODE   - Case discussed with Cousin/ HCP, Dr. Donna Hagen (Pediatric Neurologist, [phone]) and updated daily.     # DISPO - Planning to go back to NH with improvement in renal function/ electrolytes. 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 Now DISPO planning.    # Neurology   - Currently A&Ox0, awake and alert, able to move RUE and nods/ mouths one or two words per RCU evaluation and prior documentation.   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   - Continue on Modafinil, Amantidine and Keppra.     # Cardiovascular   - Hx of HFpEF 55, mild MR and AR, severe LAD, normal LVRVSF (ECHO 3/7)  - Hx of Atrial Fibrillation s/p Watchman and currently rate controlled.   - Continue on Amiodarone 200mg QD and Coreg 6.25mg BID.   - Continue on Lasix 40mg QD and monitor tolerance.     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   - Able to tolerate TC (from 4/15 during the day) and now ATC (from 4/21).   - Continue on Proventil, Atrovent and Chest PT Q6H     # GI  - Hx of SDH, Gastric Bypass and Dysphagia s/p PEG and continued on TF with course complicated by constipatio and melena  - Case discussed with GI and melena likely in setting of constipation, however no plan for scope currently and will medically treat with PPI.   - Monitor BMs on Miralax, Senna and Lactulose.   - Continue on Nepro in sight of hyperkalemia.   - No BM x3d and noted with abd distention with no obstruction on AXR 4/23.  - Start Reglan 10mg TID (4/24 - )    # / Renal  - Hx of Urinary Retention with Chronic Garcia and presented FLORIAN likely in setting of dehydration with fevers vs ATN with Sepsis (CRE high 2s and baseline 0.8-0.9).   - UA with hematuria and proteinuria and case discussed with Neprhology with concern for glomerulonephritis. ANCA, ISABELLE and GM ABs negative.   - Hypernatremia noted and continued on  Q6H as tolerated.   - Hyperkalemia and s/p cocktail with improvement (4/19).   - IVF and PRBC given with CRE improving and now resumed on Lasix with improvement.     # ID  - Recent admission for  Acinetobacter and Pseudomonas HCAP (3/2022)   - SCx (4/10) with  Acinetobacter and Pseudomonas.   - Completed Meropenem (4/8-4/14) and will monitor off ABX.     # Endocrine  - No hx of DM2 and A1C 5.9. Continue to monitor BG,    - Hx of Hypothyroidism and continued on Synthroid. TSH 47 with low T3/T4 and Free T4. TSH 80s (3/2022) and Synthroid increased at NH. Hypothyroidism could be in the setting of Amiodarone use and current Synthroid dose appears to be working.   - Next TFT to be done in June 2022.     # Hematology   - Anemia i/s/o AOCD and s/p PRBC (4/9)   - Melena/ anemia noted and s/p 2 U PRBC (4/19)  with good response, however HH waxes and wanes with no obvious bleed (no further melena or CGE from PEG aspiration)  - DVT PPX with HSQ (cleared by GI to resume).     # Ethics   - FULL CODE   - Case discussed with Cousin/ HCP, Dr. Donna Hagen (Pediatric Neurologist, [phone]) and updated daily.     # DISPO - Planning to go back to NH with improvement in renal function/ electrolytes. [place] requesting [place] [facility] and  aware   ******************  4/23-no events o/n  4/24-abdom distension-no BM for few days-abdom films pend read

## 2023-01-20 NOTE — ED PROVIDER NOTE - COVID-19  TEST TYPE
Hospital Medicine History & Physical      PCP: Chris Santiago DO    Date of Admission: 1/19/2023    Date of Service: Pt seen/examined on 1/19/2023 and Admitted to Inpatient with expected LOS greater than two midnights due to medical therapy. Placed in Observation. Chief Complaint:  weakness, palpitations      History Of Present Illness:  35 y.o. female who presented to [facility] with a complaint of shortness of breath, palpitations and substernal chest pain which have been going on for few days. Patient was recently discharged from the hospital about 3 days prior to this presentation. During the previous admission she was managed for bilateral Bell's palsy and had CT of the brain, MRI of the brain and cervical spine as well as MRV and lumbar puncture all of which were unremarkable and nondiagnostic. She completed a course of IVIG and prednisone as well as acyclovir and was discharged home. Also at home started experiencing the above-mentioned symptoms which she states she could feel blood rushing to her head and was aware of her heartbeat in the ears. She also had some episode of chest pain and lightheadedness as well as weakness and easy fatigability. She complained of some mild abdominal pain but denied any dark stools or blood in her stool. She denied vomiting any blood. She has not had symptoms like this before. Patient said she used to be on Nexplanon and stopped it in the middle of 2022. She said her periods normalize but for the last few months her periods have been abnormal and the flow has also been abnormal with her sometimes having heavy periods in the first couple of days. She said her last period was very light and lasted just about 1 to 2 days. She also admits to some episodic breakthrough bleeding. Review of symptoms otherwise negative. She was noted to be tachycardic in the ED and blood pressure was also elevated. Stool for occult blood done was negative.   Labs done showed hemoglobin of 7.7 with WBC of 10.3 and platelets of 902. Her hemoglobin at time of discharge was around 12. Urinalysis showed moderate bacteria and moderate blood. Pregnancy test was negative and INR was 1.1. Chemistry was essentially unremarkable. EKG shows sinus tachycardia with heart rate of 121. CT of the abdomen and pelvis was read as unremarkable and CT of the chest was negative for any PE. Duplex ultrasound of the lower extremities Was negative for any DVT. She has been admitted to be managed for acute anemia. Probable cause being dysfunctional uterine bleeding. Past Medical History:          Diagnosis Date    Abnormal Papanicolaou smear of cervix with positive human papilloma virus (HPV) test     Anemia 2023    Closed fracture of distal end of right fibula 2020    Morbid obesity due to excess calories [facility])        Past Surgical History:          Procedure Laterality Date    ANKLE SURGERY Right      SECTION      LEEP  2017    UPPER GASTROINTESTINAL ENDOSCOPY N/A 2022    EGD BIOPSY performed by Jovita Wolff MD at [place] [address]       Medications Prior to Admission:      Prior to Admission medications    Medication Sig Start Date End Date Taking?  Authorizing Provider   vitamin B-12 1000 MCG tablet Take 1 tablet by mouth daily 23   TJ Ram - CNP   melatonin 3 MG TABS tablet Take 1 tablet by mouth nightly as needed (sleep) 23   TJ Ram - CNP   Rimegepant Sulfate 75 MG TBDP Take 75 mg by mouth daily as needed (For migraine treatment) 23   TJ Ram - CNP   erythromycin [facility]) 5 MG/GM ophthalmic ointment Place into the left eye every 6 hours for 7 days 23  Dakota Jones APRN - CRYSTAL   prochlorperazine (COMPAZINE) 10 MG tablet Take 1 tablet by mouth every 6 hours as needed (headache or nausea) 22   Tatum Salomon MD   ondansetron (ZOFRAN-ODT) 4 MG disintegrating tablet Take 1 tablet by mouth every 8 hours as needed for Nausea or Vomiting 12/29/22   Skylar Stuart MD   methocarbamol (ROBAXIN) 500 MG tablet take 1 tablet by mouth three times a day if needed 10/12/22   Historical Provider, MD   norethindrone-ethinyl estradiol ([address] FE 1/20) 1-20 MG-MCG per tablet Take 1 tablet by mouth daily 11/3/22   Bryce Hooks MD   vitamin D (ERGOCALCIFEROL) 1.25 MG (83536 UT) CAPS capsule take 1 capsule by mouth every week 10/18/22   Emmie Cleary MD   tiZANidine (ZANAFLEX) 4 MG tablet take 1 tablet by mouth three times a day if needed for SPASM(S) 9/2/22   Historical Provider, MD   gabapentin (NEURONTIN) 600 MG tablet take 1 tablet by mouth twice a day 8/19/22 1/19/23  IRENA Mark       Allergies:  Patient has no known allergies. Social History:      The patient currently lives at home    TOBACCO:   reports that she has never smoked. She has never used smokeless tobacco.  ETOH:   reports no history of alcohol use. Family History:       Reviewed in detail and negative for DM, CAD, Cancer, CVA. Positive as follows:    History reviewed. No pertinent family history. REVIEW OF SYSTEMS:   Pertinent positives as noted in the HPI. All other systems reviewed and negative. PHYSICAL EXAM:    /67   Pulse (!) 114   Temp 98.2 °F (36.8 °C) (Oral)   Resp 20   Ht 5' 4\" (1.626 m)   Wt 250 lb (113.4 kg)   LMP 01/05/2023   SpO2 100%   BMI 42.91 kg/m²     General appearance:  No apparent distress, appears stated age and cooperative. HEENT:  Normal cephalic, atraumatic without obvious deformity. Pupils equal, round, and reactive to light. Extra ocular muscles intact. Conjunctivae/corneas clear. Neck: Supple, with full range of motion. No jugular venous distention. Trachea midline. Respiratory:  Normal respiratory effort. Clear to auscultation, bilaterally without Rales/Wheezes/Rhonchi.   Cardiovascular:  Regular rate and rhythm with normal S1/S2 without murmurs, rubs or gallops. Abdomen: Soft, non-tender, non-distended with normal bowel sounds. Musculoskeletal:  No clubbing, cyanosis or edema bilaterally. Full range of motion without deformity. Skin: Skin color, texture, turgor normal.  No rashes or lesions. Neurologic:  Neurovascularly intact without any focal sensory/motor deficits. Cranial nerves: II-XII intact, grossly non-focal.  Psychiatric:  Alert and oriented, thought content appropriate, normal insight    Labs:     Recent Labs     01/19/23  1335   WBC 10.3   HGB 7.7*   HCT 21.9*        Recent Labs     01/19/23  1513      K 4.2      CO2 22   BUN 13   CREATININE 0.7   CALCIUM 8.7     Recent Labs     01/19/23  1513   AST 56*   ALT 12   BILITOT 1.8*   ALKPHOS 48     Recent Labs     01/19/23  1513   INR 1.1     No results for input(s): Rodolfona Khat in the last 72 hours. Urinalysis:      Lab Results   Component Value Date/Time    NITRU Negative 01/19/2023 01:55 PM    [address] 01/19/2023 01:55 PM    BACTERIA MODERATE 01/19/2023 01:55 PM    RBCUA 1-3 01/19/2023 01:55 PM    BLOODU MODERATE 01/19/2023 01:55 PM    SPECGRAV 1.025 01/19/2023 01:55 PM    GLUCOSEU Negative 01/19/2023 01:55 PM         ASSESSMENT:    Active Hospital Problems    Diagnosis Date Noted    Anemia [D64.9] 01/20/2023     Priority: Medium     #Acute anemia  -etiology is unclear, but dysfunctional uterine bleeding is a possibility in light of her irregular periods with heavy bleeding and occasional breakthrough bleeding  -Hb is 7.7.   -Gynecology consulted  -hydrate gently with IVF  -type and cross ordered  -transfuse if Hb <7  -iron panel showed iron of 217 with total iron binding capacity of 260 giving iron saturation of 83%. Ferritin was 1270.   The elevated ferritin could be an acute phase reactant from her recent infection.  -Anemia is therefore not due to iron deficiency  -await gynecology evaluation; based on recommendations, will also consult hematology    #GERD: not on any meds.     #Recent history of Bell's palsy  - was recently treated with IVIG and steroids. -stable    DVT prophylaxis; SCDs      Diet: ADULT DIET; Regular;  Low Sodium (2 gm)  Code Status: Full Code    PT/OT Eval Status: consulted    Dispo - to be determined       Fausto Christensen MD MOLECULAR PCR

## 2023-01-23 NOTE — ED ADULT NURSE NOTE - CHIEF COMPLAINT
The patient is a 76y Male complaining of abnormal lab result. Rinvoq Counseling: I discussed with the patient the risks of Rinvoq therapy including but not limited to upper respiratory tract infections, shingles, cold sores, bronchitis, nausea, cough, fever, acne, and headache. Live vaccines should be avoided.  This medication has been linked to serious infections; higher rate of mortality; malignancy and lymphoproliferative disorders; major adverse cardiovascular events; thrombosis; thrombocytopenia, anemia, and neutropenia; lipid elevations; liver enzyme elevations; and gastrointestinal perforations.

## 2023-05-15 NOTE — ED PROVIDER NOTE - MDM ORDERS SUBMITTED SELECTION
Nephrology Consult Note    Reason for Consult: Chronic kidney disease management, metabolic acidosis  Requesting Physician:  Dave    Chief Complaint: Weakness and nausea and throwing up and decreased appetite  History Obtained From:  patient, electronic medical record    History of Present Illness: This is a 71 y.o. male who presents with decreased appetite weakness and fatigue nausea vomiting and weight loss of greater than 10 pounds, per the patient. We are asked to see the patient for chronic kidney disease management and significant metabolic acidosis. This morning, the patient's serum bicarb level was 9 and ABG showed a partially compensated respiratory acidosis as documented in the record. Baseline creatinine 1.5-1.8 based on previous lab data going back to May of last year. The only known aching reviewed from nephrology is in August 2013. No recent visits to nephrology that I can find. The patient was diagnosed with COVID-19. He does have cough and some sputum. He denies any swelling of the extremities. He feels quite dehydrated. He received a liter of normal saline on admission and received some more intravenous fluid estimate up to 1.5 L per the patient currently is running a normal saline at 50 mL an hour. We will be changing the patient to a bicarbonate containing IV drip at a faster rate. Review of the renal ultrasound back in April 2016 shows no hydronephrosis with right kidney 14.6 cm and left kidney 11.6 mm with no cortical thinning mass stone or hydronephrosis seen. There were cysts on both kidneys. On this admission, the patient has an elevated troponin and cardiology is seeing the patient. Is on room air. He is hard of hearing. He has other diagnoses include essential hypertension, proteinuria, myasthenia gravis, on kidney disease stage IIIb, anemia with iron deficiency, seizure history and dysmetabolic syndrome.   Home medications are reviewed and in
cramps. Genitourinary: No increased urinary frequency, or dysuria. No hematuria. No suprapubic or CVA pain  Musculoskeletal: No muscle aches or pains. No joint effusions, swelling or deformities  Hematologic: No bleeding or bruising. Neurologic: No headache, weakness, numbness, or tingling. Integument: No rash, no ulcers. Psychiatric: No depression. Endocrine: No polyuria, no polydipsia, no polyphagia. Physical Examination :   Patient Vitals for the past 8 hrs:   BP Temp Temp src Pulse Resp SpO2 Weight   05/14/23 1909 (!) 154/63 97.9 °F (36.6 °C) Oral 73 -- 99 % --   05/14/23 1831 123/87 -- -- 79 22 97 % --   05/14/23 1815 106/74 -- -- 62 17 96 % --   05/14/23 1730 (!) 141/56 -- -- 81 13 97 % --   05/14/23 1715 137/66 -- -- 75 19 97 % --   05/14/23 1645 139/73 -- -- 82 23 97 % --   05/14/23 1335 -- -- -- 76 16 95 % --   05/14/23 1330 (!) 142/71 -- -- 77 19 -- --   05/14/23 1318 (!) 142/66 -- -- 74 20 90 % --   05/14/23 1256 110/74 97 °F (36.1 °C) Oral 79 16 98 % 165 lb (74.8 kg)     General Appearance: Awake, alert, and in no apparent distress  Head:  Normocephalic, no trauma  Eyes: Pupils equal, round, reactive to light; sclera anicteric; conjunctivae pink. No embolic phenomena. ENT: Oropharynx clear, without erythema, exudate, or thrush. No tenderness of sinuses. Mouth/throat: mucosa pink and moist. No lesions. Dentition in good repair. Neck:Supple, without lymphadenopathy. Thyroid normal, No bruits. Pulmonary/Chest: Clear to auscultation, without wheezes, rales, or rhonchi. No dullness to percussion. Cardiovascular: Regular rate and rhythm without murmurs, rubs, or gallops. Abdomen: Soft, non tender. Bowel sounds normal. No organomegaly  All four Extremities: No cyanosis, clubbing, edema, or effusions. Neurologic: No gross sensory or motor deficits. Skin: Warm and dry with good turgor. No signs of peripheral arterial or venous insufficiency. No ulcerations. No open wounds.     Medical Decision
focal process. There is no effusion or pneumothorax. The cardiomediastinal silhouette is without acute process. The osseous structures are without acute process. No acute process. Last EKG:   Last EKG on 5/14/2023 showed right bundle branch block, ST depression in lead I and II, ST elevation in lead aVR    Last Echo:   Estimated ejection fraction is 55 % . Mildly increased wall thickness of the left ventricle. Evidence of diastolic dysfunction. Sclerotic aortic valve with focal calcification and thickening seen on the  RCC; There is evidence of mild aortic stenosis. Peak instantaneous gradient 23 mmHg and mean gradient 10 mmHg. No significant regurgitation is seen. Mild mitral regurgitation. Last Stress test/ LHC:   Coronary Findings Diagnostic Dominance: Left   Left Anterior Descending: There is mild diffuse disease throughout the vessel. Left Circumflex: There is mild diffuse disease throughout the vessel. Large vessel Dist Cx lesion is 50% stenosed. Right Coronary Artery: The vessel exhibits minimal luminal irregularities. Tiny vessel     IMPRESSION:    Principal Problem:    NSTEMI (non-ST elevated myocardial infarction) ([address]  Resolved Problems:    * No resolved hospital problems.  *      Elevated troponin 58> 55  NSTEMI likely type II   CKD stage IIIb based on EGFR estimation  COVID-19 positive  Leukocytosis  Seizure management as per primary  Ejection fraction 55% on last echo in 2016  Cardiac cath on 2020 showing mild diffuse disease throughout LAD and left circumflex    RECOMMENDATIONS:  Labs, ECG, prior notes, telemetry,  MAR reviewed   Will get 2D echo once out of the isolation from COVID-19  Further recommendation pending 2D echo, if there is regional wall motion abnormality patient will likely need a cath  Continue the heparin drip as for now  Keep potassium> 4, magnesium> 2  Await further attending attestation      Final plan and recommendation will follow discussion with
Labs/Imaging Studies

## 2023-08-16 NOTE — PROGRESS NOTE ADULT - PROBLEM SELECTOR PROBLEM 2
08/15/23 1145   Discharge Assessment   Assessment Type Discharge Planning Assessment   Confirmed/corrected address, phone number and insurance Yes   Confirmed Demographics Correct on Facesheet   Source of Information patient   When was your last doctors appointment?   (Magdi Rivera)   Reason For Admission Atrial fibrillation   E87.6 Hypokalemia  I10 Hypertension, poor control  R60.0 Bilateral lower extremity edema  I50.9 Congestive heart failure, unspecified HF chronicity, unspecified heart failure type   People in Home sibling(s);other relative(s)   Facility Arrived From: Home   Do you expect to return to your current living situation? Yes   Do you have help at home or someone to help you manage your care at home? Yes   Who are your caregiver(s) and their phone number(s)? Analisa Daniel (Daughter)   [phone]; Beth Wilson ([phone])   Prior to hospitilization cognitive status: Alert/Oriented   Current cognitive status: Alert/Oriented   Walking or Climbing Stairs ambulation difficulty, requires equipment   Mobility Management Cane   Equipment Currently Used at Home cane, quad   Readmission within 30 days? No   Patient currently being followed by outpatient case management? No   Do you currently have service(s) that help you manage your care at home? No   Do you take prescription medications? Yes  ([facility])   Who is going to help you get home at discharge? Family   How do you get to doctors appointments? public transportation   Are you on dialysis? No   DME Needed Upon Discharge  none   Discharge Plan discussed with: Patient   Transition of Care Barriers None   Discharge Plan A Home with family   OTHER   Name(s) of People in Home Resides with brother & Nehew     Pt  with 9 children; Good fly support; Analisa Wilson, is nurse; Pt independent with ADL's; CM to follow for d/c planning needs.  
  Problem: Adult Inpatient Plan of Care  Goal: Plan of Care Review  Outcome: Ongoing, Progressing  Goal: Patient-Specific Goal (Individualized)  Outcome: Ongoing, Progressing  Goal: Absence of Hospital-Acquired Illness or Injury  Outcome: Ongoing, Progressing  Goal: Optimal Comfort and Wellbeing  Outcome: Ongoing, Progressing  Goal: Readiness for Transition of Care  Outcome: Ongoing, Progressing     
Patient ready for discharge  
Plan of care reviewed  
Ready for discharge  
Pneumonia

## 2023-11-02 NOTE — PATIENT PROFILE ADULT - NSTOBACCONEVERSMOKERY/N_GEN_A
"Recommendations from today's MTM visit:                                                       Continue to focus on diet and exercise as well as taking your medications as prescribed at this time.     Follow-up: Return in 6 weeks (on 12/14/2023) for Follow up, with me.    It was great speaking with you today.  I value your experience and would be very thankful for your time in providing feedback in our clinic survey. In the next few days, you may receive an email or text message from [employer] with a link to a survey related to your  clinical pharmacist.\"     To schedule another MTM appointment, please call the clinic directly or you may call the MTM scheduling line at [phone] or toll-free at [phone].     My Clinical Pharmacist's contact information:                                                      Please feel free to contact me with any questions or concerns you have.      Evelyn Odell, PharmD  Medication Therapy Management Pharmacist   "
Yes

## 2023-12-12 NOTE — PROGRESS NOTE ADULT - NS_MD_PANP_GEN_ALL_CORE
Stable  Continue allopurinol  Monitor for symptoms  
Attending and PA/NP shared services statement (NON-critical care):

## 2024-03-01 NOTE — CONSULT NOTE ADULT - GASTROINTESTINAL
Patient left ED in no acute distress, alert and oriented x4. Patient was encourage to come back if symptoms get worse. Patient was provided with discharge instructions and prescriptions. All questions were answered. Patient left ambulatory.      detailed exam

## 2025-01-14 NOTE — PROGRESS NOTE ADULT - PROBLEM SELECTOR PROBLEM 3
BMI 45.20 kg/m²     BP Readings from Last 2 Encounters:   01/14/25 130/72   12/19/24 130/82       Wt Readings from Last 3 Encounters:   01/14/25 (!) 146.9 kg (323 lb 14.4 oz)   12/19/24 (!) 148.8 kg (328 lb)   07/09/24 (!) 143.8 kg (317 lb)       Physical Exam  Constitutional:       Appearance: He is obese.   HENT:      Head: Normocephalic and atraumatic.   Eyes:      Extraocular Movements: Extraocular movements intact.   Cardiovascular:      Rate and Rhythm: Normal rate and regular rhythm.   Pulmonary:      Effort: Pulmonary effort is normal.      Breath sounds: Normal breath sounds. No decreased breath sounds.   Musculoskeletal:      Right lower leg: Edema present.      Left lower leg: Edema present.   Skin:     General: Skin is warm and dry.   Neurological:      General: No focal deficit present.      Mental Status: He is alert and oriented to person, place, and time.   Psychiatric:         Mood and Affect: Mood normal.         Behavior: Behavior normal.           ASSESSMENT/PLAN:    1. Bilateral leg edema  Significant bilateral lower extremity edema and some chronic lower leg changes, especially on the left.  No significant calf pain and negative Homans' sign noted.  Patient already on anticoagulation.  Seems less likely to be any evidence of DVT given anticoagulation and physical exam.  Trial of medication as below.  I suspect some of his symptoms are due to his weight, inactivity, not elevating his feet due to other underlying back issues.  As needed medication as below.  Check labs as below as well  - torsemide (DEMADEX) 10 MG tablet; Take 1 tablet by mouth daily  Dispense: 30 tablet; Refill: 3  - Comprehensive Metabolic Panel  - TSH  - T4, Free    2. Anemia, unspecified type  Mild on last check.  Has been on Xarelto.  Repeat labs  - CBC with Auto Differential      This document was prepared by a combination of typing and transcription through a voice recognition software.           Pleural effusion, left

## 2025-03-21 NOTE — ED ADULT NURSE NOTE - NSSUHOSCREENINGYN_ED_ALL_ED
Chronic, stable  Continue Flonase, zyrtec           No - the patient is unable to be screened due to medical condition